# Patient Record
Sex: FEMALE | URBAN - NONMETROPOLITAN AREA
[De-identification: names, ages, dates, MRNs, and addresses within clinical notes are randomized per-mention and may not be internally consistent; named-entity substitution may affect disease eponyms.]

---

## 2019-03-14 ENCOUNTER — HISTORICAL (OUTPATIENT)
Dept: ADMINISTRATIVE | Facility: HOSPITAL | Age: 64
End: 2019-03-14

## 2019-03-26 ENCOUNTER — HISTORICAL (OUTPATIENT)
Dept: ADMINISTRATIVE | Facility: HOSPITAL | Age: 64
End: 2019-03-26

## 2019-04-18 ENCOUNTER — HISTORICAL (OUTPATIENT)
Dept: RADIOLOGY | Facility: HOSPITAL | Age: 64
End: 2019-04-18

## 2019-09-10 ENCOUNTER — HISTORICAL (OUTPATIENT)
Dept: ADMINISTRATIVE | Facility: HOSPITAL | Age: 64
End: 2019-09-10

## 2020-01-24 ENCOUNTER — HISTORICAL (OUTPATIENT)
Dept: RADIOLOGY | Facility: HOSPITAL | Age: 65
End: 2020-01-24

## 2020-02-07 ENCOUNTER — HISTORICAL (OUTPATIENT)
Dept: RADIOLOGY | Facility: HOSPITAL | Age: 65
End: 2020-02-07

## 2020-04-20 ENCOUNTER — HISTORICAL (OUTPATIENT)
Dept: LAB | Facility: HOSPITAL | Age: 65
End: 2020-04-20

## 2020-04-20 LAB
ALBUMIN SERPL-MCNC: 3 GM/DL (ref 3.4–5)
ALBUMIN/GLOB SERPL: 0.6 RATIO
ALP SERPL-CCNC: 434 UNIT/L (ref 30–113)
ALT SERPL-CCNC: 56 UNIT/L (ref 10–45)
AST SERPL-CCNC: 26 UNIT/L (ref 15–37)
BILIRUB SERPL-MCNC: 0.4 MG/DL (ref 0.1–0.9)
BILIRUBIN DIRECT+TOT PNL SERPL-MCNC: 0.2 MG/DL
BILIRUBIN DIRECT+TOT PNL SERPL-MCNC: 0.2 MG/DL (ref 0–0.3)
BUN SERPL-MCNC: 14 MG/DL (ref 10–20)
CALCIUM SERPL-MCNC: 9.5 MG/DL (ref 8–10.5)
CHLORIDE SERPL-SCNC: 105 MMOL/L (ref 100–108)
CHOLEST SERPL-MCNC: 113 MG/DL (ref 140–200)
CHOLEST/HDLC SERPL: 2 MG/DL (ref 0–8)
CO2 SERPL-SCNC: 26 MMOL/L (ref 21–35)
CREAT SERPL-MCNC: 0.88 MG/DL (ref 0.7–1.3)
GLOBULIN SER-MCNC: 4.9 GM/DL
GLUCOSE SERPL-MCNC: 101 MG/DL (ref 75–116)
HDLC SERPL-MCNC: 70 MG/DL (ref 35–59)
LDLC SERPL CALC-MCNC: 35 MG/DL (ref 100–129)
POTASSIUM SERPL-SCNC: 4 MMOL/L (ref 3.6–5.2)
PROT SERPL-MCNC: 7.9 GM/DL (ref 6.4–8.2)
SODIUM SERPL-SCNC: 141 MMOL/L (ref 135–145)
TRIGL SERPL-MCNC: 35 MG/DL (ref 35–150)
VLDLC SERPL CALC-MCNC: 7 MG/DL

## 2020-07-30 ENCOUNTER — HISTORICAL (OUTPATIENT)
Dept: RADIOLOGY | Facility: HOSPITAL | Age: 65
End: 2020-07-30

## 2020-08-12 ENCOUNTER — HISTORICAL (OUTPATIENT)
Dept: RADIOLOGY | Facility: HOSPITAL | Age: 65
End: 2020-08-12

## 2020-09-11 ENCOUNTER — HISTORICAL (OUTPATIENT)
Dept: ADMINISTRATIVE | Facility: HOSPITAL | Age: 65
End: 2020-09-11

## 2020-09-11 ENCOUNTER — HOSPITAL ENCOUNTER (OUTPATIENT)
Dept: MEDSURG UNIT | Facility: HOSPITAL | Age: 65
End: 2020-09-14
Attending: INTERNAL MEDICINE | Admitting: INTERNAL MEDICINE

## 2020-09-11 LAB
ABS NEUT (OLG): 7.5 X10(3)/MCL (ref 1.5–6.9)
ABS NEUT (OLG): 8.35 X10(3)/MCL (ref 2.1–9.2)
ALBUMIN SERPL-MCNC: 2.6 GM/DL (ref 3.4–4.8)
ALBUMIN SERPL-MCNC: 2.6 GM/DL (ref 3.4–5)
ALBUMIN/GLOB SERPL: 0.5 RATIO (ref 1.1–2)
ALBUMIN/GLOB SERPL: 0.5 RATIO (ref 1.1–2)
ALP SERPL-CCNC: 160 UNIT/L (ref 45–117)
ALP SERPL-CCNC: 164 UNIT/L (ref 40–150)
ALT SERPL-CCNC: 13 UNIT/L (ref 12–78)
ALT SERPL-CCNC: 8 UNIT/L (ref 0–55)
ANISOCYTOSIS BLD QL SMEAR: ABNORMAL
ANISOCYTOSIS BLD QL SMEAR: NORMAL
APPEARANCE, UA: CLEAR
APTT PPP: 45 SEC (ref 23.4–34.9)
APTT PPP: 49.4 SECOND(S) (ref 23.3–37)
AST SERPL-CCNC: 13 UNIT/L (ref 5–34)
AST SERPL-CCNC: 22 UNIT/L (ref 15–37)
BACTERIA SPEC CULT: ABNORMAL /HPF
BASOPHILS # BLD AUTO: 0 X10(3)/MCL (ref 0–0.1)
BASOPHILS # BLD AUTO: 0 X10(3)/MCL (ref 0–0.2)
BASOPHILS NFR BLD AUTO: 0 %
BASOPHILS NFR BLD AUTO: 0 % (ref 0–1)
BILIRUB SERPL-MCNC: 0.3 MG/DL
BILIRUB SERPL-MCNC: 0.3 MG/DL (ref 0.2–1)
BILIRUB UR QL STRIP: ABNORMAL
BILIRUBIN DIRECT+TOT PNL SERPL-MCNC: 0.1 MG/DL
BILIRUBIN DIRECT+TOT PNL SERPL-MCNC: 0.1 MG/DL (ref 0–0.8)
BILIRUBIN DIRECT+TOT PNL SERPL-MCNC: 0.2 MG/DL (ref 0–0.2)
BILIRUBIN DIRECT+TOT PNL SERPL-MCNC: 0.2 MG/DL (ref 0–0.5)
BUN SERPL-MCNC: 16 MG/DL (ref 7–18)
BUN SERPL-MCNC: 17 MG/DL (ref 9.8–20.1)
CALCIUM SERPL-MCNC: 9.2 MG/DL (ref 8.5–10.1)
CALCIUM SERPL-MCNC: 9.5 MG/DL (ref 8.4–10.2)
CHLORIDE SERPL-SCNC: 95 MMOL/L (ref 98–107)
CHLORIDE SERPL-SCNC: 95 MMOL/L (ref 98–107)
CO2 SERPL-SCNC: 28 MMOL/L (ref 23–31)
CO2 SERPL-SCNC: 32 MMOL/L (ref 21–32)
COLOR UR: YELLOW
CREAT SERPL-MCNC: 0.88 MG/DL (ref 0.55–1.02)
CREAT SERPL-MCNC: 0.9 MG/DL (ref 0.6–1.3)
CROSSMATCH INTERPRETATION: NORMAL
EOSINOPHIL # BLD AUTO: 0 X10(3)/MCL (ref 0–0.9)
EOSINOPHIL NFR BLD AUTO: 0 %
ERYTHROCYTE [DISTWIDTH] IN BLOOD BY AUTOMATED COUNT: 17.3 % (ref 11.5–14.5)
ERYTHROCYTE [DISTWIDTH] IN BLOOD BY AUTOMATED COUNT: 17.3 % (ref 11.5–17)
FERRITIN SERPL-MCNC: 131.58 NG/ML (ref 4.63–204)
FOLATE SERPL-MCNC: 9.9 NG/ML (ref 7–31.4)
GLOBULIN SER-MCNC: 5.2 GM/DL (ref 2.4–3.5)
GLOBULIN SER-MCNC: 5.7 GM/ML (ref 2.3–3.5)
GLUCOSE (UA): NEGATIVE MG/DL
GLUCOSE SERPL-MCNC: 102 MG/DL (ref 82–115)
GLUCOSE SERPL-MCNC: 114 MG/DL (ref 74–106)
GROUP & RH: NORMAL
HCT VFR BLD AUTO: 24.1 % (ref 35–46)
HCT VFR BLD AUTO: 24.9 % (ref 36–48)
HGB BLD-MCNC: 6.9 GM/DL (ref 12–16)
HGB BLD-MCNC: 7.3 GM/DL (ref 12–16)
HGB UR QL STRIP: NEGATIVE
HYPOCHROMIA BLD QL SMEAR: ABNORMAL
HYPOCHROMIA BLD QL SMEAR: NORMAL
IMM GRANULOCYTES # BLD AUTO: 0.05 10*3/UL
IMM GRANULOCYTES # BLD AUTO: 0.05 10*3/UL (ref 0–0.02)
IMM GRANULOCYTES NFR BLD AUTO: 0 %
IMM GRANULOCYTES NFR BLD AUTO: 0.4 % (ref 0–0.43)
INR PPP: 1 (ref 2–3)
INR PPP: 1.07 (ref 0.9–1.2)
IRON SATN MFR SERPL: 6 % (ref 20–50)
IRON SERPL-MCNC: 16 UG/DL (ref 50–170)
KETONES UR QL STRIP: 15 MG/DL
LEUKOCYTE ESTERASE UR QL STRIP: NEGATIVE
LYMPHOCYTES # BLD AUTO: 2.2 X10(3)/MCL (ref 0.6–4.6)
LYMPHOCYTES # BLD AUTO: 3.2 X10(3)/MCL (ref 0.5–4.1)
LYMPHOCYTES NFR BLD AUTO: 19 %
LYMPHOCYTES NFR BLD AUTO: 27 % (ref 15–40)
MAGNESIUM SERPL-MCNC: 2.24 MG/DL (ref 1.6–2.6)
MCH RBC QN AUTO: 22 PG (ref 27–34)
MCH RBC QN AUTO: 22.6 PG (ref 26–34)
MCHC RBC AUTO-ENTMCNC: 28.6 GM/DL (ref 31–37)
MCHC RBC AUTO-ENTMCNC: 29 GM/DL (ref 31–36)
MCV RBC AUTO: 77 FL (ref 80–99)
MCV RBC AUTO: 79 FL (ref 80–100)
MICROCYTES BLD QL SMEAR: ABNORMAL
MICROCYTES BLD QL SMEAR: NORMAL
MONOCYTES # BLD AUTO: 0.9 X10(3)/MCL (ref 0.1–1.3)
MONOCYTES # BLD AUTO: 1.1 X10(3)/MCL (ref 0–1.1)
MONOCYTES NFR BLD AUTO: 10 % (ref 4–12)
MONOCYTES NFR BLD AUTO: 8 %
NEUTROPHILS # BLD AUTO: 7.5 X10(3)/MCL (ref 1.5–6.9)
NEUTROPHILS # BLD AUTO: 8.35 X10(3)/MCL (ref 2.1–9.2)
NEUTROPHILS NFR BLD AUTO: 63 % (ref 43–75)
NEUTROPHILS NFR BLD AUTO: 73 %
NITRITE UR QL STRIP: NEGATIVE
PH UR STRIP: 5.5 [PH] (ref 4.6–8)
PLATELET # BLD AUTO: 679 X10(3)/MCL (ref 140–400)
PLATELET # BLD AUTO: 747 X10(3)/MCL (ref 130–400)
PLATELET # BLD EST: ABNORMAL 10*3/UL
PLATELET # BLD EST: NORMAL 10*3/UL
PMV BLD AUTO: 8.3 FL (ref 6.8–10)
PMV BLD AUTO: 8.6 FL (ref 7.4–10.4)
POTASSIUM SERPL-SCNC: 2.4 MMOL/L (ref 3.5–5.1)
POTASSIUM SERPL-SCNC: 2.7 MMOL/L (ref 3.5–5.1)
PRODUCT READY: NORMAL
PRODUCT READY: NORMAL
PROT SERPL-MCNC: 7.8 GM/DL (ref 5.8–7.6)
PROT SERPL-MCNC: 8.3 GM/DL (ref 6.4–8.2)
PROT UR QL STRIP: ABNORMAL
PROTHROMBIN TIME: 13.3 SEC (ref 11.7–14.5)
PROTHROMBIN TIME: 13.5 SECOND(S) (ref 11.9–14.4)
RBC # BLD AUTO: 3.05 X10(6)/MCL (ref 4–5.2)
RBC # BLD AUTO: 3.24 X10(6)/MCL (ref 4.2–5.4)
RBC #/AREA URNS HPF: ABNORMAL /HPF
RBC MORPH BLD: ABNORMAL
RBC MORPH BLD: NORMAL
SODIUM SERPL-SCNC: 137 MMOL/L (ref 136–145)
SODIUM SERPL-SCNC: 139 MMOL/L (ref 136–145)
SP GR UR STRIP: 1.01 (ref 1–1.03)
SQUAMOUS EPITHELIAL, UA: ABNORMAL /LPF
TIBC SERPL-MCNC: 232 UG/DL (ref 70–310)
TIBC SERPL-MCNC: 248 UG/DL (ref 250–450)
TRANSFUSION ORDER: NORMAL
TROPONIN I SERPL-MCNC: 0.02 NG/ML (ref 0–0.04)
UROBILINOGEN UR STRIP-ACNC: 1 EU/DL
VIT B12 SERPL-MCNC: 899 PG/ML (ref 213–816)
WBC # SPEC AUTO: 11.5 X10(3)/MCL (ref 4.5–11)
WBC # SPEC AUTO: 11.9 X10(3)/MCL (ref 4.5–11.5)
WBC #/AREA URNS HPF: ABNORMAL /HPF

## 2020-09-12 LAB
ABS NEUT (OLG): 7.3 X10(3)/MCL (ref 1.5–6.9)
ALBUMIN SERPL-MCNC: 2.4 GM/DL (ref 3.4–4.8)
ALBUMIN/GLOB SERPL: 0.5 RATIO (ref 1.1–2)
ALP SERPL-CCNC: 154 UNIT/L (ref 40–150)
ALT SERPL-CCNC: 7 UNIT/L (ref 0–55)
AST SERPL-CCNC: 12 UNIT/L (ref 5–34)
BASOPHILS # BLD AUTO: 0 X10(3)/MCL (ref 0–0.1)
BASOPHILS NFR BLD AUTO: 0 % (ref 0–1)
BILIRUB SERPL-MCNC: 0.6 MG/DL
BILIRUBIN DIRECT+TOT PNL SERPL-MCNC: 0.3 MG/DL (ref 0–0.5)
BILIRUBIN DIRECT+TOT PNL SERPL-MCNC: 0.3 MG/DL (ref 0–0.8)
BUN SERPL-MCNC: 16 MG/DL (ref 9.8–20.1)
CALCIUM SERPL-MCNC: 9.3 MG/DL (ref 8.4–10.2)
CHLORIDE SERPL-SCNC: 94 MMOL/L (ref 98–107)
CO2 SERPL-SCNC: 31 MMOL/L (ref 23–31)
CREAT SERPL-MCNC: 0.68 MG/DL (ref 0.55–1.02)
EOSINOPHIL # BLD AUTO: 0 X10(3)/MCL (ref 0–0.6)
EOSINOPHIL NFR BLD AUTO: 0 % (ref 0–5)
ERYTHROCYTE [DISTWIDTH] IN BLOOD BY AUTOMATED COUNT: 17.1 % (ref 11.5–17)
GLOBULIN SER-MCNC: 4.9 GM/DL (ref 2.4–3.5)
GLUCOSE SERPL-MCNC: 82 MG/DL (ref 82–115)
HCT VFR BLD AUTO: 26.7 % (ref 36–48)
HGB BLD-MCNC: 8 GM/DL (ref 12–16)
IMM GRANULOCYTES # BLD AUTO: 0.05 10*3/UL (ref 0–0.02)
IMM GRANULOCYTES NFR BLD AUTO: 0.4 % (ref 0–0.43)
LYMPHOCYTES # BLD AUTO: 3.2 X10(3)/MCL (ref 0.5–4.1)
LYMPHOCYTES NFR BLD AUTO: 27 % (ref 15–40)
MCH RBC QN AUTO: 23 PG (ref 27–34)
MCHC RBC AUTO-ENTMCNC: 30 GM/DL (ref 31–36)
MCV RBC AUTO: 76 FL (ref 80–99)
MONOCYTES # BLD AUTO: 1.2 X10(3)/MCL (ref 0–1.1)
MONOCYTES NFR BLD AUTO: 10 % (ref 4–12)
NEUTROPHILS # BLD AUTO: 7.3 X10(3)/MCL (ref 1.5–6.9)
NEUTROPHILS NFR BLD AUTO: 62 % (ref 43–75)
PLATELET # BLD AUTO: 596 X10(3)/MCL (ref 140–400)
PMV BLD AUTO: 8.1 FL (ref 6.8–10)
POTASSIUM SERPL-SCNC: 2.6 MMOL/L (ref 3.5–5.1)
PROT SERPL-MCNC: 7.3 GM/DL (ref 5.8–7.6)
RBC # BLD AUTO: 3.49 X10(6)/MCL (ref 4.2–5.4)
SODIUM SERPL-SCNC: 138 MMOL/L (ref 136–145)
WBC # SPEC AUTO: 11.8 X10(3)/MCL (ref 4.5–11.5)

## 2020-09-13 LAB
ABS NEUT (OLG): 10.6 X10(3)/MCL (ref 1.5–6.9)
ALBUMIN SERPL-MCNC: 2.2 GM/DL (ref 3.4–4.8)
ALBUMIN/GLOB SERPL: 0.5 RATIO (ref 1.1–2)
ALP SERPL-CCNC: 161 UNIT/L (ref 40–150)
ALT SERPL-CCNC: 7 UNIT/L (ref 0–55)
AST SERPL-CCNC: 10 UNIT/L (ref 5–34)
BASOPHILS # BLD AUTO: 0 X10(3)/MCL (ref 0–0.1)
BASOPHILS NFR BLD AUTO: 0 % (ref 0–1)
BILIRUB SERPL-MCNC: 0.5 MG/DL
BILIRUBIN DIRECT+TOT PNL SERPL-MCNC: 0.2 MG/DL (ref 0–0.8)
BILIRUBIN DIRECT+TOT PNL SERPL-MCNC: 0.3 MG/DL (ref 0–0.5)
BUN SERPL-MCNC: 8 MG/DL (ref 9.8–20.1)
CALCIUM SERPL-MCNC: 9.5 MG/DL (ref 8.4–10.2)
CHLORIDE SERPL-SCNC: 95 MMOL/L (ref 98–107)
CO2 SERPL-SCNC: 30 MMOL/L (ref 23–31)
CREAT SERPL-MCNC: 0.54 MG/DL (ref 0.55–1.02)
ERYTHROCYTE [DISTWIDTH] IN BLOOD BY AUTOMATED COUNT: 17.3 % (ref 11.5–17)
GLOBULIN SER-MCNC: 4.5 GM/DL (ref 2.4–3.5)
GLUCOSE SERPL-MCNC: 108 MG/DL (ref 82–115)
HCT VFR BLD AUTO: 26.9 % (ref 36–48)
HGB BLD-MCNC: 8.5 GM/DL (ref 12–16)
IMM GRANULOCYTES # BLD AUTO: 0.07 10*3/UL (ref 0–0.02)
IMM GRANULOCYTES NFR BLD AUTO: 0.5 % (ref 0–0.43)
LYMPHOCYTES # BLD AUTO: 2.2 X10(3)/MCL (ref 0.5–4.1)
LYMPHOCYTES NFR BLD AUTO: 15 % (ref 15–40)
MAGNESIUM SERPL-MCNC: 2.02 MG/DL (ref 1.6–2.6)
MCH RBC QN AUTO: 25 PG (ref 27–34)
MCHC RBC AUTO-ENTMCNC: 32 GM/DL (ref 31–36)
MCV RBC AUTO: 78 FL (ref 80–99)
MONOCYTES # BLD AUTO: 1.7 X10(3)/MCL (ref 0–1.1)
MONOCYTES NFR BLD AUTO: 12 % (ref 4–12)
NEUTROPHILS # BLD AUTO: 10.6 X10(3)/MCL (ref 1.5–6.9)
NEUTROPHILS NFR BLD AUTO: 73 % (ref 43–75)
PLATELET # BLD AUTO: 542 X10(3)/MCL (ref 140–400)
PMV BLD AUTO: 8 FL (ref 6.8–10)
POTASSIUM SERPL-SCNC: 2.9 MMOL/L (ref 3.5–5.1)
PROT SERPL-MCNC: 6.7 GM/DL (ref 5.8–7.6)
RBC # BLD AUTO: 3.46 X10(6)/MCL (ref 4.2–5.4)
SODIUM SERPL-SCNC: 137 MMOL/L (ref 136–145)
WBC # SPEC AUTO: 14.5 X10(3)/MCL (ref 4.5–11.5)

## 2020-09-14 LAB
ABS NEUT (OLG): 8.5 X10(3)/MCL (ref 1.5–6.9)
ALBUMIN SERPL-MCNC: 2.1 GM/DL (ref 3.4–4.8)
ALBUMIN/GLOB SERPL: 0.4 RATIO (ref 1.1–2)
ALP SERPL-CCNC: 176 UNIT/L (ref 40–150)
ALT SERPL-CCNC: 5 UNIT/L (ref 0–55)
AST SERPL-CCNC: 11 UNIT/L (ref 5–34)
BASOPHILS # BLD AUTO: 0 X10(3)/MCL (ref 0–0.1)
BASOPHILS NFR BLD AUTO: 0 % (ref 0–1)
BILIRUB SERPL-MCNC: 0.5 MG/DL
BILIRUBIN DIRECT+TOT PNL SERPL-MCNC: 0.2 MG/DL (ref 0–0.8)
BILIRUBIN DIRECT+TOT PNL SERPL-MCNC: 0.3 MG/DL (ref 0–0.5)
BUN SERPL-MCNC: 7 MG/DL (ref 9.8–20.1)
CALCIUM SERPL-MCNC: 9.5 MG/DL (ref 8.4–10.2)
CHLORIDE SERPL-SCNC: 93 MMOL/L (ref 98–107)
CO2 SERPL-SCNC: 30 MMOL/L (ref 23–31)
CREAT SERPL-MCNC: 0.52 MG/DL (ref 0.55–1.02)
EOSINOPHIL # BLD AUTO: 0 X10(3)/MCL (ref 0–0.6)
EOSINOPHIL NFR BLD AUTO: 0 % (ref 0–5)
ERYTHROCYTE [DISTWIDTH] IN BLOOD BY AUTOMATED COUNT: 17.9 % (ref 11.5–17)
GLOBULIN SER-MCNC: 4.9 GM/DL (ref 2.4–3.5)
GLUCOSE SERPL-MCNC: 94 MG/DL (ref 82–115)
HCT VFR BLD AUTO: 28.1 % (ref 36–48)
HGB BLD-MCNC: 8.7 GM/DL (ref 12–16)
IMM GRANULOCYTES # BLD AUTO: 0.07 10*3/UL (ref 0–0.02)
IMM GRANULOCYTES NFR BLD AUTO: 0.5 % (ref 0–0.43)
LYMPHOCYTES # BLD AUTO: 2.8 X10(3)/MCL (ref 0.5–4.1)
LYMPHOCYTES NFR BLD AUTO: 21 % (ref 15–40)
MAGNESIUM SERPL-MCNC: 1.98 MG/DL (ref 1.6–2.6)
MCH RBC QN AUTO: 24 PG (ref 27–34)
MCHC RBC AUTO-ENTMCNC: 31 GM/DL (ref 31–36)
MCV RBC AUTO: 79 FL (ref 80–99)
MONOCYTES # BLD AUTO: 1.5 X10(3)/MCL (ref 0–1.1)
MONOCYTES NFR BLD AUTO: 12 % (ref 4–12)
NEUTROPHILS # BLD AUTO: 8.5 X10(3)/MCL (ref 1.5–6.9)
NEUTROPHILS NFR BLD AUTO: 66 % (ref 43–75)
PLATELET # BLD AUTO: 615 X10(3)/MCL (ref 140–400)
PMV BLD AUTO: 8.4 FL (ref 6.8–10)
POTASSIUM SERPL-SCNC: 3.1 MMOL/L (ref 3.5–5.1)
PROT SERPL-MCNC: 7 GM/DL (ref 5.8–7.6)
RBC # BLD AUTO: 3.56 X10(6)/MCL (ref 4.2–5.4)
SODIUM SERPL-SCNC: 136 MMOL/L (ref 136–145)
WBC # SPEC AUTO: 12.9 X10(3)/MCL (ref 4.5–11.5)

## 2020-09-18 ENCOUNTER — HISTORICAL (OUTPATIENT)
Dept: ADMINISTRATIVE | Facility: HOSPITAL | Age: 65
End: 2020-09-18

## 2020-09-22 ENCOUNTER — HISTORICAL (OUTPATIENT)
Dept: SURGERY | Facility: HOSPITAL | Age: 65
End: 2020-09-22

## 2020-10-22 ENCOUNTER — HISTORICAL (OUTPATIENT)
Dept: ADMINISTRATIVE | Facility: HOSPITAL | Age: 65
End: 2020-10-22

## 2020-10-22 LAB
ABS NEUT (OLG): 8.88 X10(3)/MCL (ref 2.1–9.2)
ALBUMIN SERPL-MCNC: 2.5 GM/DL (ref 3.4–4.8)
ALBUMIN/GLOB SERPL: 0.3 RATIO (ref 1.1–2)
ALP SERPL-CCNC: 146 UNIT/L (ref 40–150)
ALT SERPL-CCNC: 9 UNIT/L (ref 0–55)
AST SERPL-CCNC: 17 UNIT/L (ref 5–34)
BASOPHILS # BLD AUTO: 0 X10(3)/MCL (ref 0–0.2)
BASOPHILS NFR BLD AUTO: 0 %
BILIRUB SERPL-MCNC: 0.2 MG/DL
BILIRUBIN DIRECT+TOT PNL SERPL-MCNC: -0.1 MG/DL (ref 0–0.8)
BILIRUBIN DIRECT+TOT PNL SERPL-MCNC: 0.3 MG/DL (ref 0–0.5)
BUN SERPL-MCNC: 27 MG/DL (ref 9.8–20.1)
CALCIUM SERPL-MCNC: 10 MG/DL (ref 8.4–10.2)
CHLORIDE SERPL-SCNC: 96 MMOL/L (ref 98–107)
CO2 SERPL-SCNC: 30 MMOL/L (ref 23–31)
CREAT SERPL-MCNC: 0.83 MG/DL (ref 0.55–1.02)
CRP SERPL-MCNC: 17.74 MG/DL
EOSINOPHIL # BLD AUTO: 0 X10(3)/MCL (ref 0–0.9)
EOSINOPHIL NFR BLD AUTO: 0 %
ERYTHROCYTE [DISTWIDTH] IN BLOOD BY AUTOMATED COUNT: 18.6 % (ref 11.5–14.5)
ERYTHROCYTE [SEDIMENTATION RATE] IN BLOOD: 110 MM/HR (ref 0–20)
FERRITIN SERPL-MCNC: 248.95 NG/ML (ref 4.63–204)
GLOBULIN SER-MCNC: 7.3 GM/DL (ref 2.4–3.5)
GLUCOSE SERPL-MCNC: 102 MG/DL (ref 82–115)
HAPTOGLOB SERPL-MCNC: 675 MG/DL (ref 63–273)
HCT VFR BLD AUTO: 26.9 % (ref 35–46)
HGB BLD-MCNC: 8 GM/DL (ref 12–16)
IMM GRANULOCYTES # BLD AUTO: 0.05 10*3/UL
IMM GRANULOCYTES NFR BLD AUTO: 0 %
IRON SATN MFR SERPL: 6 % (ref 20–50)
IRON SERPL-MCNC: 13 UG/DL (ref 50–170)
LDH SERPL-CCNC: 511 UNIT/L (ref 140–271)
LYMPHOCYTES # BLD AUTO: 2.4 X10(3)/MCL (ref 0.6–4.6)
LYMPHOCYTES NFR BLD AUTO: 19 %
MAGNESIUM SERPL-MCNC: 2.27 MG/DL (ref 1.6–2.6)
MCH RBC QN AUTO: 22.9 PG (ref 26–34)
MCHC RBC AUTO-ENTMCNC: 29.7 GM/DL (ref 31–37)
MCV RBC AUTO: 77.1 FL (ref 80–100)
MONOCYTES # BLD AUTO: 1.2 X10(3)/MCL (ref 0.1–1.3)
MONOCYTES NFR BLD AUTO: 9 %
NEUTROPHILS # BLD AUTO: 8.88 X10(3)/MCL (ref 2.1–9.2)
NEUTROPHILS NFR BLD AUTO: 71 %
PLATELET # BLD AUTO: 741 X10(3)/MCL (ref 130–400)
PMV BLD AUTO: 8.4 FL (ref 7.4–10.4)
POTASSIUM SERPL-SCNC: 3.4 MMOL/L (ref 3.5–5.1)
PROT SERPL-MCNC: 9.3 GM/DL
PROT SERPL-MCNC: 9.8 GM/DL (ref 5.8–7.6)
RBC # BLD AUTO: 3.49 X10(6)/MCL (ref 4–5.2)
RET# (OHS): 0.03 X10(6)/MCL (ref 0.02–0.08)
RETICULOCYTE COUNT AUTOMATED (OLG): 0.8 % (ref 0.5–1.5)
SODIUM SERPL-SCNC: 135 MMOL/L (ref 136–145)
TIBC SERPL-MCNC: 201 UG/DL (ref 70–310)
TIBC SERPL-MCNC: 214 UG/DL (ref 250–450)
TRANSFERRIN SERPL-MCNC: 188 MG/DL (ref 173–360)
WBC # SPEC AUTO: 12.5 X10(3)/MCL (ref 4.5–11)

## 2020-10-26 ENCOUNTER — HISTORICAL (OUTPATIENT)
Dept: INFUSION THERAPY | Facility: HOSPITAL | Age: 65
End: 2020-10-26

## 2020-10-29 ENCOUNTER — HISTORICAL (OUTPATIENT)
Dept: HEMATOLOGY/ONCOLOGY | Facility: CLINIC | Age: 65
End: 2020-10-29

## 2020-10-29 LAB
ABS NEUT (OLG): 10.75 X10(3)/MCL (ref 2.1–9.2)
ALBUMIN SERPL-MCNC: 2.4 GM/DL (ref 3.4–4.8)
ALBUMIN/GLOB SERPL: 0.3 RATIO (ref 1.1–2)
ALP SERPL-CCNC: 155 UNIT/L (ref 40–150)
ALT SERPL-CCNC: 14 UNIT/L (ref 0–55)
AST SERPL-CCNC: 21 UNIT/L (ref 5–34)
BASOPHILS # BLD AUTO: 0 X10(3)/MCL (ref 0–0.2)
BASOPHILS NFR BLD AUTO: 0 %
BILIRUB SERPL-MCNC: 0.3 MG/DL
BILIRUBIN DIRECT+TOT PNL SERPL-MCNC: 0.1 MG/DL (ref 0–0.8)
BILIRUBIN DIRECT+TOT PNL SERPL-MCNC: 0.2 MG/DL (ref 0–0.5)
BUN SERPL-MCNC: 18 MG/DL (ref 9.8–20.1)
CALCIUM SERPL-MCNC: 10 MG/DL (ref 8.4–10.2)
CHLORIDE SERPL-SCNC: 103 MMOL/L (ref 98–107)
CO2 SERPL-SCNC: 25 MMOL/L (ref 23–31)
CREAT SERPL-MCNC: 0.84 MG/DL (ref 0.55–1.02)
EOSINOPHIL # BLD AUTO: 0 X10(3)/MCL (ref 0–0.9)
EOSINOPHIL NFR BLD AUTO: 0 %
ERYTHROCYTE [DISTWIDTH] IN BLOOD BY AUTOMATED COUNT: 19.5 % (ref 11.5–14.5)
GLOBULIN SER-MCNC: 7.2 GM/DL (ref 2.4–3.5)
GLUCOSE SERPL-MCNC: 108 MG/DL (ref 82–115)
HCT VFR BLD AUTO: 26.1 % (ref 35–46)
HGB BLD-MCNC: 7.7 GM/DL (ref 12–16)
IMM GRANULOCYTES # BLD AUTO: 0.09 10*3/UL
IMM GRANULOCYTES NFR BLD AUTO: 1 %
LYMPHOCYTES # BLD AUTO: 3.8 X10(3)/MCL (ref 0.6–4.6)
LYMPHOCYTES NFR BLD AUTO: 24 %
MAGNESIUM SERPL-MCNC: 2.25 MG/DL (ref 1.6–2.6)
MCH RBC QN AUTO: 23.1 PG (ref 26–34)
MCHC RBC AUTO-ENTMCNC: 29.5 GM/DL (ref 31–37)
MCV RBC AUTO: 78.1 FL (ref 80–100)
MONOCYTES # BLD AUTO: 1.5 X10(3)/MCL (ref 0.1–1.3)
MONOCYTES NFR BLD AUTO: 9 %
NEUTROPHILS # BLD AUTO: 10.75 X10(3)/MCL (ref 2.1–9.2)
NEUTROPHILS NFR BLD AUTO: 66 %
PLATELET # BLD AUTO: 763 X10(3)/MCL (ref 130–400)
PMV BLD AUTO: 8.5 FL (ref 7.4–10.4)
POTASSIUM SERPL-SCNC: 4.7 MMOL/L (ref 3.5–5.1)
PROT SERPL-MCNC: 9.6 GM/DL (ref 5.8–7.6)
RBC # BLD AUTO: 3.34 X10(6)/MCL (ref 4–5.2)
SODIUM SERPL-SCNC: 136 MMOL/L (ref 136–145)
WBC # SPEC AUTO: 16.3 X10(3)/MCL (ref 4.5–11)

## 2020-11-02 ENCOUNTER — HISTORICAL (OUTPATIENT)
Dept: RADIOLOGY | Facility: HOSPITAL | Age: 65
End: 2020-11-02

## 2020-11-03 ENCOUNTER — HISTORICAL (OUTPATIENT)
Dept: INFUSION THERAPY | Facility: HOSPITAL | Age: 65
End: 2020-11-03

## 2020-11-06 ENCOUNTER — HISTORICAL (OUTPATIENT)
Dept: RADIOLOGY | Facility: HOSPITAL | Age: 65
End: 2020-11-06

## 2020-11-24 ENCOUNTER — HISTORICAL (OUTPATIENT)
Dept: RADIATION THERAPY | Facility: HOSPITAL | Age: 65
End: 2020-11-24

## 2020-11-25 ENCOUNTER — HISTORICAL (OUTPATIENT)
Dept: INFUSION THERAPY | Facility: HOSPITAL | Age: 65
End: 2020-11-25

## 2020-11-25 LAB
ABS NEUT (OLG): 8.68 X10(3)/MCL (ref 2.1–9.2)
ALBUMIN SERPL-MCNC: 2.4 GM/DL (ref 3.4–4.8)
ALBUMIN/GLOB SERPL: 0.3 RATIO (ref 1.1–2)
ALP SERPL-CCNC: 542 UNIT/L (ref 40–150)
ALT SERPL-CCNC: 24 UNIT/L (ref 0–55)
AST SERPL-CCNC: 26 UNIT/L (ref 5–34)
BASOPHILS # BLD AUTO: 0 X10(3)/MCL (ref 0–0.2)
BASOPHILS NFR BLD AUTO: 0 %
BILIRUB SERPL-MCNC: 0.3 MG/DL
BILIRUBIN DIRECT+TOT PNL SERPL-MCNC: 0.1 MG/DL (ref 0–0.8)
BILIRUBIN DIRECT+TOT PNL SERPL-MCNC: 0.2 MG/DL (ref 0–0.5)
BUN SERPL-MCNC: 21 MG/DL (ref 9.8–20.1)
CALCIUM SERPL-MCNC: 10.3 MG/DL (ref 8.4–10.2)
CHLORIDE SERPL-SCNC: 104 MMOL/L (ref 98–107)
CO2 SERPL-SCNC: 25 MMOL/L (ref 23–31)
CREAT SERPL-MCNC: 0.79 MG/DL (ref 0.55–1.02)
EOSINOPHIL # BLD AUTO: 0 X10(3)/MCL (ref 0–0.9)
EOSINOPHIL NFR BLD AUTO: 0 %
ERYTHROCYTE [DISTWIDTH] IN BLOOD BY AUTOMATED COUNT: 23.1 % (ref 11.5–14.5)
GLOBULIN SER-MCNC: 7 GM/DL (ref 2.4–3.5)
GLUCOSE SERPL-MCNC: 134 MG/DL (ref 82–115)
HCT VFR BLD AUTO: 27.1 % (ref 35–46)
HGB BLD-MCNC: 8 GM/DL (ref 12–16)
IMM GRANULOCYTES # BLD AUTO: 0.05 10*3/UL
IMM GRANULOCYTES NFR BLD AUTO: 0 %
LYMPHOCYTES # BLD AUTO: 2.2 X10(3)/MCL (ref 0.6–4.6)
LYMPHOCYTES NFR BLD AUTO: 18 %
MAGNESIUM SERPL-MCNC: 2.43 MG/DL (ref 1.6–2.6)
MCH RBC QN AUTO: 24.6 PG (ref 26–34)
MCHC RBC AUTO-ENTMCNC: 29.5 GM/DL (ref 31–37)
MCV RBC AUTO: 83.4 FL (ref 80–100)
MONOCYTES # BLD AUTO: 1.3 X10(3)/MCL (ref 0.1–1.3)
MONOCYTES NFR BLD AUTO: 11 %
NEUTROPHILS # BLD AUTO: 8.68 X10(3)/MCL (ref 2.1–9.2)
NEUTROPHILS NFR BLD AUTO: 70 %
PLATELET # BLD AUTO: 799 X10(3)/MCL (ref 130–400)
PMV BLD AUTO: 8.4 FL (ref 7.4–10.4)
POTASSIUM SERPL-SCNC: 3.5 MMOL/L (ref 3.5–5.1)
PROT SERPL-MCNC: 9.4 GM/DL (ref 5.8–7.6)
PTH-INTACT SERPL-MCNC: 32.9 PG/ML (ref 18.4–80.1)
RBC # BLD AUTO: 3.25 X10(6)/MCL (ref 4–5.2)
SODIUM SERPL-SCNC: 140 MMOL/L (ref 136–145)
WBC # SPEC AUTO: 12.3 X10(3)/MCL (ref 4.5–11)

## 2020-11-27 ENCOUNTER — HISTORICAL (OUTPATIENT)
Dept: ADMINISTRATIVE | Facility: HOSPITAL | Age: 65
End: 2020-11-27

## 2020-11-27 LAB
ABS NEUT (OLG): 8.46 X10(3)/MCL (ref 2.1–9.2)
ALBUMIN SERPL-MCNC: 2.1 GM/DL (ref 3.4–4.8)
ALBUMIN/GLOB SERPL: 0.3 RATIO (ref 1.1–2)
ALP SERPL-CCNC: 433 UNIT/L (ref 40–150)
ALT SERPL-CCNC: 19 UNIT/L (ref 0–55)
AST SERPL-CCNC: 21 UNIT/L (ref 5–34)
BASOPHILS # BLD AUTO: 0 X10(3)/MCL (ref 0–0.2)
BASOPHILS NFR BLD AUTO: 0 %
BILIRUB SERPL-MCNC: 0.3 MG/DL
BILIRUBIN DIRECT+TOT PNL SERPL-MCNC: 0.1 MG/DL (ref 0–0.8)
BILIRUBIN DIRECT+TOT PNL SERPL-MCNC: 0.2 MG/DL (ref 0–0.5)
BUN SERPL-MCNC: 6 MG/DL (ref 9.8–20.1)
CALCIUM SERPL-MCNC: 8.7 MG/DL (ref 8.4–10.2)
CHLORIDE SERPL-SCNC: 104 MMOL/L (ref 98–107)
CO2 SERPL-SCNC: 23 MMOL/L (ref 23–31)
CREAT SERPL-MCNC: 0.68 MG/DL (ref 0.55–1.02)
EOSINOPHIL # BLD AUTO: 0 X10(3)/MCL (ref 0–0.9)
EOSINOPHIL NFR BLD AUTO: 0 %
ERYTHROCYTE [DISTWIDTH] IN BLOOD BY AUTOMATED COUNT: 22.5 % (ref 11.5–14.5)
GLOBULIN SER-MCNC: 6.2 GM/DL (ref 2.4–3.5)
GLUCOSE SERPL-MCNC: 155 MG/DL (ref 82–115)
HCT VFR BLD AUTO: 24.9 % (ref 35–46)
HGB BLD-MCNC: 7.3 GM/DL (ref 12–16)
IMM GRANULOCYTES # BLD AUTO: 0.06 10*3/UL
IMM GRANULOCYTES NFR BLD AUTO: 0 %
LYMPHOCYTES # BLD AUTO: 2.2 X10(3)/MCL (ref 0.6–4.6)
LYMPHOCYTES NFR BLD AUTO: 19 %
MAGNESIUM SERPL-MCNC: 1.97 MG/DL (ref 1.6–2.6)
MCH RBC QN AUTO: 23.9 PG (ref 26–34)
MCHC RBC AUTO-ENTMCNC: 29.3 GM/DL (ref 31–37)
MCV RBC AUTO: 81.6 FL (ref 80–100)
MONOCYTES # BLD AUTO: 1.1 X10(3)/MCL (ref 0.1–1.3)
MONOCYTES NFR BLD AUTO: 10 %
NEUTROPHILS # BLD AUTO: 8.46 X10(3)/MCL (ref 2.1–9.2)
NEUTROPHILS NFR BLD AUTO: 71 %
PLATELET # BLD AUTO: 731 X10(3)/MCL (ref 130–400)
PMV BLD AUTO: 8.1 FL (ref 7.4–10.4)
POTASSIUM SERPL-SCNC: 3.8 MMOL/L (ref 3.5–5.1)
PROT SERPL-MCNC: 8.3 GM/DL (ref 5.8–7.6)
RBC # BLD AUTO: 3.05 X10(6)/MCL (ref 4–5.2)
SODIUM SERPL-SCNC: 136 MMOL/L (ref 136–145)
WBC # SPEC AUTO: 11.9 X10(3)/MCL (ref 4.5–11)

## 2020-11-30 ENCOUNTER — HISTORICAL (OUTPATIENT)
Dept: INFUSION THERAPY | Facility: HOSPITAL | Age: 65
End: 2020-11-30

## 2020-11-30 ENCOUNTER — HISTORICAL (OUTPATIENT)
Dept: RADIATION THERAPY | Facility: HOSPITAL | Age: 65
End: 2020-11-30

## 2020-11-30 LAB
CROSSMATCH INTERPRETATION: NORMAL
PRODUCT READY: NORMAL
TRANSFUSION ORDER: NORMAL

## 2020-12-03 ENCOUNTER — HISTORICAL (OUTPATIENT)
Dept: RADIATION THERAPY | Facility: HOSPITAL | Age: 65
End: 2020-12-03

## 2020-12-10 ENCOUNTER — HISTORICAL (OUTPATIENT)
Dept: ADMINISTRATIVE | Facility: HOSPITAL | Age: 65
End: 2020-12-10

## 2020-12-10 LAB
ABS NEUT (OLG): 7.67 X10(3)/MCL (ref 2.1–9.2)
ALBUMIN SERPL-MCNC: 2.4 GM/DL (ref 3.4–4.8)
ALBUMIN/GLOB SERPL: 0.4 RATIO (ref 1.1–2)
ALP SERPL-CCNC: 395 UNIT/L (ref 40–150)
ALT SERPL-CCNC: 19 UNIT/L (ref 0–55)
AST SERPL-CCNC: 17 UNIT/L (ref 5–34)
BASOPHILS # BLD AUTO: 0 X10(3)/MCL (ref 0–0.2)
BASOPHILS NFR BLD AUTO: 0 %
BILIRUB SERPL-MCNC: 0.3 MG/DL
BILIRUBIN DIRECT+TOT PNL SERPL-MCNC: 0.1 MG/DL (ref 0–0.8)
BILIRUBIN DIRECT+TOT PNL SERPL-MCNC: 0.2 MG/DL (ref 0–0.5)
BUN SERPL-MCNC: 13 MG/DL (ref 9.8–20.1)
CALCIUM SERPL-MCNC: 9.3 MG/DL (ref 8.4–10.2)
CHLORIDE SERPL-SCNC: 107 MMOL/L (ref 98–107)
CO2 SERPL-SCNC: 23 MMOL/L (ref 23–31)
CREAT SERPL-MCNC: 0.71 MG/DL (ref 0.55–1.02)
EOSINOPHIL # BLD AUTO: 0.1 X10(3)/MCL (ref 0–0.9)
EOSINOPHIL NFR BLD AUTO: 0 %
ERYTHROCYTE [DISTWIDTH] IN BLOOD BY AUTOMATED COUNT: 19.9 % (ref 11.5–14.5)
FERRITIN SERPL-MCNC: 1255.63 NG/ML (ref 4.63–204)
GLOBULIN SER-MCNC: 6.1 GM/DL (ref 2.4–3.5)
GLUCOSE SERPL-MCNC: 118 MG/DL (ref 82–115)
HCT VFR BLD AUTO: 33.7 % (ref 35–46)
HGB BLD-MCNC: 9.7 GM/DL (ref 12–16)
IMM GRANULOCYTES # BLD AUTO: 0.04 10*3/UL
IMM GRANULOCYTES NFR BLD AUTO: 0 %
IRON SATN MFR SERPL: 9 % (ref 20–50)
IRON SERPL-MCNC: 15 UG/DL (ref 50–170)
LYMPHOCYTES # BLD AUTO: 2.8 X10(3)/MCL (ref 0.6–4.6)
LYMPHOCYTES NFR BLD AUTO: 24 %
MCH RBC QN AUTO: 24.9 PG (ref 26–34)
MCHC RBC AUTO-ENTMCNC: 28.8 GM/DL (ref 31–37)
MCV RBC AUTO: 86.6 FL (ref 80–100)
MONOCYTES # BLD AUTO: 1.4 X10(3)/MCL (ref 0.1–1.3)
MONOCYTES NFR BLD AUTO: 12 %
NEUTROPHILS # BLD AUTO: 7.67 X10(3)/MCL (ref 2.1–9.2)
NEUTROPHILS NFR BLD AUTO: 64 %
PLATELET # BLD AUTO: 588 X10(3)/MCL (ref 130–400)
PMV BLD AUTO: 8.3 FL (ref 7.4–10.4)
POTASSIUM SERPL-SCNC: 3.9 MMOL/L (ref 3.5–5.1)
PROT SERPL-MCNC: 8.5 GM/DL (ref 5.8–7.6)
RBC # BLD AUTO: 3.89 X10(6)/MCL (ref 4–5.2)
SODIUM SERPL-SCNC: 138 MMOL/L (ref 136–145)
TIBC SERPL-MCNC: 157 UG/DL (ref 70–310)
TIBC SERPL-MCNC: 172 UG/DL (ref 250–450)
TRANSFERRIN SERPL-MCNC: 147 MG/DL (ref 173–360)
WBC # SPEC AUTO: 12 X10(3)/MCL (ref 4.5–11)

## 2020-12-16 ENCOUNTER — HISTORICAL (OUTPATIENT)
Dept: INFUSION THERAPY | Facility: HOSPITAL | Age: 65
End: 2020-12-16

## 2020-12-16 LAB
ABS NEUT (OLG): 10.68 X10(3)/MCL (ref 2.1–9.2)
ALBUMIN SERPL-MCNC: 2.5 GM/DL (ref 3.4–4.8)
ALBUMIN/GLOB SERPL: 0.4 RATIO (ref 1.1–2)
ALP SERPL-CCNC: 435 UNIT/L (ref 40–150)
ALT SERPL-CCNC: 32 UNIT/L (ref 0–55)
AST SERPL-CCNC: 25 UNIT/L (ref 5–34)
BASOPHILS # BLD AUTO: 0.1 X10(3)/MCL (ref 0–0.2)
BASOPHILS NFR BLD AUTO: 0 %
BILIRUB SERPL-MCNC: 0.5 MG/DL
BILIRUBIN DIRECT+TOT PNL SERPL-MCNC: 0.1 MG/DL (ref 0–0.8)
BILIRUBIN DIRECT+TOT PNL SERPL-MCNC: 0.4 MG/DL (ref 0–0.5)
BUN SERPL-MCNC: 10 MG/DL (ref 9.8–20.1)
CALCIUM SERPL-MCNC: 9.5 MG/DL (ref 8.4–10.2)
CHLORIDE SERPL-SCNC: 104 MMOL/L (ref 98–107)
CO2 SERPL-SCNC: 22 MMOL/L (ref 23–31)
CREAT SERPL-MCNC: 0.7 MG/DL (ref 0.55–1.02)
EOSINOPHIL # BLD AUTO: 0 X10(3)/MCL (ref 0–0.9)
EOSINOPHIL NFR BLD AUTO: 0 %
ERYTHROCYTE [DISTWIDTH] IN BLOOD BY AUTOMATED COUNT: 20 % (ref 11.5–14.5)
GLOBULIN SER-MCNC: 6.6 GM/DL (ref 2.4–3.5)
GLUCOSE SERPL-MCNC: 153 MG/DL (ref 82–115)
HCT VFR BLD AUTO: 32.7 % (ref 35–46)
HGB BLD-MCNC: 9.7 GM/DL (ref 12–16)
IMM GRANULOCYTES # BLD AUTO: 0.11 10*3/UL
IMM GRANULOCYTES NFR BLD AUTO: 1 %
LYMPHOCYTES # BLD AUTO: 3.3 X10(3)/MCL (ref 0.6–4.6)
LYMPHOCYTES NFR BLD AUTO: 21 %
MAGNESIUM SERPL-MCNC: 2.15 MG/DL (ref 1.6–2.6)
MCH RBC QN AUTO: 24.9 PG (ref 26–34)
MCHC RBC AUTO-ENTMCNC: 29.7 GM/DL (ref 31–37)
MCV RBC AUTO: 84.1 FL (ref 80–100)
MONOCYTES # BLD AUTO: 1.4 X10(3)/MCL (ref 0.1–1.3)
MONOCYTES NFR BLD AUTO: 9 %
NEUTROPHILS # BLD AUTO: 10.68 X10(3)/MCL (ref 2.1–9.2)
NEUTROPHILS NFR BLD AUTO: 69 %
PLATELET # BLD AUTO: 700 X10(3)/MCL (ref 130–400)
PMV BLD AUTO: 8.6 FL (ref 7.4–10.4)
POTASSIUM SERPL-SCNC: 4.3 MMOL/L (ref 3.5–5.1)
PROT SERPL-MCNC: 9.1 GM/DL (ref 5.8–7.6)
RBC # BLD AUTO: 3.89 X10(6)/MCL (ref 4–5.2)
SODIUM SERPL-SCNC: 138 MMOL/L (ref 136–145)
WBC # SPEC AUTO: 15.6 X10(3)/MCL (ref 4.5–11)

## 2020-12-22 ENCOUNTER — HISTORICAL (OUTPATIENT)
Dept: INFUSION THERAPY | Facility: HOSPITAL | Age: 65
End: 2020-12-22

## 2020-12-29 ENCOUNTER — HISTORICAL (OUTPATIENT)
Dept: ADMINISTRATIVE | Facility: HOSPITAL | Age: 65
End: 2020-12-29

## 2020-12-29 LAB
ABS NEUT (OLG): 6.75 X10(3)/MCL (ref 2.1–9.2)
ALBUMIN SERPL-MCNC: 2.7 GM/DL (ref 3.4–4.8)
ALBUMIN/GLOB SERPL: 0.6 RATIO (ref 1.1–2)
ALP SERPL-CCNC: 264 UNIT/L (ref 40–150)
ALT SERPL-CCNC: 48 UNIT/L (ref 0–55)
AST SERPL-CCNC: 17 UNIT/L (ref 5–34)
BASOPHILS # BLD AUTO: 0 X10(3)/MCL (ref 0–0.2)
BASOPHILS NFR BLD AUTO: 0 %
BILIRUB SERPL-MCNC: 0.4 MG/DL
BILIRUBIN DIRECT+TOT PNL SERPL-MCNC: 0.2 MG/DL (ref 0–0.5)
BILIRUBIN DIRECT+TOT PNL SERPL-MCNC: 0.2 MG/DL (ref 0–0.8)
BUN SERPL-MCNC: 12 MG/DL (ref 9.8–20.1)
CALCIUM SERPL-MCNC: 9.1 MG/DL (ref 8.4–10.2)
CHLORIDE SERPL-SCNC: 106 MMOL/L (ref 98–107)
CO2 SERPL-SCNC: 23 MMOL/L (ref 23–31)
CREAT SERPL-MCNC: 0.61 MG/DL (ref 0.55–1.02)
EOSINOPHIL # BLD AUTO: 0 X10(3)/MCL (ref 0–0.9)
EOSINOPHIL NFR BLD AUTO: 0 %
ERYTHROCYTE [DISTWIDTH] IN BLOOD BY AUTOMATED COUNT: 22.5 % (ref 11.5–14.5)
GLOBULIN SER-MCNC: 4.8 GM/DL (ref 2.4–3.5)
GLUCOSE SERPL-MCNC: 129 MG/DL (ref 82–115)
HCT VFR BLD AUTO: 29.2 % (ref 35–46)
HGB BLD-MCNC: 8.5 GM/DL (ref 12–16)
IMM GRANULOCYTES # BLD AUTO: 0.03 10*3/UL
IMM GRANULOCYTES NFR BLD AUTO: 0 %
LYMPHOCYTES # BLD AUTO: 1.4 X10(3)/MCL (ref 0.6–4.6)
LYMPHOCYTES NFR BLD AUTO: 15 %
MAGNESIUM SERPL-MCNC: 2.23 MG/DL (ref 1.6–2.6)
MCH RBC QN AUTO: 26.2 PG (ref 26–34)
MCHC RBC AUTO-ENTMCNC: 29.1 GM/DL (ref 31–37)
MCV RBC AUTO: 90.1 FL (ref 80–100)
MONOCYTES # BLD AUTO: 1.3 X10(3)/MCL (ref 0.1–1.3)
MONOCYTES NFR BLD AUTO: 13 %
NEUTROPHILS # BLD AUTO: 6.75 X10(3)/MCL (ref 2.1–9.2)
NEUTROPHILS NFR BLD AUTO: 71 %
PLATELET # BLD AUTO: 462 X10(3)/MCL (ref 130–400)
PMV BLD AUTO: 8.4 FL (ref 7.4–10.4)
POTASSIUM SERPL-SCNC: 4.1 MMOL/L (ref 3.5–5.1)
PROT SERPL-MCNC: 7.5 GM/DL (ref 5.8–7.6)
RBC # BLD AUTO: 3.24 X10(6)/MCL (ref 4–5.2)
SODIUM SERPL-SCNC: 138 MMOL/L (ref 136–145)
WBC # SPEC AUTO: 9.5 X10(3)/MCL (ref 4.5–11)

## 2020-12-31 ENCOUNTER — HISTORICAL (OUTPATIENT)
Dept: INFUSION THERAPY | Facility: HOSPITAL | Age: 65
End: 2020-12-31

## 2021-01-01 ENCOUNTER — HISTORICAL (OUTPATIENT)
Dept: RADIOLOGY | Facility: HOSPITAL | Age: 66
End: 2021-01-01

## 2021-01-01 ENCOUNTER — HISTORICAL (OUTPATIENT)
Dept: INFUSION THERAPY | Facility: HOSPITAL | Age: 66
End: 2021-01-01

## 2021-01-01 LAB
ABS NEUT (OLG): 4.94 X10(3)/MCL (ref 2.1–9.2)
ABS NEUT (OLG): 6.29 X10(3)/MCL (ref 2.1–9.2)
ALBUMIN SERPL-MCNC: 2.7 GM/DL (ref 3.4–4.8)
ALBUMIN SERPL-MCNC: 2.8 GM/DL (ref 3.4–4.8)
ALBUMIN/GLOB SERPL: 0.5 RATIO (ref 1.1–2)
ALBUMIN/GLOB SERPL: 0.6 RATIO (ref 1.1–2)
ALP SERPL-CCNC: 567 UNIT/L (ref 40–150)
ALP SERPL-CCNC: 606 UNIT/L (ref 40–150)
ALT SERPL-CCNC: 41 UNIT/L (ref 0–55)
ALT SERPL-CCNC: 42 UNIT/L (ref 0–55)
AST SERPL-CCNC: 30 UNIT/L (ref 5–34)
AST SERPL-CCNC: 33 UNIT/L (ref 5–34)
BASOPHILS # BLD AUTO: 0 X10(3)/MCL (ref 0–0.2)
BASOPHILS NFR BLD AUTO: 0 %
BILIRUB SERPL-MCNC: 0.4 MG/DL
BILIRUB SERPL-MCNC: 0.4 MG/DL
BILIRUBIN DIRECT+TOT PNL SERPL-MCNC: 0.1 MG/DL (ref 0–0.8)
BILIRUBIN DIRECT+TOT PNL SERPL-MCNC: 0.2 MG/DL (ref 0–0.5)
BILIRUBIN DIRECT+TOT PNL SERPL-MCNC: 0.2 MG/DL (ref 0–0.8)
BILIRUBIN DIRECT+TOT PNL SERPL-MCNC: 0.3 MG/DL (ref 0–0.5)
BUN SERPL-MCNC: 13.5 MG/DL (ref 9.8–20.1)
BUN SERPL-MCNC: 13.6 MG/DL (ref 9.8–20.1)
CALCIUM SERPL-MCNC: 10.1 MG/DL (ref 8.7–10.5)
CALCIUM SERPL-MCNC: 9.8 MG/DL (ref 8.7–10.5)
CHLORIDE SERPL-SCNC: 102 MMOL/L (ref 98–107)
CHLORIDE SERPL-SCNC: 103 MMOL/L (ref 98–107)
CO2 SERPL-SCNC: 23 MMOL/L (ref 23–31)
CO2 SERPL-SCNC: 25 MMOL/L (ref 23–31)
CREAT SERPL-MCNC: 0.71 MG/DL (ref 0.55–1.02)
CREAT SERPL-MCNC: 0.73 MG/DL (ref 0.55–1.02)
ERYTHROCYTE [DISTWIDTH] IN BLOOD BY AUTOMATED COUNT: 17.1 % (ref 11.5–14.5)
ERYTHROCYTE [DISTWIDTH] IN BLOOD BY AUTOMATED COUNT: 18.6 % (ref 11.5–14.5)
FERRITIN SERPL-MCNC: 4167.95 NG/ML (ref 4.63–204)
GLOBULIN SER-MCNC: 4.7 GM/DL (ref 2.4–3.5)
GLOBULIN SER-MCNC: 5 GM/DL (ref 2.4–3.5)
GLUCOSE SERPL-MCNC: 205 MG/DL (ref 82–115)
GLUCOSE SERPL-MCNC: 225 MG/DL (ref 82–115)
HAPTOGLOB SERPL-MCNC: 832 MG/DL (ref 63–273)
HCT VFR BLD AUTO: 32.3 % (ref 35–46)
HCT VFR BLD AUTO: 33.8 % (ref 35–46)
HGB BLD-MCNC: 10.2 GM/DL (ref 12–16)
HGB BLD-MCNC: 9.7 GM/DL (ref 12–16)
IMM GRANULOCYTES # BLD AUTO: 0.03 10*3/UL
IMM GRANULOCYTES # BLD AUTO: 0.03 10*3/UL
IMM GRANULOCYTES NFR BLD AUTO: 0 %
IMM GRANULOCYTES NFR BLD AUTO: 0 %
IRON SATN MFR SERPL: 21 % (ref 20–50)
IRON SERPL-MCNC: 46 UG/DL (ref 50–170)
LDH SERPL-CCNC: 131 UNIT/L (ref 140–271)
LYMPHOCYTES # BLD AUTO: 0.3 X10(3)/MCL (ref 0.6–4.6)
LYMPHOCYTES # BLD AUTO: 0.4 X10(3)/MCL (ref 0.6–4.6)
LYMPHOCYTES NFR BLD AUTO: 5 %
LYMPHOCYTES NFR BLD AUTO: 7 %
MCH RBC QN AUTO: 29.4 PG (ref 26–34)
MCH RBC QN AUTO: 29.6 PG (ref 26–34)
MCHC RBC AUTO-ENTMCNC: 30 GM/DL (ref 31–37)
MCHC RBC AUTO-ENTMCNC: 30.2 GM/DL (ref 31–37)
MCV RBC AUTO: 97.4 FL (ref 80–100)
MCV RBC AUTO: 98.5 FL (ref 80–100)
MONOCYTES # BLD AUTO: 0.3 X10(3)/MCL (ref 0.1–1.3)
MONOCYTES # BLD AUTO: 0.3 X10(3)/MCL (ref 0.1–1.3)
MONOCYTES NFR BLD AUTO: 4 %
MONOCYTES NFR BLD AUTO: 5 %
NEUTROPHILS # BLD AUTO: 4.94 X10(3)/MCL (ref 2.1–9.2)
NEUTROPHILS # BLD AUTO: 6.29 X10(3)/MCL (ref 2.1–9.2)
NEUTROPHILS NFR BLD AUTO: 87 %
NEUTROPHILS NFR BLD AUTO: 91 %
NRBC BLD AUTO-RTO: 0 % (ref 0–0.2)
NRBC BLD AUTO-RTO: 0 % (ref 0–0.2)
PLATELET # BLD AUTO: 552 X10(3)/MCL (ref 130–400)
PLATELET # BLD AUTO: 623 X10(3)/MCL (ref 130–400)
PMV BLD AUTO: 8.5 FL (ref 7.4–10.4)
PMV BLD AUTO: 8.6 FL (ref 7.4–10.4)
POTASSIUM SERPL-SCNC: 5.2 MMOL/L (ref 3.5–5.1)
POTASSIUM SERPL-SCNC: 5.3 MMOL/L (ref 3.5–5.1)
PROT SERPL-MCNC: 7.5 GM/DL (ref 5.8–7.6)
PROT SERPL-MCNC: 7.7 GM/DL (ref 5.8–7.6)
RBC # BLD AUTO: 3.28 X10(6)/MCL (ref 4–5.2)
RBC # BLD AUTO: 3.47 X10(6)/MCL (ref 4–5.2)
RET# (OHS): 0.1 X10(6)/MCL (ref 0.02–0.08)
RETICULOCYTE COUNT AUTOMATED (OLG): 3.1 % (ref 0.5–1.5)
SODIUM SERPL-SCNC: 135 MMOL/L (ref 136–145)
SODIUM SERPL-SCNC: 138 MMOL/L (ref 136–145)
T4 FREE SERPL-MCNC: 0.91 NG/DL (ref 0.7–1.48)
TIBC SERPL-MCNC: 172 UG/DL (ref 70–310)
TIBC SERPL-MCNC: 218 UG/DL (ref 250–450)
TRANSFERRIN SERPL-MCNC: 204 MG/DL (ref 173–360)
TSH SERPL-ACNC: 0.4 UIU/ML (ref 0.35–4.94)
TSH SERPL-ACNC: 0.63 UIU/ML (ref 0.35–4.94)
VIT B12 SERPL-MCNC: 479 PG/ML (ref 213–816)
WBC # SPEC AUTO: 5.7 X10(3)/MCL (ref 4.5–11)
WBC # SPEC AUTO: 6.9 X10(3)/MCL (ref 4.5–11)

## 2021-01-08 ENCOUNTER — HISTORICAL (OUTPATIENT)
Dept: INFUSION THERAPY | Facility: HOSPITAL | Age: 66
End: 2021-01-08

## 2021-01-08 LAB
ABS NEUT (OLG): 4.39 X10(3)/MCL (ref 2.1–9.2)
ALBUMIN SERPL-MCNC: 3 GM/DL (ref 3.4–4.8)
ALBUMIN/GLOB SERPL: 0.6 RATIO (ref 1.1–2)
ALP SERPL-CCNC: 341 UNIT/L (ref 40–150)
ALT SERPL-CCNC: 77 UNIT/L (ref 0–55)
ANISOCYTOSIS BLD QL SMEAR: ABNORMAL
AST SERPL-CCNC: 38 UNIT/L (ref 5–34)
BASOPHILS NFR BLD MANUAL: 0 %
BILIRUB SERPL-MCNC: 0.5 MG/DL
BILIRUBIN DIRECT+TOT PNL SERPL-MCNC: 0.2 MG/DL (ref 0–0.8)
BILIRUBIN DIRECT+TOT PNL SERPL-MCNC: 0.3 MG/DL (ref 0–0.5)
BUN SERPL-MCNC: 16 MG/DL (ref 9.8–20.1)
CALCIUM SERPL-MCNC: 9.4 MG/DL (ref 8.4–10.2)
CHLORIDE SERPL-SCNC: 105 MMOL/L (ref 98–107)
CO2 SERPL-SCNC: 21 MMOL/L (ref 23–31)
CREAT SERPL-MCNC: 0.76 MG/DL (ref 0.55–1.02)
EOSINOPHIL NFR BLD MANUAL: 1 %
ERYTHROCYTE [DISTWIDTH] IN BLOOD BY AUTOMATED COUNT: 23.7 % (ref 11.5–14.5)
GLOBULIN SER-MCNC: 5.2 GM/DL (ref 2.4–3.5)
GLUCOSE SERPL-MCNC: 259 MG/DL (ref 82–115)
GRANULOCYTES NFR BLD MANUAL: 91 % (ref 43–75)
HCT VFR BLD AUTO: 30.8 % (ref 35–46)
HGB BLD-MCNC: 9.1 GM/DL (ref 12–16)
LYMPHOCYTES NFR BLD MANUAL: 7 % (ref 20.5–51.1)
MAGNESIUM SERPL-MCNC: 2.4 MG/DL (ref 1.6–2.6)
MCH RBC QN AUTO: 26.9 PG (ref 26–34)
MCHC RBC AUTO-ENTMCNC: 29.5 GM/DL (ref 31–37)
MCV RBC AUTO: 91.1 FL (ref 80–100)
MONOCYTES NFR BLD MANUAL: 1 % (ref 2–9)
PLATELET # BLD AUTO: 342 X10(3)/MCL (ref 130–400)
PLATELET # BLD EST: ADEQUATE 10*3/UL
PMV BLD AUTO: 8.6 FL (ref 7.4–10.4)
POLYCHROMASIA BLD QL SMEAR: ABNORMAL
POTASSIUM SERPL-SCNC: 4.6 MMOL/L (ref 3.5–5.1)
PROT SERPL-MCNC: 8.2 GM/DL (ref 5.8–7.6)
RBC # BLD AUTO: 3.38 X10(6)/MCL (ref 4–5.2)
SODIUM SERPL-SCNC: 137 MMOL/L (ref 136–145)
WBC # SPEC AUTO: 4.7 X10(3)/MCL (ref 4.5–11)

## 2021-01-15 ENCOUNTER — HISTORICAL (OUTPATIENT)
Dept: INFUSION THERAPY | Facility: HOSPITAL | Age: 66
End: 2021-01-15

## 2021-01-15 LAB
ABS NEUT (OLG): 3.87 X10(3)/MCL (ref 2.1–9.2)
ALBUMIN SERPL-MCNC: 2.9 GM/DL (ref 3.4–4.8)
ALBUMIN/GLOB SERPL: 0.6 RATIO (ref 1.1–2)
ALP SERPL-CCNC: 298 UNIT/L (ref 40–150)
ALT SERPL-CCNC: 82 UNIT/L (ref 0–55)
AST SERPL-CCNC: 37 UNIT/L (ref 5–34)
BILIRUB SERPL-MCNC: 0.4 MG/DL
BILIRUBIN DIRECT+TOT PNL SERPL-MCNC: 0.1 MG/DL (ref 0–0.8)
BILIRUBIN DIRECT+TOT PNL SERPL-MCNC: 0.3 MG/DL (ref 0–0.5)
BUN SERPL-MCNC: 20 MG/DL (ref 9.8–20.1)
CALCIUM SERPL-MCNC: 10.1 MG/DL (ref 8.4–10.2)
CHLORIDE SERPL-SCNC: 103 MMOL/L (ref 98–107)
CO2 SERPL-SCNC: 19 MMOL/L (ref 23–31)
CREAT SERPL-MCNC: 0.82 MG/DL (ref 0.55–1.02)
ERYTHROCYTE [DISTWIDTH] IN BLOOD BY AUTOMATED COUNT: 24.3 % (ref 11.5–14.5)
GLOBULIN SER-MCNC: 4.8 GM/DL (ref 2.4–3.5)
GLUCOSE SERPL-MCNC: 313 MG/DL (ref 82–115)
HCT VFR BLD AUTO: 28.7 % (ref 35–46)
HGB BLD-MCNC: 8.7 GM/DL (ref 12–16)
IMM GRANULOCYTES # BLD AUTO: 0.03 10*3/UL
IMM GRANULOCYTES NFR BLD AUTO: 1 %
LYMPHOCYTES # BLD AUTO: 0.1 X10(3)/MCL (ref 0.6–4.6)
LYMPHOCYTES NFR BLD AUTO: 2 %
MAGNESIUM SERPL-MCNC: 1.75 MG/DL (ref 1.6–2.6)
MCH RBC QN AUTO: 27.6 PG (ref 26–34)
MCHC RBC AUTO-ENTMCNC: 30.3 GM/DL (ref 31–37)
MCV RBC AUTO: 91.1 FL (ref 80–100)
MONOCYTES # BLD AUTO: 0 X10(3)/MCL (ref 0.1–1.3)
MONOCYTES NFR BLD AUTO: 1 %
NEUTROPHILS # BLD AUTO: 3.87 X10(3)/MCL (ref 2.1–9.2)
NEUTROPHILS NFR BLD AUTO: 96 %
PLATELET # BLD AUTO: 323 X10(3)/MCL (ref 130–400)
PMV BLD AUTO: 8.6 FL (ref 7.4–10.4)
POTASSIUM SERPL-SCNC: 4.7 MMOL/L (ref 3.5–5.1)
PROT SERPL-MCNC: 7.7 GM/DL (ref 5.8–7.6)
RBC # BLD AUTO: 3.15 X10(6)/MCL (ref 4–5.2)
SODIUM SERPL-SCNC: 135 MMOL/L (ref 136–145)
WBC # SPEC AUTO: 4 X10(3)/MCL (ref 4.5–11)

## 2021-01-22 ENCOUNTER — HISTORICAL (OUTPATIENT)
Dept: INFUSION THERAPY | Facility: HOSPITAL | Age: 66
End: 2021-01-22

## 2021-01-22 LAB
ABS NEUT (OLG): 3.12 X10(3)/MCL (ref 2.1–9.2)
ALBUMIN SERPL-MCNC: 3 GM/DL (ref 3.4–4.8)
ALBUMIN/GLOB SERPL: 0.7 RATIO (ref 1.1–2)
ALP SERPL-CCNC: 281 UNIT/L (ref 40–150)
ALT SERPL-CCNC: 70 UNIT/L (ref 0–55)
AST SERPL-CCNC: 33 UNIT/L (ref 5–34)
BILIRUB SERPL-MCNC: 0.4 MG/DL
BILIRUBIN DIRECT+TOT PNL SERPL-MCNC: 0.2 MG/DL (ref 0–0.5)
BILIRUBIN DIRECT+TOT PNL SERPL-MCNC: 0.2 MG/DL (ref 0–0.8)
BUN SERPL-MCNC: 15 MG/DL (ref 9.8–20.1)
CALCIUM SERPL-MCNC: 9.4 MG/DL (ref 8.4–10.2)
CHLORIDE SERPL-SCNC: 106 MMOL/L (ref 98–107)
CO2 SERPL-SCNC: 22 MMOL/L (ref 23–31)
CREAT SERPL-MCNC: 0.71 MG/DL (ref 0.55–1.02)
ERYTHROCYTE [DISTWIDTH] IN BLOOD BY AUTOMATED COUNT: 25.2 % (ref 11.5–14.5)
GLOBULIN SER-MCNC: 4.5 GM/DL (ref 2.4–3.5)
GLUCOSE SERPL-MCNC: 279 MG/DL (ref 82–115)
HCT VFR BLD AUTO: 29.3 % (ref 35–46)
HGB BLD-MCNC: 8.8 GM/DL (ref 12–16)
IMM GRANULOCYTES # BLD AUTO: 0.03 10*3/UL
IMM GRANULOCYTES NFR BLD AUTO: 1 %
LYMPHOCYTES # BLD AUTO: 0.2 X10(3)/MCL (ref 0.6–4.6)
LYMPHOCYTES NFR BLD AUTO: 4 %
MAGNESIUM SERPL-MCNC: 2.16 MG/DL (ref 1.6–2.6)
MCH RBC QN AUTO: 28.1 PG (ref 26–34)
MCHC RBC AUTO-ENTMCNC: 30 GM/DL (ref 31–37)
MCV RBC AUTO: 93.6 FL (ref 80–100)
MONOCYTES # BLD AUTO: 0 X10(3)/MCL (ref 0.1–1.3)
MONOCYTES NFR BLD AUTO: 1 %
NEUTROPHILS # BLD AUTO: 3.12 X10(3)/MCL (ref 2.1–9.2)
NEUTROPHILS NFR BLD AUTO: 94 %
PLATELET # BLD AUTO: 339 X10(3)/MCL (ref 130–400)
PMV BLD AUTO: 8.2 FL (ref 7.4–10.4)
POTASSIUM SERPL-SCNC: 4.6 MMOL/L (ref 3.5–5.1)
PROT SERPL-MCNC: 7.5 GM/DL (ref 5.8–7.6)
RBC # BLD AUTO: 3.13 X10(6)/MCL (ref 4–5.2)
SODIUM SERPL-SCNC: 138 MMOL/L (ref 136–145)
WBC # SPEC AUTO: 3.3 X10(3)/MCL (ref 4.5–11)

## 2021-01-29 ENCOUNTER — HISTORICAL (OUTPATIENT)
Dept: INFUSION THERAPY | Facility: HOSPITAL | Age: 66
End: 2021-01-29

## 2021-01-29 LAB
ABS NEUT (OLG): 3.83 X10(3)/MCL (ref 2.1–9.2)
ALBUMIN SERPL-MCNC: 3.1 GM/DL (ref 3.4–4.8)
ALBUMIN/GLOB SERPL: 0.7 RATIO (ref 1.1–2)
ALP SERPL-CCNC: 284 UNIT/L (ref 40–150)
ALT SERPL-CCNC: 77 UNIT/L (ref 0–55)
AST SERPL-CCNC: 40 UNIT/L (ref 5–34)
BASOPHILS # BLD AUTO: 0 X10(3)/MCL (ref 0–0.2)
BASOPHILS NFR BLD AUTO: 0 %
BILIRUB SERPL-MCNC: 0.6 MG/DL
BILIRUBIN DIRECT+TOT PNL SERPL-MCNC: 0.2 MG/DL (ref 0–0.8)
BILIRUBIN DIRECT+TOT PNL SERPL-MCNC: 0.4 MG/DL (ref 0–0.5)
BUN SERPL-MCNC: 21 MG/DL (ref 9.8–20.1)
CALCIUM SERPL-MCNC: 10.5 MG/DL (ref 8.4–10.2)
CHLORIDE SERPL-SCNC: 106 MMOL/L (ref 98–107)
CO2 SERPL-SCNC: 22 MMOL/L (ref 23–31)
CREAT SERPL-MCNC: 0.77 MG/DL (ref 0.55–1.02)
ERYTHROCYTE [DISTWIDTH] IN BLOOD BY AUTOMATED COUNT: 25.3 % (ref 11.5–14.5)
GLOBULIN SER-MCNC: 4.7 GM/DL (ref 2.4–3.5)
GLUCOSE SERPL-MCNC: 206 MG/DL (ref 82–115)
HCT VFR BLD AUTO: 30.1 % (ref 35–46)
HGB BLD-MCNC: 9.5 GM/DL (ref 12–16)
IMM GRANULOCYTES # BLD AUTO: 0.02 10*3/UL
IMM GRANULOCYTES NFR BLD AUTO: 0 %
LYMPHOCYTES # BLD AUTO: 0.2 X10(3)/MCL (ref 0.6–4.6)
LYMPHOCYTES NFR BLD AUTO: 4 %
MAGNESIUM SERPL-MCNC: 1.9 MG/DL (ref 1.6–2.6)
MCH RBC QN AUTO: 29.1 PG (ref 26–34)
MCHC RBC AUTO-ENTMCNC: 31.6 GM/DL (ref 31–37)
MCV RBC AUTO: 92.3 FL (ref 80–100)
MONOCYTES # BLD AUTO: 0 X10(3)/MCL (ref 0.1–1.3)
MONOCYTES NFR BLD AUTO: 0 %
NEUTROPHILS # BLD AUTO: 3.83 X10(3)/MCL (ref 2.1–9.2)
NEUTROPHILS NFR BLD AUTO: 94 %
PLATELET # BLD AUTO: 310 X10(3)/MCL (ref 130–400)
PMV BLD AUTO: 9.2 FL (ref 7.4–10.4)
POTASSIUM SERPL-SCNC: 4.7 MMOL/L (ref 3.5–5.1)
PROT SERPL-MCNC: 7.8 GM/DL (ref 5.8–7.6)
RBC # BLD AUTO: 3.26 X10(6)/MCL (ref 4–5.2)
SODIUM SERPL-SCNC: 138 MMOL/L (ref 136–145)
WBC # SPEC AUTO: 4.1 X10(3)/MCL (ref 4.5–11)

## 2021-02-02 ENCOUNTER — HISTORICAL (OUTPATIENT)
Dept: HEPATOLOGY | Facility: HOSPITAL | Age: 66
End: 2021-02-02

## 2021-02-05 ENCOUNTER — HISTORICAL (OUTPATIENT)
Dept: GASTROENTEROLOGY | Facility: CLINIC | Age: 66
End: 2021-02-05

## 2021-02-05 LAB — SARS-COV-2 RNA RESP QL NAA+PROBE: NOT DETECTED

## 2021-02-10 ENCOUNTER — HISTORICAL (OUTPATIENT)
Dept: ENDOSCOPY | Facility: HOSPITAL | Age: 66
End: 2021-02-10

## 2021-02-10 LAB
ABS NEUT (OLG): 2.05 X10(3)/MCL (ref 2.1–9.2)
ALBUMIN SERPL-MCNC: 2.9 GM/DL (ref 3.4–4.8)
ALBUMIN/GLOB SERPL: 0.7 RATIO (ref 1.1–2)
ALP SERPL-CCNC: 244 UNIT/L (ref 40–150)
ALT SERPL-CCNC: 27 UNIT/L (ref 0–55)
AST SERPL-CCNC: 25 UNIT/L (ref 5–34)
BILIRUB SERPL-MCNC: 0.5 MG/DL
BILIRUBIN DIRECT+TOT PNL SERPL-MCNC: 0.2 MG/DL (ref 0–0.8)
BILIRUBIN DIRECT+TOT PNL SERPL-MCNC: 0.3 MG/DL (ref 0–0.5)
BUN SERPL-MCNC: 9 MG/DL (ref 9.8–20.1)
CALCIUM SERPL-MCNC: 9.1 MG/DL (ref 8.4–10.2)
CHLORIDE SERPL-SCNC: 103 MMOL/L (ref 98–107)
CO2 SERPL-SCNC: 21 MMOL/L (ref 23–31)
CREAT SERPL-MCNC: 0.65 MG/DL (ref 0.55–1.02)
ERYTHROCYTE [DISTWIDTH] IN BLOOD BY AUTOMATED COUNT: 22.1 % (ref 11.5–14.5)
GLOBULIN SER-MCNC: 4.1 GM/DL (ref 2.4–3.5)
GLUCOSE SERPL-MCNC: 92 MG/DL (ref 82–115)
HCT VFR BLD AUTO: 28.8 % (ref 35–46)
HGB BLD-MCNC: 9.3 GM/DL (ref 12–16)
LYMPHOCYTES NFR BLD MANUAL: 19 % (ref 20.5–51.1)
MAGNESIUM SERPL-MCNC: 2.01 MG/DL (ref 1.6–2.6)
MCH RBC QN AUTO: 29.6 PG (ref 26–34)
MCHC RBC AUTO-ENTMCNC: 32.3 GM/DL (ref 31–37)
MCV RBC AUTO: 91.7 FL (ref 80–100)
METAMYELOCYTES NFR BLD MANUAL: 1 %
MONOCYTES NFR BLD MANUAL: 13 % (ref 2–9)
NEUTROPHILS NFR BLD MANUAL: 67 % (ref 47–80)
PLATELET # BLD AUTO: 254 X10(3)/MCL (ref 130–400)
PLATELET # BLD EST: NORMAL 10*3/UL
PMV BLD AUTO: 9 FL (ref 7.4–10.4)
POTASSIUM SERPL-SCNC: 3.1 MMOL/L (ref 3.5–5.1)
PROT SERPL-MCNC: 7 GM/DL (ref 5.8–7.6)
RBC # BLD AUTO: 3.14 X10(6)/MCL (ref 4–5.2)
RBC MORPH BLD: NORMAL
SODIUM SERPL-SCNC: 136 MMOL/L (ref 136–145)
WBC # SPEC AUTO: 3.8 X10(3)/MCL (ref 4.5–11)

## 2021-03-22 ENCOUNTER — HISTORICAL (OUTPATIENT)
Dept: HEMATOLOGY/ONCOLOGY | Facility: CLINIC | Age: 66
End: 2021-03-22

## 2021-03-22 LAB
ABS NEUT (OLG): 2.66 X10(3)/MCL (ref 2.1–9.2)
ALBUMIN SERPL-MCNC: 3.3 GM/DL (ref 3.4–4.8)
ALBUMIN/GLOB SERPL: 0.8 RATIO (ref 1.1–2)
ALP SERPL-CCNC: 199 UNIT/L (ref 40–150)
ALT SERPL-CCNC: 27 UNIT/L (ref 0–55)
ANISOCYTOSIS BLD QL SMEAR: ABNORMAL
AST SERPL-CCNC: 19 UNIT/L (ref 5–34)
BASOPHILS NFR BLD MANUAL: 0 %
BILIRUB SERPL-MCNC: 0.3 MG/DL
BILIRUBIN DIRECT+TOT PNL SERPL-MCNC: 0.1 MG/DL (ref 0–0.8)
BILIRUBIN DIRECT+TOT PNL SERPL-MCNC: 0.2 MG/DL (ref 0–0.5)
BUN SERPL-MCNC: 18.7 MG/DL (ref 9.8–20.1)
CALCIUM SERPL-MCNC: 10 MG/DL (ref 8.4–10.2)
CHLORIDE SERPL-SCNC: 109 MMOL/L (ref 98–107)
CO2 SERPL-SCNC: 25 MMOL/L (ref 23–31)
CREAT SERPL-MCNC: 0.77 MG/DL (ref 0.55–1.02)
EOSINOPHIL NFR BLD MANUAL: 0 %
ERYTHROCYTE [DISTWIDTH] IN BLOOD BY AUTOMATED COUNT: 17.7 % (ref 11.5–14.5)
GLOBULIN SER-MCNC: 4.4 GM/DL (ref 2.4–3.5)
GLUCOSE SERPL-MCNC: 115 MG/DL (ref 82–115)
GRANULOCYTES NFR BLD MANUAL: 61 % (ref 43–75)
HCT VFR BLD AUTO: 37.2 % (ref 35–46)
HGB BLD-MCNC: 11.3 GM/DL (ref 12–16)
HYPOCHROMIA BLD QL SMEAR: ABNORMAL
LYMPHOCYTES NFR BLD MANUAL: 28 % (ref 20.5–51.1)
MAGNESIUM SERPL-MCNC: 2 MG/DL (ref 1.6–2.6)
MCH RBC QN AUTO: 31.2 PG (ref 26–34)
MCHC RBC AUTO-ENTMCNC: 30.4 GM/DL (ref 31–37)
MCV RBC AUTO: 102.8 FL (ref 80–100)
METAMYELOCYTES NFR BLD MANUAL: 1 %
MICROCYTES BLD QL SMEAR: ABNORMAL
MONOCYTES NFR BLD MANUAL: 10 % (ref 2–9)
PLATELET # BLD AUTO: 341 X10(3)/MCL (ref 130–400)
PLATELET # BLD EST: ADEQUATE 10*3/UL
PMV BLD AUTO: 8.7 FL (ref 7.4–10.4)
POLYCHROMASIA BLD QL SMEAR: ABNORMAL
POTASSIUM SERPL-SCNC: 4.3 MMOL/L (ref 3.5–5.1)
PROT SERPL-MCNC: 7.7 GM/DL (ref 5.8–7.6)
RBC # BLD AUTO: 3.62 X10(6)/MCL (ref 4–5.2)
RBC MORPH BLD: ABNORMAL
SODIUM SERPL-SCNC: 141 MMOL/L (ref 136–145)
WBC # SPEC AUTO: 5.2 X10(3)/MCL (ref 4.5–11)

## 2021-04-07 ENCOUNTER — HISTORICAL (OUTPATIENT)
Dept: RADIOLOGY | Facility: HOSPITAL | Age: 66
End: 2021-04-07

## 2021-04-15 ENCOUNTER — HISTORICAL (OUTPATIENT)
Dept: ADMINISTRATIVE | Facility: HOSPITAL | Age: 66
End: 2021-04-15

## 2021-04-15 LAB
ABS NEUT (OLG): 4.07 X10(3)/MCL (ref 2.1–9.2)
ALBUMIN SERPL-MCNC: 3.4 GM/DL (ref 3.4–4.8)
ALBUMIN/GLOB SERPL: 0.9 RATIO (ref 1.1–2)
ALP SERPL-CCNC: 149 UNIT/L (ref 40–150)
ALT SERPL-CCNC: 28 UNIT/L (ref 0–55)
AST SERPL-CCNC: 20 UNIT/L (ref 5–34)
BASOPHILS # BLD AUTO: 0 X10(3)/MCL (ref 0–0.2)
BASOPHILS NFR BLD AUTO: 1 %
BILIRUB SERPL-MCNC: 0.5 MG/DL
BILIRUBIN DIRECT+TOT PNL SERPL-MCNC: 0.2 MG/DL (ref 0–0.5)
BILIRUBIN DIRECT+TOT PNL SERPL-MCNC: 0.3 MG/DL (ref 0–0.8)
BUN SERPL-MCNC: 20.8 MG/DL (ref 9.8–20.1)
CALCIUM SERPL-MCNC: 10 MG/DL (ref 8.4–10.2)
CHLORIDE SERPL-SCNC: 106 MMOL/L (ref 98–107)
CO2 SERPL-SCNC: 28 MMOL/L (ref 23–31)
CREAT SERPL-MCNC: 0.76 MG/DL (ref 0.55–1.02)
EOSINOPHIL # BLD AUTO: 0.2 X10(3)/MCL (ref 0–0.9)
EOSINOPHIL NFR BLD AUTO: 2 %
ERYTHROCYTE [DISTWIDTH] IN BLOOD BY AUTOMATED COUNT: 15.9 % (ref 11.5–14.5)
GLOBULIN SER-MCNC: 3.9 GM/DL (ref 2.4–3.5)
GLUCOSE SERPL-MCNC: 108 MG/DL (ref 82–115)
HCT VFR BLD AUTO: 36.1 % (ref 35–46)
HGB BLD-MCNC: 11 GM/DL (ref 12–16)
IMM GRANULOCYTES # BLD AUTO: 0.06 10*3/UL
IMM GRANULOCYTES NFR BLD AUTO: 1 %
LYMPHOCYTES # BLD AUTO: 1.4 X10(3)/MCL (ref 0.6–4.6)
LYMPHOCYTES NFR BLD AUTO: 20 %
MAGNESIUM SERPL-MCNC: 2 MG/DL (ref 1.6–2.6)
MCH RBC QN AUTO: 32 PG (ref 26–34)
MCHC RBC AUTO-ENTMCNC: 30.5 GM/DL (ref 31–37)
MCV RBC AUTO: 104.9 FL (ref 80–100)
MONOCYTES # BLD AUTO: 1.1 X10(3)/MCL (ref 0.1–1.3)
MONOCYTES NFR BLD AUTO: 16 %
NEUTROPHILS # BLD AUTO: 4.07 X10(3)/MCL (ref 2.1–9.2)
NEUTROPHILS NFR BLD AUTO: 60 %
PLATELET # BLD AUTO: 314 X10(3)/MCL (ref 130–400)
PMV BLD AUTO: 8.2 FL (ref 7.4–10.4)
POTASSIUM SERPL-SCNC: 4.2 MMOL/L (ref 3.5–5.1)
PROT SERPL-MCNC: 7.3 GM/DL (ref 5.8–7.6)
RBC # BLD AUTO: 3.44 X10(6)/MCL (ref 4–5.2)
SODIUM SERPL-SCNC: 143 MMOL/L (ref 136–145)
T4 FREE SERPL-MCNC: 0.79 NG/DL (ref 0.7–1.48)
TSH SERPL-ACNC: 2.09 UIU/ML (ref 0.35–4.94)
WBC # SPEC AUTO: 6.8 X10(3)/MCL (ref 4.5–11)

## 2021-04-28 ENCOUNTER — HISTORICAL (OUTPATIENT)
Dept: RADIOLOGY | Facility: HOSPITAL | Age: 66
End: 2021-04-28

## 2021-04-29 ENCOUNTER — HISTORICAL (OUTPATIENT)
Dept: INFUSION THERAPY | Facility: HOSPITAL | Age: 66
End: 2021-04-29

## 2021-04-29 LAB
ABS NEUT (OLG): 3.74 X10(3)/MCL (ref 2.1–9.2)
ALBUMIN SERPL-MCNC: 3.5 GM/DL (ref 3.4–4.8)
ALBUMIN/GLOB SERPL: 0.9 RATIO (ref 1.1–2)
ALP SERPL-CCNC: 191 UNIT/L (ref 40–150)
ALT SERPL-CCNC: 35 UNIT/L (ref 0–55)
AST SERPL-CCNC: 22 UNIT/L (ref 5–34)
BASOPHILS # BLD AUTO: 0 X10(3)/MCL (ref 0–0.2)
BASOPHILS NFR BLD AUTO: 1 %
BILIRUB SERPL-MCNC: 0.5 MG/DL
BILIRUBIN DIRECT+TOT PNL SERPL-MCNC: 0.2 MG/DL (ref 0–0.5)
BILIRUBIN DIRECT+TOT PNL SERPL-MCNC: 0.3 MG/DL (ref 0–0.8)
BUN SERPL-MCNC: 11.8 MG/DL (ref 9.8–20.1)
CALCIUM SERPL-MCNC: 9.8 MG/DL (ref 8.4–10.2)
CHLORIDE SERPL-SCNC: 110 MMOL/L (ref 98–107)
CO2 SERPL-SCNC: 25 MMOL/L (ref 23–31)
CREAT SERPL-MCNC: 0.75 MG/DL (ref 0.55–1.02)
EOSINOPHIL # BLD AUTO: 0.1 X10(3)/MCL (ref 0–0.9)
EOSINOPHIL NFR BLD AUTO: 2 %
ERYTHROCYTE [DISTWIDTH] IN BLOOD BY AUTOMATED COUNT: 14.7 % (ref 11.5–14.5)
GLOBULIN SER-MCNC: 4 GM/DL (ref 2.4–3.5)
GLUCOSE SERPL-MCNC: 129 MG/DL (ref 82–115)
HCT VFR BLD AUTO: 37.6 % (ref 35–46)
HGB BLD-MCNC: 11.2 GM/DL (ref 12–16)
IMM GRANULOCYTES # BLD AUTO: 0.04 10*3/UL
IMM GRANULOCYTES NFR BLD AUTO: 1 %
LYMPHOCYTES # BLD AUTO: 1.6 X10(3)/MCL (ref 0.6–4.6)
LYMPHOCYTES NFR BLD AUTO: 24 %
MCH RBC QN AUTO: 32 PG (ref 26–34)
MCHC RBC AUTO-ENTMCNC: 29.8 GM/DL (ref 31–37)
MCV RBC AUTO: 107.4 FL (ref 80–100)
MONOCYTES # BLD AUTO: 1.1 X10(3)/MCL (ref 0.1–1.3)
MONOCYTES NFR BLD AUTO: 16 %
NEUTROPHILS # BLD AUTO: 3.74 X10(3)/MCL (ref 2.1–9.2)
NEUTROPHILS NFR BLD AUTO: 56 %
PLATELET # BLD AUTO: 333 X10(3)/MCL (ref 130–400)
PMV BLD AUTO: 8.5 FL (ref 7.4–10.4)
POTASSIUM SERPL-SCNC: 4.9 MMOL/L (ref 3.5–5.1)
PROT SERPL-MCNC: 7.5 GM/DL (ref 5.8–7.6)
RBC # BLD AUTO: 3.5 X10(6)/MCL (ref 4–5.2)
SODIUM SERPL-SCNC: 144 MMOL/L (ref 136–145)
T4 FREE SERPL-MCNC: 0.82 NG/DL (ref 0.7–1.48)
TSH SERPL-ACNC: 1.73 UIU/ML (ref 0.35–4.94)
WBC # SPEC AUTO: 6.6 X10(3)/MCL (ref 4.5–11)

## 2021-05-03 ENCOUNTER — HISTORICAL (OUTPATIENT)
Dept: RADIOLOGY | Facility: HOSPITAL | Age: 66
End: 2021-05-03

## 2021-05-03 ENCOUNTER — HISTORICAL (OUTPATIENT)
Dept: LAB | Facility: HOSPITAL | Age: 66
End: 2021-05-03

## 2021-05-03 LAB
ABS NEUT (OLG): 3.67 X10(3)/MCL (ref 2.1–9.2)
ALBUMIN SERPL-MCNC: 3.7 GM/DL (ref 3.4–4.8)
ALBUMIN/GLOB SERPL: 0.9 RATIO (ref 1.1–2)
ALP SERPL-CCNC: 157 UNIT/L (ref 40–150)
ALT SERPL-CCNC: 116 UNIT/L (ref 0–55)
AST SERPL-CCNC: 52 UNIT/L (ref 5–34)
BASOPHILS # BLD AUTO: 0.01 X10(3)/MCL (ref 0–0.2)
BASOPHILS NFR BLD AUTO: 0.2 % (ref 0–1)
BILIRUB SERPL-MCNC: 0.9 MG/DL (ref 0.2–1.2)
BILIRUBIN DIRECT+TOT PNL SERPL-MCNC: 0.4 MG/DL (ref 0–0.5)
BILIRUBIN DIRECT+TOT PNL SERPL-MCNC: 0.5 MG/DL (ref 0–0.8)
BUN SERPL-MCNC: 19.5 MG/DL (ref 9.8–20.1)
CALCIUM SERPL-MCNC: 10.2 MG/DL (ref 8.4–10.2)
CHLORIDE SERPL-SCNC: 103 MMOL/L (ref 98–107)
CO2 SERPL-SCNC: 24 MMOL/L (ref 23–31)
CREAT SERPL-MCNC: 0.83 MG/DL (ref 0.57–1.11)
EOSINOPHIL # BLD AUTO: 0.03 X10(3)/MCL (ref 0–0.9)
EOSINOPHIL NFR BLD AUTO: 0.6 % (ref 0–6.4)
ERYTHROCYTE [DISTWIDTH] IN BLOOD BY AUTOMATED COUNT: 14.1 % (ref 11.5–17)
GLOBULIN SER-MCNC: 4.3 GM/DL (ref 2.4–3.5)
GLUCOSE SERPL-MCNC: 92 MG/DL (ref 82–115)
HCT VFR BLD AUTO: 38.6 % (ref 37–47)
HGB BLD-MCNC: 11.7 GM/DL (ref 12–16)
IMM GRANULOCYTES # BLD AUTO: 0.01 10*3/UL (ref 0–0.02)
IMM GRANULOCYTES NFR BLD AUTO: 0.2 % (ref 0–0.43)
LYMPHOCYTES # BLD AUTO: 0.94 X10(3)/MCL (ref 0.6–4.6)
LYMPHOCYTES NFR BLD AUTO: 19.4 % (ref 16–44)
MCH RBC QN AUTO: 31.2 PG (ref 27–31)
MCHC RBC AUTO-ENTMCNC: 30.3 GM/DL (ref 33–36)
MCV RBC AUTO: 102.9 FL (ref 80–94)
MONOCYTES # BLD AUTO: 0.18 X10(3)/MCL (ref 0.1–1.3)
MONOCYTES NFR BLD AUTO: 3.7 % (ref 4–12.1)
NEUTROPHILS # BLD AUTO: 3.67 X10(3)/MCL (ref 2.1–9.2)
NEUTROPHILS NFR BLD AUTO: 75.9 % (ref 43–73)
NRBC BLD AUTO-RTO: 0 % (ref 0–0.2)
PLATELET # BLD AUTO: 313 X10(3)/MCL (ref 130–400)
PMV BLD AUTO: 8.5 FL (ref 7.4–10.4)
POTASSIUM SERPL-SCNC: 4.4 MMOL/L (ref 3.5–5.1)
PROT SERPL-MCNC: 8 GM/DL (ref 5.8–7.6)
RBC # BLD AUTO: 3.75 X10(6)/MCL (ref 4.2–5.4)
SODIUM SERPL-SCNC: 138 MMOL/L (ref 136–145)
WBC # SPEC AUTO: 4.8 X10(3)/MCL (ref 4.5–11.5)

## 2021-05-06 ENCOUNTER — HISTORICAL (OUTPATIENT)
Dept: ADMINISTRATIVE | Facility: HOSPITAL | Age: 66
End: 2021-05-06

## 2021-05-06 LAB
ABS NEUT (OLG): 2.9 X10(3)/MCL (ref 2.1–9.2)
ALBUMIN SERPL-MCNC: 3.7 GM/DL (ref 3.4–4.8)
ALBUMIN/GLOB SERPL: 0.9 RATIO (ref 1.1–2)
ALP SERPL-CCNC: 180 UNIT/L (ref 40–150)
ALT SERPL-CCNC: 81 UNIT/L (ref 0–55)
AST SERPL-CCNC: 30 UNIT/L (ref 5–34)
BASOPHILS # BLD AUTO: 0 X10(3)/MCL (ref 0–0.2)
BASOPHILS NFR BLD AUTO: 0 %
BILIRUB SERPL-MCNC: 0.4 MG/DL
BILIRUBIN DIRECT+TOT PNL SERPL-MCNC: 0.1 MG/DL (ref 0–0.8)
BILIRUBIN DIRECT+TOT PNL SERPL-MCNC: 0.3 MG/DL (ref 0–0.5)
BUN SERPL-MCNC: 19.2 MG/DL (ref 9.8–20.1)
CALCIUM SERPL-MCNC: 10.6 MG/DL (ref 8.4–10.2)
CHLORIDE SERPL-SCNC: 106 MMOL/L (ref 98–107)
CO2 SERPL-SCNC: 26 MMOL/L (ref 23–31)
CREAT SERPL-MCNC: 0.73 MG/DL (ref 0.55–1.02)
EOSINOPHIL # BLD AUTO: 0 X10(3)/MCL (ref 0–0.9)
EOSINOPHIL NFR BLD AUTO: 1 %
ERYTHROCYTE [DISTWIDTH] IN BLOOD BY AUTOMATED COUNT: 13.6 % (ref 11.5–14.5)
GLOBULIN SER-MCNC: 4.1 GM/DL (ref 2.4–3.5)
GLUCOSE SERPL-MCNC: 141 MG/DL (ref 82–115)
HCT VFR BLD AUTO: 34.4 % (ref 35–46)
HGB BLD-MCNC: 10.8 GM/DL (ref 12–16)
IMM GRANULOCYTES # BLD AUTO: 0.02 10*3/UL
IMM GRANULOCYTES NFR BLD AUTO: 0 %
LYMPHOCYTES # BLD AUTO: 0.9 X10(3)/MCL (ref 0.6–4.6)
LYMPHOCYTES NFR BLD AUTO: 20 %
MCH RBC QN AUTO: 32.3 PG (ref 26–34)
MCHC RBC AUTO-ENTMCNC: 31.4 GM/DL (ref 31–37)
MCV RBC AUTO: 103 FL (ref 80–100)
MONOCYTES # BLD AUTO: 0.5 X10(3)/MCL (ref 0.1–1.3)
MONOCYTES NFR BLD AUTO: 11 %
NEUTROPHILS # BLD AUTO: 2.9 X10(3)/MCL (ref 2.1–9.2)
NEUTROPHILS NFR BLD AUTO: 66 %
PLATELET # BLD AUTO: 259 X10(3)/MCL (ref 130–400)
PMV BLD AUTO: 8.4 FL (ref 7.4–10.4)
POTASSIUM SERPL-SCNC: 4.1 MMOL/L (ref 3.5–5.1)
PROT SERPL-MCNC: 7.8 GM/DL (ref 5.8–7.6)
RBC # BLD AUTO: 3.34 X10(6)/MCL (ref 4–5.2)
SODIUM SERPL-SCNC: 141 MMOL/L (ref 136–145)
T4 FREE SERPL-MCNC: 0.93 NG/DL (ref 0.7–1.48)
TSH SERPL-ACNC: 1.52 UIU/ML (ref 0.35–4.94)
WBC # SPEC AUTO: 4.4 X10(3)/MCL (ref 4.5–11)

## 2021-05-10 ENCOUNTER — HISTORICAL (OUTPATIENT)
Dept: ADMINISTRATIVE | Facility: HOSPITAL | Age: 66
End: 2021-05-10

## 2021-05-10 LAB — SARS-COV-2 RNA RESP QL NAA+PROBE: NOT DETECTED

## 2021-05-12 ENCOUNTER — HISTORICAL (OUTPATIENT)
Dept: SURGERY | Facility: HOSPITAL | Age: 66
End: 2021-05-12

## 2021-05-20 ENCOUNTER — HISTORICAL (OUTPATIENT)
Dept: INFUSION THERAPY | Facility: HOSPITAL | Age: 66
End: 2021-05-20

## 2021-05-20 LAB
ABS NEUT (OLG): 2.08 X10(3)/MCL (ref 2.1–9.2)
ALBUMIN SERPL-MCNC: 3.5 GM/DL (ref 3.4–4.8)
ALBUMIN/GLOB SERPL: 0.8 RATIO (ref 1.1–2)
ALP SERPL-CCNC: 217 UNIT/L (ref 40–150)
ALT SERPL-CCNC: 55 UNIT/L (ref 0–55)
AST SERPL-CCNC: 24 UNIT/L (ref 5–34)
BASOPHILS # BLD AUTO: 0 X10(3)/MCL (ref 0–0.2)
BASOPHILS NFR BLD AUTO: 0 %
BILIRUB SERPL-MCNC: 0.4 MG/DL
BILIRUBIN DIRECT+TOT PNL SERPL-MCNC: 0.1 MG/DL (ref 0–0.8)
BILIRUBIN DIRECT+TOT PNL SERPL-MCNC: 0.3 MG/DL (ref 0–0.5)
BUN SERPL-MCNC: 12.6 MG/DL (ref 9.8–20.1)
CALCIUM SERPL-MCNC: 9.8 MG/DL (ref 8.4–10.2)
CHLORIDE SERPL-SCNC: 107 MMOL/L (ref 98–107)
CO2 SERPL-SCNC: 26 MMOL/L (ref 23–31)
CREAT SERPL-MCNC: 0.77 MG/DL (ref 0.55–1.02)
EOSINOPHIL # BLD AUTO: 0 X10(3)/MCL (ref 0–0.9)
EOSINOPHIL NFR BLD AUTO: 1 %
ERYTHROCYTE [DISTWIDTH] IN BLOOD BY AUTOMATED COUNT: 15 % (ref 11.5–14.5)
GLOBULIN SER-MCNC: 4.2 GM/DL (ref 2.4–3.5)
GLUCOSE SERPL-MCNC: 99 MG/DL (ref 82–115)
HCT VFR BLD AUTO: 34.6 % (ref 35–46)
HGB BLD-MCNC: 10.6 GM/DL (ref 12–16)
IMM GRANULOCYTES # BLD AUTO: 0.05 10*3/UL
IMM GRANULOCYTES NFR BLD AUTO: 1 %
LYMPHOCYTES # BLD AUTO: 0.8 X10(3)/MCL (ref 0.6–4.6)
LYMPHOCYTES NFR BLD AUTO: 20 %
MCH RBC QN AUTO: 32.3 PG (ref 26–34)
MCHC RBC AUTO-ENTMCNC: 30.6 GM/DL (ref 31–37)
MCV RBC AUTO: 105.5 FL (ref 80–100)
MONOCYTES # BLD AUTO: 1.2 X10(3)/MCL (ref 0.1–1.3)
MONOCYTES NFR BLD AUTO: 29 %
NEUTROPHILS # BLD AUTO: 2.08 X10(3)/MCL (ref 2.1–9.2)
NEUTROPHILS NFR BLD AUTO: 49 %
PLATELET # BLD AUTO: 241 X10(3)/MCL (ref 130–400)
PMV BLD AUTO: 8.6 FL (ref 7.4–10.4)
POTASSIUM SERPL-SCNC: 4.4 MMOL/L (ref 3.5–5.1)
PROT SERPL-MCNC: 7.7 GM/DL (ref 5.8–7.6)
RBC # BLD AUTO: 3.28 X10(6)/MCL (ref 4–5.2)
SODIUM SERPL-SCNC: 141 MMOL/L (ref 136–145)
WBC # SPEC AUTO: 4.2 X10(3)/MCL (ref 4.5–11)

## 2021-06-10 ENCOUNTER — HISTORICAL (OUTPATIENT)
Dept: INFUSION THERAPY | Facility: HOSPITAL | Age: 66
End: 2021-06-10

## 2021-06-10 LAB
ABS NEUT (OLG): 2.59 X10(3)/MCL (ref 2.1–9.2)
ALBUMIN SERPL-MCNC: 3.4 GM/DL (ref 3.4–4.8)
ALBUMIN/GLOB SERPL: 0.8 RATIO (ref 1.1–2)
ALP SERPL-CCNC: 220 UNIT/L (ref 40–150)
ALT SERPL-CCNC: 34 UNIT/L (ref 0–55)
AST SERPL-CCNC: 28 UNIT/L (ref 5–34)
BASOPHILS # BLD AUTO: 0 X10(3)/MCL (ref 0–0.2)
BASOPHILS NFR BLD AUTO: 0 %
BILIRUB SERPL-MCNC: 0.5 MG/DL
BILIRUBIN DIRECT+TOT PNL SERPL-MCNC: 0.2 MG/DL (ref 0–0.8)
BILIRUBIN DIRECT+TOT PNL SERPL-MCNC: 0.3 MG/DL (ref 0–0.5)
BUN SERPL-MCNC: 7.8 MG/DL (ref 9.8–20.1)
CALCIUM SERPL-MCNC: 9.9 MG/DL (ref 8.4–10.2)
CHLORIDE SERPL-SCNC: 105 MMOL/L (ref 98–107)
CO2 SERPL-SCNC: 26 MMOL/L (ref 23–31)
CREAT SERPL-MCNC: 0.74 MG/DL (ref 0.55–1.02)
EOSINOPHIL # BLD AUTO: 0 X10(3)/MCL (ref 0–0.9)
EOSINOPHIL NFR BLD AUTO: 0 %
ERYTHROCYTE [DISTWIDTH] IN BLOOD BY AUTOMATED COUNT: 16.5 % (ref 11.5–14.5)
GLOBULIN SER-MCNC: 4.2 GM/DL (ref 2.4–3.5)
GLUCOSE SERPL-MCNC: 100 MG/DL (ref 82–115)
HCT VFR BLD AUTO: 31 % (ref 35–46)
HGB BLD-MCNC: 9.6 GM/DL (ref 12–16)
IMM GRANULOCYTES # BLD AUTO: 0.06 10*3/UL
IMM GRANULOCYTES NFR BLD AUTO: 1 %
LYMPHOCYTES # BLD AUTO: 1.4 X10(3)/MCL (ref 0.6–4.6)
LYMPHOCYTES NFR BLD AUTO: 25 %
MCH RBC QN AUTO: 33 PG (ref 26–34)
MCHC RBC AUTO-ENTMCNC: 31 GM/DL (ref 31–37)
MCV RBC AUTO: 106.5 FL (ref 80–100)
MONOCYTES # BLD AUTO: 1.6 X10(3)/MCL (ref 0.1–1.3)
MONOCYTES NFR BLD AUTO: 28 %
NEUTROPHILS # BLD AUTO: 2.59 X10(3)/MCL (ref 2.1–9.2)
NEUTROPHILS NFR BLD AUTO: 45 %
NRBC BLD AUTO-RTO: 0 % (ref 0–0.2)
PLATELET # BLD AUTO: 224 X10(3)/MCL (ref 130–400)
PMV BLD AUTO: 9.3 FL (ref 7.4–10.4)
POTASSIUM SERPL-SCNC: 4.4 MMOL/L (ref 3.5–5.1)
PROT SERPL-MCNC: 7.6 GM/DL (ref 5.8–7.6)
RBC # BLD AUTO: 2.91 X10(6)/MCL (ref 4–5.2)
SODIUM SERPL-SCNC: 141 MMOL/L (ref 136–145)
T4 FREE SERPL-MCNC: 0.87 NG/DL (ref 0.7–1.48)
T4 FREE SERPL-MCNC: 0.92 NG/DL (ref 0.7–1.48)
TSH SERPL-ACNC: 2.75 UIU/ML (ref 0.35–4.94)
TSH SERPL-ACNC: 3.05 UIU/ML (ref 0.35–4.94)
WBC # SPEC AUTO: 5.7 X10(3)/MCL (ref 4.5–11)

## 2021-07-09 ENCOUNTER — HISTORICAL (OUTPATIENT)
Dept: INFUSION THERAPY | Facility: HOSPITAL | Age: 66
End: 2021-07-09

## 2021-07-09 LAB
ABS NEUT (OLG): 3.14 X10(3)/MCL (ref 2.1–9.2)
ALBUMIN SERPL-MCNC: 3.3 GM/DL (ref 3.4–4.8)
ALBUMIN/GLOB SERPL: 0.9 RATIO (ref 1.1–2)
ALP SERPL-CCNC: 175 UNIT/L (ref 40–150)
ALT SERPL-CCNC: 23 UNIT/L (ref 0–55)
AST SERPL-CCNC: 18 UNIT/L (ref 5–34)
BASOPHILS # BLD AUTO: 0 X10(3)/MCL (ref 0–0.2)
BASOPHILS NFR BLD AUTO: 1 %
BILIRUB SERPL-MCNC: 0.3 MG/DL
BILIRUBIN DIRECT+TOT PNL SERPL-MCNC: 0.1 MG/DL (ref 0–0.5)
BILIRUBIN DIRECT+TOT PNL SERPL-MCNC: 0.2 MG/DL (ref 0–0.8)
BUN SERPL-MCNC: 12.6 MG/DL (ref 9.8–20.1)
CALCIUM SERPL-MCNC: 9.4 MG/DL (ref 8.4–10.2)
CHLORIDE SERPL-SCNC: 106 MMOL/L (ref 98–107)
CO2 SERPL-SCNC: 25 MMOL/L (ref 23–31)
CREAT SERPL-MCNC: 0.87 MG/DL (ref 0.55–1.02)
EOSINOPHIL # BLD AUTO: 0 X10(3)/MCL (ref 0–0.9)
EOSINOPHIL NFR BLD AUTO: 1 %
ERYTHROCYTE [DISTWIDTH] IN BLOOD BY AUTOMATED COUNT: 17 % (ref 11.5–14.5)
FOLATE SERPL-MCNC: 18.2 NG/ML (ref 7–31.4)
GLOBULIN SER-MCNC: 3.7 GM/DL (ref 2.4–3.5)
GLUCOSE SERPL-MCNC: 149 MG/DL (ref 82–115)
HCT VFR BLD AUTO: 32.7 % (ref 35–46)
HGB BLD-MCNC: 10.1 GM/DL (ref 12–16)
IMM GRANULOCYTES # BLD AUTO: 0.05 10*3/UL
IMM GRANULOCYTES NFR BLD AUTO: 1 %
LYMPHOCYTES # BLD AUTO: 1.4 X10(3)/MCL (ref 0.6–4.6)
LYMPHOCYTES NFR BLD AUTO: 23 %
MCH RBC QN AUTO: 33.9 PG (ref 26–34)
MCHC RBC AUTO-ENTMCNC: 30.9 GM/DL (ref 31–37)
MCV RBC AUTO: 109.7 FL (ref 80–100)
MONOCYTES # BLD AUTO: 1.3 X10(3)/MCL (ref 0.1–1.3)
MONOCYTES NFR BLD AUTO: 22 %
NEUTROPHILS # BLD AUTO: 3.14 X10(3)/MCL (ref 2.1–9.2)
NEUTROPHILS NFR BLD AUTO: 53 %
NRBC BLD AUTO-RTO: 0 % (ref 0–0.2)
PLATELET # BLD AUTO: 292 X10(3)/MCL (ref 130–400)
PMV BLD AUTO: 8 FL (ref 7.4–10.4)
POTASSIUM SERPL-SCNC: 3.9 MMOL/L (ref 3.5–5.1)
PROT SERPL-MCNC: 7 GM/DL (ref 5.8–7.6)
RBC # BLD AUTO: 2.98 X10(6)/MCL (ref 4–5.2)
SODIUM SERPL-SCNC: 141 MMOL/L (ref 136–145)
VIT B12 SERPL-MCNC: 337 PG/ML (ref 213–816)
WBC # SPEC AUTO: 5.9 X10(3)/MCL (ref 4.5–11)

## 2021-08-09 ENCOUNTER — HISTORICAL (OUTPATIENT)
Dept: RADIOLOGY | Facility: HOSPITAL | Age: 66
End: 2021-08-09

## 2021-08-09 LAB
BUN SERPL-MCNC: 7.4 MG/DL (ref 9.8–20.1)
CREAT SERPL-MCNC: 0.81 MG/DL (ref 0.57–1.11)

## 2021-08-10 ENCOUNTER — HISTORICAL (OUTPATIENT)
Dept: RADIOLOGY | Facility: HOSPITAL | Age: 66
End: 2021-08-10

## 2021-08-17 ENCOUNTER — HISTORICAL (OUTPATIENT)
Dept: ADMINISTRATIVE | Facility: HOSPITAL | Age: 66
End: 2021-08-17

## 2021-08-17 LAB
ABS NEUT (OLG): 12.92 X10(3)/MCL (ref 2.1–9.2)
ALBUMIN SERPL-MCNC: 2.1 GM/DL (ref 3.4–4.8)
ALBUMIN/GLOB SERPL: 0.3 RATIO (ref 1.1–2)
ALP SERPL-CCNC: 498 UNIT/L (ref 40–150)
ALT SERPL-CCNC: 34 UNIT/L (ref 0–55)
ANISOCYTOSIS BLD QL SMEAR: NORMAL
AST SERPL-CCNC: 33 UNIT/L (ref 5–34)
BASOPHILS # BLD AUTO: 0 X10(3)/MCL (ref 0–0.2)
BASOPHILS NFR BLD AUTO: 0 %
BILIRUB SERPL-MCNC: 0.4 MG/DL
BILIRUBIN DIRECT+TOT PNL SERPL-MCNC: 0.1 MG/DL (ref 0–0.8)
BILIRUBIN DIRECT+TOT PNL SERPL-MCNC: 0.3 MG/DL (ref 0–0.5)
BUN SERPL-MCNC: 12.8 MG/DL (ref 9.8–20.1)
CALCIUM SERPL-MCNC: 10.2 MG/DL (ref 8.4–10.2)
CHLORIDE SERPL-SCNC: 104 MMOL/L (ref 98–107)
CO2 SERPL-SCNC: 24 MMOL/L (ref 23–31)
CREAT SERPL-MCNC: 0.82 MG/DL (ref 0.55–1.02)
EOSINOPHIL # BLD AUTO: 0.1 X10(3)/MCL (ref 0–0.9)
EOSINOPHIL NFR BLD AUTO: 0 %
ERYTHROCYTE [DISTWIDTH] IN BLOOD BY AUTOMATED COUNT: 19.6 % (ref 11.5–14.5)
FERRITIN SERPL-MCNC: >1675.56 NG/ML (ref 4.63–204)
GLOBULIN SER-MCNC: 6.5 GM/DL (ref 2.4–3.5)
GLUCOSE SERPL-MCNC: 97 MG/DL (ref 82–115)
HCT VFR BLD AUTO: 20.6 % (ref 35–46)
HGB BLD-MCNC: 6.1 GM/DL (ref 12–16)
HYPOCHROMIA BLD QL SMEAR: NORMAL
IMM GRANULOCYTES # BLD AUTO: 0.23 10*3/UL
IMM GRANULOCYTES NFR BLD AUTO: 1 %
IRON SATN MFR SERPL: 9 % (ref 20–50)
IRON SERPL-MCNC: 16 UG/DL (ref 50–170)
LYMPHOCYTES # BLD AUTO: 1.1 X10(3)/MCL (ref 0.6–4.6)
LYMPHOCYTES NFR BLD AUTO: 7 %
MCH RBC QN AUTO: 29.9 PG (ref 26–34)
MCHC RBC AUTO-ENTMCNC: 29.6 GM/DL (ref 31–37)
MCV RBC AUTO: 101 FL (ref 80–100)
MICROCYTES BLD QL SMEAR: NORMAL
MONOCYTES # BLD AUTO: 2.5 X10(3)/MCL (ref 0.1–1.3)
MONOCYTES NFR BLD AUTO: 15 %
NEUTROPHILS # BLD AUTO: 12.92 X10(3)/MCL (ref 2.1–9.2)
NEUTROPHILS NFR BLD AUTO: 77 %
NRBC BLD AUTO-RTO: 0.4 % (ref 0–0.2)
PLATELET # BLD AUTO: 607 X10(3)/MCL (ref 130–400)
PLATELET # BLD EST: NORMAL 10*3/UL
PMV BLD AUTO: 8.8 FL (ref 7.4–10.4)
POTASSIUM SERPL-SCNC: 4.3 MMOL/L (ref 3.5–5.1)
PROT SERPL-MCNC: 8.6 GM/DL (ref 5.8–7.6)
RBC # BLD AUTO: 2.04 X10(6)/MCL (ref 4–5.2)
RBC MORPH BLD: NORMAL
SODIUM SERPL-SCNC: 141 MMOL/L (ref 136–145)
TIBC SERPL-MCNC: 159 UG/DL (ref 70–310)
TIBC SERPL-MCNC: 175 UG/DL (ref 250–450)
TRANSFERRIN SERPL-MCNC: 152 MG/DL (ref 173–360)
WBC # SPEC AUTO: 16.8 X10(3)/MCL (ref 4.5–11)

## 2021-08-25 ENCOUNTER — HOSPITAL ENCOUNTER (OUTPATIENT)
Dept: MEDSURG UNIT | Facility: HOSPITAL | Age: 66
End: 2021-08-26
Attending: INTERNAL MEDICINE | Admitting: INTERNAL MEDICINE

## 2021-08-25 LAB
ABS NEUT (OLG): 13.6 X10(3)/MCL (ref 1.5–6.9)
ALBUMIN SERPL-MCNC: 2 GM/DL (ref 3.4–4.8)
ALBUMIN/GLOB SERPL: 0.3 RATIO (ref 1.1–2)
ALP SERPL-CCNC: 331 UNIT/L (ref 40–150)
ALT SERPL-CCNC: 9 UNIT/L (ref 0–55)
APPEARANCE, UA: CLEAR
APTT PPP: 47.1 SEC (ref 23.4–34.9)
AST SERPL-CCNC: 19 UNIT/L (ref 5–34)
BACTERIA SPEC CULT: ABNORMAL /HPF
BASOPHILS # BLD AUTO: 0 X10(3)/MCL (ref 0–0.1)
BASOPHILS NFR BLD AUTO: 0 % (ref 0–1)
BILIRUB SERPL-MCNC: 0.4 MG/DL
BILIRUB UR QL STRIP: NEGATIVE
BILIRUBIN DIRECT+TOT PNL SERPL-MCNC: 0.2 MG/DL (ref 0–0.5)
BILIRUBIN DIRECT+TOT PNL SERPL-MCNC: 0.2 MG/DL (ref 0–0.8)
BNP BLD-MCNC: 128.3 PG/ML
BUN SERPL-MCNC: 8 MG/DL (ref 9.8–20.1)
CALCIUM SERPL-MCNC: 10.2 MG/DL (ref 8.4–10.2)
CHLORIDE SERPL-SCNC: 101 MMOL/L (ref 98–107)
CK MB SERPL-MCNC: <0.3 NG/ML
CK SERPL-CCNC: 19 U/L (ref 29–168)
CO2 SERPL-SCNC: 23 MMOL/L (ref 23–31)
COLOR UR: YELLOW
CREAT SERPL-MCNC: 0.84 MG/DL (ref 0.55–1.02)
CROSSMATCH INTERPRETATION: NORMAL
EOSINOPHIL # BLD AUTO: 0 X10(3)/MCL (ref 0–0.6)
EOSINOPHIL NFR BLD AUTO: 0 % (ref 0–5)
ERYTHROCYTE [DISTWIDTH] IN BLOOD BY AUTOMATED COUNT: 19.8 % (ref 11.5–17)
GLOBULIN SER-MCNC: 6.5 GM/DL (ref 2.4–3.5)
GLUCOSE (UA): NEGATIVE MG/DL
GLUCOSE SERPL-MCNC: 96 MG/DL (ref 82–115)
GROUP & RH: NORMAL
HCT VFR BLD AUTO: 25.2 % (ref 36–48)
HGB BLD-MCNC: 7.8 GM/DL (ref 12–16)
HGB UR QL STRIP: ABNORMAL
IMM GRANULOCYTES # BLD AUTO: 0.31 10*3/UL (ref 0–0.02)
IMM GRANULOCYTES NFR BLD AUTO: 1.8 % (ref 0–0.43)
INR PPP: 1.3 (ref 2–3)
KETONES UR QL STRIP: NEGATIVE MG/DL
LEUKOCYTE ESTERASE UR QL STRIP: NEGATIVE
LYMPHOCYTES # BLD AUTO: 1.4 X10(3)/MCL (ref 0.5–4.1)
LYMPHOCYTES NFR BLD AUTO: 8 % (ref 15–40)
MAGNESIUM SERPL-MCNC: 1.5 MG/DL (ref 1.6–2.6)
MCH RBC QN AUTO: 28 PG (ref 27–34)
MCHC RBC AUTO-ENTMCNC: 31 GM/DL (ref 31–36)
MCV RBC AUTO: 92 FL (ref 80–99)
MONOCYTES # BLD AUTO: 2.1 X10(3)/MCL (ref 0–1.1)
MONOCYTES NFR BLD AUTO: 12 % (ref 4–12)
NEUTROPHILS # BLD AUTO: 13.6 X10(3)/MCL (ref 1.5–6.9)
NEUTROPHILS NFR BLD AUTO: 78 % (ref 43–75)
NITRITE UR QL STRIP: NEGATIVE
PH UR STRIP: 7 [PH] (ref 4.6–8)
PLATELET # BLD AUTO: 646 X10(3)/MCL (ref 140–400)
PMV BLD AUTO: 9 FL (ref 6.8–10)
POTASSIUM SERPL-SCNC: 4.3 MMOL/L (ref 3.5–5.1)
PRODUCT READY: NORMAL
PROT SERPL-MCNC: 8.5 GM/DL (ref 5.8–7.6)
PROT UR QL STRIP: NEGATIVE MG/DL
PROTHROMBIN TIME: 16.5 SEC (ref 11.7–14.5)
RBC # BLD AUTO: 2.74 X10(6)/MCL (ref 4.2–5.4)
RBC #/AREA URNS HPF: ABNORMAL /HPF
SARS-COV-2 AG RESP QL IA.RAPID: NOT DETECTED
SODIUM SERPL-SCNC: 136 MMOL/L (ref 136–145)
SP GR UR STRIP: 1.01 (ref 1–1.03)
SQUAMOUS EPITHELIAL, UA: ABNORMAL /LPF
TRANSFUSION ORDER: NORMAL
TROPONIN I SERPL-MCNC: <0.01 NG/ML (ref 0–0.04)
UROBILINOGEN UR STRIP-ACNC: 0.2 EU/DL
WBC # SPEC AUTO: 17.5 X10(3)/MCL (ref 4.5–11.5)
WBC #/AREA URNS HPF: ABNORMAL /HPF

## 2021-08-26 LAB
ABS NEUT (OLG): 11 X10(3)/MCL (ref 1.5–6.9)
BASOPHILS # BLD AUTO: 0 X10(3)/MCL (ref 0–0.1)
BASOPHILS NFR BLD AUTO: 0 % (ref 0–1)
BUN SERPL-MCNC: 6 MG/DL (ref 9.8–20.1)
CALCIUM SERPL-MCNC: 9.4 MG/DL (ref 8.4–10.2)
CHLORIDE SERPL-SCNC: 108 MMOL/L (ref 98–107)
CO2 SERPL-SCNC: 21 MMOL/L (ref 23–31)
CREAT SERPL-MCNC: 0.77 MG/DL (ref 0.55–1.02)
CREAT/UREA NIT SERPL: 8
EOSINOPHIL # BLD AUTO: 0 X10(3)/MCL (ref 0–0.6)
EOSINOPHIL NFR BLD AUTO: 0 % (ref 0–5)
ERYTHROCYTE [DISTWIDTH] IN BLOOD BY AUTOMATED COUNT: 18.2 % (ref 11.5–17)
GLUCOSE SERPL-MCNC: 100 MG/DL (ref 82–115)
HCT VFR BLD AUTO: 29.9 % (ref 36–48)
HGB BLD-MCNC: 9.3 GM/DL (ref 12–16)
IMM GRANULOCYTES # BLD AUTO: 0.18 10*3/UL (ref 0–0.02)
IMM GRANULOCYTES NFR BLD AUTO: 1.3 % (ref 0–0.43)
LYMPHOCYTES # BLD AUTO: 0.8 X10(3)/MCL (ref 0.5–4.1)
LYMPHOCYTES NFR BLD AUTO: 6 % (ref 15–40)
MAGNESIUM SERPL-MCNC: 1.8 MG/DL (ref 1.6–2.6)
MCH RBC QN AUTO: 28 PG (ref 27–34)
MCHC RBC AUTO-ENTMCNC: 31 GM/DL (ref 31–36)
MCV RBC AUTO: 90 FL (ref 80–99)
MONOCYTES # BLD AUTO: 2 X10(3)/MCL (ref 0–1.1)
MONOCYTES NFR BLD AUTO: 14 % (ref 4–12)
NEUTROPHILS # BLD AUTO: 11 X10(3)/MCL (ref 1.5–6.9)
NEUTROPHILS NFR BLD AUTO: 78 % (ref 43–75)
PLATELET # BLD AUTO: 503 X10(3)/MCL (ref 140–400)
PMV BLD AUTO: 8.6 FL (ref 6.8–10)
POTASSIUM SERPL-SCNC: 3.4 MMOL/L (ref 3.5–5.1)
RBC # BLD AUTO: 3.31 X10(6)/MCL (ref 4.2–5.4)
SODIUM SERPL-SCNC: 140 MMOL/L (ref 136–145)
WBC # SPEC AUTO: 14 X10(3)/MCL (ref 4.5–11.5)

## 2021-09-09 ENCOUNTER — HISTORICAL (OUTPATIENT)
Dept: INFUSION THERAPY | Facility: HOSPITAL | Age: 66
End: 2021-09-09

## 2021-09-09 LAB
ABS NEUT (OLG): 8.73 X10(3)/MCL (ref 2.1–9.2)
ALBUMIN SERPL-MCNC: 2.3 GM/DL (ref 3.4–4.8)
ALBUMIN/GLOB SERPL: 0.3 RATIO (ref 1.1–2)
ALP SERPL-CCNC: 397 UNIT/L (ref 40–150)
ALT SERPL-CCNC: 27 UNIT/L (ref 0–55)
AST SERPL-CCNC: 33 UNIT/L (ref 5–34)
BASOPHILS # BLD AUTO: 0 X10(3)/MCL (ref 0–0.2)
BASOPHILS NFR BLD AUTO: 0 %
BILIRUB SERPL-MCNC: 0.4 MG/DL
BILIRUBIN DIRECT+TOT PNL SERPL-MCNC: 0 MG/DL (ref 0–0.8)
BILIRUBIN DIRECT+TOT PNL SERPL-MCNC: 0.4 MG/DL (ref 0–0.5)
BUN SERPL-MCNC: 15.7 MG/DL (ref 9.8–20.1)
CALCIUM SERPL-MCNC: 10.4 MG/DL (ref 8.4–10.2)
CHLORIDE SERPL-SCNC: 107 MMOL/L (ref 98–107)
CO2 SERPL-SCNC: 25 MMOL/L (ref 23–31)
CREAT SERPL-MCNC: 0.83 MG/DL (ref 0.55–1.02)
EOSINOPHIL # BLD AUTO: 0.1 X10(3)/MCL (ref 0–0.9)
EOSINOPHIL NFR BLD AUTO: 1 %
ERYTHROCYTE [DISTWIDTH] IN BLOOD BY AUTOMATED COUNT: 18 % (ref 11.5–14.5)
GLOBULIN SER-MCNC: 6.7 GM/DL (ref 2.4–3.5)
GLUCOSE SERPL-MCNC: 125 MG/DL (ref 82–115)
HCT VFR BLD AUTO: 33.1 % (ref 35–46)
HGB BLD-MCNC: 9.9 GM/DL (ref 12–16)
IMM GRANULOCYTES # BLD AUTO: 0.07 10*3/UL
IMM GRANULOCYTES NFR BLD AUTO: 1 %
LYMPHOCYTES # BLD AUTO: 1.4 X10(3)/MCL (ref 0.6–4.6)
LYMPHOCYTES NFR BLD AUTO: 12 %
MCH RBC QN AUTO: 27.3 PG (ref 26–34)
MCHC RBC AUTO-ENTMCNC: 29.9 GM/DL (ref 31–37)
MCV RBC AUTO: 91.4 FL (ref 80–100)
MONOCYTES # BLD AUTO: 1.3 X10(3)/MCL (ref 0.1–1.3)
MONOCYTES NFR BLD AUTO: 11 %
NEUTROPHILS # BLD AUTO: 8.73 X10(3)/MCL (ref 2.1–9.2)
NEUTROPHILS NFR BLD AUTO: 75 %
NRBC BLD AUTO-RTO: 0 % (ref 0–0.2)
PLATELET # BLD AUTO: 467 X10(3)/MCL (ref 130–400)
PMV BLD AUTO: 8.4 FL (ref 7.4–10.4)
POTASSIUM SERPL-SCNC: 4.3 MMOL/L (ref 3.5–5.1)
PROT SERPL-MCNC: 9 GM/DL (ref 5.8–7.6)
RBC # BLD AUTO: 3.62 X10(6)/MCL (ref 4–5.2)
SODIUM SERPL-SCNC: 142 MMOL/L (ref 136–145)
T4 FREE SERPL-MCNC: 1.03 NG/DL (ref 0.7–1.48)
TSH SERPL-ACNC: 2 UIU/ML (ref 0.35–4.94)
WBC # SPEC AUTO: 11.6 X10(3)/MCL (ref 4.5–11)

## 2021-09-16 ENCOUNTER — HISTORICAL (OUTPATIENT)
Dept: INFUSION THERAPY | Facility: HOSPITAL | Age: 66
End: 2021-09-16

## 2021-09-30 ENCOUNTER — HISTORICAL (OUTPATIENT)
Dept: ADMINISTRATIVE | Facility: HOSPITAL | Age: 66
End: 2021-09-30

## 2021-09-30 LAB
ABS NEUT (OLG): 8.19 X10(3)/MCL (ref 2.1–9.2)
ALBUMIN SERPL-MCNC: 2.4 GM/DL (ref 3.4–4.8)
ALBUMIN/GLOB SERPL: 0.4 RATIO (ref 1.1–2)
ALP SERPL-CCNC: 484 UNIT/L (ref 40–150)
ALT SERPL-CCNC: 29 UNIT/L (ref 0–55)
AST SERPL-CCNC: 18 UNIT/L (ref 5–34)
BASOPHILS # BLD AUTO: 0 X10(3)/MCL (ref 0–0.2)
BASOPHILS NFR BLD AUTO: 0 %
BILIRUB SERPL-MCNC: 0.5 MG/DL
BILIRUBIN DIRECT+TOT PNL SERPL-MCNC: 0.2 MG/DL (ref 0–0.8)
BILIRUBIN DIRECT+TOT PNL SERPL-MCNC: 0.3 MG/DL (ref 0–0.5)
BUN SERPL-MCNC: 10.5 MG/DL (ref 9.8–20.1)
CALCIUM SERPL-MCNC: 10.4 MG/DL (ref 8.4–10.2)
CHLORIDE SERPL-SCNC: 104 MMOL/L (ref 98–107)
CO2 SERPL-SCNC: 22 MMOL/L (ref 23–31)
CREAT SERPL-MCNC: 0.76 MG/DL (ref 0.55–1.02)
EOSINOPHIL # BLD AUTO: 0.1 X10(3)/MCL (ref 0–0.9)
EOSINOPHIL NFR BLD AUTO: 1 %
ERYTHROCYTE [DISTWIDTH] IN BLOOD BY AUTOMATED COUNT: 20.6 % (ref 11.5–14.5)
FERRITIN SERPL-MCNC: 8935.58 NG/ML (ref 4.63–204)
GLOBULIN SER-MCNC: 5.9 GM/DL (ref 2.4–3.5)
GLUCOSE SERPL-MCNC: 106 MG/DL (ref 82–115)
HCT VFR BLD AUTO: 31.3 % (ref 35–46)
HGB BLD-MCNC: 9.6 GM/DL (ref 12–16)
IMM GRANULOCYTES # BLD AUTO: 0.05 10*3/UL
IMM GRANULOCYTES NFR BLD AUTO: 0 %
IRON SATN MFR SERPL: 28 % (ref 20–50)
IRON SERPL-MCNC: 53 UG/DL (ref 50–170)
LYMPHOCYTES # BLD AUTO: 1.4 X10(3)/MCL (ref 0.6–4.6)
LYMPHOCYTES NFR BLD AUTO: 12 %
MCH RBC QN AUTO: 28.1 PG (ref 26–34)
MCHC RBC AUTO-ENTMCNC: 30.7 GM/DL (ref 31–37)
MCV RBC AUTO: 91.5 FL (ref 80–100)
MONOCYTES # BLD AUTO: 1.5 X10(3)/MCL (ref 0.1–1.3)
MONOCYTES NFR BLD AUTO: 13 %
NEUTROPHILS # BLD AUTO: 8.19 X10(3)/MCL (ref 2.1–9.2)
NEUTROPHILS NFR BLD AUTO: 73 %
NRBC BLD AUTO-RTO: 0 % (ref 0–0.2)
PLATELET # BLD AUTO: 425 X10(3)/MCL (ref 130–400)
PMV BLD AUTO: 8.4 FL (ref 7.4–10.4)
POTASSIUM SERPL-SCNC: 4.2 MMOL/L (ref 3.5–5.1)
PROT SERPL-MCNC: 8.3 GM/DL (ref 5.8–7.6)
RBC # BLD AUTO: 3.42 X10(6)/MCL (ref 4–5.2)
SODIUM SERPL-SCNC: 137 MMOL/L (ref 136–145)
TIBC SERPL-MCNC: 139 UG/DL (ref 70–310)
TIBC SERPL-MCNC: 192 UG/DL (ref 250–450)
TRANSFERRIN SERPL-MCNC: 153 MG/DL (ref 173–360)
WBC # SPEC AUTO: 11.3 X10(3)/MCL (ref 4.5–11)

## 2021-10-07 ENCOUNTER — HISTORICAL (OUTPATIENT)
Dept: INFUSION THERAPY | Facility: HOSPITAL | Age: 66
End: 2021-10-07

## 2021-10-07 LAB
ABS NEUT (OLG): 8.21 X10(3)/MCL (ref 2.1–9.2)
ALBUMIN SERPL-MCNC: 2.4 GM/DL (ref 3.4–4.8)
ALBUMIN/GLOB SERPL: 0.4 RATIO (ref 1.1–2)
ALP SERPL-CCNC: 480 UNIT/L (ref 40–150)
ALT SERPL-CCNC: 24 UNIT/L (ref 0–55)
AST SERPL-CCNC: 22 UNIT/L (ref 5–34)
BASOPHILS # BLD AUTO: 0 X10(3)/MCL (ref 0–0.2)
BASOPHILS NFR BLD AUTO: 0 %
BILIRUB SERPL-MCNC: 0.5 MG/DL
BILIRUBIN DIRECT+TOT PNL SERPL-MCNC: 0.2 MG/DL (ref 0–0.8)
BILIRUBIN DIRECT+TOT PNL SERPL-MCNC: 0.3 MG/DL (ref 0–0.5)
BUN SERPL-MCNC: 8.6 MG/DL (ref 9.8–20.1)
CALCIUM SERPL-MCNC: 10.6 MG/DL (ref 8.4–10.2)
CHLORIDE SERPL-SCNC: 103 MMOL/L (ref 98–107)
CO2 SERPL-SCNC: 23 MMOL/L (ref 23–31)
CREAT SERPL-MCNC: 0.79 MG/DL (ref 0.55–1.02)
EOSINOPHIL # BLD AUTO: 0.1 X10(3)/MCL (ref 0–0.9)
EOSINOPHIL NFR BLD AUTO: 1 %
ERYTHROCYTE [DISTWIDTH] IN BLOOD BY AUTOMATED COUNT: 20.6 % (ref 11.5–14.5)
GLOBULIN SER-MCNC: 6.2 GM/DL (ref 2.4–3.5)
GLUCOSE SERPL-MCNC: 133 MG/DL (ref 82–115)
HCT VFR BLD AUTO: 33 % (ref 35–46)
HGB BLD-MCNC: 9.7 GM/DL (ref 12–16)
IMM GRANULOCYTES # BLD AUTO: 0.09 10*3/UL
IMM GRANULOCYTES NFR BLD AUTO: 1 %
LYMPHOCYTES # BLD AUTO: 1.2 X10(3)/MCL (ref 0.6–4.6)
LYMPHOCYTES NFR BLD AUTO: 11 %
MCH RBC QN AUTO: 27.2 PG (ref 26–34)
MCHC RBC AUTO-ENTMCNC: 29.4 GM/DL (ref 31–37)
MCV RBC AUTO: 92.4 FL (ref 80–100)
MONOCYTES # BLD AUTO: 1.5 X10(3)/MCL (ref 0.1–1.3)
MONOCYTES NFR BLD AUTO: 14 %
NEUTROPHILS # BLD AUTO: 8.21 X10(3)/MCL (ref 2.1–9.2)
NEUTROPHILS NFR BLD AUTO: 73 %
NRBC BLD AUTO-RTO: 0 % (ref 0–0.2)
PLATELET # BLD AUTO: 514 X10(3)/MCL (ref 130–400)
PMV BLD AUTO: 8.4 FL (ref 7.4–10.4)
POTASSIUM SERPL-SCNC: 4.1 MMOL/L (ref 3.5–5.1)
PROT SERPL-MCNC: 8.6 GM/DL (ref 5.8–7.6)
RBC # BLD AUTO: 3.57 X10(6)/MCL (ref 4–5.2)
SODIUM SERPL-SCNC: 139 MMOL/L (ref 136–145)
WBC # SPEC AUTO: 11.2 X10(3)/MCL (ref 4.5–11)

## 2021-10-28 ENCOUNTER — HISTORICAL (OUTPATIENT)
Dept: INFUSION THERAPY | Facility: HOSPITAL | Age: 66
End: 2021-10-28

## 2021-10-28 LAB
ABS NEUT (OLG): 7.29 X10(3)/MCL (ref 2.1–9.2)
ALBUMIN SERPL-MCNC: 2.5 GM/DL (ref 3.4–4.8)
ALBUMIN/GLOB SERPL: 0.4 RATIO (ref 1.1–2)
ALP SERPL-CCNC: 597 UNIT/L (ref 40–150)
ALT SERPL-CCNC: 41 UNIT/L (ref 0–55)
AST SERPL-CCNC: 35 UNIT/L (ref 5–34)
BASOPHILS # BLD AUTO: 0 X10(3)/MCL (ref 0–0.2)
BASOPHILS NFR BLD AUTO: 0 %
BILIRUB SERPL-MCNC: 0.3 MG/DL
BILIRUBIN DIRECT+TOT PNL SERPL-MCNC: 0.1 MG/DL (ref 0–0.8)
BILIRUBIN DIRECT+TOT PNL SERPL-MCNC: 0.2 MG/DL (ref 0–0.5)
BUN SERPL-MCNC: 12.1 MG/DL (ref 9.8–20.1)
CALCIUM SERPL-MCNC: 10.6 MG/DL (ref 8.7–10.5)
CHLORIDE SERPL-SCNC: 106 MMOL/L (ref 98–107)
CO2 SERPL-SCNC: 23 MMOL/L (ref 23–31)
CREAT SERPL-MCNC: 0.75 MG/DL (ref 0.55–1.02)
ERYTHROCYTE [DISTWIDTH] IN BLOOD BY AUTOMATED COUNT: 20.6 % (ref 11.5–14.5)
GLOBULIN SER-MCNC: 5.9 GM/DL (ref 2.4–3.5)
GLUCOSE SERPL-MCNC: 131 MG/DL (ref 82–115)
HCT VFR BLD AUTO: 28.4 % (ref 35–46)
HGB BLD-MCNC: 8.8 GM/DL (ref 12–16)
IMM GRANULOCYTES # BLD AUTO: 0.08 10*3/UL
IMM GRANULOCYTES NFR BLD AUTO: 1 %
LYMPHOCYTES # BLD AUTO: 0.6 X10(3)/MCL (ref 0.6–4.6)
LYMPHOCYTES NFR BLD AUTO: 6 %
MCH RBC QN AUTO: 27.6 PG (ref 26–34)
MCHC RBC AUTO-ENTMCNC: 31 GM/DL (ref 31–37)
MCV RBC AUTO: 89 FL (ref 80–100)
MONOCYTES # BLD AUTO: 1.2 X10(3)/MCL (ref 0.1–1.3)
MONOCYTES NFR BLD AUTO: 13 %
NEUTROPHILS # BLD AUTO: 7.29 X10(3)/MCL (ref 2.1–9.2)
NEUTROPHILS NFR BLD AUTO: 79 %
NRBC BLD AUTO-RTO: 0 % (ref 0–0.2)
PLATELET # BLD AUTO: 568 X10(3)/MCL (ref 130–400)
PMV BLD AUTO: 8.2 FL (ref 7.4–10.4)
POTASSIUM SERPL-SCNC: 4.4 MMOL/L (ref 3.5–5.1)
PROT SERPL-MCNC: 8.4 GM/DL (ref 5.8–7.6)
RBC # BLD AUTO: 3.19 X10(6)/MCL (ref 4–5.2)
SODIUM SERPL-SCNC: 139 MMOL/L (ref 136–145)
T4 FREE SERPL-MCNC: 1.08 NG/DL (ref 0.7–1.48)
TSH SERPL-ACNC: 0.28 UIU/ML (ref 0.35–4.94)
WBC # SPEC AUTO: 9.2 X10(3)/MCL (ref 4.5–11)

## 2021-11-11 ENCOUNTER — HISTORICAL (OUTPATIENT)
Dept: RADIOLOGY | Facility: HOSPITAL | Age: 66
End: 2021-11-11

## 2021-11-18 ENCOUNTER — HISTORICAL (OUTPATIENT)
Dept: INFUSION THERAPY | Facility: HOSPITAL | Age: 66
End: 2021-11-18

## 2021-11-18 LAB
ABS NEUT (OLG): 7.03 X10(3)/MCL (ref 2.1–9.2)
ALBUMIN SERPL-MCNC: 2.5 GM/DL (ref 3.4–4.8)
ALBUMIN/GLOB SERPL: 0.5 RATIO (ref 1.1–2)
ALP SERPL-CCNC: 329 UNIT/L (ref 40–150)
ALT SERPL-CCNC: 16 UNIT/L (ref 0–55)
AST SERPL-CCNC: 16 UNIT/L (ref 5–34)
BASOPHILS # BLD AUTO: 0 X10(3)/MCL (ref 0–0.2)
BASOPHILS NFR BLD AUTO: 0 %
BILIRUB SERPL-MCNC: 0.3 MG/DL
BILIRUBIN DIRECT+TOT PNL SERPL-MCNC: 0.1 MG/DL (ref 0–0.8)
BILIRUBIN DIRECT+TOT PNL SERPL-MCNC: 0.2 MG/DL (ref 0–0.5)
BUN SERPL-MCNC: 11.8 MG/DL (ref 9.8–20.1)
CALCIUM SERPL-MCNC: 10 MG/DL (ref 8.7–10.5)
CHLORIDE SERPL-SCNC: 103 MMOL/L (ref 98–107)
CO2 SERPL-SCNC: 26 MMOL/L (ref 23–31)
CREAT SERPL-MCNC: 0.75 MG/DL (ref 0.55–1.02)
EOSINOPHIL # BLD AUTO: 0.1 X10(3)/MCL (ref 0–0.9)
EOSINOPHIL NFR BLD AUTO: 1 %
ERYTHROCYTE [DISTWIDTH] IN BLOOD BY AUTOMATED COUNT: 19.7 % (ref 11.5–14.5)
GLOBULIN SER-MCNC: 5.1 GM/DL (ref 2.4–3.5)
GLUCOSE SERPL-MCNC: 119 MG/DL (ref 82–115)
HCT VFR BLD AUTO: 29.6 % (ref 35–46)
HGB BLD-MCNC: 9.2 GM/DL (ref 12–16)
IMM GRANULOCYTES # BLD AUTO: 0.07 10*3/UL
IMM GRANULOCYTES NFR BLD AUTO: 1 %
LYMPHOCYTES # BLD AUTO: 1 X10(3)/MCL (ref 0.6–4.6)
LYMPHOCYTES NFR BLD AUTO: 10 %
MCH RBC QN AUTO: 29.4 PG (ref 26–34)
MCHC RBC AUTO-ENTMCNC: 31.1 GM/DL (ref 31–37)
MCV RBC AUTO: 94.6 FL (ref 80–100)
MONOCYTES # BLD AUTO: 1.6 X10(3)/MCL (ref 0.1–1.3)
MONOCYTES NFR BLD AUTO: 16 %
NEUTROPHILS # BLD AUTO: 7.03 X10(3)/MCL (ref 2.1–9.2)
NEUTROPHILS NFR BLD AUTO: 71 %
NRBC BLD AUTO-RTO: 0 % (ref 0–0.2)
PLATELET # BLD AUTO: 529 X10(3)/MCL (ref 130–400)
PMV BLD AUTO: 8.3 FL (ref 7.4–10.4)
POTASSIUM SERPL-SCNC: 3.9 MMOL/L (ref 3.5–5.1)
PROT SERPL-MCNC: 7.6 GM/DL (ref 5.8–7.6)
RBC # BLD AUTO: 3.13 X10(6)/MCL (ref 4–5.2)
SODIUM SERPL-SCNC: 138 MMOL/L (ref 136–145)
T4 FREE SERPL-MCNC: 1.01 NG/DL (ref 0.7–1.48)
TSH SERPL-ACNC: 1.17 UIU/ML (ref 0.35–4.94)
WBC # SPEC AUTO: 9.9 X10(3)/MCL (ref 4.5–11)

## 2022-01-01 ENCOUNTER — TELEPHONE (OUTPATIENT)
Dept: HEMATOLOGY/ONCOLOGY | Facility: CLINIC | Age: 67
End: 2022-01-01
Payer: MEDICARE

## 2022-01-01 ENCOUNTER — OFFICE VISIT (OUTPATIENT)
Dept: HEMATOLOGY/ONCOLOGY | Facility: CLINIC | Age: 67
End: 2022-01-01
Payer: MEDICARE

## 2022-01-01 ENCOUNTER — PATIENT OUTREACH (OUTPATIENT)
Dept: ADMINISTRATIVE | Facility: CLINIC | Age: 67
End: 2022-01-01
Payer: MEDICARE

## 2022-01-01 ENCOUNTER — INFUSION (OUTPATIENT)
Dept: INFUSION THERAPY | Facility: HOSPITAL | Age: 67
End: 2022-01-01
Attending: INTERNAL MEDICINE
Payer: MEDICARE

## 2022-01-01 ENCOUNTER — APPOINTMENT (OUTPATIENT)
Dept: HEMATOLOGY/ONCOLOGY | Facility: CLINIC | Age: 67
End: 2022-01-01
Payer: MEDICARE

## 2022-01-01 ENCOUNTER — HISTORICAL (OUTPATIENT)
Dept: RADIOLOGY | Facility: HOSPITAL | Age: 67
End: 2022-01-01

## 2022-01-01 ENCOUNTER — HOSPITAL ENCOUNTER (OUTPATIENT)
Dept: RADIOLOGY | Facility: HOSPITAL | Age: 67
Discharge: HOME | End: 2022-08-29
Attending: NURSE PRACTITIONER
Payer: MEDICARE

## 2022-01-01 ENCOUNTER — HISTORICAL (OUTPATIENT)
Dept: INFUSION THERAPY | Facility: HOSPITAL | Age: 67
End: 2022-01-01

## 2022-01-01 ENCOUNTER — APPOINTMENT (OUTPATIENT)
Dept: RADIOLOGY | Facility: HOSPITAL | Age: 67
End: 2022-01-01
Attending: RADIOLOGY
Payer: MEDICARE

## 2022-01-01 ENCOUNTER — HISTORICAL (OUTPATIENT)
Dept: ADMINISTRATIVE | Facility: HOSPITAL | Age: 67
End: 2022-01-01

## 2022-01-01 ENCOUNTER — HISTORICAL (OUTPATIENT)
Dept: ADMINISTRATIVE | Facility: HOSPITAL | Age: 67
End: 2022-01-01
Payer: MEDICAID

## 2022-01-01 ENCOUNTER — TELEPHONE (OUTPATIENT)
Dept: GASTROENTEROLOGY | Facility: CLINIC | Age: 67
End: 2022-01-01
Payer: MEDICARE

## 2022-01-01 ENCOUNTER — OFFICE VISIT (OUTPATIENT)
Dept: VASCULAR SURGERY | Facility: CLINIC | Age: 67
End: 2022-01-01
Payer: MEDICARE

## 2022-01-01 ENCOUNTER — HOSPITAL ENCOUNTER (OUTPATIENT)
Facility: HOSPITAL | Age: 67
Discharge: HOME | End: 2022-06-17
Attending: SURGERY | Admitting: SURGERY
Payer: MEDICARE

## 2022-01-01 ENCOUNTER — HOSPITAL ENCOUNTER (OUTPATIENT)
Dept: RADIOLOGY | Facility: HOSPITAL | Age: 67
Discharge: HOME | End: 2022-09-12
Attending: INTERNAL MEDICINE
Payer: MEDICARE

## 2022-01-01 ENCOUNTER — HOSPITAL ENCOUNTER (INPATIENT)
Facility: HOSPITAL | Age: 67
LOS: 7 days | Discharge: HOME HEALTH | DRG: 871 | End: 2022-10-17
Attending: EMERGENCY MEDICINE | Admitting: EMERGENCY MEDICINE
Payer: MEDICARE

## 2022-01-01 ENCOUNTER — OFFICE VISIT (OUTPATIENT)
Dept: GASTROENTEROLOGY | Facility: CLINIC | Age: 67
End: 2022-01-01
Payer: MEDICARE

## 2022-01-01 ENCOUNTER — ANESTHESIA EVENT (OUTPATIENT)
Dept: SURGERY | Facility: HOSPITAL | Age: 67
End: 2022-01-01
Payer: MEDICARE

## 2022-01-01 ENCOUNTER — HOSPITAL ENCOUNTER (INPATIENT)
Facility: HOSPITAL | Age: 67
LOS: 21 days | DRG: 207 | End: 2022-11-22
Attending: STUDENT IN AN ORGANIZED HEALTH CARE EDUCATION/TRAINING PROGRAM | Admitting: INTERNAL MEDICINE
Payer: MEDICARE

## 2022-01-01 ENCOUNTER — TELEPHONE (OUTPATIENT)
Dept: SMOKING CESSATION | Facility: CLINIC | Age: 67
End: 2022-01-01
Payer: MEDICARE

## 2022-01-01 ENCOUNTER — HOSPITAL ENCOUNTER (OUTPATIENT)
Dept: RADIOLOGY | Facility: HOSPITAL | Age: 67
Discharge: HOME | End: 2022-07-11
Attending: NURSE PRACTITIONER
Payer: MEDICARE

## 2022-01-01 ENCOUNTER — TELEPHONE (OUTPATIENT)
Dept: HEMATOLOGY/ONCOLOGY | Facility: CLINIC | Age: 67
End: 2022-01-01

## 2022-01-01 ENCOUNTER — HOSPITAL ENCOUNTER (OUTPATIENT)
Dept: RADIOLOGY | Facility: HOSPITAL | Age: 67
Discharge: HOME | End: 2022-06-29
Attending: NURSE PRACTITIONER
Payer: MEDICARE

## 2022-01-01 ENCOUNTER — HISTORICAL (OUTPATIENT)
Dept: INFUSION THERAPY | Facility: HOSPITAL | Age: 67
End: 2022-01-01
Payer: MEDICAID

## 2022-01-01 ENCOUNTER — HOSPITAL ENCOUNTER (EMERGENCY)
Facility: HOSPITAL | Age: 67
Discharge: HOME | End: 2022-08-29
Attending: EMERGENCY MEDICINE
Payer: MEDICARE

## 2022-01-01 ENCOUNTER — HOSPITAL ENCOUNTER (EMERGENCY)
Facility: HOSPITAL | Age: 67
Discharge: HOME | End: 2022-09-10
Attending: EMERGENCY MEDICINE
Payer: MEDICARE

## 2022-01-01 ENCOUNTER — ANESTHESIA (OUTPATIENT)
Dept: SURGERY | Facility: HOSPITAL | Age: 67
End: 2022-01-01
Payer: MEDICARE

## 2022-01-01 ENCOUNTER — HOSPITAL ENCOUNTER (OUTPATIENT)
Dept: PULMONOLOGY | Facility: HOSPITAL | Age: 67
Discharge: HOME | End: 2022-10-20
Attending: INTERNAL MEDICINE
Payer: MEDICARE

## 2022-01-01 ENCOUNTER — HOSPITAL ENCOUNTER (OUTPATIENT)
Dept: RADIOLOGY | Facility: HOSPITAL | Age: 67
Discharge: HOME | End: 2022-06-02
Attending: NURSE PRACTITIONER
Payer: MEDICARE

## 2022-01-01 ENCOUNTER — HOSPITAL ENCOUNTER (OUTPATIENT)
Dept: RADIOLOGY | Facility: HOSPITAL | Age: 67
Discharge: HOME | End: 2022-09-30
Attending: INTERNAL MEDICINE
Payer: MEDICARE

## 2022-01-01 VITALS
HEIGHT: 66 IN | TEMPERATURE: 98 F | TEMPERATURE: 99 F | SYSTOLIC BLOOD PRESSURE: 104 MMHG | SYSTOLIC BLOOD PRESSURE: 123 MMHG | HEART RATE: 98 BPM | SYSTOLIC BLOOD PRESSURE: 93 MMHG | HEIGHT: 66 IN | BODY MASS INDEX: 26.36 KG/M2 | HEART RATE: 102 BPM | OXYGEN SATURATION: 98 % | RESPIRATION RATE: 20 BRPM | BODY MASS INDEX: 26.52 KG/M2 | HEART RATE: 94 BPM | OXYGEN SATURATION: 95 % | DIASTOLIC BLOOD PRESSURE: 62 MMHG | RESPIRATION RATE: 24 BRPM | HEIGHT: 66 IN | WEIGHT: 165 LBS | DIASTOLIC BLOOD PRESSURE: 64 MMHG | OXYGEN SATURATION: 98 % | BODY MASS INDEX: 26.5 KG/M2 | TEMPERATURE: 99 F | WEIGHT: 164.88 LBS | WEIGHT: 164 LBS | DIASTOLIC BLOOD PRESSURE: 67 MMHG

## 2022-01-01 VITALS
HEIGHT: 66 IN | BODY MASS INDEX: 26.71 KG/M2 | HEART RATE: 85 BPM | RESPIRATION RATE: 16 BRPM | OXYGEN SATURATION: 100 % | DIASTOLIC BLOOD PRESSURE: 74 MMHG | WEIGHT: 166.19 LBS | TEMPERATURE: 99 F | SYSTOLIC BLOOD PRESSURE: 115 MMHG

## 2022-01-01 VITALS
HEART RATE: 123 BPM | RESPIRATION RATE: 20 BRPM | OXYGEN SATURATION: 98 % | HEIGHT: 66 IN | TEMPERATURE: 98 F | SYSTOLIC BLOOD PRESSURE: 98 MMHG | DIASTOLIC BLOOD PRESSURE: 65 MMHG | BODY MASS INDEX: 26.36 KG/M2 | WEIGHT: 164 LBS

## 2022-01-01 VITALS
BODY MASS INDEX: 26.22 KG/M2 | DIASTOLIC BLOOD PRESSURE: 52 MMHG | TEMPERATURE: 98 F | SYSTOLIC BLOOD PRESSURE: 82 MMHG | WEIGHT: 163.13 LBS | HEART RATE: 113 BPM | OXYGEN SATURATION: 98 % | RESPIRATION RATE: 20 BRPM | HEIGHT: 66 IN

## 2022-01-01 VITALS
TEMPERATURE: 98 F | BODY MASS INDEX: 26.4 KG/M2 | RESPIRATION RATE: 20 BRPM | HEIGHT: 66 IN | OXYGEN SATURATION: 97 % | HEART RATE: 91 BPM | DIASTOLIC BLOOD PRESSURE: 71 MMHG | SYSTOLIC BLOOD PRESSURE: 107 MMHG | WEIGHT: 164.25 LBS

## 2022-01-01 VITALS
RESPIRATION RATE: 16 BRPM | DIASTOLIC BLOOD PRESSURE: 74 MMHG | SYSTOLIC BLOOD PRESSURE: 116 MMHG | HEART RATE: 80 BPM | HEIGHT: 66 IN | WEIGHT: 166.81 LBS | BODY MASS INDEX: 26.81 KG/M2 | TEMPERATURE: 98 F | OXYGEN SATURATION: 99 %

## 2022-01-01 VITALS
TEMPERATURE: 98 F | RESPIRATION RATE: 18 BRPM | TEMPERATURE: 98 F | BODY MASS INDEX: 27.1 KG/M2 | DIASTOLIC BLOOD PRESSURE: 71 MMHG | SYSTOLIC BLOOD PRESSURE: 113 MMHG | RESPIRATION RATE: 18 BRPM | OXYGEN SATURATION: 97 % | DIASTOLIC BLOOD PRESSURE: 59 MMHG | BODY MASS INDEX: 27.1 KG/M2 | HEART RATE: 88 BPM | SYSTOLIC BLOOD PRESSURE: 129 MMHG | HEART RATE: 73 BPM | WEIGHT: 168.63 LBS | WEIGHT: 168.63 LBS | HEIGHT: 66 IN | OXYGEN SATURATION: 100 %

## 2022-01-01 VITALS
RESPIRATION RATE: 18 BRPM | SYSTOLIC BLOOD PRESSURE: 108 MMHG | BODY MASS INDEX: 27.42 KG/M2 | WEIGHT: 170.63 LBS | HEIGHT: 66 IN | DIASTOLIC BLOOD PRESSURE: 68 MMHG | HEART RATE: 93 BPM | OXYGEN SATURATION: 96 % | TEMPERATURE: 98 F

## 2022-01-01 VITALS
OXYGEN SATURATION: 100 % | DIASTOLIC BLOOD PRESSURE: 57 MMHG | SYSTOLIC BLOOD PRESSURE: 83 MMHG | BODY MASS INDEX: 26.99 KG/M2 | HEART RATE: 117 BPM | TEMPERATURE: 99 F | RESPIRATION RATE: 20 BRPM | HEIGHT: 65 IN | WEIGHT: 162 LBS

## 2022-01-01 VITALS
TEMPERATURE: 99 F | HEART RATE: 88 BPM | HEIGHT: 66 IN | BODY MASS INDEX: 32.2 KG/M2 | SYSTOLIC BLOOD PRESSURE: 121 MMHG | RESPIRATION RATE: 27 BRPM | WEIGHT: 200.38 LBS | DIASTOLIC BLOOD PRESSURE: 68 MMHG | OXYGEN SATURATION: 100 %

## 2022-01-01 VITALS
WEIGHT: 190.69 LBS | OXYGEN SATURATION: 100 % | DIASTOLIC BLOOD PRESSURE: 79 MMHG | BODY MASS INDEX: 30.65 KG/M2 | HEIGHT: 66 IN | SYSTOLIC BLOOD PRESSURE: 115 MMHG

## 2022-01-01 VITALS
SYSTOLIC BLOOD PRESSURE: 142 MMHG | HEIGHT: 66 IN | TEMPERATURE: 99 F | DIASTOLIC BLOOD PRESSURE: 96 MMHG | WEIGHT: 166 LBS | BODY MASS INDEX: 26.68 KG/M2 | OXYGEN SATURATION: 97 % | HEART RATE: 97 BPM | RESPIRATION RATE: 21 BRPM

## 2022-01-01 VITALS
HEART RATE: 93 BPM | TEMPERATURE: 98 F | DIASTOLIC BLOOD PRESSURE: 82 MMHG | OXYGEN SATURATION: 99 % | SYSTOLIC BLOOD PRESSURE: 138 MMHG | RESPIRATION RATE: 20 BRPM

## 2022-01-01 VITALS
HEART RATE: 111 BPM | WEIGHT: 166.88 LBS | DIASTOLIC BLOOD PRESSURE: 50 MMHG | RESPIRATION RATE: 20 BRPM | HEIGHT: 66 IN | SYSTOLIC BLOOD PRESSURE: 96 MMHG | OXYGEN SATURATION: 97 % | TEMPERATURE: 99 F | BODY MASS INDEX: 26.82 KG/M2

## 2022-01-01 VITALS
TEMPERATURE: 98 F | DIASTOLIC BLOOD PRESSURE: 69 MMHG | HEART RATE: 104 BPM | HEIGHT: 65 IN | SYSTOLIC BLOOD PRESSURE: 103 MMHG | RESPIRATION RATE: 16 BRPM | OXYGEN SATURATION: 99 % | BODY MASS INDEX: 27.39 KG/M2 | WEIGHT: 164.38 LBS

## 2022-01-01 VITALS
BODY MASS INDEX: 27.53 KG/M2 | OXYGEN SATURATION: 96 % | WEIGHT: 171.31 LBS | SYSTOLIC BLOOD PRESSURE: 127 MMHG | HEART RATE: 75 BPM | DIASTOLIC BLOOD PRESSURE: 75 MMHG | RESPIRATION RATE: 16 BRPM | HEIGHT: 66 IN | TEMPERATURE: 99 F

## 2022-01-01 VITALS
TEMPERATURE: 98 F | RESPIRATION RATE: 20 BRPM | HEART RATE: 126 BPM | BODY MASS INDEX: 26.2 KG/M2 | SYSTOLIC BLOOD PRESSURE: 93 MMHG | DIASTOLIC BLOOD PRESSURE: 64 MMHG | HEIGHT: 66 IN | OXYGEN SATURATION: 99 % | WEIGHT: 163 LBS

## 2022-01-01 VITALS
OXYGEN SATURATION: 98 % | TEMPERATURE: 98 F | DIASTOLIC BLOOD PRESSURE: 75 MMHG | SYSTOLIC BLOOD PRESSURE: 122 MMHG | HEART RATE: 111 BPM

## 2022-01-01 VITALS
HEIGHT: 66 IN | HEART RATE: 119 BPM | BODY MASS INDEX: 26.82 KG/M2 | TEMPERATURE: 98 F | DIASTOLIC BLOOD PRESSURE: 74 MMHG | WEIGHT: 166.88 LBS | SYSTOLIC BLOOD PRESSURE: 108 MMHG | OXYGEN SATURATION: 99 %

## 2022-01-01 DIAGNOSIS — D63.8 ANEMIA OF CHRONIC DISEASE: ICD-10-CM

## 2022-01-01 DIAGNOSIS — Z85.118 ENCOUNTER FOR FOLLOW-UP SURVEILLANCE OF LUNG CANCER: ICD-10-CM

## 2022-01-01 DIAGNOSIS — D52.0 DIETARY FOLATE DEFICIENCY ANEMIA: ICD-10-CM

## 2022-01-01 DIAGNOSIS — C34.90 ADENOCARCINOMA OF LUNG, STAGE 4, UNSPECIFIED LATERALITY: Primary | ICD-10-CM

## 2022-01-01 DIAGNOSIS — C34.91 CARCINOMA OF RIGHT LUNG: ICD-10-CM

## 2022-01-01 DIAGNOSIS — J98.4 CAVITARY LESION OF LUNG: Chronic | ICD-10-CM

## 2022-01-01 DIAGNOSIS — C79.31 BRAIN METASTASIS: ICD-10-CM

## 2022-01-01 DIAGNOSIS — M79.89 LEFT ARM SWELLING: ICD-10-CM

## 2022-01-01 DIAGNOSIS — R00.0 TACHYCARDIA: ICD-10-CM

## 2022-01-01 DIAGNOSIS — R04.2 HEMOPTYSIS: ICD-10-CM

## 2022-01-01 DIAGNOSIS — C79.31 BRAIN METASTASES: Primary | ICD-10-CM

## 2022-01-01 DIAGNOSIS — C79.31 BRAIN METASTASES: ICD-10-CM

## 2022-01-01 DIAGNOSIS — R93.5 ABNORMAL MRI OF THE ABDOMEN: ICD-10-CM

## 2022-01-01 DIAGNOSIS — C34.91 CARCINOMA OF RIGHT LUNG: Primary | ICD-10-CM

## 2022-01-01 DIAGNOSIS — D50.9 IRON DEFICIENCY ANEMIA, UNSPECIFIED IRON DEFICIENCY ANEMIA TYPE: ICD-10-CM

## 2022-01-01 DIAGNOSIS — I10 HTN (HYPERTENSION): ICD-10-CM

## 2022-01-01 DIAGNOSIS — R07.9 CHEST PAIN, UNSPECIFIED TYPE: ICD-10-CM

## 2022-01-01 DIAGNOSIS — C34.10 MALIGNANT NEOPLASM OF UPPER LOBE OF LUNG, UNSPECIFIED LATERALITY: ICD-10-CM

## 2022-01-01 DIAGNOSIS — C34.90 LUNG CANCER METASTATIC TO BRAIN: Primary | ICD-10-CM

## 2022-01-01 DIAGNOSIS — R79.89 ELEVATED LFTS: ICD-10-CM

## 2022-01-01 DIAGNOSIS — C79.31 SECONDARY MALIGNANT NEOPLASM OF BRAIN: Primary | ICD-10-CM

## 2022-01-01 DIAGNOSIS — Z01.818 PRE-OP TESTING: ICD-10-CM

## 2022-01-01 DIAGNOSIS — Q31.8 VOCAL CORD ANOMALY: ICD-10-CM

## 2022-01-01 DIAGNOSIS — C79.31 LUNG CANCER METASTATIC TO BRAIN: Primary | ICD-10-CM

## 2022-01-01 DIAGNOSIS — C34.91 ADENOCARCINOMA OF RIGHT LUNG, STAGE 4: Primary | ICD-10-CM

## 2022-01-01 DIAGNOSIS — J18.9 PNEUMONIA DUE TO INFECTIOUS ORGANISM, UNSPECIFIED LATERALITY, UNSPECIFIED PART OF LUNG: Primary | ICD-10-CM

## 2022-01-01 DIAGNOSIS — C34.90 MALIGNANT NEOPLASM OF LUNG, UNSPECIFIED LATERALITY, UNSPECIFIED PART OF LUNG: ICD-10-CM

## 2022-01-01 DIAGNOSIS — L11.0 ACQUIRED KERATOSIS FOLLICULARIS: ICD-10-CM

## 2022-01-01 DIAGNOSIS — J96.20 ACUTE ON CHRONIC RESPIRATORY FAILURE, UNSPECIFIED WHETHER WITH HYPOXIA OR HYPERCAPNIA: Primary | ICD-10-CM

## 2022-01-01 DIAGNOSIS — J06.9 UPPER RESPIRATORY TRACT INFECTION, UNSPECIFIED TYPE: ICD-10-CM

## 2022-01-01 DIAGNOSIS — K83.8 COMMON BILE DUCT DILATATION: ICD-10-CM

## 2022-01-01 DIAGNOSIS — D89.89 OTHER SPECIFIED DISORDERS INVOLVING THE IMMUNE MECHANISM, NOT ELSEWHERE CLASSIFIED: ICD-10-CM

## 2022-01-01 DIAGNOSIS — C79.31 LUNG CANCER METASTATIC TO BRAIN: ICD-10-CM

## 2022-01-01 DIAGNOSIS — C79.31 BRAIN METASTASIS: Primary | ICD-10-CM

## 2022-01-01 DIAGNOSIS — K80.50 CHOLEDOCHOLITHIASIS: ICD-10-CM

## 2022-01-01 DIAGNOSIS — I48.91 A-FIB: ICD-10-CM

## 2022-01-01 DIAGNOSIS — Z08 ENCOUNTER FOR FOLLOW-UP SURVEILLANCE OF LUNG CANCER: ICD-10-CM

## 2022-01-01 DIAGNOSIS — Z92.89 HISTORY OF MECHANICAL VENTILATION: ICD-10-CM

## 2022-01-01 DIAGNOSIS — A41.9 SEVERE SEPSIS: ICD-10-CM

## 2022-01-01 DIAGNOSIS — R79.89 ELEVATED FERRITIN LEVEL: Primary | ICD-10-CM

## 2022-01-01 DIAGNOSIS — C34.90 ADENOCARCINOMA OF LUNG, STAGE 4, UNSPECIFIED LATERALITY: ICD-10-CM

## 2022-01-01 DIAGNOSIS — Z86.2 HISTORY OF THROMBOCYTOSIS: ICD-10-CM

## 2022-01-01 DIAGNOSIS — C34.91 ADENOCARCINOMA OF RIGHT LUNG, STAGE 4: ICD-10-CM

## 2022-01-01 DIAGNOSIS — C34.90 LUNG CANCER METASTATIC TO BRAIN: ICD-10-CM

## 2022-01-01 DIAGNOSIS — Z51.11 ENCOUNTER FOR CHEMOTHERAPY MANAGEMENT: ICD-10-CM

## 2022-01-01 DIAGNOSIS — Z45.2 ENCOUNTER FOR CENTRAL LINE PLACEMENT: ICD-10-CM

## 2022-01-01 DIAGNOSIS — C34.11 MALIGNANT NEOPLASM OF RIGHT UPPER LOBE OF LUNG: Primary | ICD-10-CM

## 2022-01-01 DIAGNOSIS — R65.20 SEVERE SEPSIS: ICD-10-CM

## 2022-01-01 DIAGNOSIS — J06.9 URI WITH COUGH AND CONGESTION: ICD-10-CM

## 2022-01-01 DIAGNOSIS — Z51.12 ENCOUNTER FOR ANTINEOPLASTIC CHEMOTHERAPY AND IMMUNOTHERAPY: ICD-10-CM

## 2022-01-01 DIAGNOSIS — R78.81 BACTEREMIA: ICD-10-CM

## 2022-01-01 DIAGNOSIS — D75.838 REACTIVE THROMBOCYTOSIS: ICD-10-CM

## 2022-01-01 DIAGNOSIS — D50.9 IRON DEFICIENCY ANEMIA: ICD-10-CM

## 2022-01-01 DIAGNOSIS — C34.90 MALIGNANT NEOPLASM OF LUNG, UNSPECIFIED LATERALITY, UNSPECIFIED PART OF LUNG: Primary | ICD-10-CM

## 2022-01-01 DIAGNOSIS — J98.4 PULMONARY CAVITARY LESION: ICD-10-CM

## 2022-01-01 DIAGNOSIS — C34.90 LUNG CANCER: ICD-10-CM

## 2022-01-01 DIAGNOSIS — D47.2 MONOCLONAL GAMMOPATHIES: ICD-10-CM

## 2022-01-01 DIAGNOSIS — R06.02 SHORTNESS OF BREATH: ICD-10-CM

## 2022-01-01 DIAGNOSIS — D75.839 THROMBOCYTOSIS: ICD-10-CM

## 2022-01-01 DIAGNOSIS — B95.8 STAPH INFECTION: ICD-10-CM

## 2022-01-01 DIAGNOSIS — K80.50 CHOLEDOCHOLITHIASIS: Primary | ICD-10-CM

## 2022-01-01 DIAGNOSIS — Z86.2 HISTORY OF IRON DEFICIENCY ANEMIA: ICD-10-CM

## 2022-01-01 DIAGNOSIS — I10 ELEVATED HEART RATE WITH ELEVATED BLOOD PRESSURE AND DIAGNOSIS OF HYPERTENSION: ICD-10-CM

## 2022-01-01 DIAGNOSIS — Z72.0 TOBACCO USER: ICD-10-CM

## 2022-01-01 DIAGNOSIS — D81.819 BIOTIN-DEPENDENT CARBOXYLASE DEFICIENCY, UNSPECIFIED: ICD-10-CM

## 2022-01-01 DIAGNOSIS — I48.91 ATRIAL FIBRILLATION: ICD-10-CM

## 2022-01-01 DIAGNOSIS — D64.9 ANEMIA, UNSPECIFIED TYPE: ICD-10-CM

## 2022-01-01 DIAGNOSIS — R74.8 ELEVATED ALKALINE PHOSPHATASE LEVEL: Primary | ICD-10-CM

## 2022-01-01 DIAGNOSIS — Z29.89 ENCOUNTER FOR IMMUNOTHERAPY: ICD-10-CM

## 2022-01-01 DIAGNOSIS — Z87.891 FORMER SMOKER: ICD-10-CM

## 2022-01-01 DIAGNOSIS — R49.0 HOARSENESS OF VOICE: Primary | ICD-10-CM

## 2022-01-01 DIAGNOSIS — Z51.11 ENCOUNTER FOR ANTINEOPLASTIC CHEMOTHERAPY AND IMMUNOTHERAPY: ICD-10-CM

## 2022-01-01 DIAGNOSIS — J96.02 ACUTE HYPERCAPNIC RESPIRATORY FAILURE: ICD-10-CM

## 2022-01-01 DIAGNOSIS — M54.9 BACK PAIN, UNSPECIFIED BACK LOCATION, UNSPECIFIED BACK PAIN LATERALITY, UNSPECIFIED CHRONICITY: ICD-10-CM

## 2022-01-01 DIAGNOSIS — R00.9 ELEVATED HEART RATE WITH ELEVATED BLOOD PRESSURE AND DIAGNOSIS OF HYPERTENSION: ICD-10-CM

## 2022-01-01 DIAGNOSIS — R06.02 SOB (SHORTNESS OF BREATH): Primary | ICD-10-CM

## 2022-01-01 DIAGNOSIS — R07.9 CHEST PAIN: ICD-10-CM

## 2022-01-01 DIAGNOSIS — R91.8 MASS OF UPPER LOBE OF RIGHT LUNG: ICD-10-CM

## 2022-01-01 DIAGNOSIS — I47.10 SVT (SUPRAVENTRICULAR TACHYCARDIA): ICD-10-CM

## 2022-01-01 DIAGNOSIS — J44.0 CHRONIC OBSTRUCTIVE PULMONARY DISEASE WITH ACUTE LOWER RESPIRATORY INFECTION: Chronic | ICD-10-CM

## 2022-01-01 DIAGNOSIS — R07.9 CHEST PAIN, UNSPECIFIED TYPE: Primary | ICD-10-CM

## 2022-01-01 DIAGNOSIS — R73.9 HYPERGLYCEMIA: ICD-10-CM

## 2022-01-01 DIAGNOSIS — R05.8 COUGH PRODUCTIVE OF YELLOW SPUTUM: ICD-10-CM

## 2022-01-01 DIAGNOSIS — R00.1 BRADYCARDIA: ICD-10-CM

## 2022-01-01 DIAGNOSIS — I48.92 ATRIAL FLUTTER, UNSPECIFIED TYPE: ICD-10-CM

## 2022-01-01 DIAGNOSIS — R93.5 ABNORMAL MRI OF THE ABDOMEN: Primary | ICD-10-CM

## 2022-01-01 LAB
1,3 BETA GLUCAN SER-MCNC: 400 PG/ML
A1AT SERPL NEPH-MCNC: 213 MG/DL (ref 100–190)
ABO + RH BLD: NORMAL
ABS NEUT (OLG): 3.11 (ref 2.1–9.2)
ABS NEUT (OLG): 3.27 (ref 2.1–9.2)
ABS NEUT (OLG): 4.05 X10(3)/MCL (ref 2.1–9.2)
ABS NEUT (OLG): 5.28 (ref 2.1–9.2)
ABS NEUT (OLG): 6.18 (ref 2.1–9.2)
ABS NEUT CALC (OHS): 10.32 X10(3)/MCL (ref 2.1–9.2)
ABS NEUT CALC (OHS): 10.35 X10(3)/MCL (ref 2.1–9.2)
ABS NEUT CALC (OHS): 11.57 X10(3)/MCL (ref 2.1–9.2)
ABS NEUT CALC (OHS): 14.36 X10(3)/MCL (ref 2.1–9.2)
ABS NEUT CALC (OHS): 16.11 X10(3)/MCL (ref 2.1–9.2)
ABS NEUT CALC (OHS): 6.71 X10(3)/MCL (ref 2.1–9.2)
ABS NEUT CALC (OHS): 7.73 X10(3)/MCL (ref 2.1–9.2)
ABS NEUT CALC (OHS): 8.1 X10(3)/MCL (ref 2.1–9.2)
ABS NEUT CALC (OHS): 9.4 X10(3)/MCL (ref 2.1–9.2)
ABS NEUT CALC (OHS): 9.48 X10(3)/MCL (ref 2.1–9.2)
ABS NEUT CALC (OHS): 9.7 X10(3)/MCL (ref 2.1–9.2)
ABS NEUT CALC (OHS): ABNORMAL
ALBUMIN SERPL-MCNC: 1.1 GM/DL (ref 3.4–4.8)
ALBUMIN SERPL-MCNC: 1.2 GM/DL (ref 3.4–4.8)
ALBUMIN SERPL-MCNC: 1.3 GM/DL (ref 3.4–4.8)
ALBUMIN SERPL-MCNC: 1.4 GM/DL (ref 3.4–4.8)
ALBUMIN SERPL-MCNC: 1.5 GM/DL (ref 3.4–4.8)
ALBUMIN SERPL-MCNC: 1.6 GM/DL (ref 3.4–4.8)
ALBUMIN SERPL-MCNC: 1.7 GM/DL (ref 3.4–4.8)
ALBUMIN SERPL-MCNC: 1.8 GM/DL (ref 3.4–4.8)
ALBUMIN SERPL-MCNC: 1.8 GM/DL (ref 3.4–4.8)
ALBUMIN SERPL-MCNC: 2 GM/DL (ref 3.4–4.8)
ALBUMIN SERPL-MCNC: 2.2 GM/DL (ref 3.4–4.8)
ALBUMIN SERPL-MCNC: 2.3 GM/DL (ref 3.4–4.8)
ALBUMIN SERPL-MCNC: 2.6 G/DL (ref 3.4–4.8)
ALBUMIN SERPL-MCNC: 2.7 G/DL (ref 3.4–4.8)
ALBUMIN SERPL-MCNC: 2.7 GM/DL (ref 3.4–4.8)
ALBUMIN SERPL-MCNC: 2.8 G/DL (ref 3.4–4.8)
ALBUMIN SERPL-MCNC: 2.8 G/DL (ref 3.4–4.8)
ALBUMIN/GLOB SERPL: 0.2 RATIO (ref 1.1–2)
ALBUMIN/GLOB SERPL: 0.3 RATIO (ref 1.1–2)
ALBUMIN/GLOB SERPL: 0.4 RATIO (ref 1.1–2)
ALBUMIN/GLOB SERPL: 0.4 {RATIO} (ref 1.1–2)
ALBUMIN/GLOB SERPL: 0.5 RATIO (ref 1.1–2)
ALBUMIN/GLOB SERPL: 0.5 {RATIO} (ref 1.1–2)
ALBUMIN/GLOB SERPL: 0.5 {RATIO} (ref 1.1–2)
ALBUMIN/GLOB SERPL: 0.6 {RATIO} (ref 1.1–2)
ALP SERPL-CCNC: 117 UNIT/L (ref 40–150)
ALP SERPL-CCNC: 128 UNIT/L (ref 40–150)
ALP SERPL-CCNC: 128 UNIT/L (ref 40–150)
ALP SERPL-CCNC: 131 UNIT/L (ref 40–150)
ALP SERPL-CCNC: 136 UNIT/L (ref 40–150)
ALP SERPL-CCNC: 142 UNIT/L (ref 40–150)
ALP SERPL-CCNC: 142 UNIT/L (ref 40–150)
ALP SERPL-CCNC: 143 UNIT/L (ref 40–150)
ALP SERPL-CCNC: 147 UNIT/L (ref 40–150)
ALP SERPL-CCNC: 149 UNIT/L (ref 40–150)
ALP SERPL-CCNC: 150 UNIT/L (ref 40–150)
ALP SERPL-CCNC: 150 UNIT/L (ref 40–150)
ALP SERPL-CCNC: 151 UNIT/L (ref 40–150)
ALP SERPL-CCNC: 160 UNIT/L (ref 40–150)
ALP SERPL-CCNC: 169 UNIT/L (ref 40–150)
ALP SERPL-CCNC: 170 UNIT/L (ref 40–150)
ALP SERPL-CCNC: 178 UNIT/L (ref 40–150)
ALP SERPL-CCNC: 179 UNIT/L (ref 40–150)
ALP SERPL-CCNC: 183 UNIT/L (ref 40–150)
ALP SERPL-CCNC: 184 UNIT/L (ref 40–150)
ALP SERPL-CCNC: 188 UNIT/L (ref 40–150)
ALP SERPL-CCNC: 204 UNIT/L (ref 40–150)
ALP SERPL-CCNC: 205 UNIT/L (ref 40–150)
ALP SERPL-CCNC: 226 UNIT/L (ref 40–150)
ALP SERPL-CCNC: 229 UNIT/L (ref 40–150)
ALP SERPL-CCNC: 237 UNIT/L (ref 40–150)
ALP SERPL-CCNC: 247 UNIT/L (ref 40–150)
ALP SERPL-CCNC: 326 U/L (ref 40–150)
ALP SERPL-CCNC: 380 UNIT/L (ref 40–150)
ALP SERPL-CCNC: 385 U/L (ref 40–150)
ALP SERPL-CCNC: 427 UNIT/L (ref 40–150)
ALP SERPL-CCNC: 493 U/L (ref 40–150)
ALP SERPL-CCNC: 498 U/L (ref 40–150)
ALT SERPL-CCNC: 11 UNIT/L (ref 0–55)
ALT SERPL-CCNC: 11 UNIT/L (ref 0–55)
ALT SERPL-CCNC: 14 UNIT/L (ref 0–55)
ALT SERPL-CCNC: 14 UNIT/L (ref 0–55)
ALT SERPL-CCNC: 15 UNIT/L (ref 0–55)
ALT SERPL-CCNC: 16 UNIT/L (ref 0–55)
ALT SERPL-CCNC: 17 UNIT/L (ref 0–55)
ALT SERPL-CCNC: 18 UNIT/L (ref 0–55)
ALT SERPL-CCNC: 19 UNIT/L (ref 0–55)
ALT SERPL-CCNC: 19 UNIT/L (ref 0–55)
ALT SERPL-CCNC: 20 U/L (ref 0–55)
ALT SERPL-CCNC: 20 UNIT/L (ref 0–55)
ALT SERPL-CCNC: 20 UNIT/L (ref 0–55)
ALT SERPL-CCNC: 21 UNIT/L (ref 0–55)
ALT SERPL-CCNC: 21 UNIT/L (ref 0–55)
ALT SERPL-CCNC: 23 U/L (ref 0–55)
ALT SERPL-CCNC: 23 UNIT/L (ref 0–55)
ALT SERPL-CCNC: 25 UNIT/L (ref 0–55)
ALT SERPL-CCNC: 26 UNIT/L (ref 0–55)
ALT SERPL-CCNC: 34 UNIT/L (ref 0–55)
ALT SERPL-CCNC: 34 UNIT/L (ref 0–55)
ALT SERPL-CCNC: 42 U/L (ref 0–55)
ALT SERPL-CCNC: 49 U/L (ref 0–55)
ALT SERPL-CCNC: 5 UNIT/L (ref 0–55)
ALT SERPL-CCNC: 6 UNIT/L (ref 0–55)
ALT SERPL-CCNC: 6 UNIT/L (ref 0–55)
ALT SERPL-CCNC: 7 UNIT/L (ref 0–55)
ALT SERPL-CCNC: 7 UNIT/L (ref 0–55)
ALT SERPL-CCNC: 8 UNIT/L (ref 0–55)
ALT SERPL-CCNC: 9 UNIT/L (ref 0–55)
ALT SERPL-CCNC: 9 UNIT/L (ref 0–55)
ANISOCYTOSIS BLD QL SMEAR: ABNORMAL
ANISOCYTOSIS BLD QL SMEAR: SLIGHT
ANTINUCLEAR ANTIBODY SCREEN (OHS): NEGATIVE
AORTIC ROOT ANNULUS: 2.8 CM
APPEARANCE UR: ABNORMAL
APPEARANCE UR: CLEAR
AR (1,3)-BETA-D-GLUCAN INTERPRETATION: POSITIVE
AST SERPL-CCNC: 10 UNIT/L (ref 5–34)
AST SERPL-CCNC: 10 UNIT/L (ref 5–34)
AST SERPL-CCNC: 11 UNIT/L (ref 5–34)
AST SERPL-CCNC: 12 UNIT/L (ref 5–34)
AST SERPL-CCNC: 13 UNIT/L (ref 5–34)
AST SERPL-CCNC: 13 UNIT/L (ref 5–34)
AST SERPL-CCNC: 14 UNIT/L (ref 5–34)
AST SERPL-CCNC: 15 U/L (ref 5–34)
AST SERPL-CCNC: 15 UNIT/L (ref 5–34)
AST SERPL-CCNC: 17 U/L (ref 5–34)
AST SERPL-CCNC: 19 UNIT/L (ref 5–34)
AST SERPL-CCNC: 21 UNIT/L (ref 5–34)
AST SERPL-CCNC: 25 UNIT/L (ref 5–34)
AST SERPL-CCNC: 28 UNIT/L (ref 5–34)
AST SERPL-CCNC: 29 UNIT/L (ref 5–34)
AST SERPL-CCNC: 32 UNIT/L (ref 5–34)
AST SERPL-CCNC: 33 U/L (ref 5–34)
AST SERPL-CCNC: 37 U/L (ref 5–34)
AST SERPL-CCNC: 38 UNIT/L (ref 5–34)
AST SERPL-CCNC: 6 UNIT/L (ref 5–34)
AST SERPL-CCNC: 7 UNIT/L (ref 5–34)
AST SERPL-CCNC: 8 UNIT/L (ref 5–34)
AST SERPL-CCNC: 9 UNIT/L (ref 5–34)
AV INDEX (PROSTH): 0.81
AV MEAN GRADIENT: 5 MMHG
AV PEAK GRADIENT: 11 MMHG
AV REGURGITATION PRESSURE HALF TIME: 530.09 MS
AV VALVE AREA: 2.55 CM2
AV VELOCITY RATIO: 0.64
BACTERIA #/AREA URNS AUTO: ABNORMAL /HPF
BACTERIA #/AREA URNS AUTO: ABNORMAL /HPF
BACTERIA BLD CULT: ABNORMAL
BACTERIA BLD CULT: NORMAL
BACTERIA SPEC CULT: ABNORMAL
BACTERIA SPEC CULT: NORMAL
BACTERIA SPT CULT: ABNORMAL
BACTERIA SPT CULT: NO GROWTH
BASOPHILS # BLD AUTO: 0 10*3/UL (ref 0–0.2)
BASOPHILS # BLD AUTO: 0 X10(3)/MCL (ref 0–0.2)
BASOPHILS # BLD AUTO: 0.01 X10(3)/MCL (ref 0–0.2)
BASOPHILS # BLD AUTO: 0.02 X10(3)/MCL (ref 0–0.2)
BASOPHILS # BLD AUTO: 0.02 X10(3)/MCL (ref 0–0.2)
BASOPHILS # BLD AUTO: 0.03 X10(3)/MCL (ref 0–0.2)
BASOPHILS # BLD AUTO: 0.04 X10(3)/MCL (ref 0–0.2)
BASOPHILS # BLD AUTO: 0.05 X10(3)/MCL (ref 0–0.2)
BASOPHILS # BLD AUTO: 0.05 X10(3)/MCL (ref 0–0.2)
BASOPHILS # BLD AUTO: 0.07 X10(3)/MCL (ref 0–0.2)
BASOPHILS # BLD AUTO: 0.07 X10(3)/MCL (ref 0–0.2)
BASOPHILS # BLD AUTO: 0.15 X10(3)/MCL (ref 0–0.2)
BASOPHILS # BLD AUTO: 0.17 X10(3)/MCL (ref 0–0.2)
BASOPHILS NFR BLD AUTO: 0 %
BASOPHILS NFR BLD AUTO: 0 %
BASOPHILS NFR BLD AUTO: 0.1 %
BASOPHILS NFR BLD AUTO: 0.2 %
BASOPHILS NFR BLD AUTO: 0.3 %
BASOPHILS NFR BLD AUTO: 0.5 %
BASOPHILS NFR BLD AUTO: 0.6 %
BASOPHILS NFR BLD AUTO: 0.6 %
BASOPHILS NFR BLD MANUAL: 0.17 X10(3)/MCL (ref 0–0.2)
BASOPHILS NFR BLD MANUAL: 2 % (ref 0–2)
BILIRUB SERPL-MCNC: 0.3 MG/DL
BILIRUB SERPL-MCNC: 0.3 MG/DL
BILIRUB SERPL-MCNC: 0.4 MG/DL
BILIRUB UR QL STRIP.AUTO: NEGATIVE MG/DL
BILIRUB UR QL STRIP.AUTO: NEGATIVE MG/DL
BILIRUBIN DIRECT+TOT PNL SERPL-MCNC: 0.1 (ref 0–0.8)
BILIRUBIN DIRECT+TOT PNL SERPL-MCNC: 0.1 (ref 0–0.8)
BILIRUBIN DIRECT+TOT PNL SERPL-MCNC: 0.1 MG/DL (ref 0–0.8)
BILIRUBIN DIRECT+TOT PNL SERPL-MCNC: 0.2 (ref 0–0.5)
BILIRUBIN DIRECT+TOT PNL SERPL-MCNC: 0.2 (ref 0–0.8)
BILIRUBIN DIRECT+TOT PNL SERPL-MCNC: 0.2 (ref 0–0.8)
BILIRUBIN DIRECT+TOT PNL SERPL-MCNC: 0.2 MG/DL
BILIRUBIN DIRECT+TOT PNL SERPL-MCNC: 0.3 MG/DL
BILIRUBIN DIRECT+TOT PNL SERPL-MCNC: 0.3 MG/DL (ref 0–0.5)
BILIRUBIN DIRECT+TOT PNL SERPL-MCNC: 0.4 MG/DL
BILIRUBIN DIRECT+TOT PNL SERPL-MCNC: 0.5 MG/DL
BILIRUBIN DIRECT+TOT PNL SERPL-MCNC: 0.6 MG/DL
BILIRUBIN DIRECT+TOT PNL SERPL-MCNC: 0.7 MG/DL
BILIRUBIN DIRECT+TOT PNL SERPL-MCNC: 0.7 MG/DL
BILIRUBIN DIRECT+TOT PNL SERPL-MCNC: 1.6 MG/DL
BLD PROD TYP BPU: NORMAL
BLOOD UNIT EXPIRATION DATE: NORMAL
BLOOD UNIT TYPE CODE: 5100
BNP BLD-MCNC: 162.9 PG/ML
BNP BLD-MCNC: 991.6 PG/ML
BSA FOR ECHO PROCEDURE: 1.89 M2
BUN SERPL-MCNC: 10.8 MG/DL (ref 9.8–20.1)
BUN SERPL-MCNC: 12.5 MG/DL (ref 9.8–20.1)
BUN SERPL-MCNC: 13 MG/DL (ref 9.8–20.1)
BUN SERPL-MCNC: 14.8 MG/DL (ref 9.8–20.1)
BUN SERPL-MCNC: 15 MG/DL (ref 9.8–20.1)
BUN SERPL-MCNC: 15 MG/DL (ref 9.8–20.1)
BUN SERPL-MCNC: 15.1 MG/DL (ref 9.8–20.1)
BUN SERPL-MCNC: 16 MG/DL (ref 9.8–20.1)
BUN SERPL-MCNC: 16 MG/DL (ref 9.8–20.1)
BUN SERPL-MCNC: 16.9 MG/DL (ref 9.8–20.1)
BUN SERPL-MCNC: 17 MG/DL (ref 9.8–20.1)
BUN SERPL-MCNC: 17.1 MG/DL (ref 9.8–20.1)
BUN SERPL-MCNC: 18.1 MG/DL (ref 9.8–20.1)
BUN SERPL-MCNC: 18.3 MG/DL (ref 9.8–20.1)
BUN SERPL-MCNC: 19 MG/DL (ref 9.8–20.1)
BUN SERPL-MCNC: 19.4 MG/DL (ref 9.8–20.1)
BUN SERPL-MCNC: 21.6 MG/DL (ref 9.8–20.1)
BUN SERPL-MCNC: 21.9 MG/DL (ref 9.8–20.1)
BUN SERPL-MCNC: 22 MG/DL (ref 9.8–20.1)
BUN SERPL-MCNC: 23 MG/DL (ref 9.8–20.1)
BUN SERPL-MCNC: 24 MG/DL (ref 9.8–20.1)
BUN SERPL-MCNC: 24.2 MG/DL (ref 9.8–20.1)
BUN SERPL-MCNC: 25 MG/DL (ref 9.8–20.1)
BUN SERPL-MCNC: 25.3 MG/DL (ref 9.8–20.1)
BUN SERPL-MCNC: 26.2 MG/DL (ref 9.8–20.1)
BUN SERPL-MCNC: 26.6 MG/DL (ref 9.8–20.1)
BUN SERPL-MCNC: 26.6 MG/DL (ref 9.8–20.1)
BUN SERPL-MCNC: 27 MG/DL (ref 9.8–20.1)
BUN SERPL-MCNC: 27.2 MG/DL (ref 9.8–20.1)
BUN SERPL-MCNC: 30.9 MG/DL (ref 9.8–20.1)
BUN SERPL-MCNC: 32.2 MG/DL (ref 9.8–20.1)
BUN SERPL-MCNC: 36.1 MG/DL (ref 9.8–20.1)
BUN SERPL-MCNC: 38.2 MG/DL (ref 9.8–20.1)
BUN SERPL-MCNC: 41.4 MG/DL (ref 9.8–20.1)
BUN SERPL-MCNC: 46.6 MG/DL (ref 9.8–20.1)
BUN SERPL-MCNC: 46.8 MG/DL (ref 9.8–20.1)
BUN SERPL-MCNC: 48 MG/DL (ref 9.8–20.1)
BUN SERPL-MCNC: 49.1 MG/DL (ref 9.8–20.1)
BUN SERPL-MCNC: 7.7 MG/DL (ref 9.8–20.1)
BURR CELLS (OLG): SLIGHT
CALCIUM SERPL-MCNC: 10.3 MG/DL (ref 8.4–10.2)
CALCIUM SERPL-MCNC: 10.4 MG/DL (ref 8.7–10.5)
CALCIUM SERPL-MCNC: 10.6 MG/DL (ref 8.7–10.5)
CALCIUM SERPL-MCNC: 10.6 MG/DL (ref 8.7–10.5)
CALCIUM SERPL-MCNC: 10.7 MG/DL (ref 8.7–10.5)
CALCIUM SERPL-MCNC: 10.7 MG/DL (ref 8.7–10.5)
CALCIUM SERPL-MCNC: 8.2 MG/DL (ref 8.4–10.2)
CALCIUM SERPL-MCNC: 8.5 MG/DL (ref 8.4–10.2)
CALCIUM SERPL-MCNC: 8.5 MG/DL (ref 8.4–10.2)
CALCIUM SERPL-MCNC: 8.7 MG/DL (ref 8.4–10.2)
CALCIUM SERPL-MCNC: 8.7 MG/DL (ref 8.4–10.2)
CALCIUM SERPL-MCNC: 8.8 MG/DL (ref 8.4–10.2)
CALCIUM SERPL-MCNC: 8.9 MG/DL (ref 8.4–10.2)
CALCIUM SERPL-MCNC: 9 MG/DL (ref 8.4–10.2)
CALCIUM SERPL-MCNC: 9.1 MG/DL (ref 8.4–10.2)
CALCIUM SERPL-MCNC: 9.1 MG/DL (ref 8.4–10.2)
CALCIUM SERPL-MCNC: 9.2 MG/DL (ref 8.4–10.2)
CALCIUM SERPL-MCNC: 9.3 MG/DL (ref 8.4–10.2)
CALCIUM SERPL-MCNC: 9.4 MG/DL (ref 8.4–10.2)
CALCIUM SERPL-MCNC: 9.5 MG/DL (ref 8.4–10.2)
CALCIUM SERPL-MCNC: 9.6 MG/DL (ref 8.4–10.2)
CALCIUM SERPL-MCNC: 9.7 MG/DL (ref 8.4–10.2)
CALCIUM SERPL-MCNC: 9.7 MG/DL (ref 8.4–10.2)
CEA SERPL-MCNC: <1.73 NG/ML (ref 0–3)
CERULOPLASMIN SERPL-MCNC: 25 MG/DL (ref 20–51)
CHLORIDE SERPL-SCNC: 100 MMOL/L (ref 98–107)
CHLORIDE SERPL-SCNC: 101 MMOL/L (ref 98–107)
CHLORIDE SERPL-SCNC: 101 MMOL/L (ref 98–107)
CHLORIDE SERPL-SCNC: 102 MMOL/L (ref 98–107)
CHLORIDE SERPL-SCNC: 103 MMOL/L (ref 98–107)
CHLORIDE SERPL-SCNC: 103 MMOL/L (ref 98–107)
CHLORIDE SERPL-SCNC: 104 MMOL/L (ref 98–107)
CHLORIDE SERPL-SCNC: 105 MMOL/L (ref 98–107)
CHLORIDE SERPL-SCNC: 106 MMOL/L (ref 98–107)
CHLORIDE SERPL-SCNC: 107 MMOL/L (ref 98–107)
CHLORIDE SERPL-SCNC: 107 MMOL/L (ref 98–107)
CHLORIDE SERPL-SCNC: 108 MMOL/L (ref 98–107)
CHLORIDE SERPL-SCNC: 109 MMOL/L (ref 98–107)
CHLORIDE SERPL-SCNC: 110 MMOL/L (ref 98–107)
CHLORIDE SERPL-SCNC: 97 MMOL/L (ref 98–107)
CHLORIDE SERPL-SCNC: 98 MMOL/L (ref 98–107)
CHLORIDE SERPL-SCNC: 98 MMOL/L (ref 98–107)
CHLORIDE SERPL-SCNC: 99 MMOL/L (ref 98–107)
CHLORIDE UR-SCNC: 76.3 MMOL/L
CHLORIDE UR-SCNC: 85.9 MMOL/L
CK MB SERPL-MCNC: 0.7 NG/ML
CK MB SERPL-MCNC: 0.8 NG/ML
CK MB SERPL-MCNC: 1 NG/ML
CK SERPL-CCNC: 12 U/L (ref 29–168)
CK SERPL-CCNC: 19 U/L (ref 29–168)
CK SERPL-CCNC: 26 U/L (ref 29–168)
CO2 SERPL-SCNC: 17 MMOL/L (ref 23–31)
CO2 SERPL-SCNC: 18 MMOL/L (ref 23–31)
CO2 SERPL-SCNC: 19 MMOL/L (ref 23–31)
CO2 SERPL-SCNC: 19 MMOL/L (ref 23–31)
CO2 SERPL-SCNC: 20 MMOL/L (ref 23–31)
CO2 SERPL-SCNC: 21 MMOL/L (ref 23–31)
CO2 SERPL-SCNC: 22 MMOL/L (ref 23–31)
CO2 SERPL-SCNC: 23 MMOL/L (ref 23–31)
CO2 SERPL-SCNC: 24 MMOL/L (ref 23–31)
CO2 SERPL-SCNC: 25 MMOL/L (ref 23–31)
CO2 SERPL-SCNC: 26 MMOL/L (ref 23–31)
CO2 SERPL-SCNC: 27 MMOL/L (ref 23–31)
CO2 SERPL-SCNC: 29 MMOL/L (ref 23–31)
CO2 SERPL-SCNC: 30 MMOL/L (ref 23–31)
CO2 SERPL-SCNC: 30 MMOL/L (ref 23–31)
CO2 SERPL-SCNC: 34 MMOL/L (ref 23–31)
COLOR STL: NORMAL
COLOR UR AUTO: ABNORMAL
COLOR UR AUTO: YELLOW
CONSISTENCY STL: NORMAL
CORRECTED TEMPERATURE (PCO2): 27 MMHG (ref 35–45)
CORRECTED TEMPERATURE (PCO2): 35 MMHG (ref 35–45)
CORRECTED TEMPERATURE (PCO2): 36 MMHG (ref 35–45)
CORRECTED TEMPERATURE (PCO2): 37 MMHG (ref 35–45)
CORRECTED TEMPERATURE (PCO2): 40 MMHG (ref 35–45)
CORRECTED TEMPERATURE (PCO2): 41 MMHG (ref 35–45)
CORRECTED TEMPERATURE (PCO2): 42 MMHG (ref 35–45)
CORRECTED TEMPERATURE (PCO2): 43 MMHG (ref 35–45)
CORRECTED TEMPERATURE (PCO2): 45 MMHG (ref 35–45)
CORRECTED TEMPERATURE (PCO2): 46 MMHG (ref 35–45)
CORRECTED TEMPERATURE (PCO2): 47 MMHG (ref 35–45)
CORRECTED TEMPERATURE (PCO2): 49 MMHG (ref 35–45)
CORRECTED TEMPERATURE (PCO2): 50 MMHG (ref 35–45)
CORRECTED TEMPERATURE (PCO2): 51 MMHG (ref 20–50)
CORRECTED TEMPERATURE (PCO2): 51 MMHG (ref 20–50)
CORRECTED TEMPERATURE (PCO2): 54 MMHG (ref 20–50)
CORRECTED TEMPERATURE (PCO2): 55 MMHG (ref 20–50)
CORRECTED TEMPERATURE (PCO2): 59 MMHG (ref 20–50)
CORRECTED TEMPERATURE (PCO2): 62 MMHG (ref 20–50)
CORRECTED TEMPERATURE (PCO2): 97 MMHG (ref 20–50)
CORRECTED TEMPERATURE (PH): 7.06 (ref 7.3–7.6)
CORRECTED TEMPERATURE (PH): 7.11 (ref 7.3–7.6)
CORRECTED TEMPERATURE (PH): 7.28 (ref 7.3–7.6)
CORRECTED TEMPERATURE (PH): 7.3 (ref 7.35–7.45)
CORRECTED TEMPERATURE (PH): 7.31 (ref 7.35–7.45)
CORRECTED TEMPERATURE (PH): 7.32 (ref 7.35–7.45)
CORRECTED TEMPERATURE (PH): 7.33 (ref 7.35–7.45)
CORRECTED TEMPERATURE (PH): 7.34 (ref 7.35–7.45)
CORRECTED TEMPERATURE (PH): 7.35 (ref 7.35–7.45)
CORRECTED TEMPERATURE (PH): 7.36 (ref 7.35–7.45)
CORRECTED TEMPERATURE (PH): 7.36 (ref 7.35–7.45)
CORRECTED TEMPERATURE (PH): 7.37 (ref 7.35–7.45)
CORRECTED TEMPERATURE (PH): 7.39 (ref 7.35–7.45)
CORRECTED TEMPERATURE (PH): 7.4 (ref 7.35–7.45)
CORRECTED TEMPERATURE (PH): 7.46 (ref 7.35–7.45)
CORRECTED TEMPERATURE (PO2): 100 MMHG (ref 75–100)
CORRECTED TEMPERATURE (PO2): 101 MMHG (ref 75–100)
CORRECTED TEMPERATURE (PO2): 119 MMHG (ref 75–100)
CORRECTED TEMPERATURE (PO2): 50 MMHG
CORRECTED TEMPERATURE (PO2): 50 MMHG
CORRECTED TEMPERATURE (PO2): 51 MMHG
CORRECTED TEMPERATURE (PO2): 59 MMHG (ref 75–100)
CORRECTED TEMPERATURE (PO2): 59 MMHG (ref 75–100)
CORRECTED TEMPERATURE (PO2): 60 MMHG (ref 75–100)
CORRECTED TEMPERATURE (PO2): 68 MMHG (ref 75–100)
CORRECTED TEMPERATURE (PO2): 69 MMHG (ref 75–100)
CORRECTED TEMPERATURE (PO2): 75 MMHG (ref 75–100)
CORRECTED TEMPERATURE (PO2): 76 MMHG (ref 75–100)
CORRECTED TEMPERATURE (PO2): 77 MMHG (ref 75–100)
CORRECTED TEMPERATURE (PO2): 79 MMHG (ref 75–100)
CORRECTED TEMPERATURE (PO2): 81 MMHG (ref 75–100)
CORRECTED TEMPERATURE (PO2): 84 MMHG (ref 75–100)
CORRECTED TEMPERATURE (PO2): 85 MMHG (ref 75–100)
CORRECTED TEMPERATURE (PO2): 85 MMHG (ref 75–100)
CORRECTED TEMPERATURE (PO2): 90 MMHG (ref 75–100)
CORRECTED TEMPERATURE (PO2): 95 MMHG (ref 75–100)
CORRECTED TEMPERATURE (PO2): 95 MMHG (ref 75–100)
CORTIS SERPL-SCNC: 14.7 UG/DL
CREAT SERPL-MCNC: 0.64 MG/DL (ref 0.55–1.02)
CREAT SERPL-MCNC: 0.68 MG/DL (ref 0.55–1.02)
CREAT SERPL-MCNC: 0.71 MG/DL (ref 0.55–1.02)
CREAT SERPL-MCNC: 0.72 MG/DL (ref 0.55–1.02)
CREAT SERPL-MCNC: 0.75 MG/DL (ref 0.55–1.02)
CREAT SERPL-MCNC: 0.76 MG/DL (ref 0.55–1.02)
CREAT SERPL-MCNC: 0.78 MG/DL (ref 0.55–1.02)
CREAT SERPL-MCNC: 0.8 MG/DL (ref 0.55–1.02)
CREAT SERPL-MCNC: 0.8 MG/DL (ref 0.55–1.02)
CREAT SERPL-MCNC: 0.81 MG/DL (ref 0.55–1.02)
CREAT SERPL-MCNC: 0.81 MG/DL (ref 0.55–1.02)
CREAT SERPL-MCNC: 0.82 MG/DL (ref 0.55–1.02)
CREAT SERPL-MCNC: 0.85 MG/DL (ref 0.55–1.02)
CREAT SERPL-MCNC: 0.87 MG/DL (ref 0.55–1.02)
CREAT SERPL-MCNC: 0.88 MG/DL (ref 0.55–1.02)
CREAT SERPL-MCNC: 0.91 MG/DL (ref 0.55–1.02)
CREAT SERPL-MCNC: 0.92 MG/DL (ref 0.55–1.02)
CREAT SERPL-MCNC: 0.98 MG/DL (ref 0.55–1.02)
CREAT SERPL-MCNC: 1 MG/DL (ref 0.55–1.02)
CREAT SERPL-MCNC: 1.03 MG/DL (ref 0.55–1.02)
CREAT SERPL-MCNC: 1.03 MG/DL (ref 0.55–1.02)
CREAT SERPL-MCNC: 1.04 MG/DL (ref 0.55–1.02)
CREAT SERPL-MCNC: 1.08 MG/DL (ref 0.55–1.02)
CREAT SERPL-MCNC: 1.1 MG/DL (ref 0.55–1.02)
CREAT SERPL-MCNC: 1.11 MG/DL (ref 0.55–1.02)
CREAT SERPL-MCNC: 1.13 MG/DL (ref 0.55–1.02)
CREAT SERPL-MCNC: 1.16 MG/DL (ref 0.55–1.02)
CREAT SERPL-MCNC: 1.16 MG/DL (ref 0.55–1.02)
CREAT SERPL-MCNC: 1.23 MG/DL (ref 0.55–1.02)
CREAT SERPL-MCNC: 1.25 MG/DL (ref 0.55–1.02)
CREAT SERPL-MCNC: 1.29 MG/DL (ref 0.55–1.02)
CREAT SERPL-MCNC: 1.67 MG/DL (ref 0.55–1.02)
CREAT SERPL-MCNC: 1.7 MG/DL (ref 0.55–1.02)
CREAT SERPL-MCNC: 1.84 MG/DL (ref 0.55–1.02)
CREAT UR-MCNC: 14.4 MG/DL (ref 47–110)
CREAT UR-MCNC: 15 MG/DL (ref 47–110)
CREAT UR-MCNC: 39.1 MG/DL (ref 47–110)
CREAT UR-MCNC: 39.2 MG/DL (ref 47–110)
CROSSMATCH INTERPRETATION: NORMAL
CRP SERPL-MCNC: 106.7 MG/L
CRP SERPL-MCNC: 40 MG/L
CRP SERPL-MCNC: >240 MG/L
CTP QC/QA: YES
CV ECHO LV RWT: 0.52 CM
DISPENSE STATUS: NORMAL
DOP CALC AO PEAK VEL: 1.63 M/S
DOP CALC AO VTI: 23.9 CM
DOP CALC LVOT AREA: 3.1 CM2
DOP CALC LVOT DIAMETER: 2 CM
DOP CALC LVOT PEAK VEL: 1.04 M/S
DOP CALC LVOT STROKE VOLUME: 60.92 CM3
DOP CALC MV VTI: 21.4 CM
DOP CALCLVOT PEAK VEL VTI: 19.4 CM
DSDNA ANTIBODY (OHS): NEGATIVE
E WAVE DECELERATION TIME: 143.56 MSEC
E/A RATIO: 1.43
E/E' RATIO: 12.59 M/S
ECHO LV POSTERIOR WALL: 0.98 CM (ref 0.6–1.1)
EJECTION FRACTION: 65 %
ELLIPTOCYTOSIS (OHS): SLIGHT
EOSINOPHIL # BLD AUTO: 0 X10(3)/MCL (ref 0–0.9)
EOSINOPHIL # BLD AUTO: 0.01 X10(3)/MCL (ref 0–0.9)
EOSINOPHIL # BLD AUTO: 0.02 X10(3)/MCL (ref 0–0.9)
EOSINOPHIL # BLD AUTO: 0.03 X10(3)/MCL (ref 0–0.9)
EOSINOPHIL # BLD AUTO: 0.05 X10(3)/MCL (ref 0–0.9)
EOSINOPHIL # BLD AUTO: 0.05 X10(3)/MCL (ref 0–0.9)
EOSINOPHIL # BLD AUTO: 0.11 X10(3)/MCL (ref 0–0.9)
EOSINOPHIL # BLD AUTO: 0.12 X10(3)/MCL (ref 0–0.9)
EOSINOPHIL NFR BLD AUTO: 0 %
EOSINOPHIL NFR BLD AUTO: 0.1 %
EOSINOPHIL NFR BLD AUTO: 0.2 %
EOSINOPHIL NFR BLD AUTO: 0.3 %
EOSINOPHIL NFR BLD AUTO: 0.4 %
EOSINOPHIL NFR BLD AUTO: 0.4 %
EOSINOPHIL NFR BLD AUTO: 0.6 %
EOSINOPHIL NFR BLD MANUAL: 0.11 X10(3)/MCL (ref 0–0.9)
EOSINOPHIL NFR BLD MANUAL: 0.17 X10(3)/MCL (ref 0–0.9)
EOSINOPHIL NFR BLD MANUAL: 1 % (ref 0–8)
EOSINOPHIL NFR BLD MANUAL: 2 % (ref 0–8)
ERYTHROCYTE [DISTWIDTH] IN BLOOD BY AUTOMATED COUNT: 16.2 % (ref 11.5–14.5)
ERYTHROCYTE [DISTWIDTH] IN BLOOD BY AUTOMATED COUNT: 16.6 % (ref 11.5–17)
ERYTHROCYTE [DISTWIDTH] IN BLOOD BY AUTOMATED COUNT: 16.9 % (ref 11.5–14.5)
ERYTHROCYTE [DISTWIDTH] IN BLOOD BY AUTOMATED COUNT: 17.2 % (ref 11.5–14.5)
ERYTHROCYTE [DISTWIDTH] IN BLOOD BY AUTOMATED COUNT: 17.4 % (ref 11.5–14.5)
ERYTHROCYTE [DISTWIDTH] IN BLOOD BY AUTOMATED COUNT: 17.5 % (ref 11.5–17)
ERYTHROCYTE [DISTWIDTH] IN BLOOD BY AUTOMATED COUNT: 17.7 % (ref 11.5–17)
ERYTHROCYTE [DISTWIDTH] IN BLOOD BY AUTOMATED COUNT: 17.9 % (ref 11.5–17)
ERYTHROCYTE [DISTWIDTH] IN BLOOD BY AUTOMATED COUNT: 18 % (ref 11.5–17)
ERYTHROCYTE [DISTWIDTH] IN BLOOD BY AUTOMATED COUNT: 18.1 % (ref 11.5–17)
ERYTHROCYTE [DISTWIDTH] IN BLOOD BY AUTOMATED COUNT: 18.2 % (ref 11.5–17)
ERYTHROCYTE [DISTWIDTH] IN BLOOD BY AUTOMATED COUNT: 18.3 % (ref 11.5–14.5)
ERYTHROCYTE [DISTWIDTH] IN BLOOD BY AUTOMATED COUNT: 18.3 % (ref 11.5–17)
ERYTHROCYTE [DISTWIDTH] IN BLOOD BY AUTOMATED COUNT: 18.3 % (ref 11.5–17)
ERYTHROCYTE [DISTWIDTH] IN BLOOD BY AUTOMATED COUNT: 18.4 % (ref 11.5–17)
ERYTHROCYTE [DISTWIDTH] IN BLOOD BY AUTOMATED COUNT: 18.7 % (ref 11.5–17)
ERYTHROCYTE [DISTWIDTH] IN BLOOD BY AUTOMATED COUNT: 18.9 % (ref 11.5–17)
ERYTHROCYTE [DISTWIDTH] IN BLOOD BY AUTOMATED COUNT: 19.1 % (ref 11.5–17)
ERYTHROCYTE [DISTWIDTH] IN BLOOD BY AUTOMATED COUNT: 19.1 % (ref 11.5–17)
ERYTHROCYTE [DISTWIDTH] IN BLOOD BY AUTOMATED COUNT: 19.2 % (ref 11.5–17)
ERYTHROCYTE [DISTWIDTH] IN BLOOD BY AUTOMATED COUNT: 19.2 % (ref 11.5–17)
ERYTHROCYTE [DISTWIDTH] IN BLOOD BY AUTOMATED COUNT: 19.4 % (ref 11.5–17)
ERYTHROCYTE [DISTWIDTH] IN BLOOD BY AUTOMATED COUNT: 19.5 % (ref 11.5–17)
ERYTHROCYTE [DISTWIDTH] IN BLOOD BY AUTOMATED COUNT: 19.7 % (ref 11.5–17)
ERYTHROCYTE [DISTWIDTH] IN BLOOD BY AUTOMATED COUNT: 19.7 % (ref 11.5–17)
ERYTHROCYTE [DISTWIDTH] IN BLOOD BY AUTOMATED COUNT: 19.8 % (ref 11.5–17)
ERYTHROCYTE [DISTWIDTH] IN BLOOD BY AUTOMATED COUNT: 19.8 % (ref 11.5–17)
ERYTHROCYTE [DISTWIDTH] IN BLOOD BY AUTOMATED COUNT: 20 % (ref 11.5–17)
ERYTHROCYTE [DISTWIDTH] IN BLOOD BY AUTOMATED COUNT: 20.1 % (ref 11.5–17)
ERYTHROCYTE [DISTWIDTH] IN BLOOD BY AUTOMATED COUNT: 20.1 % (ref 11.5–17)
ERYTHROCYTE [DISTWIDTH] IN BLOOD BY AUTOMATED COUNT: 20.3 % (ref 11.5–17)
ERYTHROCYTE [DISTWIDTH] IN BLOOD BY AUTOMATED COUNT: 20.5 % (ref 11.5–17)
ERYTHROCYTE [DISTWIDTH] IN BLOOD BY AUTOMATED COUNT: 20.6 % (ref 11.5–17)
ERYTHROCYTE [DISTWIDTH] IN BLOOD BY AUTOMATED COUNT: 20.6 % (ref 11.5–17)
ERYTHROCYTE [DISTWIDTH] IN BLOOD BY AUTOMATED COUNT: 20.7 % (ref 11.5–17)
ERYTHROCYTE [DISTWIDTH] IN BLOOD BY AUTOMATED COUNT: 20.8 % (ref 11.5–17)
ERYTHROCYTE [DISTWIDTH] IN BLOOD BY AUTOMATED COUNT: 21 % (ref 11.5–17)
ERYTHROCYTE [DISTWIDTH] IN BLOOD BY AUTOMATED COUNT: 21.1 % (ref 11.5–17)
FERRITIN SERPL-MCNC: 3562.58 NG/ML (ref 4.63–204)
FERRITIN SERPL-MCNC: 4445.08 NG/ML (ref 4.63–204)
FERRITIN SERPL-MCNC: 6982.07 NG/ML (ref 4.63–204)
FLAG2 (OHS): 70
FLAG3 (OHS): 70
FLAG3 (OHS): 70
FLAG3 (OHS): 80
FLAG3 (OHS): 80
FLAGS (OHS): 70
FLAGS (OHS): 80
FLUAV AG UPPER RESP QL IA.RAPID: NOT DETECTED
FLUBV AG UPPER RESP QL IA.RAPID: NOT DETECTED
FOLATE SERPL-MCNC: 18.5 NG/ML (ref 7–31.4)
FRACTIONAL SHORTENING: 33 % (ref 28–44)
GALACTOMANNAN AG SERPL IA-ACNC: <0.5 INDEX
GFR SERPLBLD CREATININE-BSD FMLA CKD-EPI: 30 MLS/MIN/1.73/M2
GFR SERPLBLD CREATININE-BSD FMLA CKD-EPI: 33 MLS/MIN/1.73/M2
GFR SERPLBLD CREATININE-BSD FMLA CKD-EPI: 34 MLS/MIN/1.73/M2
GFR SERPLBLD CREATININE-BSD FMLA CKD-EPI: 46 MLS/MIN/1.73/M2
GFR SERPLBLD CREATININE-BSD FMLA CKD-EPI: 48 MLS/MIN/1.73/M2
GFR SERPLBLD CREATININE-BSD FMLA CKD-EPI: 49 MLS/MIN/1.73/M2
GFR SERPLBLD CREATININE-BSD FMLA CKD-EPI: 52 MLS/MIN/1.73/M2
GFR SERPLBLD CREATININE-BSD FMLA CKD-EPI: 52 MLS/MIN/1.73/M2
GFR SERPLBLD CREATININE-BSD FMLA CKD-EPI: 54 MLS/MIN/1.73/M2
GFR SERPLBLD CREATININE-BSD FMLA CKD-EPI: 55 MLS/MIN/1.73/M2
GFR SERPLBLD CREATININE-BSD FMLA CKD-EPI: 56 MLS/MIN/1.73/M2
GFR SERPLBLD CREATININE-BSD FMLA CKD-EPI: 57 MLS/MIN/1.73/M2
GFR SERPLBLD CREATININE-BSD FMLA CKD-EPI: 59 MLS/MIN/1.73/M2
GFR SERPLBLD CREATININE-BSD FMLA CKD-EPI: 60 MLS/MIN/1.73/M2
GFR SERPLBLD CREATININE-BSD FMLA CKD-EPI: 60 MLS/MIN/1.73/M2
GFR SERPLBLD CREATININE-BSD FMLA CKD-EPI: >60 MLS/MIN/1.73/M2
GLOBULIN SER-MCNC: 3.6 GM/DL (ref 2.4–3.5)
GLOBULIN SER-MCNC: 4 GM/DL (ref 2.4–3.5)
GLOBULIN SER-MCNC: 4.2 GM/DL (ref 2.4–3.5)
GLOBULIN SER-MCNC: 4.3 GM/DL (ref 2.4–3.5)
GLOBULIN SER-MCNC: 4.4 GM/DL (ref 2.4–3.5)
GLOBULIN SER-MCNC: 4.6 GM/DL (ref 2.4–3.5)
GLOBULIN SER-MCNC: 4.7 GM/DL (ref 2.4–3.5)
GLOBULIN SER-MCNC: 4.9 G/DL (ref 2.4–3.5)
GLOBULIN SER-MCNC: 4.9 GM/DL (ref 2.4–3.5)
GLOBULIN SER-MCNC: 5 GM/DL (ref 2.4–3.5)
GLOBULIN SER-MCNC: 5 GM/DL (ref 2.4–3.5)
GLOBULIN SER-MCNC: 5.1 GM/DL (ref 2.4–3.5)
GLOBULIN SER-MCNC: 5.1 GM/DL (ref 2.4–3.5)
GLOBULIN SER-MCNC: 5.2 G/DL (ref 2.4–3.5)
GLOBULIN SER-MCNC: 5.2 G/DL (ref 2.4–3.5)
GLOBULIN SER-MCNC: 5.2 GM/DL (ref 2.4–3.5)
GLOBULIN SER-MCNC: 5.4 GM/DL (ref 2.4–3.5)
GLOBULIN SER-MCNC: 5.5 GM/DL (ref 2.4–3.5)
GLOBULIN SER-MCNC: 5.8 GM/DL (ref 2.4–3.5)
GLOBULIN SER-MCNC: 5.9 GM/DL (ref 2.4–3.5)
GLOBULIN SER-MCNC: 6 G/DL (ref 2.4–3.5)
GLUCOSE SERPL-MCNC: 110 MG/DL (ref 82–115)
GLUCOSE SERPL-MCNC: 111 MG/DL (ref 82–115)
GLUCOSE SERPL-MCNC: 114 MG/DL (ref 82–115)
GLUCOSE SERPL-MCNC: 116 MG/DL (ref 82–115)
GLUCOSE SERPL-MCNC: 117 MG/DL (ref 70–110)
GLUCOSE SERPL-MCNC: 121 MG/DL (ref 82–115)
GLUCOSE SERPL-MCNC: 125 MG/DL (ref 82–115)
GLUCOSE SERPL-MCNC: 139 MG/DL (ref 82–115)
GLUCOSE SERPL-MCNC: 139 MG/DL (ref 82–115)
GLUCOSE SERPL-MCNC: 140 MG/DL (ref 82–115)
GLUCOSE SERPL-MCNC: 141 MG/DL (ref 82–115)
GLUCOSE SERPL-MCNC: 145 MG/DL (ref 82–115)
GLUCOSE SERPL-MCNC: 145 MG/DL (ref 82–115)
GLUCOSE SERPL-MCNC: 149 MG/DL (ref 82–115)
GLUCOSE SERPL-MCNC: 150 MG/DL (ref 82–115)
GLUCOSE SERPL-MCNC: 154 MG/DL (ref 82–115)
GLUCOSE SERPL-MCNC: 158 MG/DL (ref 70–110)
GLUCOSE SERPL-MCNC: 172 MG/DL (ref 82–115)
GLUCOSE SERPL-MCNC: 177 MG/DL (ref 82–115)
GLUCOSE SERPL-MCNC: 178 MG/DL (ref 82–115)
GLUCOSE SERPL-MCNC: 179 MG/DL (ref 82–115)
GLUCOSE SERPL-MCNC: 179 MG/DL (ref 82–115)
GLUCOSE SERPL-MCNC: 193 MG/DL (ref 82–115)
GLUCOSE SERPL-MCNC: 200 MG/DL (ref 82–115)
GLUCOSE SERPL-MCNC: 201 MG/DL (ref 82–115)
GLUCOSE SERPL-MCNC: 206 MG/DL (ref 82–115)
GLUCOSE SERPL-MCNC: 208 MG/DL (ref 82–115)
GLUCOSE SERPL-MCNC: 210 MG/DL (ref 82–115)
GLUCOSE SERPL-MCNC: 231 MG/DL (ref 82–115)
GLUCOSE SERPL-MCNC: 253 MG/DL (ref 82–115)
GLUCOSE SERPL-MCNC: 259 MG/DL (ref 82–115)
GLUCOSE SERPL-MCNC: 260 MG/DL (ref 82–115)
GLUCOSE SERPL-MCNC: 274 MG/DL (ref 82–115)
GLUCOSE SERPL-MCNC: 306 MG/DL (ref 82–115)
GLUCOSE SERPL-MCNC: 342 MG/DL (ref 82–115)
GLUCOSE SERPL-MCNC: 65 MG/DL (ref 82–115)
GLUCOSE SERPL-MCNC: 81 MG/DL (ref 82–115)
GLUCOSE SERPL-MCNC: 81 MG/DL (ref 82–115)
GLUCOSE SERPL-MCNC: 89 MG/DL (ref 82–115)
GLUCOSE SERPL-MCNC: 99 MG/DL (ref 82–115)
GLUCOSE SERPL-MCNC: 99 MG/DL (ref 82–115)
GLUCOSE UR QL STRIP.AUTO: NEGATIVE MG/DL
GLUCOSE UR QL STRIP.AUTO: NORMAL MG/DL
GRAM STN SPEC: ABNORMAL
GRAM STN SPEC: NORMAL
GROUP & RH: NORMAL
HAV IGM SERPL QL IA: NONREACTIVE
HBV CORE IGM SERPL QL IA: NONREACTIVE
HBV SURFACE AG SERPL QL IA: NONREACTIVE
HCO3 UR-SCNC: 14.9 MMOL/L
HCO3 UR-SCNC: 18.7 MMOL/L (ref 22–26)
HCO3 UR-SCNC: 21.2 MMOL/L (ref 22–26)
HCO3 UR-SCNC: 22.1 MMOL/L (ref 22–26)
HCO3 UR-SCNC: 22.4 MMOL/L (ref 22–26)
HCO3 UR-SCNC: 22.4 MMOL/L (ref 22–26)
HCO3 UR-SCNC: 22.7 MMOL/L (ref 22–26)
HCO3 UR-SCNC: 22.9 MMOL/L (ref 22–26)
HCO3 UR-SCNC: 23.2 MMOL/L (ref 22–26)
HCO3 UR-SCNC: 23.2 MMOL/L (ref 22–26)
HCO3 UR-SCNC: 23.7 MMOL/L (ref 22–26)
HCO3 UR-SCNC: 24.2 MMOL/L (ref 22–26)
HCO3 UR-SCNC: 24.3 MMOL/L (ref 22–26)
HCO3 UR-SCNC: 25.6 MMOL/L (ref 22–26)
HCO3 UR-SCNC: 26.3 MMOL/L (ref 22–26)
HCO3 UR-SCNC: 26.4 MMOL/L (ref 22–26)
HCO3 UR-SCNC: 27.1 MMOL/L (ref 22–26)
HCO3 UR-SCNC: 27.5 MMOL/L (ref 22–26)
HCO3 UR-SCNC: 27.7 MMOL/L (ref 22–26)
HCO3 UR-SCNC: 28.8 MMOL/L (ref 22–26)
HCO3 UR-SCNC: 29.1 MMOL/L (ref 22–26)
HCO3 UR-SCNC: 30.5 MMOL/L (ref 22–26)
HCT VFR BLD AUTO: 21.2 % (ref 37–47)
HCT VFR BLD AUTO: 22.1 % (ref 37–47)
HCT VFR BLD AUTO: 22.1 % (ref 37–47)
HCT VFR BLD AUTO: 23.1 % (ref 37–47)
HCT VFR BLD AUTO: 23.4 % (ref 37–47)
HCT VFR BLD AUTO: 23.6 % (ref 37–47)
HCT VFR BLD AUTO: 25.2 % (ref 37–47)
HCT VFR BLD AUTO: 25.3 % (ref 37–47)
HCT VFR BLD AUTO: 25.7 % (ref 37–47)
HCT VFR BLD AUTO: 26.8 % (ref 37–47)
HCT VFR BLD AUTO: 26.9 % (ref 37–47)
HCT VFR BLD AUTO: 27 % (ref 37–47)
HCT VFR BLD AUTO: 27.2 % (ref 37–47)
HCT VFR BLD AUTO: 27.9 % (ref 37–47)
HCT VFR BLD AUTO: 28.6 % (ref 37–47)
HCT VFR BLD AUTO: 29.1 % (ref 37–47)
HCT VFR BLD AUTO: 29.2 % (ref 37–47)
HCT VFR BLD AUTO: 29.2 % (ref 37–47)
HCT VFR BLD AUTO: 29.8 % (ref 37–47)
HCT VFR BLD AUTO: 30.3 % (ref 37–47)
HCT VFR BLD AUTO: 30.7 % (ref 37–47)
HCT VFR BLD AUTO: 30.8 % (ref 37–47)
HCT VFR BLD AUTO: 30.9 % (ref 37–47)
HCT VFR BLD AUTO: 31.3 % (ref 37–47)
HCT VFR BLD AUTO: 31.7 % (ref 37–47)
HCT VFR BLD AUTO: 32.1 % (ref 37–47)
HCT VFR BLD AUTO: 32.7 % (ref 37–47)
HCT VFR BLD AUTO: 34.5 % (ref 37–47)
HCT VFR BLD AUTO: 35.2 % (ref 35–46)
HCT VFR BLD AUTO: 35.6 % (ref 37–47)
HCT VFR BLD AUTO: 36.4 % (ref 37–47)
HCT VFR BLD AUTO: 36.6 % (ref 35–46)
HCT VFR BLD AUTO: 36.7 % (ref 35–46)
HCT VFR BLD AUTO: 37.3 % (ref 35–46)
HCT VFR BLD AUTO: 38.6 % (ref 37–47)
HCT VFR BLD AUTO: 39 % (ref 37–47)
HCT VFR BLD AUTO: 39.1 % (ref 37–47)
HCT VFR BLD AUTO: 40.2 % (ref 35–46)
HCT VFR BLD AUTO: 40.6 % (ref 37–47)
HCV AB SERPL QL IA: NONREACTIVE
HEMOCCULT SP1 STL QL: NEGATIVE
HEMOCCULT SP2 STL QL: NEGATIVE
HEMOLYSIS INTERF INDEX SERPL-ACNC: 25
HEMOLYSIS INTERF INDEX SERPL-ACNC: 4
HEMOLYSIS INTERF INDEX SERPL-ACNC: 4
HEMOLYSIS INTERF INDEX SERPL-ACNC: 6
HEMOLYSIS INTERF INDEX SERPL-ACNC: <0
HEMOLYSIS INTERF INDEX SERPL-ACNC: <0
HGB BLD-MCNC: 10 G/DL (ref 12–18)
HGB BLD-MCNC: 10.1 GM/DL (ref 12–16)
HGB BLD-MCNC: 10.2 GM/DL (ref 12–16)
HGB BLD-MCNC: 10.3 G/DL (ref 12–18)
HGB BLD-MCNC: 10.3 G/DL (ref 12–18)
HGB BLD-MCNC: 10.4 GM/DL (ref 12–16)
HGB BLD-MCNC: 10.5 G/DL (ref 12–16)
HGB BLD-MCNC: 10.6 G/DL (ref 12–18)
HGB BLD-MCNC: 10.8 GM/DL (ref 12–16)
HGB BLD-MCNC: 11 G/DL (ref 12–16)
HGB BLD-MCNC: 11 GM/DL (ref 12–16)
HGB BLD-MCNC: 11 GM/DL (ref 12–16)
HGB BLD-MCNC: 11.1 G/DL (ref 12–16)
HGB BLD-MCNC: 11.5 GM/DL (ref 12–16)
HGB BLD-MCNC: 11.5 GM/DL (ref 12–16)
HGB BLD-MCNC: 12 G/DL (ref 12–16)
HGB BLD-MCNC: 12 GM/DL (ref 12–16)
HGB BLD-MCNC: 12.1 GM/DL (ref 12–16)
HGB BLD-MCNC: 14.7 G/DL (ref 12–18)
HGB BLD-MCNC: 6.7 GM/DL (ref 12–16)
HGB BLD-MCNC: 6.9 G/DL (ref 12–18)
HGB BLD-MCNC: 6.9 GM/DL (ref 12–16)
HGB BLD-MCNC: 7 GM/DL (ref 12–16)
HGB BLD-MCNC: 7.2 G/DL (ref 12–18)
HGB BLD-MCNC: 7.2 GM/DL (ref 12–16)
HGB BLD-MCNC: 7.3 GM/DL (ref 12–16)
HGB BLD-MCNC: 7.5 GM/DL (ref 12–16)
HGB BLD-MCNC: 7.7 GM/DL (ref 12–16)
HGB BLD-MCNC: 7.8 G/DL (ref 12–18)
HGB BLD-MCNC: 7.8 GM/DL (ref 12–16)
HGB BLD-MCNC: 8.1 GM/DL (ref 12–16)
HGB BLD-MCNC: 8.1 GM/DL (ref 12–16)
HGB BLD-MCNC: 8.4 G/DL (ref 12–18)
HGB BLD-MCNC: 8.5 G/DL (ref 12–18)
HGB BLD-MCNC: 8.5 GM/DL (ref 12–16)
HGB BLD-MCNC: 8.5 GM/DL (ref 12–16)
HGB BLD-MCNC: 8.6 G/DL (ref 12–18)
HGB BLD-MCNC: 8.7 GM/DL (ref 12–16)
HGB BLD-MCNC: 8.7 GM/DL (ref 12–16)
HGB BLD-MCNC: 8.8 G/DL (ref 12–18)
HGB BLD-MCNC: 8.8 GM/DL (ref 12–16)
HGB BLD-MCNC: 8.9 GM/DL (ref 12–16)
HGB BLD-MCNC: 8.9 GM/DL (ref 12–16)
HGB BLD-MCNC: 9 G/DL (ref 12–18)
HGB BLD-MCNC: 9.2 G/DL (ref 12–18)
HGB BLD-MCNC: 9.3 G/DL (ref 12–18)
HGB BLD-MCNC: 9.3 G/DL (ref 12–18)
HGB BLD-MCNC: 9.3 GM/DL (ref 12–16)
HGB BLD-MCNC: 9.4 G/DL (ref 12–18)
HGB BLD-MCNC: 9.4 GM/DL (ref 12–16)
HGB BLD-MCNC: 9.5 G/DL (ref 12–18)
HGB BLD-MCNC: 9.5 GM/DL (ref 12–16)
HGB BLD-MCNC: 9.5 GM/DL (ref 12–16)
HGB BLD-MCNC: 9.6 G/DL (ref 12–18)
HGB BLD-MCNC: 9.7 G/DL (ref 12–18)
HGB BLD-MCNC: 9.7 GM/DL (ref 12–16)
HGB BLD-MCNC: 9.8 G/DL (ref 12–18)
HGB BLD-MCNC: 9.8 GM/DL (ref 12–16)
HGB BLD-MCNC: 9.9 G/DL (ref 12–18)
HGB BLD-MCNC: 9.9 GM/DL (ref 12–16)
HOLD SPECIMEN: NORMAL
HYALINE CASTS #/AREA URNS LPF: ABNORMAL /LPF
HYPOCHROMIA BLD QL SMEAR: SLIGHT
HYPOCHROMIA BLD QL SMEAR: SLIGHT
ICTERIC INTERF INDEX SERPL-ACNC: 0
IMM GRANULOCYTES # BLD AUTO: 0.02 10*3/UL
IMM GRANULOCYTES # BLD AUTO: 0.02 10*3/UL
IMM GRANULOCYTES # BLD AUTO: 0.02 X10(3)/MCL (ref 0–0.04)
IMM GRANULOCYTES # BLD AUTO: 0.02 X10(3)/MCL (ref 0–0.04)
IMM GRANULOCYTES # BLD AUTO: 0.03 X10(3)/MCL (ref 0–0.04)
IMM GRANULOCYTES # BLD AUTO: 0.04 X10(3)/MCL (ref 0–0.04)
IMM GRANULOCYTES # BLD AUTO: 0.07 10*3/UL
IMM GRANULOCYTES # BLD AUTO: 0.07 10*3/UL
IMM GRANULOCYTES # BLD AUTO: 0.07 X10(3)/MCL (ref 0–0.04)
IMM GRANULOCYTES # BLD AUTO: 0.09 X10(3)/MCL (ref 0–0.04)
IMM GRANULOCYTES # BLD AUTO: 0.1 X10(3)/MCL (ref 0–0.04)
IMM GRANULOCYTES # BLD AUTO: 0.11 10*3/UL
IMM GRANULOCYTES # BLD AUTO: 0.13 X10(3)/MCL (ref 0–0.04)
IMM GRANULOCYTES # BLD AUTO: 0.19 X10(3)/MCL (ref 0–0.04)
IMM GRANULOCYTES # BLD AUTO: 0.19 X10(3)/MCL (ref 0–0.04)
IMM GRANULOCYTES # BLD AUTO: 0.21 X10(3)/MCL (ref 0–0.04)
IMM GRANULOCYTES # BLD AUTO: 0.21 X10(3)/MCL (ref 0–0.04)
IMM GRANULOCYTES # BLD AUTO: 0.23 X10(3)/MCL (ref 0–0.04)
IMM GRANULOCYTES # BLD AUTO: 0.27 X10(3)/MCL (ref 0–0.04)
IMM GRANULOCYTES # BLD AUTO: 0.31 X10(3)/MCL (ref 0–0.04)
IMM GRANULOCYTES # BLD AUTO: 0.33 X10(3)/MCL (ref 0–0.04)
IMM GRANULOCYTES # BLD AUTO: 0.35 X10(3)/MCL (ref 0–0.04)
IMM GRANULOCYTES # BLD AUTO: 0.39 X10(3)/MCL (ref 0–0.04)
IMM GRANULOCYTES # BLD AUTO: 0.44 X10(3)/MCL (ref 0–0.04)
IMM GRANULOCYTES # BLD AUTO: 0.46 X10(3)/MCL (ref 0–0.04)
IMM GRANULOCYTES # BLD AUTO: 0.46 X10(3)/MCL (ref 0–0.04)
IMM GRANULOCYTES # BLD AUTO: 0.51 X10(3)/MCL (ref 0–0.04)
IMM GRANULOCYTES # BLD AUTO: 0.55 X10(3)/MCL (ref 0–0.04)
IMM GRANULOCYTES # BLD AUTO: 0.56 X10(3)/MCL (ref 0–0.04)
IMM GRANULOCYTES # BLD AUTO: 0.64 X10(3)/MCL (ref 0–0.04)
IMM GRANULOCYTES # BLD AUTO: 0.69 X10(3)/MCL (ref 0–0.04)
IMM GRANULOCYTES # BLD AUTO: 0.83 X10(3)/MCL (ref 0–0.04)
IMM GRANULOCYTES # BLD AUTO: 0.99 X10(3)/MCL (ref 0–0.04)
IMM GRANULOCYTES # BLD AUTO: 1.06 X10(3)/MCL (ref 0–0.04)
IMM GRANULOCYTES # BLD AUTO: 1.21 X10(3)/MCL (ref 0–0.04)
IMM GRANULOCYTES # BLD AUTO: 1.78 X10(3)/MCL (ref 0–0.04)
IMM GRANULOCYTES # BLD AUTO: 2.07 X10(3)/MCL (ref 0–0.04)
IMM GRANULOCYTES # BLD AUTO: 2.46 X10(3)/MCL (ref 0–0.04)
IMM GRANULOCYTES NFR BLD AUTO: 0 %
IMM GRANULOCYTES NFR BLD AUTO: 0 %
IMM GRANULOCYTES NFR BLD AUTO: 0.2 %
IMM GRANULOCYTES NFR BLD AUTO: 0.2 %
IMM GRANULOCYTES NFR BLD AUTO: 0.4 %
IMM GRANULOCYTES NFR BLD AUTO: 0.5 %
IMM GRANULOCYTES NFR BLD AUTO: 0.6 %
IMM GRANULOCYTES NFR BLD AUTO: 0.8 %
IMM GRANULOCYTES NFR BLD AUTO: 1 %
IMM GRANULOCYTES NFR BLD AUTO: 1 %
IMM GRANULOCYTES NFR BLD AUTO: 1.1 %
IMM GRANULOCYTES NFR BLD AUTO: 1.2 %
IMM GRANULOCYTES NFR BLD AUTO: 1.3 %
IMM GRANULOCYTES NFR BLD AUTO: 1.6 %
IMM GRANULOCYTES NFR BLD AUTO: 2 %
IMM GRANULOCYTES NFR BLD AUTO: 2.1 %
IMM GRANULOCYTES NFR BLD AUTO: 2.4 %
IMM GRANULOCYTES NFR BLD AUTO: 2.5 %
IMM GRANULOCYTES NFR BLD AUTO: 2.5 %
IMM GRANULOCYTES NFR BLD AUTO: 2.7 %
IMM GRANULOCYTES NFR BLD AUTO: 3.1 %
IMM GRANULOCYTES NFR BLD AUTO: 4.2 %
IMM GRANULOCYTES NFR BLD AUTO: 5 %
IMM GRANULOCYTES NFR BLD AUTO: 5.1 %
IMM GRANULOCYTES NFR BLD AUTO: 5.2 %
IMM GRANULOCYTES NFR BLD AUTO: 5.5 %
IMM GRANULOCYTES NFR BLD AUTO: 5.5 %
IMM GRANULOCYTES NFR BLD AUTO: 5.7 %
IMM GRANULOCYTES NFR BLD AUTO: 6.2 %
IMM GRANULOCYTES NFR BLD AUTO: 6.9 %
IMM GRANULOCYTES NFR BLD AUTO: 7.3 %
IMM GRANULOCYTES NFR BLD AUTO: 8.3 %
IMM GRANULOCYTES NFR BLD AUTO: 9.8 %
IMM. NE 1 SUSPECT FLAG (OHS): 10
IMM. NE 2 SUSPECT FLAG (OHS): 20
INDIRECT COOMBS GEL: NORMAL
INTERVENTRICULAR SEPTUM: 1.06 CM (ref 0.6–1.1)
IRON SATN MFR SERPL: 23 % (ref 20–50)
IRON SATN MFR SERPL: 25 % (ref 20–50)
IRON SATN MFR SERPL: 7 % (ref 20–50)
IRON SATN MFR SERPL: ABNORMAL %
IRON SERPL-MCNC: 12 UG/DL (ref 50–170)
IRON SERPL-MCNC: 249 UG/DL (ref 50–170)
IRON SERPL-MCNC: 25 UG/DL (ref 50–170)
IRON SERPL-MCNC: 54 UG/DL (ref 50–170)
KETONES UR QL STRIP.AUTO: NEGATIVE MG/DL
KETONES UR QL STRIP.AUTO: NEGATIVE MG/DL
LACTATE SERPL-SCNC: 0.8 MMOL/L (ref 0.5–2.2)
LACTATE SERPL-SCNC: 0.8 MMOL/L (ref 0.5–2.2)
LACTATE SERPL-SCNC: 1.1 MMOL/L (ref 0.5–2.2)
LACTATE SERPL-SCNC: 1.5 MMOL/L (ref 0.5–2.2)
LACTATE SERPL-SCNC: 1.8 MMOL/L (ref 0.5–2.2)
LDH SERPL-CCNC: 165 U/L (ref 125–220)
LEFT ATRIUM SIZE: 3.19 CM
LEFT ATRIUM VOLUME INDEX MOD: 16 ML/M2
LEFT ATRIUM VOLUME MOD: 30 CM3
LEFT INTERNAL DIMENSION IN SYSTOLE: 2.53 CM (ref 2.1–4)
LEFT VENTRICLE DIASTOLIC VOLUME INDEX: 32.62 ML/M2
LEFT VENTRICLE DIASTOLIC VOLUME: 61 ML
LEFT VENTRICLE MASS INDEX: 64 G/M2
LEFT VENTRICLE SYSTOLIC VOLUME INDEX: 12.3 ML/M2
LEFT VENTRICLE SYSTOLIC VOLUME: 23.09 ML
LEFT VENTRICULAR INTERNAL DIMENSION IN DIASTOLE: 3.78 CM (ref 3.5–6)
LEFT VENTRICULAR MASS: 119.68 G
LEUKOCYTE ESTERASE UR QL STRIP.AUTO: NEGATIVE UNIT/L
LEUKOCYTE ESTERASE UR QL STRIP.AUTO: NEGATIVE UNIT/L
LIPEMIC INTERF INDEX SERPL-ACNC: 11
LIPEMIC INTERF INDEX SERPL-ACNC: 6
LIPEMIC INTERF INDEX SERPL-ACNC: 7
LIPEMIC INTERF INDEX SERPL-ACNC: 9
LKM-1 IGG SER IA-ACNC: 1.1 U
LOW EVENT # SUSPECT FLAG (OHS): 70
LOW EVENT # SUSPECT FLAG (OHS): 80
LV LATERAL E/E' RATIO: 10.7 M/S
LV SEPTAL E/E' RATIO: 15.29 M/S
LVOT MG: 2.71 MMHG
LVOT MV: 0.8 CM/S
LYMPH ABN # BLD MANUAL: 2 %
LYMPHOCYTES # BLD AUTO: 0.2 X10(3)/MCL (ref 0.6–4.6)
LYMPHOCYTES # BLD AUTO: 0.26 X10(3)/MCL (ref 0.6–4.6)
LYMPHOCYTES # BLD AUTO: 0.32 X10(3)/MCL (ref 0.6–4.6)
LYMPHOCYTES # BLD AUTO: 0.33 X10(3)/MCL (ref 0.6–4.6)
LYMPHOCYTES # BLD AUTO: 0.35 X10(3)/MCL (ref 0.6–4.6)
LYMPHOCYTES # BLD AUTO: 0.39 X10(3)/MCL (ref 0.6–4.6)
LYMPHOCYTES # BLD AUTO: 0.4 10*3/UL (ref 0.6–4.6)
LYMPHOCYTES # BLD AUTO: 0.46 X10(3)/MCL (ref 0.6–4.6)
LYMPHOCYTES # BLD AUTO: 0.49 X10(3)/MCL (ref 0.6–4.6)
LYMPHOCYTES # BLD AUTO: 0.49 X10(3)/MCL (ref 0.6–4.6)
LYMPHOCYTES # BLD AUTO: 0.5 10*3/UL (ref 0.6–4.6)
LYMPHOCYTES # BLD AUTO: 0.5 X10(3)/MCL (ref 0.6–4.6)
LYMPHOCYTES # BLD AUTO: 0.53 X10(3)/MCL (ref 0.6–4.6)
LYMPHOCYTES # BLD AUTO: 0.53 X10(3)/MCL (ref 0.6–4.6)
LYMPHOCYTES # BLD AUTO: 0.56 X10(3)/MCL (ref 0.6–4.6)
LYMPHOCYTES # BLD AUTO: 0.6 10*3/UL (ref 0.6–4.6)
LYMPHOCYTES # BLD AUTO: 0.62 X10(3)/MCL (ref 0.6–4.6)
LYMPHOCYTES # BLD AUTO: 0.62 X10(3)/MCL (ref 0.6–4.6)
LYMPHOCYTES # BLD AUTO: 0.7 10*3/UL (ref 0.6–4.6)
LYMPHOCYTES # BLD AUTO: 0.7 X10(3)/MCL (ref 0.6–4.6)
LYMPHOCYTES # BLD AUTO: 0.99 X10(3)/MCL (ref 0.6–4.6)
LYMPHOCYTES # BLD AUTO: 1.09 X10(3)/MCL (ref 0.6–4.6)
LYMPHOCYTES # BLD AUTO: 1.17 X10(3)/MCL (ref 0.6–4.6)
LYMPHOCYTES # BLD AUTO: 1.21 X10(3)/MCL (ref 0.6–4.6)
LYMPHOCYTES NFR BLD AUTO: 0.9 %
LYMPHOCYTES NFR BLD AUTO: 1.5 %
LYMPHOCYTES NFR BLD AUTO: 10 %
LYMPHOCYTES NFR BLD AUTO: 13 %
LYMPHOCYTES NFR BLD AUTO: 16 %
LYMPHOCYTES NFR BLD AUTO: 2.1 %
LYMPHOCYTES NFR BLD AUTO: 2.2 %
LYMPHOCYTES NFR BLD AUTO: 2.5 %
LYMPHOCYTES NFR BLD AUTO: 2.8 %
LYMPHOCYTES NFR BLD AUTO: 2.9 %
LYMPHOCYTES NFR BLD AUTO: 2.9 %
LYMPHOCYTES NFR BLD AUTO: 3 %
LYMPHOCYTES NFR BLD AUTO: 3.2 %
LYMPHOCYTES NFR BLD AUTO: 3.7 %
LYMPHOCYTES NFR BLD AUTO: 3.8 %
LYMPHOCYTES NFR BLD AUTO: 3.9 %
LYMPHOCYTES NFR BLD AUTO: 3.9 %
LYMPHOCYTES NFR BLD AUTO: 4.2 %
LYMPHOCYTES NFR BLD AUTO: 4.3 %
LYMPHOCYTES NFR BLD AUTO: 4.8 %
LYMPHOCYTES NFR BLD AUTO: 6.1 %
LYMPHOCYTES NFR BLD AUTO: 6.7 %
LYMPHOCYTES NFR BLD AUTO: 6.8 %
LYMPHOCYTES NFR BLD AUTO: 7 %
LYMPHOCYTES NFR BLD AUTO: 8.6 %
LYMPHOCYTES NFR BLD AUTO: 9 %
LYMPHOCYTES NFR BLD MANUAL: 0.33 X10(3)/MCL
LYMPHOCYTES NFR BLD MANUAL: 0.4 X10(3)/MCL
LYMPHOCYTES NFR BLD MANUAL: 0.44 X10(3)/MCL
LYMPHOCYTES NFR BLD MANUAL: 0.54 X10(3)/MCL
LYMPHOCYTES NFR BLD MANUAL: 0.55 X10(3)/MCL
LYMPHOCYTES NFR BLD MANUAL: 0.64 X10(3)/MCL
LYMPHOCYTES NFR BLD MANUAL: 0.66 X10(3)/MCL
LYMPHOCYTES NFR BLD MANUAL: 0.68 X10(3)/MCL
LYMPHOCYTES NFR BLD MANUAL: 0.69 X10(3)/MCL
LYMPHOCYTES NFR BLD MANUAL: 0.76 X10(3)/MCL
LYMPHOCYTES NFR BLD MANUAL: 1.08 X10(3)/MCL
LYMPHOCYTES NFR BLD MANUAL: 10 % (ref 13–40)
LYMPHOCYTES NFR BLD MANUAL: 3 % (ref 13–40)
LYMPHOCYTES NFR BLD MANUAL: 4 % (ref 13–40)
LYMPHOCYTES NFR BLD MANUAL: 4 % (ref 13–40)
LYMPHOCYTES NFR BLD MANUAL: 5 % (ref 13–40)
LYMPHOCYTES NFR BLD MANUAL: 5 % (ref 13–40)
LYMPHOCYTES NFR BLD MANUAL: 6 % (ref 13–40)
LYMPHOCYTES NFR BLD MANUAL: 7 % (ref 13–40)
LYMPHOCYTES NFR BLD MANUAL: 7 % (ref 13–40)
LYMPHOCYTES NFR BLD MANUAL: 8 % (ref 13–40)
MACROCYTES BLD QL SMEAR: ABNORMAL
MACROCYTES BLD QL SMEAR: ABNORMAL
MAGNESIUM SERPL-MCNC: 1.2 MG/DL (ref 1.6–2.6)
MAGNESIUM SERPL-MCNC: 1.4 MG/DL (ref 1.6–2.6)
MAGNESIUM SERPL-MCNC: 1.5 MG/DL (ref 1.6–2.6)
MAGNESIUM SERPL-MCNC: 1.6 MG/DL (ref 1.6–2.6)
MAGNESIUM SERPL-MCNC: 1.7 MG/DL (ref 1.6–2.6)
MAGNESIUM SERPL-MCNC: 1.8 MG/DL (ref 1.6–2.6)
MAGNESIUM SERPL-MCNC: 1.9 MG/DL (ref 1.6–2.6)
MAGNESIUM SERPL-MCNC: 2 MG/DL (ref 1.6–2.6)
MAGNESIUM SERPL-MCNC: 2.1 MG/DL (ref 1.6–2.6)
MAGNESIUM SERPL-MCNC: 2.1 MG/DL (ref 1.6–2.6)
MANUAL DIFF? (OHS): NO
MAYO GENERIC ORDERABLE RESULT: ABNORMAL
MCH RBC QN AUTO: 27.7 PG (ref 27–31)
MCH RBC QN AUTO: 28.1 PG (ref 26–34)
MCH RBC QN AUTO: 28.5 PG (ref 27–31)
MCH RBC QN AUTO: 28.6 PG (ref 27–31)
MCH RBC QN AUTO: 28.8 PG (ref 26–34)
MCH RBC QN AUTO: 28.8 PG (ref 27–31)
MCH RBC QN AUTO: 28.9 PG (ref 26–34)
MCH RBC QN AUTO: 28.9 PG (ref 26–34)
MCH RBC QN AUTO: 28.9 PG (ref 27–31)
MCH RBC QN AUTO: 29 PG (ref 27–31)
MCH RBC QN AUTO: 29.1 PG (ref 26–34)
MCH RBC QN AUTO: 29.1 PG (ref 27–31)
MCH RBC QN AUTO: 29.2 PG (ref 27–31)
MCH RBC QN AUTO: 29.3 PG (ref 27–31)
MCH RBC QN AUTO: 29.4 PG (ref 27–31)
MCH RBC QN AUTO: 29.5 PG (ref 27–31)
MCH RBC QN AUTO: 29.5 PG (ref 27–31)
MCH RBC QN AUTO: 29.6 PG (ref 27–31)
MCH RBC QN AUTO: 29.6 PG (ref 27–31)
MCH RBC QN AUTO: 29.8 PG (ref 27–31)
MCH RBC QN AUTO: 29.9 PG (ref 27–31)
MCH RBC QN AUTO: 30 PG (ref 27–31)
MCH RBC QN AUTO: 30.2 PG (ref 27–31)
MCH RBC QN AUTO: 30.5 PG (ref 27–31)
MCHC RBC AUTO-ENTMCNC: 29.4 MG/DL (ref 33–36)
MCHC RBC AUTO-ENTMCNC: 29.5 GM/DL (ref 31–37)
MCHC RBC AUTO-ENTMCNC: 29.8 G/DL (ref 31–37)
MCHC RBC AUTO-ENTMCNC: 29.8 MG/DL (ref 33–36)
MCHC RBC AUTO-ENTMCNC: 29.9 G/DL (ref 31–37)
MCHC RBC AUTO-ENTMCNC: 29.9 MG/DL (ref 33–36)
MCHC RBC AUTO-ENTMCNC: 30 G/DL (ref 31–37)
MCHC RBC AUTO-ENTMCNC: 30 MG/DL (ref 33–36)
MCHC RBC AUTO-ENTMCNC: 30.1 MG/DL (ref 33–36)
MCHC RBC AUTO-ENTMCNC: 30.2 MG/DL (ref 33–36)
MCHC RBC AUTO-ENTMCNC: 30.3 G/DL (ref 31–37)
MCHC RBC AUTO-ENTMCNC: 30.3 MG/DL (ref 33–36)
MCHC RBC AUTO-ENTMCNC: 30.4 MG/DL (ref 33–36)
MCHC RBC AUTO-ENTMCNC: 30.6 MG/DL (ref 33–36)
MCHC RBC AUTO-ENTMCNC: 30.8 MG/DL (ref 33–36)
MCHC RBC AUTO-ENTMCNC: 31 MG/DL (ref 33–36)
MCHC RBC AUTO-ENTMCNC: 31 MG/DL (ref 33–36)
MCHC RBC AUTO-ENTMCNC: 31.2 MG/DL (ref 33–36)
MCHC RBC AUTO-ENTMCNC: 31.3 MG/DL (ref 33–36)
MCHC RBC AUTO-ENTMCNC: 31.5 MG/DL (ref 33–36)
MCHC RBC AUTO-ENTMCNC: 31.5 MG/DL (ref 33–36)
MCHC RBC AUTO-ENTMCNC: 31.6 MG/DL (ref 33–36)
MCHC RBC AUTO-ENTMCNC: 31.7 MG/DL (ref 33–36)
MCHC RBC AUTO-ENTMCNC: 31.7 MG/DL (ref 33–36)
MCHC RBC AUTO-ENTMCNC: 31.8 MG/DL (ref 33–36)
MCHC RBC AUTO-ENTMCNC: 31.8 MG/DL (ref 33–36)
MCHC RBC AUTO-ENTMCNC: 31.9 MG/DL (ref 33–36)
MCHC RBC AUTO-ENTMCNC: 32 MG/DL (ref 33–36)
MCHC RBC AUTO-ENTMCNC: 32.3 MG/DL (ref 33–36)
MCHC RBC AUTO-ENTMCNC: 32.3 MG/DL (ref 33–36)
MCHC RBC AUTO-ENTMCNC: 32.5 MG/DL (ref 33–36)
MCHC RBC AUTO-ENTMCNC: 32.9 MG/DL (ref 33–36)
MCV RBC AUTO: 100.6 FL (ref 80–94)
MCV RBC AUTO: 89.2 FL (ref 80–94)
MCV RBC AUTO: 90.4 FL (ref 80–94)
MCV RBC AUTO: 90.6 FL (ref 80–94)
MCV RBC AUTO: 90.9 FL (ref 80–94)
MCV RBC AUTO: 91.4 FL (ref 80–94)
MCV RBC AUTO: 91.7 FL (ref 80–94)
MCV RBC AUTO: 91.8 FL (ref 80–94)
MCV RBC AUTO: 92.1 FL (ref 80–94)
MCV RBC AUTO: 92.4 FL (ref 80–94)
MCV RBC AUTO: 92.4 FL (ref 80–94)
MCV RBC AUTO: 92.5 FL (ref 80–94)
MCV RBC AUTO: 92.5 FL (ref 80–94)
MCV RBC AUTO: 92.7 FL (ref 80–100)
MCV RBC AUTO: 93.2 FL (ref 80–94)
MCV RBC AUTO: 93.8 FL (ref 80–94)
MCV RBC AUTO: 94 FL (ref 80–94)
MCV RBC AUTO: 94.2 FL (ref 80–94)
MCV RBC AUTO: 94.4 FL (ref 80–94)
MCV RBC AUTO: 94.4 FL (ref 80–94)
MCV RBC AUTO: 94.5 FL (ref 80–94)
MCV RBC AUTO: 94.6 FL (ref 80–94)
MCV RBC AUTO: 94.7 FL (ref 80–94)
MCV RBC AUTO: 95 FL (ref 80–94)
MCV RBC AUTO: 95.1 FL (ref 80–94)
MCV RBC AUTO: 95.7 FL (ref 80–94)
MCV RBC AUTO: 96.1 FL (ref 80–94)
MCV RBC AUTO: 96.3 FL (ref 80–100)
MCV RBC AUTO: 96.4 FL (ref 80–100)
MCV RBC AUTO: 96.5 FL (ref 80–94)
MCV RBC AUTO: 96.7 FL (ref 80–94)
MCV RBC AUTO: 96.7 FL (ref 80–94)
MCV RBC AUTO: 96.8 FL (ref 80–94)
MCV RBC AUTO: 96.8 FL (ref 80–94)
MCV RBC AUTO: 97 FL (ref 80–94)
MCV RBC AUTO: 97.5 FL (ref 80–94)
MCV RBC AUTO: 97.6 FL (ref 80–100)
MCV RBC AUTO: 97.9 FL (ref 80–100)
METAMYELOCYTES NFR BLD MANUAL: 1 %
METAMYELOCYTES NFR BLD MANUAL: 1 %
METAMYELOCYTES NFR BLD MANUAL: 2 %
METAMYELOCYTES NFR BLD MANUAL: 3 %
MICROCYTES BLD QL SMEAR: SLIGHT
MITOCHONDRIA M2 AB SER IA-ACNC: <0.1 U
MO BLASTS SUSPECT FLAG (OHS): 40
MONOCYTES # BLD AUTO: 0.2 X10(3)/MCL (ref 0.1–1.3)
MONOCYTES # BLD AUTO: 0.3 10*3/UL (ref 0.1–1.3)
MONOCYTES # BLD AUTO: 0.3 X10(3)/MCL (ref 0.1–1.3)
MONOCYTES # BLD AUTO: 0.32 X10(3)/MCL (ref 0.1–1.3)
MONOCYTES # BLD AUTO: 0.4 10*3/UL (ref 0.1–1.3)
MONOCYTES # BLD AUTO: 0.45 X10(3)/MCL (ref 0.1–1.3)
MONOCYTES # BLD AUTO: 0.48 X10(3)/MCL (ref 0.1–1.3)
MONOCYTES # BLD AUTO: 0.59 X10(3)/MCL (ref 0.1–1.3)
MONOCYTES # BLD AUTO: 0.59 X10(3)/MCL (ref 0.1–1.3)
MONOCYTES # BLD AUTO: 0.64 X10(3)/MCL (ref 0.1–1.3)
MONOCYTES # BLD AUTO: 0.64 X10(3)/MCL (ref 0.1–1.3)
MONOCYTES # BLD AUTO: 0.69 X10(3)/MCL (ref 0.1–1.3)
MONOCYTES # BLD AUTO: 0.84 X10(3)/MCL (ref 0.1–1.3)
MONOCYTES # BLD AUTO: 0.84 X10(3)/MCL (ref 0.1–1.3)
MONOCYTES # BLD AUTO: 0.86 X10(3)/MCL (ref 0.1–1.3)
MONOCYTES # BLD AUTO: 0.89 X10(3)/MCL (ref 0.1–1.3)
MONOCYTES # BLD AUTO: 1.08 X10(3)/MCL (ref 0.1–1.3)
MONOCYTES # BLD AUTO: 1.23 X10(3)/MCL (ref 0.1–1.3)
MONOCYTES # BLD AUTO: 1.29 X10(3)/MCL (ref 0.1–1.3)
MONOCYTES # BLD AUTO: 1.35 X10(3)/MCL (ref 0.1–1.3)
MONOCYTES # BLD AUTO: 1.36 X10(3)/MCL (ref 0.1–1.3)
MONOCYTES # BLD AUTO: 1.53 X10(3)/MCL (ref 0.1–1.3)
MONOCYTES # BLD AUTO: 1.59 X10(3)/MCL (ref 0.1–1.3)
MONOCYTES # BLD AUTO: 1.72 X10(3)/MCL (ref 0.1–1.3)
MONOCYTES NFR BLD AUTO: 10 %
MONOCYTES NFR BLD AUTO: 10.5 %
MONOCYTES NFR BLD AUTO: 10.8 %
MONOCYTES NFR BLD AUTO: 15.3 %
MONOCYTES NFR BLD AUTO: 2.4 %
MONOCYTES NFR BLD AUTO: 2.6 %
MONOCYTES NFR BLD AUTO: 2.9 %
MONOCYTES NFR BLD AUTO: 3.7 %
MONOCYTES NFR BLD AUTO: 3.8 %
MONOCYTES NFR BLD AUTO: 3.8 %
MONOCYTES NFR BLD AUTO: 4 %
MONOCYTES NFR BLD AUTO: 4 %
MONOCYTES NFR BLD AUTO: 4.1 %
MONOCYTES NFR BLD AUTO: 4.1 %
MONOCYTES NFR BLD AUTO: 4.7 %
MONOCYTES NFR BLD AUTO: 5 %
MONOCYTES NFR BLD AUTO: 5.4 %
MONOCYTES NFR BLD AUTO: 5.5 %
MONOCYTES NFR BLD AUTO: 5.6 %
MONOCYTES NFR BLD AUTO: 6.1 %
MONOCYTES NFR BLD AUTO: 7 %
MONOCYTES NFR BLD AUTO: 7.1 %
MONOCYTES NFR BLD AUTO: 7.1 %
MONOCYTES NFR BLD AUTO: 7.5 %
MONOCYTES NFR BLD AUTO: 7.6 %
MONOCYTES NFR BLD AUTO: 9.7 %
MONOCYTES NFR BLD MANUAL: 0.26 X10(3)/MCL (ref 0.1–1.3)
MONOCYTES NFR BLD MANUAL: 0.32 X10(3)/MCL (ref 0.1–1.3)
MONOCYTES NFR BLD MANUAL: 0.44 X10(3)/MCL (ref 0.1–1.3)
MONOCYTES NFR BLD MANUAL: 0.44 X10(3)/MCL (ref 0.1–1.3)
MONOCYTES NFR BLD MANUAL: 0.46 X10(3)/MCL (ref 0.1–1.3)
MONOCYTES NFR BLD MANUAL: 0.76 X10(3)/MCL (ref 0.1–1.3)
MONOCYTES NFR BLD MANUAL: 0.77 X10(3)/MCL (ref 0.1–1.3)
MONOCYTES NFR BLD MANUAL: 0.83 X10(3)/MCL (ref 0.1–1.3)
MONOCYTES NFR BLD MANUAL: 1.09 X10(3)/MCL (ref 0.1–1.3)
MONOCYTES NFR BLD MANUAL: 1.33 X10(3)/MCL (ref 0.1–1.3)
MONOCYTES NFR BLD MANUAL: 1.49 X10(3)/MCL (ref 0.1–1.3)
MONOCYTES NFR BLD MANUAL: 10 % (ref 2–11)
MONOCYTES NFR BLD MANUAL: 3 % (ref 2–11)
MONOCYTES NFR BLD MANUAL: 3 % (ref 2–11)
MONOCYTES NFR BLD MANUAL: 4 % (ref 2–11)
MONOCYTES NFR BLD MANUAL: 6 % (ref 2–11)
MONOCYTES NFR BLD MANUAL: 7 % (ref 2–11)
MONOCYTES NFR BLD MANUAL: 9 % (ref 2–11)
MRSA PCR SCRN (OHS): NOT DETECTED
MTB AND RIF RESISTANCE PNL ISLT/SPM: NOT DETECTED
MTB AND RIF RESISTANCE PNL ISLT/SPM: NOT DETECTED
MUCOUS THREADS URNS QL MICRO: ABNORMAL /LPF
MV MEAN GRADIENT: 2 MMHG
MV PEAK A VEL: 0.75 M/S
MV PEAK E VEL: 1.07 M/S
MV PEAK GRADIENT: 4 MMHG
MV STENOSIS PRESSURE HALF TIME: 41.63 MS
MV VALVE AREA BY CONTINUITY EQUATION: 2.85 CM2
MV VALVE AREA P 1/2 METHOD: 5.28 CM2
MYELOCYTES NFR BLD MANUAL: 1 %
NEUTROPHILS # BLD AUTO: 10.5 X10(3)/MCL (ref 2.1–9.2)
NEUTROPHILS # BLD AUTO: 11 X10(3)/MCL (ref 2.1–9.2)
NEUTROPHILS # BLD AUTO: 11.1 X10(3)/MCL (ref 2.1–9.2)
NEUTROPHILS # BLD AUTO: 13.2 X10(3)/MCL (ref 2.1–9.2)
NEUTROPHILS # BLD AUTO: 13.9 X10(3)/MCL (ref 2.1–9.2)
NEUTROPHILS # BLD AUTO: 13.9 X10(3)/MCL (ref 2.1–9.2)
NEUTROPHILS # BLD AUTO: 14.8 X10(3)/MCL (ref 2.1–9.2)
NEUTROPHILS # BLD AUTO: 14.9 X10(3)/MCL (ref 2.1–9.2)
NEUTROPHILS # BLD AUTO: 15.6 X10(3)/MCL (ref 2.1–9.2)
NEUTROPHILS # BLD AUTO: 19.5 X10(3)/MCL (ref 2.1–9.2)
NEUTROPHILS # BLD AUTO: 24.1 X10(3)/MCL (ref 2.1–9.2)
NEUTROPHILS # BLD AUTO: 24.5 X10(3)/MCL (ref 2.1–9.2)
NEUTROPHILS # BLD AUTO: 26.6 X10(3)/MCL (ref 2.1–9.2)
NEUTROPHILS # BLD AUTO: 3.11 10*3/UL (ref 2.1–9.2)
NEUTROPHILS # BLD AUTO: 3.27 10*3/UL (ref 2.1–9.2)
NEUTROPHILS # BLD AUTO: 33.1 X10(3)/MCL (ref 2.1–9.2)
NEUTROPHILS # BLD AUTO: 4.05 X10(3)/MCL (ref 2.1–9.2)
NEUTROPHILS # BLD AUTO: 5.28 10*3/UL (ref 2.1–9.2)
NEUTROPHILS # BLD AUTO: 6.18 10*3/UL (ref 2.1–9.2)
NEUTROPHILS # BLD AUTO: 6.4 X10(3)/MCL (ref 2.1–9.2)
NEUTROPHILS # BLD AUTO: 6.9 X10(3)/MCL (ref 2.1–9.2)
NEUTROPHILS # BLD AUTO: 7.5 X10(3)/MCL (ref 2.1–9.2)
NEUTROPHILS # BLD AUTO: 7.9 X10(3)/MCL (ref 2.1–9.2)
NEUTROPHILS # BLD AUTO: 8.1 X10(3)/MCL (ref 2.1–9.2)
NEUTROPHILS # BLD AUTO: 8.7 X10(3)/MCL (ref 2.1–9.2)
NEUTROPHILS # BLD AUTO: 9.8 X10(3)/MCL (ref 2.1–9.2)
NEUTROPHILS NFR BLD AUTO: 74 %
NEUTROPHILS NFR BLD AUTO: 76.4 %
NEUTROPHILS NFR BLD AUTO: 77.7 %
NEUTROPHILS NFR BLD AUTO: 79.2 %
NEUTROPHILS NFR BLD AUTO: 80 %
NEUTROPHILS NFR BLD AUTO: 81.4 %
NEUTROPHILS NFR BLD AUTO: 82 %
NEUTROPHILS NFR BLD AUTO: 83.3 %
NEUTROPHILS NFR BLD AUTO: 84.7 %
NEUTROPHILS NFR BLD AUTO: 85.5 %
NEUTROPHILS NFR BLD AUTO: 85.6 %
NEUTROPHILS NFR BLD AUTO: 86 %
NEUTROPHILS NFR BLD AUTO: 87.8 %
NEUTROPHILS NFR BLD AUTO: 88 %
NEUTROPHILS NFR BLD AUTO: 88.6 %
NEUTROPHILS NFR BLD AUTO: 88.6 %
NEUTROPHILS NFR BLD AUTO: 89.2 %
NEUTROPHILS NFR BLD AUTO: 89.7 %
NEUTROPHILS NFR BLD AUTO: 90.8 %
NEUTROPHILS NFR BLD AUTO: 92 %
NEUTROPHILS NFR BLD AUTO: 92.6 %
NEUTROPHILS NFR BLD AUTO: 92.7 %
NEUTROPHILS NFR BLD AUTO: 92.8 %
NEUTROPHILS NFR BLD AUTO: 93.8 %
NEUTROPHILS NFR BLD MANUAL: 63 % (ref 47–80)
NEUTROPHILS NFR BLD MANUAL: 81 % (ref 47–80)
NEUTROPHILS NFR BLD MANUAL: 82 % (ref 47–80)
NEUTROPHILS NFR BLD MANUAL: 82 % (ref 47–80)
NEUTROPHILS NFR BLD MANUAL: 83 % (ref 47–80)
NEUTROPHILS NFR BLD MANUAL: 84 % (ref 47–80)
NEUTROPHILS NFR BLD MANUAL: 84 % (ref 47–80)
NEUTROPHILS NFR BLD MANUAL: 86 % (ref 47–80)
NEUTROPHILS NFR BLD MANUAL: 86 % (ref 47–80)
NEUTROPHILS NFR BLD MANUAL: 89 % (ref 47–80)
NEUTROPHILS NFR BLD MANUAL: 89 % (ref 47–80)
NEUTROPHILS NFR BLD MANUAL: 91 % (ref 47–80)
NEUTS BAND NFR BLD MANUAL: 1 % (ref 0–11)
NEUTS BAND NFR BLD MANUAL: 14 % (ref 0–11)
NEUTS BAND NFR BLD MANUAL: 2 % (ref 0–11)
NEUTS BAND NFR BLD MANUAL: 2 % (ref 0–11)
NEUTS BAND NFR BLD MANUAL: 3 % (ref 0–11)
NEUTS BAND NFR BLD MANUAL: 4 % (ref 0–11)
NEUTS BAND NFR BLD MANUAL: 4 % (ref 0–11)
NEUTS HYPERSEG BLD QL SMEAR: ABNORMAL
NITRITE UR QL STRIP.AUTO: NEGATIVE
NITRITE UR QL STRIP.AUTO: NEGATIVE
NRBC BLD AUTO-RTO: 0 %
NRBC BLD AUTO-RTO: 0 % (ref 0–0.2)
NRBC BLD AUTO-RTO: 0.1 %
NRBC BLD AUTO-RTO: 0.1 %
NRBC BLD AUTO-RTO: 0.2 %
NRBC BLD AUTO-RTO: 0.3 %
NRBC BLD AUTO-RTO: 0.4 %
NRBC BLD AUTO-RTO: 0.6 %
NRBC BLD AUTO-RTO: 0.6 %
OSMOLALITY SERPL: 291 MOSM/KG (ref 280–300)
OSMOLALITY SERPL: 303 MOSM/KG (ref 280–300)
OSMOLALITY UR: 262 MOSM/KG (ref 300–1300)
OSMOLALITY UR: 432 MOSM/KG (ref 300–1300)
PCO2 BLDA: 27 MMHG (ref 35–45)
PCO2 BLDA: 35 MMHG (ref 35–45)
PCO2 BLDA: 36 MMHG (ref 35–45)
PCO2 BLDA: 37 MMHG (ref 35–45)
PCO2 BLDA: 40 MMHG (ref 35–45)
PCO2 BLDA: 41 MMHG (ref 35–45)
PCO2 BLDA: 42 MMHG (ref 35–45)
PCO2 BLDA: 43 MMHG (ref 35–45)
PCO2 BLDA: 45 MMHG (ref 35–45)
PCO2 BLDA: 46 MMHG (ref 35–45)
PCO2 BLDA: 47 MMHG (ref 35–45)
PCO2 BLDA: 49 MMHG (ref 35–45)
PCO2 BLDA: 50 MMHG (ref 35–45)
PCO2 BLDA: 51 MMHG (ref 20–50)
PCO2 BLDA: 51 MMHG (ref 20–50)
PCO2 BLDA: 54 MMHG (ref 20–50)
PCO2 BLDA: 55 MMHG (ref 20–50)
PCO2 BLDA: 59 MMHG (ref 20–50)
PCO2 BLDA: 62 MMHG (ref 20–50)
PCO2 BLDA: 97 MMHG (ref 20–50)
PEEP: 5 CM H2O
PH SMN: 7.06 [PH] (ref 7.3–7.6)
PH SMN: 7.11 [PH] (ref 7.3–7.6)
PH SMN: 7.28 [PH] (ref 7.3–7.6)
PH SMN: 7.3 [PH] (ref 7.35–7.45)
PH SMN: 7.31 [PH] (ref 7.35–7.45)
PH SMN: 7.32 [PH] (ref 7.35–7.45)
PH SMN: 7.33 [PH] (ref 7.35–7.45)
PH SMN: 7.34 [PH] (ref 7.35–7.45)
PH SMN: 7.35 [PH] (ref 7.35–7.45)
PH SMN: 7.36 [PH] (ref 7.35–7.45)
PH SMN: 7.36 [PH] (ref 7.35–7.45)
PH SMN: 7.37 [PH] (ref 7.35–7.45)
PH SMN: 7.39 [PH] (ref 7.35–7.45)
PH SMN: 7.4 [PH] (ref 7.35–7.45)
PH SMN: 7.46 [PH] (ref 7.35–7.45)
PH UR STRIP.AUTO: 5.5 [PH]
PH UR STRIP.AUTO: 6 [PH]
PHOSPHATE SERPL-MCNC: 1.4 MG/DL (ref 2.3–4.7)
PHOSPHATE SERPL-MCNC: 1.9 MG/DL (ref 2.3–4.7)
PHOSPHATE SERPL-MCNC: 2.2 MG/DL (ref 2.3–4.7)
PHOSPHATE SERPL-MCNC: 2.3 MG/DL (ref 2.3–4.7)
PHOSPHATE SERPL-MCNC: 2.4 MG/DL (ref 2.3–4.7)
PHOSPHATE SERPL-MCNC: 2.4 MG/DL (ref 2.3–4.7)
PHOSPHATE SERPL-MCNC: 2.5 MG/DL (ref 2.3–4.7)
PHOSPHATE SERPL-MCNC: 2.6 MG/DL (ref 2.3–4.7)
PHOSPHATE SERPL-MCNC: 2.6 MG/DL (ref 2.3–4.7)
PHOSPHATE SERPL-MCNC: 2.7 MG/DL (ref 2.3–4.7)
PHOSPHATE SERPL-MCNC: 2.8 MG/DL (ref 2.3–4.7)
PHOSPHATE SERPL-MCNC: 2.9 MG/DL (ref 2.3–4.7)
PHOSPHATE SERPL-MCNC: 2.9 MG/DL (ref 2.3–4.7)
PHOSPHATE SERPL-MCNC: 3 MG/DL (ref 2.3–4.7)
PHOSPHATE SERPL-MCNC: 3.1 MG/DL (ref 2.3–4.7)
PHOSPHATE SERPL-MCNC: 3.1 MG/DL (ref 2.3–4.7)
PHOSPHATE SERPL-MCNC: 3.2 MG/DL (ref 2.3–4.7)
PHOSPHATE SERPL-MCNC: 3.4 MG/DL (ref 2.3–4.7)
PHOSPHATE SERPL-MCNC: 3.5 MG/DL (ref 2.3–4.7)
PHOSPHATE SERPL-MCNC: 3.6 MG/DL (ref 2.3–4.7)
PHOSPHATE SERPL-MCNC: 3.9 MG/DL (ref 2.3–4.7)
PHOSPHATE SERPL-MCNC: 3.9 MG/DL (ref 2.3–4.7)
PHOSPHATE SERPL-MCNC: 4.1 MG/DL (ref 2.3–4.7)
PHOSPHATE SERPL-MCNC: 4.6 MG/DL (ref 2.3–4.7)
PHOSPHATE SERPL-MCNC: 4.6 MG/DL (ref 2.3–4.7)
PHOSPHATE SERPL-MCNC: 4.7 MG/DL (ref 2.3–4.7)
PISA AR MAX VEL: 3.67 M/S
PISA TR MAX VEL: 2.86 M/S
PLATELET # BLD AUTO: 197 X10(3)/MCL (ref 130–400)
PLATELET # BLD AUTO: 211 X10(3)/MCL (ref 130–400)
PLATELET # BLD AUTO: 212 X10(3)/MCL (ref 130–400)
PLATELET # BLD AUTO: 225 X10(3)/MCL (ref 130–400)
PLATELET # BLD AUTO: 229 X10(3)/MCL (ref 130–400)
PLATELET # BLD AUTO: 236 X10(3)/MCL (ref 130–400)
PLATELET # BLD AUTO: 238 X10(3)/MCL (ref 130–400)
PLATELET # BLD AUTO: 245 X10(3)/MCL (ref 130–400)
PLATELET # BLD AUTO: 252 X10(3)/MCL (ref 130–400)
PLATELET # BLD AUTO: 252 X10(3)/MCL (ref 130–400)
PLATELET # BLD AUTO: 254 X10(3)/MCL (ref 130–400)
PLATELET # BLD AUTO: 256 X10(3)/MCL (ref 130–400)
PLATELET # BLD AUTO: 262 X10(3)/MCL (ref 130–400)
PLATELET # BLD AUTO: 265 X10(3)/MCL (ref 130–400)
PLATELET # BLD AUTO: 266 X10(3)/MCL (ref 130–400)
PLATELET # BLD AUTO: 277 X10(3)/MCL (ref 130–400)
PLATELET # BLD AUTO: 296 X10(3)/MCL (ref 130–400)
PLATELET # BLD AUTO: 302 X10(3)/MCL (ref 130–400)
PLATELET # BLD AUTO: 302 X10(3)/MCL (ref 130–400)
PLATELET # BLD AUTO: 304 X10(3)/MCL (ref 130–400)
PLATELET # BLD AUTO: 308 X10(3)/MCL (ref 130–400)
PLATELET # BLD AUTO: 320 X10(3)/MCL (ref 130–400)
PLATELET # BLD AUTO: 364 X10(3)/MCL (ref 130–400)
PLATELET # BLD AUTO: 383 X10(3)/MCL (ref 130–400)
PLATELET # BLD AUTO: 408 X10(3)/MCL (ref 130–400)
PLATELET # BLD AUTO: 411 X10(3)/MCL (ref 130–400)
PLATELET # BLD AUTO: 415 X10(3)/MCL (ref 130–400)
PLATELET # BLD AUTO: 469 X10(3)/MCL (ref 130–400)
PLATELET # BLD AUTO: 486 X10(3)/MCL (ref 130–400)
PLATELET # BLD AUTO: 493 X10(3)/MCL (ref 130–400)
PLATELET # BLD AUTO: 504 10*3/UL (ref 130–400)
PLATELET # BLD AUTO: 519 10*3/UL (ref 130–400)
PLATELET # BLD AUTO: 528 X10(3)/MCL (ref 130–400)
PLATELET # BLD AUTO: 550 X10(3)/MCL (ref 130–400)
PLATELET # BLD AUTO: 596 10*3/UL (ref 130–400)
PLATELET # BLD AUTO: 648 X10(3)/MCL (ref 130–400)
PLATELET # BLD AUTO: 651 10*3/UL (ref 130–400)
PLATELET # BLD AUTO: 763 X10(3)/MCL (ref 130–400)
PLATELET # BLD EST: ABNORMAL 10*3/UL
PLATELET # BLD EST: ADEQUATE 10*3/UL
PLATELET # BLD EST: NORMAL 10*3/UL
PLATELET CLUMPS SUSPECT FLAG (OHS): 10
PMV BLD AUTO: 10 FL (ref 7.4–10.4)
PMV BLD AUTO: 10 FL (ref 7.4–10.4)
PMV BLD AUTO: 10.1 FL (ref 7.4–10.4)
PMV BLD AUTO: 10.1 FL (ref 7.4–10.4)
PMV BLD AUTO: 10.2 FL (ref 7.4–10.4)
PMV BLD AUTO: 10.3 FL (ref 7.4–10.4)
PMV BLD AUTO: 10.3 FL (ref 7.4–10.4)
PMV BLD AUTO: 10.5 FL (ref 7.4–10.4)
PMV BLD AUTO: 10.6 FL (ref 7.4–10.4)
PMV BLD AUTO: 10.7 FL (ref 7.4–10.4)
PMV BLD AUTO: 10.8 FL (ref 7.4–10.4)
PMV BLD AUTO: 10.9 FL (ref 7.4–10.4)
PMV BLD AUTO: 11 FL (ref 7.4–10.4)
PMV BLD AUTO: 8.3 FL (ref 7.4–10.4)
PMV BLD AUTO: 8.5 FL (ref 7.4–10.4)
PMV BLD AUTO: 8.6 FL (ref 7.4–10.4)
PMV BLD AUTO: 8.7 FL (ref 7.4–10.4)
PMV BLD AUTO: 8.8 FL (ref 7.4–10.4)
PMV BLD AUTO: 8.9 FL (ref 7.4–10.4)
PMV BLD AUTO: 8.9 FL (ref 7.4–10.4)
PMV BLD AUTO: 9.2 FL (ref 7.4–10.4)
PMV BLD AUTO: 9.3 FL (ref 7.4–10.4)
PMV BLD AUTO: 9.4 FL (ref 7.4–10.4)
PMV BLD AUTO: 9.6 FL (ref 7.4–10.4)
PMV BLD AUTO: 9.6 FL (ref 7.4–10.4)
PMV BLD AUTO: 9.7 FL (ref 7.4–10.4)
PMV BLD AUTO: 9.8 FL (ref 7.4–10.4)
PMV BLD AUTO: 9.9 FL (ref 7.4–10.4)
PMV BLD AUTO: 9.9 FL (ref 7.4–10.4)
PO2 BLDA: 100 MMHG (ref 75–100)
PO2 BLDA: 101 MMHG (ref 75–100)
PO2 BLDA: 119 MMHG (ref 75–100)
PO2 BLDA: 50 MMHG
PO2 BLDA: 50 MMHG
PO2 BLDA: 51 MMHG
PO2 BLDA: 59 MMHG (ref 75–100)
PO2 BLDA: 59 MMHG (ref 75–100)
PO2 BLDA: 60 MMHG (ref 75–100)
PO2 BLDA: 68 MMHG (ref 75–100)
PO2 BLDA: 69 MMHG (ref 75–100)
PO2 BLDA: 75 MMHG (ref 75–100)
PO2 BLDA: 76 MMHG (ref 75–100)
PO2 BLDA: 77 MMHG (ref 75–100)
PO2 BLDA: 79 MMHG (ref 75–100)
PO2 BLDA: 81 MMHG (ref 75–100)
PO2 BLDA: 84 MMHG (ref 75–100)
PO2 BLDA: 85 MMHG (ref 75–100)
PO2 BLDA: 85 MMHG (ref 75–100)
PO2 BLDA: 90 MMHG (ref 75–100)
PO2 BLDA: 95 MMHG (ref 75–100)
PO2 BLDA: 95 MMHG (ref 75–100)
POC BASE DEFICIT: -0.6 MMOL/L (ref -2–2)
POC BASE DEFICIT: -0.7 MMOL/L (ref -2–2)
POC BASE DEFICIT: -1.1 MMOL/L (ref -2–2)
POC BASE DEFICIT: -1.5 MMOL/L (ref -2–2)
POC BASE DEFICIT: -1.7 MMOL/L (ref -2–2)
POC BASE DEFICIT: -10.7 MMOL/L (ref -2–2)
POC BASE DEFICIT: -2.3 MMOL/L (ref -2–2)
POC BASE DEFICIT: -3.1 MMOL/L (ref -2–2)
POC BASE DEFICIT: -3.3 MMOL/L (ref -2–2)
POC BASE DEFICIT: -3.4 MMOL/L (ref -2–2)
POC BASE DEFICIT: -3.9 MMOL/L (ref -2–2)
POC BASE DEFICIT: -4.6 MMOL/L (ref -2–2)
POC BASE DEFICIT: -9.6 MMOL/L (ref -2–2)
POC BASE DEFICIT: 0.1 MMOL/L (ref -2–2)
POC BASE DEFICIT: 0.8 MMOL/L (ref -2–2)
POC BASE DEFICIT: 1.1 MMOL/L (ref -2–2)
POC BASE DEFICIT: 1.7 MMOL/L (ref -2–2)
POC BASE DEFICIT: 2.6 MMOL/L (ref -2–2)
POC BASE DEFICIT: 2.7 MMOL/L (ref -2–2)
POC BASE DEFICIT: 2.9 MMOL/L (ref -2–2)
POC COHB: 1.9 % (ref 0.5–1.5)
POC COHB: 2.1 % (ref 0.5–1.5)
POC COHB: 2.1 % (ref 0.5–1.5)
POC COHB: 2.3 % (ref 0.5–1.5)
POC COHB: 2.3 % (ref 0.5–1.5)
POC COHB: 2.4 % (ref 0.5–1.5)
POC COHB: 2.4 % (ref 0.5–1.5)
POC COHB: 2.5 % (ref 0.5–1.5)
POC COHB: 2.5 % (ref 0.5–1.5)
POC COHB: 2.6 % (ref 0.5–1.5)
POC COHB: 2.7 % (ref 0.5–1.5)
POC COHB: 2.9 % (ref 0.5–1.5)
POC COHB: 2.9 % (ref 0.5–1.5)
POC COHB: 3 % (ref 0.5–1.5)
POC COHB: 3.1 % (ref 0.5–1.5)
POC COHB: 3.2 % (ref 0.5–1.5)
POC METHB: 0.3 % (ref 0–1.5)
POC METHB: 0.3 % (ref 0–1.5)
POC METHB: 0.4 % (ref 0–1.5)
POC METHB: 0.6 % (ref 0–1.5)
POC METHB: 0.7 % (ref 0–1.5)
POC METHB: 0.7 % (ref 0–1.5)
POC METHB: 0.8 % (ref 0–1.5)
POC METHB: 0.9 % (ref 0–1.5)
POC METHB: 1 % (ref 0–1.5)
POC METHB: 1.2 % (ref 0–1.5)
POC METHB: 1.4 % (ref 0–1.5)
POC O2HB: 76.6 % (ref 94–100)
POC O2HB: 82.3 % (ref 94–100)
POC O2HB: 86.9 % (ref 94–100)
POC O2HB: 88.5 % (ref 94–100)
POC O2HB: 88.9 % (ref 94–100)
POC O2HB: 89.8 % (ref 94–100)
POC O2HB: 92.1 % (ref 94–100)
POC O2HB: 92.8 % (ref 94–100)
POC O2HB: 94.1 % (ref 94–100)
POC O2HB: 94.4 % (ref 94–100)
POC O2HB: 94.5 % (ref 94–100)
POC O2HB: 94.7 % (ref 94–100)
POC O2HB: 95 % (ref 94–100)
POC O2HB: 95.1 % (ref 94–100)
POC O2HB: 95.1 % (ref 94–100)
POC O2HB: 95.8 % (ref 94–100)
POC O2HB: 95.9 % (ref 94–100)
POC O2HB: 96 % (ref 94–100)
POC O2HB: 96.2 % (ref 94–100)
POC O2HB: 96.5 % (ref 94–100)
POC O2HB: 96.7 % (ref 94–100)
POC O2HB: 97.9 % (ref 94–100)
POC PERFORMED BY: ABNORMAL
POC SATURATED O2: 66.4 % (ref 90–100)
POC SATURATED O2: 80.6 % (ref 90–100)
POC SATURATED O2: 83.3 % (ref 90–100)
POC SATURATED O2: 87.5 % (ref 90–100)
POC SATURATED O2: 89 % (ref 90–100)
POC SATURATED O2: 89 % (ref 90–100)
POC SATURATED O2: 89.5 % (ref 90–100)
POC SATURATED O2: 92.6 % (ref 90–100)
POC SATURATED O2: 92.7 % (ref 90–100)
POC SATURATED O2: 93.8 % (ref 90–100)
POC SATURATED O2: 94.5 % (ref 90–100)
POC SATURATED O2: 94.7 % (ref 90–100)
POC SATURATED O2: 95.4 % (ref 90–100)
POC SATURATED O2: 96.2 % (ref 90–100)
POC SATURATED O2: 96.2 % (ref 90–100)
POC SATURATED O2: 96.3 % (ref 90–100)
POC SATURATED O2: 96.4 % (ref 90–100)
POC SATURATED O2: 96.4 % (ref 90–100)
POC SATURATED O2: 97.2 % (ref 90–100)
POC SATURATED O2: 97.3 % (ref 90–100)
POC SATURATED O2: 97.4 % (ref 90–100)
POC SATURATED O2: 98.8 % (ref 90–100)
POC TEMPERATURE: 37 C
POC TEMPERATURE: 37 °C
POCT GLUCOSE: 102 MG/DL (ref 70–110)
POCT GLUCOSE: 107 MG/DL (ref 70–110)
POCT GLUCOSE: 108 MG/DL (ref 70–110)
POCT GLUCOSE: 111 MG/DL (ref 70–110)
POCT GLUCOSE: 115 MG/DL (ref 70–110)
POCT GLUCOSE: 121 MG/DL (ref 70–110)
POCT GLUCOSE: 122 MG/DL (ref 70–110)
POCT GLUCOSE: 122 MG/DL (ref 70–110)
POCT GLUCOSE: 125 MG/DL (ref 70–110)
POCT GLUCOSE: 133 MG/DL (ref 70–110)
POCT GLUCOSE: 136 MG/DL (ref 70–110)
POCT GLUCOSE: 137 MG/DL (ref 70–110)
POCT GLUCOSE: 142 MG/DL (ref 70–110)
POCT GLUCOSE: 144 MG/DL (ref 70–110)
POCT GLUCOSE: 148 MG/DL (ref 70–110)
POCT GLUCOSE: 150 MG/DL (ref 70–110)
POCT GLUCOSE: 151 MG/DL (ref 70–110)
POCT GLUCOSE: 152 MG/DL (ref 70–110)
POCT GLUCOSE: 155 MG/DL (ref 70–110)
POCT GLUCOSE: 156 MG/DL (ref 70–110)
POCT GLUCOSE: 159 MG/DL (ref 70–110)
POCT GLUCOSE: 168 MG/DL (ref 70–110)
POCT GLUCOSE: 168 MG/DL (ref 70–110)
POCT GLUCOSE: 173 MG/DL (ref 70–110)
POCT GLUCOSE: 173 MG/DL (ref 70–110)
POCT GLUCOSE: 180 MG/DL (ref 70–110)
POCT GLUCOSE: 184 MG/DL (ref 70–110)
POCT GLUCOSE: 184 MG/DL (ref 70–110)
POCT GLUCOSE: 188 MG/DL (ref 70–110)
POCT GLUCOSE: 189 MG/DL (ref 70–110)
POCT GLUCOSE: 191 MG/DL (ref 70–110)
POCT GLUCOSE: 192 MG/DL (ref 70–110)
POCT GLUCOSE: 195 MG/DL (ref 70–110)
POCT GLUCOSE: 196 MG/DL (ref 70–110)
POCT GLUCOSE: 201 MG/DL (ref 70–110)
POCT GLUCOSE: 207 MG/DL (ref 70–110)
POCT GLUCOSE: 207 MG/DL (ref 70–110)
POCT GLUCOSE: 211 MG/DL (ref 70–110)
POCT GLUCOSE: 213 MG/DL (ref 70–110)
POCT GLUCOSE: 214 MG/DL (ref 70–110)
POCT GLUCOSE: 215 MG/DL (ref 70–110)
POCT GLUCOSE: 217 MG/DL (ref 70–110)
POCT GLUCOSE: 218 MG/DL (ref 70–110)
POCT GLUCOSE: 219 MG/DL (ref 70–110)
POCT GLUCOSE: 225 MG/DL (ref 70–110)
POCT GLUCOSE: 226 MG/DL (ref 70–110)
POCT GLUCOSE: 227 MG/DL (ref 70–110)
POCT GLUCOSE: 227 MG/DL (ref 70–110)
POCT GLUCOSE: 235 MG/DL (ref 70–110)
POCT GLUCOSE: 244 MG/DL (ref 70–110)
POCT GLUCOSE: 247 MG/DL (ref 70–110)
POCT GLUCOSE: 247 MG/DL (ref 70–110)
POCT GLUCOSE: 253 MG/DL (ref 70–110)
POCT GLUCOSE: 254 MG/DL (ref 70–110)
POCT GLUCOSE: 264 MG/DL (ref 70–110)
POCT GLUCOSE: 269 MG/DL (ref 70–110)
POCT GLUCOSE: 273 MG/DL (ref 70–110)
POCT GLUCOSE: 281 MG/DL (ref 70–110)
POCT GLUCOSE: 284 MG/DL (ref 70–110)
POCT GLUCOSE: 285 MG/DL (ref 70–110)
POCT GLUCOSE: 285 MG/DL (ref 70–110)
POCT GLUCOSE: 294 MG/DL (ref 70–110)
POCT GLUCOSE: 327 MG/DL (ref 70–110)
POCT GLUCOSE: 331 MG/DL (ref 70–110)
POCT GLUCOSE: 80 MG/DL (ref 70–110)
POCT GLUCOSE: 84 MG/DL (ref 70–110)
POCT GLUCOSE: 90 MG/DL (ref 70–110)
POCT GLUCOSE: 91 MG/DL (ref 70–110)
POCT GLUCOSE: 92 MG/DL (ref 70–110)
POCT GLUCOSE: 93 MG/DL (ref 70–110)
POCT GLUCOSE: 93 MG/DL (ref 70–110)
POCT GLUCOSE: 94 MG/DL (ref 70–110)
POCT GLUCOSE: 95 MG/DL (ref 70–110)
POCT GLUCOSE: 98 MG/DL (ref 70–110)
POIKILOCYTOSIS BLD QL SMEAR: ABNORMAL
POIKILOCYTOSIS BLD QL SMEAR: ABNORMAL
POIKILOCYTOSIS BLD QL SMEAR: SLIGHT
POLYCHROMASIA BLD QL SMEAR: ABNORMAL
POTASSIUM SERPL-SCNC: 2.9 MMOL/L (ref 3.5–5.1)
POTASSIUM SERPL-SCNC: 3 MMOL/L (ref 3.5–5.1)
POTASSIUM SERPL-SCNC: 3 MMOL/L (ref 3.5–5.1)
POTASSIUM SERPL-SCNC: 3.1 MMOL/L (ref 3.5–5.1)
POTASSIUM SERPL-SCNC: 3.2 MMOL/L (ref 3.5–5.1)
POTASSIUM SERPL-SCNC: 3.3 MMOL/L (ref 3.5–5.1)
POTASSIUM SERPL-SCNC: 3.3 MMOL/L (ref 3.5–5.1)
POTASSIUM SERPL-SCNC: 3.4 MMOL/L (ref 3.5–5.1)
POTASSIUM SERPL-SCNC: 3.4 MMOL/L (ref 3.5–5.1)
POTASSIUM SERPL-SCNC: 3.6 MMOL/L (ref 3.5–5.1)
POTASSIUM SERPL-SCNC: 3.6 MMOL/L (ref 3.5–5.1)
POTASSIUM SERPL-SCNC: 3.7 MMOL/L (ref 3.5–5.1)
POTASSIUM SERPL-SCNC: 3.8 MMOL/L (ref 3.5–5.1)
POTASSIUM SERPL-SCNC: 3.9 MMOL/L (ref 3.5–5.1)
POTASSIUM SERPL-SCNC: 4 MMOL/L (ref 3.5–5.1)
POTASSIUM SERPL-SCNC: 4.1 MMOL/L (ref 3.5–5.1)
POTASSIUM SERPL-SCNC: 4.1 MMOL/L (ref 3.5–5.1)
POTASSIUM SERPL-SCNC: 4.2 MMOL/L (ref 3.5–5.1)
POTASSIUM SERPL-SCNC: 4.4 MMOL/L (ref 3.5–5.1)
POTASSIUM SERPL-SCNC: 4.4 MMOL/L (ref 3.5–5.1)
POTASSIUM SERPL-SCNC: 4.5 MMOL/L (ref 3.5–5.1)
POTASSIUM SERPL-SCNC: 4.6 MMOL/L (ref 3.5–5.1)
POTASSIUM SERPL-SCNC: 4.7 MMOL/L (ref 3.5–5.1)
POTASSIUM SERPL-SCNC: 4.7 MMOL/L (ref 3.5–5.1)
POTASSIUM SERPL-SCNC: 5 MMOL/L (ref 3.5–5.1)
POTASSIUM SERPL-SCNC: 5 MMOL/L (ref 3.5–5.1)
POTASSIUM SERPL-SCNC: 5.1 MMOL/L (ref 3.5–5.1)
POTASSIUM SERPL-SCNC: 5.3 MMOL/L (ref 3.5–5.1)
POTASSIUM SERPL-SCNC: 5.5 MMOL/L (ref 3.5–5.1)
POTASSIUM UR-SCNC: 12 MMOL/L
POTASSIUM UR-SCNC: 50 MMOL/L
PREALB SERPL-MCNC: 5.9 MG/DL (ref 14–37)
PROCALCITONIN SERPL-MCNC: 0.37 NG/ML
PROT SERPL-MCNC: 5.3 GM/DL (ref 5.8–7.6)
PROT SERPL-MCNC: 5.3 GM/DL (ref 5.8–7.6)
PROT SERPL-MCNC: 5.6 GM/DL (ref 5.8–7.6)
PROT SERPL-MCNC: 5.6 GM/DL (ref 5.8–7.6)
PROT SERPL-MCNC: 5.7 GM/DL (ref 5.8–7.6)
PROT SERPL-MCNC: 5.7 GM/DL (ref 5.8–7.6)
PROT SERPL-MCNC: 5.9 GM/DL (ref 5.8–7.6)
PROT SERPL-MCNC: 6.1 GM/DL (ref 5.8–7.6)
PROT SERPL-MCNC: 6.2 GM/DL (ref 5.8–7.6)
PROT SERPL-MCNC: 6.2 GM/DL (ref 5.8–7.6)
PROT SERPL-MCNC: 6.3 GM/DL (ref 5.8–7.6)
PROT SERPL-MCNC: 6.4 GM/DL (ref 5.8–7.6)
PROT SERPL-MCNC: 6.4 GM/DL (ref 5.8–7.6)
PROT SERPL-MCNC: 6.5 GM/DL (ref 5.8–7.6)
PROT SERPL-MCNC: 6.6 GM/DL (ref 5.8–7.6)
PROT SERPL-MCNC: 6.6 GM/DL (ref 5.8–7.6)
PROT SERPL-MCNC: 6.7 GM/DL (ref 5.8–7.6)
PROT SERPL-MCNC: 6.7 GM/DL (ref 5.8–7.6)
PROT SERPL-MCNC: 7.1 GM/DL (ref 5.8–7.6)
PROT SERPL-MCNC: 7.7 G/DL (ref 5.8–7.6)
PROT SERPL-MCNC: 7.9 G/DL (ref 5.8–7.6)
PROT SERPL-MCNC: 8 G/DL (ref 5.8–7.6)
PROT SERPL-MCNC: 8.1 GM/DL (ref 5.8–7.6)
PROT SERPL-MCNC: 8.6 G/DL (ref 5.8–7.6)
PROT SERPL-MCNC: 8.6 GM/DL (ref 5.8–7.6)
PROT UR QL STRIP.AUTO: ABNORMAL MG/DL
PROT UR QL STRIP.AUTO: ABNORMAL MG/DL
PROT UR STRIP-MCNC: 104.6 MG/DL
PROT UR STRIP-MCNC: 31.7 MG/DL
PTH-INTACT SERPL-MCNC: 52.7 PG/ML (ref 8.7–77)
RA PRESSURE: 3 MMHG
RBC # BLD AUTO: 2.32 X10(6)/MCL (ref 4.2–5.4)
RBC # BLD AUTO: 2.37 X10(6)/MCL (ref 4.2–5.4)
RBC # BLD AUTO: 2.4 X10(6)/MCL (ref 4.2–5.4)
RBC # BLD AUTO: 2.55 X10(6)/MCL (ref 4.2–5.4)
RBC # BLD AUTO: 2.68 X10(6)/MCL (ref 4.2–5.4)
RBC # BLD AUTO: 2.74 X10(6)/MCL (ref 4.2–5.4)
RBC # BLD AUTO: 2.78 X10(6)/MCL (ref 4.2–5.4)
RBC # BLD AUTO: 2.79 X10(6)/MCL (ref 4.2–5.4)
RBC # BLD AUTO: 2.9 X10(6)/MCL (ref 4.2–5.4)
RBC # BLD AUTO: 2.92 X10(6)/MCL (ref 4.2–5.4)
RBC # BLD AUTO: 2.96 X10(6)/MCL (ref 4.2–5.4)
RBC # BLD AUTO: 3.01 X10(6)/MCL (ref 4.2–5.4)
RBC # BLD AUTO: 3.02 X10(6)/MCL (ref 4.2–5.4)
RBC # BLD AUTO: 3.02 X10(6)/MCL (ref 4.2–5.4)
RBC # BLD AUTO: 3.13 X10(6)/MCL (ref 4.2–5.4)
RBC # BLD AUTO: 3.17 X10(6)/MCL (ref 4.2–5.4)
RBC # BLD AUTO: 3.18 X10(6)/MCL (ref 4.2–5.4)
RBC # BLD AUTO: 3.19 X10(6)/MCL (ref 4.2–5.4)
RBC # BLD AUTO: 3.21 X10(6)/MCL (ref 4.2–5.4)
RBC # BLD AUTO: 3.23 X10(6)/MCL (ref 4.2–5.4)
RBC # BLD AUTO: 3.25 X10(6)/MCL (ref 4.2–5.4)
RBC # BLD AUTO: 3.29 X10(6)/MCL (ref 4.2–5.4)
RBC # BLD AUTO: 3.35 X10(6)/MCL (ref 4.2–5.4)
RBC # BLD AUTO: 3.35 X10(6)/MCL (ref 4.2–5.4)
RBC # BLD AUTO: 3.44 X10(6)/MCL (ref 4.2–5.4)
RBC # BLD AUTO: 3.5 X10(6)/MCL (ref 4.2–5.4)
RBC # BLD AUTO: 3.59 X10(6)/MCL (ref 4.2–5.4)
RBC # BLD AUTO: 3.65 10*6/UL (ref 4–5.2)
RBC # BLD AUTO: 3.69 X10(6)/MCL (ref 4.2–5.4)
RBC # BLD AUTO: 3.81 10*6/UL (ref 4–5.2)
RBC # BLD AUTO: 3.81 X10(6)/MCL (ref 4–5.2)
RBC # BLD AUTO: 3.85 X10(6)/MCL (ref 4.2–5.4)
RBC # BLD AUTO: 3.95 10*6/UL (ref 4–5.2)
RBC # BLD AUTO: 3.98 X10(6)/MCL (ref 4.2–5.4)
RBC # BLD AUTO: 4.04 X10(6)/MCL (ref 4.2–5.4)
RBC # BLD AUTO: 4.12 10*6/UL (ref 4–5.2)
RBC # BLD AUTO: 4.14 X10(6)/MCL (ref 4.2–5.4)
RBC # BLD AUTO: 4.2 X10(6)/MCL (ref 4.2–5.4)
RBC #/AREA URNS AUTO: ABNORMAL /HPF
RBC #/AREA URNS AUTO: ABNORMAL /HPF
RBC MORPH BLD: ABNORMAL
RBC MORPH BLD: NORMAL
RBC UR QL AUTO: NEGATIVE UNIT/L
RBC UR QL AUTO: NEGATIVE UNIT/L
RET# (OHS): 0.03 (ref 0.02–0.08)
RET# (OHS): 0.06 (ref 0.02–0.08)
RETICULOCYTE COUNT AUTOMATED (OLG): 1.15 % (ref 1.1–2.1)
RETICULOCYTE COUNT AUTOMATED (OLG): 1.76 % (ref 1.1–2.1)
RHODAMINE-AURAMINE STN SPEC: NORMAL
RIGHT VENTRICULAR END-DIASTOLIC DIMENSION: 2.28 CM
SARS-COV-2 AG RESP QL IA.RAPID: NEGATIVE
SARS-COV-2 RDRP RESP QL NAA+PROBE: NEGATIVE
SARS-COV-2 RNA RESP QL NAA+PROBE: NOT DETECTED
SCHISTOCYTE (OLG): SLIGHT
SMA IGG SER-ACNC: 16.1 U
SODIUM SERPL-SCNC: 129 MMOL/L (ref 136–145)
SODIUM SERPL-SCNC: 134 MMOL/L (ref 136–145)
SODIUM SERPL-SCNC: 135 MMOL/L (ref 136–145)
SODIUM SERPL-SCNC: 136 MMOL/L (ref 136–145)
SODIUM SERPL-SCNC: 137 MMOL/L (ref 136–145)
SODIUM SERPL-SCNC: 137 MMOL/L (ref 136–145)
SODIUM SERPL-SCNC: 138 MMOL/L (ref 136–145)
SODIUM SERPL-SCNC: 139 MMOL/L (ref 136–145)
SODIUM SERPL-SCNC: 140 MMOL/L (ref 136–145)
SODIUM SERPL-SCNC: 141 MMOL/L (ref 136–145)
SODIUM SERPL-SCNC: 141 MMOL/L (ref 136–145)
SODIUM SERPL-SCNC: 142 MMOL/L (ref 136–145)
SODIUM SERPL-SCNC: 143 MMOL/L (ref 136–145)
SODIUM SERPL-SCNC: 144 MMOL/L (ref 136–145)
SODIUM SERPL-SCNC: 146 MMOL/L (ref 136–145)
SODIUM UR-SCNC: 57.8 MMOL/L
SODIUM UR-SCNC: 62.2 MMOL/L
SP GR UR STRIP.AUTO: 1.02
SP GR UR STRIP.AUTO: 1.03
SPECIMEN SOURCE: ABNORMAL
SQUAMOUS #/AREA URNS AUTO: ABNORMAL /HPF
SQUAMOUS #/AREA URNS LPF: ABNORMAL /HPF
T4 FREE SERPL-MCNC: 0.91 NG/DL (ref 0.7–1.48)
T4 FREE SERPL-MCNC: 0.98 NG/DL (ref 0.7–1.48)
T4 FREE SERPL-MCNC: 1.04 NG/DL (ref 0.7–1.48)
T4 FREE SERPL-MCNC: 1.12 NG/DL (ref 0.7–1.48)
TARGETS BLD QL SMEAR: SLIGHT
TDI LATERAL: 0.1 M/S
TDI SEPTAL: 0.07 M/S
TDI: 0.09 M/S
TEAR DROP CELL (OLG): ABNORMAL
TIBC SERPL-MCNC: 111 UG/DL (ref 250–450)
TIBC SERPL-MCNC: 149 UG/DL (ref 70–310)
TIBC SERPL-MCNC: 161 UG/DL (ref 250–450)
TIBC SERPL-MCNC: 162 UG/DL (ref 70–310)
TIBC SERPL-MCNC: 216 UG/DL (ref 250–450)
TIBC SERPL-MCNC: 86 UG/DL (ref 70–310)
TIBC SERPL-MCNC: <25 UG/DL (ref 70–310)
TIBC SERPL-MCNC: <274 UG/DL (ref 250–450)
TR MAX PG: 33 MMHG
TRANSFERRIN SERPL-MCNC: 130 MG/DL (ref 173–360)
TRANSFERRIN SERPL-MCNC: 190 MG/DL (ref 173–360)
TRANSFERRIN SERPL-MCNC: 195 MG/DL (ref 173–360)
TROPONIN I SERPL-MCNC: 0.01 NG/ML (ref 0–0.04)
TROPONIN I SERPL-MCNC: 0.01 NG/ML (ref 0–0.04)
TROPONIN I SERPL-MCNC: 0.04 NG/ML (ref 0–0.04)
TROPONIN I SERPL-MCNC: <0.01 NG/ML (ref 0–0.04)
TROPONIN I SERPL-MCNC: <0.01 NG/ML (ref 0–0.04)
TSH SERPL-ACNC: 0.32 M[IU]/L (ref 0.35–4.94)
TSH SERPL-ACNC: 0.43 UIU/ML (ref 0.35–4.94)
TSH SERPL-ACNC: 0.51 UIU/ML (ref 0.35–4.94)
TSH SERPL-ACNC: 0.53 M[IU]/L (ref 0.35–4.94)
TSH SERPL-ACNC: 0.6 M[IU]/L (ref 0.35–4.94)
TSH SERPL-ACNC: 1.33 UIU/ML (ref 0.35–4.94)
TSH SERPL-ACNC: 1.92 UIU/ML (ref 0.35–4.94)
TTG IGA SER IA-ACNC: <2 U/ML
TV REST PULMONARY ARTERY PRESSURE: 36 MMHG
UNIT NUMBER: NORMAL
URATE SERPL-MCNC: 2.1 MG/DL (ref 2.6–6)
URATE SERPL-MCNC: 3.1 MG/DL (ref 2.6–6)
URATE SERPL-MCNC: 4.2 MG/DL (ref 2.6–6)
URATE UR-MCNC: 10.3 MG/DL
URATE UR-MCNC: 37.6 MG/DL
URINE PROTEIN/CREATININE RATIO (OHS): 2.2
URINE PROTEIN/CREATININE RATIO (OHS): 2.7
UROBILINOGEN UR STRIP-ACNC: 0.2 MG/DL
UROBILINOGEN UR STRIP-ACNC: NORMAL MG/DL
VANCOMYCIN SERPL-MCNC: 11.2 UG/ML (ref 15–20)
VANCOMYCIN SERPL-MCNC: 18.1 UG/ML (ref 15–20)
VANCOMYCIN SERPL-MCNC: 25.3 UG/ML (ref 15–20)
VANCOMYCIN TROUGH SERPL-MCNC: 11.4 UG/ML (ref 15–20)
VANCOMYCIN TROUGH SERPL-MCNC: 31.4 UG/ML (ref 15–20)
VANCOMYCIN TROUGH SERPL-MCNC: 38.5 UG/ML (ref 15–20)
VANCOMYCIN TROUGH SERPL-MCNC: 9.5 UG/ML (ref 15–20)
VIT B12 SERPL-MCNC: 1851 PG/ML (ref 213–816)
VIT B12 SERPL-MCNC: 696 PG/ML (ref 213–816)
WBC # SPEC AUTO: 10.3 X10(3)/MCL (ref 4.5–11.5)
WBC # SPEC AUTO: 10.8 X10(3)/MCL (ref 4.5–11.5)
WBC # SPEC AUTO: 10.9 X10(3)/MCL (ref 4.5–11.5)
WBC # SPEC AUTO: 10.9 X10(3)/MCL (ref 4.5–11.5)
WBC # SPEC AUTO: 11.1 X10(3)/MCL (ref 4.5–11.5)
WBC # SPEC AUTO: 11.1 X10(3)/MCL (ref 4.5–11.5)
WBC # SPEC AUTO: 11.5 X10(3)/MCL (ref 4.5–11.5)
WBC # SPEC AUTO: 11.9 X10(3)/MCL (ref 4.5–11.5)
WBC # SPEC AUTO: 12.2 X10(3)/MCL (ref 4.5–11.5)
WBC # SPEC AUTO: 13.3 X10(3)/MCL (ref 4.5–11.5)
WBC # SPEC AUTO: 14.1 X10(3)/MCL (ref 4.5–11.5)
WBC # SPEC AUTO: 14.8 X10(3)/MCL (ref 4.5–11.5)
WBC # SPEC AUTO: 15.8 X10(3)/MCL (ref 4.5–11.5)
WBC # SPEC AUTO: 16 X10(3)/MCL (ref 4.5–11.5)
WBC # SPEC AUTO: 16.3 X10(3)/MCL (ref 4.5–11.5)
WBC # SPEC AUTO: 16.5 X10(3)/MCL (ref 4.5–11.5)
WBC # SPEC AUTO: 16.8 X10(3)/MCL (ref 4.5–11.5)
WBC # SPEC AUTO: 17.9 X10(3)/MCL (ref 4.5–11.5)
WBC # SPEC AUTO: 18.1 X10(3)/MCL (ref 4.5–11.5)
WBC # SPEC AUTO: 21.2 X10(3)/MCL (ref 4.5–11.5)
WBC # SPEC AUTO: 26.5 X10(3)/MCL (ref 4.5–11.5)
WBC # SPEC AUTO: 29.6 X10(3)/MCL (ref 4.5–11.5)
WBC # SPEC AUTO: 29.9 X10(3)/MCL (ref 4.5–11.5)
WBC # SPEC AUTO: 3.9 10*3/UL (ref 4.5–11)
WBC # SPEC AUTO: 31.3 X10(3)/MCL (ref 4.5–11.5)
WBC # SPEC AUTO: 35.3 X10(3)/MCL (ref 4.5–11.5)
WBC # SPEC AUTO: 4.4 10*3/UL (ref 4.5–11)
WBC # SPEC AUTO: 4.9 X10(3)/MCL (ref 4.5–11)
WBC # SPEC AUTO: 6 10*3/UL (ref 4.5–11)
WBC # SPEC AUTO: 7.2 10*3/UL (ref 4.5–11)
WBC # SPEC AUTO: 7.9 X10(3)/MCL (ref 4.5–11.5)
WBC # SPEC AUTO: 8.4 X10(3)/MCL (ref 4.5–11.5)
WBC # SPEC AUTO: 8.5 X10(3)/MCL (ref 4.5–11.5)
WBC # SPEC AUTO: 8.7 X10(3)/MCL (ref 4.5–11.5)
WBC # SPEC AUTO: 8.8 X10(3)/MCL (ref 4.5–11.5)
WBC # SPEC AUTO: 9 X10(3)/MCL (ref 4.5–11.5)
WBC # SPEC AUTO: 9.2 X10(3)/MCL (ref 4.5–11.5)
WBC # SPEC AUTO: 9.6 X10(3)/MCL (ref 4.5–11.5)
WBC #/AREA URNS AUTO: ABNORMAL /HPF
WBC #/AREA URNS AUTO: ABNORMAL /HPF
ZZGIANT PLATELETS (OHS): 10

## 2022-01-01 PROCEDURE — 87116 MYCOBACTERIA CULTURE: CPT | Performed by: INTERNAL MEDICINE

## 2022-01-01 PROCEDURE — 83735 ASSAY OF MAGNESIUM: CPT | Performed by: STUDENT IN AN ORGANIZED HEALTH CARE EDUCATION/TRAINING PROGRAM

## 2022-01-01 PROCEDURE — 99223 1ST HOSP IP/OBS HIGH 75: CPT | Mod: AI,,, | Performed by: INTERNAL MEDICINE

## 2022-01-01 PROCEDURE — 25000003 PHARM REV CODE 250: Performed by: EMERGENCY MEDICINE

## 2022-01-01 PROCEDURE — 86803 HEPATITIS C AB TEST: CPT | Performed by: NURSE PRACTITIONER

## 2022-01-01 PROCEDURE — 99214 OFFICE O/P EST MOD 30 MIN: CPT | Mod: PBBFAC | Performed by: INTERNAL MEDICINE

## 2022-01-01 PROCEDURE — 1159F PR MEDICATION LIST DOCUMENTED IN MEDICAL RECORD: ICD-10-PCS | Mod: CPTII,,, | Performed by: NURSE PRACTITIONER

## 2022-01-01 PROCEDURE — 25000003 PHARM REV CODE 250: Performed by: INTERNAL MEDICINE

## 2022-01-01 PROCEDURE — 63600175 PHARM REV CODE 636 W HCPCS: Performed by: NURSE ANESTHETIST, CERTIFIED REGISTERED

## 2022-01-01 PROCEDURE — 63600175 PHARM REV CODE 636 W HCPCS: Performed by: INTERNAL MEDICINE

## 2022-01-01 PROCEDURE — C9113 INJ PANTOPRAZOLE SODIUM, VIA: HCPCS | Performed by: STUDENT IN AN ORGANIZED HEALTH CARE EDUCATION/TRAINING PROGRAM

## 2022-01-01 PROCEDURE — 11000001 HC ACUTE MED/SURG PRIVATE ROOM

## 2022-01-01 PROCEDURE — 3008F PR BODY MASS INDEX (BMI) DOCUMENTED: ICD-10-PCS | Mod: CPTII,,, | Performed by: NURSE PRACTITIONER

## 2022-01-01 PROCEDURE — 96411 CHEMO IV PUSH ADDL DRUG: CPT

## 2022-01-01 PROCEDURE — 86709 HEPATITIS A IGM ANTIBODY: CPT | Mod: 59,91 | Performed by: NURSE PRACTITIONER

## 2022-01-01 PROCEDURE — 84100 ASSAY OF PHOSPHORUS: CPT | Performed by: STUDENT IN AN ORGANIZED HEALTH CARE EDUCATION/TRAINING PROGRAM

## 2022-01-01 PROCEDURE — 1101F PT FALLS ASSESS-DOCD LE1/YR: CPT | Mod: CPTII,,, | Performed by: INTERNAL MEDICINE

## 2022-01-01 PROCEDURE — 93005 ELECTROCARDIOGRAM TRACING: CPT

## 2022-01-01 PROCEDURE — 99214 OFFICE O/P EST MOD 30 MIN: CPT | Mod: S$PBB,,, | Performed by: INTERNAL MEDICINE

## 2022-01-01 PROCEDURE — 84133 ASSAY OF URINE POTASSIUM: CPT | Performed by: STUDENT IN AN ORGANIZED HEALTH CARE EDUCATION/TRAINING PROGRAM

## 2022-01-01 PROCEDURE — 82803 BLOOD GASES ANY COMBINATION: CPT

## 2022-01-01 PROCEDURE — 63600175 PHARM REV CODE 636 W HCPCS: Performed by: EMERGENCY MEDICINE

## 2022-01-01 PROCEDURE — 85014 HEMATOCRIT: CPT | Performed by: INTERNAL MEDICINE

## 2022-01-01 PROCEDURE — 1101F PR PT FALLS ASSESS DOC 0-1 FALLS W/OUT INJ PAST YR: ICD-10-PCS | Mod: CPTII,,, | Performed by: NURSE PRACTITIONER

## 2022-01-01 PROCEDURE — 99900026 HC AIRWAY MAINTENANCE (STAT)

## 2022-01-01 PROCEDURE — 85027 COMPLETE CBC AUTOMATED: CPT | Performed by: STUDENT IN AN ORGANIZED HEALTH CARE EDUCATION/TRAINING PROGRAM

## 2022-01-01 PROCEDURE — G0378 HOSPITAL OBSERVATION PER HR: HCPCS

## 2022-01-01 PROCEDURE — 25000242 PHARM REV CODE 250 ALT 637 W/ HCPCS: Performed by: STUDENT IN AN ORGANIZED HEALTH CARE EDUCATION/TRAINING PROGRAM

## 2022-01-01 PROCEDURE — 63600175 PHARM REV CODE 636 W HCPCS: Performed by: STUDENT IN AN ORGANIZED HEALTH CARE EDUCATION/TRAINING PROGRAM

## 2022-01-01 PROCEDURE — 80202 ASSAY OF VANCOMYCIN: CPT | Performed by: INTERNAL MEDICINE

## 2022-01-01 PROCEDURE — 27200966 HC CLOSED SUCTION SYSTEM

## 2022-01-01 PROCEDURE — 86376 MICROSOMAL ANTIBODY EACH: CPT | Performed by: NURSE PRACTITIONER

## 2022-01-01 PROCEDURE — 94761 N-INVAS EAR/PLS OXIMETRY MLT: CPT

## 2022-01-01 PROCEDURE — 21400001 HC TELEMETRY ROOM

## 2022-01-01 PROCEDURE — 3288F PR FALLS RISK ASSESSMENT DOCUMENTED: ICD-10-PCS | Mod: CPTII,,, | Performed by: NURSE PRACTITIONER

## 2022-01-01 PROCEDURE — 20000000 HC ICU ROOM

## 2022-01-01 PROCEDURE — 85025 COMPLETE CBC W/AUTO DIFF WBC: CPT | Performed by: EMERGENCY MEDICINE

## 2022-01-01 PROCEDURE — 99900035 HC TECH TIME PER 15 MIN (STAT)

## 2022-01-01 PROCEDURE — 78815 PET IMAGE W/CT SKULL-THIGH: CPT | Mod: TC,PS

## 2022-01-01 PROCEDURE — 25000003 PHARM REV CODE 250

## 2022-01-01 PROCEDURE — 83735 ASSAY OF MAGNESIUM: CPT | Performed by: INTERNAL MEDICINE

## 2022-01-01 PROCEDURE — A4216 STERILE WATER/SALINE, 10 ML: HCPCS | Performed by: NURSE PRACTITIONER

## 2022-01-01 PROCEDURE — 63600175 PHARM REV CODE 636 W HCPCS

## 2022-01-01 PROCEDURE — 1126F AMNT PAIN NOTED NONE PRSNT: CPT | Mod: CPTII,,, | Performed by: NURSE PRACTITIONER

## 2022-01-01 PROCEDURE — 87040 BLOOD CULTURE FOR BACTERIA: CPT | Performed by: STUDENT IN AN ORGANIZED HEALTH CARE EDUCATION/TRAINING PROGRAM

## 2022-01-01 PROCEDURE — 3078F DIAST BP <80 MM HG: CPT | Mod: CPTII,,, | Performed by: INTERNAL MEDICINE

## 2022-01-01 PROCEDURE — 1101F PT FALLS ASSESS-DOCD LE1/YR: CPT | Mod: CPTII,,, | Performed by: NURSE PRACTITIONER

## 2022-01-01 PROCEDURE — 1160F RVW MEDS BY RX/DR IN RCRD: CPT | Mod: CPTII,,, | Performed by: NURSE PRACTITIONER

## 2022-01-01 PROCEDURE — 96413 CHEMO IV INFUSION 1 HR: CPT

## 2022-01-01 PROCEDURE — 99214 OFFICE O/P EST MOD 30 MIN: CPT | Mod: PBBFAC | Performed by: NURSE PRACTITIONER

## 2022-01-01 PROCEDURE — 82533 TOTAL CORTISOL: CPT | Performed by: INTERNAL MEDICINE

## 2022-01-01 PROCEDURE — 96375 TX/PRO/DX INJ NEW DRUG ADDON: CPT

## 2022-01-01 PROCEDURE — 25000003 PHARM REV CODE 250: Performed by: STUDENT IN AN ORGANIZED HEALTH CARE EDUCATION/TRAINING PROGRAM

## 2022-01-01 PROCEDURE — 36410 VNPNXR 3YR/> PHY/QHP DX/THER: CPT

## 2022-01-01 PROCEDURE — 80053 COMPREHEN METABOLIC PANEL: CPT | Performed by: STUDENT IN AN ORGANIZED HEALTH CARE EDUCATION/TRAINING PROGRAM

## 2022-01-01 PROCEDURE — 86920 COMPATIBILITY TEST SPIN: CPT | Performed by: INTERNAL MEDICINE

## 2022-01-01 PROCEDURE — 86850 RBC ANTIBODY SCREEN: CPT | Performed by: INTERNAL MEDICINE

## 2022-01-01 PROCEDURE — P9016 RBC LEUKOCYTES REDUCED: HCPCS | Performed by: INTERNAL MEDICINE

## 2022-01-01 PROCEDURE — 85025 COMPLETE CBC W/AUTO DIFF WBC: CPT | Performed by: INTERNAL MEDICINE

## 2022-01-01 PROCEDURE — 3288F FALL RISK ASSESSMENT DOCD: CPT | Mod: CPTII,,, | Performed by: INTERNAL MEDICINE

## 2022-01-01 PROCEDURE — 27000221 HC OXYGEN, UP TO 24 HOURS

## 2022-01-01 PROCEDURE — 85045 AUTOMATED RETICULOCYTE COUNT: CPT | Performed by: NURSE PRACTITIONER

## 2022-01-01 PROCEDURE — 99233 PR SUBSEQUENT HOSPITAL CARE,LEVL III: ICD-10-PCS | Mod: GC,,, | Performed by: INTERNAL MEDICINE

## 2022-01-01 PROCEDURE — 99214 PR OFFICE/OUTPT VISIT, EST, LEVL IV, 30-39 MIN: ICD-10-PCS | Mod: S$PBB,,, | Performed by: NURSE PRACTITIONER

## 2022-01-01 PROCEDURE — 36415 COLL VENOUS BLD VENIPUNCTURE: CPT | Performed by: INTERNAL MEDICINE

## 2022-01-01 PROCEDURE — 94003 VENT MGMT INPAT SUBQ DAY: CPT

## 2022-01-01 PROCEDURE — 83540 ASSAY OF IRON: CPT | Performed by: NURSE PRACTITIONER

## 2022-01-01 PROCEDURE — 84560 ASSAY OF URINE/URIC ACID: CPT | Performed by: STUDENT IN AN ORGANIZED HEALTH CARE EDUCATION/TRAINING PROGRAM

## 2022-01-01 PROCEDURE — 99233 SBSQ HOSP IP/OBS HIGH 50: CPT | Mod: GC,,, | Performed by: INTERNAL MEDICINE

## 2022-01-01 PROCEDURE — 25500020 PHARM REV CODE 255: Performed by: EMERGENCY MEDICINE

## 2022-01-01 PROCEDURE — 37000008 HC ANESTHESIA 1ST 15 MINUTES: Performed by: SURGERY

## 2022-01-01 PROCEDURE — 3074F SYST BP LT 130 MM HG: CPT | Mod: CPTII,,, | Performed by: NURSE PRACTITIONER

## 2022-01-01 PROCEDURE — 3078F DIAST BP <80 MM HG: CPT | Mod: CPTII,,, | Performed by: NURSE PRACTITIONER

## 2022-01-01 PROCEDURE — 96360 HYDRATION IV INFUSION INIT: CPT

## 2022-01-01 PROCEDURE — 93010 ELECTROCARDIOGRAM REPORT: CPT | Mod: ,,, | Performed by: INTERNAL MEDICINE

## 2022-01-01 PROCEDURE — 80069 RENAL FUNCTION PANEL: CPT | Performed by: INTERNAL MEDICINE

## 2022-01-01 PROCEDURE — C9113 INJ PANTOPRAZOLE SODIUM, VIA: HCPCS | Performed by: INTERNAL MEDICINE

## 2022-01-01 PROCEDURE — 85025 COMPLETE CBC W/AUTO DIFF WBC: CPT | Performed by: STUDENT IN AN ORGANIZED HEALTH CARE EDUCATION/TRAINING PROGRAM

## 2022-01-01 PROCEDURE — 84484 ASSAY OF TROPONIN QUANT: CPT | Performed by: EMERGENCY MEDICINE

## 2022-01-01 PROCEDURE — 85045 AUTOMATED RETICULOCYTE COUNT: CPT | Performed by: STUDENT IN AN ORGANIZED HEALTH CARE EDUCATION/TRAINING PROGRAM

## 2022-01-01 PROCEDURE — 1101F PR PT FALLS ASSESS DOC 0-1 FALLS W/OUT INJ PAST YR: ICD-10-PCS | Mod: CPTII,,, | Performed by: INTERNAL MEDICINE

## 2022-01-01 PROCEDURE — 94640 AIRWAY INHALATION TREATMENT: CPT

## 2022-01-01 PROCEDURE — 63600175 PHARM REV CODE 636 W HCPCS: Mod: JG | Performed by: INTERNAL MEDICINE

## 2022-01-01 PROCEDURE — 37799 UNLISTED PX VASCULAR SURGERY: CPT

## 2022-01-01 PROCEDURE — 87556 M.TUBERCULO DNA AMP PROBE: CPT | Performed by: INTERNAL MEDICINE

## 2022-01-01 PROCEDURE — 86709 HEPATITIS A IGM ANTIBODY: CPT | Performed by: NURSE PRACTITIONER

## 2022-01-01 PROCEDURE — 96361 HYDRATE IV INFUSION ADD-ON: CPT

## 2022-01-01 PROCEDURE — 1159F MED LIST DOCD IN RCRD: CPT | Mod: CPTII,,, | Performed by: NURSE PRACTITIONER

## 2022-01-01 PROCEDURE — 25000242 PHARM REV CODE 250 ALT 637 W/ HCPCS: Performed by: INTERNAL MEDICINE

## 2022-01-01 PROCEDURE — 96374 THER/PROPH/DIAG INJ IV PUSH: CPT

## 2022-01-01 PROCEDURE — 85018 HEMOGLOBIN: CPT | Performed by: INTERNAL MEDICINE

## 2022-01-01 PROCEDURE — 36415 COLL VENOUS BLD VENIPUNCTURE: CPT | Performed by: STUDENT IN AN ORGANIZED HEALTH CARE EDUCATION/TRAINING PROGRAM

## 2022-01-01 PROCEDURE — 84300 ASSAY OF URINE SODIUM: CPT | Performed by: STUDENT IN AN ORGANIZED HEALTH CARE EDUCATION/TRAINING PROGRAM

## 2022-01-01 PROCEDURE — A4217 STERILE WATER/SALINE, 500 ML: HCPCS | Performed by: STUDENT IN AN ORGANIZED HEALTH CARE EDUCATION/TRAINING PROGRAM

## 2022-01-01 PROCEDURE — 83735 ASSAY OF MAGNESIUM: CPT | Performed by: EMERGENCY MEDICINE

## 2022-01-01 PROCEDURE — 36415 COLL VENOUS BLD VENIPUNCTURE: CPT | Performed by: NURSE PRACTITIONER

## 2022-01-01 PROCEDURE — P9047 ALBUMIN (HUMAN), 25%, 50ML: HCPCS | Mod: JG | Performed by: STUDENT IN AN ORGANIZED HEALTH CARE EDUCATION/TRAINING PROGRAM

## 2022-01-01 PROCEDURE — 25500020 PHARM REV CODE 255

## 2022-01-01 PROCEDURE — C1751 CATH, INF, PER/CENT/MIDLINE: HCPCS

## 2022-01-01 PROCEDURE — 86850 RBC ANTIBODY SCREEN: CPT | Performed by: NURSE PRACTITIONER

## 2022-01-01 PROCEDURE — 84156 ASSAY OF PROTEIN URINE: CPT | Performed by: INTERNAL MEDICINE

## 2022-01-01 PROCEDURE — 83930 ASSAY OF BLOOD OSMOLALITY: CPT | Performed by: STUDENT IN AN ORGANIZED HEALTH CARE EDUCATION/TRAINING PROGRAM

## 2022-01-01 PROCEDURE — 3008F PR BODY MASS INDEX (BMI) DOCUMENTED: ICD-10-PCS | Mod: CPTII,,, | Performed by: INTERNAL MEDICINE

## 2022-01-01 PROCEDURE — 99214 OFFICE O/P EST MOD 30 MIN: CPT | Mod: S$PBB,,, | Performed by: NURSE PRACTITIONER

## 2022-01-01 PROCEDURE — 3074F PR MOST RECENT SYSTOLIC BLOOD PRESSURE < 130 MM HG: ICD-10-PCS | Mod: CPTII,,, | Performed by: NURSE PRACTITIONER

## 2022-01-01 PROCEDURE — 25000242 PHARM REV CODE 250 ALT 637 W/ HCPCS: Performed by: EMERGENCY MEDICINE

## 2022-01-01 PROCEDURE — 81001 URINALYSIS AUTO W/SCOPE: CPT | Performed by: STUDENT IN AN ORGANIZED HEALTH CARE EDUCATION/TRAINING PROGRAM

## 2022-01-01 PROCEDURE — 82553 CREATINE MB FRACTION: CPT | Performed by: EMERGENCY MEDICINE

## 2022-01-01 PROCEDURE — 3074F PR MOST RECENT SYSTOLIC BLOOD PRESSURE < 130 MM HG: ICD-10-PCS | Mod: CPTII,,, | Performed by: INTERNAL MEDICINE

## 2022-01-01 PROCEDURE — 3078F PR MOST RECENT DIASTOLIC BLOOD PRESSURE < 80 MM HG: ICD-10-PCS | Mod: CPTII,,, | Performed by: INTERNAL MEDICINE

## 2022-01-01 PROCEDURE — 87641 MR-STAPH DNA AMP PROBE: CPT | Performed by: INTERNAL MEDICINE

## 2022-01-01 PROCEDURE — 36415 COLL VENOUS BLD VENIPUNCTURE: CPT

## 2022-01-01 PROCEDURE — 71000039 HC RECOVERY, EACH ADD'L HOUR: Performed by: SURGERY

## 2022-01-01 PROCEDURE — 84443 ASSAY THYROID STIM HORMONE: CPT | Performed by: INTERNAL MEDICINE

## 2022-01-01 PROCEDURE — 3288F FALL RISK ASSESSMENT DOCD: CPT | Mod: CPTII,,, | Performed by: NURSE PRACTITIONER

## 2022-01-01 PROCEDURE — A4216 STERILE WATER/SALINE, 10 ML: HCPCS | Performed by: STUDENT IN AN ORGANIZED HEALTH CARE EDUCATION/TRAINING PROGRAM

## 2022-01-01 PROCEDURE — 25000003 PHARM REV CODE 250: Performed by: SURGERY

## 2022-01-01 PROCEDURE — 82436 ASSAY OF URINE CHLORIDE: CPT | Performed by: STUDENT IN AN ORGANIZED HEALTH CARE EDUCATION/TRAINING PROGRAM

## 2022-01-01 PROCEDURE — 3074F SYST BP LT 130 MM HG: CPT | Mod: CPTII,,, | Performed by: INTERNAL MEDICINE

## 2022-01-01 PROCEDURE — 3008F BODY MASS INDEX DOCD: CPT | Mod: CPTII,,, | Performed by: NURSE PRACTITIONER

## 2022-01-01 PROCEDURE — 83935 ASSAY OF URINE OSMOLALITY: CPT | Performed by: STUDENT IN AN ORGANIZED HEALTH CARE EDUCATION/TRAINING PROGRAM

## 2022-01-01 PROCEDURE — 97530 THERAPEUTIC ACTIVITIES: CPT

## 2022-01-01 PROCEDURE — 63600175 PHARM REV CODE 636 W HCPCS: Performed by: NURSE PRACTITIONER

## 2022-01-01 PROCEDURE — 99214 OFFICE O/P EST MOD 30 MIN: CPT | Mod: PBBFAC,25 | Performed by: NURSE PRACTITIONER

## 2022-01-01 PROCEDURE — 94002 VENT MGMT INPAT INIT DAY: CPT

## 2022-01-01 PROCEDURE — 87070 CULTURE OTHR SPECIMN AEROBIC: CPT | Performed by: INTERNAL MEDICINE

## 2022-01-01 PROCEDURE — 87449 NOS EACH ORGANISM AG IA: CPT | Performed by: INTERNAL MEDICINE

## 2022-01-01 PROCEDURE — 83516 IMMUNOASSAY NONANTIBODY: CPT | Mod: 59 | Performed by: NURSE PRACTITIONER

## 2022-01-01 PROCEDURE — 84134 ASSAY OF PREALBUMIN: CPT | Performed by: EMERGENCY MEDICINE

## 2022-01-01 PROCEDURE — 36430 TRANSFUSION BLD/BLD COMPNT: CPT

## 2022-01-01 PROCEDURE — C1894 INTRO/SHEATH, NON-LASER: HCPCS | Performed by: SURGERY

## 2022-01-01 PROCEDURE — 99291 CRITICAL CARE FIRST HOUR: CPT | Mod: GC,,, | Performed by: INTERNAL MEDICINE

## 2022-01-01 PROCEDURE — 1159F PR MEDICATION LIST DOCUMENTED IN MEDICAL RECORD: ICD-10-PCS | Mod: CPTII,,, | Performed by: INTERNAL MEDICINE

## 2022-01-01 PROCEDURE — 83605 ASSAY OF LACTIC ACID: CPT | Performed by: EMERGENCY MEDICINE

## 2022-01-01 PROCEDURE — 36600 WITHDRAWAL OF ARTERIAL BLOOD: CPT

## 2022-01-01 PROCEDURE — 97161 PT EVAL LOW COMPLEX 20 MIN: CPT

## 2022-01-01 PROCEDURE — 82728 ASSAY OF FERRITIN: CPT | Performed by: NURSE PRACTITIONER

## 2022-01-01 PROCEDURE — 1126F PR PAIN SEVERITY QUANTIFIED, NO PAIN PRESENT: ICD-10-PCS | Mod: CPTII,,, | Performed by: NURSE PRACTITIONER

## 2022-01-01 PROCEDURE — 85027 COMPLETE CBC AUTOMATED: CPT | Performed by: INTERNAL MEDICINE

## 2022-01-01 PROCEDURE — A9577 INJ MULTIHANCE: HCPCS | Performed by: RADIOLOGY

## 2022-01-01 PROCEDURE — 63600175 PHARM REV CODE 636 W HCPCS: Performed by: ANESTHESIOLOGY

## 2022-01-01 PROCEDURE — 99215 OFFICE O/P EST HI 40 MIN: CPT | Mod: PBBFAC

## 2022-01-01 PROCEDURE — 71000033 HC RECOVERY, INTIAL HOUR: Performed by: SURGERY

## 2022-01-01 PROCEDURE — 96372 THER/PROPH/DIAG INJ SC/IM: CPT | Mod: 59

## 2022-01-01 PROCEDURE — 85025 COMPLETE CBC W/AUTO DIFF WBC: CPT | Performed by: NURSE PRACTITIONER

## 2022-01-01 PROCEDURE — 1160F PR REVIEW ALL MEDS BY PRESCRIBER/CLIN PHARMACIST DOCUMENTED: ICD-10-PCS | Mod: CPTII,,, | Performed by: NURSE PRACTITIONER

## 2022-01-01 PROCEDURE — 87635 SARS-COV-2 COVID-19 AMP PRB: CPT | Performed by: STUDENT IN AN ORGANIZED HEALTH CARE EDUCATION/TRAINING PROGRAM

## 2022-01-01 PROCEDURE — 83605 ASSAY OF LACTIC ACID: CPT | Performed by: STUDENT IN AN ORGANIZED HEALTH CARE EDUCATION/TRAINING PROGRAM

## 2022-01-01 PROCEDURE — C1788 PORT, INDWELLING, IMP: HCPCS | Performed by: SURGERY

## 2022-01-01 PROCEDURE — 1160F PR REVIEW ALL MEDS BY PRESCRIBER/CLIN PHARMACIST DOCUMENTED: ICD-10-PCS | Mod: CPTII,,, | Performed by: INTERNAL MEDICINE

## 2022-01-01 PROCEDURE — 99291 PR CRITICAL CARE, E/M 30-74 MINUTES: ICD-10-PCS | Mod: ,,, | Performed by: INTERNAL MEDICINE

## 2022-01-01 PROCEDURE — 1125F AMNT PAIN NOTED PAIN PRSNT: CPT | Mod: CPTII,,, | Performed by: INTERNAL MEDICINE

## 2022-01-01 PROCEDURE — 80053 COMPREHEN METABOLIC PANEL: CPT | Performed by: INTERNAL MEDICINE

## 2022-01-01 PROCEDURE — 3078F PR MOST RECENT DIASTOLIC BLOOD PRESSURE < 80 MM HG: ICD-10-PCS | Mod: CPTII,,, | Performed by: NURSE PRACTITIONER

## 2022-01-01 PROCEDURE — 86140 C-REACTIVE PROTEIN: CPT | Performed by: INTERNAL MEDICINE

## 2022-01-01 PROCEDURE — 99291 PR CRITICAL CARE, E/M 30-74 MINUTES: ICD-10-PCS | Mod: ,,, | Performed by: HOSPITALIST

## 2022-01-01 PROCEDURE — 80053 COMPREHEN METABOLIC PANEL: CPT | Performed by: EMERGENCY MEDICINE

## 2022-01-01 PROCEDURE — 25000242 PHARM REV CODE 250 ALT 637 W/ HCPCS

## 2022-01-01 PROCEDURE — 87340 HEPATITIS B SURFACE AG IA: CPT | Performed by: NURSE PRACTITIONER

## 2022-01-01 PROCEDURE — 1126F AMNT PAIN NOTED NONE PRSNT: CPT | Mod: CPTII,,, | Performed by: INTERNAL MEDICINE

## 2022-01-01 PROCEDURE — 84484 ASSAY OF TROPONIN QUANT: CPT | Performed by: STUDENT IN AN ORGANIZED HEALTH CARE EDUCATION/TRAINING PROGRAM

## 2022-01-01 PROCEDURE — 99213 OFFICE O/P EST LOW 20 MIN: CPT | Mod: PBBFAC | Performed by: INTERNAL MEDICINE

## 2022-01-01 PROCEDURE — 82103 ALPHA-1-ANTITRYPSIN TOTAL: CPT | Performed by: NURSE PRACTITIONER

## 2022-01-01 PROCEDURE — 84550 ASSAY OF BLOOD/URIC ACID: CPT | Performed by: STUDENT IN AN ORGANIZED HEALTH CARE EDUCATION/TRAINING PROGRAM

## 2022-01-01 PROCEDURE — 87040 BLOOD CULTURE FOR BACTERIA: CPT | Performed by: EMERGENCY MEDICINE

## 2022-01-01 PROCEDURE — 3288F PR FALLS RISK ASSESSMENT DOCUMENTED: ICD-10-PCS | Mod: CPTII,,, | Performed by: INTERNAL MEDICINE

## 2022-01-01 PROCEDURE — 63600175 PHARM REV CODE 636 W HCPCS: Mod: JG | Performed by: STUDENT IN AN ORGANIZED HEALTH CARE EDUCATION/TRAINING PROGRAM

## 2022-01-01 PROCEDURE — 1125F PR PAIN SEVERITY QUANTIFIED, PAIN PRESENT: ICD-10-PCS | Mod: CPTII,,, | Performed by: NURSE PRACTITIONER

## 2022-01-01 PROCEDURE — 82570 ASSAY OF URINE CREATININE: CPT | Performed by: STUDENT IN AN ORGANIZED HEALTH CARE EDUCATION/TRAINING PROGRAM

## 2022-01-01 PROCEDURE — 84156 ASSAY OF PROTEIN URINE: CPT | Performed by: STUDENT IN AN ORGANIZED HEALTH CARE EDUCATION/TRAINING PROGRAM

## 2022-01-01 PROCEDURE — 87206 SMEAR FLUORESCENT/ACID STAI: CPT | Performed by: INTERNAL MEDICINE

## 2022-01-01 PROCEDURE — 30000890 HC MISC. SEND OUT TEST: Performed by: INTERNAL MEDICINE

## 2022-01-01 PROCEDURE — 71046 X-RAY EXAM CHEST 2 VIEWS: CPT | Mod: TC

## 2022-01-01 PROCEDURE — 85610 PROTHROMBIN TIME: CPT | Performed by: STUDENT IN AN ORGANIZED HEALTH CARE EDUCATION/TRAINING PROGRAM

## 2022-01-01 PROCEDURE — 76705 ECHO EXAM OF ABDOMEN: CPT | Mod: TC

## 2022-01-01 PROCEDURE — 99291 PR CRITICAL CARE, E/M 30-74 MINUTES: ICD-10-PCS | Mod: GC,,, | Performed by: INTERNAL MEDICINE

## 2022-01-01 PROCEDURE — 84439 ASSAY OF FREE THYROXINE: CPT | Performed by: INTERNAL MEDICINE

## 2022-01-01 PROCEDURE — 99291 CRITICAL CARE FIRST HOUR: CPT | Mod: ,,, | Performed by: HOSPITALIST

## 2022-01-01 PROCEDURE — 99285 EMERGENCY DEPT VISIT HI MDM: CPT | Mod: 25,27

## 2022-01-01 PROCEDURE — 87305 ASPERGILLUS AG IA: CPT | Performed by: INTERNAL MEDICINE

## 2022-01-01 PROCEDURE — 99285 EMERGENCY DEPT VISIT HI MDM: CPT | Mod: 25

## 2022-01-01 PROCEDURE — 99215 OFFICE O/P EST HI 40 MIN: CPT | Mod: PBBFAC | Performed by: NURSE PRACTITIONER

## 2022-01-01 PROCEDURE — 87040 BLOOD CULTURE FOR BACTERIA: CPT | Performed by: INTERNAL MEDICINE

## 2022-01-01 PROCEDURE — 3008F BODY MASS INDEX DOCD: CPT | Mod: CPTII,,, | Performed by: INTERNAL MEDICINE

## 2022-01-01 PROCEDURE — 1125F AMNT PAIN NOTED PAIN PRSNT: CPT | Mod: CPTII,,, | Performed by: NURSE PRACTITIONER

## 2022-01-01 PROCEDURE — 99214 PR OFFICE/OUTPT VISIT, EST, LEVL IV, 30-39 MIN: ICD-10-PCS | Mod: S$PBB,,, | Performed by: INTERNAL MEDICINE

## 2022-01-01 PROCEDURE — 76937 US GUIDE VASCULAR ACCESS: CPT

## 2022-01-01 PROCEDURE — 25500020 PHARM REV CODE 255: Performed by: RADIOLOGY

## 2022-01-01 PROCEDURE — 36000706: Performed by: SURGERY

## 2022-01-01 PROCEDURE — 84145 PROCALCITONIN (PCT): CPT | Performed by: STUDENT IN AN ORGANIZED HEALTH CARE EDUCATION/TRAINING PROGRAM

## 2022-01-01 PROCEDURE — 83880 ASSAY OF NATRIURETIC PEPTIDE: CPT | Performed by: INTERNAL MEDICINE

## 2022-01-01 PROCEDURE — 99222 1ST HOSP IP/OBS MODERATE 55: CPT | Mod: ,,, | Performed by: INTERNAL MEDICINE

## 2022-01-01 PROCEDURE — 86225 DNA ANTIBODY NATIVE: CPT | Performed by: NURSE PRACTITIONER

## 2022-01-01 PROCEDURE — 86381 MITOCHONDRIAL ANTIBODY EACH: CPT | Performed by: NURSE PRACTITIONER

## 2022-01-01 PROCEDURE — 84439 ASSAY OF FREE THYROXINE: CPT

## 2022-01-01 PROCEDURE — 84100 ASSAY OF PHOSPHORUS: CPT | Performed by: EMERGENCY MEDICINE

## 2022-01-01 PROCEDURE — 82746 ASSAY OF FOLIC ACID SERUM: CPT | Performed by: STUDENT IN AN ORGANIZED HEALTH CARE EDUCATION/TRAINING PROGRAM

## 2022-01-01 PROCEDURE — 25500020 PHARM REV CODE 255: Performed by: STUDENT IN AN ORGANIZED HEALTH CARE EDUCATION/TRAINING PROGRAM

## 2022-01-01 PROCEDURE — 36415 COLL VENOUS BLD VENIPUNCTURE: CPT | Performed by: EMERGENCY MEDICINE

## 2022-01-01 PROCEDURE — 82550 ASSAY OF CK (CPK): CPT | Performed by: EMERGENCY MEDICINE

## 2022-01-01 PROCEDURE — 87186 SC STD MICRODIL/AGAR DIL: CPT | Performed by: EMERGENCY MEDICINE

## 2022-01-01 PROCEDURE — 87077 CULTURE AEROBIC IDENTIFY: CPT | Performed by: INTERNAL MEDICINE

## 2022-01-01 PROCEDURE — 25000242 PHARM REV CODE 250 ALT 637 W/ HCPCS: Performed by: ANESTHESIOLOGY

## 2022-01-01 PROCEDURE — 86335 IMMUNFIX E-PHORSIS/URINE/CSF: CPT | Performed by: INTERNAL MEDICINE

## 2022-01-01 PROCEDURE — 1111F PR DISCHARGE MEDS RECONCILED W/ CURRENT OUTPATIENT MED LIST: ICD-10-PCS | Mod: CPTII,,, | Performed by: NURSE PRACTITIONER

## 2022-01-01 PROCEDURE — 99291 CRITICAL CARE FIRST HOUR: CPT | Mod: ,,, | Performed by: INTERNAL MEDICINE

## 2022-01-01 PROCEDURE — 82728 ASSAY OF FERRITIN: CPT | Performed by: STUDENT IN AN ORGANIZED HEALTH CARE EDUCATION/TRAINING PROGRAM

## 2022-01-01 PROCEDURE — 70553 MRI BRAIN STEM W/O & W/DYE: CPT | Mod: TC

## 2022-01-01 PROCEDURE — 84484 ASSAY OF TROPONIN QUANT: CPT | Performed by: NURSE PRACTITIONER

## 2022-01-01 PROCEDURE — 84443 ASSAY THYROID STIM HORMONE: CPT | Performed by: STUDENT IN AN ORGANIZED HEALTH CARE EDUCATION/TRAINING PROGRAM

## 2022-01-01 PROCEDURE — 84443 ASSAY THYROID STIM HORMONE: CPT | Performed by: EMERGENCY MEDICINE

## 2022-01-01 PROCEDURE — 99213 OFFICE O/P EST LOW 20 MIN: CPT | Mod: PBBFAC,25 | Performed by: NURSE PRACTITIONER

## 2022-01-01 PROCEDURE — 78815 PET IMAGE W/CT SKULL-THIGH: CPT | Mod: TC

## 2022-01-01 PROCEDURE — 99233 PR SUBSEQUENT HOSPITAL CARE,LEVL III: ICD-10-PCS | Mod: ,,, | Performed by: INTERNAL MEDICINE

## 2022-01-01 PROCEDURE — 1125F PR PAIN SEVERITY QUANTIFIED, PAIN PRESENT: ICD-10-PCS | Mod: CPTII,,, | Performed by: INTERNAL MEDICINE

## 2022-01-01 PROCEDURE — 99233 SBSQ HOSP IP/OBS HIGH 50: CPT | Mod: ,,, | Performed by: INTERNAL MEDICINE

## 2022-01-01 PROCEDURE — 80202 ASSAY OF VANCOMYCIN: CPT | Performed by: STUDENT IN AN ORGANIZED HEALTH CARE EDUCATION/TRAINING PROGRAM

## 2022-01-01 PROCEDURE — 86901 BLOOD TYPING SEROLOGIC RH(D): CPT | Performed by: INTERNAL MEDICINE

## 2022-01-01 PROCEDURE — 82270 OCCULT BLOOD FECES: CPT | Performed by: NURSE PRACTITIONER

## 2022-01-01 PROCEDURE — 74177 CT ABD & PELVIS W/CONTRAST: CPT | Mod: TC

## 2022-01-01 PROCEDURE — 99222 PR INITIAL HOSPITAL CARE,LEVL II: ICD-10-PCS | Mod: ,,, | Performed by: INTERNAL MEDICINE

## 2022-01-01 PROCEDURE — 99223 PR INITIAL HOSPITAL CARE,LEVL III: ICD-10-PCS | Mod: AI,,, | Performed by: INTERNAL MEDICINE

## 2022-01-01 PROCEDURE — C1725 CATH, TRANSLUMIN NON-LASER: HCPCS | Performed by: SURGERY

## 2022-01-01 PROCEDURE — 83880 ASSAY OF NATRIURETIC PEPTIDE: CPT | Performed by: EMERGENCY MEDICINE

## 2022-01-01 PROCEDURE — 93010 EKG 12-LEAD: ICD-10-PCS | Mod: ,,, | Performed by: INTERNAL MEDICINE

## 2022-01-01 PROCEDURE — 37000009 HC ANESTHESIA EA ADD 15 MINS: Performed by: SURGERY

## 2022-01-01 PROCEDURE — 86850 RBC ANTIBODY SCREEN: CPT | Performed by: STUDENT IN AN ORGANIZED HEALTH CARE EDUCATION/TRAINING PROGRAM

## 2022-01-01 PROCEDURE — 82390 ASSAY OF CERULOPLASMIN: CPT | Performed by: NURSE PRACTITIONER

## 2022-01-01 PROCEDURE — 63600175 PHARM REV CODE 636 W HCPCS: Performed by: SURGERY

## 2022-01-01 PROCEDURE — 87070 CULTURE OTHR SPECIMN AEROBIC: CPT | Performed by: STUDENT IN AN ORGANIZED HEALTH CARE EDUCATION/TRAINING PROGRAM

## 2022-01-01 PROCEDURE — 1126F PR PAIN SEVERITY QUANTIFIED, NO PAIN PRESENT: ICD-10-PCS | Mod: CPTII,,, | Performed by: INTERNAL MEDICINE

## 2022-01-01 PROCEDURE — 27201423 OPTIME MED/SURG SUP & DEVICES STERILE SUPPLY: Performed by: SURGERY

## 2022-01-01 PROCEDURE — 1160F RVW MEDS BY RX/DR IN RCRD: CPT | Mod: CPTII,,, | Performed by: INTERNAL MEDICINE

## 2022-01-01 PROCEDURE — 81001 URINALYSIS AUTO W/SCOPE: CPT | Performed by: EMERGENCY MEDICINE

## 2022-01-01 PROCEDURE — 83540 ASSAY OF IRON: CPT | Performed by: STUDENT IN AN ORGANIZED HEALTH CARE EDUCATION/TRAINING PROGRAM

## 2022-01-01 PROCEDURE — 87205 SMEAR GRAM STAIN: CPT | Performed by: STUDENT IN AN ORGANIZED HEALTH CARE EDUCATION/TRAINING PROGRAM

## 2022-01-01 PROCEDURE — 87102 FUNGUS ISOLATION CULTURE: CPT | Performed by: STUDENT IN AN ORGANIZED HEALTH CARE EDUCATION/TRAINING PROGRAM

## 2022-01-01 PROCEDURE — 87040 BLOOD CULTURE FOR BACTERIA: CPT | Mod: 59 | Performed by: EMERGENCY MEDICINE

## 2022-01-01 PROCEDURE — 36620 INSERTION CATHETER ARTERY: CPT

## 2022-01-01 PROCEDURE — 96367 TX/PROPH/DG ADDL SEQ IV INF: CPT

## 2022-01-01 PROCEDURE — A4216 STERILE WATER/SALINE, 10 ML: HCPCS | Performed by: INTERNAL MEDICINE

## 2022-01-01 PROCEDURE — 86140 C-REACTIVE PROTEIN: CPT | Performed by: EMERGENCY MEDICINE

## 2022-01-01 PROCEDURE — 1111F DSCHRG MED/CURRENT MED MERGE: CPT | Mod: CPTII,,, | Performed by: NURSE PRACTITIONER

## 2022-01-01 PROCEDURE — 86364 TISS TRNSGLTMNASE EA IG CLAS: CPT | Performed by: NURSE PRACTITIONER

## 2022-01-01 PROCEDURE — 1159F MED LIST DOCD IN RCRD: CPT | Mod: CPTII,,, | Performed by: INTERNAL MEDICINE

## 2022-01-01 PROCEDURE — 25000003 PHARM REV CODE 250: Performed by: NURSE PRACTITIONER

## 2022-01-01 PROCEDURE — 84100 ASSAY OF PHOSPHORUS: CPT

## 2022-01-01 PROCEDURE — 99204 OFFICE O/P NEW MOD 45 MIN: CPT | Mod: S$PBB,,, | Performed by: NURSE PRACTITIONER

## 2022-01-01 PROCEDURE — 99214 OFFICE O/P EST MOD 30 MIN: CPT | Mod: PBBFAC,27

## 2022-01-01 PROCEDURE — 0241U COVID/FLU A&B PCR: CPT | Performed by: NURSE PRACTITIONER

## 2022-01-01 PROCEDURE — 99204 PR OFFICE/OUTPT VISIT, NEW, LEVL IV, 45-59 MIN: ICD-10-PCS | Mod: S$PBB,,, | Performed by: NURSE PRACTITIONER

## 2022-01-01 PROCEDURE — 71260 CT THORAX DX C+: CPT | Mod: TC

## 2022-01-01 PROCEDURE — 82607 VITAMIN B-12: CPT | Performed by: STUDENT IN AN ORGANIZED HEALTH CARE EDUCATION/TRAINING PROGRAM

## 2022-01-01 PROCEDURE — 25000003 PHARM REV CODE 250: Performed by: NURSE ANESTHETIST, CERTIFIED REGISTERED

## 2022-01-01 PROCEDURE — 99213 OFFICE O/P EST LOW 20 MIN: CPT | Mod: PBBFAC | Performed by: NURSE PRACTITIONER

## 2022-01-01 PROCEDURE — 99900031 HC PATIENT EDUCATION (STAT)

## 2022-01-01 PROCEDURE — 87102 FUNGUS ISOLATION CULTURE: CPT | Performed by: INTERNAL MEDICINE

## 2022-01-01 PROCEDURE — 80053 COMPREHEN METABOLIC PANEL: CPT | Performed by: NURSE PRACTITIONER

## 2022-01-01 PROCEDURE — 36000707: Performed by: SURGERY

## 2022-01-01 PROCEDURE — 96409 CHEMO IV PUSH SNGL DRUG: CPT

## 2022-01-01 DEVICE — PORT POWERPORT CLEARVUE 8F: Type: IMPLANTABLE DEVICE | Site: CHEST | Status: FUNCTIONAL

## 2022-01-01 RX ORDER — LANCETS 33 GAUGE
EACH MISCELLANEOUS
COMMUNITY
Start: 2022-01-01

## 2022-01-01 RX ORDER — EPINEPHRINE 0.3 MG/.3ML
0.3 INJECTION SUBCUTANEOUS ONCE AS NEEDED
Status: CANCELLED | OUTPATIENT
Start: 2022-01-01

## 2022-01-01 RX ORDER — MUPIROCIN 20 MG/G
OINTMENT TOPICAL 2 TIMES DAILY
Status: DISCONTINUED | OUTPATIENT
Start: 2022-01-01 | End: 2022-01-01

## 2022-01-01 RX ORDER — POTASSIUM CHLORIDE 7.45 MG/ML
10 INJECTION INTRAVENOUS
Status: COMPLETED | OUTPATIENT
Start: 2022-01-01 | End: 2022-01-01

## 2022-01-01 RX ORDER — DEXAMETHASONE 4 MG/1
4 TABLET ORAL
COMMUNITY
Start: 2021-09-09 | End: 2022-01-01

## 2022-01-01 RX ORDER — CALCIUM GLUCONATE 20 MG/ML
2 INJECTION, SOLUTION INTRAVENOUS
Status: DISCONTINUED | OUTPATIENT
Start: 2022-01-01 | End: 2022-01-01 | Stop reason: HOSPADM

## 2022-01-01 RX ORDER — LORATADINE 10 MG/1
10 TABLET ORAL DAILY
COMMUNITY

## 2022-01-01 RX ORDER — KETAMINE HCL IN 0.9 % NACL 50 MG/5 ML
SYRINGE (ML) INTRAVENOUS
Status: DISCONTINUED | OUTPATIENT
Start: 2022-01-01 | End: 2022-01-01

## 2022-01-01 RX ORDER — SODIUM CHLORIDE 0.9 % (FLUSH) 0.9 %
10 SYRINGE (ML) INJECTION
Status: DISCONTINUED | OUTPATIENT
Start: 2022-01-01 | End: 2022-01-01 | Stop reason: HOSPADM

## 2022-01-01 RX ORDER — PROPOFOL 10 MG/ML
0-50 INJECTION, EMULSION INTRAVENOUS CONTINUOUS
Status: DISCONTINUED | OUTPATIENT
Start: 2022-01-01 | End: 2022-01-01

## 2022-01-01 RX ORDER — PANTOPRAZOLE SODIUM 40 MG/10ML
40 INJECTION, POWDER, LYOPHILIZED, FOR SOLUTION INTRAVENOUS EVERY 24 HOURS
Status: DISCONTINUED | OUTPATIENT
Start: 2022-01-01 | End: 2022-01-01

## 2022-01-01 RX ORDER — DIGOXIN 0.25 MG/ML
125 INJECTION INTRAMUSCULAR; INTRAVENOUS ONCE
Status: COMPLETED | OUTPATIENT
Start: 2022-01-01 | End: 2022-01-01

## 2022-01-01 RX ORDER — PHENYLEPHRINE HYDROCHLORIDE 10 MG/ML
INJECTION INTRAVENOUS
Status: DISCONTINUED | OUTPATIENT
Start: 2022-01-01 | End: 2022-01-01

## 2022-01-01 RX ORDER — NAPROXEN SODIUM 220 MG/1
81 TABLET, FILM COATED ORAL DAILY
Status: DISCONTINUED | OUTPATIENT
Start: 2022-01-01 | End: 2022-01-01 | Stop reason: HOSPADM

## 2022-01-01 RX ORDER — DOXYCYCLINE HYCLATE 100 MG
100 TABLET ORAL EVERY 12 HOURS
Qty: 14 TABLET | Refills: 0 | Status: SHIPPED | OUTPATIENT
Start: 2022-01-01 | End: 2022-01-01

## 2022-01-01 RX ORDER — PANTOPRAZOLE SODIUM 40 MG/10ML
80 INJECTION, POWDER, LYOPHILIZED, FOR SOLUTION INTRAVENOUS ONCE
Status: COMPLETED | OUTPATIENT
Start: 2022-01-01 | End: 2022-01-01

## 2022-01-01 RX ORDER — DILTIAZEM HYDROCHLORIDE 360 MG/1
360 CAPSULE, EXTENDED RELEASE ORAL DAILY
Qty: 30 CAPSULE | Refills: 1 | Status: SHIPPED | OUTPATIENT
Start: 2022-01-01 | End: 2022-01-01

## 2022-01-01 RX ORDER — DIPHENHYDRAMINE HYDROCHLORIDE 50 MG/ML
50 INJECTION INTRAMUSCULAR; INTRAVENOUS ONCE AS NEEDED
Status: DISCONTINUED | OUTPATIENT
Start: 2022-01-01 | End: 2022-01-01 | Stop reason: HOSPADM

## 2022-01-01 RX ORDER — POTASSIUM CHLORIDE 20 MEQ/1
40 TABLET, EXTENDED RELEASE ORAL 2 TIMES DAILY
Status: COMPLETED | OUTPATIENT
Start: 2022-01-01 | End: 2022-01-01

## 2022-01-01 RX ORDER — LEVOFLOXACIN 750 MG/1
750 TABLET ORAL DAILY
Qty: 7 TABLET | Refills: 0 | Status: ON HOLD | OUTPATIENT
Start: 2022-01-01 | End: 2022-01-01

## 2022-01-01 RX ORDER — SODIUM CHLORIDE, SODIUM LACTATE, POTASSIUM CHLORIDE, CALCIUM CHLORIDE 600; 310; 30; 20 MG/100ML; MG/100ML; MG/100ML; MG/100ML
2000 INJECTION, SOLUTION INTRAVENOUS CONTINUOUS
Status: ACTIVE | OUTPATIENT
Start: 2022-01-01 | End: 2022-01-01

## 2022-01-01 RX ORDER — EPINEPHRINE 0.3 MG/.3ML
0.3 INJECTION SUBCUTANEOUS ONCE AS NEEDED
Status: DISCONTINUED | OUTPATIENT
Start: 2022-01-01 | End: 2022-01-01 | Stop reason: HOSPADM

## 2022-01-01 RX ORDER — IPRATROPIUM BROMIDE AND ALBUTEROL SULFATE 2.5; .5 MG/3ML; MG/3ML
3 SOLUTION RESPIRATORY (INHALATION) EVERY 4 HOURS PRN
Status: DISCONTINUED | OUTPATIENT
Start: 2022-01-01 | End: 2022-01-01 | Stop reason: SINTOL

## 2022-01-01 RX ORDER — CYANOCOBALAMIN 1000 UG/ML
1000 INJECTION, SOLUTION INTRAMUSCULAR; SUBCUTANEOUS
Status: CANCELLED | OUTPATIENT
Start: 2022-01-01

## 2022-01-01 RX ORDER — DEXAMETHASONE SODIUM PHOSPHATE 4 MG/ML
INJECTION, SOLUTION INTRA-ARTICULAR; INTRALESIONAL; INTRAMUSCULAR; INTRAVENOUS; SOFT TISSUE
Status: DISCONTINUED | OUTPATIENT
Start: 2022-01-01 | End: 2022-01-01

## 2022-01-01 RX ORDER — IPRATROPIUM BROMIDE 17 UG/1
AEROSOL, METERED RESPIRATORY (INHALATION)
COMMUNITY
Start: 2022-01-01

## 2022-01-01 RX ORDER — MAGNESIUM SULFATE 1 G/100ML
1 INJECTION INTRAVENOUS ONCE
Status: COMPLETED | OUTPATIENT
Start: 2022-01-01 | End: 2022-01-01

## 2022-01-01 RX ORDER — BENZONATATE 100 MG/1
200 CAPSULE ORAL ONCE
Status: DISCONTINUED | OUTPATIENT
Start: 2022-01-01 | End: 2022-01-01

## 2022-01-01 RX ORDER — HEPARIN 100 UNIT/ML
500 SYRINGE INTRAVENOUS
Status: CANCELLED | OUTPATIENT
Start: 2022-01-01

## 2022-01-01 RX ORDER — POTASSIUM CHLORIDE 14.9 MG/ML
60 INJECTION INTRAVENOUS
Status: DISCONTINUED | OUTPATIENT
Start: 2022-01-01 | End: 2022-01-01

## 2022-01-01 RX ORDER — ATROPINE SULFATE 10 MG/ML
2 SOLUTION/ DROPS OPHTHALMIC EVERY 4 HOURS PRN
Status: DISCONTINUED | OUTPATIENT
Start: 2022-01-01 | End: 2022-01-01 | Stop reason: HOSPADM

## 2022-01-01 RX ORDER — METHYLPREDNISOLONE 4 MG/1
TABLET ORAL
Qty: 21 EACH | Refills: 0 | Status: SHIPPED | OUTPATIENT
Start: 2022-01-01 | End: 2022-01-01

## 2022-01-01 RX ORDER — DEXMEDETOMIDINE HYDROCHLORIDE 4 UG/ML
INJECTION, SOLUTION INTRAVENOUS
Status: DISPENSED
Start: 2022-01-01 | End: 2022-01-01

## 2022-01-01 RX ORDER — DEXTROSE MONOHYDRATE 100 MG/ML
INJECTION, SOLUTION INTRAVENOUS CONTINUOUS PRN
Status: DISCONTINUED | OUTPATIENT
Start: 2022-01-01 | End: 2022-01-01 | Stop reason: HOSPADM

## 2022-01-01 RX ORDER — LORAZEPAM 2 MG/ML
0.5 INJECTION INTRAMUSCULAR EVERY 30 MIN PRN
Status: DISCONTINUED | OUTPATIENT
Start: 2022-01-01 | End: 2022-01-01 | Stop reason: HOSPADM

## 2022-01-01 RX ORDER — IBUPROFEN 200 MG
16 TABLET ORAL
Status: DISCONTINUED | OUTPATIENT
Start: 2022-01-01 | End: 2022-01-01

## 2022-01-01 RX ORDER — NAPROXEN SODIUM 220 MG/1
81 TABLET, FILM COATED ORAL DAILY
Refills: 0
Start: 2022-01-01 | End: 2022-01-01

## 2022-01-01 RX ORDER — HEPARIN 100 UNIT/ML
500 SYRINGE INTRAVENOUS
Status: DISCONTINUED | OUTPATIENT
Start: 2022-01-01 | End: 2022-01-01 | Stop reason: HOSPADM

## 2022-01-01 RX ORDER — IPRATROPIUM BROMIDE AND ALBUTEROL SULFATE 2.5; .5 MG/3ML; MG/3ML
3 SOLUTION RESPIRATORY (INHALATION) EVERY 4 HOURS
Status: DISCONTINUED | OUTPATIENT
Start: 2022-01-01 | End: 2022-01-01

## 2022-01-01 RX ORDER — LIDOCAINE HCL/EPINEPHRINE/PF 2%-1:200K
VIAL (ML) INJECTION
Status: DISCONTINUED | OUTPATIENT
Start: 2022-01-01 | End: 2022-01-01 | Stop reason: HOSPADM

## 2022-01-01 RX ORDER — DIPHENHYDRAMINE HYDROCHLORIDE 50 MG/ML
6.25 INJECTION INTRAMUSCULAR; INTRAVENOUS ONCE AS NEEDED
Status: DISCONTINUED | OUTPATIENT
Start: 2022-01-01 | End: 2022-01-01

## 2022-01-01 RX ORDER — MIRTAZAPINE 15 MG/1
15 TABLET, FILM COATED ORAL NIGHTLY
Status: DISCONTINUED | OUTPATIENT
Start: 2022-01-01 | End: 2022-01-01 | Stop reason: HOSPADM

## 2022-01-01 RX ORDER — IBUPROFEN 200 MG
16 TABLET ORAL
Status: DISCONTINUED | OUTPATIENT
Start: 2022-01-01 | End: 2022-01-01 | Stop reason: HOSPADM

## 2022-01-01 RX ORDER — DIPHENHYDRAMINE HYDROCHLORIDE 50 MG/ML
50 INJECTION INTRAMUSCULAR; INTRAVENOUS ONCE AS NEEDED
Status: CANCELLED | OUTPATIENT
Start: 2022-01-01

## 2022-01-01 RX ORDER — IPRATROPIUM BROMIDE AND ALBUTEROL SULFATE 2.5; .5 MG/3ML; MG/3ML
3 SOLUTION RESPIRATORY (INHALATION)
Status: COMPLETED | OUTPATIENT
Start: 2022-01-01 | End: 2022-01-01

## 2022-01-01 RX ORDER — METOPROLOL TARTRATE 1 MG/ML
5 INJECTION, SOLUTION INTRAVENOUS EVERY 5 MIN PRN
Status: COMPLETED | OUTPATIENT
Start: 2022-01-01 | End: 2022-01-01

## 2022-01-01 RX ORDER — INSULIN ASPART 100 [IU]/ML
0-10 INJECTION, SOLUTION INTRAVENOUS; SUBCUTANEOUS 3 TIMES DAILY
Status: DISCONTINUED | OUTPATIENT
Start: 2022-01-01 | End: 2022-01-01

## 2022-01-01 RX ORDER — OXYCODONE HYDROCHLORIDE 5 MG/1
5 TABLET ORAL EVERY 4 HOURS PRN
Status: DISCONTINUED | OUTPATIENT
Start: 2022-01-01 | End: 2022-01-01 | Stop reason: HOSPADM

## 2022-01-01 RX ORDER — DEXMEDETOMIDINE HYDROCHLORIDE 4 UG/ML
0-1.4 INJECTION, SOLUTION INTRAVENOUS CONTINUOUS
Status: DISCONTINUED | OUTPATIENT
Start: 2022-01-01 | End: 2022-01-01

## 2022-01-01 RX ORDER — POTASSIUM CHLORIDE 29.8 MG/ML
80 INJECTION INTRAVENOUS
Status: DISCONTINUED | OUTPATIENT
Start: 2022-01-01 | End: 2022-01-01

## 2022-01-01 RX ORDER — INSULIN ASPART 100 [IU]/ML
1-10 INJECTION, SOLUTION INTRAVENOUS; SUBCUTANEOUS
Status: DISCONTINUED | OUTPATIENT
Start: 2022-01-01 | End: 2022-01-01

## 2022-01-01 RX ORDER — FOLIC ACID 1 MG/1
1 TABLET ORAL DAILY
COMMUNITY

## 2022-01-01 RX ORDER — ONDANSETRON 2 MG/ML
4 INJECTION INTRAMUSCULAR; INTRAVENOUS EVERY 8 HOURS PRN
Status: DISCONTINUED | OUTPATIENT
Start: 2022-01-01 | End: 2022-01-01 | Stop reason: HOSPADM

## 2022-01-01 RX ORDER — FUROSEMIDE 10 MG/ML
10 INJECTION INTRAMUSCULAR; INTRAVENOUS ONCE
Status: COMPLETED | OUTPATIENT
Start: 2022-01-01 | End: 2022-01-01

## 2022-01-01 RX ORDER — METOCLOPRAMIDE HYDROCHLORIDE 5 MG/ML
10 INJECTION INTRAMUSCULAR; INTRAVENOUS ONCE AS NEEDED
Status: DISCONTINUED | OUTPATIENT
Start: 2022-01-01 | End: 2022-01-01

## 2022-01-01 RX ORDER — IPRATROPIUM BROMIDE AND ALBUTEROL SULFATE 2.5; .5 MG/3ML; MG/3ML
3 SOLUTION RESPIRATORY (INHALATION) EVERY 4 HOURS
Status: DISCONTINUED | OUTPATIENT
Start: 2022-01-01 | End: 2022-01-01 | Stop reason: SINTOL

## 2022-01-01 RX ORDER — FLUTICASONE PROPIONATE 50 MCG
SPRAY, SUSPENSION (ML) NASAL
Status: ON HOLD | COMMUNITY
Start: 2022-01-01 | End: 2022-01-01

## 2022-01-01 RX ORDER — LIDOCAINE HCL/EPINEPHRINE/PF 2%-1:200K
VIAL (ML) INJECTION
Status: DISCONTINUED
Start: 2022-01-01 | End: 2022-01-01 | Stop reason: HOSPADM

## 2022-01-01 RX ORDER — SODIUM BICARBONATE 1 MEQ/ML
100 SYRINGE (ML) INTRAVENOUS ONCE
Status: COMPLETED | OUTPATIENT
Start: 2022-01-01 | End: 2022-01-01

## 2022-01-01 RX ORDER — MAGNESIUM SULFATE HEPTAHYDRATE 40 MG/ML
4 INJECTION, SOLUTION INTRAVENOUS ONCE
Status: COMPLETED | OUTPATIENT
Start: 2022-01-01 | End: 2022-01-01

## 2022-01-01 RX ORDER — GUAIFENESIN/DEXTROMETHORPHAN 100-10MG/5
5 SYRUP ORAL ONCE
Status: COMPLETED | OUTPATIENT
Start: 2022-01-01 | End: 2022-01-01

## 2022-01-01 RX ORDER — METOPROLOL TARTRATE 1 MG/ML
5 INJECTION, SOLUTION INTRAVENOUS ONCE
Status: COMPLETED | OUTPATIENT
Start: 2022-01-01 | End: 2022-01-01

## 2022-01-01 RX ORDER — MONTELUKAST SODIUM 5 MG/1
5 TABLET, CHEWABLE ORAL DAILY
Status: DISCONTINUED | OUTPATIENT
Start: 2022-01-01 | End: 2022-01-01 | Stop reason: HOSPADM

## 2022-01-01 RX ORDER — METOPROLOL TARTRATE 25 MG/1
25 TABLET, FILM COATED ORAL 2 TIMES DAILY
Status: DISCONTINUED | OUTPATIENT
Start: 2022-01-01 | End: 2022-01-01

## 2022-01-01 RX ORDER — IPRATROPIUM BROMIDE AND ALBUTEROL SULFATE 2.5; .5 MG/3ML; MG/3ML
3 SOLUTION RESPIRATORY (INHALATION) ONCE
Status: COMPLETED | OUTPATIENT
Start: 2022-01-01 | End: 2022-01-01

## 2022-01-01 RX ORDER — ALPRAZOLAM 0.5 MG/1
0.5 TABLET ORAL 2 TIMES DAILY PRN
Status: DISCONTINUED | OUTPATIENT
Start: 2022-01-01 | End: 2022-01-01 | Stop reason: HOSPADM

## 2022-01-01 RX ORDER — HYDROCODONE BITARTRATE AND ACETAMINOPHEN 5; 325 MG/1; MG/1
1 TABLET ORAL EVERY 6 HOURS PRN
Status: DISCONTINUED | OUTPATIENT
Start: 2022-01-01 | End: 2022-01-01 | Stop reason: HOSPADM

## 2022-01-01 RX ORDER — SODIUM CHLORIDE, SODIUM LACTATE, POTASSIUM CHLORIDE, CALCIUM CHLORIDE 600; 310; 30; 20 MG/100ML; MG/100ML; MG/100ML; MG/100ML
INJECTION, SOLUTION INTRAVENOUS CONTINUOUS
Status: DISCONTINUED | OUTPATIENT
Start: 2022-01-01 | End: 2022-01-01

## 2022-01-01 RX ORDER — MIRTAZAPINE 15 MG/1
15 TABLET, FILM COATED ORAL NIGHTLY
Qty: 30 TABLET | Refills: 0 | Status: SHIPPED | OUTPATIENT
Start: 2022-01-01 | End: 2022-01-01

## 2022-01-01 RX ORDER — SODIUM CHLORIDE 0.9 % (FLUSH) 0.9 %
10 SYRINGE (ML) INJECTION
Status: CANCELLED | OUTPATIENT
Start: 2022-01-01

## 2022-01-01 RX ORDER — MIDAZOLAM HYDROCHLORIDE 1 MG/ML
1 INJECTION INTRAMUSCULAR; INTRAVENOUS ONCE AS NEEDED
Status: COMPLETED | OUTPATIENT
Start: 2022-01-01 | End: 2022-01-01

## 2022-01-01 RX ORDER — ALBUMIN HUMAN 250 G/1000ML
12.5 SOLUTION INTRAVENOUS ONCE
Status: COMPLETED | OUTPATIENT
Start: 2022-01-01 | End: 2022-01-01

## 2022-01-01 RX ORDER — HEPARIN 100 UNIT/ML
100 SYRINGE INTRAVENOUS
Status: DISCONTINUED | OUTPATIENT
Start: 2022-01-01 | End: 2022-01-01 | Stop reason: HOSPADM

## 2022-01-01 RX ORDER — GUAIFENESIN/DEXTROMETHORPHAN 100-10MG/5
5 SYRUP ORAL EVERY 6 HOURS PRN
Status: DISCONTINUED | OUTPATIENT
Start: 2022-01-01 | End: 2022-01-01

## 2022-01-01 RX ORDER — ONDANSETRON 2 MG/ML
4 INJECTION INTRAMUSCULAR; INTRAVENOUS
Status: ACTIVE | OUTPATIENT
Start: 2022-01-01 | End: 2022-01-01

## 2022-01-01 RX ORDER — HYDROCODONE BITARTRATE AND ACETAMINOPHEN 10; 325 MG/1; MG/1
1 TABLET ORAL EVERY 6 HOURS PRN
Qty: 30 TABLET | Refills: 0 | Status: SHIPPED | OUTPATIENT
Start: 2022-01-01 | End: 2022-01-01 | Stop reason: SDUPTHER

## 2022-01-01 RX ORDER — GLUCAGON 1 MG
1 KIT INJECTION
Status: DISCONTINUED | OUTPATIENT
Start: 2022-01-01 | End: 2022-01-01 | Stop reason: HOSPADM

## 2022-01-01 RX ORDER — GABAPENTIN 300 MG/1
300 CAPSULE ORAL 3 TIMES DAILY
Status: DISCONTINUED | OUTPATIENT
Start: 2022-01-01 | End: 2022-01-01

## 2022-01-01 RX ORDER — ACETAMINOPHEN 325 MG/1
650 TABLET ORAL EVERY 4 HOURS PRN
Status: DISCONTINUED | OUTPATIENT
Start: 2022-01-01 | End: 2022-01-01 | Stop reason: HOSPADM

## 2022-01-01 RX ORDER — SODIUM CHLORIDE 9 MG/ML
INJECTION, SOLUTION INTRAVENOUS CONTINUOUS
Status: CANCELLED | OUTPATIENT
Start: 2022-01-01

## 2022-01-01 RX ORDER — ROCURONIUM BROMIDE 10 MG/ML
INJECTION, SOLUTION INTRAVENOUS
Status: DISCONTINUED | OUTPATIENT
Start: 2022-01-01 | End: 2022-01-01

## 2022-01-01 RX ORDER — MAG HYDROX/ALUMINUM HYD/SIMETH 200-200-20
30 SUSPENSION, ORAL (FINAL DOSE FORM) ORAL 4 TIMES DAILY PRN
Status: DISCONTINUED | OUTPATIENT
Start: 2022-01-01 | End: 2022-01-01 | Stop reason: HOSPADM

## 2022-01-01 RX ORDER — FENTANYL CITRATE 50 UG/ML
INJECTION, SOLUTION INTRAMUSCULAR; INTRAVENOUS
Status: DISCONTINUED | OUTPATIENT
Start: 2022-01-01 | End: 2022-01-01

## 2022-01-01 RX ORDER — AZITHROMYCIN 250 MG/1
TABLET, FILM COATED ORAL
Qty: 6 TABLET | Refills: 0 | Status: CANCELLED | OUTPATIENT
Start: 2022-01-01 | End: 2022-01-01

## 2022-01-01 RX ORDER — NALOXONE HCL 0.4 MG/ML
0.02 VIAL (ML) INJECTION
Status: DISCONTINUED | OUTPATIENT
Start: 2022-01-01 | End: 2022-01-01 | Stop reason: HOSPADM

## 2022-01-01 RX ORDER — SODIUM,POTASSIUM PHOSPHATES 280-250MG
2 POWDER IN PACKET (EA) ORAL ONCE
Status: COMPLETED | OUTPATIENT
Start: 2022-01-01 | End: 2022-01-01

## 2022-01-01 RX ORDER — IPRATROPIUM BROMIDE AND ALBUTEROL SULFATE 2.5; .5 MG/3ML; MG/3ML
3 SOLUTION RESPIRATORY (INHALATION) EVERY 4 HOURS PRN
Status: DISCONTINUED | OUTPATIENT
Start: 2022-01-01 | End: 2022-01-01

## 2022-01-01 RX ORDER — POLYETHYLENE GLYCOL 3350 17 G/17G
17 POWDER, FOR SOLUTION ORAL DAILY
Status: DISCONTINUED | OUTPATIENT
Start: 2022-01-01 | End: 2022-01-01 | Stop reason: HOSPADM

## 2022-01-01 RX ORDER — BLOOD-GLUCOSE METER
EACH MISCELLANEOUS
COMMUNITY
Start: 2022-01-01

## 2022-01-01 RX ORDER — HYDROCODONE BITARTRATE AND ACETAMINOPHEN 10; 325 MG/1; MG/1
1 TABLET ORAL
Status: COMPLETED | OUTPATIENT
Start: 2022-01-01 | End: 2022-01-01

## 2022-01-01 RX ORDER — HYDROCODONE BITARTRATE AND ACETAMINOPHEN 10; 325 MG/1; MG/1
1 TABLET ORAL EVERY 4 HOURS PRN
Qty: 90 TABLET | Refills: 0 | Status: SHIPPED | OUTPATIENT
Start: 2022-01-01 | End: 2022-01-01 | Stop reason: SDUPTHER

## 2022-01-01 RX ORDER — ALPRAZOLAM 0.25 MG/1
0.5 TABLET ORAL ONCE
Status: COMPLETED | OUTPATIENT
Start: 2022-01-01 | End: 2022-01-01

## 2022-01-01 RX ORDER — METFORMIN HYDROCHLORIDE 500 MG/1
500 TABLET ORAL 2 TIMES DAILY
COMMUNITY
Start: 2022-01-01

## 2022-01-01 RX ORDER — VANCOMYCIN/0.9 % SOD CHLORIDE 1 G/100 ML
1000 PLASTIC BAG, INJECTION (ML) INTRAVENOUS
Status: DISCONTINUED | OUTPATIENT
Start: 2022-01-01 | End: 2022-01-01 | Stop reason: DRUGHIGH

## 2022-01-01 RX ORDER — DILTIAZEM HYDROCHLORIDE 120 MG/1
360 CAPSULE, COATED, EXTENDED RELEASE ORAL DAILY
Status: DISCONTINUED | OUTPATIENT
Start: 2022-01-01 | End: 2022-01-01

## 2022-01-01 RX ORDER — LORAZEPAM 1 MG/1
1 TABLET ORAL EVERY 30 MIN PRN
Status: DISCONTINUED | OUTPATIENT
Start: 2022-01-01 | End: 2022-01-01 | Stop reason: HOSPADM

## 2022-01-01 RX ORDER — IBUPROFEN 200 MG
24 TABLET ORAL
Status: DISCONTINUED | OUTPATIENT
Start: 2022-01-01 | End: 2022-01-01

## 2022-01-01 RX ORDER — CEFAZOLIN SODIUM 1 G/3ML
INJECTION, POWDER, FOR SOLUTION INTRAMUSCULAR; INTRAVENOUS
Status: DISCONTINUED | OUTPATIENT
Start: 2022-01-01 | End: 2022-01-01

## 2022-01-01 RX ORDER — SODIUM CHLORIDE 0.9 % (FLUSH) 0.9 %
10 SYRINGE (ML) INJECTION EVERY 12 HOURS PRN
Status: DISCONTINUED | OUTPATIENT
Start: 2022-01-01 | End: 2022-01-01 | Stop reason: HOSPADM

## 2022-01-01 RX ORDER — METOCLOPRAMIDE 5 MG/1
5 TABLET ORAL EVERY 6 HOURS PRN
Status: DISCONTINUED | OUTPATIENT
Start: 2022-01-01 | End: 2022-01-01 | Stop reason: HOSPADM

## 2022-01-01 RX ORDER — HEPARIN SODIUM 5000 [USP'U]/ML
5000 INJECTION, SOLUTION INTRAVENOUS; SUBCUTANEOUS
Status: COMPLETED | OUTPATIENT
Start: 2022-01-01 | End: 2022-01-01

## 2022-01-01 RX ORDER — METOPROLOL TARTRATE 25 MG/1
25 TABLET, FILM COATED ORAL 2 TIMES DAILY
Status: ON HOLD | COMMUNITY
Start: 2021-08-26 | End: 2022-01-01 | Stop reason: HOSPADM

## 2022-01-01 RX ORDER — FOLIC ACID 1 MG/1
1 TABLET ORAL DAILY
Status: DISCONTINUED | OUTPATIENT
Start: 2022-01-01 | End: 2022-01-01

## 2022-01-01 RX ORDER — CALCIUM GLUCONATE 20 MG/ML
3 INJECTION, SOLUTION INTRAVENOUS
Status: DISCONTINUED | OUTPATIENT
Start: 2022-01-01 | End: 2022-01-01 | Stop reason: HOSPADM

## 2022-01-01 RX ORDER — MUPIROCIN 20 MG/G
OINTMENT TOPICAL 2 TIMES DAILY
Status: COMPLETED | OUTPATIENT
Start: 2022-01-01 | End: 2022-01-01

## 2022-01-01 RX ORDER — ONDANSETRON 2 MG/ML
INJECTION INTRAMUSCULAR; INTRAVENOUS
Status: DISCONTINUED | OUTPATIENT
Start: 2022-01-01 | End: 2022-01-01

## 2022-01-01 RX ORDER — SODIUM CHLORIDE 9 MG/ML
INJECTION, SOLUTION INTRAVENOUS CONTINUOUS
Status: DISCONTINUED | OUTPATIENT
Start: 2022-01-01 | End: 2022-01-01

## 2022-01-01 RX ORDER — DEXAMETHASONE 4 MG/1
4 TABLET ORAL EVERY 6 HOURS
Qty: 120 TABLET | Refills: 0 | Status: CANCELLED | OUTPATIENT
Start: 2022-01-01 | End: 2023-09-07

## 2022-01-01 RX ORDER — INSULIN ASPART 100 [IU]/ML
1-10 INJECTION, SOLUTION INTRAVENOUS; SUBCUTANEOUS EVERY 4 HOURS PRN
Status: DISCONTINUED | OUTPATIENT
Start: 2022-01-01 | End: 2022-01-01

## 2022-01-01 RX ORDER — METHYLPREDNISOLONE SOD SUCC 125 MG
60 VIAL (EA) INJECTION
Status: DISCONTINUED | OUTPATIENT
Start: 2022-01-01 | End: 2022-01-01

## 2022-01-01 RX ORDER — METOPROLOL TARTRATE 50 MG/1
100 TABLET ORAL 2 TIMES DAILY
Status: DISCONTINUED | OUTPATIENT
Start: 2022-01-01 | End: 2022-01-01

## 2022-01-01 RX ORDER — GLUCAGON 1 MG
1 KIT INJECTION
Status: DISCONTINUED | OUTPATIENT
Start: 2022-01-01 | End: 2022-01-01

## 2022-01-01 RX ORDER — DROPERIDOL 2.5 MG/ML
0.25 INJECTION, SOLUTION INTRAMUSCULAR; INTRAVENOUS ONCE AS NEEDED
Status: DISCONTINUED | OUTPATIENT
Start: 2022-01-01 | End: 2022-01-01

## 2022-01-01 RX ORDER — PREDNISONE 10 MG/1
TABLET ORAL
Qty: 21 TABLET | Refills: 0 | Status: ON HOLD | OUTPATIENT
Start: 2022-01-01 | End: 2022-01-01 | Stop reason: HOSPADM

## 2022-01-01 RX ORDER — DIGOXIN 250 MCG
0.25 TABLET ORAL ONCE
Status: DISCONTINUED | OUTPATIENT
Start: 2022-01-01 | End: 2022-01-01

## 2022-01-01 RX ORDER — HYDROCODONE BITARTRATE AND ACETAMINOPHEN 500; 5 MG/1; MG/1
TABLET ORAL
Status: DISCONTINUED | OUTPATIENT
Start: 2022-01-01 | End: 2022-01-01 | Stop reason: HOSPADM

## 2022-01-01 RX ORDER — METOPROLOL TARTRATE 25 MG/1
25 TABLET, FILM COATED ORAL 2 TIMES DAILY
Status: DISCONTINUED | OUTPATIENT
Start: 2022-01-01 | End: 2022-01-01 | Stop reason: HOSPADM

## 2022-01-01 RX ORDER — CALCIUM GLUCONATE 20 MG/ML
1 INJECTION, SOLUTION INTRAVENOUS
Status: DISCONTINUED | OUTPATIENT
Start: 2022-01-01 | End: 2022-01-01 | Stop reason: HOSPADM

## 2022-01-01 RX ORDER — HYDROCODONE BITARTRATE AND ACETAMINOPHEN 10; 325 MG/1; MG/1
1 TABLET ORAL EVERY 4 HOURS PRN
Qty: 180 TABLET | Refills: 0 | Status: ON HOLD | OUTPATIENT
Start: 2022-01-01 | End: 2022-01-01 | Stop reason: HOSPADM

## 2022-01-01 RX ORDER — PANTOPRAZOLE SODIUM 40 MG/10ML
80 INJECTION, POWDER, LYOPHILIZED, FOR SOLUTION INTRAVENOUS EVERY 24 HOURS
Status: DISCONTINUED | OUTPATIENT
Start: 2022-01-01 | End: 2022-01-01

## 2022-01-01 RX ORDER — FUROSEMIDE 10 MG/ML
20 INJECTION INTRAMUSCULAR; INTRAVENOUS ONCE
Status: COMPLETED | OUTPATIENT
Start: 2022-01-01 | End: 2022-01-01

## 2022-01-01 RX ORDER — HEPARIN 100 UNIT/ML
SYRINGE INTRAVENOUS
Status: DISCONTINUED | OUTPATIENT
Start: 2022-01-01 | End: 2022-01-01 | Stop reason: HOSPADM

## 2022-01-01 RX ORDER — ONDANSETRON 8 MG/1
8 TABLET, ORALLY DISINTEGRATING ORAL 3 TIMES DAILY PRN
Qty: 90 TABLET | Refills: 1 | Status: SHIPPED | OUTPATIENT
Start: 2022-01-01 | End: 2022-01-01

## 2022-01-01 RX ORDER — SODIUM CHLORIDE, SODIUM LACTATE, POTASSIUM CHLORIDE, CALCIUM CHLORIDE 600; 310; 30; 20 MG/100ML; MG/100ML; MG/100ML; MG/100ML
2000 INJECTION, SOLUTION INTRAVENOUS CONTINUOUS
Status: DISCONTINUED | OUTPATIENT
Start: 2022-01-01 | End: 2022-01-01

## 2022-01-01 RX ORDER — ACETAMINOPHEN 325 MG/1
650 TABLET ORAL EVERY 8 HOURS PRN
Status: DISCONTINUED | OUTPATIENT
Start: 2022-01-01 | End: 2022-01-01 | Stop reason: HOSPADM

## 2022-01-01 RX ORDER — SULFAMETHOXAZOLE AND TRIMETHOPRIM 800; 160 MG/1; MG/1
1 TABLET ORAL 2 TIMES DAILY
Status: DISCONTINUED | OUTPATIENT
Start: 2022-01-01 | End: 2022-01-01

## 2022-01-01 RX ORDER — IBUPROFEN 200 MG
24 TABLET ORAL
Status: DISCONTINUED | OUTPATIENT
Start: 2022-01-01 | End: 2022-01-01 | Stop reason: HOSPADM

## 2022-01-01 RX ORDER — METHYLPREDNISOLONE SOD SUCC 125 MG
125 VIAL (EA) INJECTION
Status: COMPLETED | OUTPATIENT
Start: 2022-01-01 | End: 2022-01-01

## 2022-01-01 RX ORDER — INSULIN HUMAN 100 [IU]/ML
INJECTION, SOLUTION PARENTERAL
COMMUNITY
Start: 2022-01-01

## 2022-01-01 RX ORDER — HYDROCODONE BITARTRATE AND ACETAMINOPHEN 5; 325 MG/1; MG/1
1 TABLET ORAL EVERY 4 HOURS PRN
Status: DISCONTINUED | OUTPATIENT
Start: 2022-01-01 | End: 2022-01-01 | Stop reason: HOSPADM

## 2022-01-01 RX ORDER — DICYCLOMINE HYDROCHLORIDE 10 MG/1
10 CAPSULE ORAL 3 TIMES DAILY PRN
COMMUNITY
Start: 2022-01-01

## 2022-01-01 RX ORDER — METOPROLOL TARTRATE 1 MG/ML
INJECTION, SOLUTION INTRAVENOUS
Status: COMPLETED
Start: 2022-01-01 | End: 2022-01-01

## 2022-01-01 RX ORDER — CYPROHEPTADINE HYDROCHLORIDE 4 MG/1
4 TABLET ORAL
COMMUNITY
Start: 2022-01-01

## 2022-01-01 RX ORDER — INSULIN ASPART 100 [IU]/ML
1-10 INJECTION, SOLUTION INTRAVENOUS; SUBCUTANEOUS 4 TIMES DAILY
Status: DISCONTINUED | OUTPATIENT
Start: 2022-01-01 | End: 2022-01-01

## 2022-01-01 RX ORDER — GLYCOPYRROLATE 0.2 MG/ML
INJECTION INTRAMUSCULAR; INTRAVENOUS
Status: DISCONTINUED | OUTPATIENT
Start: 2022-01-01 | End: 2022-01-01

## 2022-01-01 RX ORDER — IPRATROPIUM BROMIDE 0.5 MG/2.5ML
0.5 SOLUTION RESPIRATORY (INHALATION) EVERY 6 HOURS PRN
Status: DISCONTINUED | OUTPATIENT
Start: 2022-01-01 | End: 2022-01-01 | Stop reason: HOSPADM

## 2022-01-01 RX ORDER — IPRATROPIUM BROMIDE 17 UG/1
AEROSOL, METERED RESPIRATORY (INHALATION)
COMMUNITY
Start: 2022-01-01 | End: 2022-01-01

## 2022-01-01 RX ORDER — HYDROCODONE BITARTRATE AND ACETAMINOPHEN 10; 325 MG/1; MG/1
1 TABLET ORAL EVERY 4 HOURS PRN
Qty: 180 TABLET | Refills: 0 | Status: SHIPPED | OUTPATIENT
Start: 2022-01-01 | End: 2022-01-01 | Stop reason: SDUPTHER

## 2022-01-01 RX ORDER — GABAPENTIN 300 MG/1
300 CAPSULE ORAL 3 TIMES DAILY
Status: DISCONTINUED | OUTPATIENT
Start: 2022-01-01 | End: 2022-01-01 | Stop reason: HOSPADM

## 2022-01-01 RX ORDER — PROPOFOL 10 MG/ML
VIAL (ML) INTRAVENOUS CONTINUOUS PRN
Status: DISCONTINUED | OUTPATIENT
Start: 2022-01-01 | End: 2022-01-01

## 2022-01-01 RX ORDER — TALC
6 POWDER (GRAM) TOPICAL NIGHTLY PRN
Status: DISCONTINUED | OUTPATIENT
Start: 2022-01-01 | End: 2022-01-01 | Stop reason: HOSPADM

## 2022-01-01 RX ORDER — MAGNESIUM SULFATE HEPTAHYDRATE 40 MG/ML
2 INJECTION, SOLUTION INTRAVENOUS
Status: DISCONTINUED | OUTPATIENT
Start: 2022-01-01 | End: 2022-01-01 | Stop reason: HOSPADM

## 2022-01-01 RX ORDER — LEVOFLOXACIN 5 MG/ML
500 INJECTION, SOLUTION INTRAVENOUS
Status: DISCONTINUED | OUTPATIENT
Start: 2022-01-01 | End: 2022-01-01

## 2022-01-01 RX ORDER — HYDROCODONE BITARTRATE AND ACETAMINOPHEN 10; 325 MG/1; MG/1
1 TABLET ORAL EVERY 4 HOURS PRN
Qty: 180 TABLET | Refills: 0 | OUTPATIENT
Start: 2022-01-01 | End: 2022-01-01

## 2022-01-01 RX ORDER — IPRATROPIUM BROMIDE AND ALBUTEROL SULFATE 2.5; .5 MG/3ML; MG/3ML
SOLUTION RESPIRATORY (INHALATION)
Status: COMPLETED
Start: 2022-01-01 | End: 2022-01-01

## 2022-01-01 RX ORDER — METOPROLOL TARTRATE 1 MG/ML
INJECTION, SOLUTION INTRAVENOUS
Status: DISPENSED
Start: 2022-01-01 | End: 2022-01-01

## 2022-01-01 RX ORDER — MORPHINE SULFATE 2 MG/ML
1 INJECTION, SOLUTION INTRAMUSCULAR; INTRAVENOUS EVERY 6 HOURS PRN
Status: DISCONTINUED | OUTPATIENT
Start: 2022-01-01 | End: 2022-01-01 | Stop reason: HOSPADM

## 2022-01-01 RX ORDER — LEVOFLOXACIN 750 MG/1
750 TABLET ORAL DAILY
Qty: 7 TABLET | Refills: 0 | Status: SHIPPED | OUTPATIENT
Start: 2022-01-01 | End: 2022-01-01

## 2022-01-01 RX ORDER — DEXTROSE MONOHYDRATE 100 MG/ML
12.5 INJECTION, SOLUTION INTRAVENOUS
Status: DISCONTINUED | OUTPATIENT
Start: 2022-01-01 | End: 2022-01-01 | Stop reason: HOSPADM

## 2022-01-01 RX ORDER — MAGNESIUM SULFATE HEPTAHYDRATE 40 MG/ML
2 INJECTION, SOLUTION INTRAVENOUS ONCE
Status: COMPLETED | OUTPATIENT
Start: 2022-01-01 | End: 2022-01-01

## 2022-01-01 RX ORDER — METOPROLOL TARTRATE 100 MG/1
100 TABLET ORAL 2 TIMES DAILY
Qty: 60 TABLET | Refills: 11 | Status: SHIPPED | OUTPATIENT
Start: 2022-01-01 | End: 2023-10-17

## 2022-01-01 RX ORDER — GUAIFENESIN 600 MG/1
600 TABLET, EXTENDED RELEASE ORAL 2 TIMES DAILY
Status: DISCONTINUED | OUTPATIENT
Start: 2022-01-01 | End: 2022-01-01 | Stop reason: HOSPADM

## 2022-01-01 RX ORDER — HYDROCODONE BITARTRATE AND ACETAMINOPHEN 10; 325 MG/1; MG/1
1 TABLET ORAL EVERY 4 HOURS PRN
Qty: 90 TABLET | Refills: 0 | Status: SHIPPED | OUTPATIENT
Start: 2022-01-01 | End: 2022-01-01

## 2022-01-01 RX ORDER — POTASSIUM CHLORIDE 29.8 MG/ML
40 INJECTION INTRAVENOUS
Status: DISCONTINUED | OUTPATIENT
Start: 2022-01-01 | End: 2022-01-01

## 2022-01-01 RX ORDER — MORPHINE SULFATE 4 MG/ML
4 INJECTION, SOLUTION INTRAMUSCULAR; INTRAVENOUS
Status: ACTIVE | OUTPATIENT
Start: 2022-01-01 | End: 2022-01-01

## 2022-01-01 RX ORDER — CYANOCOBALAMIN 1000 UG/ML
1000 INJECTION, SOLUTION INTRAMUSCULAR; SUBCUTANEOUS
Status: COMPLETED | OUTPATIENT
Start: 2022-01-01 | End: 2022-01-01

## 2022-01-01 RX ORDER — DILTIAZEM HYDROCHLORIDE 5 MG/ML
10 INJECTION INTRAVENOUS ONCE
Status: COMPLETED | OUTPATIENT
Start: 2022-01-01 | End: 2022-01-01

## 2022-01-01 RX ORDER — SUCCINYLCHOLINE CHLORIDE 20 MG/ML
INJECTION INTRAMUSCULAR; INTRAVENOUS
Status: DISCONTINUED | OUTPATIENT
Start: 2022-01-01 | End: 2022-01-01

## 2022-01-01 RX ORDER — NYSTATIN 100000 [USP'U]/ML
4 SUSPENSION ORAL
Status: ON HOLD | COMMUNITY
Start: 2022-01-01 | End: 2022-01-01

## 2022-01-01 RX ORDER — MORPHINE SULFATE 2 MG/ML
4 INJECTION, SOLUTION INTRAMUSCULAR; INTRAVENOUS
Status: DISCONTINUED | OUTPATIENT
Start: 2022-01-01 | End: 2022-01-01 | Stop reason: HOSPADM

## 2022-01-01 RX ORDER — TIZANIDINE 4 MG/1
TABLET ORAL
COMMUNITY
Start: 2022-01-01

## 2022-01-01 RX ORDER — ALBUTEROL SULFATE 90 UG/1
AEROSOL, METERED RESPIRATORY (INHALATION)
COMMUNITY
Start: 2022-01-01

## 2022-01-01 RX ORDER — HYDROCODONE BITARTRATE AND ACETAMINOPHEN 10; 325 MG/1; MG/1
1 TABLET ORAL EVERY 4 HOURS PRN
Qty: 30 TABLET | Refills: 0 | Status: SHIPPED | OUTPATIENT
Start: 2022-01-01 | End: 2022-01-01 | Stop reason: SDUPTHER

## 2022-01-01 RX ORDER — DIGOXIN 250 MCG
0.25 TABLET ORAL DAILY
Status: DISCONTINUED | OUTPATIENT
Start: 2022-01-01 | End: 2022-01-01

## 2022-01-01 RX ORDER — OXYCODONE HYDROCHLORIDE 5 MG/1
5 TABLET ORAL
Status: DISCONTINUED | OUTPATIENT
Start: 2022-01-01 | End: 2022-01-01

## 2022-01-01 RX ORDER — PROPOFOL 10 MG/ML
INJECTION, EMULSION INTRAVENOUS
Status: DISCONTINUED
Start: 2022-01-01 | End: 2022-01-01 | Stop reason: WASHOUT

## 2022-01-01 RX ORDER — MONTELUKAST SODIUM 10 MG/1
10 TABLET ORAL DAILY
Status: ON HOLD | COMMUNITY
Start: 2022-01-01 | End: 2022-01-01

## 2022-01-01 RX ORDER — PANTOPRAZOLE SODIUM 40 MG/10ML
40 INJECTION, POWDER, LYOPHILIZED, FOR SOLUTION INTRAVENOUS EVERY 12 HOURS
Status: DISCONTINUED | OUTPATIENT
Start: 2022-01-01 | End: 2022-01-01 | Stop reason: HOSPADM

## 2022-01-01 RX ORDER — GABAPENTIN 300 MG/1
300 CAPSULE ORAL 3 TIMES DAILY
COMMUNITY
Start: 2021-08-25

## 2022-01-01 RX ORDER — HYDROMORPHONE HYDROCHLORIDE 1 MG/ML
0.2 INJECTION, SOLUTION INTRAMUSCULAR; INTRAVENOUS; SUBCUTANEOUS EVERY 5 MIN PRN
Status: DISCONTINUED | OUTPATIENT
Start: 2022-01-01 | End: 2022-01-01

## 2022-01-01 RX ORDER — DEXTROSE MONOHYDRATE, SODIUM CHLORIDE, AND POTASSIUM CHLORIDE 50; 1.49; 4.5 G/1000ML; G/1000ML; G/1000ML
INJECTION, SOLUTION INTRAVENOUS CONTINUOUS
Status: DISCONTINUED | OUTPATIENT
Start: 2022-01-01 | End: 2022-01-01

## 2022-01-01 RX ORDER — HYDROCODONE BITARTRATE AND ACETAMINOPHEN 10; 325 MG/1; MG/1
1 TABLET ORAL EVERY 4 HOURS PRN
Qty: 180 TABLET | Refills: 0 | Status: SHIPPED | OUTPATIENT
Start: 2022-01-01 | End: 2022-01-01

## 2022-01-01 RX ORDER — FAMOTIDINE 10 MG/ML
20 INJECTION INTRAVENOUS 2 TIMES DAILY
Status: DISCONTINUED | OUTPATIENT
Start: 2022-01-01 | End: 2022-01-01 | Stop reason: HOSPADM

## 2022-01-01 RX ORDER — ETOMIDATE 2 MG/ML
INJECTION INTRAVENOUS
Status: COMPLETED
Start: 2022-01-01 | End: 2022-01-01

## 2022-01-01 RX ORDER — ALPRAZOLAM 0.5 MG/1
0.5 TABLET ORAL ONCE
Status: COMPLETED | OUTPATIENT
Start: 2022-01-01 | End: 2022-01-01

## 2022-01-01 RX ORDER — MORPHINE SULFATE 2 MG/ML
2 INJECTION, SOLUTION INTRAMUSCULAR; INTRAVENOUS EVERY 4 HOURS PRN
Status: DISCONTINUED | OUTPATIENT
Start: 2022-01-01 | End: 2022-01-01 | Stop reason: HOSPADM

## 2022-01-01 RX ORDER — IPRATROPIUM BROMIDE AND ALBUTEROL SULFATE 2.5; .5 MG/3ML; MG/3ML
3 SOLUTION RESPIRATORY (INHALATION) EVERY 4 HOURS PRN
COMMUNITY
Start: 2022-01-01

## 2022-01-01 RX ORDER — VORICONAZOLE 200 MG/1
200 TABLET, FILM COATED ORAL 2 TIMES DAILY
Status: DISCONTINUED | OUTPATIENT
Start: 2022-01-01 | End: 2022-01-01

## 2022-01-01 RX ORDER — DICYCLOMINE HYDROCHLORIDE 10 MG/1
10 CAPSULE ORAL 3 TIMES DAILY
Status: DISCONTINUED | OUTPATIENT
Start: 2022-01-01 | End: 2022-01-01 | Stop reason: HOSPADM

## 2022-01-01 RX ORDER — LEVOFLOXACIN 5 MG/ML
750 INJECTION, SOLUTION INTRAVENOUS
Status: DISCONTINUED | OUTPATIENT
Start: 2022-01-01 | End: 2022-01-01

## 2022-01-01 RX ORDER — IPRATROPIUM BROMIDE AND ALBUTEROL SULFATE 2.5; .5 MG/3ML; MG/3ML
6 SOLUTION RESPIRATORY (INHALATION)
Status: COMPLETED | OUTPATIENT
Start: 2022-01-01 | End: 2022-01-01

## 2022-01-01 RX ORDER — MEGESTROL ACETATE 40 MG/ML
400 SUSPENSION ORAL 2 TIMES DAILY
Qty: 480 ML | Refills: 1 | Status: ON HOLD | OUTPATIENT
Start: 2022-01-01 | End: 2022-01-01 | Stop reason: HOSPADM

## 2022-01-01 RX ORDER — AMIODARONE HYDROCHLORIDE 200 MG/1
200 TABLET ORAL 2 TIMES DAILY
Status: DISCONTINUED | OUTPATIENT
Start: 2022-01-01 | End: 2022-01-01

## 2022-01-01 RX ORDER — ACETAMINOPHEN 325 MG/1
650 TABLET ORAL ONCE
Status: DISCONTINUED | OUTPATIENT
Start: 2022-01-01 | End: 2022-01-01

## 2022-01-01 RX ORDER — HYDROCODONE BITARTRATE AND ACETAMINOPHEN 5; 325 MG/1; MG/1
1 TABLET ORAL EVERY 6 HOURS PRN
Status: DISCONTINUED | OUTPATIENT
Start: 2022-01-01 | End: 2022-01-01

## 2022-01-01 RX ORDER — LANCETS 30 GAUGE
EACH MISCELLANEOUS
COMMUNITY
Start: 2022-01-01

## 2022-01-01 RX ORDER — LIDOCAINE HYDROCHLORIDE 10 MG/ML
1 INJECTION, SOLUTION EPIDURAL; INFILTRATION; INTRACAUDAL; PERINEURAL ONCE
Status: DISCONTINUED | OUTPATIENT
Start: 2022-01-01 | End: 2022-01-01

## 2022-01-01 RX ORDER — AMIODARONE HYDROCHLORIDE 200 MG/1
400 TABLET ORAL 2 TIMES DAILY
Status: DISCONTINUED | OUTPATIENT
Start: 2022-01-01 | End: 2022-01-01

## 2022-01-01 RX ORDER — METOPROLOL TARTRATE 1 MG/ML
5 INJECTION, SOLUTION INTRAVENOUS ONCE
Status: DISCONTINUED | OUTPATIENT
Start: 2022-01-01 | End: 2022-01-01

## 2022-01-01 RX ORDER — ALBUTEROL SULFATE 90 UG/1
2 AEROSOL, METERED RESPIRATORY (INHALATION) EVERY 6 HOURS PRN
Status: DISCONTINUED | OUTPATIENT
Start: 2022-01-01 | End: 2022-01-01

## 2022-01-01 RX ORDER — ALBUTEROL SULFATE 90 UG/1
AEROSOL, METERED RESPIRATORY (INHALATION)
COMMUNITY
Start: 2022-01-01 | End: 2022-01-01

## 2022-01-01 RX ORDER — INSULIN ASPART 100 [IU]/ML
5-10 INJECTION, SOLUTION INTRAVENOUS; SUBCUTANEOUS EVERY 6 HOURS
Status: DISCONTINUED | OUTPATIENT
Start: 2022-01-01 | End: 2022-01-01

## 2022-01-01 RX ORDER — METOPROLOL TARTRATE 25 MG/1
12.5 TABLET ORAL 2 TIMES DAILY
Status: CANCELLED | OUTPATIENT
Start: 2022-01-01

## 2022-01-01 RX ORDER — METOPROLOL TARTRATE 50 MG/1
100 TABLET ORAL 2 TIMES DAILY
Status: DISCONTINUED | OUTPATIENT
Start: 2022-01-01 | End: 2022-01-01 | Stop reason: HOSPADM

## 2022-01-01 RX ORDER — INSULIN ASPART 100 [IU]/ML
0-5 INJECTION, SOLUTION INTRAVENOUS; SUBCUTANEOUS EVERY 4 HOURS PRN
Status: DISCONTINUED | OUTPATIENT
Start: 2022-01-01 | End: 2022-01-01 | Stop reason: HOSPADM

## 2022-01-01 RX ORDER — IPRATROPIUM BROMIDE AND ALBUTEROL SULFATE 2.5; .5 MG/3ML; MG/3ML
3 SOLUTION RESPIRATORY (INHALATION) EVERY 4 HOURS PRN
Status: DISCONTINUED | OUTPATIENT
Start: 2022-01-01 | End: 2022-01-01 | Stop reason: HOSPADM

## 2022-01-01 RX ORDER — ONDANSETRON 4 MG/1
8 TABLET, ORALLY DISINTEGRATING ORAL EVERY 8 HOURS PRN
Status: DISCONTINUED | OUTPATIENT
Start: 2022-01-01 | End: 2022-01-01 | Stop reason: HOSPADM

## 2022-01-01 RX ORDER — DILTIAZEM HYDROCHLORIDE 180 MG/1
360 CAPSULE, COATED, EXTENDED RELEASE ORAL DAILY
Status: DISCONTINUED | OUTPATIENT
Start: 2022-01-01 | End: 2022-01-01 | Stop reason: HOSPADM

## 2022-01-01 RX ORDER — CODEINE PHOSPHATE AND GUAIFENESIN 10; 100 MG/5ML; MG/5ML
10 SOLUTION ORAL EVERY 6 HOURS PRN
Status: DISCONTINUED | OUTPATIENT
Start: 2022-01-01 | End: 2022-01-01 | Stop reason: HOSPADM

## 2022-01-01 RX ORDER — HEPARIN SODIUM 5000 [USP'U]/ML
5000 INJECTION, SOLUTION INTRAVENOUS; SUBCUTANEOUS
Status: CANCELLED | OUTPATIENT
Start: 2022-01-01 | End: 2022-01-01

## 2022-01-01 RX ORDER — ROCURONIUM BROMIDE 10 MG/ML
INJECTION, SOLUTION INTRAVENOUS
Status: COMPLETED
Start: 2022-01-01 | End: 2022-01-01

## 2022-01-01 RX ORDER — CODEINE PHOSPHATE AND GUAIFENESIN 10; 100 MG/5ML; MG/5ML
10 SOLUTION ORAL
COMMUNITY
Start: 2021-10-28

## 2022-01-01 RX ORDER — INSULIN ASPART 100 [IU]/ML
0-5 INJECTION, SOLUTION INTRAVENOUS; SUBCUTANEOUS
Status: DISCONTINUED | OUTPATIENT
Start: 2022-01-01 | End: 2022-01-01

## 2022-01-01 RX ORDER — MONTELUKAST SODIUM 10 MG/1
10 TABLET ORAL
COMMUNITY
Start: 2022-01-01

## 2022-01-01 RX ORDER — METOPROLOL TARTRATE 50 MG/1
50 TABLET ORAL 2 TIMES DAILY
Status: DISCONTINUED | OUTPATIENT
Start: 2022-01-01 | End: 2022-01-01

## 2022-01-01 RX ORDER — MAGNESIUM SULFATE HEPTAHYDRATE 40 MG/ML
4 INJECTION, SOLUTION INTRAVENOUS
Status: DISCONTINUED | OUTPATIENT
Start: 2022-01-01 | End: 2022-01-01 | Stop reason: HOSPADM

## 2022-01-01 RX ORDER — PROPOFOL 10 MG/ML
INJECTION, EMULSION INTRAVENOUS
Status: COMPLETED
Start: 2022-01-01 | End: 2022-01-01

## 2022-01-01 RX ORDER — MONTELUKAST SODIUM 10 MG/1
10 TABLET ORAL DAILY
Status: DISCONTINUED | OUTPATIENT
Start: 2022-01-01 | End: 2022-01-01 | Stop reason: HOSPADM

## 2022-01-01 RX ORDER — BENZONATATE 100 MG/1
100 CAPSULE ORAL 3 TIMES DAILY PRN
Status: DISCONTINUED | OUTPATIENT
Start: 2022-01-01 | End: 2022-01-01 | Stop reason: HOSPADM

## 2022-01-01 RX ORDER — TIZANIDINE 4 MG/1
TABLET ORAL
COMMUNITY
Start: 2022-01-01 | End: 2022-01-01

## 2022-01-01 RX ORDER — PREDNISONE 20 MG/1
40 TABLET ORAL DAILY
Status: DISCONTINUED | OUTPATIENT
Start: 2022-01-01 | End: 2022-01-01

## 2022-01-01 RX ORDER — CEFAZOLIN SODIUM 2 G/50ML
2 SOLUTION INTRAVENOUS
Status: DISCONTINUED | OUTPATIENT
Start: 2022-01-01 | End: 2022-01-01

## 2022-01-01 RX ADMIN — DEXMEDETOMIDINE HYDROCHLORIDE 0.6 MCG/KG/HR: 4 INJECTION, SOLUTION INTRAVENOUS at 01:11

## 2022-01-01 RX ADMIN — PANTOPRAZOLE SODIUM 40 MG: 40 INJECTION, POWDER, FOR SOLUTION INTRAVENOUS at 08:11

## 2022-01-01 RX ADMIN — IPRATROPIUM BROMIDE AND ALBUTEROL SULFATE 3 ML: 2.5; .5 SOLUTION RESPIRATORY (INHALATION) at 07:06

## 2022-01-01 RX ADMIN — CEFEPIME 1 G: 1 INJECTION, POWDER, FOR SOLUTION INTRAMUSCULAR; INTRAVENOUS at 09:11

## 2022-01-01 RX ADMIN — PANTOPRAZOLE SODIUM 40 MG: 40 INJECTION, POWDER, FOR SOLUTION INTRAVENOUS at 09:11

## 2022-01-01 RX ADMIN — ALBUMIN (HUMAN) 12.5 G: 0.25 INJECTION, SOLUTION INTRAVENOUS at 03:11

## 2022-01-01 RX ADMIN — ASCORBIC ACID, VITAMIN A PALMITATE, CHOLECALCIFEROL, THIAMINE HYDROCHLORIDE, RIBOFLAVIN-5 PHOSPHATE SODIUM, PYRIDOXINE HYDROCHLORIDE, NIACINAMIDE, DEXPANTHENOL, ALPHA-TOCOPHEROL ACETATE, VITAMIN K1, FOLIC ACID, BIOTIN, CYANOCOBALAMIN: 200; 3300; 200; 6; 3.6; 6; 40; 15; 10; 150; 600; 60; 5 INJECTION, SOLUTION INTRAVENOUS at 03:11

## 2022-01-01 RX ADMIN — PREDNISONE 40 MG: 20 TABLET ORAL at 09:11

## 2022-01-01 RX ADMIN — IPRATROPIUM BROMIDE AND ALBUTEROL SULFATE 3 ML: 2.5; .5 SOLUTION RESPIRATORY (INHALATION) at 02:11

## 2022-01-01 RX ADMIN — DICYCLOMINE HYDROCHLORIDE 10 MG: 10 CAPSULE ORAL at 02:10

## 2022-01-01 RX ADMIN — HYDROCORTISONE SODIUM SUCCINATE 100 MG: 100 INJECTION, POWDER, FOR SOLUTION INTRAMUSCULAR; INTRAVENOUS at 01:11

## 2022-01-01 RX ADMIN — SODIUM CHLORIDE, POTASSIUM CHLORIDE, SODIUM LACTATE AND CALCIUM CHLORIDE 1000 ML: 600; 310; 30; 20 INJECTION, SOLUTION INTRAVENOUS at 11:11

## 2022-01-01 RX ADMIN — FAMOTIDINE 20 MG: 10 INJECTION INTRAVENOUS at 08:10

## 2022-01-01 RX ADMIN — MONTELUKAST 10 MG: 10 TABLET, FILM COATED ORAL at 08:10

## 2022-01-01 RX ADMIN — POLYETHYLENE GLYCOL 3350 17 G: 17 POWDER, FOR SOLUTION ORAL at 08:10

## 2022-01-01 RX ADMIN — SODIUM CHLORIDE 200 MG: 9 INJECTION, SOLUTION INTRAVENOUS at 10:07

## 2022-01-01 RX ADMIN — SODIUM CHLORIDE, POTASSIUM CHLORIDE, SODIUM LACTATE AND CALCIUM CHLORIDE: 600; 310; 30; 20 INJECTION, SOLUTION INTRAVENOUS at 06:11

## 2022-01-01 RX ADMIN — SODIUM CHLORIDE, POTASSIUM CHLORIDE, SODIUM LACTATE AND CALCIUM CHLORIDE 2000 ML: 600; 310; 30; 20 INJECTION, SOLUTION INTRAVENOUS at 08:11

## 2022-01-01 RX ADMIN — DEXAMETHASONE SODIUM PHOSPHATE 0.25 MG: 4 INJECTION, SOLUTION INTRA-ARTICULAR; INTRALESIONAL; INTRAMUSCULAR; INTRAVENOUS; SOFT TISSUE at 10:06

## 2022-01-01 RX ADMIN — INSULIN ASPART 2 UNITS: 100 INJECTION, SOLUTION INTRAVENOUS; SUBCUTANEOUS at 03:11

## 2022-01-01 RX ADMIN — PROPOFOL 50 MCG/KG/MIN: 10 INJECTION, EMULSION INTRAVENOUS at 09:11

## 2022-01-01 RX ADMIN — CEFEPIME 1 G: 1 INJECTION, POWDER, FOR SOLUTION INTRAMUSCULAR; INTRAVENOUS at 11:11

## 2022-01-01 RX ADMIN — BENZONATATE 100 MG: 100 CAPSULE ORAL at 09:11

## 2022-01-01 RX ADMIN — VANCOMYCIN HYDROCHLORIDE 750 MG: 750 INJECTION, POWDER, LYOPHILIZED, FOR SOLUTION INTRAVENOUS at 06:11

## 2022-01-01 RX ADMIN — GABAPENTIN 300 MG: 300 CAPSULE ORAL at 08:10

## 2022-01-01 RX ADMIN — DEXMEDETOMIDINE HYDROCHLORIDE 1.2 MCG/KG/HR: 4 INJECTION, SOLUTION INTRAVENOUS at 03:11

## 2022-01-01 RX ADMIN — PROPOFOL 30 MCG/KG/MIN: 10 INJECTION, EMULSION INTRAVENOUS at 02:11

## 2022-01-01 RX ADMIN — DEXMEDETOMIDINE HYDROCHLORIDE 0.2 MCG/KG/HR: 4 INJECTION, SOLUTION INTRAVENOUS at 04:11

## 2022-01-01 RX ADMIN — CEFEPIME 1 G: 1 INJECTION, POWDER, FOR SOLUTION INTRAMUSCULAR; INTRAVENOUS at 02:11

## 2022-01-01 RX ADMIN — VORICONAZOLE 200 MG: 200 TABLET ORAL at 08:11

## 2022-01-01 RX ADMIN — FAMOTIDINE 20 MG: 10 INJECTION INTRAVENOUS at 11:10

## 2022-01-01 RX ADMIN — HYDROCODONE BITARTRATE AND ACETAMINOPHEN 1 TABLET: 5; 325 TABLET ORAL at 05:11

## 2022-01-01 RX ADMIN — DICYCLOMINE HYDROCHLORIDE 10 MG: 10 CAPSULE ORAL at 03:10

## 2022-01-01 RX ADMIN — PROPOFOL 40 MCG/KG/MIN: 10 INJECTION, EMULSION INTRAVENOUS at 02:11

## 2022-01-01 RX ADMIN — SODIUM CHLORIDE, POTASSIUM CHLORIDE, SODIUM LACTATE AND CALCIUM CHLORIDE 2000 ML: 600; 310; 30; 20 INJECTION, SOLUTION INTRAVENOUS at 05:11

## 2022-01-01 RX ADMIN — PIPERACILLIN AND TAZOBACTAM 4.5 G: 4; .5 INJECTION, POWDER, LYOPHILIZED, FOR SOLUTION INTRAVENOUS; PARENTERAL at 11:10

## 2022-01-01 RX ADMIN — FENTANYL CITRATE 25 MCG: 50 INJECTION, SOLUTION INTRAMUSCULAR; INTRAVENOUS at 07:06

## 2022-01-01 RX ADMIN — GABAPENTIN 300 MG: 300 CAPSULE ORAL at 08:11

## 2022-01-01 RX ADMIN — HYDROCODONE BITARTRATE AND ACETAMINOPHEN 1 TABLET: 5; 325 TABLET ORAL at 12:10

## 2022-01-01 RX ADMIN — HYDROCODONE BITARTRATE AND ACETAMINOPHEN 1 TABLET: 5; 325 TABLET ORAL at 06:10

## 2022-01-01 RX ADMIN — MUPIROCIN: 20 OINTMENT TOPICAL at 08:10

## 2022-01-01 RX ADMIN — METHYLPREDNISOLONE SODIUM SUCCINATE 60 MG: 125 INJECTION, POWDER, FOR SOLUTION INTRAMUSCULAR; INTRAVENOUS at 05:10

## 2022-01-01 RX ADMIN — INSULIN ASPART 1 UNITS: 100 INJECTION, SOLUTION INTRAVENOUS; SUBCUTANEOUS at 08:11

## 2022-01-01 RX ADMIN — IPRATROPIUM BROMIDE AND ALBUTEROL SULFATE 3 ML: 2.5; .5 SOLUTION RESPIRATORY (INHALATION) at 03:10

## 2022-01-01 RX ADMIN — AMIODARONE HYDROCHLORIDE 200 MG: 200 TABLET ORAL at 09:11

## 2022-01-01 RX ADMIN — PROPOFOL 35 MCG/KG/MIN: 10 INJECTION, EMULSION INTRAVENOUS at 09:11

## 2022-01-01 RX ADMIN — SODIUM ZIRCONIUM CYCLOSILICATE 5 G: 5 POWDER, FOR SUSPENSION ORAL at 04:10

## 2022-01-01 RX ADMIN — GABAPENTIN 300 MG: 300 CAPSULE ORAL at 09:10

## 2022-01-01 RX ADMIN — SODIUM CHLORIDE, PRESERVATIVE FREE 10 ML: 5 INJECTION INTRAVENOUS at 10:06

## 2022-01-01 RX ADMIN — PROPOFOL 40 MCG/KG/MIN: 10 INJECTION, EMULSION INTRAVENOUS at 06:11

## 2022-01-01 RX ADMIN — DEXMEDETOMIDINE HYDROCHLORIDE 0.6 MCG/KG/HR: 4 INJECTION, SOLUTION INTRAVENOUS at 11:11

## 2022-01-01 RX ADMIN — DILTIAZEM HYDROCHLORIDE 360 MG: 180 CAPSULE, COATED, EXTENDED RELEASE ORAL at 09:10

## 2022-01-01 RX ADMIN — MUPIROCIN: 20 OINTMENT TOPICAL at 10:10

## 2022-01-01 RX ADMIN — PROPOFOL 45 MCG/KG/MIN: 10 INJECTION, EMULSION INTRAVENOUS at 10:11

## 2022-01-01 RX ADMIN — GUAIFENESIN AND CODEINE PHOSPHATE 10 ML: 100; 10 SOLUTION ORAL at 10:10

## 2022-01-01 RX ADMIN — DIGOXIN 125 MCG: 0.25 INJECTION INTRAMUSCULAR; INTRAVENOUS at 03:10

## 2022-01-01 RX ADMIN — VANCOMYCIN HYDROCHLORIDE 750 MG: 500 INJECTION, POWDER, LYOPHILIZED, FOR SOLUTION INTRAVENOUS at 05:11

## 2022-01-01 RX ADMIN — HYDROCORTISONE SODIUM SUCCINATE 100 MG: 100 INJECTION, POWDER, FOR SOLUTION INTRAMUSCULAR; INTRAVENOUS at 09:11

## 2022-01-01 RX ADMIN — HYDROCORTISONE SODIUM SUCCINATE 100 MG: 100 INJECTION, POWDER, FOR SOLUTION INTRAMUSCULAR; INTRAVENOUS at 02:11

## 2022-01-01 RX ADMIN — ETOMIDATE 20 MG: 2 INJECTION INTRAVENOUS at 01:11

## 2022-01-01 RX ADMIN — METOPROLOL TARTRATE 5 MG: 5 INJECTION, SOLUTION INTRAVENOUS at 12:11

## 2022-01-01 RX ADMIN — CEFEPIME 2 G: 2 INJECTION, POWDER, FOR SOLUTION INTRAVENOUS at 08:11

## 2022-01-01 RX ADMIN — PROPOFOL 50 MCG/KG/MIN: 10 INJECTION, EMULSION INTRAVENOUS at 05:11

## 2022-01-01 RX ADMIN — POTASSIUM CHLORIDE 10 MEQ: 7.46 INJECTION, SOLUTION INTRAVENOUS at 01:11

## 2022-01-01 RX ADMIN — MUPIROCIN: 20 OINTMENT TOPICAL at 08:11

## 2022-01-01 RX ADMIN — POTASSIUM CHLORIDE 10 MEQ: 7.46 INJECTION, SOLUTION INTRAVENOUS at 09:11

## 2022-01-01 RX ADMIN — ALPRAZOLAM 0.5 MG: 0.25 TABLET ORAL at 05:11

## 2022-01-01 RX ADMIN — GUAIFENESIN 600 MG: 600 TABLET, EXTENDED RELEASE ORAL at 09:10

## 2022-01-01 RX ADMIN — PROPOFOL 50 MCG/KG/MIN: 10 INJECTION, EMULSION INTRAVENOUS at 08:11

## 2022-01-01 RX ADMIN — VORICONAZOLE 200 MG: 200 TABLET ORAL at 09:11

## 2022-01-01 RX ADMIN — DILTIAZEM HYDROCHLORIDE 10 MG: 5 INJECTION INTRAVENOUS at 03:10

## 2022-01-01 RX ADMIN — MUPIROCIN: 20 OINTMENT TOPICAL at 09:11

## 2022-01-01 RX ADMIN — SODIUM CHLORIDE 975 MG: 9 INJECTION, SOLUTION INTRAVENOUS at 11:07

## 2022-01-01 RX ADMIN — MAGNESIUM SULFATE 2 G: 2 INJECTION INTRAVENOUS at 06:11

## 2022-01-01 RX ADMIN — INSULIN ASPART 3 UNITS: 100 INJECTION, SOLUTION INTRAVENOUS; SUBCUTANEOUS at 12:11

## 2022-01-01 RX ADMIN — VASOPRESSIN 0.04 UNITS/MIN: 20 INJECTION INTRAVENOUS at 12:11

## 2022-01-01 RX ADMIN — FUROSEMIDE 10 MG: 10 INJECTION INTRAMUSCULAR; INTRAVENOUS at 11:11

## 2022-01-01 RX ADMIN — AMIODARONE HYDROCHLORIDE 400 MG: 200 TABLET ORAL at 09:11

## 2022-01-01 RX ADMIN — NOREPINEPHRINE BITARTRATE 0.02 MCG/KG/MIN: 1 INJECTION, SOLUTION, CONCENTRATE INTRAVENOUS at 11:11

## 2022-01-01 RX ADMIN — IPRATROPIUM BROMIDE AND ALBUTEROL SULFATE 3 ML: 2.5; .5 SOLUTION RESPIRATORY (INHALATION) at 05:10

## 2022-01-01 RX ADMIN — METOPROLOL TARTRATE 5 MG: 5 INJECTION, SOLUTION INTRAVENOUS at 01:11

## 2022-01-01 RX ADMIN — POTASSIUM BICARBONATE 20 MEQ: 391 TABLET, EFFERVESCENT ORAL at 09:11

## 2022-01-01 RX ADMIN — IPRATROPIUM BROMIDE AND ALBUTEROL SULFATE 3 ML: 2.5; .5 SOLUTION RESPIRATORY (INHALATION) at 01:11

## 2022-01-01 RX ADMIN — VASOPRESSIN 0.04 UNITS/MIN: 20 INJECTION INTRAVENOUS at 05:11

## 2022-01-01 RX ADMIN — DEXAMETHASONE SODIUM PHOSPHATE 0.25 MG: 4 INJECTION, SOLUTION INTRA-ARTICULAR; INTRALESIONAL; INTRAMUSCULAR; INTRAVENOUS; SOFT TISSUE at 09:09

## 2022-01-01 RX ADMIN — FUROSEMIDE 20 MG: 10 INJECTION, SOLUTION INTRAMUSCULAR; INTRAVENOUS at 05:10

## 2022-01-01 RX ADMIN — IPRATROPIUM BROMIDE AND ALBUTEROL SULFATE 3 ML: 2.5; .5 SOLUTION RESPIRATORY (INHALATION) at 07:10

## 2022-01-01 RX ADMIN — PREDNISONE 40 MG: 20 TABLET ORAL at 08:11

## 2022-01-01 RX ADMIN — SODIUM CHLORIDE 1000 ML: 9 INJECTION, SOLUTION INTRAVENOUS at 10:10

## 2022-01-01 RX ADMIN — PANTOPRAZOLE SODIUM 80 MG: 40 INJECTION, POWDER, FOR SOLUTION INTRAVENOUS at 08:11

## 2022-01-01 RX ADMIN — PROPOFOL 40 MCG/KG/MIN: 10 INJECTION, EMULSION INTRAVENOUS at 01:11

## 2022-01-01 RX ADMIN — AMIODARONE HYDROCHLORIDE 200 MG: 200 TABLET ORAL at 08:11

## 2022-01-01 RX ADMIN — DEXAMETHASONE SODIUM PHOSPHATE 0.25 MG: 4 INJECTION, SOLUTION INTRA-ARTICULAR; INTRALESIONAL; INTRAMUSCULAR; INTRAVENOUS; SOFT TISSUE at 10:07

## 2022-01-01 RX ADMIN — SODIUM PHOSPHATE, MONOBASIC, MONOHYDRATE AND SODIUM PHOSPHATE, DIBASIC, ANHYDROUS 30 MMOL: 276; 142 INJECTION, SOLUTION INTRAVENOUS at 11:11

## 2022-01-01 RX ADMIN — METOPROLOL TARTRATE 25 MG: 25 TABLET, FILM COATED ORAL at 08:10

## 2022-01-01 RX ADMIN — FENTANYL CITRATE 15 MCG/HR: 50 INJECTION, SOLUTION INTRAMUSCULAR; INTRAVENOUS at 08:11

## 2022-01-01 RX ADMIN — NOREPINEPHRINE BITARTRATE 0.06 MCG/KG/MIN: 1 INJECTION, SOLUTION, CONCENTRATE INTRAVENOUS at 04:11

## 2022-01-01 RX ADMIN — IPRATROPIUM BROMIDE AND ALBUTEROL SULFATE 3 ML: 2.5; .5 SOLUTION RESPIRATORY (INHALATION) at 12:10

## 2022-01-01 RX ADMIN — AMIODARONE HYDROCHLORIDE 150 MG: 1.5 INJECTION, SOLUTION INTRAVENOUS at 10:11

## 2022-01-01 RX ADMIN — GUAIFENESIN 600 MG: 600 TABLET, EXTENDED RELEASE ORAL at 08:10

## 2022-01-01 RX ADMIN — PIPERACILLIN AND TAZOBACTAM 4.5 G: 4; .5 INJECTION, POWDER, LYOPHILIZED, FOR SOLUTION INTRAVENOUS; PARENTERAL at 12:11

## 2022-01-01 RX ADMIN — FAMOTIDINE 20 MG: 10 INJECTION INTRAVENOUS at 09:10

## 2022-01-01 RX ADMIN — POTASSIUM CHLORIDE 10 MEQ: 7.46 INJECTION, SOLUTION INTRAVENOUS at 08:11

## 2022-01-01 RX ADMIN — DEXAMETHASONE SODIUM PHOSPHATE 4 MG: 4 INJECTION, SOLUTION INTRA-ARTICULAR; INTRALESIONAL; INTRAMUSCULAR; INTRAVENOUS; SOFT TISSUE at 09:06

## 2022-01-01 RX ADMIN — DEXMEDETOMIDINE HYDROCHLORIDE 0.49 MCG/KG/HR: 4 INJECTION, SOLUTION INTRAVENOUS at 03:11

## 2022-01-01 RX ADMIN — DEXMEDETOMIDINE HYDROCHLORIDE 1.2 MCG/KG/HR: 4 INJECTION, SOLUTION INTRAVENOUS at 05:11

## 2022-01-01 RX ADMIN — FOLIC ACID 1 MG: 1 TABLET ORAL at 08:11

## 2022-01-01 RX ADMIN — HYDROCODONE BITARTRATE AND ACETAMINOPHEN 1 TABLET: 5; 325 TABLET ORAL at 08:10

## 2022-01-01 RX ADMIN — ALPRAZOLAM 0.5 MG: 0.25 TABLET ORAL at 08:11

## 2022-01-01 RX ADMIN — DEXMEDETOMIDINE HYDROCHLORIDE 1 MCG/KG/HR: 4 INJECTION, SOLUTION INTRAVENOUS at 04:11

## 2022-01-01 RX ADMIN — MIRTAZAPINE 15 MG: 15 TABLET, FILM COATED ORAL at 09:10

## 2022-01-01 RX ADMIN — PROPOFOL 20 MCG/KG/MIN: 10 INJECTION, EMULSION INTRAVENOUS at 11:11

## 2022-01-01 RX ADMIN — DICYCLOMINE HYDROCHLORIDE 10 MG: 10 CAPSULE ORAL at 08:10

## 2022-01-01 RX ADMIN — IOHEXOL 100 ML: 350 INJECTION, SOLUTION INTRAVENOUS at 08:09

## 2022-01-01 RX ADMIN — ALPRAZOLAM 0.5 MG: 0.5 TABLET ORAL at 06:10

## 2022-01-01 RX ADMIN — SODIUM BICARBONATE: 84 INJECTION, SOLUTION INTRAVENOUS at 07:11

## 2022-01-01 RX ADMIN — PROPOFOL 50 MCG/KG/MIN: 10 INJECTION, EMULSION INTRAVENOUS at 12:11

## 2022-01-01 RX ADMIN — GABAPENTIN 300 MG: 300 CAPSULE ORAL at 09:11

## 2022-01-01 RX ADMIN — CEFEPIME 1 G: 1 INJECTION, POWDER, FOR SOLUTION INTRAMUSCULAR; INTRAVENOUS at 10:11

## 2022-01-01 RX ADMIN — PROPOFOL 30 MCG/KG/MIN: 10 INJECTION, EMULSION INTRAVENOUS at 01:11

## 2022-01-01 RX ADMIN — INSULIN ASPART 6 UNITS: 100 INJECTION, SOLUTION INTRAVENOUS; SUBCUTANEOUS at 04:11

## 2022-01-01 RX ADMIN — PROPOFOL 45 MCG/KG/MIN: 10 INJECTION, EMULSION INTRAVENOUS at 04:11

## 2022-01-01 RX ADMIN — MEROPENEM 1 G: 1 INJECTION, POWDER, FOR SOLUTION INTRAVENOUS at 04:10

## 2022-01-01 RX ADMIN — METOPROLOL TARTRATE 25 MG: 25 TABLET, FILM COATED ORAL at 09:10

## 2022-01-01 RX ADMIN — PROPOFOL 50 MCG/KG/MIN: 10 INJECTION, EMULSION INTRAVENOUS at 06:11

## 2022-01-01 RX ADMIN — PROPOFOL 50 MCG/KG/MIN: 10 INJECTION, EMULSION INTRAVENOUS at 01:11

## 2022-01-01 RX ADMIN — PIPERACILLIN AND TAZOBACTAM 4.5 G: 4; .5 INJECTION, POWDER, LYOPHILIZED, FOR SOLUTION INTRAVENOUS; PARENTERAL at 04:11

## 2022-01-01 RX ADMIN — PROPOFOL 45 MCG/KG/MIN: 10 INJECTION, EMULSION INTRAVENOUS at 01:11

## 2022-01-01 RX ADMIN — INSULIN DETEMIR 3 UNITS: 100 INJECTION, SOLUTION SUBCUTANEOUS at 08:11

## 2022-01-01 RX ADMIN — SODIUM CHLORIDE, POTASSIUM CHLORIDE, SODIUM LACTATE AND CALCIUM CHLORIDE 1000 ML: 600; 310; 30; 20 INJECTION, SOLUTION INTRAVENOUS at 06:11

## 2022-01-01 RX ADMIN — VASOPRESSIN 0.04 UNITS/MIN: 20 INJECTION INTRAVENOUS at 01:11

## 2022-01-01 RX ADMIN — MEROPENEM 1 G: 1 INJECTION, POWDER, FOR SOLUTION INTRAVENOUS at 05:11

## 2022-01-01 RX ADMIN — DEXMEDETOMIDINE HYDROCHLORIDE 0.2 MCG/KG/HR: 4 INJECTION, SOLUTION INTRAVENOUS at 02:11

## 2022-01-01 RX ADMIN — RACEPINEPHRINE HYDROCHLORIDE 0.5 ML: 11.25 SOLUTION RESPIRATORY (INHALATION) at 10:06

## 2022-01-01 RX ADMIN — DILTIAZEM HYDROCHLORIDE 360 MG: 120 CAPSULE, COATED, EXTENDED RELEASE ORAL at 10:11

## 2022-01-01 RX ADMIN — PROPOFOL 35 MCG/KG/MIN: 10 INJECTION, EMULSION INTRAVENOUS at 11:11

## 2022-01-01 RX ADMIN — MEROPENEM 1 G: 1 INJECTION, POWDER, FOR SOLUTION INTRAVENOUS at 12:10

## 2022-01-01 RX ADMIN — INSULIN DETEMIR 10 UNITS: 100 INJECTION, SOLUTION SUBCUTANEOUS at 08:11

## 2022-01-01 RX ADMIN — SUGAMMADEX 200 MG: 100 INJECTION, SOLUTION INTRAVENOUS at 09:06

## 2022-01-01 RX ADMIN — MEROPENEM 1 G: 1 INJECTION, POWDER, FOR SOLUTION INTRAVENOUS at 10:10

## 2022-01-01 RX ADMIN — NOREPINEPHRINE BITARTRATE 0.06 MCG/KG/MIN: 1 INJECTION, SOLUTION, CONCENTRATE INTRAVENOUS at 07:11

## 2022-01-01 RX ADMIN — HEPARIN 500 UNITS: 100 SYRINGE at 10:06

## 2022-01-01 RX ADMIN — DEXMEDETOMIDINE HYDROCHLORIDE 1 MCG/KG/HR: 4 INJECTION, SOLUTION INTRAVENOUS at 02:11

## 2022-01-01 RX ADMIN — PROPOFOL 45 MCG/KG/MIN: 10 INJECTION, EMULSION INTRAVENOUS at 08:11

## 2022-01-01 RX ADMIN — IPRATROPIUM BROMIDE AND ALBUTEROL SULFATE 3 ML: 2.5; .5 SOLUTION RESPIRATORY (INHALATION) at 11:10

## 2022-01-01 RX ADMIN — PIPERACILLIN AND TAZOBACTAM 4.5 G: 4; .5 INJECTION, POWDER, LYOPHILIZED, FOR SOLUTION INTRAVENOUS; PARENTERAL at 01:11

## 2022-01-01 RX ADMIN — POTASSIUM CHLORIDE 10 MEQ: 7.46 INJECTION, SOLUTION INTRAVENOUS at 07:11

## 2022-01-01 RX ADMIN — INSULIN ASPART 2 UNITS: 100 INJECTION, SOLUTION INTRAVENOUS; SUBCUTANEOUS at 10:11

## 2022-01-01 RX ADMIN — DEXMEDETOMIDINE HYDROCHLORIDE 0.06 MCG/KG/HR: 4 INJECTION, SOLUTION INTRAVENOUS at 10:11

## 2022-01-01 RX ADMIN — SODIUM CHLORIDE, POTASSIUM CHLORIDE, SODIUM LACTATE AND CALCIUM CHLORIDE: 600; 310; 30; 20 INJECTION, SOLUTION INTRAVENOUS at 06:06

## 2022-01-01 RX ADMIN — PROPOFOL 20 MCG/KG/MIN: 10 INJECTION, EMULSION INTRAVENOUS at 01:11

## 2022-01-01 RX ADMIN — PROPOFOL 10 MCG/KG/MIN: 10 INJECTION, EMULSION INTRAVENOUS at 07:11

## 2022-01-01 RX ADMIN — ACETAMINOPHEN 650 MG: 325 TABLET ORAL at 02:10

## 2022-01-01 RX ADMIN — SULFAMETHOXAZOLE AND TRIMETHOPRIM 1 TABLET: 800; 160 TABLET ORAL at 09:11

## 2022-01-01 RX ADMIN — VASOPRESSIN 0.04 UNITS/MIN: 20 INJECTION INTRAVENOUS at 08:11

## 2022-01-01 RX ADMIN — PIPERACILLIN AND TAZOBACTAM 4.5 G: 4; .5 INJECTION, POWDER, LYOPHILIZED, FOR SOLUTION INTRAVENOUS; PARENTERAL at 10:11

## 2022-01-01 RX ADMIN — HYDROCODONE BITARTRATE AND ACETAMINOPHEN 1 TABLET: 5; 325 TABLET ORAL at 10:10

## 2022-01-01 RX ADMIN — PHENYLEPHRINE HYDROCHLORIDE 200 MCG: 10 INJECTION INTRAVENOUS at 07:06

## 2022-01-01 RX ADMIN — INSULIN ASPART 2 UNITS: 100 INJECTION, SOLUTION INTRAVENOUS; SUBCUTANEOUS at 02:11

## 2022-01-01 RX ADMIN — LEVOFLOXACIN 500 MG: 500 INJECTION, SOLUTION INTRAVENOUS at 10:10

## 2022-01-01 RX ADMIN — METOPROLOL TARTRATE 5 MG: 5 INJECTION, SOLUTION INTRAVENOUS at 12:10

## 2022-01-01 RX ADMIN — IPRATROPIUM BROMIDE AND ALBUTEROL SULFATE 3 ML: 2.5; .5 SOLUTION RESPIRATORY (INHALATION) at 04:11

## 2022-01-01 RX ADMIN — MIRTAZAPINE 15 MG: 15 TABLET, FILM COATED ORAL at 08:10

## 2022-01-01 RX ADMIN — METOPROLOL TARTRATE 100 MG: 50 TABLET, FILM COATED ORAL at 09:10

## 2022-01-01 RX ADMIN — AMIODARONE HYDROCHLORIDE 1 MG/MIN: 1.8 INJECTION, SOLUTION INTRAVENOUS at 02:11

## 2022-01-01 RX ADMIN — GABAPENTIN 300 MG: 300 CAPSULE ORAL at 02:10

## 2022-01-01 RX ADMIN — DILTIAZEM HYDROCHLORIDE 360 MG: 180 CAPSULE, COATED, EXTENDED RELEASE ORAL at 12:10

## 2022-01-01 RX ADMIN — DEXTROSE MONOHYDRATE 10 MG/HR: 50 INJECTION, SOLUTION INTRAVENOUS at 03:10

## 2022-01-01 RX ADMIN — POTASSIUM CHLORIDE 40 MEQ: 1500 TABLET, EXTENDED RELEASE ORAL at 08:10

## 2022-01-01 RX ADMIN — PROPOFOL 50 MCG/KG/MIN: 10 INJECTION, EMULSION INTRAVENOUS at 02:11

## 2022-01-01 RX ADMIN — DILTIAZEM HYDROCHLORIDE 360 MG: 180 CAPSULE, COATED, EXTENDED RELEASE ORAL at 08:10

## 2022-01-01 RX ADMIN — DEXMEDETOMIDINE HYDROCHLORIDE 0.6 MCG/KG/HR: 4 INJECTION, SOLUTION INTRAVENOUS at 09:11

## 2022-01-01 RX ADMIN — PROPOFOL 50 MCG/KG/MIN: 10 INJECTION, EMULSION INTRAVENOUS at 11:11

## 2022-01-01 RX ADMIN — PROPOFOL 50 MCG/KG/MIN: 10 INJECTION, EMULSION INTRAVENOUS at 04:11

## 2022-01-01 RX ADMIN — CEFEPIME 1 G: 1 INJECTION, POWDER, FOR SOLUTION INTRAMUSCULAR; INTRAVENOUS at 07:11

## 2022-01-01 RX ADMIN — DEXMEDETOMIDINE HYDROCHLORIDE 0.5 MCG/KG/HR: 4 INJECTION, SOLUTION INTRAVENOUS at 04:11

## 2022-01-01 RX ADMIN — POTASSIUM CHLORIDE 20 MEQ: 14.9 INJECTION, SOLUTION INTRAVENOUS at 03:11

## 2022-01-01 RX ADMIN — HEPARIN SODIUM 5000 UNITS: 5000 INJECTION, SOLUTION INTRAVENOUS; SUBCUTANEOUS at 06:06

## 2022-01-01 RX ADMIN — VANCOMYCIN HYDROCHLORIDE 750 MG: 750 INJECTION, POWDER, LYOPHILIZED, FOR SOLUTION INTRAVENOUS at 08:11

## 2022-01-01 RX ADMIN — PROPOFOL 40 MCG/KG/MIN: 10 INJECTION, EMULSION INTRAVENOUS at 05:11

## 2022-01-01 RX ADMIN — DEXMEDETOMIDINE HYDROCHLORIDE 1.4 MCG/KG/HR: 4 INJECTION, SOLUTION INTRAVENOUS at 11:11

## 2022-01-01 RX ADMIN — PROPOFOL 40 MCG/KG/MIN: 10 INJECTION, EMULSION INTRAVENOUS at 12:11

## 2022-01-01 RX ADMIN — SODIUM CHLORIDE 900 MG: 9 INJECTION, SOLUTION INTRAVENOUS at 10:09

## 2022-01-01 RX ADMIN — PHENYLEPHRINE HYDROCHLORIDE 100 MCG: 10 INJECTION INTRAVENOUS at 07:06

## 2022-01-01 RX ADMIN — DEXTROSE MONOHYDRATE 15 MG/HR: 50 INJECTION, SOLUTION INTRAVENOUS at 06:10

## 2022-01-01 RX ADMIN — METHYLPREDNISOLONE SODIUM SUCCINATE 60 MG: 125 INJECTION, POWDER, FOR SOLUTION INTRAMUSCULAR; INTRAVENOUS at 06:10

## 2022-01-01 RX ADMIN — DEXMEDETOMIDINE HYDROCHLORIDE 1 MCG/KG/HR: 4 INJECTION, SOLUTION INTRAVENOUS at 05:11

## 2022-01-01 RX ADMIN — VASOPRESSIN 0.04 UNITS/MIN: 20 INJECTION INTRAVENOUS at 09:11

## 2022-01-01 RX ADMIN — GUAIFENESIN AND DEXTROMETHORPHAN 5 ML: 100; 10 SYRUP ORAL at 01:11

## 2022-01-01 RX ADMIN — SODIUM CHLORIDE 200 MG: 9 INJECTION, SOLUTION INTRAVENOUS at 09:09

## 2022-01-01 RX ADMIN — PIPERACILLIN AND TAZOBACTAM 4.5 G: 4; .5 INJECTION, POWDER, LYOPHILIZED, FOR SOLUTION INTRAVENOUS; PARENTERAL at 08:11

## 2022-01-01 RX ADMIN — DEXTROSE MONOHYDRATE 10 MG/HR: 50 INJECTION, SOLUTION INTRAVENOUS at 04:10

## 2022-01-01 RX ADMIN — SODIUM CHLORIDE 200 MG: 9 INJECTION, SOLUTION INTRAVENOUS at 09:05

## 2022-01-01 RX ADMIN — PROPOFOL 40 MCG/KG/MIN: 10 INJECTION, EMULSION INTRAVENOUS at 09:11

## 2022-01-01 RX ADMIN — VASOPRESSIN 0.04 UNITS/MIN: 20 INJECTION INTRAVENOUS at 07:11

## 2022-01-01 RX ADMIN — SODIUM CHLORIDE 200 MG: 9 INJECTION, SOLUTION INTRAVENOUS at 11:08

## 2022-01-01 RX ADMIN — GABAPENTIN 300 MG: 300 CAPSULE ORAL at 03:11

## 2022-01-01 RX ADMIN — MEROPENEM 1 G: 1 INJECTION, POWDER, FOR SOLUTION INTRAVENOUS at 06:10

## 2022-01-01 RX ADMIN — DEXMEDETOMIDINE HYDROCHLORIDE 0.4 MCG/KG/HR: 4 INJECTION, SOLUTION INTRAVENOUS at 05:11

## 2022-01-01 RX ADMIN — IOPAMIDOL 100 ML: 755 INJECTION, SOLUTION INTRAVENOUS at 01:08

## 2022-01-01 RX ADMIN — POTASSIUM BICARBONATE 20 MEQ: 391 TABLET, EFFERVESCENT ORAL at 08:11

## 2022-01-01 RX ADMIN — IPRATROPIUM BROMIDE AND ALBUTEROL SULFATE 3 ML: 2.5; .5 SOLUTION RESPIRATORY (INHALATION) at 09:06

## 2022-01-01 RX ADMIN — PHENYLEPHRINE HYDROCHLORIDE 200 MCG: 10 INJECTION INTRAVENOUS at 08:06

## 2022-01-01 RX ADMIN — PROPOFOL 20 MCG/KG/MIN: 10 INJECTION, EMULSION INTRAVENOUS at 03:11

## 2022-01-01 RX ADMIN — DEXMEDETOMIDINE HYDROCHLORIDE 1 MCG/KG/HR: 4 INJECTION, SOLUTION INTRAVENOUS at 03:11

## 2022-01-01 RX ADMIN — FENTANYL CITRATE 100 MCG/HR: 50 INJECTION, SOLUTION INTRAMUSCULAR; INTRAVENOUS at 04:11

## 2022-01-01 RX ADMIN — GUAIFENESIN AND CODEINE PHOSPHATE 10 ML: 100; 10 SOLUTION ORAL at 02:10

## 2022-01-01 RX ADMIN — METOPROLOL TARTRATE 100 MG: 50 TABLET, FILM COATED ORAL at 08:10

## 2022-01-01 RX ADMIN — DEXMEDETOMIDINE HYDROCHLORIDE 1 MCG/KG/HR: 4 INJECTION, SOLUTION INTRAVENOUS at 08:11

## 2022-01-01 RX ADMIN — DEXMEDETOMIDINE HYDROCHLORIDE 0.4 MCG/KG/HR: 4 INJECTION, SOLUTION INTRAVENOUS at 02:11

## 2022-01-01 RX ADMIN — MORPHINE SULFATE 1 MG: 2 INJECTION, SOLUTION INTRAMUSCULAR; INTRAVENOUS at 02:11

## 2022-01-01 RX ADMIN — DICYCLOMINE HYDROCHLORIDE 10 MG: 10 CAPSULE ORAL at 09:10

## 2022-01-01 RX ADMIN — HYDROMORPHONE HYDROCHLORIDE 0.2 MG: 1 INJECTION, SOLUTION INTRAMUSCULAR; INTRAVENOUS; SUBCUTANEOUS at 10:06

## 2022-01-01 RX ADMIN — PROPOFOL 40 MCG/KG/MIN: 10 INJECTION, EMULSION INTRAVENOUS at 04:11

## 2022-01-01 RX ADMIN — IPRATROPIUM BROMIDE AND ALBUTEROL SULFATE 3 ML: 2.5; .5 SOLUTION RESPIRATORY (INHALATION) at 07:11

## 2022-01-01 RX ADMIN — AMIODARONE HYDROCHLORIDE 1 MG/MIN: 1.8 INJECTION, SOLUTION INTRAVENOUS at 01:11

## 2022-01-01 RX ADMIN — CEFEPIME 2 G: 2 INJECTION, POWDER, FOR SOLUTION INTRAVENOUS at 03:11

## 2022-01-01 RX ADMIN — SODIUM CHLORIDE, POTASSIUM CHLORIDE, SODIUM LACTATE AND CALCIUM CHLORIDE 1000 ML: 600; 310; 30; 20 INJECTION, SOLUTION INTRAVENOUS at 09:09

## 2022-01-01 RX ADMIN — AMIODARONE HYDROCHLORIDE 1 MG/MIN: 1.8 INJECTION, SOLUTION INTRAVENOUS at 09:11

## 2022-01-01 RX ADMIN — NOREPINEPHRINE BITARTRATE 0.07 MCG/KG/MIN: 1 INJECTION, SOLUTION, CONCENTRATE INTRAVENOUS at 07:11

## 2022-01-01 RX ADMIN — CEFEPIME 2 G: 2 INJECTION, POWDER, FOR SOLUTION INTRAVENOUS at 01:11

## 2022-01-01 RX ADMIN — GUAIFENESIN AND CODEINE PHOSPHATE 10 ML: 100; 10 SOLUTION ORAL at 08:10

## 2022-01-01 RX ADMIN — PROPOFOL 40 MCG/KG/MIN: 10 INJECTION, EMULSION INTRAVENOUS at 10:11

## 2022-01-01 RX ADMIN — PROPOFOL 20 MCG/KG/MIN: 10 INJECTION, EMULSION INTRAVENOUS at 02:11

## 2022-01-01 RX ADMIN — POTASSIUM CHLORIDE 10 MEQ: 7.46 INJECTION, SOLUTION INTRAVENOUS at 10:11

## 2022-01-01 RX ADMIN — DEXMEDETOMIDINE HYDROCHLORIDE 0.4 MCG/KG/HR: 4 INJECTION, SOLUTION INTRAVENOUS at 08:11

## 2022-01-01 RX ADMIN — PROPOFOL 0.5 MCG/KG/MIN: 10 INJECTION, EMULSION INTRAVENOUS at 12:11

## 2022-01-01 RX ADMIN — POTASSIUM CHLORIDE 10 MEQ: 7.46 INJECTION, SOLUTION INTRAVENOUS at 05:11

## 2022-01-01 RX ADMIN — ALPRAZOLAM 0.5 MG: 0.5 TABLET ORAL at 10:10

## 2022-01-01 RX ADMIN — GABAPENTIN 300 MG: 300 CAPSULE ORAL at 04:10

## 2022-01-01 RX ADMIN — HYDROCORTISONE SODIUM SUCCINATE 100 MG: 100 INJECTION, POWDER, FOR SOLUTION INTRAMUSCULAR; INTRAVENOUS at 05:11

## 2022-01-01 RX ADMIN — INSULIN ASPART 4 UNITS: 100 INJECTION, SOLUTION INTRAVENOUS; SUBCUTANEOUS at 06:11

## 2022-01-01 RX ADMIN — METHYLPREDNISOLONE SODIUM SUCCINATE 60 MG: 125 INJECTION, POWDER, FOR SOLUTION INTRAMUSCULAR; INTRAVENOUS at 04:10

## 2022-01-01 RX ADMIN — FENTANYL CITRATE 25 MCG: 50 INJECTION, SOLUTION INTRAMUSCULAR; INTRAVENOUS at 09:06

## 2022-01-01 RX ADMIN — LEUCINE, PHENYLALANINE, LYSINE, METHIONINE, ISOLEUCINE, VALINE, HISTIDINE, THREONINE, TRYPTOPHAN, ALANINE, GLYCINE, ARGININE, PROLINE, SERINE, TYROSINE, DEXTROSE: 365; 280; 290; 200; 300; 290; 240; 210; 90; 1035; 515; 575; 340; 250; 20; 20 INJECTION INTRAVENOUS at 06:11

## 2022-01-01 RX ADMIN — POTASSIUM CHLORIDE 10 MEQ: 7.46 INJECTION, SOLUTION INTRAVENOUS at 06:11

## 2022-01-01 RX ADMIN — IOPAMIDOL 100 ML: 755 INJECTION, SOLUTION INTRAVENOUS at 05:10

## 2022-01-01 RX ADMIN — HYDROCODONE BITARTRATE AND ACETAMINOPHEN 1 TABLET: 5; 325 TABLET ORAL at 02:10

## 2022-01-01 RX ADMIN — POTASSIUM, SODIUM PHOSPHATES 280 MG-160 MG-250 MG ORAL POWDER PACKET 2 PACKET: POWDER IN PACKET at 09:11

## 2022-01-01 RX ADMIN — SODIUM CHLORIDE 200 MG: 9 INJECTION, SOLUTION INTRAVENOUS at 09:06

## 2022-01-01 RX ADMIN — GUAIFENESIN AND CODEINE PHOSPHATE 10 ML: 100; 10 SOLUTION ORAL at 11:10

## 2022-01-01 RX ADMIN — SODIUM CHLORIDE 975 MG: 9 INJECTION, SOLUTION INTRAVENOUS at 12:08

## 2022-01-01 RX ADMIN — DEXMEDETOMIDINE HYDROCHLORIDE 1.2 MCG/KG/HR: 4 INJECTION, SOLUTION INTRAVENOUS at 12:11

## 2022-01-01 RX ADMIN — PROPOFOL 45 MCG/KG/MIN: 10 INJECTION, EMULSION INTRAVENOUS at 09:11

## 2022-01-01 RX ADMIN — DEXMEDETOMIDINE HYDROCHLORIDE 0.4 MCG/KG/HR: 4 INJECTION, SOLUTION INTRAVENOUS at 06:11

## 2022-01-01 RX ADMIN — MEROPENEM 1 G: 1 INJECTION, POWDER, FOR SOLUTION INTRAVENOUS at 09:10

## 2022-01-01 RX ADMIN — POTASSIUM CHLORIDE 10 MEQ: 7.46 INJECTION, SOLUTION INTRAVENOUS at 11:11

## 2022-01-01 RX ADMIN — Medication 1 G: at 08:11

## 2022-01-01 RX ADMIN — INSULIN ASPART 4 UNITS: 100 INJECTION, SOLUTION INTRAVENOUS; SUBCUTANEOUS at 05:11

## 2022-01-01 RX ADMIN — PIPERACILLIN AND TAZOBACTAM 4.5 G: 4; .5 INJECTION, POWDER, LYOPHILIZED, FOR SOLUTION INTRAVENOUS; PARENTERAL at 05:11

## 2022-01-01 RX ADMIN — NOREPINEPHRINE BITARTRATE 0.5 MCG/KG/MIN: 1 INJECTION, SOLUTION, CONCENTRATE INTRAVENOUS at 11:11

## 2022-01-01 RX ADMIN — INSULIN ASPART 3 UNITS: 100 INJECTION, SOLUTION INTRAVENOUS; SUBCUTANEOUS at 05:11

## 2022-01-01 RX ADMIN — DEXMEDETOMIDINE HYDROCHLORIDE 0.6 MCG/KG/HR: 4 INJECTION, SOLUTION INTRAVENOUS at 06:11

## 2022-01-01 RX ADMIN — GABAPENTIN 300 MG: 300 CAPSULE ORAL at 02:11

## 2022-01-01 RX ADMIN — ROCURONIUM BROMIDE 30 MG: 10 SOLUTION INTRAVENOUS at 08:06

## 2022-01-01 RX ADMIN — INSULIN ASPART 4 UNITS: 100 INJECTION, SOLUTION INTRAVENOUS; SUBCUTANEOUS at 10:11

## 2022-01-01 RX ADMIN — DILTIAZEM HYDROCHLORIDE 5 MG/HR: 5 INJECTION INTRAVENOUS at 02:11

## 2022-01-01 RX ADMIN — SODIUM ZIRCONIUM CYCLOSILICATE 5 G: 5 POWDER, FOR SUSPENSION ORAL at 01:10

## 2022-01-01 RX ADMIN — ASCORBIC ACID, VITAMIN A PALMITATE, CHOLECALCIFEROL, THIAMINE HYDROCHLORIDE, RIBOFLAVIN-5 PHOSPHATE SODIUM, PYRIDOXINE HYDROCHLORIDE, NIACINAMIDE, DEXPANTHENOL, ALPHA-TOCOPHEROL ACETATE, VITAMIN K1, FOLIC ACID, BIOTIN, CYANOCOBALAMIN: 200; 3300; 200; 6; 3.6; 6; 40; 15; 10; 150; 600; 60; 5 INJECTION, SOLUTION INTRAVENOUS at 01:11

## 2022-01-01 RX ADMIN — HYDROCODONE BITARTRATE AND ACETAMINOPHEN 1 TABLET: 5; 325 TABLET ORAL at 05:10

## 2022-01-01 RX ADMIN — GABAPENTIN 300 MG: 300 CAPSULE ORAL at 10:11

## 2022-01-01 RX ADMIN — MEROPENEM 1 G: 1 INJECTION, POWDER, FOR SOLUTION INTRAVENOUS at 06:11

## 2022-01-01 RX ADMIN — PROPOFOL 40 MCG/KG/MIN: 10 INJECTION, EMULSION INTRAVENOUS at 11:11

## 2022-01-01 RX ADMIN — GLYCOPYRROLATE 0.2 MG: 0.2 INJECTION INTRAMUSCULAR; INTRAVENOUS at 07:06

## 2022-01-01 RX ADMIN — MONTELUKAST 10 MG: 10 TABLET, FILM COATED ORAL at 09:10

## 2022-01-01 RX ADMIN — DEXMEDETOMIDINE HYDROCHLORIDE 1.2 MCG/KG/HR: 4 INJECTION, SOLUTION INTRAVENOUS at 07:11

## 2022-01-01 RX ADMIN — NOREPINEPHRINE BITARTRATE 0.02 MCG/KG/MIN: 1 INJECTION, SOLUTION, CONCENTRATE INTRAVENOUS at 07:11

## 2022-01-01 RX ADMIN — INSULIN DETEMIR 5 UNITS: 100 INJECTION, SOLUTION SUBCUTANEOUS at 09:11

## 2022-01-01 RX ADMIN — DEXMEDETOMIDINE HYDROCHLORIDE 0.4 MCG/KG/HR: 4 INJECTION, SOLUTION INTRAVENOUS at 09:11

## 2022-01-01 RX ADMIN — MAGNESIUM SULFATE HEPTAHYDRATE 4 G: 40 INJECTION, SOLUTION INTRAVENOUS at 11:11

## 2022-01-01 RX ADMIN — PROPOFOL 35 MCG/KG/MIN: 10 INJECTION, EMULSION INTRAVENOUS at 12:11

## 2022-01-01 RX ADMIN — SODIUM CHLORIDE: 9 INJECTION, SOLUTION INTRAVENOUS at 09:05

## 2022-01-01 RX ADMIN — VANCOMYCIN HYDROCHLORIDE 1500 MG: 500 INJECTION, POWDER, LYOPHILIZED, FOR SOLUTION INTRAVENOUS at 04:11

## 2022-01-01 RX ADMIN — Medication 10 MG: at 07:06

## 2022-01-01 RX ADMIN — ASCORBIC ACID, VITAMIN A PALMITATE, CHOLECALCIFEROL, THIAMINE HYDROCHLORIDE, RIBOFLAVIN-5 PHOSPHATE SODIUM, PYRIDOXINE HYDROCHLORIDE, NIACINAMIDE, DEXPANTHENOL, ALPHA-TOCOPHEROL ACETATE, VITAMIN K1, FOLIC ACID, BIOTIN, CYANOCOBALAMIN: 200; 3300; 200; 6; 3.6; 6; 40; 15; 10; 150; 600; 60; 5 INJECTION, SOLUTION INTRAVENOUS at 11:11

## 2022-01-01 RX ADMIN — SODIUM CHLORIDE: 9 INJECTION, SOLUTION INTRAVENOUS at 08:06

## 2022-01-01 RX ADMIN — FENTANYL CITRATE 50 MCG/HR: 50 INJECTION, SOLUTION INTRAMUSCULAR; INTRAVENOUS at 03:11

## 2022-01-01 RX ADMIN — NOREPINEPHRINE BITARTRATE 0.03 MCG/KG/MIN: 1 INJECTION, SOLUTION, CONCENTRATE INTRAVENOUS at 05:11

## 2022-01-01 RX ADMIN — MUPIROCIN: 20 OINTMENT TOPICAL at 09:10

## 2022-01-01 RX ADMIN — NOREPINEPHRINE BITARTRATE 0.06 MCG/KG/MIN: 1 INJECTION, SOLUTION, CONCENTRATE INTRAVENOUS at 06:11

## 2022-01-01 RX ADMIN — PROPOFOL 40 MCG/KG/MIN: 10 INJECTION, EMULSION INTRAVENOUS at 08:11

## 2022-01-01 RX ADMIN — DEXMEDETOMIDINE HYDROCHLORIDE 1 MCG/KG/HR: 4 INJECTION, SOLUTION INTRAVENOUS at 12:11

## 2022-01-01 RX ADMIN — CYANOCOBALAMIN 1000 MCG: 1000 INJECTION, SOLUTION INTRAMUSCULAR at 10:07

## 2022-01-01 RX ADMIN — METOPROLOL TARTRATE 5 MG: 5 INJECTION, SOLUTION INTRAVENOUS at 02:10

## 2022-01-01 RX ADMIN — SODIUM BICARBONATE 100 MEQ: 84 INJECTION INTRAVENOUS at 06:11

## 2022-01-01 RX ADMIN — FENTANYL CITRATE 100 MCG/HR: 50 INJECTION, SOLUTION INTRAMUSCULAR; INTRAVENOUS at 02:11

## 2022-01-01 RX ADMIN — POTASSIUM BICARBONATE 25 MEQ: 978 TABLET, EFFERVESCENT ORAL at 08:11

## 2022-01-01 RX ADMIN — ONDANSETRON 4 MG: 2 INJECTION INTRAMUSCULAR; INTRAVENOUS at 02:10

## 2022-01-01 RX ADMIN — IPRATROPIUM BROMIDE AND ALBUTEROL SULFATE 3 ML: 2.5; .5 SOLUTION RESPIRATORY (INHALATION) at 11:06

## 2022-01-01 RX ADMIN — DEXMEDETOMIDINE HYDROCHLORIDE 1 MCG/KG/HR: 4 INJECTION, SOLUTION INTRAVENOUS at 11:11

## 2022-01-01 RX ADMIN — DEXTROSE MONOHYDRATE 10 MG/HR: 50 INJECTION, SOLUTION INTRAVENOUS at 05:10

## 2022-01-01 RX ADMIN — NOREPINEPHRINE BITARTRATE 0.02 MCG/KG/MIN: 1 INJECTION, SOLUTION, CONCENTRATE INTRAVENOUS at 02:11

## 2022-01-01 RX ADMIN — IPRATROPIUM BROMIDE AND ALBUTEROL SULFATE 3 ML: 2.5; .5 SOLUTION RESPIRATORY (INHALATION) at 10:10

## 2022-01-01 RX ADMIN — DEXMEDETOMIDINE HYDROCHLORIDE 0.2 MCG/KG/HR: 4 INJECTION, SOLUTION INTRAVENOUS at 01:11

## 2022-01-01 RX ADMIN — GUAIFENESIN AND CODEINE PHOSPHATE 10 ML: 100; 10 SOLUTION ORAL at 07:10

## 2022-01-01 RX ADMIN — POTASSIUM CHLORIDE, DEXTROSE MONOHYDRATE AND SODIUM CHLORIDE: 150; 5; 450 INJECTION, SOLUTION INTRAVENOUS at 07:10

## 2022-01-01 RX ADMIN — AMIODARONE HYDROCHLORIDE 1 MG/MIN: 1.8 INJECTION, SOLUTION INTRAVENOUS at 07:11

## 2022-01-01 RX ADMIN — INSULIN ASPART 6 UNITS: 100 INJECTION, SOLUTION INTRAVENOUS; SUBCUTANEOUS at 09:11

## 2022-01-01 RX ADMIN — IPRATROPIUM BROMIDE 0.5 MG: 0.5 SOLUTION RESPIRATORY (INHALATION) at 11:11

## 2022-01-01 RX ADMIN — INSULIN ASPART 1 UNITS: 100 INJECTION, SOLUTION INTRAVENOUS; SUBCUTANEOUS at 09:11

## 2022-01-01 RX ADMIN — ASPIRIN 81 MG 81 MG: 81 TABLET ORAL at 12:10

## 2022-01-01 RX ADMIN — FOLIC ACID 1 MG: 1 TABLET ORAL at 09:11

## 2022-01-01 RX ADMIN — ACETAMINOPHEN 650 MG: 325 TABLET ORAL at 04:11

## 2022-01-01 RX ADMIN — PROPOFOL 20 MCG/KG/MIN: 10 INJECTION, EMULSION INTRAVENOUS at 05:11

## 2022-01-01 RX ADMIN — CEFEPIME 1 G: 1 INJECTION, POWDER, FOR SOLUTION INTRAMUSCULAR; INTRAVENOUS at 08:11

## 2022-01-01 RX ADMIN — PROPOFOL 20 MCG/KG/MIN: 10 INJECTION, EMULSION INTRAVENOUS at 06:11

## 2022-01-01 RX ADMIN — ASPIRIN 81 MG 81 MG: 81 TABLET ORAL at 09:10

## 2022-01-01 RX ADMIN — ALPRAZOLAM 0.5 MG: 0.25 TABLET ORAL at 12:11

## 2022-01-01 RX ADMIN — VANCOMYCIN HYDROCHLORIDE 1000 MG: 1 INJECTION, POWDER, LYOPHILIZED, FOR SOLUTION INTRAVENOUS at 09:11

## 2022-01-01 RX ADMIN — PIPERACILLIN AND TAZOBACTAM 4.5 G: 4; .5 INJECTION, POWDER, LYOPHILIZED, FOR SOLUTION INTRAVENOUS; PARENTERAL at 06:10

## 2022-01-01 RX ADMIN — DEXMEDETOMIDINE HYDROCHLORIDE 0.2 MCG/KG/HR: 4 INJECTION, SOLUTION INTRAVENOUS at 09:11

## 2022-01-01 RX ADMIN — SUCCINYLCHOLINE CHLORIDE 100 MG: 20 INJECTION, SOLUTION INTRAMUSCULAR; INTRAVENOUS at 08:06

## 2022-01-01 RX ADMIN — PROPOFOL 50 MCG/KG/MIN: 10 INJECTION, EMULSION INTRAVENOUS at 07:06

## 2022-01-01 RX ADMIN — ASPIRIN 81 MG 81 MG: 81 TABLET ORAL at 08:10

## 2022-01-01 RX ADMIN — VASOPRESSIN 0.04 UNITS/MIN: 20 INJECTION INTRAVENOUS at 04:11

## 2022-01-01 RX ADMIN — GUAIFENESIN AND CODEINE PHOSPHATE 10 ML: 100; 10 SOLUTION ORAL at 09:10

## 2022-01-01 RX ADMIN — POTASSIUM CHLORIDE, DEXTROSE MONOHYDRATE AND SODIUM CHLORIDE 125 ML/HR: 150; 5; 450 INJECTION, SOLUTION INTRAVENOUS at 08:10

## 2022-01-01 RX ADMIN — SODIUM ZIRCONIUM CYCLOSILICATE 5 G: 5 POWDER, FOR SUSPENSION ORAL at 08:10

## 2022-01-01 RX ADMIN — CEFAZOLIN 2 G: 330 INJECTION, POWDER, FOR SOLUTION INTRAMUSCULAR; INTRAVENOUS at 07:06

## 2022-01-01 RX ADMIN — HYDROCODONE BITARTRATE AND ACETAMINOPHEN 1 TABLET: 5; 325 TABLET ORAL at 07:10

## 2022-01-01 RX ADMIN — DEXTROSE MONOHYDRATE 10 MG/HR: 50 INJECTION, SOLUTION INTRAVENOUS at 08:10

## 2022-01-01 RX ADMIN — PROPOFOL 35 MCG/KG/MIN: 10 INJECTION, EMULSION INTRAVENOUS at 04:11

## 2022-01-01 RX ADMIN — ONDANSETRON 4 MG: 2 INJECTION INTRAMUSCULAR; INTRAVENOUS at 09:06

## 2022-01-01 RX ADMIN — AZITHROMYCIN 500 MG: 500 INJECTION, POWDER, LYOPHILIZED, FOR SOLUTION INTRAVENOUS at 05:10

## 2022-01-01 RX ADMIN — GUAIFENESIN AND CODEINE PHOSPHATE 10 ML: 100; 10 SOLUTION ORAL at 06:10

## 2022-01-01 RX ADMIN — MAGNESIUM SULFATE IN DEXTROSE 1 G: 10 INJECTION, SOLUTION INTRAVENOUS at 06:11

## 2022-01-01 RX ADMIN — SODIUM CHLORIDE, POTASSIUM CHLORIDE, SODIUM LACTATE AND CALCIUM CHLORIDE 500 ML: 600; 310; 30; 20 INJECTION, SOLUTION INTRAVENOUS at 06:11

## 2022-01-01 RX ADMIN — METOPROLOL TARTRATE 5 MG: 5 INJECTION, SOLUTION INTRAVENOUS at 03:10

## 2022-01-01 RX ADMIN — INSULIN ASPART 3 UNITS: 100 INJECTION, SOLUTION INTRAVENOUS; SUBCUTANEOUS at 03:11

## 2022-01-01 RX ADMIN — AMIODARONE HYDROCHLORIDE 400 MG: 200 TABLET ORAL at 05:11

## 2022-01-01 RX ADMIN — Medication 1 G: at 09:11

## 2022-01-01 RX ADMIN — GABAPENTIN 300 MG: 300 CAPSULE ORAL at 03:10

## 2022-01-01 RX ADMIN — PROPOFOL 10 MCG/KG/MIN: 10 INJECTION, EMULSION INTRAVENOUS at 08:11

## 2022-01-01 RX ADMIN — POTASSIUM BICARBONATE 20 MEQ: 391 TABLET, EFFERVESCENT ORAL at 11:11

## 2022-01-01 RX ADMIN — MAGNESIUM SULFATE IN DEXTROSE 1 G: 10 INJECTION, SOLUTION INTRAVENOUS at 05:11

## 2022-01-01 RX ADMIN — PROPOFOL 40 MCG/KG/MIN: 10 INJECTION, EMULSION INTRAVENOUS at 07:11

## 2022-01-01 RX ADMIN — NOREPINEPHRINE BITARTRATE 0.08 MCG/KG/MIN: 1 INJECTION, SOLUTION, CONCENTRATE INTRAVENOUS at 05:11

## 2022-01-01 RX ADMIN — IPRATROPIUM BROMIDE AND ALBUTEROL SULFATE 3 ML: 2.5; .5 SOLUTION RESPIRATORY (INHALATION) at 02:10

## 2022-01-01 RX ADMIN — MAGNESIUM SULFATE 4 G: 2 INJECTION INTRAVENOUS at 06:11

## 2022-01-01 RX ADMIN — NOREPINEPHRINE BITARTRATE 0.18 MCG/KG/MIN: 1 INJECTION, SOLUTION, CONCENTRATE INTRAVENOUS at 09:11

## 2022-01-01 RX ADMIN — PIPERACILLIN AND TAZOBACTAM 4.5 G: 4; .5 INJECTION, POWDER, LYOPHILIZED, FOR SOLUTION INTRAVENOUS; PARENTERAL at 09:11

## 2022-01-01 RX ADMIN — DEXMEDETOMIDINE HYDROCHLORIDE 1 MCG/KG/HR: 4 INJECTION, SOLUTION INTRAVENOUS at 10:11

## 2022-01-01 RX ADMIN — AMIODARONE HYDROCHLORIDE 400 MG: 200 TABLET ORAL at 08:11

## 2022-01-01 RX ADMIN — INSULIN ASPART 3 UNITS: 100 INJECTION, SOLUTION INTRAVENOUS; SUBCUTANEOUS at 11:11

## 2022-01-01 RX ADMIN — MIDAZOLAM HYDROCHLORIDE 1 MG: 1 INJECTION, SOLUTION INTRAMUSCULAR; INTRAVENOUS at 07:06

## 2022-01-01 RX ADMIN — MORPHINE SULFATE 1 MG: 2 INJECTION, SOLUTION INTRAMUSCULAR; INTRAVENOUS at 10:11

## 2022-01-01 RX ADMIN — FENTANYL CITRATE 100 MCG/HR: 50 INJECTION, SOLUTION INTRAMUSCULAR; INTRAVENOUS at 09:11

## 2022-01-01 RX ADMIN — IPRATROPIUM BROMIDE AND ALBUTEROL SULFATE 6 ML: 2.5; .5 SOLUTION RESPIRATORY (INHALATION) at 02:11

## 2022-01-01 RX ADMIN — SODIUM CHLORIDE, POTASSIUM CHLORIDE, SODIUM LACTATE AND CALCIUM CHLORIDE 500 ML: 600; 310; 30; 20 INJECTION, SOLUTION INTRAVENOUS at 07:11

## 2022-01-01 RX ADMIN — METOPROLOL TARTRATE 5 MG: 1 INJECTION, SOLUTION INTRAVENOUS at 01:11

## 2022-01-01 RX ADMIN — CEFEPIME 2 G: 2 INJECTION, POWDER, FOR SOLUTION INTRAVENOUS at 12:11

## 2022-01-01 RX ADMIN — POTASSIUM CHLORIDE 10 MEQ: 7.46 INJECTION, SOLUTION INTRAVENOUS at 12:11

## 2022-01-01 RX ADMIN — POTASSIUM CHLORIDE 20 MEQ: 14.9 INJECTION, SOLUTION INTRAVENOUS at 05:11

## 2022-01-01 RX ADMIN — INSULIN ASPART 2 UNITS: 100 INJECTION, SOLUTION INTRAVENOUS; SUBCUTANEOUS at 11:11

## 2022-01-01 RX ADMIN — MEROPENEM 1 G: 1 INJECTION, POWDER, FOR SOLUTION INTRAVENOUS at 08:10

## 2022-01-01 RX ADMIN — HEPARIN 500 UNITS: 100 SYRINGE at 11:07

## 2022-01-01 RX ADMIN — SODIUM CHLORIDE, PRESERVATIVE FREE 10 ML: 5 INJECTION INTRAVENOUS at 11:07

## 2022-01-01 RX ADMIN — SODIUM CHLORIDE 900 MG: 9 INJECTION, SOLUTION INTRAVENOUS at 10:05

## 2022-01-01 RX ADMIN — PROPOFOL 30 MCG/KG/MIN: 10 INJECTION, EMULSION INTRAVENOUS at 04:11

## 2022-01-01 RX ADMIN — OXYCODONE HYDROCHLORIDE 5 MG: 5 TABLET ORAL at 12:06

## 2022-01-01 RX ADMIN — DEXAMETHASONE SODIUM PHOSPHATE 0.25 MG: 4 INJECTION, SOLUTION INTRA-ARTICULAR; INTRALESIONAL; INTRAMUSCULAR; INTRAVENOUS; SOFT TISSUE at 11:08

## 2022-01-01 RX ADMIN — SODIUM CHLORIDE, PRESERVATIVE FREE 10 ML: 5 INJECTION INTRAVENOUS at 01:11

## 2022-01-01 RX ADMIN — LEVOFLOXACIN 750 MG: 750 INJECTION, SOLUTION INTRAVENOUS at 10:10

## 2022-01-01 RX ADMIN — VASOPRESSIN 0.04 UNITS/MIN: 20 INJECTION INTRAVENOUS at 10:11

## 2022-01-01 RX ADMIN — PROPOFOL 20 MCG/KG/MIN: 10 INJECTION, EMULSION INTRAVENOUS at 10:11

## 2022-01-01 RX ADMIN — POTASSIUM CHLORIDE 20 MEQ: 14.9 INJECTION, SOLUTION INTRAVENOUS at 12:11

## 2022-01-01 RX ADMIN — DEXMEDETOMIDINE HYDROCHLORIDE 1.4 MCG/KG/HR: 4 INJECTION, SOLUTION INTRAVENOUS at 03:11

## 2022-01-01 RX ADMIN — INSULIN DETEMIR 5 UNITS: 100 INJECTION, SOLUTION SUBCUTANEOUS at 08:11

## 2022-01-01 RX ADMIN — GUAIFENESIN AND CODEINE PHOSPHATE 10 ML: 100; 10 SOLUTION ORAL at 04:10

## 2022-01-01 RX ADMIN — IOHEXOL 100 ML: 350 INJECTION, SOLUTION INTRAVENOUS at 11:10

## 2022-01-01 RX ADMIN — INSULIN DETEMIR 10 UNITS: 100 INJECTION, SOLUTION SUBCUTANEOUS at 09:11

## 2022-01-01 RX ADMIN — PROPOFOL 10 MCG/KG/MIN: 10 INJECTION, EMULSION INTRAVENOUS at 03:11

## 2022-01-01 RX ADMIN — CEFEPIME 1 G: 1 INJECTION, POWDER, FOR SOLUTION INTRAMUSCULAR; INTRAVENOUS at 03:11

## 2022-01-01 RX ADMIN — IOPAMIDOL 100 ML: 755 INJECTION, SOLUTION INTRAVENOUS at 09:09

## 2022-01-01 RX ADMIN — PHENYLEPHRINE HYDROCHLORIDE 100 MCG: 10 INJECTION INTRAVENOUS at 08:06

## 2022-01-01 RX ADMIN — VANCOMYCIN HYDROCHLORIDE 2000 MG: 1 INJECTION, POWDER, LYOPHILIZED, FOR SOLUTION INTRAVENOUS at 08:10

## 2022-01-01 RX ADMIN — DEXMEDETOMIDINE HYDROCHLORIDE 0.4 MCG/KG/HR: 4 INJECTION, SOLUTION INTRAVENOUS at 11:11

## 2022-01-01 RX ADMIN — METOPROLOL TARTRATE 5 MG: 5 INJECTION, SOLUTION INTRAVENOUS at 08:10

## 2022-01-01 RX ADMIN — DEXTROSE MONOHYDRATE 15 MG/HR: 50 INJECTION, SOLUTION INTRAVENOUS at 11:10

## 2022-01-01 RX ADMIN — DEXMEDETOMIDINE HYDROCHLORIDE 0.2 MCG/KG/HR: 4 INJECTION, SOLUTION INTRAVENOUS at 05:11

## 2022-01-01 RX ADMIN — SODIUM CHLORIDE 975 MG: 9 INJECTION, SOLUTION INTRAVENOUS at 10:06

## 2022-01-01 RX ADMIN — NOREPINEPHRINE BITARTRATE 0.33 MCG/KG/MIN: 1 INJECTION, SOLUTION, CONCENTRATE INTRAVENOUS at 05:11

## 2022-01-01 RX ADMIN — METHYLPREDNISOLONE SODIUM SUCCINATE 125 MG: 125 INJECTION, POWDER, FOR SOLUTION INTRAMUSCULAR; INTRAVENOUS at 03:10

## 2022-01-01 RX ADMIN — SODIUM CHLORIDE 1000 ML: 9 INJECTION, SOLUTION INTRAVENOUS at 12:11

## 2022-01-01 RX ADMIN — GADOBENATE DIMEGLUMINE 15 ML: 529 INJECTION, SOLUTION INTRAVENOUS at 03:07

## 2022-01-01 RX ADMIN — DEXMEDETOMIDINE HYDROCHLORIDE 0.2 MCG/KG/HR: 4 INJECTION, SOLUTION INTRAVENOUS at 11:11

## 2022-01-01 RX ADMIN — HYDROCODONE BITARTRATE AND ACETAMINOPHEN 1 TABLET: 5; 325 TABLET ORAL at 03:11

## 2022-01-01 RX ADMIN — SODIUM CHLORIDE, POTASSIUM CHLORIDE, SODIUM LACTATE AND CALCIUM CHLORIDE 2244 ML: 600; 310; 30; 20 INJECTION, SOLUTION INTRAVENOUS at 03:10

## 2022-01-01 RX ADMIN — DEXMEDETOMIDINE HYDROCHLORIDE 0.2 MCG/KG/HR: 4 INJECTION, SOLUTION INTRAVENOUS at 12:11

## 2022-01-01 RX ADMIN — DEXMEDETOMIDINE HYDROCHLORIDE 1 MCG/KG/HR: 4 INJECTION, SOLUTION INTRAVENOUS at 07:11

## 2022-01-01 RX ADMIN — ASCORBIC ACID, VITAMIN A PALMITATE, CHOLECALCIFEROL, THIAMINE HYDROCHLORIDE, RIBOFLAVIN-5 PHOSPHATE SODIUM, PYRIDOXINE HYDROCHLORIDE, NIACINAMIDE, DEXPANTHENOL, ALPHA-TOCOPHEROL ACETATE, VITAMIN K1, FOLIC ACID, BIOTIN, CYANOCOBALAMIN: 200; 3300; 200; 6; 3.6; 6; 40; 15; 10; 150; 600; 60; 5 INJECTION, SOLUTION INTRAVENOUS at 05:11

## 2022-01-01 RX ADMIN — POTASSIUM BICARBONATE 20 MEQ: 391 TABLET, EFFERVESCENT ORAL at 03:11

## 2022-01-01 RX ADMIN — ALPRAZOLAM 0.5 MG: 0.5 TABLET ORAL at 09:10

## 2022-01-01 RX ADMIN — MAGNESIUM SULFATE IN DEXTROSE 1 G: 10 INJECTION, SOLUTION INTRAVENOUS at 08:11

## 2022-01-01 RX ADMIN — AMIODARONE HYDROCHLORIDE 0.5 MG/MIN: 1.8 INJECTION, SOLUTION INTRAVENOUS at 05:11

## 2022-01-01 RX ADMIN — AMIODARONE HYDROCHLORIDE 1 MG/MIN: 1.8 INJECTION, SOLUTION INTRAVENOUS at 11:11

## 2022-01-01 RX ADMIN — OXYCODONE HYDROCHLORIDE 5 MG: 5 TABLET ORAL at 04:06

## 2022-01-01 RX ADMIN — PROPOFOL 30 MCG/KG/MIN: 10 INJECTION, EMULSION INTRAVENOUS at 05:11

## 2022-01-01 RX ADMIN — METOPROLOL TARTRATE 5 MG: 1 INJECTION, SOLUTION INTRAVENOUS at 12:11

## 2022-01-01 RX ADMIN — CEFEPIME 2 G: 2 INJECTION, POWDER, FOR SOLUTION INTRAVENOUS at 04:11

## 2022-01-01 RX ADMIN — INSULIN ASPART 4 UNITS: 100 INJECTION, SOLUTION INTRAVENOUS; SUBCUTANEOUS at 07:11

## 2022-01-01 RX ADMIN — DEXAMETHASONE SODIUM PHOSPHATE 0.25 MG: 4 INJECTION, SOLUTION INTRA-ARTICULAR; INTRALESIONAL; INTRAMUSCULAR; INTRAVENOUS; SOFT TISSUE at 10:05

## 2022-01-01 RX ADMIN — IPRATROPIUM BROMIDE AND ALBUTEROL SULFATE 3 ML: 2.5; .5 SOLUTION RESPIRATORY (INHALATION) at 11:11

## 2022-01-01 RX ADMIN — PROPOFOL 50 MG: 10 INJECTION, EMULSION INTRAVENOUS at 08:06

## 2022-01-01 RX ADMIN — PROPOFOL 50 MCG/KG/MIN: 10 INJECTION, EMULSION INTRAVENOUS at 03:11

## 2022-01-01 RX ADMIN — PROPOFOL 45 MCG/KG/MIN: 10 INJECTION, EMULSION INTRAVENOUS at 05:11

## 2022-01-01 RX ADMIN — POTASSIUM CHLORIDE 40 MEQ: 1500 TABLET, EXTENDED RELEASE ORAL at 05:10

## 2022-01-01 RX ADMIN — NOREPINEPHRINE BITARTRATE 0.22 MCG/KG/MIN: 1 INJECTION, SOLUTION, CONCENTRATE INTRAVENOUS at 05:11

## 2022-01-01 RX ADMIN — PIPERACILLIN AND TAZOBACTAM 4.5 G: 4; .5 INJECTION, POWDER, LYOPHILIZED, FOR SOLUTION INTRAVENOUS; PARENTERAL at 03:10

## 2022-01-01 RX ADMIN — SULFAMETHOXAZOLE AND TRIMETHOPRIM 1 TABLET: 800; 160 TABLET ORAL at 11:11

## 2022-01-01 RX ADMIN — DEXMEDETOMIDINE HYDROCHLORIDE 1 MCG/KG/HR: 4 INJECTION, SOLUTION INTRAVENOUS at 01:11

## 2022-01-01 RX ADMIN — PROPOFOL 40 MCG/KG/MIN: 10 INJECTION, EMULSION INTRAVENOUS at 03:11

## 2022-01-01 RX ADMIN — FENTANYL CITRATE 100 MCG/HR: 50 INJECTION, SOLUTION INTRAMUSCULAR; INTRAVENOUS at 12:11

## 2022-01-01 RX ADMIN — VASOPRESSIN 0.04 UNITS/MIN: 20 INJECTION INTRAVENOUS at 03:11

## 2022-01-01 RX ADMIN — HYDROCODONE BITARTRATE AND ACETAMINOPHEN 1 TABLET: 5; 325 TABLET ORAL at 11:11

## 2022-01-01 RX ADMIN — NOREPINEPHRINE BITARTRATE 0.16 MCG/KG/MIN: 1 INJECTION, SOLUTION, CONCENTRATE INTRAVENOUS at 09:11

## 2022-01-01 RX ADMIN — AZITHROMYCIN 500 MG: 500 INJECTION, POWDER, LYOPHILIZED, FOR SOLUTION INTRAVENOUS at 04:10

## 2022-01-01 RX ADMIN — AMIODARONE HYDROCHLORIDE 1 MG/MIN: 1.8 INJECTION, SOLUTION INTRAVENOUS at 08:11

## 2022-01-01 RX ADMIN — DICYCLOMINE HYDROCHLORIDE 10 MG: 10 CAPSULE ORAL at 04:10

## 2022-01-01 RX ADMIN — CEFEPIME 2 G: 2 INJECTION, POWDER, FOR SOLUTION INTRAVENOUS at 05:11

## 2022-01-01 RX ADMIN — INSULIN ASPART 4 UNITS: 100 INJECTION, SOLUTION INTRAVENOUS; SUBCUTANEOUS at 04:11

## 2022-01-01 RX ADMIN — HYDROCODONE BITARTRATE AND ACETAMINOPHEN 1 TABLET: 10; 325 TABLET ORAL at 12:09

## 2022-01-01 RX ADMIN — HYDROCODONE BITARTRATE AND ACETAMINOPHEN 1 TABLET: 5; 325 TABLET ORAL at 09:10

## 2022-01-01 RX ADMIN — PANTOPRAZOLE SODIUM 40 MG: 40 INJECTION, POWDER, LYOPHILIZED, FOR SOLUTION INTRAVENOUS at 08:11

## 2022-01-01 RX ADMIN — ROCURONIUM BROMIDE 80 MG: 10 SOLUTION INTRAVENOUS at 01:11

## 2022-03-22 PROBLEM — C34.90 STAGE IV ADENOCARCINOMA OF LUNG: Status: ACTIVE | Noted: 2022-01-01

## 2022-04-30 NOTE — ED PROVIDER NOTES
Patient:   Adrienne Urbina            MRN: [id number]            FIN: [id number]               Age:   66 years     Sex:  Female     :  1955   Associated Diagnoses:   Anemia; SVT (supraventricular tachycardia)   Author:   Eran Villafuerte MD      Basic Information   Time seen: Date & time 2021 11:54:00.   History source: Patient.   Arrival mode: Private vehicle, wheelchair.   History limitation: None.   Additional information: Chief Complaint from Nursing Triage Note : Chief Complaint   2021 11:59 CDT      Chief Complaint           Pt sent from Dr. givens office for elevated hr. Pt denies cp or palpitations.  .      History of Present Illness   The patient presents with Patient sent from Dr. Givens's office for SVT.  Patient states she has not had palpitations.  heart rate greater than 160 for at least 2-3 days.  Patient had an episode while hospitalized last week which was treated and resolved prior to discharge she denies any complaints.  Patient denies chest pain.  The onset was unknown.  The course/duration of symptoms is constant.  The degree at onset was unknown.  The degree at present is minimal.  The exacerbating factor is none.  The relieving factor is none.  Risk factors consist of hypertension.  Prior episodes: with paroxysmal supraventricular tachycardia.  Therapy today: none.  Associated symptoms: none.  Additional history: none.        Review of Systems   Constitutional symptoms:  Negative except as documented in HPI.   Skin symptoms:  Negative except as documented in HPI.   Eye symptoms:  Negative except as documented in HPI.   ENMT symptoms:  Negative except as documented in HPI.   Respiratory symptoms:  Negative except as documented in HPI.   Cardiovascular symptoms:  Negative except as documented in HPI.   Gastrointestinal symptoms:  Negative except as documented in HPI.   Genitourinary symptoms:  Negative except as documented in HPI.   Musculoskeletal symptoms:  Negative except as  documented in HPI.   Neurologic symptoms:  Negative except as documented in HPI.   Psychiatric symptoms:  Negative except as documented in HPI.             Additional review of systems information: All other systems reviewed and otherwise negative.      Health Status   Allergies:    Allergic Reactions (Selected)  No Known Medication Allergies,    Allergies (1) Active Reaction  No Known Medication Allergies None Documented  .   Medications:  (Selected)   Inpatient Medications  Ordered  Benadryl (for IVPB): 25 mg, form: Infusion, IV Piggyback, Once-chemo, Infuse over: 20 minute(s), first dose 01/08/21 9:29:00 CST, stop date 01/08/21 9:29:00 CST, Week 3  Benadryl (for IVPB): 25 mg, form: Infusion, IV Piggyback, Once-chemo, Infuse over: 20 minute(s), first dose 01/15/21 9:13:00 CST, stop date 01/15/21 9:13:00 CST, Week 4  Benadryl (for IVPB): 25 mg, form: Infusion, IV Piggyback, Once-chemo, Infuse over: 20 minute(s), first dose 01/22/21 9:33:00 CST, stop date 01/22/21 9:33:00 CST, Week 5  Benadryl (for IVPB): 25 mg, form: Infusion, IV Piggyback, Once-chemo, Infuse over: 20 minute(s), first dose 01/29/21 10:23:00 CST, stop date 01/29/21 10:23:00 CST, Week 6  Benadryl (for IVPB): 25 mg, form: Infusion, IV Piggyback, Once-chemo, Infuse over: 20 minute(s), first dose 12/31/20 7:55:00 CST, stop date 12/31/20 7:55:00 CST, Week 2  Heparin Flush 100 U/mL - 5 mL: 500 units, form: Injection, IV Push, Once-chemo, first dose 01/22/21 11:53:00 CST, stop date 01/22/21 11:53:00 CST, Routine, Week 5  Heparin Flush 100 U/mL - 5 mL: 500 units, form: Injection, IV Push, Once-chemo, first dose 12/31/20 10:15:00 CST, stop date 12/31/20 10:15:00 CST, Routine, Week 2  Future  Alimta (for IVPB): 980 mg, IV Piggyback, Once-chemo, Infuse over: 10 minute(s), *Est. first dose 08/18/21 16:16:00 CDT, *Est. stop date 08/18/21 16:16:00 CDT, Future Order, Days 1  Feraheme: 510 mg, form: Soln, IV Piggyback, Once-chemo, Infuse over: 20 minute(s), *Est.  first dose 08/19/21 19:26:00 CDT, *Est. stop date 08/19/21 19:26:00 CDT, Routine, Mix in NS 100ml and infuse over 20 mins., days 1, Future Order  Feraheme: 510 mg, form: Soln, IV Piggyback, Once-chemo, Infuse over: 20 minute(s), *Est. first dose 08/26/21 19:26:00 CDT, *Est. stop date 08/26/21 19:26:00 CDT, Routine, Mix in NS 100ml and infuse over 20 mins., days 8, Future Order  Heparin Flush 100 U/mL - 5 mL: 500 units, form: Injection, IV Push, Once-chemo, *Est. first dose 08/18/21 16:46:00 CDT, *Est. stop date 08/18/21 16:46:00 CDT, Routine, Days 1, Future Order  Keytruda: 200 mg, form: Infusion, IV Piggyback, Once, *Est. first dose 08/18/21 16:00:00 CDT, *Est. stop date 08/18/21 16:00:00 CDT, Routine, Days 1, Future Order  Zofran (for IVPB): 16 mg, form: Infusion, IV Piggyback, Once-chemo, Infuse over: 20 minute(s), *Est. first dose 08/18/21 15:56:00 CDT, *Est. stop date 08/18/21 15:56:00 CDT, Routine, Days 1, Future Order  cyanocobalamin: 1,000 mcg, form: Injection, IM, Once-chemo, *Est. first dose 08/18/21 16:16:00 CDT, *Est. stop date 08/18/21 16:16:00 CDT, Routine, Give with Cycles 3 & 6., Days 1, Future Order  dexamethasone (for IVPB): 10 mg, form: Infusion, IV Piggyback, Once-chemo, Infuse over: 20 minute(s), *Est. first dose 08/18/21 15:56:00 CDT, *Est. stop date 08/18/21 15:56:00 CDT, Future Order, Days 1  Outpatient Medications  Ordered  Benadryl (for IVPB): 25 mg, form: Infusion, IV Piggyback, Once-chemo, Infuse over: 20 minute(s), first dose 12/16/20 10:16:00 CST, stop date 12/16/20 10:16:00 CST, Week 1  Prescriptions  Prescribed  DuoNeb 0.5 mg-2.5 mg/3 mL inhalation solution: 3 mL, NEB, QID, # 60 EA, 0 Refill(s), Pharmacy: [id number], 168, cm, Height/Length Dosing, 07/12/21 1:45:00 CDT, 97, kg, Weight Dosing, 07/12/21 1:45:00 CDT  Fergon 240 mg (27 mg elemental iron) oral tablet: 240 mg = 1 tab(s), Oral, Daily, # 30 tab(s), 3 Refill(s)  Norco 10 mg-325 mg oral tablet: 1 tab(s), Oral, q6hr,  PRN PRN as needed for pain, take one tablet by mouth every 6 hours as needed for pain., # 30 tab(s), 0 Refill(s), Pharmacy: [facility], 168, cm, Height/Length Dosing, 07/12/21 1:45:00 CDT, 97, kg, Weight Dosing, 07/12/...  Xanax 0.25 mg oral tablet: 0.25 mg = 1 tab(s), Oral, BID, X 7 day(s), # 14 tab(s), 0 Refill(s)  amoxicillin-clavulanate 875 mg-125 mg oral tablet: = 1 tab(s), Oral, q12hr, X 7 day(s), # 14 tab(s), 0 Refill(s), Pharmacy: [facility], 164, cm, Height/Length Dosing, 08/18/21 1:21:00 CDT, 87.05, kg, Weight Dosing, 08/18/21 1:21:00 CDT  buPROPion 150 mg oral tablet, extended release: 150 mg = 1 tab(s), Oral, Daily, # 30 tab(s), 0 Refill(s), [facility] 163, cm, Height/Length Dosing, 06/10/21 9:45:00 CDT, 91.2, kg, Weight Dosing, 06/10/21 9:45:00 CDT  dexamethasone 4 mg oral tablet: 4 mg = 1 tab(s), Oral, BID, Begin day before chemo, X 3 day(s), # 12 tab(s), 12 Refill(s), Pharmacy: [facility] #[id number], 168, cm, Height/Length Dosing, 08/17/21 15:39:00 CDT, 86.4, kg, Weight Dosing, 08/17/21 15:39:00 CDT  folic acid 1 mg oral tablet: 1 mg = 1 tab(s), Oral, Daily, Starting 7-14 days before chemo and continued for 21 days after the last premetrexed dose, # 30 tab(s), 12 Refill(s), Pharmacy: [facility], 163, cm, Height/Length Dosing, 07/09/21 9:43:00 CDT, 93.8, kg, Weight...  levofloxacin 750 mg oral tablet: 750 mg = 1 tab(s), Oral, q24hr, X 7 day(s), # 7 tab(s), 0 Refill(s), Pharmacy: [facility], 164, cm, Height/Length Dosing, 08/18/21 1:21:00 CDT, 87.05, kg, Weight Dosing, 08/18/21 1:21:00 CDT  megestrol 40 mg/mL oral suspension: 800 mg = 20 mL, Oral, Daily, # 600 mL, 1 Refill(s), Pharmacy: [facility], 168, cm, Height/Length Dosing, 08/17/21 15:39:00 CDT, 86.4, kg, Weight Dosing, 08/17/21 15:39:00 CDT  scopolamine 1 mg/72 hr transdermal film, extended release: 1, TOP, q72hr, # 10 EA, 0 Refill(s), Pharmacy: [facility] #[facility],  168, cm, Height/Length Dosing, 21 1:45:00 CDT, 97, kg, Weight Dosing, 21 1:45:00 CDT  Documented Medications  Documented  aspirin 81 mg oral Delayed Release (EC) tablet: 0 Refill(s)  ondansetron 8 mg oral tablet, disintegratin mg = 1 tab(s), Oral, TID.      Past Medical/ Family/ Social History   Medical history:    Active  COPD - Chronic obstructive pulmonary disease (458445195)  Abnormal imaging (0601604554)  Comments:  3/4/2020 CST 6:54 CST - Agnes Crawley LPN  abnormal HIDA Scan.   Surgical history:    Catheter Insertion Mediport (None) on 2021 at 65 Years.  Comments:  2021 10:24 CDT - Lucita Layne RN  auto-populated from documented surgical case  Colonoscopy (None) on 2/10/2021 at 65 Years.  Comments:  2/10/2021 9:08 CST - Suzi Sauceda  auto-populated from documented surgical case  Esophagogastroduodenoscopy on 2/10/2021 at 65 Years.  Comments:  2/10/2021 9:08 CST - Suzi Sauceda  auto-populated from documented surgical case  Bronchoscopy, Ebus, 3 or More Samples on 2020 at 65 Years.  Comments:  2020 9:25 CDT - Alexa Pimentel  auto-populated from documented surgical case  PICC placement in 2020 at 65 Years..   Family history:    Heart disease  Brother  Sister  Primary malignant neoplasm of bone  Mother ()  Hypertension.  Brother  Sister  Unknown cause of morbidity or mortality.....  Father  .   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 (Check all that apply) Identifies as female    History of STIs No    Substance Use  08/17/2021  Use: Never    Tobacco  08/18/2021  Use: Former smoker, quit more    Type: Cigarettes    Patient Wants Consult For Cessation Counseling No    Tobacco use per day: 3    08/25/2021  Use: Never (less than 100 in l    Patient Wants Consult For Cessation Counseling N/A    Abuse/Neglect  08/18/2021  SHX Any signs of abuse or neglect No    Feels unsafe at home: No    Safe place to go: Yes    08/25/2021  SHX Any signs of abuse or neglect No    Spiritual/Cultural  03/04/2020  Mormonism Preference Taoism    Spiritual Services to Visit? No  .   Problem list:    Active Problems (18)  Abnormal imaging   Alkaline phosphatase level   Brain metastasis   Cancer of upper lobe of right lung   Cancer related pain   Cholelithiasis   Chronic cough   COPD - Chronic obstructive pulmonary disease   Current smoker   Dilated cbd, acquired   Hepatomegaly   Impaired mobility   Iron deficiency anemia   Microcytic anemia   Obesity   PAD - Peripheral arterial disease   Thrombocytosis   Tobacco user   .      Physical Examination               Vital Signs   Vital Signs   8/25/2021 11:59 CDT      Temperature Oral          36.4 DegC                             Temperature Oral (calculated)             97.52 DegF                             Peripheral Pulse Rate     172 bpm  HI                             Respiratory Rate          22 br/min                             SpO2                      96 %                             Oxygen Therapy            Room air                             Systolic Blood Pressure   107 mmHg                             Diastolic Blood Pressure  72 mmHg  .      Vital  Signs (last 24 hrs)_____  Last Charted___________  Temp Oral     36.4 DegC  (AUG 25 11:59)  Heart Rate Peripheral   H 172bpm  (AUG 25 11:59)  Resp Rate         22 br/min  (AUG 25 11:59)  SBP      107 mmHg  (AUG 25 11:59)  DBP      72 mmHg  (AUG 25 11:59)  SpO2      96 %  (AUG 25 11:59)  .   Measurements   8/25/2021 11:59 CDT      Weight Dosing             81.5 kg                             Weight Measured and Calculated in Lbs     179.67 lb                             Weight Estimated          81.5 kg                             Height/Length Dosing      167 cm                             Height/Length Estimated   167 cm                             Body Mass Index Estimated 29.22 kg/m2  .   Basic Oxygen Information   8/25/2021 11:59 CDT      SpO2                      96 %                             Oxygen Therapy            Room air  .   General:  Alert, no acute distress.    Skin:  Warm, dry, pink, intact.    Head:  Normocephalic, atraumatic.    Neck:  Supple, trachea midline, no tenderness, no JVD.    Eye:  Pupils are equal, round and reactive to light, extraocular movements are intact, normal conjunctiva.    Cardiovascular:  No murmur, Normal peripheral perfusion, No edema, Tachycardia.    Respiratory:  Lungs are clear to auscultation, respirations are non-labored, breath sounds are equal.    Chest wall:  No tenderness, No deformity.    Back:  Nontender, Normal range of motion, Normal alignment.    Musculoskeletal:  Normal ROM, normal strength, no tenderness, no swelling, no deformity.    Gastrointestinal:  Soft, Nontender, Non distended, Normal bowel sounds, No organomegaly.    Neurological:  Alert and oriented to person, place, time, and situation, No focal neurological deficit observed.    Lymphatics:  No lymphadenopathy.   Psychiatric:  Cooperative, appropriate mood & affect, normal judgment.       Medical Decision Making   Orders  Launch Order Profile (Selected)   Inpatient Orders  Ordered  30 Day  "Readmission: 08/25/21 12:02:30 CDT, Stop date 08/25/21 12:02:30 CDT, "This patient has had an inpatient, observation, outpatient bedded or emergency visit within the last 30 days."  Ordered (Dispatched)  Urinalysis with Microscopic if Indicated: Stat collect, Urine, 08/25/21 12:45:00 CDT, Stop date 08/25/21 12:46:00 CDT, Nurse collect  Ordered (Exam Ordered)  XR Chest 1 View: Stat, 08/25/21 12:45:00 CDT, Chest Pain, None, Ambulatory, Rad Type, Not Scheduled, 08/25/21 12:45:00 CDT  Ordered (In-Lab)  Automated Diff: Now collect, 08/25/21 12:40:00 CDT, Blood, Collected, Stop date 08/25/21 12:40:00 CDT, Lab Collect, 08/25/21 12:45:00 CDT  BNP: Stat collect, 08/25/21 12:45:00 CDT, Blood, Stop date 08/25/21 12:46:00 CDT, Lab Collect, 08/25/21 12:45:00 CDT  CBC w/ Auto Diff: Now collect, 08/25/21 12:45:00 CDT, Blood, Stop date 08/25/21 12:46:00 CDT, Lab Collect, 08/25/21 12:45:00 CDT  CK MB: Stat collect, 08/25/21 12:45:00 CDT, Blood, Stop date 08/25/21 12:46:00 CDT, Lab Collect, 08/25/21 12:45:00 CDT  CK: Stat collect, 08/25/21 12:45:00 CDT, Blood, Stop date 08/25/21 12:46:00 CDT, Lab Collect, 08/25/21 12:45:00 CDT  CMP: Stat collect, 08/25/21 12:45:00 CDT, Blood, Stop date 08/25/21 12:46:00 CDT, Lab Collect, 08/25/21 12:45:00 CDT  INR - Protime: Stat collect, 08/25/21 12:45:00 CDT, Blood, Stop date 08/25/21 12:46:00 CDT, Lab Collect, 08/25/21 12:45:00 CDT  Magnesium Level: Stat collect, 08/25/21 12:45:00 CDT, Blood, Stop date 08/25/21 12:46:00 CDT, Lab Collect, 08/25/21 12:45:00 CDT  PTT: Stat collect, 08/25/21 12:45:00 CDT, Blood, Stop date 08/25/21 12:46:00 CDT, Lab Collect, 08/25/21 12:45:00 CDT  Troponin-I: Stat collect, 08/25/21 12:45:00 CDT, Blood, Stop date 08/25/21 12:46:00 CDT, Lab Collect, 08/25/21 12:45:00 CDT.   Electrocardiogram:  Time 8/25/2021 12:02:00, rate 167, The Rhythm is SVT.  , Q waves V1 and V2.    Results review:  Lab results : Lab View   8/25/2021 14:05 CDT      UA Appear                 Clear  "                            UA Color                  Yellow                             UA Spec Grav              1.015                             UA Bili                   Negative                             UA pH                     7.0                             UA Urobilinogen           0.2 EU/dL                             UA Blood                  Large                             UA Glucose                Negative mg/dL                             UA Ketones                Negative mg/dL                             UA Protein                Negative mg/dL                             UA Nitrite                Negative                             UA Leuk Est               Negative                             UA WBC                    2-5 /HPF                             UA RBC                    10-25 /HPF                             UA Bacteria               None Seen /HPF                             UA Squam Epithelial       Few /LPF    8/25/2021 13:08 CDT      Est Creat Clearance Ser   85.91 mL/min    8/25/2021 12:40 CDT      Sodium Lvl                136 mmol/L                             Potassium Lvl             4.3 mmol/L                             Chloride                  101 mmol/L                             CO2                       23 mmol/L                             Calcium Lvl               10.2 mg/dL                             Magnesium Lvl             1.50 mg/dL  LOW                             Glucose Lvl               96 mg/dL                             BUN                       8.0 mg/dL  LOW                             Creatinine                0.84 mg/dL                             eGFR-AA                   >60  NA                             eGFR-WILFRID                  >60 mls/min/1.73/m2  NA                             Bili Total                0.4 mg/dL                             Bili Direct               0.2 mg/dL                             Bili Indirect             0.20 mg/dL                              AST                       19 unit/L                             ALT                       9 unit/L                             Alk Phos                  331 unit/L  HI                             Total Protein             8.5 gm/dL  HI                             Albumin Lvl               2.0 gm/dL  LOW                             Globulin                  6.5 gm/dL  HI                             A/G Ratio                 0.3 ratio  LOW                             BNP                       128.3 pg/mL  HI                             Total CK                  19 U/L  LOW                             CK MB                     <0.3 ng/mL                             Troponin-I                <0.010 ng/mL                             PT                        16.5 sec  HI                             INR                       1.3  LOW                             PTT                       47.1 sec  HI                             WBC                       17.5 x10(3)/mcL  HI                             RBC                       2.74 x10(6)/mcL  LOW                             Hgb                       7.8 gm/dL  LOW                             Hct                       25.2 %  LOW                             Platelet                  646 x10(3)/mcL  HI                             MCV                       92 fL                             MCH                       28 pg                             MCHC                      31 gm/dL                             RDW                       19.8 %  HI                             MPV                       9.0 fL                             Abs Neut                  13.6 x10(3)/mcL  HI                             Neutro Auto               78 %  HI                             Lymph Auto                8 %  LOW                             Mono Auto                 12 %                             Eos Auto                  0 %                             Abs  Eos                   0.0 x10(3)/mcL                             Basophil Auto             0 %                             Abs Neutro                13.6 x10(3)/mcL  HI                             Abs Lymph                 1.4 x10(3)/mcL                             Abs Mono                  2.1 x10(3)/mcL  HI                             Abs Baso                  0.0 x10(3)/mcL                             IG%                       1.800 %  HI                             IG#                       0.3100  HI  ,    No qualifying data available.    Radiology results:  Rad Results (ST)  < 12 hrs   Accession: [id number]  Order: XR Chest 1 View  Report Dt/Tm: 08/25/2021 13:52  Report:   CHEST 1 VIEW (PORTABLE):     HISTORY: Chest Pain     COMPARISON: 8/18/2021     FINDINGS: An AP view or more reveals the heart to be borderline  enlarged. There is continued opacification of the right upper and mid  lung. There is suspect a slight interim progression since the prior  exam. Left lung remains relatively clear. There is lateral deviation  of the trachea. Left central line/Mediport is unchanged.  Atherosclerosis is seen within the aorta.     IMPRESSION:  1.Continued opacification right upper and midlung with questionable  slight interim progression  2. Borderline cardiomegaly  2. Left central line/Mediport      .      Reexamination/ Reevaluation   Vital signs   Basic Oxygen Information   8/25/2021 11:59 CDT      SpO2                      96 %                             Oxygen Therapy            Room air        Impression and Plan   Diagnosis   Anemia (QAJ91-KG D64.9)   SVT (supraventricular tachycardia) (LFZ91-BL I47.1)      Calls-Consults   -  8/25/2021 16:04:00 , Efren [place], Eliseo Hagan to admit.    Plan   Condition: Improved, Stable.    Disposition: Admit time  8/25/2021 16:05:00, Place in Observation Telemetry Unit.    Counseled: Patient, Family, Regarding diagnosis, Regarding diagnostic results, Regarding treatment  plan, Patient indicated understanding of instructions.    Orders: Launch Orders   Pharmacy:  Rocephin (Order): 1 gm, CAP, IV, Once, first dose 8/25/2021 17:00 CDT, stop date 8/25/2021 17:00 CDT  Admit/Transfer/Discharge:  Place in Outpatient Observation (Order): 8/25/2021 16:05 CDT, Medical Unit Efren [place], Enrrique FAUSTIN, No.

## 2022-04-30 NOTE — ED PROVIDER NOTES
Patient:   Adrienne Urbina            MRN: [id number]            FIN: [id number]               Age:   65 years     Sex:  Female     :  1955   Associated Diagnoses:   Anemia   Author:   Ameena Lan DO      Basic Information   Time seen: Date & time 2020 18:30:00.   History source: Patient.   Arrival mode: Private vehicle.   History limitation: None.   Additional information: Chief Complaint from Nursing Triage Note : Chief Complaint   2020 18:13 CDT      Chief Complaint           Sent by [facility] to get blood transfusion    had abnormal labs this morning  .      History of Present Illness   The patient presents with anemia.  The onset was chronic.  Risk factors consist of COPD/lung mass.  Prior episodes: none.  Therapy today: doctor's office visit.  Associated symptoms: fatigue, denies blood in stool, denies syncope and denies bleeding.   patient was seen today as an outpatient and had labs drawn. She was called this afternoon and told to go to a hospital for a blood transfusion due to her RBC count.  She is a symptomatic other than fatigue and told me that she has always been told she had a low blood count she has never had a blood transfusion..        Review of Systems   Constitutional symptoms:  Fatigue.   Skin symptoms:  Negative except as documented in HPI.   Eye symptoms:  Negative except as documented in HPI.   ENMT symptoms:  Negative except as documented in HPI.   Respiratory symptoms:  Negative except as documented in HPI.   Cardiovascular symptoms:  Negative except as documented in HPI.   Gastrointestinal symptoms:  Negative except as documented in HPI.   Genitourinary symptoms:  Negative except as documented in HPI.   Musculoskeletal symptoms:  Negative except as documented in HPI.   Neurologic symptoms:  Negative except as documented in HPI.   Psychiatric symptoms:  Negative except as documented in HPI.   Hematologic/Lymphatic symptoms:  Negative except as documented in HPI.    Allergy/immunologic symptoms:  Negative except as documented in HPI.             Additional review of systems information: All other systems reviewed and otherwise negative, All systems reviewed as documented in chart.      Health Status   Allergies:    Allergic Reactions (All)  No Known Medication Allergies,    Allergies (1) Active Reaction  No Known Medication Allergies None Documented  .   Medications:  (Selected)   Prescriptions  Prescribed  gabapentin 100 mg oral capsule: 100 mg = 1 cap(s), Oral, TID, Take 1 capule tonight. Tomorrow take 1 capsule in the morning & 1 capsule at night. In 2 days, begin taking 1 capsule in morning, 1 at lunch & 1 at night & then continue taking 3 times a day, # 90 cap(s), 0 Refill(s)  Documented Medications  Documented  Atrovent HFA 17 mcg/inh inhalation aerosol: 2 puff(s), INH, QID  DULoxetine 60 mg oral delayed release capsule: 60 mg = 1 cap(s), Oral, Daily  Plavix 75 mg oral tablet: 75 mg = 1 tab(s), Oral, Daily, # 30 tab(s), 0 Refill(s)  acetaminophen-hydrocodone 325 mg-5 mg oral tablet: 1 tab(s), Oral, BID  albuterol 0.083% inhalation solution: 2.5 mg = 3 mL, NEB, q6hr  albuterol CFC free 90 mcg/inh inhalation aerosol with adapter: 2 puff(s), INH, q6hr  amitriptyline 10 mg oral tablet: 10 mg = 1 tab(s), Oral, qPM  diclofenac sodium 50 mg oral delayed release tablet: 50 mg = 1 tab(s), Oral, BID  diclofenac sodium 75 mg oral delayed release tablet: 75 mg = 1 tab(s), Oral, BID  fluticasone 50 mcg/inh nasal spray: 1 spray(s), Nasal, Daily  gabapentin 300 mg oral capsule: 300 mg = 1 cap(s), Oral, TID  hydroCHLOROthiazide 12.5 mg oral capsule: 25 mg = 2 cap(s), Oral, Daily, 0 Refill(s)  loratadine 10 mg oral tablet: 10 mg = 1 tab(s), Oral, Daily  methocarbamol 750 mg oral tablet: 750 mg = 1 tab(s), Oral, TID  montelukast 10 mg oral TABLET: 10 mg = 1 tab(s), Oral, Daily  pravastatin 40 mg oral tablet: 40 mg = 1 tab(s), Oral, Daily.   Immunizations: Unknown.      Past Medical/  Family/ Social History   Medical history:    Active  COPD - Chronic obstructive pulmonary disease (210055546)  Abnormal imaging ([id number])  Comments:  3/4/2020 CST 6:54 CST - Agnes Crawley LPN  abnormal HIDA Scan.   Surgical history:    No active procedure history items have been selected or recorded..   Family history:    Heart disease  Brother  Sister  Primary malignant neoplasm of bone  Mother ()  Hypertension.  Brother  Sister  Unknown cause of morbidity or mortality.....  Father  .   Social history:    Social & Psychosocial Habits    Alcohol  2018  Use: Current    Frequency: 1-2 times per year    2020  Use: Past    Type: Beer, Liquor, Wine    Has alcohol use interfered with work or home life? No    Has anyone been hurt or at risk by your drinking? 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 (Check all that apply) Identifies as female    History of STIs No    Substance Use  2017  Use: Never    2020  Use: Never    Has drug use interfered with your work or home life? No    Concerns about substance abuse in household: No    Tobacco  2019  Use: 10 or more cigarettes (1/    Type: Cigarettes    Patient Wants Consult For Cessation Counseling  No    03/04/2020  Use: 10 or more cigarettes (1/    Type: Cigarettes    Patient Wants Consult For Cessation Counseling No    Concerns about tobacco use in household: No    06/23/2020  Use: 10 or more cigarettes (1/    Type: Cigarettes    Patient Wants Consult For Cessation Counseling No    Tobacco use per day: 10    07/12/2020  Use: 10 or more cigarettes (1/    Patient Wants Consult For Cessation Counseling No    08/22/2020  Use: 10 or more cigarettes (1/    Patient Wants Consult For Cessation Counseling No    09/11/2020  Use: 10 or more cigarettes (1/    Patient Wants Consult For Cessation Counseling No    Abuse/Neglect  09/17/2019  SHX Any signs of abuse or neglect No    03/04/2020  SHX Any signs of abuse or neglect No    Feels unsafe at home: No    Safe place to go: Yes    06/23/2020  SHX Any signs of abuse or neglect No    07/12/2020  SHX Any signs of abuse or neglect No    08/22/2020  SHX Any signs of abuse or neglect No    09/11/2020  SHX Any signs of abuse or neglect No    Spiritual/Cultural  03/04/2020  Hindu Preference Taoist    Spiritual Services to Visit? No  .   Problem list:    Active Problems (10)  Abnormal imaging   Alkaline phosphatase level   Cholelithiasis   Chronic cough   COPD - Chronic obstructive pulmonary disease   Current smoker   Hepatomegaly   Obesity   PAD - Peripheral arterial disease   Tobacco user   .      Physical Examination               Vital Signs   Vital Signs   9/11/2020 18:13 CDT      Temperature Oral          36.4 DegC                             Temperature Oral (calculated)             97.52 DegF                             Peripheral Pulse Rate     102 bpm  HI                             Respiratory Rate          18 br/min                             SpO2                      100 %                             Oxygen Therapy            Room air                             Systolic Blood Pressure   94 mmHg                             Diastolic Blood Pressure  56 mmHg  LOW     9/11/2020 14:19 CDT      Temperature Oral          36.8 DegC                             Temperature Oral (calculated)             98.24 DegF                             Peripheral Pulse Rate     99 bpm                             SpO2                      100 %                             Systolic Blood Pressure   93 mmHg                             Diastolic Blood Pressure  60 mmHg                             Mean Arterial Pressure, Cuff              71 mmHg  .      Vital Signs (last 24 hrs)_____  Last Charted___________  Temp Oral     36.4 DegC  (SEP 11 18:13)  Heart Rate Peripheral   H 102bpm  (SEP 11 18:13)  Resp Rate         18 br/min  (SEP 11 18:13)  SBP      94 mmHg  (SEP 11 18:13)  DBP      L 56mmHg  (SEP 11 18:13)  SpO2      100 %  (SEP 11 18:13)  .   Measurements   9/11/2020 18:13 CDT      Weight Dosing             75 kg                             Weight Measured and Calculated in Lbs     165.34 lb                             Weight Estimated          75 kg                             Height/Length Dosing      168 cm                             Height/Length Estimated   168 cm                             Body Mass Index Estimated 26.57 kg/m2    9/11/2020 14:15 CDT      Weight Dosing             76.2 kg  (Unauth)                            Weight Measured           76.2 kg  (Unauth)                            Height/Length Dosing      165.10 cm  (Unauth)                            Height/Length Measured    165.10 cm  (Unauth)                            BSA Measured              1.87 m2  (Unauth)                            Body Mass Index Measured  27.96 kg/m2  (Unauth) .   Basic Oxygen Information   9/11/2020 18:13 CDT      SpO2                      100 %                             Oxygen Therapy            Room air    9/11/2020 14:19 CDT      SpO2                      100 %  .   General:  Alert, no acute distress.    Skin:  Warm, dry, pink.    Head:  Normocephalic, atraumatic.    Neck:  Supple.    Eye:  Pupils are equal, round and reactive to light, extraocular movements are intact.    Ears, nose, mouth and throat:  Oral mucosa moist.   Cardiovascular:  Regular rate and rhythm.   Respiratory:  Lungs are clear to auscultation.   Gastrointestinal:  Soft, Nontender, Normal bowel sounds.    Musculoskeletal:  Normal ROM, normal strength, no tenderness.    Neurological:  Alert and oriented to person, place, time, and situation, No focal neurological deficit observed, normal sensory observed, normal motor observed, normal speech observed.    Psychiatric:  Cooperative.      Medical Decision Making   Differential Diagnosis:  Anemia, gastrointestinal bleed, dizziness, iron deficiency.    Results review:  Lab results : Lab View   9/11/2020 20:21 CDT      UA Appear                 Clear                             UA Color                  Yellow                             UA Spec Grav              1.015                             UA Bili                   Small                             UA pH                     5.5                             UA Urobilinogen           1.0 EU/dL                             UA Blood                  Negative                             UA Glucose                Negative mg/dL                             UA Ketones                15 mg/dL                             UA Protein                Trace                             UA Nitrite                Negative                             UA Leuk Est               Negative                             UA WBC                    0-2 /HPF                             UA RBC                    2-5 /HPF                             UA Bacteria               Moderate /HPF                             UA Squam Epithelial       Few /LPF    9/11/2020 19:20 CDT      Occult Bld Stl            Positive    9/11/2020 18:40 CDT      Sodium Lvl                139 mmol/L                             Potassium Lvl             2.7 mmol/L  LOW                              Chloride                  95 mmol/L  LOW                             CO2                       28 mmol/L                             Calcium Lvl               9.5 mg/dL                             Magnesium Lvl             2.24 mg/dL                             Glucose Lvl               102 mg/dL                             BUN                       17.0 mg/dL                             Creatinine                0.88 mg/dL                             eGFR-AA                   >60  NA                             eGFR-WILFRID                  >60  NA                             Bili Total                0.3 mg/dL                             Bili Direct               0.2 mg/dL                             Bili Indirect             0.10 mg/dL                             AST                       13 unit/L                             ALT                       8 unit/L                             Alk Phos                  164 unit/L  HI                             Total Protein             7.8 gm/dL  HI                             Albumin Lvl               2.6 gm/dL  LOW                             Globulin                  5.2 gm/dL  HI                             A/G Ratio                 0.5 ratio  LOW                             Troponin-I                0.018 ng/mL                             PT                        13.3 sec                             INR                       1.0  LOW                             PTT                       45.0 sec  HI                             WBC                       11.9 x10(3)/mcL  HI                             RBC                       3.24 x10(6)/mcL  LOW                             Hgb                       7.3 gm/dL  LOW                             Hct                       24.9 %  LOW                             Platelet                  679 x10(3)/mcL  HI                             MCV                       77 fL  LOW                             MCH                        22 pg  LOW                             MCHC                      29 gm/dL  LOW                             RDW                       17.3 %  HI                             MPV                       8.3 fL                             Abs Neut                  7.5 x10(3)/mcL  HI                             Neutro Auto               63 %                             Lymph Auto                27 %                             Mono Auto                 10 %                             Basophil Auto             0 %                             Abs Neutro                7.5 x10(3)/mcL  HI                             Abs Lymph                 3.2 x10(3)/mcL                             Abs Mono                  1.1 x10(3)/mcL                             Abs Baso                  0.0 x10(3)/mcL                             IG%                       0.400 %                             IG#                       0.0500  HI                             Hypochrom                 1+                             Platelet Est              Increased                             Anisocyte                 Trace                             Microcyte                 1+                             RBC Morph                 Abnormal                             ABO/Rh                    O POS                             Antibody Screen           Neg    9/11/2020 15:04 CDT      Sodium Lvl                137 mmol/L                             Potassium Lvl             2.4 mmol/L  CRIT                             Chloride                  95 mmol/L  LOW                             CO2                       32 mmol/L                             Calcium Lvl               9.2 mg/dL                             Glucose Lvl               114 mg/dL  HI                             BUN                       16 mg/dL                             Creatinine                0.90 mg/dL                             eGFR-AA                   81 mL/min  LOW                              eGFR-WILFRID                  67 mL/min  LOW                             Bili Total                0.3 mg/dL                             Bili Direct               0.2 mg/dL                             Bili Indirect             0.1 mg/dL                             AST                       22 unit/L                             ALT                       13 unit/L                             Alk Phos                  160 unit/L  HI                             Total Protein             8.3 gm/dL  HI                             Albumin Lvl               2.6 gm/dL  LOW                             Globulin                  5.70 gm/mL  HI                             A/G Ratio                 0.5 ratio  LOW                             PT                        13.5 second(s)                             INR                       1.07                             PTT                       49.4 second(s)  HI                             WBC                       11.5 x10(3)/mcL  HI                             RBC                       3.05 x10(6)/mcL  LOW                             Hgb                       6.9 gm/dL  CRIT                             Hct                       24.1 %  LOW                             Platelet                  747 x10(3)/mcL  HI                             MCV                       79.0 fL  LOW                             MCH                       22.6 pg  LOW                             MCHC                      28.6 gm/dL  LOW                             RDW                       17.3 %  HI                             MPV                       8.6 fL                             Abs Neut                  8.35 x10(3)/mcL                             Neutro Auto               73 %  NA                             Lymph Auto                19 %  NA                             Mono Auto                 8 %  NA                             Eos Auto                  0 %  NA                              Abs Eos                   0.0 x10(3)/mcL                             Basophil Auto             0 %  NA                             Abs Neutro                8.35 x10(3)/mcL                             Abs Lymph                 2.2 x10(3)/mcL                             Abs Mono                  0.9 x10(3)/mcL                             Abs Baso                  0.0 x10(3)/mcL                             IG%                       0 %  NA                             IG#                       0.0500  NA                             Hypochrom                 1+                             Platelet Est              Increased                             Anisocyte                 1+                             Microcyte                 1+                             RBC Morph                 Abnormal  ,    No qualifying data available.       Reexamination/ Reevaluation   Vital signs   Basic Oxygen Information   9/11/2020 18:13 CDT      SpO2                      100 %                             Oxygen Therapy            Room air    9/11/2020 14:19 CDT      SpO2                      100 %     2230 - the patient is stable have talked to the hospitalist about her anemia and he agrees with observation we will go ahead and give her 1 unit of PRBCs and he will reevaluate in the morning.  I have ordered the anemia workup on this patient.  She is stable for admission and agrees with this treatment plan.      Impression and Plan   Diagnosis   Anemia (BVA08-EC D64.9)      Calls-Consults   -  9/11/2020 22:20:00 , [place], Matthew [place].    Plan   Condition: Stable.    Disposition: Medically cleared, Admit time  9/11/2020 22:42:00, Place in Observation Unit.    Counseled: Patient, Family, Regarding diagnosis, Regarding diagnostic results, Patient indicated understanding of instructions.

## 2022-04-30 NOTE — CONSULTS
Patient:   Adrienne Urbina            MRN: [id number]            FIN: [id number]               Age:   66 years     Sex:  Female     :  1955   Associated Diagnoses:   None   Author:   Yrn STATON, Barbara Archer      CONSULTATION NOTE      Chief complaint:  Elevated heart rate    History of present illness:  She is a 66-year-old -American female patient of [facility] with history of stage IV lung cancer with metastasis to the brain undergoing chemotherapy who was seen at our clinic in [place] today by CAROL Adams NP as hospital follow-up for recent anemia.  She was noted to be tachycardic with heart rate in the 160s and was referred back to the ER for further evaluation.  Of note, on her previous hospitalization, she was noted to be tachycardic prior to discharge.  She underwent work-up for PE protocol which was negative.  She was given a dose of Ativan and apparently, her heart rate improved back to baseline of 110s.  She was discharged home, however, patient reports heart rate has been increased since her discharge.  She denies any chest pain, shortness of breath, palpitations, fever, chills, or purulent sputum production.  Her twelve-lead EKG initially demonstrated SVT.  She was given 12 mg of adenosine with improvement.  Repeat EKG showed sinus tachycardia and her heart rate has been in the 110s to 120s since that time.  She is awake and alert resting comfortably in the ER without complaints at this time.  She is oxygenating well on room air.  Blood pressure has been running 110s over 70s.    PMH: Stage IV lung cancer with metastasis to the brain, anemia, recent pneumonia, COPD unquantitated, PAD, cholelithiasis, thrombocytosis  PSH: She has undergone Mediport insertion, colonoscopy, EGD, bronchoscopy  Family Hx: Brother and sister both had heart disease and hypertension.  Mother had bone cancer  Social Hx: Currently lives at home with her  and children.  She is a current smoker of about 4 cigarettes  a day.  Previously worked in housekeeping.  No significant alcohol or drug use.    PREVIOUS TESTING/PROCEDURES:    Art us 1/20: mild to mod dz, no significant hemodynamic stenosis.     Labs 4/20: ALT 56, Alk Phos 434, HDL 70, LDL 35    Labs 6/20: ALT 57, Alk Phos 439, HDL 59,     Labs 9/20: CMP ok, lipids not done but LDL 35 in 4/20    REVIEW OF SYSTEMS:  Constitutional: No weakness, no fatigue  Respiratory: No shortness of breath, no cough.  Cardiovascular: No chest pain, no palpitations, no bradycardia  Gastrointestinal: No nausea, no vomiting.  Genitourinary: No dysuria.  Neurologic: Alert and oriented x4.  All other systems are negative    Allergies:  No known allergies    Medications:  Home Medications (12) Active  amoxicillin-clavulanate 875 mg-125 mg oral tablet 1 tab(s), Oral, q12hr  aspirin 81 mg oral Delayed Release (EC) tablet   buPROPion 150 mg oral tablet, extended release 150 mg = 1 tab(s), Oral, Daily  dexamethasone 4 mg oral tablet 4 mg = 1 tab(s), Oral, BID  DuoNeb 0.5 mg-2.5 mg/3 mL inhalation solution 3 mL, NEB, QID  Fergon 240 mg (27 mg elemental iron) oral tablet 240 mg = 1 tab(s), Oral, Daily  folic acid 1 mg oral tablet 1 mg = 1 tab(s), Oral, Daily  levofloxacin 750 mg oral tablet 750 mg = 1 tab(s), Oral, q24hr  megestrol 40 mg/mL oral suspension 800 mg = 20 mL, Oral, Daily  Norco 10 mg-325 mg oral tablet 1 tab(s), PRN, Oral, q6hr  ondansetron 8 mg oral tablet, disintegrating 8 mg = 1 tab(s), Oral, TID  Xanax 0.25 mg oral tablet 0.25 mg = 1 tab(s), Oral, BID    PHYSICAL EXAM:  Vital signs: Afebrile, heart rate 118, respiratory rate 22, blood pressure 129/94, O2 sat 95% on room air  General: She is a frail middle-aged -American female awake and alert.  No acute distress noted.  HEENT: Unremarkable  Neck: Supple, no JVD  Chest: Clear bilaterally nonlabored  Cardiac: Tachycardic, S1, S2  Abdomen: Soft, nondistended  Extremities: No clubbing, cyanosis, or edema  Neurologic exam:  Awake and alert, no focal deficits    LABS:  Labs Last 24 Hours   Chemistry  Hematology/Coagulation    Sodium Lvl: 136 mmol/L (08/25/21 12:40:00) PT: 16.5 sec High (08/25/21 12:40:00)   Potassium Lvl: 4.3 mmol/L (08/25/21 12:40:00) INR: 1.3 Low (08/25/21 12:40:00)   Chloride: 101 mmol/L (08/25/21 12:40:00) PTT: 47.1 sec High (08/25/21 12:40:00)   CO2: 23 mmol/L (08/25/21 12:40:00) WBC: 17.5 x10(3)/mcL High (08/25/21 12:40:00)   Calcium Lvl: 10.2 mg/dL (08/25/21 12:40:00) RBC: 2.74 x10(6)/mcL Low (08/25/21 12:40:00)   Magnesium Lvl: 1.5 mg/dL Low (08/25/21 12:40:00) Hgb: 7.8 gm/dL Low (08/25/21 12:40:00)   Glucose Lvl: 96 mg/dL (08/25/21 12:40:00) Hct: 25.2 % Low (08/25/21 12:40:00)   BUN: 8 mg/dL Low (08/25/21 12:40:00) Platelet: 646 x10(3)/mcL High (08/25/21 12:40:00)   Creatinine: 0.84 mg/dL (08/25/21 12:40:00) MCV: 92 fL (08/25/21 12:40:00)   Est Creat Clearance Ser: 85.91 mL/min (08/25/21 13:08:07) MCH: 28 pg (08/25/21 12:40:00)   eGFR-AA: >60 (08/25/21 12:40:00) MCHC: 31 gm/dL (08/25/21 12:40:00)   eGFR-WILFRID: >60 (08/25/21 12:40:00) RDW: 19.8 % High (08/25/21 12:40:00)   Bili Total: 0.4 mg/dL (08/25/21 12:40:00) MPV: 9 fL (08/25/21 12:40:00)   Bili Direct: 0.2 mg/dL (08/25/21 12:40:00) Abs Neut: 13.6 x10(3)/mcL High (08/25/21 12:40:00)   Bili Indirect: 0.2 mg/dL (08/25/21 12:40:00) Neutro Auto: 78 % High (08/25/21 12:40:00)   AST: 19 unit/L (08/25/21 12:40:00) Lymph Auto: 8 % Low (08/25/21 12:40:00)   ALT: 9 unit/L (08/25/21 12:40:00) Mono Auto: 12 % (08/25/21 12:40:00)   Alk Phos: 331 unit/L High (08/25/21 12:40:00) Eos Auto: 0 % (08/25/21 12:40:00)   Total Protein: 8.5 gm/dL High (08/25/21 12:40:00) Abs Eos: 0 x10(3)/mcL (08/25/21 12:40:00)   Albumin Lvl: 2 gm/dL Low (08/25/21 12:40:00) Basophil Auto: 0 % (08/25/21 12:40:00)   Globulin: 6.5 gm/dL High (08/25/21 12:40:00) Abs Neutro: 13.6 x10(3)/mcL High (08/25/21 12:40:00)   A/G Ratio: 0.3 ratio Low (08/25/21 12:40:00) Abs Lymph: 1.4 x10(3)/mcL (08/25/21  12:40:00)   BNP: 128.3 pg/mL High (08/25/21 12:40:00) Abs [place]: 2.1 x10(3)/mcL High (08/25/21 12:40:00)   Total CK: 19 U/L Low (08/25/21 12:40:00) Abs Baso: 0 x10(3)/mcL (08/25/21 12:40:00)   CK MB: <0.3 (08/25/21 12:40:00) IG%: 1.8 % High (08/25/21 12:40:00)   Troponin-I: <0.010 (08/25/21 12:40:00) IG#: 0.31 High (08/25/21 12:40:00)      RADIOLOGY:  Chest x-ray per radiology review demonstrates continued opacification of the right upper and mid lung, borderline cardiomegaly, and left central line/Mediport    DIAGNOSTIC STUDIES:  Twelve-lead EKG 8/25/21 12:02: SVT  Twelve-lead EKG 8/25/21 12:19 p.m.: Sinus tachycardia    IMPRESSION:  1.  Supraventricular tachycardia, converted to sinus tachycardia with 12 mg of adenosine  2.  Recent hospitalization for anemia and pneumonia, with continued chronic anemia  3.  Stage IV lung cancer with metastasis to the brain, currently on chemotherapy  4.  Peripheral arterial disease  5.  COPD unquantitated    PLAN:  We will give 1 dose of Lopressor IV push now and will order as needed.  Start metoprolol tartrate 25 mg p.o. twice daily.  Patient will stay overnight for observation and hopefully discharge tomorrow with plans to follow-up with [facility] after discharge.

## 2022-05-04 NOTE — HISTORICAL [facility] CERNER
This is a historical note converted from [place]. Formatting and pictures may have been removed.  Please reference [place] for original formatting and attached multimedia. 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 Diagnosed 9/22/2020. Brain mets diagnosed 11/6/2020.  ?   Current Treatment:  Carboplatin/Taxol weekly along with RT  XRT started 12/16/2020  Started 12/16/2020  Week #4 due 1/15/2021  ?   Dose modifications & interruptions:  Week #2 delayed 1 week d/t patient not presenting for toxicity check  ?   Treatment History:  SRS to brain metastasis on 12/3/2020.  ?  ?   Past medical history: Cholelithiasis.? Chronic cough.? COPD.? Tobacco abuse.? Hypertension.? Dyslipidemia.? Hepatomegaly.? Obesity.? Peripheral artery disease.  Social history: . ?Lives in [place] with her  and family. ?Has 12 children. ?Has been smoking?1 pack of cigarettes daily for 40 years.? No history of alcohol or illicit drug abuse.  Family history:?Negative for cancers.  Health maintenance:  Screening mammogram in 2019 in [place], apparently unremarkable.  Mostly colonoscopy ever.  Menstrual and OB/GYN history:?Menopause?in her 50s.  ?   History of present illness:  65-year-old female referred by Dr. Steve Simpson, with lung cancer.  ?   For a full, detailed history, please see Dr. Ulrich note dated 11/27/2020.  ?  Interval History  1/29/2021: Patient?presents for?follow up?on weekly Carbo/Taxol + XRT; she?is scheduled to receive week #6 today. She presents with her daughter. VS stable. CO2 low at 22; Ca elevated mildly at 10.5. BUN slightly elevated at 21; creatinine/eGFR WNL. Bili WNL. AST now mildly elevated at 40; ALT stable at 77; Alk Phos stable at 281. WBC 4.1; H&H 9.5 & 30.1; plts 310k; ANC 3830. She denies nausea, vomiting, diarrhea, constipation, mouth sores, abdominal pain. Her only complaint  is a burning rash at the right upper back and difficulty swallowing which leads to coughing, choking, and vomiting. Will follow up on referral to GI as she was referred at diagnosis for colonoscopy & EGD. Advised her to try Aquaphor or CeraVe for radiation burns to right upper back. 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 She states Rad Onc told her it would get better; however, she has difficulty swallowing water. She crushes her pain pills into a paste and still has difficulty swallowing that. Will change her pain medication to liquid form. Will have her follow up in 2 weeks with Dr. Shoemaker to discuss further treatment. She is amenable to this plan.  Review of Systems  A complete 12-point?ROS was performed in full with pertinent positives as described in interval history. Remainder of ROS done in full and are negative.  Physical Exam  Vitals & Measurements  T:?36.8? ?C (Oral)? HR:?93(Peripheral)? RR:?18? BP:?109/68?  HT:?167.00?cm? WT:?76.700?kg? BMI:?27.32?  General: ?Alert and oriented, No acute distress.??  Appearance: Well-groomed  HEENT: ?Normocephalic, Oral mucosa is moist.?Pupils are equal, round and reactive to light, Extraocular movements are intact, Normal conjunctiva.?  Neck: ?Supple, Non-tender, No lymphadenopathy, No thyromegaly.??  Respiratory: ?Lungs are clear to auscultation, Respirations are non-labored, Breath sounds are equal, Symmetrical chest wall expansion.??  Cardiovascular: ?Normal rate, Regular rhythm, No edema.??  Breast:??Breast exam not performed on todays visit.??  Gastrointestinal: Rounded,?Soft, Non-tender, Non-distended, Normal bowel sounds.??  Musculoskeletal: ?Normal strength.??  Integumentary: ?Warm, dry, intact. Artificial nails bilateral hands.  Neurologic: ?Alert, Oriented, No focal deficits, Cranial Nerves II-XII are grossly intact.??  Cognition and Speech: ?Oriented, Speech clear  and coherent.??Wearing face mask.  Psychiatric: ?Cooperative, Appropriate mood & affect. ?  ECOG Performance Scale: 2 - Capable of all self-care but unable to carry out any work activities. Up and about greater than 50 percent of waking hours.?  Assessment/Plan  1.?Cancer of upper lobe of right lung?C34.11  #Poorly differentiated carcinoma of lung  -11.4 cm right upper lobe mass, invading mediastinum (noncontrast chest CT)  -Large mass in 4R position (right lower paratracheal) (bronchoscopy and EBUS: 09/22/2020)  -EBUS, 4R, FNA: Poorly differentiated carcinoma (tumor of lung versus upper GI tract)  -PET/CT (11/02/2020):?Right upper lung lobe mass has enlarged?9.9 x 9.8 x 14.0 cm,?previously, 8.1 x 7.4 x 11.4 cm;?  contiguous extension?of the mass into the mediastinum?versus right hilar enlarged lymph node?2.0 x 1.8 cm  -Brain MRI (11/06/2020):?Right occipital?5 x 4 mm?hemorrhagic metastasis without visual edema  -11/06/2020: Referred to [facility] for SRS  -->  Tumor >7 cm, therefore, T4  Tumor invading mediastinum, therefore, T4  Ipsilateral mediastinal mass (??Lymphadenopathy), EBUS FNA positive,?therefore, N2  Solitary brain metastasis, therefore,?M1b  -->  cT4 cN2 M1b, stage RICKIE  ?  ?   #????Hypercalcemia?(11/25/2020)  -11/25/2020: Calcium 10.3 (elevated for the very first time). ?BUN 21, elevated. ?Creatinine 0.79, normal. ?Albumin 2.4.  -11/25/2020:?Intact PTH level 32.9, normal. ?Ionized calcium level 5.4, within normal limits.  -11/25/2020:?IV fluids + Zometa 4 mg IV x1  ?  ?   #????Iron deficiency anemia, FOBT positive  -09/11/2020: Hemoglobin 6.9.? Microcytosis.? Elevated RDW.? Low serum iron, low TIBC, low transferrin saturation, normal ferritin.? FOBT positive.? PRBC x2  -10/22/2020:?Iron deficiency (transferrin saturation 6%, ferritin elevated?to 248.95);?folate, B12 stores adequate; no monoclonal?gammopathy; no hemolysis; hypoproliferative anemia  -Feraheme?510 mg IV x2 (10/26/2020-11/03/2020)  ?    #????Thrombocytosis since 09/11/2020 ([phone])  -Could be reactive to iron deficiency anemia?versus metastatic lung cancer  -10/22/2020:?JAK2 mutation negative. ?BCR-ABL 1?fusion transcripts negative.? ESR, CRP elevated.? Iron deficiency anemia.  ?   #Cholelithiasis.? Chronically elevated alkaline phosphatase.  -HIDA scan (04/18/2019): No gallbladder activity at 1 hour post isotope injection, compatible with cystic duct obstruction/acute cholecystitis  -02/07/2020: Limited abdominal ultrasound: Cholelithiasis, dilated CBD, hepatomegaly  ?  ?  Plan:  -Solitary brain metastasis  -SRS to brain metastasis (referred to radiation oncology)?(as of 11/27/2020,?pending)  -Subsequently, definitive concurrent chemoradiation therapy to cT4 cN2 disease  (Carboplatin/Taxol weekly along with RT) (see below)  -Followed by consideration of?systemic chemotherapy?(for stage IV disease)  -Check lung cancer biopsy tissue for EGFR mutation, ALK mutation, BRAF mutation, ROS1 gene rearrangement, PD-L1 expression, RET rearrangement, MET exon 14 skipping mutation, NTRK gene fusion?(already ordered)  ?  -Has been referred to GI for colonoscopy and EGD  (Possibility of upper GI primary?on mediastinal mass biopsy?via EBUS, apart from lung primary)  (Iron deficiency anemia, FOBT positive)  ?  -Iron deficiency anemia  -FOBT positive  -Feraheme 510 mg IV x2 (10/26/2020-11/03/2020)  -11/27/2020: Hemoglobin down to 7.3;?no bleeding in any form; will transfuse PRBC x2 units  -Assess response to Feraheme?with repeat serum iron, TIBC, ferritin 6 weeks post completion (mid December)  ?  -Cough suppressants?for dry cough  ?  -Thrombocytosis is?reactive to iron deficiency and underlying metastatic lung cancer  ?  Ok to proceed with week #6 of Carbo/Taxol today.  Follow up in?2 weeks (TM) with?Dr. Shoemaker with?CBC, CMP, Mg to discuss potential future treatment.  ?  Discussion:  Concurrent chemoradiation therapy:  Day 1: Paclitaxel 45-50 mg/m? IV,  followed by:  Day 1 carboplatin AUC 2  Repeat weekly x7 weeks with RT, with/without additional  Day 1: Paclitaxel 200 mg per square IV  Day 1: Carboplatin AUC 6 IV  Repeat every 3 weeks x2 cycles starting 2-4 weeks after completion of concurrent CRT  Ordered:  ?  2.?Cancer related pain?G89.3  -Right upper back constant pain, 10/10, secondary to?large right upper lobe tumor  -Well-controlled with Norco 10 mg every 4 hours as needed  -Instructions given?to mitigate constipation with narcotics (plenty of fluids;?prune juice twice daily, with Metamucil;?as needed Metamucil, etc.)?  ?   Continue Norco 10/325mg q4hr prn; will change to liquid form for easier swallowing.?  Ordered:  ?   Problem List/Past Medical History  Ongoing  Abnormal imaging  Alkaline phosphatase level  Brain metastasis  Cancer of upper lobe of right lung  Cancer related pain  Cholelithiasis  Chronic cough  COPD - Chronic obstructive pulmonary disease  Current smoker  Dilated cbd, acquired  Hepatomegaly  Iron deficiency anemia  Microcytic anemia  Obesity  PAD - Peripheral arterial disease  Thrombocytosis  Tobacco user  Historical  No qualifying data  Procedure/Surgical History  Transfusion, blood or blood components (11/30/2020)  Bronchoscopy, Ebus, 3 or More Samples (09/22/2020)  Bronchoscopy, rigid or flexible, including fluoroscopic guidance, when performed; with endobronchial ultrasound (EBUS) guided transtracheal and/or transbronchial sampling (eg, aspiration[s]/biopsy[ies]), 3 or more mediastinal and/or hilar lymph node stati (09/22/2020)  Extraction of Thorax Lymphatic, Via Natural or Artificial Opening Endoscopic, Diagnostic (09/22/2020)  Transfusion of Nonautologous Red Blood Cells into Peripheral Vein, Percutaneous Approach (09/12/2020)  Transfusion, blood or blood components (09/12/2020)  Transfusion of Nonautologous Red Blood Cells into Peripheral Vein, Percutaneous Approach (09/11/2020)  Transfusion, blood or blood components  (09/11/2020)  PICC placement (2020)  Drainage of Neck Skin, External Approach (12/21/2018)  Incision and drainage of abscess (eg, carbuncle, suppurative hidradenitis, cutaneous or subcutaneous abscess, cyst, furuncle, or paronychia); simple or single (12/21/2018)   Medications  acetaminophen-hydrocodone 325 mg-5 mg oral tablet, 1 tab(s), Oral, q6hr, PRN,? ?Not Taking, Completed Rx  amitriptyline 10 mg oral tablet, 10 mg= 1 tab(s), Oral, qPM,? ?Not Taking, Completed Rx  aspirin 81 mg oral Delayed Release (EC) tablet  Benadryl (for IVPB), 25 mg, IV Piggyback, Once-chemo  Benadryl (for IVPB), 25 mg, IV Piggyback, Once-chemo  Benadryl (for IVPB), 25 mg, IV Piggyback, Once-chemo  Benadryl (for IVPB), 25 mg, IV Piggyback, Once-chemo  Benadryl (for IVPB), 25 mg, IV Piggyback, Once-chemo  Benadryl (for IVPB), 25 mg, IV Piggyback, Once-chemo  benzonatate 100 mg oral capsule,? ?Not taking  carboplatin (for IVPB)  clopidogrel 75 mg oral tablet, 75 mg= 1 tab(s), Oral, Daily,? ?Not Taking, Completed Rx  Decadron 4 mg oral tablet, 4 mg= 1 tab(s), Oral, BID  DULoxetine 60 mg oral delayed release capsule, 60 mg= 1 cap(s), Oral, Daily,? ?Not Taking, Completed Rx  Fergon 240 mg (27 mg elemental iron) oral tablet, 240 mg= 1 tab(s), Oral, Daily, 3 refills  gabapentin 100 mg oral capsule, 100 mg= 1 cap(s), Oral, TID,? ?Not taking  gabapentin 300 mg oral capsule, 300 mg= 1 cap(s), Oral, TID  Heparin Flush 100 U/mL - 5 mL, 500 units= 5 mL, IV Push, Once-chemo  Heparin Flush 100 U/mL - 5 mL, 500 units= 5 mL, IV Push, Once-chemo  Heparin Flush 100 U/mL - 5 mL, 500 units= 5 mL, IV Push, Once-chemo  hydroCHLOROthiazide 12.5 mg oral capsule, 25 mg= 2 cap(s), Oral, Daily,? ?Not taking  K-Dur 20 oral tablet, extended release, 40 mEq= 2 tab(s), Oral, Daily,? ?Not taking  megestrol 40 mg/mL oral suspension, 800 mg= 20 mL, Oral, Daily, 1 refills  montelukast 10 mg oral TABLET, 10 mg= 1 tab(s), Oral, Daily,? ?Not taking  MoviPrep oral powder for  reconstitution, 240 mL, Oral, Daily,? ?Investigating: will need for colonoscopy  Norco 10 mg-325 mg oral tablet, 1 tab(s), Oral, q4hr, PRN  pravastatin 40 mg oral tablet, 40 mg= 1 tab(s), Oral, Daily,? 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 Exercise frequency: Daily. Exercise type: Walking., 03/04/2020  Home/Environment  Lives with Children, Spouse, grandson. Living situation: Home/Independent. TV/Computer concerns: No. Single family house, 03/04/2020  Living situation: Home/Independent., 01/18/2018  Nutrition/Health  Regular, Low fat, Good, 03/04/2020  Sexual  Sexually active: Yes. Number of current partners 1. Sexual orientation: Straight or heterosexual. Gender Identity Identifies as female. No, 03/04/2020  Spiritual/Cultural  Islam, No, 03/04/2020  Substance Use  Never, 01/29/2021  Tobacco  4 or less cigarettes(less than 1/4 pack)/day in last 30 days, No, 01/29/2021  Family History  Heart disease: Sister and Brother.  Hypertension.: Sister and Brother.  Primary malignant neoplasm of bone: Mother.  Unknown cause of morbidity or mortality.....: Father.  Health Maintenance  Health Maintenance  ???Pending?(in the next year)  ??? ??OverDue  ??? ? ? ?COPD Management-COPD Medications Prescribed due??04/19/18??and every 1??year(s)  ??? ? ? ?Influenza Vaccine due??10/01/20??and every 1??day(s)  ??? ??Due?  ??? ? ? ?Colorectal Screening due??12/11/20??and every 3??month(s)  ??? ? ? ?Alcohol Misuse Screening due??01/02/21??and every 1??year(s)  ??? ? ? ?Breast Cancer Screening due??01/29/21??Unknown Frequency  ??? ? ? ?COPD  Maintenance-Pulmonary Rehab Education due??01/29/21??and every 1??year(s)  ??? ? ? ?Cervical Cancer Screening due??01/29/21??Unknown Frequency  ??? ? ? ?Lung Cancer Screening due??01/29/21??and every 1??year(s)  ??? ? ? ?Tetanus Vaccine due??01/29/21??and every 10??year(s)  ??? ? ? ?Zoster Vaccine due??01/29/21??Unknown Frequency  ??? ??Refused?  ??? ? ? ?Smoking Cessation due??01/01/21??and every 1??year(s)  ??? ??Due In Future?  ??? ? ? ?ADL Screening not due until??09/15/21??and every 1??year(s)  ??? ? ? ?Aspirin Therapy for CVD Prevention not due until??10/22/21??and every 1??year(s)  ??? ? ? ?Obesity Screening not due until??01/01/22??and every 1??year(s)  ??? ? ? ?COPD Management-Oxygen Assessment not due until??01/22/22??and every 1??year(s)  ???Satisfied?(in the past 1 year)  ??? ??Satisfied?  ??? ? ? ?ADL Screening on??09/15/20.??Satisfied by Betsey Lockett  ??? ? ? ?Alcohol Misuse Screening on??03/03/20.??Satisfied by Agnes Crawley LPN  ??? ? ? ?Aspirin Therapy for CVD Prevention on??10/22/20.  ??? ? ? ?Blood Pressure Screening on??01/29/21.??Satisfied by Jose Miguel Houston LPN  ??? ? ? ?Body Mass Index Check on??01/29/21.??Satisfied by Cesar Gomez RN  ??? ? ? ?COPD Management-Oxygen Assessment on??01/22/21.??Satisfied by Cesar Gomez RN  ??? ? ? ?Colorectal Screening on??09/11/20.??Satisfied by Colton Moseley  ??? ? ? ?Coronary Artery Disease Maintenance-Antiplatelet Agent Prescribed on??10/22/20.  ??? ? ? ?Depression Screening on??01/29/21.??Satisfied by Jose Miguel Houston LPN  ??? ? ? ?Diabetes Screening on??01/29/21.??Satisfied by Alba Wilson.  ??? ? ? ?Hypertension Management-Blood Pressure on??01/29/21.??Satisfied by Jose Miguel Houston LPN  ??? ? ? ?Hypertension Management-BMP on??01/29/21.??Satisfied by Alba Wilson.  ??? ? ? ?Influenza Vaccine on??01/29/21.??Satisfied by Cesar Gomez RN  ??? ? ? ?Lipid Screening on??04/20/20.??Satisfied by Venita Underwood  ??? ? ? ?Obesity  Screening on??01/29/21.??Satisfied by Cesar Gomez RN  ??? ??Refused?  ??? ? ? ?Smoking Cessation on??09/15/20.??Recorded by Betsey Lockett??Reason: Patient Refuses  ?  Lab Results  Test Name Test Result Date/Time   Sodium Lvl 138 mmol/L 01/29/2021 08:15 CST   Potassium Lvl 4.7 mmol/L 01/29/2021 08:15 CST   Chloride 106 mmol/L 01/29/2021 08:15 CST   CO2 22 mmol/L (Low) 01/29/2021 08:15 CST   Calcium Lvl 10.5 mg/dL (High) 01/29/2021 08:15 CST   Magnesium Lvl 1.90 mg/dL 01/29/2021 08:15 CST   Glucose Lvl 206 mg/dL (High) 01/29/2021 08:15 CST   BUN 21.0 mg/dL (High) 01/29/2021 08:15 CST   Creatinine 0.77 mg/dL 01/29/2021 08:15 CST   Est Creat Clearance Ser 68.42 mL/min 01/29/2021 09:14 CST   eGFR-AA 97 01/29/2021 08:15 CST   eGFR-WILFRID 80 mL/min/1.73 m2 (Low) 01/29/2021 08:15 CST   Bili Total 0.6 mg/dL 01/29/2021 08:15 CST   Bili Direct 0.4 mg/dL 01/29/2021 08:15 CST   Bili Indirect 0.20 mg/dL 01/29/2021 08:15 CST   AST 40 unit/L (High) 01/29/2021 08:15 CST   ALT 77 unit/L (High) 01/29/2021 08:15 CST   Alk Phos 284 unit/L (High) 01/29/2021 08:15 CST   Total Protein 7.8 gm/dL (High) 01/29/2021 08:15 CST   Albumin Lvl 3.1 gm/dL (Low) 01/29/2021 08:15 CST   Globulin 4.7 gm/dL (High) 01/29/2021 08:15 CST   A/G Ratio 0.7 ratio (Low) 01/29/2021 08:15 CST   WBC 4.1 x10(3)/mcL (Low) 01/29/2021 08:15 CST   RBC 3.26 x10(6)/mcL (Low) 01/29/2021 08:15 CST   Hgb 9.5 gm/dL (Low) 01/29/2021 08:15 CST   Hct 30.1 % (Low) 01/29/2021 08:15 CST   Platelet 310 x10(3)/mcL 01/29/2021 08:15 CST   MCV 92.3 fL 01/29/2021 08:15 CST   MCH 29.1 pg 01/29/2021 08:15 CST   MCHC 31.6 gm/dL 01/29/2021 08:15 CST   RDW 25.3 % (High) 01/29/2021 08:15 CST   MPV 9.2 fL 01/29/2021 08:15 CST   Abs Neut 3.83 x10(3)/mcL 01/29/2021 08:15 CST   Neutro Auto 94 % 01/29/2021 08:15 CST   Lymph Auto 4 % 01/29/2021 08:15 CST   Mono Auto 0 % 01/29/2021 08:15 CST   Abs Neutro 3.83 x10(3)/mcL 01/29/2021 08:15 CST   Abs Lymph 0.2 x10(3)/mcL (Low) 01/29/2021 08:15 CST   Abs  Mono 0.0 x10(3)/mcL (Low) 01/29/2021 08:15 CST   IG% 0 % 01/29/2021 08:15 CST   IG# 0.020 01/29/2021 08:15 CST

## 2022-05-04 NOTE — HISTORICAL [facility] CERNER
This is a historical note converted from [place]. Formatting and pictures may have been removed.  Please reference [place] for original formatting and attached multimedia. History of Present Illness  Past medical history: Cholelithiasis.? Chronic cough.? COPD.? Tobacco abuse.? Hypertension.? Dyslipidemia.? Hepatomegaly.? Obesity.? Peripheral artery disease.  Social history: . ?Lives in [place] with her  and family. ?Has 12 children. ?Has been smoking?1 pack of cigarettes daily for 40 years.? No history of alcohol or illicit drug abuse.  Family history:?Negative for cancers.  Health maintenance:  Screening mammogram in 2019 in [place], apparently unremarkable.  Mostly colonoscopy ever.  Menstrual and OB/GYN history:?Menopause?in her 50s.  ?  ?   Reason for follow-up:  Poorly differentiated carcinoma of right lung, stage IV: Right upper lobe mass, mediastinal lymphadenopathy, brain metastasis  Brain metastasis  Iron deficiency anemia; FOBT positive  Thrombocytosis  ?  ?   History of present illness:  65-year-old female referred by Dr. Steve Simpson, with lung cancer.  ?   -11.4 cm right upper lobe mass, invading mediastinum (noncontrast chest CT)  -Large mass in 4R position (right lower paratracheal) (bronchoscopy and EBUS: 09/22/2020)  -EBUS, 4R, FNA: Poorly differentiated carcinoma (tumor of lung versus upper GI tract)  (EGD, colonoscopy:?02/10/2021: No malignancy; no biopsies collected)  -PET/CT (11/02/2020):?Right upper lung lobe mass has enlarged?9.9 x 9.8 x 14.0 cm,?previously, 8.1 x 7.4 x 11.4 cm;?  contiguous extension?of the mass into the mediastinum?versus right hilar enlarged lymph node?2.0 x 1.8 cm  -Brain MRI (11/06/2020):?Right occipital?5 x 4 mm?hemorrhagic metastasis without?edema  -11/06/2020: Referred to [facility] for SRS  >>  Tumor >7 cm, therefore, T4  Tumor invading mediastinum, therefore, T4  Ipsilateral mediastinal mass (??Lymphadenopathy), EBUS FNA positive,?therefore, N2  Solitary  brain metastasis, therefore,?M1b  >>cT4 cN2 M1b, stage RICKIE  -SRS to brain metastasis (2100 cGy; 1 fraction)?(12/03/2020)  -Carbo/Taxol weekly x6, concurrent with RT?(12/16/2020-01/29/2021)  -Radiotherapy right lung (12/16/2020-01/28/2021) (6000 cGy; 30 fractions; 43 elapsed days)  -04/07/2021: Restaging bone scan: No bone metastasis  -04/07/2021: Restaging CT C/A/P with contrast (comparison: PET/CT dated 11/02/2020): Mild interval decrease in size of the mass involving the upper lobe of right lung (8.7 x 7.6 cm, previously 10.5 x 9.8 cm); prominent precarinal lymph node also appears mildly reduced in size (7 mm, previously 9 mm); no new malignancy or metastatic disease in chest, abdomen, or pelvis  (Positive response to?chemoradiation therapy)  -Subsequently, carbo/Alimta/Keytruda x4?(04/29/2021-07/09/2021)  -No brain metastases on surveillance brain MRI (05/03/2021)  -Hospitalized 07/11/2021-07/22/2021:?[facility]: Postobstructive pneumonia, and acute hypoxemic?respiratory failure, septic shock, HUNG; successfully treated  -08/09/2021: Surveillance brain MRI with contrast (comparison: 05/03/2021):  -08/20/2021: Restaging CT C/A/P with contrast (comparison: 04/07/2021: Similar size of right upper lobe mass extending to the hilum with increased peripheral opacities and fluid peripheral to the mass, cannot exclude postobstructive infection (8-9 cm, similar to prior); small right pleural effusion; stable mediastinal lymph nodes (subcarinal, 8 mm)  -Hospitalized 08/18/2021-08/21/2021: Anemia, pneumonia; hemoglobin 6.1, PRBC x1 unit; Augmentin plus Levaquin for pneumonia; sinus tachycardia, requiring Ativan; remained afebrile; CT chest without contrast (08/19/2021) indicated postobstructive pneumonia and lepidic spread of tumor, no interval change from prior; CTA chest PE study showed no large central or segmental pulmonary thromboemboli; interval enlargement of known right upper lobe mass and progression of  surrounding irregular airspace consolidation and ipsilateral pleural effusion, etc.  -08/19/2021: CT chest without contrast: Size of the right upper lobe mass with associated consolidation is unchanged in size and extent from 08/10/2021; small right pleural effusion; postobstructive pneumonia versus lepidic spread of tumor, no change from prior  -08/20/2021: CTA chest PE protocol (comparison: 07/11/2021): No PE; interval enlargement of known right upper lobe mass and progression of surrounding irregular airspace consolidation and ipsilateral pleural effusion (large right upper lobe mass 9.4 x 12.6 x 7.6 cm); cholelithiasis, similar dilated appearance of common bile duct, without definitive visualization of distal obstructive etiology  -Alimta/Keytruda every 3 weeks maintenance started 09/09/2021  -11/11/2021: Surveillance brain MRI with and without contrast (comparison: 08/09/2021): No acute intracranial findings or evidence of metastasis  -11/18/2021: TSH and free T4 normal  -Cycle #6 of maintenance Alimta/Keytruda on 12/29/2021  -12/17/2021: Restaging CTs of C/A/P with contrast (comparison: August 10 and August 9, 2021): Similar appearance of irregular right upper lung masslike consolidation and adjacent fluid; slightly improved aeration of right lower lobe; questionably slightly increased biliary ductal dilation; small mediastinal lymph nodes are stable; there are gallstones  -Alk phos: 567 (12/29/2021); 337 (12/13/2021); 606 (12/09/2021),  -Bilirubin, AST, ALT normal  -12/09/2021: No hemolysis; iron stores normal (transferrin saturation 21%, ferritin 4167.95); B12 479, normal; LDH low, haptoglobin elevated; RBC folate normal; hemoglobin 9.7, reticulocyte count 3.1%  -12/29/2021: TSH normal  -Per pathology, unable to perform liquid biopsy for PD-L1 and KRAS mutations  ?  ?  Interval history:  10/22/2020:  Presents for?initial medical oncology consultation, accompanied by her daughter,?Leslie.  ?    01/19/2022:  -Cycle #6 of maintenance Alimta/Keytruda on 12/29/2021  -12/17/2021: Restaging CTs of C/A/P with contrast (comparison: August 10 and August 9, 2021): Similar appearance of irregular right upper lung masslike consolidation and adjacent fluid; slightly improved aeration of right lower lobe; questionably slightly increased biliary ductal dilation; small mediastinal lymph nodes are stable; there are gallstones  -Alk phos: 567 (12/29/2021); 337 (12/13/2021); 606 (12/09/2021),  -Bilirubin, AST, ALT normal  -12/09/2021: No hemolysis; iron stores normal (transferrin saturation 21%, ferritin 4167.95); B12 479, normal; LDH low, haptoglobin elevated; RBC folate normal; hemoglobin 9.7, reticulocyte count 3.1%  -12/29/2021: TSH normal  -Per pathology, unable to perform liquid biopsy for PD-L1 and KRAS mutations  -01/19/2022: CBC reviewed; platelets 648,000/mm?, reactively elevated; hemoglobin 11, stable; WBC, differential normal  Presents for follow-up visit, accompanied by her daughter.? Doing very well, indeed.? Good appetite.? Good energy.? No unusual headaches or focal neurological symptoms.? No cough, chest pain, hemoptysis, recurrent fevers or chills, or significant dyspnea.? ECOG 1.? No new lumps or lymphadenopathy.? Appetite is good with Megace.? Wants refill of Norco.? Complains of pain in the right upper back secondary to very large right upper lung lobe tumor.? So far, has responded extremely well.? No significant side effects with chemotherapy whatsoever.? No right upper abdominal quadrant pains.? Occasional right lower abdominal quadrant pains, transient.  ?  ?  Review of systems:  All systems reviewed, and found to be negative except for the symptoms detailed above.  ?  ?   Physical examination:  VITAL SIGNS:? Reviewed.? ?  GENERAL:? In no apparent distress.?  HEAD:? No signs of head trauma.  EYES:? Pupils are equal.? Extraocular motions intact.?  EARS:? Hearing grossly intact.  MOUTH:? Oropharynx is  normal.  NECK:? No adenopathy, no JVD.? ?  CHEST:? Chest with clear breath sounds bilaterally.? No wheezes, rales, or rhonchi.?  CARDIAC:? Regular rate and rhythm.? S1 and S2, without murmurs, gallops, or rubs.  VASCULAR:? No Edema.? Peripheral pulses normal and equal in all extremities.  ABDOMEN:? Soft, without detectable tenderness.? No sign of distention.? No? ?rebound or guarding, and no masses palpated.? ?Bowel Sounds normal.  MUSCULOSKELETAL:? Good range of motion of all major joints. Extremities without clubbing, cyanosis or edema.?  NEUROLOGIC EXAM:? Alert and oriented x 3.? No focal sensory or strength deficits.? ?Speech normal.? Follows commands.  PSYCHIATRIC:? Mood normal.  SKIN:? No rash or lesions.  10/22/2020:?Lungs clear to auscultation. ?No crepitations or rhonchi.? No supraclavicular or axillary lymphadenopathy palpable.? Heart sounds normal. ?Abdomen benign.? No swelling in legs.? Conjunctiva pale.  11/27/2020: In no acute discomfort.? Conjunctive pale.  03/23/2021:?Not examined; it was a telemedicine visit.  04/15/2021:?Looks well and healthy. ?Cheerful.? Bilateral rhonchi. ?No palpable lymphadenopathy.  ?  ?  Assessment:  #Poorly differentiated carcinoma of lung:  To summarize:  -Poorly differentiated carcinoma of lung, 14.0 cm right upper lobe mass invading mediastinum  -EBUS 09/20/2020  -cT4 cN2 M1b, stage RICKIE  -EGD/colonoscopy negative  -5 mm right occipital hemorrhagic metastasis 11/06/2020  -S/p SRS?to brain metastasis (2100 cGy; 1 fraction)?(12/03/2020)  -S/p chemoradiation therapy (12/2020-01/2021) for intrathoracic disease, with positive response,  -Followed by carbo/Alimta/Keytruda x4 for metastatic disease (solitary brain metastasis)?with stable disease;?questionable progression on CTs?08/2021)  -No brain metastases on surveillance brain MRI (11/11/2021)  -No progression on restaging CTs?post?maintenance Alimta/Keytruda x6?(12/17/2021)  ?  ?  #Sites of disease:  14 cm right upper lobe  mass, invading mediastinum;?mediastinal lymphadenopathy;?right occipital brain metastasis  ?  ?  #Molecular markers:  -BRAF mutation negative; ROS1 gene arrangement negative; ALK rearrangement negative; PD-L1 CPS <10 (CPS 5)?(erroneously, tested for?esophageal carcinoma); EGFR negative; MET exon 14 skipping mutation negative; NTRK gene fusion negative; RET rearrangement negative  -PD-L1 testing: QNS  ?  ?  #Hypercalcemia?(11/25/2020)  -11/25/2020: Calcium 10.3 (elevated for the very first time). ?BUN 21, elevated. ?Creatinine 0.79, normal. ?Albumin 2.4.  -11/25/2020:?Intact PTH level 32.9, normal. ?Ionized calcium level 5.4, within normal limits.  -11/25/2020:?IV fluids + Zometa 4 mg IV x1  ?  ?  #Iron deficiency anemia, FOBT positive  -09/11/2020: Hemoglobin 6.9.? Microcytosis.? Elevated RDW.? Low serum iron, low TIBC, low transferrin saturation, normal ferritin.? FOBT positive.? 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  ?  #Thrombocytosis since 09/11/2020 ([id number])  -Could be reactive to iron deficiency anemia?versus metastatic lung cancer  -10/22/2020:?JAK2 mutation negative. ?BCR-ABL  1?fusion transcripts negative.? ESR, CRP elevated.? Iron deficiency anemia.  -Subsequently, resolved?(post?parenteral iron therapy;?post?chemoradiation therapy for lung cancer)  -08/17/2021: Hemoglobin 6.1, , serum iron 16, TIBC 175, transferrin saturation 9%, ferritin >1675.56 (relative iron deficiency)  ?  ?  #Cholelithiasis;?chronically elevated alkaline phosphatase:  -HIDA scan (04/18/2019): No gallbladder activity at 1 hour post isotope injection, compatible with cystic duct obstruction/acute cholecystitis  -02/07/2020: Limited abdominal ultrasound: Cholelithiasis, dilated CBD, hepatomegaly  (12/03/2020)  -12/17/2021:?Restaging CT C/A/P with contrast:?Gallstones noted, apart from other findings;?questionable?slightly increased?periductal dilatation  -Alk phos: 567 (12/29/2021); 337 (12/13/2021); 606 (12/09/2021),  (Bilirubin, AST, ALT normal)  ?  ?  Plan:  -Alimta/Keytruda maintenance started 09/09/2021  -No progression?post Alimta/Durant x6 cycles?on restaging CTs (12/17/2021)  >>>  -Continue Alimta/Keytruda maintenance every 3 weeks (Alimta indefinitely; pembrolizumab for 24 months)  -Check CBC and CMP every 3 weeks?before each cycle of chemotherapy  -Check TSH?every 6 weeks; next,?due now  -Restage with contrast-enhanced CT scans of C/A/P?in 3 months?mid March)  -Surveillance brain MRI scans deferred?to radiation oncology?(no metastasis on surveillance brain MRI?11/11/2021)  ?  Chemotherapy regimen:  1.? Day 1: Pembrolizumab 200 mg IV plus pemetrexed 500 mg/m? IV plus carboplatin AUC 5; repeat cycles every 3 weeks for 4 cycles; followed by:  2.? Day 1: Pembrolizumab 200 mg IV every 3 weeks for 24 months;  3.? Day 1: Pemetrexed 500 mg/m? IV every 3 weeks indefinitely  ?  Labs unable to perform?liquid biopsy?for PD-L1 and KRAS mutation  >>>  -Will refer to?IR for?biopsy?of right upper lung lobe mass?we will check for PD-L1 and KRAS mutations  ?  -Iron deficiency anemia; FOBT positive;;?s/p Feraheme  x2 (10/26/2020-11/03/2020)  -EGD and colonoscopy?(02/10/2021)?with a few ulcers in sigmoid colon, internal hemorrhoids, no bleeding,?radiation esophagitis?(no biopsies taken)  -08/17/2021: Hemoglobin 6.1, relative iron deficiency (serum iron 16, TIBC 175,?transferrin saturation 9%, ferritin?>1675.56), PRBC x1 unit as inpatient  -S/p Feraheme 510 mg IV x2 (09/09/2021, 09/16/2021)  -No improvement in hemoglobin?despite normalization of iron stores?(12/09/2021)  -12/09/2021: Iron, B12, folate stores normal; no hemolysis  ?  -Cholelithiasis; chronically elevated alkaline phosphatase  -Questionable slightly increased bili ductal dilation on CT C/A/P with contrast (12/17/2021)  >>>  -We will order MRCP at this time to rule out choledocholithiasis  ?  -Thrombocytosis is?reactive to iron deficiency and underlying metastatic lung cancer  ?  Needs pain medications and cough suppressant.? On  -Norco refilled today.  ?  Follow-up with NP in 3 weeks.  ?  Above discussed with her?and her daughter. ?All questions answered.  Discussed labs and scans and gave her copies of relevant reports.  She understands and agrees with this plan, and was appreciative.  Physical Exam  Vitals & Measurements  T:?37.0? ?C (Oral)? HR:?87(Apical)? RR:?19? BP:?117/76? SpO2:?98%?  HT:?160?cm? WT:?79.5?kg?   Problem List/Past Medical History  Ongoing  Abnormal imaging  Adjustment and management of vascular access device  Alkaline phosphatase level  Brain metastasis  Cancer of upper lobe of right lung  Cancer related pain  Cholelithiasis  Chronic cough  COPD - Chronic obstructive pulmonary disease  Current smoker  Dilated cbd, acquired  Hepatomegaly  Iron deficiency anemia  Microcytic anemia  Obesity  PAD - Peripheral arterial disease  Thrombocytosis  Tobacco user  Historical  No qualifying data  Procedure/Surgical History  Transfusion of Nonautologous Red Blood Cells into Peripheral Vein, Percutaneous Approach (08/18/2021)  Insertion of Infusion Device  into Superior Vena Cava, Percutaneous Approach (07/12/2021)  Introduction of Vasopressor into Central Vein, Percutaneous Approach (07/12/2021)  Catheter Insertion Mediport (None) (05/12/2021)  Fluoroscopy of Superior Vena Cava using Low Osmolar Contrast, Guidance (05/12/2021)  Insertion of Infusion Device into Superior Vena Cava, Percutaneous Approach (05/12/2021)  Insertion of Totally Implantable Vascular Access Device into Chest Subcutaneous Tissue and Fascia, Open Approach (05/12/2021)  Insertion of tunneled centrally inserted central venous access device, with subcutaneous port; age 5 years or older (05/12/2021)  Colonoscopy (None) (02/10/2021)  Colonoscopy, flexible; diagnostic, including collection of specimen(s) by brushing or washing, when performed (separate procedure) (02/10/2021)  Esophagogastroduodenoscopy (02/10/2021)  Esophagogastroduodenoscopy, flexible, transoral; diagnostic, including collection of specimen(s) by brushing or washing, when performed (separate procedure) (02/10/2021)  Inspection of Lower Intestinal Tract, Via Natural or Artificial Opening Endoscopic (02/10/2021)  Inspection of Upper Intestinal Tract, Via Natural or Artificial Opening Endoscopic (02/10/2021)  Transfusion, blood or blood components (11/30/2020)  Bronchoscopy, Ebus, 3 or More Samples (09/22/2020)  Bronchoscopy, rigid or flexible, including fluoroscopic guidance, when performed; with endobronchial ultrasound (EBUS) guided transtracheal and/or transbronchial sampling (eg, aspiration[s]/biopsy[ies]), 3 or more mediastinal and/or hilar lymph node stati (09/22/2020)  Extraction of Thorax Lymphatic, Via Natural or Artificial Opening Endoscopic, Diagnostic (09/22/2020)  Transfusion of Nonautologous Red Blood Cells into Peripheral Vein, Percutaneous Approach (09/12/2020)  Transfusion, blood or blood components (09/12/2020)  Transfusion of Nonautologous Red Blood Cells into Peripheral Vein, Percutaneous Approach  (09/11/2020)  Transfusion, blood or blood components (09/11/2020)  PICC placement (2020)  Drainage of Neck Skin, External Approach (12/21/2018)  Incision and drainage of abscess (eg, carbuncle, suppurative hidradenitis, cutaneous or subcutaneous abscess, cyst, furuncle, or paronychia); simple or single (12/21/2018)   Medications  adenosine, 6 mg= 2 mL, IV Push, Once  alPRAzolam 0.25 mg oral tab, 0.25 mg= 1 tab(s), Oral, TID  aspirin 81 mg oral Delayed Release (EC) tablet  Benadryl (for IVPB), 25 mg, IV Piggyback, Once-chemo  Benadryl (for IVPB), 25 mg, IV Piggyback, Once-chemo  Benadryl (for IVPB), 25 mg, IV Piggyback, Once-chemo  Benadryl (for IVPB), 25 mg, IV Piggyback, Once-chemo  Benadryl (for IVPB), 25 mg, IV Piggyback, Once-chemo  Benadryl (for IVPB), 25 mg, IV Piggyback, Once-chemo  Bentyl 10 mg oral capsule, 10 mg= 1 cap(s), Oral, q6hr, PRN  buPROPion 150 mg oral tablet, extended release, 150 mg= 1 tab(s), Oral, Daily,? ?Not taking  codeine-guaifenesin 10 mg-100 mg/5 mL oral syrup, 10 mL, Oral, q4hr, PRN  codeine-promethazine 10 mg-6.25 mg/5 mL oral syrup, 5 mL, Oral, Once a day (at bedtime)  cyproheptadine 4 mg oral tablet, 4 mg= 1 tab(s), Oral, Daily,? ?Not taking  Decadron 4 mg oral tablet, 4 mg= 1 tab(s), Oral, BID  dexamethasone 4 mg oral tablet, 4 mg= 1 tab(s), Oral, BID, 12 refills  DuoNeb 0.5 mg-2.5 mg/3 mL inhalation solution, 3 mL, NEB, QID  Fergon 240 mg (27 mg elemental iron) oral tablet, 240 mg= 1 tab(s), Oral, Daily, 3 refills  folic acid 1 mg oral tablet, 1 mg= 1 tab(s), Oral, Daily, 12 refills  folic acid 1 mg oral tablet, 1 mg= 1 tab(s), Oral, Daily, 12 refills  gabapentin 300 mg oral capsule, 300 mg= 1 cap(s), Oral, TID  Heparin Flush 100 U/mL - 5 mL, 500 units= 5 mL, IV Push, Once-chemo  Heparin Flush 100 U/mL - 5 mL, 500 units= 5 mL, IV Push, Once-chemo  loratadine 10 mg oral capsule, 10 mg= 1 cap(s), Oral, Daily  megestrol 40 mg/mL oral suspension, 800 mg= 20 mL, Oral, Daily, 1  refills  metoprolol tartrate 25 mg oral tab, 25 mg= 1 tab(s), Oral, BID, 3 refills  metoprolol tartrate 50 mg oral tab, 50 mg= 1 tab(s), Oral, BID,? 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 Exercise frequency: Daily. Exercise type: Walking., 03/04/2020  Home/Environment  Lives with Children, Spouse, grandson. Living situation: Home/Independent. TV/Computer concerns: No. Single family house, 03/04/2020  Living situation: Home/Independent., 01/18/2018  Nutrition/Health  Regular, Low fat, Good, 03/04/2020  Sexual  Sexually active: Yes. Number of current partners 1. Sexual orientation: Straight or heterosexual. Gender Identity Identifies as female. No, 03/04/2020  Spiritual/Cultural  Cheondoism, No, 03/04/2020  Substance Use  Never, 12/29/2021  Tobacco  Former smoker, quit more than 30 days ago, No, 12/29/2021  Family History  Heart disease: Sister and Brother.  Hypertension.: Sister and Brother.  Primary malignant neoplasm of bone: Mother.  Unknown cause of morbidity or mortality.....: Father.  Immunizations  Vaccine Date Status   influenza virus vaccine, inactivated 10/05/2011 Recorded

## 2022-05-04 NOTE — HISTORICAL [facility] CERNER
This is a historical note converted from [place]. Formatting and pictures may have been removed.  Please reference [place] for original formatting and attached multimedia. History of Present Illness  ?  IMPRESSION:  ?  1. Right occipital lobe small lesion with precontrast T1-weighted and  T2-weighted hyperintensity which suggests subacute hemorrhage. This  lesion shows enhancement on the postcontrast images and is suspected  to represent small hemorrhagic metastatic focus. No surrounding  vasogenic edema. Attention to short follow-up exam in 6 weeks.?  2. Mild chronic microvascular ischemia.?  ?  ?  Brain MRI scan?report reviewed (see above).  Question of?small?hemorrhagic?right occipital?lobe metastasis.  Called patient.  She denies any acute symptoms except for?nonspecific?headaches without focal neurological symptoms?like vision impairment, seizures,?or focal weakness?or sensory loss, or dizziness.  ?  We will send a referral to [facility]; patient?may need SRS.  At this time, no indication to start corticosteroid therapy.  ER precautions discussed.? Instructed her to report to emergency room immediately if she is?starts having severe headaches?or?focal neurological symptoms.  ?  She verbalized understanding.   Problem List/Past Medical History  Ongoing  Abnormal imaging  Alkaline phosphatase level  Cholelithiasis  Chronic cough  COPD - Chronic obstructive pulmonary disease  Current smoker  Dilated cbd, acquired  Hepatomegaly  Microcytic anemia  Obesity  PAD - Peripheral arterial disease  Tobacco user  Historical  No qualifying data  Procedure/Surgical History  Bronchoscopy, Ebus, 3 or More Samples (09/22/2020)  Bronchoscopy, rigid or flexible, including fluoroscopic guidance, when performed; with endobronchial ultrasound (EBUS) guided transtracheal and/or transbronchial sampling (eg, aspiration[s]/biopsy[ies]), one or two mediastinal and/or hilar lymph node stat (09/22/2020)  Extraction of Thorax Lymphatic, Via  Natural or Artificial Opening Endoscopic, Diagnostic (09/22/2020)  Transfusion of Nonautologous Red Blood Cells into Peripheral Vein, Percutaneous Approach (09/12/2020)  Transfusion, blood or blood components (09/12/2020)  Transfusion of Nonautologous Red Blood Cells into Peripheral Vein, Percutaneous Approach (09/11/2020)  Transfusion, blood or blood components (09/11/2020)  Drainage of Neck Skin, External Approach (12/21/2018)  Incision and drainage of abscess (eg, carbuncle, suppurative hidradenitis, cutaneous or subcutaneous abscess, cyst, furuncle, or paronychia); simple or single (12/21/2018)   Medications  acetaminophen-hydrocodone 325 mg-5 mg oral tablet, 1 tab(s), Oral, q6hr, PRN,? ?Not Taking, Completed Rx  amitriptyline 10 mg oral tablet, 10 mg= 1 tab(s), Oral, qPM,? ?Not Taking, Completed Rx  aspirin 81 mg oral Delayed Release (EC) tablet  benzonatate 100 mg oral capsule  clopidogrel 75 mg oral tablet, 75 mg= 1 tab(s), Oral, Daily,? ?Not Taking, Completed Rx  DULoxetine 60 mg oral delayed release capsule, 60 mg= 1 cap(s), Oral, Daily,? ?Not Taking, Completed Rx  Fergon 240 mg (27 mg elemental iron) oral tablet, 240 mg= 1 tab(s), Oral, Daily, 3 refills  gabapentin 100 mg oral capsule, 100 mg= 1 cap(s), Oral, TID  hydroCHLOROthiazide 12.5 mg oral capsule, 25 mg= 2 cap(s), Oral, Daily  K-Dur 20 oral tablet, extended release, 40 mEq= 2 tab(s), Oral, Daily  megestrol 40 mg/mL oral suspension, 800 mg= 20 mL, Oral, Daily, 1 refills  montelukast 10 mg oral TABLET, 10 mg= 1 tab(s), Oral, Daily  pravastatin 40 mg oral tablet, 40 mg= 1 tab(s), Oral, Daily,? 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 Exercise frequency: Daily.  Exercise type: Walking., 03/04/2020  Home/Environment  Lives with Children, Spouse, grandson. Living situation: Home/Independent. TV/Computer concerns: No. Single family house, 03/04/2020  Living situation: Home/Independent., 01/18/2018  Nutrition/Health  Regular, Low fat, Good, 03/04/2020  Sexual  Sexually active: Yes. Number of current partners 1. Sexual orientation: Straight or heterosexual. Gender Identity Identifies as female. No, 03/04/2020  Spiritual/Cultural  Caodaism, No, 03/04/2020  Substance Use  Never, 10/22/2020  Tobacco  10 or more cigarettes (1/2 pack or more)/day in last 30 days, No, Cigarettes, 19 Years (Age started)., 11/03/2020  Family History  Heart disease: Sister and Brother.  Hypertension.: Sister and Brother.  Primary malignant neoplasm of bone: Mother.  Unknown cause of morbidity or mortality.....: Father.

## 2022-05-04 NOTE — HISTORICAL [facility] CERNER
This is a historical note converted from [place]. Formatting and pictures may have been removed.  Please reference [place] for original formatting and attached multimedia. 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 Diagnosed 9/22/2020. Brain mets diagnosed 11/6/2020.  ?   Current Treatment:  Day 1: Pembrolizumab 200 mg IV plus pemetrexed 500 mg/m? IV plus carboplatin AUC 5; repeat cycles every 3 weeks for 4 cycles; followed by:  2.? Day 1: Pembrolizumab 200 mg IV every 3 weeks for 24 months;  3.? Day 1: Pemetrexed 500 mg/m? IV every 3 weeks indefinitely  Started 4/29/2021  ?   Cycle #4 due 7/1/2021  ?   Dose modifications & interruptions:  ?  ?   Treatment History:  SRS to brain metastasis on 12/3/2020.  Carboplatin/Taxol weekly along with RT  XRT started 12/16/2020  Started 12/16/2020, completed 1/29/2021  ?  ?   Past medical history: Cholelithiasis.? Chronic cough.? COPD.? Tobacco abuse.? Hypertension.? Dyslipidemia.? Hepatomegaly.? Obesity.? Peripheral artery disease.  Social history: . ?Lives in [place] with her  and family. ?Has 12 children. ?Has been smoking?1 pack of cigarettes daily for 40 years.? No history of alcohol or illicit drug abuse.  Family history:?Negative for cancers.  Health maintenance:  Screening mammogram in 2019 in [place], apparently unremarkable.  Mostly colonoscopy ever.  Menstrual and OB/GYN history:?Menopause?in her 50s.  ?  History of present illness:  65-year-old female referred by Dr. Steve Simpson, with lung cancer.  ?  For a full, detailed history, please see Dr. Ulrich note dated 11/27/2020.  ?  Interval History  7/9/2021: Patient presents for follow up on Carbo/Alimta/Keytruda; she is scheduled to receive cycle #4 today. VS stable. Her daughter and granddaughter attend the visit with her. She denies nausea, vomiting, diarrhea, constipation, mouth  sores, abdominal pain. CMP, CBC stable. She asks why she can no longer receive Spangle from Oncology. Informed her that recent scans indicate a decrease in her right lung mass as well as a decrease in the size of her lymph nodes. Since her chest pain had resolved, there is no need to continue to prescribe narcotic pain medication. Advised her she can take OTC pain meds. She reports tenderness to her right axilla and breast; she is aware that it could be related to XRT. She is due for MMG which her PCP orders; she has this done in [place]. Advised her to follow up with her PCP regarding having her MMG ordered. Breast exam did not reveal any lumps but elicited tenderness upon palpation in the axilla and at 6 oclock under the right breast. Informed her that, after today, her regimen will not include Carbo; she verbalizes understanding. She is also aware of upcoming scans. Will have her follow up in 3 weeks with Dr. Shoemaker to review scans and start Alimta/Keytruda. She is amenable to this plan.  Review of Systems  A complete 12-point?ROS was performed in full with pertinent positives as described in interval history. Remainder of ROS done in full and are negative.  Physical Exam  Vitals & Measurements  T:?37.0? ?C (Oral)? HR:?105(Peripheral)? RR:?18? BP:?130/79?  HT:?163.00?cm? WT:?93.800?kg? BMI:?35.3?  General: ?Alert and oriented, No acute distress.??  Appearance: Well-groomed; wearing face mask.  HEENT: ?Normocephalic, Oral mucosa is moist.?Pupils are equal, round and reactive to light, Extraocular movements are intact, Normal conjunctiva.?  Neck: ?Supple, Non-tender, No lymphadenopathy, No thyromegaly.??  Respiratory: ?Lungs are clear to auscultation, Respirations are non-labored, Breath sounds are equal, Symmetrical chest wall expansion.??  Cardiovascular: ?Normal rate, Regular rhythm, No edema.??  Breast:??Right breast no masses, adenopathy, or nipple discharge. Left axillary tenderness;?tenderness at 6  oclock.??Left breast not examined.  Gastrointestinal: Rounded,?Soft, Non-tender, Non-distended, Normal bowel sounds.??  Musculoskeletal: ?Normal strength.??  Integumentary: ?Warm, dry, intact.?Left dorsum?hand tattoo.?Long acrylic nails to bilateral hands.  Neurologic: ?Alert, Oriented, No focal deficits, Cranial Nerves II-XII are grossly intact.??  Cognition and Speech: ?Oriented, Speech clear and coherent.??  Psychiatric: ?Cooperative, Appropriate mood & affect. ?  ECOG Performance Scale:?1 - Capable of light work.?  Assessment/Plan  1.?Cancer of upper lobe of right lung?C34.11  #Poorly differentiated carcinoma of lung  -11.4 cm right upper lobe mass, invading mediastinum (noncontrast chest CT)  -Large mass in 4R position (right lower paratracheal) (bronchoscopy and EBUS: 09/22/2020)  -EBUS, 4R, FNA: Poorly differentiated carcinoma (tumor of lung versus upper GI tract)  (EGD, colonoscopy:?02/10/2021: No malignancy; no biopsies collected)  -PET/CT (11/02/2020):?Right upper lung lobe mass has enlarged?9.9 x 9.8 x 14.0 cm,?previously, 8.1 x 7.4 x 11.4 cm;?  contiguous extension?of the mass into the mediastinum?versus right hilar enlarged lymph node?2.0 x 1.8 cm  -Brain MRI (11/06/2020):?Right occipital?5 x 4 mm?hemorrhagic metastasis without?edema  -11/06/2020: Referred to [facility] for SRS  -->  Tumor >7 cm, therefore, T4  Tumor invading mediastinum, therefore, T4  Ipsilateral mediastinal mass (??Lymphadenopathy), EBUS FNA positive,?therefore, N2  Solitary brain metastasis, therefore,?M1b  -->?cT4 cN2 M1b, stage RICKIE  -SRS to brain metastasis (2100 cGy; 1 fraction)?(12/03/2020)  -Carbo/Taxol weekly x6, concurrent with RT?(12/16/2020-01/29/2021)  -Radiotherapy right lung (12/16/2020-01/28/2021) (6000 cGy; 30 fractions; 43 elapsed days)  -04/07/2021: Restaging bone scan: No bone metastasis  -04/07/2021: Restaging CT C/A/P with contrast (comparison: PET/CT dated 11/02/2020): Mild interval decrease in size of the mass  involving the upper lobe of right lung (8.7 x 7.6 cm, previously 10.5 x 9.8 cm); prominent precarinal lymph node also appears mildly reduced in size (7 mm, previously 9 mm); no new malignancy or metastatic disease in chest, abdomen, or pelvis  (Positive response to?chemoradiation therapy)  ?   #Sites of disease:  11.4 cm right upper lobe mass, invading mediastinum;?mediastinal lymphadenopathy;?right occipital brain metastasis  ?   #?Molecular markers:  ?-BRAF mutation negative; ROS1 gene arrangement negative; ALK rearrangement negative; PD-L1 CPS <10 (CPS 5)?(erroneously, tested for?esophageal carcinoma); EGFR negative; MET exon 14 skipping mutation negative; NTRK gene fusion negative; RET rearrangement negative  -PD-L1 testing: QNS  ?  ?   #Cholelithiasis.? Chronically elevated alkaline phosphatase.  -HIDA scan (04/18/2019): No gallbladder activity at 1 hour post isotope injection, compatible with cystic duct obstruction/acute cholecystitis  -02/07/2020: Limited abdominal ultrasound: Cholelithiasis, dilated CBD, hepatomegaly  ?   ?Plan:  -Solitary brain metastasis, s/p SRS?(12/03/2020)  -Subsequently,?definitive CRT concurrent chemoradiation therapy to cT4 cN2 disease?(12/16/2020-01/29/2021)  -Positive response to chemoradiation therapy?on restaging scans  ?   -Now, proceed with systemic chemotherapy?for stage IV disease  -Molecular markers ( mutations) negative  -PD-L1 testing: QNS  ?   -Proceed with systemic therapy now  -In general, 4 cycles of initial systemic therapy (with carboplatin and cisplatin) are recommended prior to maintenance therapy  -However, if patient is tolerating therapy well, consideration can be given to continue to 6 cycles  -Monitoring: Response assessment after 2 cycles, then every 2-4 cycles with CTs  ?  Ok to proceed with cycle #4 of Carbo/Alimta/Keytruda today.  Restaging contrast-enhanced CT scans of C/A/P?2 months after starting chemotherapy; scheduled for?7/16/2021.  Follow  up?in?3 weeks (F2F on 7/30/2021) with Dr. Shoemaker with CT results, CBC, CMP, TSH, free T4?prior to starting Alimta/Keytruda.  Check baseline TSH and free T4, and monitor every 6 weeks (monitoring for the possibility of immune thyroiditis with immunotherapy).  ?  -Check CBC and CMP every 10 days  -Restage with contrast-enhanced CT scans of C/A/P 2 months after initiation of chemotherapy  ?  -Brain MRI for surveillance of brain metastasis, deferred to radiation oncology  ?  -EGD Band colonoscopy?(02/10/2021)?with a few ulcers in sigmoid colon, internal hemorrhoids, no bleeding,?radiation esophagitis?(no biopsies taken)  ?  -Right upper back constant pain, 10/10, secondary to?large right upper lobe tumor  -Required narcotics  -04/15/2021:?Chest pain has completely resolved?and she does not need to take narcotics anymore; severe cough is also resolved  (Excellent symptom?response to chemoradiation therapy)  ?  -Mid-forehead abscess  referred to PCP  causing headaches, encouraged to take Tylenol?OTC will not refill [place] for healing abscess to forehead  Ordered:  ?  2.?Iron deficiency anemia?D50.9  #Iron deficiency anemia, FOBT positive  -09/11/2020: Hemoglobin 6.9.? Microcytosis.? Elevated RDW.? Low serum iron, low TIBC, low transferrin saturation, normal ferritin.? FOBT positive.? PRBC x2  -10/22/2020:?Iron deficiency (transferrin saturation 6%, ferritin elevated?to 248.95);?folate, B12 stores adequate; no monoclonal?gammopathy; no hemolysis; hypoproliferative anemia  -Feraheme?510 mg IV x2 (10/26/2020-11/03/2020)  ?-02/10/2021: Colonoscopy: A few ulcers in sigmoid colon; mild diverticulosis in sigmoid colon, without bleeding; internal hemorrhoids (no biopsies)  -02/10/2021: EGD: Moderately severe radiation esophagitis?(radiation esophagitis); normal stomach; duodenal mucosal atrophy (no biopsies)?  ?   -Iron deficiency anemia; FOBT positive  -Feraheme 510 mg IV x2 (10/26/2020-11/03/2020)?  ?  Continue folic acid  daily.  B12 level, folate level in 3 weeks  Ordered:  ?   Problem List/Past Medical History  Ongoing  Abnormal imaging  Alkaline phosphatase level  Brain metastasis  Cancer of upper lobe of right lung  Cancer related pain  Cholelithiasis  Chronic cough  COPD - Chronic obstructive pulmonary disease  Current smoker  Dilated cbd, acquired  Hepatomegaly  Iron deficiency anemia  Microcytic anemia  Obesity  PAD - Peripheral arterial disease  Thrombocytosis  Tobacco user  Historical  No qualifying data  Procedure/Surgical History  Catheter Insertion Mediport (None) (05/12/2021)  Fluoroscopy of Superior Vena Cava using Low Osmolar Contrast, Guidance (05/12/2021)  Insertion of Infusion Device into Superior Vena Cava, Percutaneous Approach (05/12/2021)  Insertion of Totally Implantable Vascular Access Device into Chest Subcutaneous Tissue and Fascia, Open Approach (05/12/2021)  Insertion of tunneled centrally inserted central venous access device, with subcutaneous port; age 5 years or older (05/12/2021)  Colonoscopy (None) (02/10/2021)  Colonoscopy, flexible; diagnostic, including collection of specimen(s) by brushing or washing, when performed (separate procedure) (02/10/2021)  Esophagogastroduodenoscopy (02/10/2021)  Esophagogastroduodenoscopy, flexible, transoral; diagnostic, including collection of specimen(s) by brushing or washing, when performed (separate procedure) (02/10/2021)  Inspection of Lower Intestinal Tract, Via Natural or Artificial Opening Endoscopic (02/10/2021)  Inspection of Upper Intestinal Tract, Via Natural or Artificial Opening Endoscopic (02/10/2021)  Transfusion, blood or blood components (11/30/2020)  Bronchoscopy, Ebus, 3 or More Samples (09/22/2020)  Bronchoscopy, rigid or flexible, including fluoroscopic guidance, when performed; with endobronchial ultrasound (EBUS) guided transtracheal and/or transbronchial sampling (eg, aspiration[s]/biopsy[ies]), 3 or more mediastinal and/or hilar lymph node  stati (09/22/2020)  Extraction of Thorax Lymphatic, Via Natural or Artificial Opening Endoscopic, Diagnostic (09/22/2020)  Transfusion of Nonautologous Red Blood Cells into Peripheral Vein, Percutaneous Approach (09/12/2020)  Transfusion, blood or blood components (09/12/2020)  Transfusion of Nonautologous Red Blood Cells into Peripheral Vein, Percutaneous Approach (09/11/2020)  Transfusion, blood or blood components (09/11/2020)  PICC placement (2020)  Drainage of Neck Skin, External Approach (12/21/2018)  Incision and drainage of abscess (eg, carbuncle, suppurative hidradenitis, cutaneous or subcutaneous abscess, cyst, furuncle, or paronychia); simple or single (12/21/2018)   Medications  albuterol 0.083% inhalation solution, 2.5 mg= 3 mL, NEB, q6hr  albuterol CFC free 90 mcg/inh inhalation aerosol with adapter, 2 puff(s), INH, q6hr  Alimta (for IVPB)  aspirin 81 mg oral Delayed Release (EC) tablet  Benadryl (for IVPB), 25 mg, IV Piggyback, Once-chemo  Benadryl (for IVPB), 25 mg, IV Piggyback, Once-chemo  Benadryl (for IVPB), 25 mg, IV Piggyback, Once-chemo  Benadryl (for IVPB), 25 mg, IV Piggyback, Once-chemo  Benadryl (for IVPB), 25 mg, IV Piggyback, Once-chemo  Benadryl (for IVPB), 25 mg, IV Piggyback, Once-chemo  Benadryl 50mg/ml Inj, 25 mg= 0.5 mL, IV Push, Once-chemo  buPROPion 150 mg oral tablet, extended release, 150 mg= 1 tab(s), Oral, Daily  carboplatin (for IVPB)  Cinvanti, 130 mg= 18 mL, IV Push, Once  cyanocobalamin, 1000 mcg= 1 mL, IM, Once-chemo  cyproheptadine 4 mg oral tablet, 4 mg= 1 tab(s), Oral, qPM  Decadron 4 mg oral tablet, 4 mg= 1 tab(s), Oral, BID  Fergon 240 mg (27 mg elemental iron) oral tablet, 240 mg= 1 tab(s), Oral, Daily, 3 refills  fluticasone 50 mcg/inh nasal spray, 1 spray(s), Daily  folic acid 1 mg oral tablet, 1 mg= 1 tab(s), Oral, Daily, 12 refills  gabapentin 300 mg oral capsule, 300 mg= 1 cap(s), Oral, TID  Heparin Flush 100 U/mL - 5 mL, 500 units= 5 mL, IV Push,  Once-chemo  Heparin Flush 100 U/mL - 5 mL, 500 units= 5 mL, IV Push, Once-chemo  Heparin Flush 100 U/mL - 5 mL, 500 units= 5 mL, IV Push, Once-chemo  loratadine 10 mg oral tablet, 10 mg= 1 tab(s), Oral, Daily  megestrol 40 mg/mL oral suspension, 800 mg= 20 mL, Oral, Daily, 1 refills  montelukast 10 mg oral TABLET, 10 mg= 1 tab(s), Oral, Daily  Norco 10 mg-325 mg oral tablet, 1 tab(s), Oral, q6hr, PRN  ondansetron 8 mg oral tablet, disintegrating, 8 mg= 1 tab(s), Oral, TID  ondansetron/dexamethasone, 1 EA, IV Piggyback, Once-chemo  pembrolizumab  tiZANidine 4 mg oral tablet, 4 mg= 1 tab(s), Oral, TID  Allergies  No Known Medication Allergies  Social History  Abuse/Neglect  No, No, Yes, 07/09/2021  Alcohol  Past, Beer, 07/09/2021  Employment/School  Employed, 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 Exercise frequency: Daily. Exercise type: Walking., 03/04/2020  Home/Environment  Lives with Children, Spouse, grandson. Living situation: Home/Independent. TV/Computer concerns: No. Single family house, 03/04/2020  Living situation: Home/Independent., 01/18/2018  Nutrition/Health  Regular, Low fat, Good, 03/04/2020  Sexual  Sexually active: Yes. Number of current partners 1. Sexual orientation: Straight or heterosexual. Gender Identity Identifies as female. No, 03/04/2020  Spiritual/Cultural  Orthodox, No, 03/04/2020  Substance Use  Never, 07/09/2021  Tobacco  4 or less cigarettes(less than 1/4 pack)/day in last 30 days, No, Cigarettes, 3 per day., 07/09/2021  Family History  Heart disease: Sister and Brother.  Hypertension.: Sister and Brother.  Primary malignant neoplasm of bone: Mother.  Unknown cause of morbidity or mortality.....: Father.  Health Maintenance  Health Maintenance  ???Pending?(in the next year)  ??? ??OverDue  ??? ? ? ?Influenza Vaccine due??10/01/20??and every 1??day(s)  ??? ? ? ?Alcohol Misuse Screening due??01/02/21??and every  1??year(s)  ??? ??Due?  ??? ? ? ?Breast Cancer Screening due??07/09/21??Unknown Frequency  ??? ? ? ?COPD Maintenance-Pulmonary Rehab Education due??07/09/21??and every 1??year(s)  ??? ? ? ?Cervical Cancer Screening due??07/09/21??Unknown Frequency  ??? ? ? ?Lung Cancer Screening due??07/09/21??and every 1??year(s)  ??? ? ? ?Tetanus Vaccine due??07/09/21??and every 10??year(s)  ??? ? ? ?Zoster Vaccine due??07/09/21??Unknown Frequency  ??? ??Refused?  ??? ? ? ?Smoking Cessation due??01/01/21??and every 1??year(s)  ??? ??Due In Future?  ??? ? ? ?ADL Screening not due until??09/15/21??and every 1??year(s)  ??? ? ? ?Aspirin Therapy for CVD Prevention not due until??10/22/21??and every 1??year(s)  ??? ? ? ?Obesity Screening not due until??01/01/22??and every 1??year(s)  ??? ? ? ?COPD Management-COPD Medications Prescribed not due until??04/27/22??and every 1??year(s)  ??? ? ? ?COPD Management-Oxygen Assessment not due until??05/12/22??and every 1??year(s)  ???Satisfied?(in the past 1 year)  ??? ??Satisfied?  ??? ? ? ?ADL Screening on??09/15/20.??Satisfied by Betsey Lockett  ??? ? ? ?Aspirin Therapy for CVD Prevention on??10/22/20.  ??? ? ? ?Blood Pressure Screening on??07/09/21.??Satisfied by Manny Moore  ??? ? ? ?Body Mass Index Check on??07/09/21.??Satisfied by Manny Moore  ??? ? ? ?COPD Management-Oxygen Assessment on??05/12/21.??Satisfied by Gaetano DUQUE, Dee  ??? ? ? ?COPD Management-COPD Medications Prescribed on??04/27/21.  ??? ? ? ?Colorectal Screening on??02/10/21.  ??? ? ? ?Coronary Artery Disease Maintenance-Antiplatelet Agent Prescribed on??10/22/20.  ??? ? ? ?Depression Screening on??07/09/21.??Satisfied by Manny Moore  ??? ? ? ?Diabetes Screening on??07/09/21.??Satisfied by Siobhan Moseley  ??? ? ? ?Hypertension Management-Blood Pressure on??07/09/21.??Satisfied by Manny Moore  ??? ? ? ?Hypertension Management-BMP on??07/09/21.??Satisfied by Siobhan Moseley  ??? ? ?  ?Influenza Vaccine on??03/23/21.??Satisfied by Patricia Montoya  ??? ? ? ?Obesity Screening on??07/09/21.??Satisfied by Manny Moore  ??? ??Refused?  ??? ? ? ?Smoking Cessation on??09/15/20.??Recorded by Betsey Lockett??Reason: Patient Refuses  ?  Lab Results  Test Name Test Result Date/Time   Sodium Lvl 141 mmol/L 07/09/2021 08:20 CDT   Potassium Lvl 3.9 mmol/L 07/09/2021 08:20 CDT   Chloride 106 mmol/L 07/09/2021 08:20 CDT   CO2 25 mmol/L 07/09/2021 08:20 CDT   Calcium Lvl 9.4 mg/dL 07/09/2021 08:20 CDT   Glucose Lvl 149 mg/dL (High) 07/09/2021 08:20 CDT   BUN 12.6 mg/dL 07/09/2021 08:20 CDT   Creatinine 0.87 mg/dL 07/09/2021 08:20 CDT   Est Creat Clearance Ser 56.82 mL/min 07/09/2021 09:43 CDT   eGFR-AA 84 (Low) 07/09/2021 08:20 CDT   eGFR-WILFRID 70 mL/min/1.73 m2 (Low) 07/09/2021 08:20 CDT   Bili Total 0.3 mg/dL 07/09/2021 08:20 CDT   Bili Direct 0.1 mg/dL 07/09/2021 08:20 CDT   Bili Indirect 0.20 mg/dL 07/09/2021 08:20 CDT   AST 18 unit/L 07/09/2021 08:20 CDT   ALT 23 unit/L 07/09/2021 08:20 CDT   Alk Phos 175 unit/L (High) 07/09/2021 08:20 CDT   Total Protein 7.0 gm/dL 07/09/2021 08:20 CDT   Albumin Lvl 3.3 gm/dL (Low) 07/09/2021 08:20 CDT   Globulin 3.7 gm/dL (High) 07/09/2021 08:20 CDT   A/G Ratio 0.9 ratio (Low) 07/09/2021 08:20 CDT   Folate Lvl 18.2 ng/mL 07/09/2021 08:20 CDT   Vitamin B12 Lvl 337 pg/mL 07/09/2021 08:20 CDT   WBC 5.9 x10(3)/mcL 07/09/2021 08:20 CDT   RBC 2.98 x10(6)/mcL (Low) 07/09/2021 08:20 CDT   Hgb 10.1 gm/dL (Low) 07/09/2021 08:20 CDT   Hct 32.7 % (Low) 07/09/2021 08:20 CDT   Platelet 292 x10(3)/mcL 07/09/2021 08:20 CDT   .7 fL (High) 07/09/2021 08:20 CDT   MCH 33.9 pg 07/09/2021 08:20 CDT   MCHC 30.9 gm/dL (Low) 07/09/2021 08:20 CDT   RDW 17.0 % (High) 07/09/2021 08:20 CDT   MPV 8.0 fL 07/09/2021 08:20 CDT   Abs Neut 3.14 x10(3)/mcL 07/09/2021 08:20 CDT   Neutro Auto 53 % 07/09/2021 08:20 CDT   Lymph Auto 23 % 07/09/2021 08:20 CDT   Mono Auto 22 % 07/09/2021 08:20 CDT   Eos Auto  1 % 07/09/2021 08:20 CDT   Abs Eos 0.0 x10(3)/[facility] 07/09/2021 08:20 CDT   Basophil Auto 1 % 07/09/2021 08:20 CDT   Abs Neutro 3.14 x10(3)/[facility] 07/09/2021 08:20 CDT   Abs Lymph 1.4 x10(3)/[facility] 07/09/2021 08:20 CDT   Abs Mono 1.3 x10(3)/[facility] 07/09/2021 08:20 CDT   Abs Baso 0.0 x10(3)/[facility] 07/09/2021 08:20 CDT   NRBC% 0.0 % 07/09/2021 08:20 CDT   IG% 1 % 07/09/2021 08:20 CDT   IG# 0.050 07/09/2021 08:20 CDT

## 2022-05-04 NOTE — HISTORICAL [facility] CERNER
This is a historical note converted from [employer]. Formatting and pictures may have been removed.  Please reference [employer] for original formatting and attached multimedia. No changes to H&P performed within the past 30 days.  -To OR today for mediport placment  -Informed consent in chart  ?  Leti Zaragoza MD  [facility] General Surgery, PGY-1  ?  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 Pt has changes on CT of thorax that look like  involvement of Rt subclavian and IJ region.  Will attempt line placement via Left IJ.  Pt is aware of potential difficulties with access.  ?  Americo Martin MD

## 2022-05-04 NOTE — HISTORICAL [facility] CERNER
This is a historical note converted from [employer]. Formatting and pictures may have been removed.  Please reference [employer] for original formatting and attached multimedia. Procedure Name  Endoscopic?bronchoscopy with ultrasound?guidance  Consent  Written consent obtained from the patient  Indication  Right upper lung mass  Location  Procedure performed in the interventional radiology suite  Pre-Procedure Exam  Patient awake and alert.  Procedural Sedation  Sedation accomplished?by CNA  Technique  The fiberoptic bronchoscope was inserted via the LMA.? The vocal cords were visualized and normal in appearance and function.? They?were anesthetized with local lidocaine.? The scope was advanced to the trachea.? The main richie was identified and normal.? The scope was advanced to the left mainstem bronchus.? Left upper and left lower lung were patent and free of endobronchial lesions.? The scope was then brought back to the main richie and advanced to the right mainstem bronchus. ?This was normal.? The right upper, right middle and right lower lung were all patent?and free ?of endobronchial lesions.? The fiberoptic bronchoscope was subsequently removed and the ultrasound bronchoscope was inserted.? This was advanced to the distal trachea.? Ultrasound evaluation was subsequently made.? Large mass was found?in the 4R position.? Transtracheal biopsy with the ultrasound guidance was accomplished x4?with initial?fast read consistent with malignancy.? Scope was withdrawn and the patient is recovered by CRNA.? All specimens were obtained from the 4 R position and sent to pathology?for?examination.  Post-Procedure Exam  Patient awake and alert  Complications  No apparent immediate complications.  Total Time  45 minutes  Assessment/Plan  Orders:  Pathology Non-Gyn Request [facility], 09/22/20 8:14:00 CDT, Routine, Vishnu Catalan MD, Right upper lung mass c/w lung cancer, Lung mass, AP Specimen, 4R Right upper lung, Nurse Collect, Print  Label, RT - Routine, [email or web address], Hold Until Collected, 09/22/20 8:15:00 CDT

## 2022-05-04 NOTE — HISTORICAL [facility] CERNER
This is a historical note converted from [employer]. Formatting and pictures may have been removed.  Please reference [employer] for original formatting and attached multimedia. I have reviewed the H&P and there are no changes. All questions answered this AM. Ready to proceed to the OR for mediport insertion.  ?  Nelda French MD  [facility] General Surgery PGY 1

## 2022-05-04 NOTE — HISTORICAL [facility] CERNER
This is a historical note converted from [place]. Formatting and pictures may have been removed.  Please reference [place] for original formatting and attached multimedia. 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 Diagnosed 9/22/2020. Brain mets diagnosed 11/6/2020.  ?   Current Treatment:  2.? Day 1: Pembrolizumab 200 mg IV every 3 weeks for 24 months;  3.? Day 1: Pemetrexed 500 mg/m? IV every 3 weeks indefinitely  Started 9/9/2021  ?   Cycle #4 due 11/18/2021  ?   Dose modifications & interruptions:  Cycle #1 delayed 6 weeks d/t multiple hospitalizations  ?   Treatment History:  1. SRS to brain metastasis on 12/3/2020.  2. Carboplatin/Taxol weekly along with RT. XRT started 12/16/2020. Chemo started 12/16/2020; completed 1/29/2021.  3. Carbo/Alimta/Keytruda every 3 weeks x 4 cycles. Started 4/29/2021. Completed 4 cycles on 7/9/2021.  ?  ?   Past medical history: Cholelithiasis.? Chronic cough.? COPD.? Tobacco abuse.? Hypertension.? Dyslipidemia.? Hepatomegaly.? Obesity.? Peripheral artery disease.  Social history: . ?Lives in [place] with her  and family. ?Has 12 children. ?Has been smoking?1 pack of cigarettes daily for 40 years.? No history of alcohol or illicit drug abuse.  Family history:?Negative for cancers.  Health maintenance:  Screening mammogram in 2019 in [place], apparently unremarkable.  Mostly colonoscopy ever.  Menstrual and OB/GYN history:?Menopause?in her 50s.  ?   History of present illness:  65-year-old female referred by Dr. Steve Simpson, with lung cancer.  ?  For a full, detailed history, please see Dr. Ulrich note dated 11/27/2020.  ?  Interval History  11/18/2021: Patient presents for follow up on Alimta/Keytruda; she is scheduled to receive cycle #4 today. VS stable. CMP, CBC stable. TSH, free T4 WNL. She presents with her daughter. She denies nausea,  vomiting, diarrhea, constipation, mouth sores, abdominal pain, and neuropathy. She reports a decent appetite but states her appetite is better when she takes Megace. She has Megace at home and has resumed using it. Advised her she can take it every other day if daily is causing her to eat more than she would like. She verbalizes understanding. Her main complaint this morning is that she is tired. She was originally scheduled for scans this morning; scans were cancelled but she was not notified so she presented at 7am this morning. Her daughter asks when she will need?to do scans. Will reschedule them for 2 weeks from now and have her follow up in 3 weeks with Dr. Shoemaker to review scans prior to her next cycle of treatment. She is amenable to this plan. She reports having followed up with Rad Onc recently; brain MRI reveals no evidence of recurrent or worsening brain mets.  Review of Systems  A complete 12-point?ROS was performed in full with pertinent positives as described in interval history. Remainder of ROS done in full and are negative.  Physical Exam  Vitals & Measurements  T:?36.9? ?C (Oral)? HR:?88(Peripheral)? RR:?20? BP:?111/71?  HT:?160.00?cm? WT:?80.700?kg? BMI:?31.52?  General: ?Alert and oriented, No acute distress.??  Appearance: Well-groomed; wearing face mask.  HEENT: ?Normocephalic, Oral mucosa is moist.?Pupils are equal, round and reactive to light, Extraocular movements are intact, Normal conjunctiva.?  Neck: ?Supple, Non-tender, No lymphadenopathy, No thyromegaly.??  Respiratory: ?Lungs are clear to auscultation, Respirations are non-labored, Breath sounds are equal, Symmetrical chest wall expansion.??  Cardiovascular: ?Normal rate, Regular rhythm, No edema.??  Breast:??Breast exam not performed on todays visit.??  Gastrointestinal: Rounded,?Soft, Non-tender, Non-distended, Normal bowel sounds.??  Musculoskeletal: ?Normal strength.??  Integumentary: ?Warm, dry, intact.??  Neurologic: ?Alert,  Oriented, No focal deficits, Cranial Nerves II-XII are grossly intact.??  Cognition and Speech: ?Oriented, Speech clear and coherent.??  Psychiatric: ?Cooperative, Appropriate mood & affect. ?  ECOG Performance Scale: 2 - Capable of all self-care but unable to carry out any work activities. Up and about greater than 50 percent of waking hours.?  Assessment/Plan  1.?Cancer of upper lobe of right lung?C34.11  #Poorly differentiated carcinoma of lung:  -11.4 cm right upper lobe mass, invading mediastinum (noncontrast chest CT)  -Large mass in 4R position (right lower paratracheal) (bronchoscopy and EBUS: 09/22/2020)  -EBUS, 4R, FNA: Poorly differentiated carcinoma (tumor of lung versus upper GI tract)  (EGD, colonoscopy:?02/10/2021: No malignancy; no biopsies collected)  -PET/CT (11/02/2020):?Right upper lung lobe mass has enlarged?9.9 x 9.8 x 14.0 cm,?previously, 8.1 x 7.4 x 11.4 cm;?  contiguous extension?of the mass into the mediastinum?versus right hilar enlarged lymph node?2.0 x 1.8 cm  -Brain MRI (11/06/2020):?Right occipital?5 x 4 mm?hemorrhagic metastasis without?edema  -11/06/2020: Referred to [facility] for SRS  >>  Tumor >7 cm, therefore, T4  Tumor invading mediastinum, therefore, T4  Ipsilateral mediastinal mass (??Lymphadenopathy), EBUS FNA positive,?therefore, N2  Solitary brain metastasis, therefore,?M1b  >> cT4 cN2 M1b, stage RICKIE  -SRS to brain metastasis (2100 cGy; 1 fraction)?(12/03/2020)  -Carbo/Taxol weekly x6, concurrent with RT?(12/16/2020-01/29/2021)  -Radiotherapy right lung (12/16/2020-01/28/2021) (6000 cGy; 30 fractions; 43 elapsed days)  -04/07/2021: Restaging bone scan: No bone metastasis  -04/07/2021: Restaging CT C/A/P with contrast (comparison: PET/CT dated 11/02/2020): Mild interval decrease in size of the mass involving the upper lobe of right lung (8.7 x 7.6 cm, previously 10.5 x 9.8 cm); prominent precarinal lymph node also appears mildly reduced in size (7 mm, previously 9 mm); no  new malignancy or metastatic disease in chest, abdomen, or pelvis  (Positive response to?chemoradiation therapy)  -Subsequently, carbo/Alimta/Keytruda x4?(04/29/2021-07/09/2021)  -No brain metastases on surveillance brain MRI (05/03/2021)  -Hospitalized 07/11/2021-07/22/2021:?[facility]: Postobstructive pneumonia, and acute hypoxemic?respiratory failure, septic shock, HUNG; successfully treated  -08/09/2021: Surveillance brain MRI with contrast (comparison: 05/03/2021):  -08/20/2021: Restaging CT C/A/P with contrast (comparison: 04/07/2021: Similar size of right upper lobe mass extending to the hilum with increased peripheral opacities and fluid peripheral to the mass, cannot exclude postobstructive infection (8-9 cm, similar to prior); small right pleural effusion; stable mediastinal lymph nodes (subcarinal, 8 mm)  To summarize:  -Poorly differentiated carcinoma of lung, 14.0 cm right upper lobe mass invading mediastinum, EBUS 09/20/2020, EGD/colonoscopy negative, 5 mm right occipital hemorrhagic metastasis 11/06/2020, s/p SRS, s/p chemoradiation therapy (12/2020-01/2021) with positive response, then carbo/Alimta/Keytruda x4 for metastatic disease (solitary brain metastasis)?with stable disease  ?  ?   #Sites of disease:  11.4 cm right upper lobe mass, invading mediastinum;?mediastinal lymphadenopathy;?right occipital brain metastasis  ?  ?   #Molecular markers:  -BRAF mutation negative; ROS1 gene arrangement negative; ALK rearrangement negative; PD-L1 CPS <10 (CPS 5)?(erroneously, tested for?esophageal carcinoma); EGFR negative; MET exon 14 skipping mutation negative; NTRK gene fusion negative; RET rearrangement negative  -PD-L1 testing: QNS  ?  ?   #Hypercalcemia?(11/25/2020)  -11/25/2020: Calcium 10.3 (elevated for the very first time). ?BUN 21, elevated. ?Creatinine 0.79, normal. ?Albumin 2.4.  -11/25/2020:?Intact PTH level 32.9, normal. ?Ionized calcium level 5.4, within normal  limits.  -11/25/2020:?IV fluids + Zometa 4 mg IV x1  ?  ?  #Iron deficiency anemia, FOBT positive  -09/11/2020: Hemoglobin 6.9.? Microcytosis.? Elevated RDW.? Low serum iron, low TIBC, low transferrin saturation, normal ferritin.? FOBT positive.? 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  ?  #Thrombocytosis since 09/11/2020 ([phone])  -Could be reactive to iron deficiency anemia?versus metastatic lung cancer  -10/22/2020:?JAK2 mutation negative. ?BCR-ABL 1?fusion transcripts negative.? ESR, CRP elevated.? Iron deficiency anemia.  -Subsequently, resolved?(post?parenteral iron therapy;?post?chemoradiation therapy for lung cancer)  ?  ?  #Cholelithiasis.? Chronically elevated alkaline phosphatase.  -HIDA scan (04/18/2019): No gallbladder activity at 1 hour post isotope injection, compatible with cystic duct obstruction/acute cholecystitis  -02/07/2020: Limited abdominal ultrasound: Cholelithiasis, dilated CBD, hepatomegaly  (12/03/2020)  ?  ?  Plan:  -Poorly differentiated carcinoma of lung, 14.0 cm right upper lobe mass invading mediastinum, EBUS 09/20/2020, EGD/colonoscopy negative, 5 mm right occipital hemorrhagic metastasis 11/06/2020, s/p SRS, s/p chemoradiation therapy (12/2020-01/2021) with positive response, then carbo/Alimta/Keytruda x4 for metastatic disease (solitary brain metastasis)?with stable disease  >>>  -Proceed with?Alimta/Keytruda maintenance every 3 weeks (Alimta indefinitely; pembrolizumab for 24 months)  -Restage with  contrast-enhanced CT scans of C/A/P?in 3 months?(November)  -Surveillance brain MRI scans deferred?to radiation oncology  ?  Check baseline TSH and free T4, and monitor every 6 weeks (monitoring for the possibility of immune thyroiditis with immunotherapy)  -Next?TSH check, end of August  ?  -Check CBC and CMP every 10 days  ?  -Resume Norco 10 mg every 4 hours as needed?for right chest wall pain secondary to?lung tumor  -Resume Megace?800 mg p.o. daily to boost appetite.  ?  -Brain MRI for surveillance of brain metastasis, deferred to radiation oncology  ?  -Iron deficiency anemia; FOBT positive; s/p Feraheme x2 (10/26/2020-11/03/2020)  -EGD and colonoscopy?(02/10/2021)?with a few ulcers in sigmoid colon, internal hemorrhoids, no bleeding,?radiation esophagitis?(no biopsies taken)  >>>  Today, check CBC, CMP, serum iron, TIBC, and ferritin  ?  -Right upper back constant pain, 10/10, secondary to?large right upper lobe tumor; required narcotics  -04/15/2021:?Chest pain completely resolved?and she does not need to take narcotics anymore; severe cough is also resolved  (Excellent symptom?response to chemoradiation therapy)  -9/9/2021: Right upper and lower back pain from tumor  ?  -Thrombocytosis is?reactive to iron deficiency and underlying metastatic lung cancer  -Subsequently, resolved  ?  Ok to proceed with?cycle #4 of Alimta/Keytruda today.  Restage with contrast-enhanced CT scans of C/A/P?in 3 months?(November); scheduled for 11/18/2021; will move back?2 weeks.  Follow up in 6 weeks (12/9/2021) with Dr. Shoemaker with CBC, CMP, TSH, free T4, & CT scan results prior to cycle #5.  Surveillance brain MRI scans deferred?to radiation oncology  ?  Discussion:  Concurrent chemoradiation therapy:  Day 1: Paclitaxel 45-50 mg/m? IV, followed by:  Day 1 carboplatin AUC 2  Repeat weekly x7 weeks with RT,  with/without additional  Day 1: Paclitaxel 200 mg per square IV  Day 1: Carboplatin AUC 6 IV  Repeat every 3 weeks x2  cycles starting 2-4 weeks after completion of concurrent CRT  Ordered:  ?   Problem List/Past Medical History  Ongoing  Abnormal imaging  Alkaline phosphatase level  Brain metastasis  Cancer of upper lobe of right lung  Cancer related pain  Cholelithiasis  Chronic cough  COPD - Chronic obstructive pulmonary disease  Current smoker  Dilated cbd, acquired  Hepatomegaly  Iron deficiency anemia  Microcytic anemia  Obesity  PAD - Peripheral arterial disease  Thrombocytosis  Tobacco user  Historical  No qualifying data  Procedure/Surgical History  Transfusion of Nonautologous Red Blood Cells into Peripheral Vein, Percutaneous Approach (08/18/2021)  Insertion of Infusion Device into Superior Vena Cava, Percutaneous Approach (07/12/2021)  Introduction of Vasopressor into Central Vein, Percutaneous Approach (07/12/2021)  Catheter Insertion Mediport (None) (05/12/2021)  Fluoroscopy of Superior Vena Cava using Low Osmolar Contrast, Guidance (05/12/2021)  Insertion of Infusion Device into Superior Vena Cava, Percutaneous Approach (05/12/2021)  Insertion of Totally Implantable Vascular Access Device into Chest Subcutaneous Tissue and Fascia, Open Approach (05/12/2021)  Insertion of tunneled centrally inserted central venous access device, with subcutaneous port; age 5 years or older (05/12/2021)  Colonoscopy (None) (02/10/2021)  Colonoscopy, flexible; diagnostic, including collection of specimen(s) by brushing or washing, when performed (separate procedure) (02/10/2021)  Esophagogastroduodenoscopy (02/10/2021)  Esophagogastroduodenoscopy, flexible, transoral; diagnostic, including collection of specimen(s) by brushing or washing, when performed (separate procedure) (02/10/2021)  Inspection of Lower Intestinal Tract, Via Natural or Artificial Opening Endoscopic (02/10/2021)  Inspection of Upper Intestinal Tract, Via Natural or Artificial Opening Endoscopic (02/10/2021)  Transfusion, blood or blood components  (11/30/2020)  Bronchoscopy, Ebus, 3 or More Samples (09/22/2020)  Bronchoscopy, rigid or flexible, including fluoroscopic guidance, when performed; with endobronchial ultrasound (EBUS) guided transtracheal and/or transbronchial sampling (eg, aspiration[s]/biopsy[ies]), 3 or more mediastinal and/or hilar lymph node stati (09/22/2020)  Extraction of Thorax Lymphatic, Via Natural or Artificial Opening Endoscopic, Diagnostic (09/22/2020)  Transfusion of Nonautologous Red Blood Cells into Peripheral Vein, Percutaneous Approach (09/12/2020)  Transfusion, blood or blood components (09/12/2020)  Transfusion of Nonautologous Red Blood Cells into Peripheral Vein, Percutaneous Approach (09/11/2020)  Transfusion, blood or blood components (09/11/2020)  PICC placement (2020)  Drainage of Neck Skin, External Approach (12/21/2018)  Incision and drainage of abscess (eg, carbuncle, suppurative hidradenitis, cutaneous or subcutaneous abscess, cyst, furuncle, or paronychia); simple or single (12/21/2018)   Medications  adenosine, 6 mg= 2 mL, IV Push, Once  alPRAzolam 0.25 mg oral tab, 0.25 mg= 1 tab(s), Oral, TID  aspirin 81 mg oral Delayed Release (EC) tablet  Benadryl (for IVPB), 25 mg, IV Piggyback, Once-chemo  Benadryl (for IVPB), 25 mg, IV Piggyback, Once-chemo  Benadryl (for IVPB), 25 mg, IV Piggyback, Once-chemo  Benadryl (for IVPB), 25 mg, IV Piggyback, Once-chemo  Benadryl (for IVPB), 25 mg, IV Piggyback, Once-chemo  Benadryl (for IVPB), 25 mg, IV Piggyback, Once-chemo  buPROPion 150 mg oral tablet, extended release, 150 mg= 1 tab(s), Oral, Daily,? ?Not taking  codeine-guaifenesin 10 mg-100 mg/5 mL oral syrup, 10 mL, Oral, q4hr, PRN  codeine-promethazine 10 mg-6.25 mg/5 mL oral syrup, 5 mL, Oral, Once a day (at bedtime)  cyproheptadine 4 mg oral tablet, 4 mg= 1 tab(s), Oral, Daily,? ?Not taking  Decadron 4 mg oral tablet, 4 mg= 1 tab(s), Oral, BID  dexamethasone 4 mg oral tablet, 4 mg= 1 tab(s), Oral, BID, 12  refills  dexamethasone 4 mg oral tablet, 4 mg= 1 tab(s), Oral, BID, 12 refills  DuoNeb 0.5 mg-2.5 mg/3 mL inhalation solution, 3 mL, NEB, QID  Fergon 240 mg (27 mg elemental iron) oral tablet, 240 mg= 1 tab(s), Oral, Daily, 3 refills  folic acid 1 mg oral tablet, 1 mg= 1 tab(s), Oral, Daily, 12 refills  folic acid 1 mg oral tablet, 1 mg= 1 tab(s), Oral, Daily, 12 refills  gabapentin 300 mg oral capsule, 300 mg= 1 cap(s), Oral, TID  Heparin Flush 100 U/mL - 5 mL, 500 units= 5 mL, IV Push, Once-chemo  Heparin Flush 100 U/mL - 5 mL, 500 units= 5 mL, IV Push, Once-chemo  loratadine 10 mg oral capsule, 10 mg= 1 cap(s), Oral, Daily  megestrol 40 mg/mL oral suspension, 800 mg= 20 mL, Oral, Daily, 1 refills  metoprolol tartrate 25 mg oral tab, 25 mg= 1 tab(s), Oral, BID, 3 refills  metoprolol tartrate 50 mg oral tab, 50 mg= 1 tab(s), Oral, BID,? 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 Exercise frequency: Daily. Exercise type: Walking., 03/04/2020  Home/Environment  Lives with Children, Spouse, grandson. Living situation: Home/Independent. TV/Computer concerns: No. Single family house, 03/04/2020  Living situation: Home/Independent., 01/18/2018  Nutrition/Health  Regular, Low fat, Good, 03/04/2020  Sexual  Sexually active: Yes. Number of current partners 1. Sexual orientation: Straight or heterosexual. Gender Identity Identifies as female. No, 03/04/2020  Spiritual/Cultural  Latter day, No, 03/04/2020  Substance Use  Never, 11/18/2021  Tobacco  Former smoker, quit more than 30 days ago, No, 11/18/2021  Family History  Heart disease: Sister and Brother.  Hypertension.:  Sister and Brother.  Primary malignant neoplasm of bone: Mother.  Unknown cause of morbidity or mortality.....: Father.  Immunizations  Vaccine Date Status   influenza virus vaccine, inactivated 10/05/2011 Recorded   Health Maintenance  Health Maintenance  ???Pending?(in the next year)  ??? ??OverDue  ??? ? ? ?Alcohol Misuse Screening due??01/02/21??and every 1??year(s)  ??? ? ? ?Cognitive Screening due??01/02/21??and every 1??year(s)  ??? ? ? ?ADL Screening due??09/15/21??and every 1??year(s)  ??? ??Due?  ??? ? ? ?Bone Density Screening due??11/18/21??Variable frequency  ??? ? ? ?Breast Cancer Screening due??11/18/21??Unknown Frequency  ??? ? ? ?COPD Maintenance-Pulmonary Rehab Education due??11/18/21??and every 1??year(s)  ??? ? ? ?Lung Cancer Screening due??11/18/21??and every 1??year(s)  ??? ? ? ?Pneumococcal Vaccine due??11/18/21??Unknown Frequency  ??? ? ? ?Tetanus Vaccine due??11/18/21??and every 10??year(s)  ??? ? ? ?Zoster Vaccine due??11/18/21??Unknown Frequency  ??? ??Refused?  ??? ? ? ?Smoking Cessation due??01/01/21??and every 1??year(s)  ??? ??Due In Future?  ??? ? ? ?Obesity Screening not due until??01/01/22??and every 1??year(s)  ??? ? ? ?Advance Directive not due until??01/02/22??and every 1??year(s)  ??? ? ? ?Fall Risk Assessment not due until??01/02/22??and every 1??year(s)  ??? ? ? ?Functional Assessment not due until??01/02/22??and every 1??year(s)  ??? ? ? ?COPD Management-COPD Medications Prescribed not due until??08/18/22??and every 1??year(s)  ??? ? ? ?Aspirin Therapy for CVD Prevention not due until??08/26/22??and every 1??year(s)  ??? ? ? ?COPD Management-Oxygen Assessment not due until??10/07/22??and every 1??year(s)  ???Satisfied?(in the past 1 year)  ??? ??Satisfied?  ??? ? ? ?Advance Directive on??08/25/21.??Satisfied by Daryl Camarillo  ??? ? ? ?Aspirin Therapy for CVD Prevention on??08/26/21.??Satisfied by Gale Marin RN  ??? ? ? ?Blood Pressure Screening  on??11/18/21.??Satisfied by Joana Yates  ??? ? ? ?Body Mass Index Check on??11/18/21.??Satisfied by Joana Yates  ??? ? ? ?COPD Maintenance-Spirometry on??07/21/21.??Satisfied by She Cunningham  ??? ? ? ?COPD Management-COPD Medications Prescribed on??08/18/21.??Satisfied by Gennaro Rodriguez DO  ??? ? ? ?COPD Management-Oxygen Assessment on??10/07/21.??Satisfied by Janessa Moseley RN  ??? ? ? ?Colorectal Screening on??02/10/21.??Satisfied by Agnes Crawley LPN.  ??? ? ? ?Depression Screening on??11/18/21.??Satisfied by Joana Yates  ??? ? ? ?Diabetes Screening on??11/18/21.??Satisfied by Kevan Osorio.  ??? ? ? ?Fall Risk Assessment on??11/18/21.??Satisfied by Jeanette Brewer RN  ??? ? ? ?Functional Assessment on??11/18/21.??Satisfied by Jeanette Brewer RN  ??? ? ? ?Hypertension Management-Blood Pressure on??11/18/21.??Satisfied by Joana Yates  ??? ? ? ?Hypertension Management-BMP on??11/18/21.??Satisfied by Kevan Osorio.  ??? ? ? ?Influenza Vaccine on??11/18/21.??Satisfied by Joana Yates  ??? ? ? ?Obesity Screening on??11/18/21.??Satisfied by Joana Yates  ?  Lab Results  Test Name Test Result Date/Time   Sodium Lvl 138 mmol/L 11/18/2021 08:05 CST   Potassium Lvl 3.9 mmol/L 11/18/2021 08:05 CST   Chloride 103 mmol/L 11/18/2021 08:05 CST   CO2 26 mmol/L 11/18/2021 08:05 CST   Calcium Lvl 10.0 mg/dL 11/18/2021 08:05 CST   Glucose Lvl 119 mg/dL (High) 11/18/2021 08:05 CST   BUN 11.8 mg/dL 11/18/2021 08:05 CST   Creatinine 0.75 mg/dL 11/18/2021 08:05 CST   Est Creat Clearance Ser 61.84 mL/min 11/18/2021 09:53 CST   eGFR- 11/18/2021 08:05 CST   eGFR-WILFRID 82 mL/min/1.73 m2 (Low) 11/18/2021 08:05 CST   Bili Total 0.3 mg/dL 11/18/2021 08:05 CST   Bili Direct 0.2 mg/dL 11/18/2021 08:05 CST   Bili Indirect 0.10 mg/dL 11/18/2021 08:05 CST   AST 16 unit/L 11/18/2021 08:05 CST   ALT 16 unit/L 11/18/2021 08:05 CST   Alk Phos 329 unit/L (High) 11/18/2021 08:05 CST   Total Protein 7.6 gm/dL  11/18/2021 08:05 CST   Albumin Lvl 2.5 gm/dL (Low) 11/18/2021 08:05 CST   Globulin 5.1 gm/dL (High) 11/18/2021 08:05 CST   A/G Ratio 0.5 ratio (Low) 11/18/2021 08:05 CST   T4 Free 1.01 ng/dL 11/18/2021 08:05 CST   TSH 1.1680 uIU/mL 11/18/2021 08:05 CST   WBC 9.9 x10(3)/mcL 11/18/2021 08:05 CST   RBC 3.13 x10(6)/mcL (Low) 11/18/2021 08:05 CST   Hgb 9.2 gm/dL (Low) 11/18/2021 08:05 CST   Hct 29.6 % (Low) 11/18/2021 08:05 CST   Platelet 529 x10(3)/mcL (High) 11/18/2021 08:05 CST   MCV 94.6 fL 11/18/2021 08:05 CST   MCH 29.4 pg 11/18/2021 08:05 CST   MCHC 31.1 gm/dL 11/18/2021 08:05 CST   RDW 19.7 % (High) 11/18/2021 08:05 CST   MPV 8.3 fL 11/18/2021 08:05 CST   Abs Neut 7.03 x10(3)/mcL 11/18/2021 08:05 CST   Neutro Auto 71 % 11/18/2021 08:05 CST   Lymph Auto 10 % 11/18/2021 08:05 CST   Mono Auto 16 % 11/18/2021 08:05 CST   Eos Auto 1 % 11/18/2021 08:05 CST   Abs Eos 0.1 x10(3)/mcL 11/18/2021 08:05 CST   Basophil Auto 0 % 11/18/2021 08:05 CST   Abs Neutro 7.03 x10(3)/mcL 11/18/2021 08:05 CST   Abs Lymph 1.0 x10(3)/mcL 11/18/2021 08:05 CST   Abs Mono 1.6 x10(3)/mcL (High) 11/18/2021 08:05 CST   Abs Baso 0.0 x10(3)/mcL 11/18/2021 08:05 CST   NRBC% 0.0 % 11/18/2021 08:05 CST   IG% 1 % 11/18/2021 08:05 CST   IG# 0.070 11/18/2021 08:05 CST

## 2022-05-04 NOTE — HISTORICAL [facility] CERNER
This is a historical note converted from [place]. Formatting and pictures may have been removed.  Please reference [place] for original formatting and attached multimedia. 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 Diagnosed 9/22/2020. Brain mets diagnosed 11/6/2020.  ?   Current Treatment:  2.? Day 1: Pembrolizumab 200 mg IV every 3 weeks for 24 months;  3.? Day 1: Pemetrexed 500 mg/m? IV every 3 weeks indefinitely  Started 9/9/2021  ?   Cycle #3 due 10/28/2021  ?   Dose modifications & interruptions:  Cycle #1 delayed 6 weeks d/t multiple hospitalizations  ?   Treatment History:  1. SRS to brain metastasis on 12/3/2020.  2. Carboplatin/Taxol weekly along with RT. XRT started 12/16/2020. Chemo started 12/16/2020; completed 1/29/2021.  3. Carbo/Alimta/Keytruda every 3 weeks x 4 cycles. Started 4/29/2021. Completed 4 cycles on 7/9/2021.  ?  ?   Past medical history: Cholelithiasis.? Chronic cough.? COPD.? Tobacco abuse.? Hypertension.? Dyslipidemia.? Hepatomegaly.? Obesity.? Peripheral artery disease.  Social history: . ?Lives in [place] with her  and family. ?Has 12 children. ?Has been smoking?1 pack of cigarettes daily for 40 years.? No history of alcohol or illicit drug abuse.  Family history:?Negative for cancers.  Health maintenance:  Screening mammogram in 2019 in [place], apparently unremarkable.  Mostly colonoscopy ever.  Menstrual and OB/GYN history:?Menopause?in her 50s.  ?   History of present illness:  65-year-old female referred by Dr. Steve Simpson, with lung cancer.  ?  For a full, detailed history, please see Dr. Ulrich note dated 11/27/2020.  ?  Interval History  10/28/2021: Patient presents for follow up on Alimta/Keytruda; she is scheduled to receive cycle #3 today. VS stable. She presents with her daughter. She denies nausea, vomiting, diarrhea, constipation,  mouth sores, abdominal pain, and neuropathy. She experienced back pain yesterday but states her pain meds relieved it and it has not returned. She requests a refill of her pain meds. She also requests a refill of cough syrup. She reports a good appetite. Her daughter states she has been having difficulty getting a refill of anxiety medication for her mother. Alprazolam was started while patient was inpatient when patients HR was elevated d/t anxiety. Patient saw a psych NP who informed her she could not refill it and referred her back to Oncology; however, Oncology does not diagnose or treat anxiety. Will consult with Patient Navigator to refer patient to Psych NP. Informed patient that scans will be moved back 1 week. CMP, CBC stable. TSH low; will hold Keytruda per Up-To-Date recommendations. Will recheck TSH with next dose and resume if TSH has returned to WNL. Will have her follow up in 3 weeks with labs. She is amenable to this plan.  Review of Systems  A complete 12-point?ROS was performed in full with pertinent positives as described in interval history. Remainder of ROS done in full and are negative.  Physical Exam  Vitals & Measurements  T:?36.8? ?C (Oral)? HR:?83(Peripheral)? RR:?20? BP:?113/72?  HT:?160.00?cm? WT:?81.100?kg? BMI:?31.68?  General: ?Alert and oriented, No acute distress.??  Appearance: Well-groomed; wearing face mask.  HEENT: ?Normocephalic, Oral mucosa is moist.?Pupils are equal, round and reactive to light, Extraocular movements are intact, Normal conjunctiva.?  Neck: ?Supple, Non-tender, No lymphadenopathy, No thyromegaly.??  Respiratory: ?Lungs are clear to auscultation, Respirations are non-labored, Breath sounds are equal, Symmetrical chest wall expansion.??  Cardiovascular: ?Normal rate, Regular rhythm, No edema.??  Breast:??Breast exam not performed on todays visit.??  Gastrointestinal: Rounded,?Soft, Non-tender, Non-distended, Normal bowel sounds.??  Musculoskeletal: ?Normal  strength.??  Integumentary: ?Warm, dry, intact.??  Neurologic: ?Alert, Oriented, No focal deficits, Cranial Nerves II-XII are grossly intact.??  Cognition and Speech: ?Oriented, Speech clear and coherent.??  Psychiatric: ?Cooperative, Appropriate mood & affect. ?  ECOG Performance Scale: 2 - Capable of all self-care but unable to carry out any work activities. Up and about greater than 50 percent of waking hours.?  Assessment/Plan  1.?Cancer of upper lobe of right lung?C34.11  #Poorly differentiated carcinoma of lung:  -11.4 cm right upper lobe mass, invading mediastinum (noncontrast chest CT)  -Large mass in 4R position (right lower paratracheal) (bronchoscopy and EBUS: 09/22/2020)  -EBUS, 4R, FNA: Poorly differentiated carcinoma (tumor of lung versus upper GI tract)  (EGD, colonoscopy:?02/10/2021: No malignancy; no biopsies collected)  -PET/CT (11/02/2020):?Right upper lung lobe mass has enlarged?9.9 x 9.8 x 14.0 cm,?previously, 8.1 x 7.4 x 11.4 cm;?  contiguous extension?of the mass into the mediastinum?versus right hilar enlarged lymph node?2.0 x 1.8 cm  -Brain MRI (11/06/2020):?Right occipital?5 x 4 mm?hemorrhagic metastasis without?edema  -11/06/2020: Referred to [facility] for SRS  >>  Tumor >7 cm, therefore, T4  Tumor invading mediastinum, therefore, T4  Ipsilateral mediastinal mass (??Lymphadenopathy), EBUS FNA positive,?therefore, N2  Solitary brain metastasis, therefore,?M1b  >> cT4 cN2 M1b, stage RICKIE  -SRS to brain metastasis (2100 cGy; 1 fraction)?(12/03/2020)  -Carbo/Taxol weekly x6, concurrent with RT?(12/16/2020-01/29/2021)  -Radiotherapy right lung (12/16/2020-01/28/2021) (6000 cGy; 30 fractions; 43 elapsed days)  -04/07/2021: Restaging bone scan: No bone metastasis  -04/07/2021: Restaging CT C/A/P with contrast (comparison: PET/CT dated 11/02/2020): Mild interval decrease in size of the mass involving the upper lobe of right lung (8.7 x 7.6 cm, previously 10.5 x 9.8 cm); prominent precarinal lymph  node also appears mildly reduced in size (7 mm, previously 9 mm); no new malignancy or metastatic disease in chest, abdomen, or pelvis  (Positive response to?chemoradiation therapy)  -Subsequently, carbo/Alimta/Keytruda x4?(04/29/2021-07/09/2021)  -No brain metastases on surveillance brain MRI (05/03/2021)  -Hospitalized 07/11/2021-07/22/2021:?[facility]: Postobstructive pneumonia, and acute hypoxemic?respiratory failure, septic shock, HUNG; successfully treated  -08/09/2021: Surveillance brain MRI with contrast (comparison: 05/03/2021):  -08/20/2021: Restaging CT C/A/P with contrast (comparison: 04/07/2021: Similar size of right upper lobe mass extending to the hilum with increased peripheral opacities and fluid peripheral to the mass, cannot exclude postobstructive infection (8-9 cm, similar to prior); small right pleural effusion; stable mediastinal lymph nodes (subcarinal, 8 mm)  To summarize:  -Poorly differentiated carcinoma of lung, 14.0 cm right upper lobe mass invading mediastinum, EBUS 09/20/2020, EGD/colonoscopy negative, 5 mm right occipital hemorrhagic metastasis 11/06/2020, s/p SRS, s/p chemoradiation therapy (12/2020-01/2021) with positive response, then carbo/Alimta/Keytruda x4 for metastatic disease (solitary brain metastasis)?with stable disease  ?  ?   #Sites of disease:  11.4 cm right upper lobe mass, invading mediastinum;?mediastinal lymphadenopathy;?right occipital brain metastasis  ?  ?   #Molecular markers:  -BRAF mutation negative; ROS1 gene arrangement negative; ALK rearrangement negative; PD-L1 CPS <10 (CPS 5)?(erroneously, tested for?esophageal carcinoma); EGFR negative; MET exon 14 skipping mutation negative; NTRK gene fusion negative; RET rearrangement negative  -PD-L1 testing: QNS  ?  ?   #Hypercalcemia?(11/25/2020)  -11/25/2020: Calcium 10.3 (elevated for the very first time). ?BUN 21, elevated. ?Creatinine 0.79, normal. ?Albumin 2.4.  -11/25/2020:?Intact PTH level 32.9,  normal. ?Ionized calcium level 5.4, within normal limits.  -11/25/2020:?IV fluids + Zometa 4 mg IV x1  ?  ?  #Iron deficiency anemia, FOBT positive  -09/11/2020: Hemoglobin 6.9.? Microcytosis.? Elevated RDW.? Low serum iron, low TIBC, low transferrin saturation, normal ferritin.? FOBT positive.? 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  ?  #Thrombocytosis since 09/11/2020 ([phone])  -Could be reactive to iron deficiency anemia?versus metastatic lung cancer  -10/22/2020:?JAK2 mutation negative. ?BCR-ABL 1?fusion transcripts negative.? ESR, CRP elevated.? Iron deficiency anemia.  -Subsequently, resolved?(post?parenteral iron therapy;?post?chemoradiation therapy for lung cancer)  ?  ?  #Cholelithiasis.? Chronically elevated alkaline phosphatase.  -HIDA scan (04/18/2019): No gallbladder activity at 1 hour post isotope injection, compatible with cystic duct obstruction/acute cholecystitis  -02/07/2020: Limited abdominal ultrasound: Cholelithiasis, dilated CBD, hepatomegaly  (12/03/2020)  ?  ?  Plan:  -Poorly differentiated carcinoma of lung, 14.0 cm right upper lobe mass invading mediastinum, EBUS 09/20/2020, EGD/colonoscopy negative, 5 mm right occipital hemorrhagic metastasis 11/06/2020, s/p SRS, s/p chemoradiation therapy (12/2020-01/2021) with positive response, then carbo/Alimta/Keytruda x4 for metastatic disease (solitary brain metastasis)?with stable disease  >>>  -Proceed with?Alimta/Keytruda maintenance every 3 weeks (Alimta  indefinitely; pembrolizumab for 24 months)  -Restage with contrast-enhanced CT scans of C/A/P?in 3 months?(November)  -Surveillance brain MRI scans deferred?to radiation oncology  ?  Check baseline TSH and free T4, and monitor every 6 weeks (monitoring for the possibility of immune thyroiditis with immunotherapy)  -Next?TSH check, end of August  ?  -Check CBC and CMP every 10 days  ?  -Resume Norco 10 mg every 4 hours as needed?for right chest wall pain secondary to?lung tumor  -Resume Megace?800 mg p.o. daily to boost appetite.  ?  -Brain MRI for surveillance of brain metastasis, deferred to radiation oncology  ?  -Iron deficiency anemia; FOBT positive; s/p Feraheme x2 (10/26/2020-11/03/2020)  -EGD and colonoscopy?(02/10/2021)?with a few ulcers in sigmoid colon, internal hemorrhoids, no bleeding,?radiation esophagitis?(no biopsies taken)  >>>  Today, check CBC, CMP, serum iron, TIBC, and ferritin  ?  -Right upper back constant pain, 10/10, secondary to?large right upper lobe tumor; required narcotics  -04/15/2021:?Chest pain completely resolved?and she does not need to take narcotics anymore; severe cough is also resolved  (Excellent symptom?response to chemoradiation therapy)  -9/9/2021: Right upper and lower back pain from tumor  ?  -Thrombocytosis is?reactive to iron deficiency and underlying metastatic lung cancer  -Subsequently, resolved  ?  Ok to proceed with?cycle #3 of Alimta/Keytruda today.  Follow up in?3 weeks (F2F on 11/18/2021) with CBC, CMP prior to cycle #4.  Restage with contrast-enhanced CT scans of C/A/P?in 3 months?(November); scheduled for 11/18/2021; will move back 1 week.  Follow up in 6 weeks (12/9/2021) with Dr. Shoemaker with CBC, CMP, TSH, free T4, & CT scan results prior to cycle #5.  Surveillance brain MRI scans deferred?to radiation oncology  ?  Discussion:  Concurrent chemoradiation therapy:  Day 1: Paclitaxel 45-50 mg/m? IV, followed by:  Day 1 carboplatin AUC 2  Repeat weekly x7 weeks  with RT,  with/without additional  Day 1: Paclitaxel 200 mg per square IV  Day 1: Carboplatin AUC 6 IV  Repeat every 3 weeks x2 cycles starting 2-4 weeks after completion of concurrent CRT  Ordered:  ?   Problem List/Past Medical History  Ongoing  Abnormal imaging  Alkaline phosphatase level  Brain metastasis  Cancer of upper lobe of right lung  Cancer related pain  Cholelithiasis  Chronic cough  COPD - Chronic obstructive pulmonary disease  Current smoker  Dilated cbd, acquired  Hepatomegaly  Iron deficiency anemia  Microcytic anemia  Obesity  PAD - Peripheral arterial disease  Thrombocytosis  Tobacco user  Historical  No qualifying data  Procedure/Surgical History  Transfusion of Nonautologous Red Blood Cells into Peripheral Vein, Percutaneous Approach (08/18/2021)  Insertion of Infusion Device into Superior Vena Cava, Percutaneous Approach (07/12/2021)  Introduction of Vasopressor into Central Vein, Percutaneous Approach (07/12/2021)  Catheter Insertion Mediport (None) (05/12/2021)  Fluoroscopy of Superior Vena Cava using Low Osmolar Contrast, Guidance (05/12/2021)  Insertion of Infusion Device into Superior Vena Cava, Percutaneous Approach (05/12/2021)  Insertion of Totally Implantable Vascular Access Device into Chest Subcutaneous Tissue and Fascia, Open Approach (05/12/2021)  Insertion of tunneled centrally inserted central venous access device, with subcutaneous port; age 5 years or older (05/12/2021)  Colonoscopy (None) (02/10/2021)  Colonoscopy, flexible; diagnostic, including collection of specimen(s) by brushing or washing, when performed (separate procedure) (02/10/2021)  Esophagogastroduodenoscopy (02/10/2021)  Esophagogastroduodenoscopy, flexible, transoral; diagnostic, including collection of specimen(s) by brushing or washing, when performed (separate procedure) (02/10/2021)  Inspection of Lower Intestinal Tract, Via Natural or Artificial Opening Endoscopic (02/10/2021)  Inspection of Upper  Intestinal Tract, Via Natural or Artificial Opening Endoscopic (02/10/2021)  Transfusion, blood or blood components (11/30/2020)  Bronchoscopy, Ebus, 3 or More Samples (09/22/2020)  Bronchoscopy, rigid or flexible, including fluoroscopic guidance, when performed; with endobronchial ultrasound (EBUS) guided transtracheal and/or transbronchial sampling (eg, aspiration[s]/biopsy[ies]), 3 or more mediastinal and/or hilar lymph node stati (09/22/2020)  Extraction of Thorax Lymphatic, Via Natural or Artificial Opening Endoscopic, Diagnostic (09/22/2020)  Transfusion of Nonautologous Red Blood Cells into Peripheral Vein, Percutaneous Approach (09/12/2020)  Transfusion, blood or blood components (09/12/2020)  Transfusion of Nonautologous Red Blood Cells into Peripheral Vein, Percutaneous Approach (09/11/2020)  Transfusion, blood or blood components (09/11/2020)  PICC placement (2020)  Drainage of Neck Skin, External Approach (12/21/2018)  Incision and drainage of abscess (eg, carbuncle, suppurative hidradenitis, cutaneous or subcutaneous abscess, cyst, furuncle, or paronychia); simple or single (12/21/2018)   Medications  adenosine, 6 mg= 2 mL, IV Push, Once  alPRAzolam 0.25 mg oral tab, 0.25 mg= 1 tab(s), Oral, TID  aspirin 81 mg oral Delayed Release (EC) tablet  Benadryl (for IVPB), 25 mg, IV Piggyback, Once-chemo  Benadryl (for IVPB), 25 mg, IV Piggyback, Once-chemo  Benadryl (for IVPB), 25 mg, IV Piggyback, Once-chemo  Benadryl (for IVPB), 25 mg, IV Piggyback, Once-chemo  Benadryl (for IVPB), 25 mg, IV Piggyback, Once-chemo  Benadryl (for IVPB), 25 mg, IV Piggyback, Once-chemo  buPROPion 150 mg oral tablet, extended release, 150 mg= 1 tab(s), Oral, Daily,? ?Not taking  codeine-guaifenesin 10 mg-100 mg/5 mL oral syrup, 10 mL, Oral, q4hr, PRN  codeine-promethazine 10 mg-6.25 mg/5 mL oral syrup, 5 mL, Oral, Once a day (at bedtime)  cyproheptadine 4 mg oral tablet, 4 mg= 1 tab(s), Oral, Daily,? ?Not taking  Decadron 4 mg  oral tablet, 4 mg= 1 tab(s), Oral, BID  dexamethasone 4 mg oral tablet, 4 mg= 1 tab(s), Oral, BID, 12 refills  DuoNeb 0.5 mg-2.5 mg/3 mL inhalation solution, 3 mL, NEB, QID  Fergon 240 mg (27 mg elemental iron) oral tablet, 240 mg= 1 tab(s), Oral, Daily, 3 refills  folic acid 1 mg oral tablet, 1 mg= 1 tab(s), Oral, Daily, 12 refills  gabapentin 300 mg oral capsule, 300 mg= 1 cap(s), Oral, TID  Heparin Flush 100 U/mL - 5 mL, 500 units= 5 mL, IV Push, Once-chemo  Heparin Flush 100 U/mL - 5 mL, 500 units= 5 mL, IV Push, Once-chemo  loratadine 10 mg oral capsule, 10 mg= 1 cap(s), Oral, Daily  megestrol 40 mg/mL oral suspension, 800 mg= 20 mL, Oral, Daily, 1 refills  metoprolol tartrate 25 mg oral tab, 25 mg= 1 tab(s), Oral, BID, 3 refills  metoprolol tartrate 50 mg oral tab, 50 mg= 1 tab(s), Oral, BID,? 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 Exercise frequency: Daily. Exercise type: Walking., 03/04/2020  Home/Environment  Lives with Children, Spouse, grandson. Living situation: Home/Independent. TV/Computer concerns: No. Single family house, 03/04/2020  Living situation: Home/Independent., 01/18/2018  Nutrition/Health  Regular, Low fat, Good, 03/04/2020  Sexual  Sexually active: Yes. Number of current partners 1. Sexual orientation: Straight or heterosexual. Gender Identity Identifies as female. No, 03/04/2020  Spiritual/Cultural  Quaker, No, 03/04/2020  Substance Use  Never, 10/28/2021  Tobacco  Former smoker, quit more than 30 days ago, No, 10/28/2021  Family History  Heart disease: Sister and Brother.  Hypertension.: Sister and Brother.  Primary  malignant neoplasm of bone: Mother.  Unknown cause of morbidity or mortality.....: Father.  Immunizations  Vaccine Date Status   influenza virus vaccine, inactivated 10/05/2011 Recorded   Health Maintenance  Health Maintenance  ???Pending?(in the next year)  ??? ??OverDue  ??? ? ? ?Alcohol Misuse Screening due??01/02/21??and every 1??year(s)  ??? ? ? ?Cognitive Screening due??01/02/21??and every 1??year(s)  ??? ? ? ?ADL Screening due??09/15/21??and every 1??year(s)  ??? ??Due?  ??? ? ? ?Bone Density Screening due??10/28/21??Variable frequency  ??? ? ? ?Breast Cancer Screening due??10/28/21??Unknown Frequency  ??? ? ? ?COPD Maintenance-Pulmonary Rehab Education due??10/28/21??and every 1??year(s)  ??? ? ? ?Lung Cancer Screening due??10/28/21??and every 1??year(s)  ??? ? ? ?Pneumococcal Vaccine due??10/28/21??Unknown Frequency  ??? ? ? ?Tetanus Vaccine due??10/28/21??and every 10??year(s)  ??? ? ? ?Zoster Vaccine due??10/28/21??Unknown Frequency  ??? ??Refused?  ??? ? ? ?Smoking Cessation due??01/01/21??and every 1??year(s)  ??? ??Due In Future?  ??? ? ? ?Obesity Screening not due until??01/01/22??and every 1??year(s)  ??? ? ? ?Advance Directive not due until??01/02/22??and every 1??year(s)  ??? ? ? ?Fall Risk Assessment not due until??01/02/22??and every 1??year(s)  ??? ? ? ?Functional Assessment not due until??01/02/22??and every 1??year(s)  ??? ? ? ?COPD Management-COPD Medications Prescribed not due until??08/18/22??and every 1??year(s)  ??? ? ? ?Aspirin Therapy for CVD Prevention not due until??08/26/22??and every 1??year(s)  ??? ? ? ?COPD Management-Oxygen Assessment not due until??10/07/22??and every 1??year(s)  ???Satisfied?(in the past 1 year)  ??? ??Satisfied?  ??? ? ? ?Advance Directive on??08/25/21.??Satisfied by Daryl Camarillo  ??? ? ? ?Aspirin Therapy for CVD Prevention on??08/26/21.??Satisfied by Gale Marin RN  ??? ? ? ?Blood Pressure Screening on??10/28/21.??Satisfied by Milan  Joana  ??? ? ? ?Body Mass Index Check on??10/28/21.??Satisfied by Joana Yates  ??? ? ? ?COPD Maintenance-Spirometry on??07/21/21.??Satisfied by She Cunningham  ??? ? ? ?COPD Management-COPD Medications Prescribed on??08/18/21.??Satisfied by Gennaro Rodriguez DO  ??? ? ? ?COPD Management-Oxygen Assessment on??10/07/21.??Satisfied by Janessa Moseley RN  ??? ? ? ?Colorectal Screening on??02/10/21.??Satisfied by Agnes Crawley LPN.  ??? ? ? ?Depression Screening on??10/28/21.??Satisfied by Joana Yates  ??? ? ? ?Diabetes Screening on??10/28/21.??Satisfied by Ning Jaeger  ??? ? ? ?Fall Risk Assessment on??10/28/21.??Satisfied by Jeanette Brewer RN  ??? ? ? ?Functional Assessment on??10/28/21.??Satisfied by Jeanette Brewer RN  ??? ? ? ?Hypertension Management-Blood Pressure on??10/28/21.??Satisfied by Joana Yates  ??? ? ? ?Hypertension Management-BMP on??10/28/21.??Satisfied by Ning Jaeger  ??? ? ? ?Influenza Vaccine on??10/28/21.??Satisfied by Jeanette Brewer RN  ??? ? ? ?Obesity Screening on??10/28/21.??Satisfied by Joana Yates  ?  Lab Results  Test Name Test Result Date/Time   Sodium Lvl 139 mmol/L 10/28/2021 09:41 CDT   Potassium Lvl 4.4 mmol/L 10/28/2021 09:41 CDT   Chloride 106 mmol/L 10/28/2021 09:41 CDT   CO2 23 mmol/L 10/28/2021 09:41 CDT   Calcium Lvl 10.6 mg/dL (High) 10/28/2021 09:41 CDT   Glucose Lvl 131 mg/dL (High) 10/28/2021 09:41 CDT   BUN 12.1 mg/dL 10/28/2021 09:41 CDT   Creatinine 0.75 mg/dL 10/28/2021 09:41 CDT   Est Creat Clearance Ser 61.84 mL/min 10/28/2021 10:56 CDT   eGFR- 10/28/2021 09:41 CDT   eGFR-WILFRID 82 mL/min/1.73 m2 (Low) 10/28/2021 09:41 CDT   Bili Total 0.3 mg/dL 10/28/2021 09:41 CDT   Bili Direct 0.2 mg/dL 10/28/2021 09:41 CDT   Bili Indirect 0.10 mg/dL 10/28/2021 09:41 CDT   AST 35 unit/L (High) 10/28/2021 09:41 CDT   ALT 41 unit/L 10/28/2021 09:41 CDT   Alk Phos 597 unit/L (High) 10/28/2021 09:41 CDT   Total Protein 8.4 gm/dL (High)  10/28/2021 09:41 CDT   Albumin Lvl 2.5 gm/dL (Low) 10/28/2021 09:41 CDT   Globulin 5.9 gm/dL (High) 10/28/2021 09:41 CDT   A/G Ratio 0.4 ratio (Low) 10/28/2021 09:41 CDT   T4 Free 1.08 ng/dL 10/28/2021 09:41 CDT   TSH 0.2774 uIU/mL (Low) 10/28/2021 09:41 CDT   WBC 9.2 x10(3)/mcL 10/28/2021 09:41 CDT   RBC 3.19 x10(6)/mcL (Low) 10/28/2021 09:41 CDT   Hgb 8.8 gm/dL (Low) 10/28/2021 09:41 CDT   Hct 28.4 % (Low) 10/28/2021 09:41 CDT   Platelet 568 x10(3)/mcL (High) 10/28/2021 09:41 CDT   MCV 89.0 fL 10/28/2021 09:41 CDT   MCH 27.6 pg 10/28/2021 09:41 CDT   MCHC 31.0 gm/dL 10/28/2021 09:41 CDT   RDW 20.6 % (High) 10/28/2021 09:41 CDT   MPV 8.2 fL 10/28/2021 09:41 CDT   Abs Neut 7.29 x10(3)/mcL 10/28/2021 09:41 CDT   Neutro Auto 79 % 10/28/2021 09:41 CDT   Lymph Auto 6 % 10/28/2021 09:41 CDT   Mono Auto 13 % 10/28/2021 09:41 CDT   Basophil Auto 0 % 10/28/2021 09:41 CDT   Abs Neutro 7.29 x10(3)/mcL 10/28/2021 09:41 CDT   Abs Lymph 0.6 x10(3)/mcL 10/28/2021 09:41 CDT   Abs Mono 1.2 x10(3)/mcL 10/28/2021 09:41 CDT   Abs Baso 0.0 x10(3)/mcL 10/28/2021 09:41 CDT   NRBC% 0.0 % 10/28/2021 09:41 CDT   IG% 1 % 10/28/2021 09:41 CDT   IG# 0.080 10/28/2021 09:41 CDT

## 2022-05-04 NOTE — HISTORICAL [facility] CERNER
This is a historical note converted from [place]. Formatting and pictures may have been removed.  Please reference [place] for original formatting and attached multimedia. 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 Diagnosed 9/22/2020. Brain mets diagnosed 11/6/2020.  ?   Current Treatment:  2.? Day 1: Pembrolizumab 200 mg IV every 3 weeks for 24 months;  3.? Day 1: Pemetrexed 500 mg/m? IV every 3 weeks indefinitely  Started  ?   Cycle #1 due 9/9/2021  ?   Dose modifications & interruptions:  Cycle #1 delayed 6 weeks d/t multiple hospitalizations  ?   Treatment History:  1. SRS to brain metastasis on 12/3/2020.  2. Carboplatin/Taxol weekly along with RT. XRT started 12/16/2020.Chemo started 12/16/2020; completed 1/29/2021.  3. Carbo/Alimta/Keytruda every 3 weeks x 4 cycles.Started 4/29/2021. Completed 4 cycles on 7/9/2021.  ?  ?   Past medical history: Cholelithiasis.? Chronic cough.? COPD.? Tobacco abuse.? Hypertension.? Dyslipidemia.? Hepatomegaly.? Obesity.? Peripheral artery disease.  Social history: . ?Lives in [place] with her  and family. ?Has 12 children. ?Has been smoking?1 pack of cigarettes daily for 40 years.? No history of alcohol or illicit drug abuse.  Family history:?Negative for cancers.  Health maintenance:  Screening mammogram in 2019 in [place], apparently unremarkable.  Mostly colonoscopy ever.  Menstrual and OB/GYN history:?Menopause?in her 50s.  ?   History of present illness:  65-year-old female referred by Dr. Steve Simpson, with lung cancer.  ?  For a full, detailed history, please see Dr. Ulrich note dated 11/27/2020.  ?  Interval History  9/9/2021: Patient presents for follow up on Alimta/Keytruda; she is scheduled to receive cycle #1 today. CMP, CBC stable. She presents with her daughter. VS stable. Patients treatment has been delayed  6 weeks. The patient was diagnosed with pneumonia and bacteremia and treated with antibiotics. She underwent blood transfusion for critically low H&H and exhibited elevated WBC & ANC. She was sent to ER where she was admitted and given IV Vanc and Zosyn. She was sent home with Levaquin and Augmentin which she completed. During all of this, her HR was elevated. Her PCP referred her to [facility] who started her on metoprolol. Also, Xanax was prescribed for her inpatient for her anxiety. She is waiting to see a new Psych doctor for her anxiety. Advised her daughter to follow up with the new Psych doctor to get a sooner appointment, possibly TM, to get a refill of Xanax. Patient requests refill of Norco. She denies any nausea, vomiting, diarrhea, constipation; she states she is a little weak but feels well enough for treatment. She states chemo does not make her sick. Her appetite is improving since having pneumonia. Will proceed with treatment today and have her follow up in 3 weeks labs prior to treatment. She is amenable to this plan.  Review of Systems  A complete 12-point?ROS was performed in full with pertinent positives as described in interval history. Remainder of ROS done in full and are negative.  Physical Exam  Vitals & Measurements  T:?36.5? ?C (Oral)? HR:?96(Peripheral)? RR:?18? BP:?94/62? SpO2:?100%?  HT:?168.00?cm? WT:?81.8?kg? BMI:?29.33?  General: ?Alert and oriented, No acute distress.??  Appearance: Well-groomed; wearing face mask.  HEENT: ?Normocephalic, Oral mucosa is moist.?Pupils are equal, round and reactive to light, Extraocular movements are intact, Normal conjunctiva.?  Neck: ?Supple, Non-tender, No lymphadenopathy, No thyromegaly.??  Respiratory: ?Lungs are clear to auscultation, Respirations are non-labored, Breath sounds are equal, Symmetrical chest wall expansion.??  Cardiovascular: ?Normal rate, Regular rhythm, No edema.??  Breast:??Breast exam not performed on todays  visit.??  Gastrointestinal: Rounded,?Soft, Non-tender, Non-distended, Normal bowel sounds.??  Musculoskeletal: ?Normal strength.??  Integumentary: ?Warm, dry, intact.??  Neurologic: ?Alert, Oriented, No focal deficits, Cranial Nerves II-XII are grossly intact.??  Cognition and Speech: ?Oriented, Speech clear and coherent.??  Psychiatric: ?Cooperative, Appropriate mood & affect. ?  ECOG Performance Scale: 2 - Capable of all self-care but unable to carry out any work activities. Up and about greater than 50 percent of waking hours.?  Assessment/Plan  1.?Cancer of upper lobe of right lung?C34.11  #Poorly differentiated carcinoma of lung:  -11.4 cm right upper lobe mass, invading mediastinum (noncontrast chest CT)  -Large mass in 4R position (right lower paratracheal) (bronchoscopy and EBUS: 09/22/2020)  -EBUS, 4R, FNA: Poorly differentiated carcinoma (tumor of lung versus upper GI tract)  (EGD, colonoscopy:?02/10/2021: No malignancy; no biopsies collected)  -PET/CT (11/02/2020):?Right upper lung lobe mass has enlarged?9.9 x 9.8 x 14.0 cm,?previously, 8.1 x 7.4 x 11.4 cm;?  contiguous extension?of the mass into the mediastinum?versus right hilar enlarged lymph node?2.0 x 1.8 cm  -Brain MRI (11/06/2020):?Right occipital?5 x 4 mm?hemorrhagic metastasis without?edema  -11/06/2020: Referred to [facility] for SRS  >>  Tumor >7 cm, therefore, T4  Tumor invading mediastinum, therefore, T4  Ipsilateral mediastinal mass (??Lymphadenopathy), EBUS FNA positive,?therefore, N2  Solitary brain metastasis, therefore,?M1b  >> cT4 cN2 M1b, stage RICKIE  -SRS to brain metastasis (2100 cGy; 1 fraction)?(12/03/2020)  -Carbo/Taxol weekly x6, concurrent with RT?(12/16/2020-01/29/2021)  -Radiotherapy right lung (12/16/2020-01/28/2021) (6000 cGy; 30 fractions; 43 elapsed days)  -04/07/2021: Restaging bone scan: No bone metastasis  -04/07/2021: Restaging CT C/A/P with contrast (comparison: PET/CT dated 11/02/2020): Mild interval decrease in size  of the mass involving the upper lobe of right lung (8.7 x 7.6 cm, previously 10.5 x 9.8 cm); prominent precarinal lymph node also appears mildly reduced in size (7 mm, previously 9 mm); no new malignancy or metastatic disease in chest, abdomen, or pelvis  (Positive response to?chemoradiation therapy)  -Subsequently, carbo/Alimta/Keytruda x4?(04/29/2021-07/09/2021)  -No brain metastases on surveillance brain MRI (05/03/2021)  -Hospitalized 07/11/2021-07/22/2021:?[facility]: Postobstructive pneumonia, and acute hypoxemic?respiratory failure, septic shock, HUNG; successfully treated  -08/09/2021: Surveillance brain MRI with contrast (comparison: 05/03/2021):  -08/20/2021: Restaging CT C/A/P with contrast (comparison: 04/07/2021: Similar size of right upper lobe mass extending to the hilum with increased peripheral opacities and fluid peripheral to the mass, cannot exclude postobstructive infection (8-9 cm, similar to prior); small right pleural effusion; stable mediastinal lymph nodes (subcarinal, 8 mm)  To summarize:  -Poorly differentiated carcinoma of lung, 14.0 cm right upper lobe mass invading mediastinum, EBUS 09/20/2020, EGD/colonoscopy negative, 5 mm right occipital hemorrhagic metastasis 11/06/2020, s/p SRS, s/p chemoradiation therapy (12/2020-01/2021) with positive response, then carbo/Alimta/Keytruda x4 for metastatic disease (solitary brain metastasis)?with stable disease  ?  ?   #Sites of disease:  11.4 cm right upper lobe mass, invading mediastinum;?mediastinal lymphadenopathy;?right occipital brain metastasis  ?  ?   #Molecular markers:  -BRAF mutation negative; ROS1 gene arrangement negative; ALK rearrangement negative; PD-L1 CPS <10 (CPS 5)?(erroneously, tested for?esophageal carcinoma); EGFR negative; MET exon 14 skipping mutation negative; NTRK gene fusion negative; RET rearrangement negative  -PD-L1 testing: QNS  ?  ?   #Hypercalcemia?(11/25/2020)  -11/25/2020: Calcium 10.3 (elevated for  the very first time). ?BUN 21, elevated. ?Creatinine 0.79, normal. ?Albumin 2.4.  -11/25/2020:?Intact PTH level 32.9, normal. ?Ionized calcium level 5.4, within normal limits.  -11/25/2020:?IV fluids + Zometa 4 mg IV x1  ?  ?  #Iron deficiency anemia, FOBT positive  -09/11/2020: Hemoglobin 6.9.? Microcytosis.? Elevated RDW.? Low serum iron, low TIBC, low transferrin saturation, normal ferritin.? FOBT positive.? 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  ?  #Thrombocytosis since 09/11/2020 (542-117K)  -Could be reactive to iron deficiency anemia?versus metastatic lung cancer  -10/22/2020:?JAK2 mutation negative. ?BCR-ABL 1?fusion transcripts negative.? ESR, CRP elevated.? Iron deficiency anemia.  -Subsequently, resolved?(post?parenteral iron therapy;?post?chemoradiation therapy for lung cancer)  ?  ?  #Cholelithiasis.? Chronically elevated alkaline phosphatase.  -HIDA scan (04/18/2019): No gallbladder activity at 1 hour post isotope injection, compatible with cystic duct obstruction/acute cholecystitis  -02/07/2020: Limited abdominal ultrasound: Cholelithiasis, dilated CBD, hepatomegaly  (12/03/2020)  ?  ?  Plan:  -Poorly differentiated carcinoma of lung, 14.0 cm right upper lobe mass invading mediastinum, EBUS 09/20/2020, EGD/colonoscopy negative, 5 mm right occipital hemorrhagic metastasis 11/06/2020, s/p SRS, s/p chemoradiation therapy (12/2020-01/2021) with positive response, then carbo/Alimta/Keytruda x4 for metastatic disease  (solitary brain metastasis)?with stable disease  >>>  -Proceed with?Alimta/Keytruda maintenance every 3 weeks (Alimta indefinitely; pembrolizumab for 24 months)  -Restage with contrast-enhanced CT scans of C/A/P?in 3 months?(November)  -Surveillance brain MRI scans deferred?to radiation oncology  ?  Check baseline TSH and free T4, and monitor every 6 weeks (monitoring for the possibility of immune thyroiditis with immunotherapy)  -Next?TSH check, end of August  ?  -Check CBC and CMP every 10 days  ?  -Resume Norco 10 mg every 4 hours as needed?for right chest wall pain secondary to?lung tumor  -Resume Megace?800 mg p.o. daily to boost appetite.  ?  -Brain MRI for surveillance of brain metastasis, deferred to radiation oncology  ?  -Iron deficiency anemia; FOBT positive; s/p Feraheme x2 (10/26/2020-11/03/2020)  -EGD and colonoscopy?(02/10/2021)?with a few ulcers in sigmoid colon, internal hemorrhoids, no bleeding,?radiation esophagitis?(no biopsies taken)  >>>  Today, check CBC, CMP, serum iron, TIBC, and ferritin  ?  -Right upper back constant pain, 10/10, secondary to?large right upper lobe tumor; required narcotics  -04/15/2021:?Chest pain completely resolved?and she does not need to take narcotics anymore; severe cough is also resolved  (Excellent symptom?response to chemoradiation therapy)  -9/9/2021: Right upper and lower back pain from tumor  ?  -Thrombocytosis is?reactive to iron deficiency and underlying metastatic lung cancer  -Subsequently, resolved  ?  Ok to proceed with cycle #1 of Alimta/Keytruda today; B12 injection to be given.  Norco refilled per patients request.  Follow up in 3 weeks (F2F on 9/30/2021) with CBC, CMP prior to cycle #2.  Restage with contrast-enhanced CT scans of C/A/P?in 3 months?(November); ordered today.  Surveillance brain MRI scans deferred?to radiation oncology  ?  Discussion:  Concurrent chemoradiation therapy:  Day 1: Paclitaxel 45-50 mg/m? IV, followed by:  Day 1 carboplatin  AUC 2  Repeat weekly x7 weeks with RT,  with/without additional  Day 1: Paclitaxel 200 mg per square IV  Day 1: Carboplatin AUC 6 IV  Repeat every 3 weeks x2 cycles starting 2-4 weeks after completion of concurrent CRT  Ordered:  ?   Problem List/Past Medical History  Ongoing  Abnormal imaging  Alkaline phosphatase level  Brain metastasis  Cancer of upper lobe of right lung  Cancer related pain  Cholelithiasis  Chronic cough  COPD - Chronic obstructive pulmonary disease  Current smoker  Dilated cbd, acquired  Hepatomegaly  Iron deficiency anemia  Microcytic anemia  Obesity  PAD - Peripheral arterial disease  Thrombocytosis  Tobacco user  Historical  No qualifying data  Procedure/Surgical History  Transfusion of Nonautologous Red Blood Cells into Peripheral Vein, Percutaneous Approach (08/18/2021)  Insertion of Infusion Device into Superior Vena Cava, Percutaneous Approach (07/12/2021)  Introduction of Vasopressor into Central Vein, Percutaneous Approach (07/12/2021)  Catheter Insertion Mediport (None) (05/12/2021)  Fluoroscopy of Superior Vena Cava using Low Osmolar Contrast, Guidance (05/12/2021)  Insertion of Infusion Device into Superior Vena Cava, Percutaneous Approach (05/12/2021)  Insertion of Totally Implantable Vascular Access Device into Chest Subcutaneous Tissue and Fascia, Open Approach (05/12/2021)  Insertion of tunneled centrally inserted central venous access device, with subcutaneous port; age 5 years or older (05/12/2021)  Colonoscopy (None) (02/10/2021)  Colonoscopy, flexible; diagnostic, including collection of specimen(s) by brushing or washing, when performed (separate procedure) (02/10/2021)  Esophagogastroduodenoscopy (02/10/2021)  Esophagogastroduodenoscopy, flexible, transoral; diagnostic, including collection of specimen(s) by brushing or washing, when performed (separate procedure) (02/10/2021)  Inspection of Lower Intestinal Tract, Via Natural or Artificial Opening Endoscopic  (02/10/2021)  Inspection of Upper Intestinal Tract, Via Natural or Artificial Opening Endoscopic (02/10/2021)  Transfusion, blood or blood components (11/30/2020)  Bronchoscopy, Ebus, 3 or More Samples (09/22/2020)  Bronchoscopy, rigid or flexible, including fluoroscopic guidance, when performed; with endobronchial ultrasound (EBUS) guided transtracheal and/or transbronchial sampling (eg, aspiration[s]/biopsy[ies]), 3 or more mediastinal and/or hilar lymph node stati (09/22/2020)  Extraction of Thorax Lymphatic, Via Natural or Artificial Opening Endoscopic, Diagnostic (09/22/2020)  Transfusion of Nonautologous Red Blood Cells into Peripheral Vein, Percutaneous Approach (09/12/2020)  Transfusion, blood or blood components (09/12/2020)  Transfusion of Nonautologous Red Blood Cells into Peripheral Vein, Percutaneous Approach (09/11/2020)  Transfusion, blood or blood components (09/11/2020)  PICC placement (2020)  Drainage of Neck Skin, External Approach (12/21/2018)  Incision and drainage of abscess (eg, carbuncle, suppurative hidradenitis, cutaneous or subcutaneous abscess, cyst, furuncle, or paronychia); simple or single (12/21/2018)   Medications  adenosine, 6 mg= 2 mL, IV Push, Once  alPRAzolam 0.25 mg oral tab, 0.25 mg= 1 tab(s), Oral, TID  aspirin 81 mg oral Delayed Release (EC) tablet  Benadryl (for IVPB), 25 mg, IV Piggyback, Once-chemo  Benadryl (for IVPB), 25 mg, IV Piggyback, Once-chemo  Benadryl (for IVPB), 25 mg, IV Piggyback, Once-chemo  Benadryl (for IVPB), 25 mg, IV Piggyback, Once-chemo  Benadryl (for IVPB), 25 mg, IV Piggyback, Once-chemo  Benadryl (for IVPB), 25 mg, IV Piggyback, Once-chemo  buPROPion 150 mg oral tablet, extended release, 150 mg= 1 tab(s), Oral, Daily,? ?Not taking  cyproheptadine 4 mg oral tablet, 4 mg= 1 tab(s), Oral, Daily,? ?Not taking  Decadron 4 mg oral tablet, 4 mg= 1 tab(s), Oral, BID  DuoNeb 0.5 mg-2.5 mg/3 mL inhalation solution, 3 mL, NEB, QID  Fergon 240 mg (27 mg  elemental iron) oral tablet, 240 mg= 1 tab(s), Oral, Daily, 3 refills  folic acid 1 mg oral tablet, 1 mg= 1 tab(s), Oral, Daily, 12 refills  gabapentin 300 mg oral capsule, 300 mg= 1 cap(s), Oral, TID  Heparin Flush 100 U/mL - 5 mL, 500 units= 5 mL, IV Push, Once-chemo  Heparin Flush 100 U/mL - 5 mL, 500 units= 5 mL, IV Push, Once-chemo  loratadine 10 mg oral capsule, 10 mg= 1 cap(s), Oral, Daily  megestrol 40 mg/mL oral suspension, 800 mg= 20 mL, Oral, Daily, 1 refills  metoprolol tartrate 25 mg oral tab, 25 mg= 1 tab(s), Oral, BID, 3 refills  Norco 10 mg-325 mg oral tablet, 1 tab(s), Oral, q4hr, PRN  ondansetron 8 mg oral tablet, disintegrating, 8 mg= 1 tab(s), Oral, TID  Allergies  No Known Medication Allergies  Social History  Abuse/Neglect  No, No, Yes, 09/09/2021  Alcohol  Past, Beer, 09/09/2021  Employment/School  Employed, 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 Exercise frequency: Daily. Exercise type: Walking., 03/04/2020  Home/Environment  Lives with Children, Spouse, grandson. Living situation: Home/Independent. TV/Computer concerns: No. Single family house, 03/04/2020  Living situation: Home/Independent., 01/18/2018  Nutrition/Health  Regular, Low fat, Good, 03/04/2020  Sexual  Sexually active: Yes. Number of current partners 1. Sexual orientation: Straight or heterosexual. Gender Identity Identifies as female. No, 03/04/2020  Spiritual/Cultural  Latter-day, No, 03/04/2020  Substance Use  Never, 08/17/2021  Tobacco  Former smoker, quit more than 30 days ago, No, Cigarettes, 3 per day., 09/09/2021  Family History  Heart disease: Sister and Brother.  Hypertension.: Sister and Brother.  Primary malignant neoplasm of bone: Mother.  Unknown cause of morbidity or mortality.....: Father.  Health Maintenance  Health Maintenance  ???Pending?(in the next year)  ??? ??OverDue  ??? ? ? ?Alcohol Misuse Screening due??01/02/21??and every 1??year(s)  ???  ? ? ?Cognitive Screening due??01/02/21??and every 1??year(s)  ??? ??Due?  ??? ? ? ?Bone Density Screening due??09/09/21??Variable frequency  ??? ? ? ?Breast Cancer Screening due??09/09/21??Unknown Frequency  ??? ? ? ?COPD Maintenance-Pulmonary Rehab Education due??09/09/21??and every 1??year(s)  ??? ? ? ?IPPE due??09/09/21??One-time only  ??? ? ? ?Lung Cancer Screening due??09/09/21??and every 1??year(s)  ??? ? ? ?Medicare Annual Wellness Exam due??09/09/21??and every 1??year(s)  ??? ? ? ?Pneumococcal Vaccine due??09/09/21??Unknown Frequency  ??? ? ? ?Tetanus Vaccine due??09/09/21??and every 10??year(s)  ??? ? ? ?Zoster Vaccine due??09/09/21??Unknown Frequency  ??? ??Refused?  ??? ? ? ?Smoking Cessation due??01/01/21??and every 1??year(s)  ??? ??Due In Future?  ??? ? ? ?ADL Screening not due until??09/15/21??and every 1??year(s)  ??? ? ? ?Obesity Screening not due until??01/01/22??and every 1??year(s)  ??? ? ? ?Advance Directive not due until??01/02/22??and every 1??year(s)  ??? ? ? ?Fall Risk Assessment not due until??01/02/22??and every 1??year(s)  ??? ? ? ?Functional Assessment not due until??01/02/22??and every 1??year(s)  ??? ? ? ?COPD Management-COPD Medications Prescribed not due until??08/18/22??and every 1??year(s)  ??? ? ? ?Aspirin Therapy for CVD Prevention not due until??08/26/22??and every 1??year(s)  ???Satisfied?(in the past 1 year)  ??? ??Satisfied?  ??? ? ? ?ADL Screening on??09/15/20.??Satisfied by Betsey Lockett  ??? ? ? ?Advance Directive on??08/25/21.??Satisfied by Daryl Camarillo  ??? ? ? ?Aspirin Therapy for CVD Prevention on??08/26/21.??Satisfied by Gale Marin RN  ??? ? ? ?Blood Pressure Screening on??09/09/21.??Satisfied by Verena Singh  ??? ? ? ?Body Mass Index Check on??09/09/21.??Satisfied by Verena Singh  ??? ? ? ?COPD Maintenance-Spirometry on??07/21/21.??Satisfied by She Cunningham  ??? ? ? ?COPD Management-Oxygen Assessment on??09/09/21.??Satisfied by  Nancy Davison LPN  ??? ? ? ?COPD Management-COPD Medications Prescribed on??08/18/21.??Satisfied by Gennaro Rodriguez DO  ??? ? ? ?Colorectal Screening on??02/10/21.??Satisfied by Agnes Crawley LPN.  ??? ? ? ?Coronary Artery Disease Maintenance-Antiplatelet Agent Prescribed on??10/22/20.  ??? ? ? ?Depression Screening on??09/09/21.??Satisfied by Nancy Davison LPN  ??? ? ? ?Diabetes Screening on??09/09/21.??Satisfied by Akira Calderón  ??? ? ? ?Fall Risk Assessment on??09/09/21.??Satisfied by Verena Singh  ??? ? ? ?Functional Assessment on??09/09/21.??Satisfied by Verena Singh  ??? ? ? ?Hypertension Management-Blood Pressure on??09/09/21.??Satisfied by Verena Singh  ??? ? ? ?Hypertension Management-BMP on??09/09/21.??Satisfied by Akira Calderón  ??? ? ? ?Influenza Vaccine on??03/23/21.??Satisfied by Patricia Montoya  ??? ? ? ?Obesity Screening on??09/09/21.??Satisfied by Verena Singh  ??? ??Refused?  ??? ? ? ?Smoking Cessation on??09/15/20.??Recorded by Betsey Lockett??Reason: Patient Refuses  ?  Lab Results  Test Name Test Result Date/Time   Sodium Lvl 142 mmol/L 09/09/2021 07:15 CDT   Potassium Lvl 4.3 mmol/L 09/09/2021 07:15 CDT   Chloride 107 mmol/L 09/09/2021 07:15 CDT   CO2 25 mmol/L 09/09/2021 07:15 CDT   Calcium Lvl 10.4 mg/dL (High) 09/09/2021 07:15 CDT   Glucose Lvl 125 mg/dL (High) 09/09/2021 07:15 CDT   BUN 15.7 mg/dL 09/09/2021 07:15 CDT   Creatinine 0.83 mg/dL 09/09/2021 07:15 CDT   eGFR-AA 89 (Low) 09/09/2021 07:15 CDT   eGFR-WILFRID 73 mL/min/1.73 m2 (Low) 09/09/2021 07:15 CDT   Bili Total 0.4 mg/dL 09/09/2021 07:15 CDT   Bili Direct 0.4 mg/dL 09/09/2021 07:15 CDT   Bili Indirect 0.00 mg/dL 09/09/2021 07:15 CDT   AST 33 unit/L 09/09/2021 07:15 CDT   ALT 27 unit/L 09/09/2021 07:15 CDT   Alk Phos 397 unit/L (High) 09/09/2021 07:15 CDT   Total Protein 9.0 gm/dL (High) 09/09/2021 07:15 CDT   Albumin Lvl 2.3 gm/dL (Low) 09/09/2021 07:15 CDT   Globulin 6.7 gm/dL (High) 09/09/2021  07:15 CDT   A/G Ratio 0.3 ratio (Low) 09/09/2021 07:15 CDT   WBC 11.6 x10(3)/mcL (High) 09/09/2021 07:15 CDT   RBC 3.62 x10(6)/mcL (Low) 09/09/2021 07:15 CDT   Hgb 9.9 gm/dL (Low) 09/09/2021 07:15 CDT   Hct 33.1 % (Low) 09/09/2021 07:15 CDT   Platelet 467 x10(3)/mcL (High) 09/09/2021 07:15 CDT   MCV 91.4 fL 09/09/2021 07:15 CDT   MCH 27.3 pg 09/09/2021 07:15 CDT   MCHC 29.9 gm/dL (Low) 09/09/2021 07:15 CDT   RDW 18.0 % (High) 09/09/2021 07:15 CDT   MPV 8.4 fL 09/09/2021 07:15 CDT   Abs Neut 8.73 x10(3)/[facility] 09/09/2021 07:15 CDT   Neutro Auto 75 % 09/09/2021 07:15 CDT   Lymph Auto 12 % 09/09/2021 07:15 CDT   Mono Auto 11 % 09/09/2021 07:15 CDT   Eos Auto 1 % 09/09/2021 07:15 CDT   Abs Eos 0.1 x10(3)/[facility] 09/09/2021 07:15 CDT   Basophil Auto 0 % 09/09/2021 07:15 CDT   Abs Neutro 8.73 x10(3)/[facility] 09/09/2021 07:15 CDT   Abs Lymph 1.4 x10(3)/[facility] 09/09/2021 07:15 CDT   Abs Mono 1.3 x10(3)/[facility] 09/09/2021 07:15 CDT   Abs Baso 0.0 x10(3)/[facility] 09/09/2021 07:15 CDT   NRBC% 0.0 % 09/09/2021 07:15 CDT   IG% 1 % 09/09/2021 07:15 CDT   IG# 0.070 09/09/2021 07:15 CDT

## 2022-05-04 NOTE — HISTORICAL [facility] CERNER
This is a historical note converted from [place]. Formatting and pictures may have been removed.  Please reference [place] for original formatting and attached multimedia. Chief Complaint  Pt sent from Dr. givens office for elevated hr. Pt denies cp or palpitations.  History of Present Illness  65yo female presents to the ED today due to tachycardia.? She checked her pulse today and found it to be very high.? She was apparently admitted to [facility] last week for the same thing and pneumonia.? She was tachycardic the entire duration of her stay and was only given Ativan with some mild improvement of her tachycardia.? Today she was in the 170s and given adenosine 12mg.? Once again she was also found to be anemic which can be contributing to her HR.? No fever chills.? No chest pain/SOB.? No N/V/D/C.? She does have metastatic lung cancer as well.? Tele-cardiology was consulted in the ED and recommended metoprolol.? Also her Mag is low which will be replaced.?? She will be transfused with 2 units PRBCs.  Review of Systems  ?  ?????Constitutional: ?No fever, No chills, No sweats,?+ weakness, No fatigue. ?  ?????Eye: ?No double vision, No dry eyes, No photophobia. ?  ?????Ear/Nose/Mouth/Throat: ?No ear pain, No nasal congestion, No sore throat. ?  ?????Respiratory: ?No shortness of breath, No cough, No sputum production, No hemoptysis, No wheezing, No pleuritic pain. ?  ?????Cardiovascular: ?No chest pain, No palpitations, No peripheral edema, No syncope. ?  ?????Gastrointestinal: ?No nausea, No vomiting, No diarrhea, No constipation, No heartburn, No abdominal pain. ?  ?????Genitourinary: ?No dysuria, No hematuria, No change in urine stream. ?  ?????Hematology/Lymphatics: ?No anemia, No bruising tendency, No bruise, No hemorrhage, No petechiae. ?  ?????Endocrine: ?No excessive thirst, No polyuria, No cold intolerance. ?  ?????Immunologic: ?No recurrent fevers, No recurrent infections. ?  ?????Musculoskeletal: ?Joint pain, No back  pain, No muscle pain, No decreased range of motion. ?  ?????Integumentary: ?No rash, No pruritus, No petechiae, No skin lesion. ?  ?????Neurologic: ?Alert and oriented X4, No abnormal balance, No confusion, No tingling. ?  ?????Psychiatric: ?No anxiety, No depression. ?  Physical Exam  Vitals & Measurements  T:?36.4? ?C (Oral)? HR:?111(Peripheral)? HR:?112(Monitored)? RR:?30? BP:?141/71? SpO2:?96%? WT:?81.5?kg? BMI:?29.22?  General: ?Alert and oriented, No acute distress. ?  ??????? ? Appearance: Well nourished. ?  ??????? ? Behavior: Appropriate. ?  ??????? ? Skin: Normal for ethnicity. ?  ?????Eye: ?Pupils are equal, round and reactive to light, Extraocular movements are intact. ?  ?????HENT: ?Normocephalic, Normal hearing, Oral mucosa is moist, No pharyngeal erythema. ?  ?????Neck: ?Supple, Non-tender, No carotid bruit, No jugular venous distention, No lymphadenopathy, No thyromegaly. ?  ?????Respiratory: ?Lungs are clear to auscultation, Breath sounds are equal, No chest wall tenderness. ?  ?????Cardiovascular:? tachycardic, Regular rhythm, No murmur, No gallop, Good pulses equal in all extremities, Normal peripheral perfusion, No edema. ?  ?????Gastrointestinal: ?Soft, Non-tender, Non-distended, Normal bowel sounds, No organomegaly. ?  ?????Genitourinary: ?No costovertebral angle tenderness, No urethral discharge. ?  ?????Lymphatics: ?No lymphadenopathy neck, axilla, groin. ?  ?????Musculoskeletal: ?Normal range of motion, Normal strength, No tenderness, No swelling, Normal gait. ?  ?????Integumentary: ?Warm, Dry, Pink, Intact, No rash. ?  ?????Neurologic: ?Alert, Oriented, Normal sensory, Normal motor function, No focal deficits, Cranial Nerves II-XII are grossly intact. ?  ?????Psychiatric: ?Cooperative, Appropriate mood & affect, Normal judgment. ?  Assessment/Plan  1.?SVT (supraventricular tachycardia)?I47.1  ?  2.?Anemia?D64.9  ?  3.?Non-small cell lung cancer?C34.90  Ordered:  megestrol, 800 mg, form:  Susp, Oral, Daily, first dose 08/26/21 9:00:00 CDT  ?  4.?COPD - Chronic obstructive pulmonary disease?J44.9  ?  Orders:  acetaminophen-HYDROcodone, 1 tab(s), form: Tab, Oral, q6hr PRN for pain, first dose 08/25/21 17:18:00 CDT  albuterol-ipratropium, 3 mL, form: Soln, NEB, QID, first dose 08/25/21 17:18:00 CDT  aspirin, 81 mg, form: Tab-EC, Oral, Daily, first dose 08/26/21 9:00:00 CDT  buPROPion, 150 mg, form: SR Tab, Oral, Daily, first dose 08/26/21 9:00:00 CDT  ferrous gluconate, 240 mg, form: Tab, Oral, Daily, first dose 08/26/21 9:00:00 CDT  Low Sodium Meal Plan, 08/25/21 17:01:00 CDT, Start Meal: Now  Magnesium Level, AM Next collect, 08/26/21 5:00:00 CDT, Blood, Stop date 08/26/21 5:00:00 CDT, Lab Collect, 08/26/21 5:00:00 CDT  Weight, 08/25/21 16:54:00 CDT, Daily  Admit to observation on 8/25/21 at 16:05  Transfuse 2 units PRBCs  replace Mag IV  review home meds  add B-blocker for HR  follow labs  DVT prophylaxis  telemetry  ?  criticla care time=43mins   Problem List/Past Medical History  Ongoing  Abnormal imaging  Alkaline phosphatase level  Brain metastasis  Cancer of upper lobe of right lung  Cancer related pain  Cholelithiasis  Chronic cough  COPD - Chronic obstructive pulmonary disease  Current smoker  Dilated cbd, acquired  Hepatomegaly  Iron deficiency anemia  Microcytic anemia  Obesity  PAD - Peripheral arterial disease  Thrombocytosis  Tobacco user  Historical  No qualifying data  Procedure/Surgical History  Transfusion of Nonautologous Red Blood Cells into Peripheral Vein, Percutaneous Approach (08/18/2021)  Insertion of Infusion Device into Superior Vena Cava, Percutaneous Approach (07/12/2021)  Introduction of Vasopressor into Central Vein, Percutaneous Approach (07/12/2021)  Catheter Insertion Mediport (None) (05/12/2021)  Fluoroscopy of Superior Vena Cava using Low Osmolar Contrast, Guidance (05/12/2021)  Insertion of Infusion Device into Superior Vena Cava, Percutaneous Approach  (05/12/2021)  Insertion of Totally Implantable Vascular Access Device into Chest Subcutaneous Tissue and Fascia, Open Approach (05/12/2021)  Insertion of tunneled centrally inserted central venous access device, with subcutaneous port; age 5 years or older (05/12/2021)  Colonoscopy (None) (02/10/2021)  Colonoscopy, flexible; diagnostic, including collection of specimen(s) by brushing or washing, when performed (separate procedure) (02/10/2021)  Esophagogastroduodenoscopy (02/10/2021)  Esophagogastroduodenoscopy, flexible, transoral; diagnostic, including collection of specimen(s) by brushing or washing, when performed (separate procedure) (02/10/2021)  Inspection of Lower Intestinal Tract, Via Natural or Artificial Opening Endoscopic (02/10/2021)  Inspection of Upper Intestinal Tract, Via Natural or Artificial Opening Endoscopic (02/10/2021)  Transfusion, blood or blood components (11/30/2020)  Bronchoscopy, Ebus, 3 or More Samples (09/22/2020)  Bronchoscopy, rigid or flexible, including fluoroscopic guidance, when performed; with endobronchial ultrasound (EBUS) guided transtracheal and/or transbronchial sampling (eg, aspiration[s]/biopsy[ies]), 3 or more mediastinal and/or hilar lymph node stati (09/22/2020)  Extraction of Thorax Lymphatic, Via Natural or Artificial Opening Endoscopic, Diagnostic (09/22/2020)  Transfusion of Nonautologous Red Blood Cells into Peripheral Vein, Percutaneous Approach (09/12/2020)  Transfusion, blood or blood components (09/12/2020)  Transfusion of Nonautologous Red Blood Cells into Peripheral Vein, Percutaneous Approach (09/11/2020)  Transfusion, blood or blood components (09/11/2020)  PICC placement (2020)  Drainage of Neck Skin, External Approach (12/21/2018)  Incision and drainage of abscess (eg, carbuncle, suppurative hidradenitis, cutaneous or subcutaneous abscess, cyst, furuncle, or paronychia); simple or single (12/21/2018)   Medications  Inpatient  acetaminophen, 650 mg= 2  tab(s), Oral, q6hr, PRN  acetaminophen, 1000 mg= 2 tab(s), Oral, q6hr, PRN  acetaminophen, 650 mg= 2 tab(s), Oral, Once-Unscheduled  aspirin 81 mg oral Delayed Release (EC) tablet, 81 mg= 1 tab(s), Oral, Daily  Benadryl (for IVPB), 25 mg, IV Piggyback, Once-chemo  Benadryl (for IVPB), 25 mg, IV Piggyback, Once-chemo  Benadryl (for IVPB), 25 mg, IV Piggyback, Once-chemo  Benadryl (for IVPB), 25 mg, IV Piggyback, Once-chemo  Benadryl (for IVPB), 25 mg, IV Piggyback, Once-chemo  Benadryl (for IVPB), 25 mg, IV Piggyback, Once-chemo  buPROPion 150 mg/12 hours (SR) oral tablet, extended release, 150 mg= 1 tab(s), Oral, Daily  diphenhydrAMINE, 25 mg= 0.5 mL, IV Push, Once-Unscheduled  DuoNeb 0.5 mg-2.5 mg/3 mL inhalation solution, 3 mL, NEB, QID  Fergon, 324 mg= 1 tab(s), Oral, Daily  Heparin Flush 100 U/mL - 5 mL, 500 units= 5 mL, IV Push, Once-chemo  Heparin Flush 100 U/mL - 5 mL, 500 units= 5 mL, IV Push, Once-chemo  Lasix, 20 mg= 2 mL, IV Push, Once-Unscheduled  megestrol 40 mg/mL oral suspension, 800 mg= 20 mL, Oral, Daily  metoprolol tartrate 25 mg oral tab, 25 mg= 1 tab(s), Oral, BID  Norco 10 mg-325 mg oral tablet, 1 tab(s), Oral, q6hr, PRN  Normal Saline (0.9% NS)  mL, 500 mL, IV  Rocephin  Zofran, 4 mg= 2 mL, IV Push, q4hr, PRN  Home  amoxicillin-clavulanate 875 mg-125 mg oral tablet, 1 tab(s), Oral, q12hr  aspirin 81 mg oral Delayed Release (EC) tablet  buPROPion 150 mg oral tablet, extended release, 150 mg= 1 tab(s), Oral, Daily,? ?Investigating  dexamethasone 4 mg oral tablet, 4 mg= 1 tab(s), Oral, BID, 12 refills  DuoNeb 0.5 mg-2.5 mg/3 mL inhalation solution, 3 mL, NEB, QID  Fergon 240 mg (27 mg elemental iron) oral tablet, 240 mg= 1 tab(s), Oral, Daily, 3 refills  folic acid 1 mg oral tablet, 1 mg= 1 tab(s), Oral, Daily, 12 refills  levofloxacin 750 mg oral tablet, 750 mg= 1 tab(s), Oral, q24hr  megestrol 40 mg/mL oral suspension, 800 mg= 20 mL, Oral, Daily, 1 refills  Norco 10 mg-325 mg oral tablet,  1 tab(s), Oral, q6hr, PRN,? 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 Exercise frequency: Daily. Exercise type: Walking., 03/04/2020  Home/Environment  Lives with Children, Spouse, grandson. Living situation: Home/Independent. TV/Computer concerns: No. Single family house, 03/04/2020  Living situation: Home/Independent., 01/18/2018  Nutrition/Health  Regular, Low fat, Good, 03/04/2020  Sexual  Sexually active: Yes. Number of current partners 1. Sexual orientation: Straight or heterosexual. Gender Identity Identifies as female. No, 03/04/2020  Spiritual/Cultural  Hindu, No, 03/04/2020  Substance Use  Never, 08/17/2021  Tobacco  Never (less than 100 in lifetime), N/A, 08/25/2021  Former smoker, quit more than 30 days ago, Cigarettes, No, 3 per day., 08/18/2021  Family History  Heart disease: Sister and Brother.  Hypertension.: Sister and Brother.  Primary malignant neoplasm of bone: Mother.  Unknown cause of morbidity or mortality.....: Father.  Lab Results  Test Name Test Result Date/Time Comments   Sodium Lvl 136 mmol/L 08/25/2021 12:40 CDT    Potassium Lvl 4.3 mmol/L 08/25/2021 12:40 CDT    Chloride 101 mmol/L 08/25/2021 12:40 CDT    CO2 23 mmol/L 08/25/2021 12:40 CDT    Calcium Lvl 10.2 mg/dL 08/25/2021 12:40 CDT    Magnesium Lvl 1.50 mg/dL (Low) 08/25/2021 12:40 CDT    Glucose Lvl 96 mg/dL 08/25/2021 12:40 CDT    BUN 8.0 mg/dL (Low) 08/25/2021 12:40 CDT    Creatinine 0.84 mg/dL 08/25/2021 12:40 CDT    eGFR-AA >60 08/25/2021 12:40 CDT    AST 19 unit/L 08/25/2021 12:40 CDT    ALT 9 unit/L 08/25/2021 12:40 CDT    Alk Phos  331 unit/L (High) 08/25/2021 12:40 CDT    Total Protein 8.5 gm/dL (High) 08/25/2021 12:40 CDT    Albumin Lvl 2.0 gm/dL (Low) 08/25/2021 12:40 CDT    Globulin 6.5 gm/dL (High) 08/25/2021 12:40 CDT    .3 pg/mL (High) 08/25/2021 12:40 CDT    Total CK 19 U/L (Low) 08/25/2021 12:40 CDT    CK MB <0.3 ng/mL 08/25/2021 12:40 CDT    Troponin-I <.010 ng/mL 08/25/2021 12:40 CDT 0.5 ng/mL is the accepted discriminant level for mycardial injury rather than a statistical normal limit for the general population.   PT 16.5 sec (High) 08/25/2021 12:40 CDT    INR 1.3 (Low) 08/25/2021 12:40 CDT    PTT 47.1 sec (High) 08/25/2021 12:40 CDT    WBC 17.5 x10(3)/mcL (High) 08/25/2021 12:40 CDT    RBC 2.74 x10(6)/mcL (Low) 08/25/2021 12:40 CDT    Hgb 7.8 gm/dL (Low) 08/25/2021 12:40 CDT    Hct 25.2 % (Low) 08/25/2021 12:40 CDT    Platelet 646 x10(3)/mcL (High) 08/25/2021 12:40 CDT    COVID-19 Rapid Cepheid 08/25/2021 15:55 CDT ID NOW is an automated ?assay that utilizes ?isothermal nucleic acid amplification technology intended for the qualitative detection SARS-Co-@ viral nucleic acids.

## 2022-05-04 NOTE — HISTORICAL [facility] CERNER
This is a historical note converted from [place]. Formatting and pictures may have been removed.  Please reference [place] for original formatting and attached multimedia. 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 Diagnosed 9/22/2020. Brain mets diagnosed 11/6/2020.  ?   Current Treatment:  2.? Day 1: Pembrolizumab 200 mg IV every 3 weeks for 24 months;  3.? Day 1: Pemetrexed 500 mg/m? IV every 3 weeks indefinitely  Started 9/9/2021  ?   Cycle #6 due 12/29/2021  ?   Dose modifications & interruptions:  Cycle #1 delayed 6 weeks d/t multiple hospitalizations  ?   Treatment History:  1. SRS to brain metastasis on 12/3/2020.  2. Carboplatin/Taxol weekly along with RT. XRT started 12/16/2020. Chemo started 12/16/2020; completed 1/29/2021.  3. Carbo/Alimta/Keytruda every 3 weeks x 4 cycles. Started 4/29/2021. Completed 4 cycles on 7/9/2021.  ?  ?   Past medical history: Cholelithiasis.? Chronic cough.? COPD.? Tobacco abuse.? Hypertension.? Dyslipidemia.? Hepatomegaly.? Obesity.? Peripheral artery disease.  Social history: . ?Lives in [place] with her  and family. ?Has 12 children. ?Has been smoking?1 pack of cigarettes daily for 40 years.? No history of alcohol or illicit drug abuse.  Family history:?Negative for cancers.  Health maintenance:  Screening mammogram in 2019 in [place], apparently unremarkable.  Mostly colonoscopy ever.  Menstrual and OB/GYN history:?Menopause?in her 50s.  ?   History of present illness:  65-year-old female referred by Dr. Steve Simpson, with lung cancer.  ?  For a full, detailed history, please see Dr. Ulrich note dated 11/27/2020.  ?  Interval History  12/29/2021: Patient presents for follow up on Alimta/Keytruda; she is scheduled to receive cycle #6 today. VS stable. She presents with her daughter and states her appetite is good and she feels good now.  She had presented to the ER with right sided abdominal pain that required her to take her pain medication more often. Abdominal XR reveals no abnormality. Restaging scans reveal no progression and improved aeration of right lower lobe. It also mentions the presence of gallstones; her daughter is aware of this. She states they were told nothing had to be done about them until they began to bother her. Advised the patient?that if her abdominal pain occurs more frequently or becomes worse, she may need to have her gallbladder removed. CMP, CBC stable. TSH WNL. She requests a refill of pain medications. Will have her follow up with Dr. Shoemaker in 3 weeks so he can review her scans as well. She is amenable to this plan.  Review of Systems  A complete 12-point?ROS was performed in full with pertinent positives as described in interval history. Remainder of ROS done in full and are negative.  Physical Exam  Vitals & Measurements  T:?37.2? ?C (Oral)? HR:?86(Peripheral)? RR:?20? BP:?110/70?  HT:?160.00?cm? WT:?81.100?kg? BMI:?31.68?  General: ?Alert and oriented, No acute distress.??  Appearance: Well-groomed; wearing face mask.  HEENT: ?Normocephalic, Oral mucosa is moist.?Pupils are equal, round and reactive to light, Extraocular movements are intact, Normal conjunctiva.?  Neck: ?Supple, Non-tender, No lymphadenopathy, No thyromegaly.??  Respiratory: ?Lungs are clear to auscultation, Respirations are non-labored, Breath sounds are equal, Symmetrical chest wall expansion.??  Cardiovascular: ?Normal rate, Regular rhythm, No edema.??  Breast:??Breast exam not performed on todays visit.??  Gastrointestinal: Rounded,?Soft, Non-tender, Non-distended, Normal bowel sounds.??  Musculoskeletal: ?Normal strength.??  Integumentary: ?Warm, dry, intact.??Left chest wall mediport.  Neurologic: ?Alert, Oriented, No focal deficits, Cranial Nerves II-XII are grossly intact.??  Cognition and Speech: ?Oriented, Speech clear and  coherent.??  Psychiatric: ?Cooperative, Appropriate mood & affect. ?  ECOG Performance Scale: 2 - Capable of all self-care but unable to carry out any work activities. Up and about greater than 50 percent of waking hours.?  Assessment/Plan  1.?Cancer of upper lobe of right lung?C34.11  #Poorly differentiated carcinoma of lung:  To summarize:  -Poorly differentiated carcinoma of lung, 14.0 cm right upper lobe mass invading mediastinum  -EBUS 09/20/2020  -cT4 cN2 M1b, stage RICKIE  -EGD/colonoscopy negative  -5 mm right occipital hemorrhagic metastasis 11/06/2020  -S/p SRS?to brain metastasis (2100 cGy; 1 fraction)?(12/03/2020)  -S/p chemoradiation therapy (12/2020-01/2021) for intrathoracic disease, with positive response,  -Followed by carbo/Alimta/Keytruda x4 for metastatic disease (solitary brain metastasis)?with stable disease;?questionable progression on CTs?08/2021)  -No brain metastases on surveillance brain MRI (11/11/2021)  ?  ?   #Sites of disease:  14 cm right upper lobe mass, invading mediastinum;?mediastinal lymphadenopathy;?right occipital brain metastasis  ?  ?   #Molecular markers:  -BRAF mutation negative; ROS1 gene arrangement negative; ALK rearrangement negative; PD-L1 CPS <10 (CPS 5)?(erroneously, tested for?esophageal carcinoma); EGFR negative; MET exon 14 skipping mutation negative; NTRK gene fusion negative; RET rearrangement negative  -PD-L1 testing: QNS  ?  ?   #Hypercalcemia?(11/25/2020)  -11/25/2020: Calcium 10.3 (elevated for the very first time). ?BUN 21, elevated. ?Creatinine 0.79, normal. ?Albumin 2.4.  -11/25/2020:?Intact PTH level 32.9, normal. ?Ionized calcium level 5.4, within normal limits.  -11/25/2020:?IV fluids + Zometa 4 mg IV x1  ?  ?  #Iron deficiency anemia, FOBT positive  -09/11/2020: Hemoglobin 6.9.? Microcytosis.? Elevated RDW.? Low serum iron, low TIBC, low transferrin saturation, normal ferritin.? FOBT positive.? 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  ?  #Thrombocytosis since 09/11/2020 ([id number])  -Could be reactive to iron deficiency anemia?versus metastatic lung cancer  -10/22/2020:?JAK2 mutation negative. ?BCR-ABL 1?fusion transcripts negative.? ESR, CRP elevated.? Iron deficiency anemia.  -Subsequently, resolved?(post?parenteral iron therapy;?post?chemoradiation therapy for lung cancer)  -08/17/2021: Hemoglobin 6.1, , serum iron 16, TIBC 175, transferrin saturation 9%, ferritin >1675.56 (relative iron deficiency)  ?  ?  #Cholelithiasis.? Chronically elevated alkaline phosphatase.  -HIDA scan (04/18/2019): No gallbladder activity at 1 hour post isotope injection, compatible with cystic duct obstruction/acute cholecystitis  -02/07/2020: Limited abdominal ultrasound: Cholelithiasis, dilated CBD, hepatomegaly  (12/03/2020)  ?  ?  Plan:  Need restaging scans at this time  ?  -Potassium 5.3 today  -Not on potassium supplements  -We will prescribe Kayexalate 30 g p.o. x1 dose  -Recheck potassium level tomorrow  ?  -Alimta/Keytruda maintenance started 09/09/2021  >>>  -Continue Alimta/Keytruda maintenance every 3 weeks (Alimta indefinitely; pembrolizumab for 24 months)  -Check CBC and CMP every 3 weeks?before each cycle of chemotherapy  -Check TSH?every 6 weeks; next, mid January 22  -Restage with contrast-enhanced CT scans of C/A/P?in 3 months?(November)?(due now)  -Surveillance brain MRI scans deferred?to radiation oncology?(no metastasis on surveillance brain  MRI?11/11/2021)  ?  Chemotherapy regimen:  1.? Day 1: Pembrolizumab 200 mg IV plus pemetrexed 500 mg/m? IV plus carboplatin AUC 5; repeat cycles every 3 weeks for 4 cycles; followed by:  2.? Day 1: Pembrolizumab 200 mg IV every 3 weeks for 24 months;  3.? Day 1: Pemetrexed 500 mg/m? IV every 3 weeks indefinitely  ?  Check liquid biopsy?for PD-L1 and KRAS mutation.  ?  -Iron deficiency anemia; FOBT positive;;?s/p Feraheme x2 (10/26/2020-11/03/2020)  -EGD and colonoscopy?(02/10/2021)?with a few ulcers in sigmoid colon, internal hemorrhoids, no bleeding,?radiation esophagitis?(no biopsies taken)  -08/17/2021: Hemoglobin 6.1, relative iron deficiency (serum iron 16, TIBC 175,?transferrin saturation 9%, ferritin?>1675.56), PRBC x1 unit as inpatient  -S/p Feraheme 510 mg IV x2 (09/09/2021, 09/16/2021)  >>>  -No improvement in hemoglobin  -Check reticulocyte count, serum iron, TIBC, ferritin,?B12 level, RBC folate, LDH,?haptoglobin  ?  -Thrombocytosis is?reactive to iron deficiency and underlying metastatic lung cancer  ?  Pain medications and?cough suppressant?prescription filled today.  ?  Ok to proceed with cycle #6 of Keytruda/Alimta today.  Check CBC and CMP every 3 weeks?before each cycle of chemotherapy.  Check TSH?every 6 weeks; next, mid January 22  Restage with contrast-enhanced CT scans of C/A/P?in 3 months; due 3/2022.  Surveillance brain MRI scans deferred?to radiation oncology?  Follow up in 3 weeks (F2F with 1/19/2022) with Dr. Shoemaker with?CBC, CMP, TSH, free T4,?& CT scan results?prior to cycle #7.  Ordered:  ?   Problem List/Past Medical History  Ongoing  Abnormal imaging  Adjustment and management of vascular access device  Alkaline phosphatase level  Brain metastasis  Cancer of upper lobe of right lung  Cancer related pain  Cholelithiasis  Chronic cough  COPD - Chronic obstructive pulmonary disease  Current smoker  Dilated cbd, acquired  Hepatomegaly  Iron deficiency anemia  Microcytic  anemia  Obesity  PAD - Peripheral arterial disease  Thrombocytosis  Tobacco user  Historical  No qualifying data  Procedure/Surgical History  Transfusion of Nonautologous Red Blood Cells into Peripheral Vein, Percutaneous Approach (08/18/2021)  Insertion of Infusion Device into Superior Vena Cava, Percutaneous Approach (07/12/2021)  Introduction of Vasopressor into Central Vein, Percutaneous Approach (07/12/2021)  Catheter Insertion Mediport (None) (05/12/2021)  Fluoroscopy of Superior Vena Cava using Low Osmolar Contrast, Guidance (05/12/2021)  Insertion of Infusion Device into Superior Vena Cava, Percutaneous Approach (05/12/2021)  Insertion of Totally Implantable Vascular Access Device into Chest Subcutaneous Tissue and Fascia, Open Approach (05/12/2021)  Insertion of tunneled centrally inserted central venous access device, with subcutaneous port; age 5 years or older (05/12/2021)  Colonoscopy (None) (02/10/2021)  Colonoscopy, flexible; diagnostic, including collection of specimen(s) by brushing or washing, when performed (separate procedure) (02/10/2021)  Esophagogastroduodenoscopy (02/10/2021)  Esophagogastroduodenoscopy, flexible, transoral; diagnostic, including collection of specimen(s) by brushing or washing, when performed (separate procedure) (02/10/2021)  Inspection of Lower Intestinal Tract, Via Natural or Artificial Opening Endoscopic (02/10/2021)  Inspection of Upper Intestinal Tract, Via Natural or Artificial Opening Endoscopic (02/10/2021)  Transfusion, blood or blood components (11/30/2020)  Bronchoscopy, Ebus, 3 or More Samples (09/22/2020)  Bronchoscopy, rigid or flexible, including fluoroscopic guidance, when performed; with endobronchial ultrasound (EBUS) guided transtracheal and/or transbronchial sampling (eg, aspiration[s]/biopsy[ies]), 3 or more mediastinal and/or hilar lymph node stati (09/22/2020)  Extraction of Thorax Lymphatic, Via Natural or Artificial Opening Endoscopic, Diagnostic  (09/22/2020)  Transfusion of Nonautologous Red Blood Cells into Peripheral Vein, Percutaneous Approach (09/12/2020)  Transfusion, blood or blood components (09/12/2020)  Transfusion of Nonautologous Red Blood Cells into Peripheral Vein, Percutaneous Approach (09/11/2020)  Transfusion, blood or blood components (09/11/2020)  PICC placement (2020)  Drainage of Neck Skin, External Approach (12/21/2018)  Incision and drainage of abscess (eg, carbuncle, suppurative hidradenitis, cutaneous or subcutaneous abscess, cyst, furuncle, or paronychia); simple or single (12/21/2018)   Medications  adenosine, 6 mg= 2 mL, IV Push, Once  Alimta (for IVPB)  alPRAzolam 0.25 mg oral tab, 0.25 mg= 1 tab(s), Oral, TID  aspirin 81 mg oral Delayed Release (EC) tablet  Benadryl (for IVPB), 25 mg, IV Piggyback, Once-chemo  Benadryl (for IVPB), 25 mg, IV Piggyback, Once-chemo  Benadryl (for IVPB), 25 mg, IV Piggyback, Once-chemo  Benadryl (for IVPB), 25 mg, IV Piggyback, Once-chemo  Benadryl (for IVPB), 25 mg, IV Piggyback, Once-chemo  Benadryl (for IVPB), 25 mg, IV Piggyback, Once-chemo  Bentyl 10 mg oral capsule, 10 mg= 1 cap(s), Oral, q6hr, PRN  buPROPion 150 mg oral tablet, extended release, 150 mg= 1 tab(s), Oral, Daily,? ?Not taking  codeine-guaifenesin 10 mg-100 mg/5 mL oral syrup, 10 mL, Oral, q4hr, PRN  codeine-promethazine 10 mg-6.25 mg/5 mL oral syrup, 5 mL, Oral, Once a day (at bedtime)  cyproheptadine 4 mg oral tablet, 4 mg= 1 tab(s), Oral, Daily,? ?Not taking  Decadron 4 mg oral tablet, 4 mg= 1 tab(s), Oral, BID  dexamethasone 4 mg oral tablet, 4 mg= 1 tab(s), Oral, BID, 12 refills  dexamethasone 4 mg oral tablet, 4 mg= 1 tab(s), Oral, BID, 12 refills  DuoNeb 0.5 mg-2.5 mg/3 mL inhalation solution, 3 mL, NEB, QID  Fergon 240 mg (27 mg elemental iron) oral tablet, 240 mg= 1 tab(s), Oral, Daily, 3 refills  folic acid 1 mg oral tablet, 1 mg= 1 tab(s), Oral, Daily, 12 refills  folic acid 1 mg oral tablet, 1 mg= 1 tab(s), Oral,  Daily, 12 refills  gabapentin 300 mg oral capsule, 300 mg= 1 cap(s), Oral, TID  Heparin Flush 100 U/mL - 5 mL, 500 units= 5 mL, IV Push, Once-chemo  Heparin Flush 100 U/mL - 5 mL, 500 units= 5 mL, IV Push, Once-chemo  Heparin Flush 100 U/mL - 5 mL, 500 units= 5 mL, IV Push, Once-chemo  Keytruda  loratadine 10 mg oral capsule, 10 mg= 1 cap(s), Oral, Daily  megestrol 40 mg/mL oral suspension, 800 mg= 20 mL, Oral, Daily, 1 refills  metoprolol tartrate 25 mg oral tab, 25 mg= 1 tab(s), Oral, BID, 3 refills  metoprolol tartrate 50 mg oral tab, 50 mg= 1 tab(s), Oral, BID,? 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 Exercise frequency: Daily. Exercise type: Walking., 03/04/2020  Home/Environment  Lives with Children, Spouse, grandson. Living situation: Home/Independent. TV/Computer concerns: No. Single family house, 03/04/2020  Living situation: Home/Independent., 01/18/2018  Nutrition/Health  Regular, Low fat, Good, 03/04/2020  Sexual  Sexually active: Yes. Number of current partners 1. Sexual orientation: Straight or heterosexual. Gender Identity Identifies as female. No, 03/04/2020  Spiritual/Cultural  Druze, No, 03/04/2020  Substance Use  Never, 12/29/2021  Tobacco  Former smoker, quit more than 30 days ago, No, 12/29/2021  Family History  Heart disease: Sister and Brother.  Hypertension.: Sister and Brother.  Primary malignant neoplasm of bone: Mother.  Unknown cause of morbidity or mortality.....: Father.  Immunizations  Vaccine Date Status   influenza virus vaccine, inactivated  10/05/2011 Recorded   Health Maintenance  Health Maintenance  ???Pending?(in the next year)  ??? ??OverDue  ??? ? ? ?Alcohol Misuse Screening due??01/02/21??and every 1??year(s)  ??? ? ? ?Cognitive Screening due??01/02/21??and every 1??year(s)  ??? ? ? ?ADL Screening due??09/15/21??and every 1??year(s)  ??? ??Due?  ??? ? ? ?Bone Density Screening due??12/29/21??Variable frequency  ??? ? ? ?Breast Cancer Screening due??12/29/21??Unknown Frequency  ??? ? ? ?COPD Maintenance-Pulmonary Rehab Education due??12/29/21??and every 1??year(s)  ??? ? ? ?Lung Cancer Screening due??12/29/21??and every 1??year(s)  ??? ? ? ?Pneumococcal Vaccine due??12/29/21??Unknown Frequency  ??? ? ? ?Tetanus Vaccine due??12/29/21??and every 10??year(s)  ??? ? ? ?Zoster Vaccine due??12/29/21??Unknown Frequency  ??? ??Refused?  ??? ? ? ?Smoking Cessation due??01/01/21??and every 1??year(s)  ??? ??Due In Future?  ??? ? ? ?Obesity Screening not due until??01/01/22??and every 1??year(s)  ??? ? ? ?Advance Directive not due until??01/02/22??and every 1??year(s)  ??? ? ? ?Fall Risk Assessment not due until??01/02/22??and every 1??year(s)  ??? ? ? ?Functional Assessment not due until??01/02/22??and every 1??year(s)  ??? ? ? ?COPD Management-COPD Medications Prescribed not due until??08/18/22??and every 1??year(s)  ??? ? ? ?Aspirin Therapy for CVD Prevention not due until??08/26/22??and every 1??year(s)  ??? ? ? ?COPD Management-Oxygen Assessment not due until??12/13/22??and every 1??year(s)  ???Satisfied?(in the past 1 year)  ??? ??Satisfied?  ??? ? ? ?Advance Directive on??08/25/21.??Satisfied by Daryl Camarillo  ??? ? ? ?Aspirin Therapy for CVD Prevention on??08/26/21.??Satisfied by Gale Marin RN  ??? ? ? ?Blood Pressure Screening on??12/29/21.??Satisfied by Joana Yates  ??? ? ? ?Body Mass Index Check on??12/29/21.??Satisfied by Joana Yates  ??? ? ? ?COPD Maintenance-Spirometry on??07/21/21.??Satisfied by She Cunningham  ??? ?  ? ?COPD Management-COPD Medications Prescribed on??08/18/21.??Satisfied by Gennaro Rodriguez DO  ??? ? ? ?COPD Management-Oxygen Assessment on??12/13/21.??Satisfied by Deidre Crawley  ??? ? ? ?Colorectal Screening on??02/10/21.??Satisfied by Agnes Crawley LPN  ??? ? ? ?Depression Screening on??12/29/21.??Satisfied by Joana Yates  ??? ? ? ?Diabetes Screening on??12/29/21.??Satisfied by Aletha Salter MT  ??? ? ? ?Fall Risk Assessment on??12/29/21.??Satisfied by Joana Yates  ??? ? ? ?Functional Assessment on??12/09/21.??Satisfied by Jeanette Brewer RN  ??? ? ? ?Hypertension Management-Blood Pressure on??12/29/21.??Satisfied by Joana Yates  ??? ? ? ?Hypertension Management-BMP on??12/29/21.??Satisfied by Aletha Salter MT  ??? ? ? ?Influenza Vaccine on??12/29/21.??Satisfied by Joana Yates  ??? ? ? ?Obesity Screening on??12/29/21.??Satisfied by Joana Yates  ?  Lab Results  Test Name Test Result Date/Time   Sodium Lvl 138 mmol/L 12/29/2021 09:53 CST   Potassium Lvl 5.2 mmol/L (High) 12/29/2021 09:53 CST   Chloride 103 mmol/L 12/29/2021 09:53 CST   CO2 25 mmol/L 12/29/2021 09:53 CST   Calcium Lvl 9.8 mg/dL 12/29/2021 09:53 CST   Glucose Lvl 205 mg/dL (High) 12/29/2021 09:53 CST   BUN 13.6 mg/dL 12/29/2021 09:53 CST   Creatinine 0.73 mg/dL 12/29/2021 09:53 CST   Est Creat Clearance Ser 63.53 mL/min 12/29/2021 10:50 CST   eGFR- 12/29/2021 09:53 CST   eGFR-WILFRID 85 mL/min/1.73 m2 (Low) 12/29/2021 09:53 CST   Bili Total 0.4 mg/dL 12/29/2021 09:53 CST   Bili Direct 0.2 mg/dL 12/29/2021 09:53 CST   Bili Indirect 0.20 mg/dL 12/29/2021 09:53 CST   AST 33 unit/L 12/29/2021 09:53 CST   ALT 42 unit/L 12/29/2021 09:53 CST   Alk Phos 567 unit/L (High) 12/29/2021 09:53 CST   Total Protein 7.5 gm/dL 12/29/2021 09:53 CST   Albumin Lvl 2.8 gm/dL (Low) 12/29/2021 09:53 CST   Globulin 4.7 gm/dL (High) 12/29/2021 09:53 CST   A/G Ratio 0.6 ratio (Low) 12/29/2021 09:53 CST   WBC 5.7 x10(3)/mcL  12/29/2021 09:53 CST   RBC 3.47 x10(6)/mcL (Low) 12/29/2021 09:53 CST   Hgb 10.2 gm/dL (Low) 12/29/2021 09:53 CST   Hct 33.8 % (Low) 12/29/2021 09:53 CST   Platelet 552 x10(3)/mcL (High) 12/29/2021 09:53 CST   MCV 97.4 fL 12/29/2021 09:53 CST   MCH 29.4 pg 12/29/2021 09:53 CST   MCHC 30.2 gm/dL (Low) 12/29/2021 09:53 CST   RDW 17.1 % (High) 12/29/2021 09:53 CST   MPV 8.5 fL 12/29/2021 09:53 CST   Abs Neut 4.94 x10(3)/mcL 12/29/2021 09:53 CST   Neutro Auto 87 % 12/29/2021 09:53 CST   Lymph Auto 7 % 12/29/2021 09:53 CST   Mono Auto 5 % 12/29/2021 09:53 CST   Abs Neutro 4.94 x10(3)/mcL 12/29/2021 09:53 CST   Abs Lymph 0.4 x10(3)/mcL (Low) 12/29/2021 09:53 CST   Abs Mono 0.3 x10(3)/mcL 12/29/2021 09:53 CST   NRBC% 0.0 % 12/29/2021 09:53 CST   IG% 0 % 12/29/2021 09:53 CST   IG# 0.030 12/29/2021 09:53 CST   Diagnostic Results  (12/17/2021 11:19 CST CT Abdomen and Pelvis W Contrast)  ?  Reason For Exam  evaluation for disease progression;Other (please specify)  ?  Radiology Report  ?  History.  Lung cancer  ?  Reference.  19 August 2021  10 August 2021  ?  Technique.  Helical acquisition from the thoracic inlet through the ischia with IV  contrast. Three plane reconstructions made available for review. DLP  1490 mGycm. Automatic exposure control, adjustment of mA/kV or  iterative reconstruction technique was used to reduce radiation.  ?  Findings.  Chest.  The heart is not enlarged. No pericardial effusion. There are coronary  artery calcifications. Left-sided Port-A-Cath tip is in the  brachiocephalic vein.  ?  Small mediastinal lymph nodes are stable. For example subcarinal lymph  node image 27 series 2 measures 9 mm short axis. No axillary  adenopathy. No left hilar adenopathy. Stable thyroid hypodensities.  Similar esophageal wall thickening.  ?  Right upper lung irregular consolidation with surrounding fluid shows  little interval change going back to August 2021. There are calcified  right hilar lymph nodes. There is  slightly improved aeration of the  right lower lobe compared to prior. There are no new suspicious  findings in the left lung. Moderate emphysema.  ?  Abdomen and pelvis.  No focal liver lesion is seen. There are gallstones. Perhaps slightly  worsened biliary ductal dilatation. No significant abnormality the  spleen, pancreas or adrenals. There is no hydronephrosis.  ?  There is no bowel obstruction. There is colonic diverticulosis. No  evidence of diverticulitis. No enlarged mesenteric lymph nodes.  ?  Urinary bladder nearly empty. No significant free fluid. Abdominal  aorta is normal in caliber. Moderate atherosclerotic disease. There  are stable small retroperitoneal lymph nodes.  ?  No new suspicious osseous lesion.  ?  Impression.  1. Similar appearance of irregular right upper lung masslike  consolidation and adjacent fluid.  2. Slightly improved aeration of the right lower lobe.  3. Question slightly increased biliary ductal dilatation, recommend  correlation with cholestatic parameters.  ?  ?  ?  Signature Line  Electronically Signed By: Penelope COLORADO, Akira Min  Date/Time Signed: 12/17/2021 11:38  ?  Technical Comments  CT Abdomen and Pelvis W Contrast:  History of adverse reaction to contrast? No?  History of Renal Insufficiency? No?  Dialysis Patient? No?  Did Patient have reaction? No?  Was there an extravasation of contrast? No?  If Yese, was hospital protocol followed? No?  Home Medication Reviewed? No?  ?  CT Thorax W Contrast:  History of adverse reaction to contrast? No?  History of Renal Insufficiency? No?  Dialysis Patient? No?  Did Patient have reaction? No?  Was there an extravasation of contrast? No?  If Yese, was hospital protocol followed? No?  Home Medication Reviewed? No?  ?  ?  This document has an image  ?  Result type:???????CT Abdomen and Pelvis W Contrast  Result date:???????December 17, 2021 11:19 CST  Result status:???????Auth (Verified)  Result title:???????CT Abdomen and Pelvis W  Contrast  Performed by:???????Penelope COLORADO, Akira Min on December 17, 2021 11:25 CST  Verified by:???????Penelope COLORADO, Akira Min on December 17, 2021 11:38 CST  Encounter info:???????[id number], [facility], Outpatient, 12/17/2021 - 12/17/2021 [1]  ?  ?  (12/17/2021 11:19 CST CT Thorax W Contrast)  Reason For Exam  evaluation for disease progression;Other (please specify)  ?  Radiology Report  ?  History.  Lung cancer  ?  Reference.  19 August 2021  10 August 2021  ?  Technique.  Helical acquisition from the thoracic inlet through the ischia with IV  contrast. Three plane reconstructions made available for review. DLP  1490 mGycm. Automatic exposure control, adjustment of mA/kV or  iterative reconstruction technique was used to reduce radiation.  ?  Findings.  Chest.  The heart is not enlarged. No pericardial effusion. There are coronary  artery calcifications. Left-sided Port-A-Cath tip is in the  brachiocephalic vein.  ?  Small mediastinal lymph nodes are stable. For example subcarinal lymph  node image 27 series 2 measures 9 mm short axis. No axillary  adenopathy. No left hilar adenopathy. Stable thyroid hypodensities.  Similar esophageal wall thickening.  ?  Right upper lung irregular consolidation with surrounding fluid shows  little interval change going back to August 2021. There are calcified  right hilar lymph nodes. There is slightly improved aeration of the  right lower lobe compared to prior. There are no new suspicious  findings in the left lung. Moderate emphysema.  ?  Abdomen and pelvis.  No focal liver lesion is seen. There are gallstones. Perhaps slightly  worsened biliary ductal dilatation. No significant abnormality the  spleen, pancreas or adrenals. There is no hydronephrosis.  ?  There is no bowel obstruction. There is colonic diverticulosis. No  evidence of diverticulitis. No enlarged mesenteric lymph nodes.  ?  Urinary bladder nearly empty. No significant free fluid. Abdominal  aorta  is normal in caliber. Moderate atherosclerotic disease. There  are stable small retroperitoneal lymph nodes.  ?  No new suspicious osseous lesion.  ?  Impression.  1. Similar appearance of irregular right upper lung masslike  consolidation and adjacent fluid.  2. Slightly improved aeration of the right lower lobe.  3. Question slightly increased biliary ductal dilatation, recommend  correlation with cholestatic parameters.  ?  ?  ?  Signature Line  Electronically Signed By: Akira Negrete MD  Date/Time Signed: 12/17/2021 11:38  ?  Technical Comments  CT Abdomen and Pelvis W Contrast:  History of adverse reaction to contrast? No?  History of Renal Insufficiency? No?  Dialysis Patient? No?  Did Patient have reaction? No?  Was there an extravasation of contrast? No?  If Yese, was hospital protocol followed? No?  Home Medication Reviewed? No?  ?  CT Thorax W Contrast:  History of adverse reaction to contrast? No?  History of Renal Insufficiency? No?  Dialysis Patient? No?  Did Patient have reaction? No?  Was there an extravasation of contrast? No?  If Yese, was hospital protocol followed? No?  Home Medication Reviewed? No?  ?  ?  This document has an image  ?  Result type:???????CT Thorax W Contrast  Result date:???????December 17, 2021 11:19 CST  Result status:???????Auth (Verified)  Result title:???????CT Thorax W Contrast  Performed by:???????Akira Negrete MD on December 17, 2021 11:25 CST  Verified by:???????Akira Negrete MD on December 17, 2021 11:38 CST  Encounter info:???????[id number], [facility], Outpatient, 12/17/2021 - 12/17/2021 [2]     [1]?CT Abdomen and Pelvis W Contrast; Akira Nergete MD 12/17/2021 11:19 CST  [2]?CT Thorax W Contrast; Akira Negrete MD 12/17/2021 11:19 CST

## 2022-05-04 NOTE — HISTORICAL [facility] CERNER
This is a historical note converted from [facility]. Formatting and pictures may have been removed.  Please reference [facility] for original formatting and attached multimedia. Chief Complaint  Sent by [facility] to get blood transfusion ? ?had abnormal labs this morning  History of Present Illness  This is a 64 y/o female with PMH COPD, HTN, dyslipidemia, who comes in for evaluation of anemia.? Patient has a history of smoking and was found to have a right upper lobe mass.? Patient is scheduled for a biopsy of the mass?next Tuesday?and went to [facility] for pre-op labs and was found to have a hemoglobin of 6.9.? Patient was advised to come to the ER for blood transfusion.? Patient reports to feel fatigued and weak.  Patient denies any chest pain, palpitations, shortness of breath, melena, hematuria.? Patient has never received blood transfusions in the past.  Review of Systems  All other 14 point review of systems were reviewed and are negative  Physical Exam  Vitals & Measurements  T:?36.4? ?C (Oral)? HR:?94(Peripheral)? HR:?94(Monitored)? RR:?19? BP:?146/73? SpO2:?97%? WT:?75?kg?  Gen: NAD, AAOX4  HEENT: NC/AT, PERRL, anicteric sclera, no JVD  CVS: S2S2+, RRR, no murmurs  Lungs: CTA B/L, no crackles, no wheezes  Abdomen: +BS,soft, NT/ND  Extremities: no edema, no clubbing, no cyanosis  Neuro: no acute focal neurological deficits  Assessment/Plan  Orders:  amitriptyline, 10 mg, form: Tab, Oral, qPM, first dose 09/11/20 22:22:00 CDT, STAT  atorvastatin, 10 mg, form: Tab, Oral, Daily, first dose 09/11/20 22:23:00 CDT, STAT  clopidogrel, 75 mg, form: Tab, Oral, Daily, first dose 09/11/20 22:22:00 CDT, STAT  DULoxetine, 60 mg, form: Cap-DR, Oral, Daily, first dose 09/11/20 22:22:00 CDT, STAT  gabapentin, 100 mg, form: Cap, Oral, TID, first dose 09/11/20 22:22:00 CDT, STAT  hydrochlorothiazide, 25 mg, form: Tab, Oral, Daily, first dose 09/11/20 22:23:00 CDT, STAT  montelukast, 10 mg, form: Tab, Oral, Daily, first dose 09/11/20 22:22:00  CDT, STAT  This is a 64 y/o female with anemia  ?  1) Anemia  - Follow up anemia labs  - Will transfuse with 1 unit PRBC  - Check CBC in AM  ?  2) Hypokalemia  - Replete with oral potassium  - Monitor BMP  ?  3) COPD/HTN/Dyslipidemia  - All stable  - Resume home meds  ?  4) DVT PPX  - SCDs  ?  5) Code Ststus  - Full  ?   Problem List/Past Medical History  Ongoing  Abnormal imaging  Alkaline phosphatase level  Cholelithiasis  Chronic cough  COPD - Chronic obstructive pulmonary disease  Current smoker  Hepatomegaly  Obesity  PAD - Peripheral arterial disease  Tobacco user  Historical  No qualifying data  Procedure/Surgical History  Drainage of Neck Skin, External Approach (12/21/2018)  Incision and drainage of abscess (eg, carbuncle, suppurative hidradenitis, cutaneous or subcutaneous abscess, cyst, furuncle, or paronychia); simple or single (12/21/2018)   Medications  Inpatient  amitriptyline 10 mg oral tablet, 10 mg= 1 tab(s), Oral, qPM  atorvastatin 10 mg oral tablet, 10 mg= 1 tab(s), Oral, Daily  DULoxetine 60 mg oral delayed release capsule, 60 mg= 1 cap(s), Oral, Daily  gabapentin 100 mg oral capsule, 100 mg= 1 cap(s), Oral, TID  hydrochlorothiazide 25 mg oral tablet, 25 mg= 1 tab(s), Oral, Daily  montelukast 10 mg oral TABLET, 10 mg= 1 tab(s), Oral, Daily  Plavix 75 mg oral tablet, 75 mg= 1 tab(s), Oral, Daily  Home  acetaminophen-hydrocodone 325 mg-5 mg oral tablet, 1 tab(s), Oral, BID,? ?Not taking  albuterol 0.083% inhalation solution, 2.5 mg= 3 mL, NEB, q6hr,? ?Not taking  albuterol CFC free 90 mcg/inh inhalation aerosol with adapter, 2 puff(s), INH, q6hr,? ?Not taking  amitriptyline 10 mg oral tablet, 10 mg= 1 tab(s), Oral, qPM,? ?Not taking  Atrovent HFA 17 mcg/inh inhalation aerosol, 2 puff(s), INH, QID  diclofenac sodium 50 mg oral delayed release tablet, 50 mg= 1 tab(s), Oral, BID,? ?Not taking  diclofenac sodium 75 mg oral delayed release tablet, 75 mg= 1 tab(s), Oral, BID,? ?Not taking  DULoxetine 60  mg oral delayed release capsule, 60 mg= 1 cap(s), Oral, Daily,? ?Not taking  fluticasone 50 mcg/inh nasal spray, 1 spray(s), Nasal, Daily,? ?Not taking  gabapentin 100 mg oral capsule, 100 mg= 1 cap(s), Oral, TID,? ?Not taking  gabapentin 300 mg oral capsule, 300 mg= 1 cap(s), Oral, TID,? ?Not taking  hydroCHLOROthiazide 12.5 mg oral capsule, 25 mg= 2 cap(s), Oral, Daily  hydrochlorothiazide 25 mg oral tablet, 25 mg= 1 tab(s), Oral, Daily  loratadine 10 mg oral tablet, 10 mg= 1 tab(s), Oral, Daily,? ?Not taking  methocarbamol 750 mg oral tablet, 750 mg= 1 tab(s), Oral, TID,? ?Not taking  montelukast 10 mg oral TABLET, 10 mg= 1 tab(s), Oral, Daily,? ?Not taking  Plavix 75 mg oral tablet, 75 mg= 1 tab(s), Oral, Daily  pravastatin 40 mg oral tablet, 40 mg= 1 tab(s), Oral, Daily,? ?Not taking  Allergies  No Known Medication Allergies  Social History  Abuse/Neglect  No, 09/11/2020  No, 08/22/2020  No, 07/12/2020  No, 06/23/2020  No, No, Yes, 03/04/2020  No, 09/17/2019  Alcohol  Past, 06/23/2020  Past, Beer, Wine, Liquor, Alcohol use interferes with work or home: No. Others hurt by drinking: No. 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 Exercise frequency: Daily. Exercise type: Walking., 03/04/2020  Home/Environment  Lives with Children, Spouse, grandson. Living situation: Home/Independent. TV/Computer concerns: No. Single family house, 03/04/2020  Living situation: Home/Independent., 01/18/2018  Nutrition/Health  Regular, Low fat, Good, 03/04/2020  Sexual  Sexually active: Yes. Number of current partners 1. Sexual orientation: Straight or heterosexual. Gender Identity Identifies as female. No, 03/04/2020  Spiritual/Cultural  Shinto, No, 03/04/2020  Substance Use  Never, Drug use interferes with work/home: No. Household substance abuse  concerns: No., 03/04/2020  Never, 04/19/2017  Tobacco  10 or more cigarettes (1/2 pack or more)/day in last 30 days, No, 09/11/2020  10 or more cigarettes (1/2 pack or more)/day in last 30 days, No, 08/22/2020  10 or more cigarettes (1/2 pack or more)/day in last 30 days, No, 07/12/2020  10 or more cigarettes (1/2 pack or more)/day in last 30 days, Cigarettes, No, 10 per day., 06/23/2020  10 or more cigarettes (1/2 pack or more)/day in last 30 days, Cigarettes, No, Household tobacco concerns: No., 03/04/2020  10 or more cigarettes (1/2 pack or more)/day in last 30 days, Cigarettes, No, 09/17/2019  Family History  Heart disease: Sister and Brother.  Hypertension.: Sister and Brother.  Primary malignant neoplasm of bone: Mother.  Unknown cause of morbidity or mortality.....: Father.  Lab Results  Labs reviewed, significant for Hemoglbin of 7.3 and potassium of 2.7  Diagnostic Results  None

## 2022-05-04 NOTE — HISTORICAL [facility] CERNER
This is a historical note converted from [place]. Formatting and pictures may have been removed.  Please reference [place] for original formatting and attached multimedia. 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 Diagnosed 9/22/2020. Brain mets diagnosed 11/6/2020.  ?   Current Treatment:  Day 1: Pembrolizumab 200 mg IV plus pemetrexed 500 mg/m? IV plus carboplatin AUC 5; repeat cycles every 3 weeks for 4 cycles; followed by:  2.? Day 1: Pembrolizumab 200 mg IV every 3 weeks for 24 months;  3.? Day 1: Pemetrexed 500 mg/m? IV every 3 weeks indefinitely  Started 4/29/2021  ?   Cycle #4 due 7/1/2021  ?   Dose modifications & interruptions:  ?  ?   Treatment History:  SRS to brain metastasis on 12/3/2020.  Carboplatin/Taxol weekly along with RT  XRT started 12/16/2020  Started 12/16/2020, completed 1/29/2021  ?  ?   Past medical history: Cholelithiasis.? Chronic cough.? COPD.? Tobacco abuse.? Hypertension.? Dyslipidemia.? Hepatomegaly.? Obesity.? Peripheral artery disease.  Social history: . ?Lives in [place] with her  and family. ?Has 12 children. ?Has been smoking?1 pack of cigarettes daily for 40 years.? No history of alcohol or illicit drug abuse.  Family history:?Negative for cancers.  Health maintenance:  Screening mammogram in 2019 in [place], apparently unremarkable.  Mostly colonoscopy ever.  Menstrual and OB/GYN history:?Menopause?in her 50s.  ?  History of present illness:  65-year-old female referred by Dr. Steve Simpson, with lung cancer.  ?  For a full, detailed history, please see Dr. Ulrich note dated 11/27/2020.  ?  Interval History  6/10/2021: Patient presents to clinic for scheduled follow up visit. Patient presents for cycle 3 Carbo/Keytruda/Alimta. Patient reports that she has been really tired more than usual and experiencing alot of restlessness and anxiety,  and?thinks she is a little depressed?since beginning this regimen. Lab work reviewed with patient, stable for treatment. Patient reports adherence to folic acid daily. We will recheck b12 and folate levels at next visit. thyroid levels will be checked today. Home medications reviewed with patient. folic acid refilled. Patient reports good appetite. Recently placed on abx ointment for abscess on forehead in which patient says is causing headaches, however reports that rx norco manages her headache well, encouraged patient to take Tylenol OTC if ineffective follow up with PCP to manage headaches d/t headaches caused by abscess on forehead which?does not appear infected, no drainage, or swelling noted. Patient also concerned about mediport site, the glue has not fallen off and when attempt to pull it off appears that incision begins to come apart. Will consult surgery clinic for further evaluation.? Patient denies any further questions or concerns at this time.  Review of Systems  A complete 12-point ROS was performed in full with pertinent positives as described in interval history. Remainder of ROS done in full and are negative.  ?  Physical Exam  Vitals & Measurements  T:?37.0? ?C (Oral)? HR:?114(Peripheral)? RR:?18? BP:?129/73?  HT:?163.00?cm? WT:?91.200?kg? BMI:?34.33?  Physical Exam:  General: Alert and oriented, No acute distress.?  Appearance: Well-groomed wearing mask  HEENT: Normocephalic, Oral mucosa is moist. Pupils are equal, round and reactive to light, Extraocular movements are intact, Normal conjunctiva.?  Neck: Supple, Non-tender, No lymphadenopathy, No thyromegaly.?  Respiratory: Lungs are clear to auscultation, Respirations are non-labored, Breath sounds are equal, Symmetrical chest wall expansion.?  Cardiovascular: Normal rate, Regular rhythm, No edema.?  Breast: Breast exam not performed on todays visit.?  Gastrointestinal: Rounded, Soft, Non-tender, Non-distended, Normal bowel  sounds.?  Musculoskeletal: Normal strength. Ambulates without assistance  Integumentary: Warm, dry, healing abscess mid forehead  Neurologic: Alert, Oriented, No focal deficits, Cranial Nerves II-XII are grossly intact.?  Cognition and Speech: Oriented, Speech clear and coherent.?  Psychiatric: Cooperative, Appropriate mood & affect.?  ECOG Performance Scale:?1 - Capable of all self-care?able to perform light work activities.  ?  Assessment/Plan  1.?Cancer of upper lobe of right lung?C34.11  #Poorly differentiated carcinoma of lung  -11.4 cm right upper lobe mass, invading mediastinum (noncontrast chest CT)  -Large mass in 4R position (right lower paratracheal) (bronchoscopy and EBUS: 09/22/2020)  -EBUS, 4R, FNA: Poorly differentiated carcinoma (tumor of lung versus upper GI tract)  (EGD, colonoscopy:?02/10/2021: No malignancy; no biopsies collected)  -PET/CT (11/02/2020):?Right upper lung lobe mass has enlarged?9.9 x 9.8 x 14.0 cm,?previously, 8.1 x 7.4 x 11.4 cm;?  contiguous extension?of the mass into the mediastinum?versus right hilar enlarged lymph node?2.0 x 1.8 cm  -Brain MRI (11/06/2020):?Right occipital?5 x 4 mm?hemorrhagic metastasis without?edema  -11/06/2020: Referred to [facility] for SRS  -->  Tumor >7 cm, therefore, T4  Tumor invading mediastinum, therefore, T4  Ipsilateral mediastinal mass (??Lymphadenopathy), EBUS FNA positive,?therefore, N2  Solitary brain metastasis, therefore,?M1b  -->cT4 cN2 M1b, stage RICKIE  -SRS to brain metastasis (2100 cGy; 1 fraction)?(12/03/2020)  -Carbo/Taxol weekly x6, concurrent with RT?(12/16/2020-01/29/2021)  -Radiotherapy right lung (12/16/2020-01/28/2021) (6000 cGy; 30 fractions; 43 elapsed days)  -04/07/2021: Restaging bone scan: No bone metastasis  -04/07/2021: Restaging CT C/A/P with contrast (comparison: PET/CT dated 11/02/2020): Mild interval decrease in size of the mass involving the upper lobe of right lung (8.7 x 7.6 cm, previously 10.5 x 9.8 cm); prominent  precarinal lymph node also appears mildly reduced in size (7 mm, previously 9 mm); no new malignancy or metastatic disease in chest, abdomen, or pelvis  (Positive response to?chemoradiation therapy)  ?   #Sites of disease:  11.4 cm right upper lobe mass, invading mediastinum;?mediastinal lymphadenopathy;?right occipital brain metastasis  ?   #Molecular markers:  -BRAF mutation negative; ROS1 gene arrangement negative; ALK rearrangement negative; PD-L1 CPS <10 (CPS 5)?(erroneously, tested for?esophageal carcinoma); EGFR negative; MET exon 14 skipping mutation negative; NTRK gene fusion negative; RET rearrangement negative  -PD-L1 testing: QNS  ?  ?  ?   #Cholelithiasis.? Chronically elevated alkaline phosphatase.  -HIDA scan (04/18/2019): No gallbladder activity at 1 hour post isotope injection, compatible with cystic duct obstruction/acute cholecystitis  -02/07/2020: Limited abdominal ultrasound: Cholelithiasis, dilated CBD, hepatomegaly  ?   Plan:  -Solitary brain metastasis, s/p SRS?(12/03/2020)  -Subsequently,?definitive CRT concurrent chemoradiation therapy to cT4 cN2 disease?(12/16/2020-01/29/2021)  -Positive response to chemoradiation therapy?on restaging scans  ?   -Now, proceed with systemic chemotherapy?for stage IV disease  -Molecular markers ( mutations) negative  -PD-L1 testing: QNS  ?  -Proceed with systemic therapy now  -In general, 4 cycles of initial systemic therapy (with carboplatin and cisplatin) are recommended prior to maintenance therapy  -However, if patient is tolerating therapy well, consideration can be given to continue to 6 cycles  -Monitoring: Response assessment after 2 cycles, then every 2-4 cycles with CTs  ?  -Okay to proceed with Cycle?3 Carbo/Alimta/Keytruda today  -f/u in 3 weeks for Cycle?4 CBC,CMP W/NP  -f/u in 6 weeks w/Dr. Shoemaker CT results, CBC, CMP comp.of 4 cycles carbo/alimta/keytruda  -Will restage with contrast-enhanced CT scans of C/A/P?2 months after starting  chemotherapy. 7/16/2021  -Check baseline TSH and free T4, and monitor every 6 weeks (monitoring for the possibility of immune thyroiditis with immunotherapy).-check today  ?  -Check CBC and CMP every 10 days  -Restage with contrast-enhanced CT scans of C/A/P 2 months after initiation of chemotherapy  ?  -Brain MRI for surveillance of brain metastasis, deferred to radiation oncology  ?  -EGD Band colonoscopy?(02/10/2021)?with a few ulcers in sigmoid colon, internal hemorrhoids, no bleeding,?radiation esophagitis?(no biopsies taken)  ?  -Right upper back constant pain, 10/10, secondary to?large right upper lobe tumor  -Required narcotics  -04/15/2021:?Chest pain has completely resolved?and she does not need to take narcotics anymore; severe cough is also resolved  (Excellent symptom?response to chemoradiation therapy)  ?  -Mid-forehead abscess  referred to PCP  causing headaches, encouraged to take Tylenol?OTC will not refill [place] for healing abscess to forehead  ?  ?  ?  Ordered:  ?  2.?Iron deficiency anemia?D50.9  ?  #Iron deficiency anemia, FOBT positive  -09/11/2020: Hemoglobin 6.9.? Microcytosis.? Elevated RDW.? Low serum iron, low TIBC, low transferrin saturation, normal ferritin.? FOBT positive.? PRBC x2  -10/22/2020:?Iron deficiency (transferrin saturation 6%, ferritin elevated?to 248.95);?folate, B12 stores adequate; no monoclonal?gammopathy; no hemolysis; hypoproliferative anemia  -Feraheme?510 mg IV x2 (10/26/2020-11/03/2020)  -02/10/2021: Colonoscopy: A few ulcers in sigmoid colon; mild diverticulosis in sigmoid colon, without bleeding; internal hemorrhoids (no biopsies)  -02/10/2021: EGD: Moderately severe radiation esophagitis?(radiation esophagitis); normal stomach; duodenal mucosal atrophy (no biopsies)?  ?  -Iron deficiency anemia; FOBT positive  -Feraheme 510 mg IV x2 (10/26/2020-11/03/2020)?  -C/W folic acid daily-refilled today  -B12 level, folate level in 3 weeks  ?  ?  Referrals  Clinic Follow  up, *Est. 07/22/21 3:00:00 CDT, f/u w/MD ct scans results, Order for future visit, Cancer of upper lobe of right lung, [facility]  Lab Draw, *Est. 07/01/21 10:10:00 CDT, Order for future visit, Cancer of upper lobe of right lung, [facility]  Lab Draw, 07/22/21 9:20:00 CDT, Order for future visit, Cancer of upper lobe of right lung  Lab Draw, *Est. 07/22/21 3:00:00 CDT, Order for future visit, Cancer of upper lobe of right lung  Treatment/Doctor Visit, 06/10/21 8:30:00 CDT, F2F w/labs for #2 Alimta/Keytruda, Cancer of upper lobe of right lung, [facility]  Treatment/Doctor Visit, 07/01/21 11:00:00 CDT, f/u w/np carbo/alimta/keytruda cycle 4, Cancer of upper lobe of right lung, [facility]  Treatment/Doctor Visit, 07/22/21 10:20:00 CDT, Cancer of upper lobe of right lung   Problem List/Past Medical History  Ongoing  Abnormal imaging  Alkaline phosphatase level  Brain metastasis  Cancer of upper lobe of right lung  Cancer related pain  Cholelithiasis  Chronic cough  COPD - Chronic obstructive pulmonary disease  Current smoker  Dilated cbd, acquired  Hepatomegaly  Iron deficiency anemia  Microcytic anemia  Obesity  PAD - Peripheral arterial disease  Thrombocytosis  Tobacco user  Historical  No qualifying data  Procedure/Surgical History  Catheter Insertion Mediport (None) (05/12/2021)  Fluoroscopy of Superior Vena Cava using Low Osmolar Contrast, Guidance (05/12/2021)  Insertion of Infusion Device into Superior Vena Cava, Percutaneous Approach (05/12/2021)  Insertion of Totally Implantable Vascular Access Device into Chest Subcutaneous Tissue and Fascia, Open Approach (05/12/2021)  Insertion of tunneled centrally inserted central venous access device, with subcutaneous port; age 5 years or older (05/12/2021)  Colonoscopy (None) (02/10/2021)  Colonoscopy, flexible; diagnostic, including collection of specimen(s) by brushing or washing, when  performed (separate procedure) (02/10/2021)  Esophagogastroduodenoscopy (02/10/2021)  Esophagogastroduodenoscopy, flexible, transoral; diagnostic, including collection of specimen(s) by brushing or washing, when performed (separate procedure) (02/10/2021)  Inspection of Lower Intestinal Tract, Via Natural or Artificial Opening Endoscopic (02/10/2021)  Inspection of Upper Intestinal Tract, Via Natural or Artificial Opening Endoscopic (02/10/2021)  Transfusion, blood or blood components (11/30/2020)  Bronchoscopy, Ebus, 3 or More Samples (09/22/2020)  Bronchoscopy, rigid or flexible, including fluoroscopic guidance, when performed; with endobronchial ultrasound (EBUS) guided transtracheal and/or transbronchial sampling (eg, aspiration[s]/biopsy[ies]), 3 or more mediastinal and/or hilar lymph node stati (09/22/2020)  Extraction of Thorax Lymphatic, Via Natural or Artificial Opening Endoscopic, Diagnostic (09/22/2020)  Transfusion of Nonautologous Red Blood Cells into Peripheral Vein, Percutaneous Approach (09/12/2020)  Transfusion, blood or blood components (09/12/2020)  Transfusion of Nonautologous Red Blood Cells into Peripheral Vein, Percutaneous Approach (09/11/2020)  Transfusion, blood or blood components (09/11/2020)  PICC placement (2020)  Drainage of Neck Skin, External Approach (12/21/2018)  Incision and drainage of abscess (eg, carbuncle, suppurative hidradenitis, cutaneous or subcutaneous abscess, cyst, furuncle, or paronychia); simple or single (12/21/2018)   Medications  albuterol 0.083% inhalation solution, 2.5 mg= 3 mL, NEB, q6hr  albuterol CFC free 90 mcg/inh inhalation aerosol with adapter, 2 puff(s), INH, q6hr  Alimta (for IVPB)  aspirin 81 mg oral Delayed Release (EC) tablet  Benadryl (for IVPB), 25 mg, IV Piggyback, Once-chemo  Benadryl (for IVPB), 25 mg, IV Piggyback, Once-chemo  Benadryl (for IVPB), 25 mg, IV Piggyback, Once-chemo  Benadryl (for IVPB), 25 mg, IV Piggyback, Once-chemo  Benadryl  (for IVPB), 25 mg, IV Piggyback, Once-chemo  Benadryl (for IVPB), 25 mg, IV Piggyback, Once-chemo  Benadryl 50mg/ml Inj, 25 mg= 0.5 mL, IV Push, Once-chemo  buPROPion 150 mg oral tablet, extended release, 150 mg= 1 tab(s), Oral, Daily  carboplatin (for IVPB)  Cathflo, 2 mg= 2 mL, IV Push, Once-Unscheduled  Cin[place]i, 130 mg= 18 mL, IV Push, Once  cyanocobalamin, 1000 mcg, IM, Once-chemo  cyproheptadine 4 mg oral tablet, 4 mg= 1 tab(s), Oral, qPM  Decadron 4 mg oral tablet, 4 mg= 1 tab(s), Oral, BID  dexamethasone (for IVPB), 10 mg, IV Piggyback, Once-chemo  Fergon 240 mg (27 mg elemental iron) oral tablet, 240 mg= 1 tab(s), Oral, Daily, 3 refills  fluticasone 50 mcg/inh nasal spray, 1 spray(s), Daily  folic acid 1 mg oral tablet, 1 mg= 1 tab(s), Oral, Daily, 12 refills  folic acid 1 mg oral tablet, 1 mg= 1 tab(s), Oral, Daily, 12 refills  folic acid 1 mg oral tablet, 1 mg= 1 tab(s), Oral, Daily, 12 refills  gabapentin 300 mg oral capsule, 300 mg= 1 cap(s), Oral, TID  Heparin Flush 100 U/mL - 5 mL, 500 units= 5 mL, IV Push, Once-chemo  Heparin Flush 100 U/mL - 5 mL, 500 units= 5 mL, IV Push, Once-chemo  Heparin Flush 100 U/mL - 5 mL, 500 units= 5 mL, IV Push, Once-chemo  loratadine 10 mg oral tablet, 10 mg= 1 tab(s), Oral, Daily  megestrol 40 mg/mL oral suspension, 800 mg= 20 mL, Oral, Daily, 1 refills  montelukast 10 mg oral TABLET, 10 mg= 1 tab(s), Oral, Daily  Norco 10 mg-325 mg oral tablet, 1 tab(s), Oral, q6hr, PRN  ondansetron (for IVPB), 16 mg, IV Piggyback, Once-chemo  ondansetron 8 mg oral tablet, disintegrating, 8 mg= 1 tab(s), Oral, TID  ondansetron/dexamethasone, 1 EA, IV Piggyback, Once-chemo  pembrolizumab  tiZANidine 4 mg oral tablet, 4 mg= 1 tab(s), Oral, TID  Allergies  No Known Medication Allergies  Social History  Abuse/Neglect  No, No, Yes, 06/10/2021  Alcohol  Past, Beer, 05/06/2021  Employment/School  Employed, 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 Exercise frequency: Daily. Exercise type: Walking., 03/04/2020  Home/Environment  Lives with Children, Spouse, grandson. Living situation: Home/Independent. TV/Computer concerns: No. Single family house, 03/04/2020  Living situation: Home/Independent., 01/18/2018  Nutrition/Health  Regular, Low fat, Good, 03/04/2020  Sexual  Sexually active: Yes. Number of current partners 1. Sexual orientation: Straight or heterosexual. Gender Identity Identifies as female. No, 03/04/2020  Spiritual/Cultural  Mu-ism, No, 03/04/2020  Substance Use  Never, 05/06/2021  Tobacco  4 or less cigarettes(less than 1/4 pack)/day in last 30 days, No, 06/10/2021  Family History  Heart disease: Sister and Brother.  Hypertension.: Sister and Brother.  Primary malignant neoplasm of bone: Mother.  Unknown cause of morbidity or mortality.....: Father.  Health Maintenance  Health Maintenance  ???Pending?(in the next year)  ??? ??OverDue  ??? ? ? ?Influenza Vaccine due??10/01/20??and every 1??day(s)  ??? ? ? ?Alcohol Misuse Screening due??01/02/21??and every 1??year(s)  ??? ??Due?  ??? ? ? ?Breast Cancer Screening due??06/10/21??Unknown Frequency  ??? ? ? ?COPD Maintenance-Pulmonary Rehab Education due??06/10/21??and every 1??year(s)  ??? ? ? ?Cervical Cancer Screening due??06/10/21??Unknown Frequency  ??? ? ? ?Lung Cancer Screening due??06/10/21??and every 1??year(s)  ??? ? ? ?Tetanus Vaccine due??06/10/21??and every 10??year(s)  ??? ? ? ?Zoster Vaccine due??06/10/21??Unknown Frequency  ??? ??Refused?  ??? ? ? ?Smoking Cessation due??01/01/21??and every 1??year(s)  ??? ??Due In Future?  ??? ? ? ?ADL Screening not due until??09/15/21??and every 1??year(s)  ??? ? ? ?Aspirin Therapy for CVD Prevention not due until??10/22/21??and every 1??year(s)  ??? ? ? ?Obesity Screening not due until??01/01/22??and every 1??year(s)  ??? ? ? ?COPD Management-COPD Medications Prescribed not due until??04/27/22??and every  1??year(s)  ??? ? ? ?COPD Management-Oxygen Assessment not due until??05/12/22??and every 1??year(s)  ???Satisfied?(in the past 1 year)  ??? ??Satisfied?  ??? ? ? ?ADL Screening on??09/15/20.??Satisfied by Betsey Lockett  ??? ? ? ?Aspirin Therapy for CVD Prevention on??10/22/20.  ??? ? ? ?Blood Pressure Screening on??06/10/21.??Satisfied by Zelda Jesus LPN  ??? ? ? ?Body Mass Index Check on??06/10/21.??Satisfied by Zelda Jesus LPN  ??? ? ? ?COPD Management-Oxygen Assessment on??05/12/21.??Satisfied by Gaetano DUQUE, Dee  ??? ? ? ?COPD Management-COPD Medications Prescribed on??04/27/21.  ??? ? ? ?Colorectal Screening on??02/10/21.  ??? ? ? ?Coronary Artery Disease Maintenance-Antiplatelet Agent Prescribed on??10/22/20.  ??? ? ? ?Depression Screening on??06/10/21.??Satisfied by Zelda Jesus LPN  ??? ? ? ?Diabetes Screening on??06/10/21.??Satisfied by George Albert Jr.  ??? ? ? ?Hypertension Management-Blood Pressure on??06/10/21.??Satisfied by Zelda Jesus LPN  ??? ? ? ?Hypertension Management-BMP on??06/10/21.??Satisfied by George Albert Jr.  ??? ? ? ?Influenza Vaccine on??03/23/21.??Satisfied by Patricia Montoya  ??? ? ? ?Obesity Screening on??06/10/21.??Satisfied by Zelda Jesus LPN  ??? ??Refused?  ??? ? ? ?Smoking Cessation on??09/15/20.??Recorded by Betsey Lockett??Reason: Patient Refuses  ?  Lab Results  Test Name Test Result Date/Time   Sodium Lvl 141 mmol/L 06/10/2021 08:15 CDT   Potassium Lvl 4.4 mmol/L 06/10/2021 08:15 CDT   Chloride 105 mmol/L 06/10/2021 08:15 CDT   CO2 26 mmol/L 06/10/2021 08:15 CDT   Calcium Lvl 9.9 mg/dL 06/10/2021 08:15 CDT   Glucose Lvl 100 mg/dL 06/10/2021 08:15 CDT   BUN 7.8 mg/dL (Low) 06/10/2021 08:15 CDT   Creatinine 0.74 mg/dL 06/10/2021 08:15 CDT   Est Creat Clearance Ser 66.80 mL/min 06/10/2021 09:07 CDT   eGFR- 06/10/2021 08:15 CDT   eGFR-WILFRID 84 mL/min/1.73 m2 (Low) 06/10/2021 08:15 CDT   Bili Total  0.5 mg/dL 06/10/2021 08:15 CDT   Bili Direct 0.3 mg/dL 06/10/2021 08:15 CDT   Bili Indirect 0.20 mg/dL 06/10/2021 08:15 CDT   AST 28 unit/L 06/10/2021 08:15 CDT   ALT 34 unit/L 06/10/2021 08:15 CDT   Alk Phos 220 unit/L (High) 06/10/2021 08:15 CDT   Total Protein 7.6 gm/dL 06/10/2021 08:15 CDT   Albumin Lvl 3.4 gm/dL 06/10/2021 08:15 CDT   Globulin 4.2 gm/dL (High) 06/10/2021 08:15 CDT   A/G Ratio 0.8 ratio (Low) 06/10/2021 08:15 CDT   WBC 5.7 x10(3)/mcL 06/10/2021 08:15 CDT   RBC 2.91 x10(6)/mcL (Low) 06/10/2021 08:15 CDT   Hgb 9.6 gm/dL (Low) 06/10/2021 08:15 CDT   Hct 31.0 % (Low) 06/10/2021 08:15 CDT   Platelet 224 x10(3)/mcL 06/10/2021 08:15 CDT   .5 fL (High) 06/10/2021 08:15 CDT   MCH 33.0 pg 06/10/2021 08:15 CDT   MCHC 31.0 gm/dL 06/10/2021 08:15 CDT   RDW 16.5 % (High) 06/10/2021 08:15 CDT   MPV 9.3 fL 06/10/2021 08:15 CDT   Abs Neut 2.59 x10(3)/mcL 06/10/2021 08:15 CDT   Neutro Auto 45 % 06/10/2021 08:15 CDT   Lymph Auto 25 % 06/10/2021 08:15 CDT   Mono Auto 28 % 06/10/2021 08:15 CDT   Eos Auto 0 % 06/10/2021 08:15 CDT   Abs Eos 0.0 x10(3)/mcL 06/10/2021 08:15 CDT   Basophil Auto 0 % 06/10/2021 08:15 CDT   Abs Neutro 2.59 x10(3)/mcL 06/10/2021 08:15 CDT   Abs Lymph 1.4 x10(3)/mcL 06/10/2021 08:15 CDT   Abs Mono 1.6 x10(3)/mcL (High) 06/10/2021 08:15 CDT   Abs Baso 0.0 x10(3)/mcL 06/10/2021 08:15 CDT   NRBC% 0.0 % 06/10/2021 08:15 CDT   IG% 1 % 06/10/2021 08:15 CDT   IG# 0.060 06/10/2021 08:15 CDT

## 2022-05-04 NOTE — HISTORICAL [facility] C[facility]ER
This is a historical note converted from [place]. Formatting and pictures may have been removed.  Please reference [place] for original formatting and attached multimedia. 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 Diagnosed 9/22/2020. Brain mets diagnosed 11/6/2020.  ?   Current Treatment:  Carboplatin/Taxol weekly along with RT  XRT started 12/16/2020  Started 12/16/2020  Week #4 due 1/15/2021  ?   Dose modifications & interruptions:  Week #2 delayed 1 week d/t patient not presenting for toxicity check  ?   Treatment History:  SRS to brain metastasis on 12/3/2020.  ?  ?   Past medical history: Cholelithiasis.? Chronic cough.? COPD.? Tobacco abuse.? Hypertension.? Dyslipidemia.? Hepatomegaly.? Obesity.? Peripheral artery disease.  Social history: . ?Lives in [place] with her  and family. ?Has 12 children. ?Has been smoking?1 pack of cigarettes daily for 40 years.? No history of alcohol or illicit drug abuse.  Family history:?Negative for cancers.  Health maintenance:  Screening mammogram in 2019 in [place], apparently unremarkable.  Mostly colonoscopy ever.  Menstrual and OB/GYN history:?Menopause?in her 50s.  ?   History of present illness:  65-year-old female referred by Dr. Steve Simpson, with lung cancer.  ?   For a full, detailed history, please see Dr. Ulrich note dated 11/27/2020.  ?  Interval History  1/15/2021: Patient?presents for?follow up?on weekly Carbo/Taxol + XRT; she?is scheduled to receive week #4 today. VS stable. She has no complaints today; denies nausea, vomiting, diarrhea, constipation, mouth sores, abdominal pain, and neuropathy. She reports a good appetite. Na low at 135; CO2 low at 19; remaining electrolytes WNL. BUN/creatinine/eGFR WNL. Nonfasting blood glucose elevated at 313. Bili WNL. AST/ALT elevated at 37 & 82, respectively; Alk Phos improved to 298.  WBC 4.0; H&H 8.7 & 28.7; plts 323k; ANC 3870. She does not know how many weeks of XRT she is scheduled to have or when she is estimated to complete XRT; she will Rad Onc today when she presents after chemo. Will recheck labs in one week prior to treatment and have her follow up in 2 weeks with labs prior to treatment. She is amenable to this plan.  Review of Systems  A complete 12-point?ROS was performed in full with pertinent positives as described in interval history. Remainder of ROS done in full and are negative.  Physical Exam  Vitals & Measurements  T:?37.2? ?C (Oral)? HR:?107(Peripheral)? RR:?20? BP:?127/78?  BMI:?26.75?  General: ?Alert and oriented, No acute distress.??  Appearance: Well-groomed; patient smells of smoke.  HEENT: ?Normocephalic, Oral mucosa is moist.?Pupils are equal, round and reactive to light, Extraocular movements are intact, Normal conjunctiva.?  Neck: ?Supple, Non-tender, No lymphadenopathy, No thyromegaly.??  Respiratory: ?Lungs are clear to auscultation, Respirations are non-labored, Breath sounds are equal, Symmetrical chest wall expansion.??  Cardiovascular: ?Normal rate, Regular rhythm, No edema.??  Breast:??Breast exam not performed on todays visit.??  Gastrointestinal: Rounded,?Soft, Non-tender, Non-distended, Normal bowel sounds.??  Musculoskeletal: ?Normal strength.??  Integumentary: ?Warm, dry, intact.?Artificial nails.?  Neurologic: ?Alert, Oriented, No focal deficits, Cranial Nerves II-XII are grossly intact.??  Cognition and Speech: ?Oriented, Speech clear and coherent.??Wearing face mask.  Psychiatric: ?Cooperative, Appropriate mood & affect. ?  ECOG Performance Scale:?1 - Capable of light work.?  Assessment/Plan  1.?Cancer of upper lobe of right lung?C34.11  #Poorly differentiated carcinoma of lung  -11.4 cm right upper lobe mass, invading mediastinum (noncontrast chest CT)  -Large mass in 4R position (right lower paratracheal) (bronchoscopy and EBUS: 09/22/2020)  -EBUS,  4R, FNA: Poorly differentiated carcinoma (tumor of lung versus upper GI tract)  -PET/CT (11/02/2020):?Right upper lung lobe mass has enlarged?9.9 x 9.8 x 14.0 cm,?previously, 8.1 x 7.4 x 11.4 cm;?  contiguous extension?of the mass into the mediastinum?versus right hilar enlarged lymph node?2.0 x 1.8 cm  -Brain MRI (11/06/2020):?Right occipital?5 x 4 mm?hemorrhagic metastasis without visual edema  -11/06/2020: Referred to [facility] for SRS  -->  Tumor >7 cm, therefore, T4  Tumor invading mediastinum, therefore, T4  Ipsilateral mediastinal mass (??Lymphadenopathy), EBUS FNA positive,?therefore, N2  ?Solitary brain metastasis, therefore,?M1b  -->  cT4 cN2 M1b, stage RICKIE  ?  ?   #??Hypercalcemia?(11/25/2020)  -11/25/2020: Calcium 10.3 (elevated for the very first time). ?BUN 21, elevated. ?Creatinine 0.79, normal. ?Albumin 2.4.  -11/25/2020:?Intact PTH level 32.9, normal. ?Ionized calcium level 5.4, within normal limits.  -11/25/2020:?IV fluids + Zometa 4 mg IV x1  ?  ?   #??Iron deficiency anemia, FOBT positive  -09/11/2020: Hemoglobin 6.9.? Microcytosis.? Elevated RDW.? Low serum iron, low TIBC, low transferrin saturation, normal ferritin.? FOBT positive.? PRBC x2  -10/22/2020:?Iron deficiency (transferrin saturation 6%, ferritin elevated?to 248.95);?folate, B12 stores adequate; no monoclonal?gammopathy; no hemolysis; hypoproliferative anemia  -Feraheme?510 mg IV x2 (10/26/2020-11/03/2020)  ?   #??Thrombocytosis since 09/11/2020 ([phone])  -Could be reactive to iron deficiency anemia?versus metastatic lung cancer  -10/22/2020:?JAK2 mutation negative. ?BCR-ABL 1?fusion transcripts negative.? ESR, CRP elevated.? Iron deficiency anemia.  ?   #Cholelithiasis.? Chronically elevated alkaline phosphatase.  -HIDA scan (04/18/2019): No gallbladder activity at 1 hour post isotope injection, compatible with cystic duct obstruction/acute cholecystitis  -02/07/2020: Limited abdominal ultrasound: Cholelithiasis, dilated CBD,  hepatomegaly  ?  ?  Plan:  -Solitary brain metastasis  -SRS to brain metastasis (referred to radiation oncology)?(as of 11/27/2020,?pending)  -Subsequently, definitive concurrent chemoradiation therapy to cT4 cN2 disease  (Carboplatin/Taxol weekly along with RT) (see below)  -Followed by consideration of?systemic chemotherapy?(for stage IV disease)  -Check lung cancer biopsy tissue for EGFR mutation, ALK mutation, BRAF mutation, ROS1 gene rearrangement, PD-L1 expression, RET rearrangement, MET exon 14 skipping mutation, NTRK gene fusion?(already ordered)  ?  -Has been referred to GI for colonoscopy and EGD  (Possibility of upper GI primary?on mediastinal mass biopsy?via EBUS, apart from lung primary)  (Iron deficiency anemia, FOBT positive)  ?  -Iron deficiency anemia  -FOBT positive  -Feraheme 510 mg IV x2 (10/26/2020-11/03/2020)  -11/27/2020: Hemoglobin down to 7.3;?no bleeding in any form; will transfuse PRBC x2 units  -Assess response to Feraheme?with repeat serum iron, TIBC, ferritin 6 weeks post completion (mid December)  ?  -Cough suppressants?for dry cough  ?  -Thrombocytosis is?reactive to iron deficiency and underlying metastatic lung cancer  ?  Ok to proceed with week #4 of Carbo/Taxol today.  Check CBC, CMP, Mg on 1/22/2021 to be reviewed by infusion nurse.  If labs WNL, ok to proceed with week #5 on 1/22/2021.  Follow up in?2 weeks (F2F on 1/29/2021) with CBC, CMP, Mg.  ?  Discussion:  Concurrent chemoradiation therapy:  Day 1: Paclitaxel 45-50 mg/m? IV, followed by:  Day 1 carboplatin AUC 2  Repeat weekly x7 weeks with RT, with/without additional  Day 1: Paclitaxel 200 mg per square IV  Day 1: Carboplatin AUC 6 IV  Repeat every 3 weeks x2 cycles starting 2-4 weeks after completion of concurrent CRT  2.?Cancer related pain?G89.3  -Right upper back constant pain, 10/10, secondary to?large right upper lobe tumor  -Well-controlled with Norco 10 mg every 4 hours as needed  -Instructions given?to mitigate  constipation with narcotics (plenty of fluids;?prune juice twice daily, with Metamucil;?as needed Metamucil, etc.)?  ?   Continue Norco 10/325mg q4hr prn.?   Problem List/Past Medical History  Ongoing  Abnormal imaging  Alkaline phosphatase level  Brain metastasis  Cancer of upper lobe of right lung  Cancer related pain  Cholelithiasis  Chronic cough  COPD - Chronic obstructive pulmonary disease  Current smoker  Dilated cbd, acquired  Hepatomegaly  Iron deficiency anemia  Microcytic anemia  Obesity  PAD - Peripheral arterial disease  Thrombocytosis  Tobacco user  Historical  No qualifying data  Procedure/Surgical History  Transfusion, blood or blood components (11/30/2020)  Bronchoscopy, Ebus, 3 or More Samples (09/22/2020)  Bronchoscopy, rigid or flexible, including fluoroscopic guidance, when performed; with endobronchial ultrasound (EBUS) guided transtracheal and/or transbronchial sampling (eg, aspiration[s]/biopsy[ies]), 3 or more mediastinal and/or hilar lymph node stati (09/22/2020)  Extraction of Thorax Lymphatic, Via Natural or Artificial Opening Endoscopic, Diagnostic (09/22/2020)  Transfusion of Nonautologous Red Blood Cells into Peripheral Vein, Percutaneous Approach (09/12/2020)  Transfusion, blood or blood components (09/12/2020)  Transfusion of Nonautologous Red Blood Cells into Peripheral Vein, Percutaneous Approach (09/11/2020)  Transfusion, blood or blood components (09/11/2020)  Drainage of Neck Skin, External Approach (12/21/2018)  Incision and drainage of abscess (eg, carbuncle, suppurative hidradenitis, cutaneous or subcutaneous abscess, cyst, furuncle, or paronychia); simple or single (12/21/2018)   Medications  acetaminophen-hydrocodone 325 mg-5 mg oral tablet, 1 tab(s), Oral, q6hr, PRN,? ?Not Taking, Completed Rx  amitriptyline 10 mg oral tablet, 10 mg= 1 tab(s), Oral, qPM,? ?Not Taking, Completed Rx  aspirin 81 mg oral Delayed Release (EC) tablet  Benadryl (for IVPB), 25 mg, IV Piggyback,  Once-chemo  Benadryl (for IVPB), 25 mg, IV Piggyback, Once-chemo  Benadryl (for IVPB), 25 mg, IV Piggyback, Once-chemo  benzonatate 100 mg oral capsule,? ?Not taking  clopidogrel 75 mg oral tablet, 75 mg= 1 tab(s), Oral, Daily,? ?Not Taking, Completed Rx  Decadron 4 mg oral tablet, 4 mg= 1 tab(s), Oral, BID  DULoxetine 60 mg oral delayed release capsule, 60 mg= 1 cap(s), Oral, Daily,? ?Not Taking, Completed Rx  Fergon 240 mg (27 mg elemental iron) oral tablet, 240 mg= 1 tab(s), Oral, Daily, 3 refills  gabapentin 100 mg oral capsule, 100 mg= 1 cap(s), Oral, TID,? ?Not taking  gabapentin 300 mg oral capsule, 300 mg= 1 cap(s), Oral, TID  Heparin Flush 100 U/mL - 5 mL, 500 units= 5 mL, IV Push, Once-chemo  hydroCHLOROthiazide 12.5 mg oral capsule, 25 mg= 2 cap(s), Oral, Daily,? ?Not taking  K-Dur 20 oral tablet, extended release, 40 mEq= 2 tab(s), Oral, Daily,? ?Not taking  megestrol 40 mg/mL oral suspension, 800 mg= 20 mL, Oral, Daily, 1 refills  montelukast 10 mg oral TABLET, 10 mg= 1 tab(s), Oral, Daily,? ?Not taking  MoviPrep oral powder for reconstitution, 240 mL, Oral, Daily,? ?Investigating: will need for colonoscopy  Norco 10 mg-325 mg oral tablet, 1 tab(s), Oral, q4hr, PRN  pravastatin 40 mg oral tablet, 40 mg= 1 tab(s), Oral, Daily,? 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 Exercise frequency: Daily. Exercise type: Walking., 03/04/2020  Home/Environment  Lives with Children, Spouse, grandson. Living situation: Home/Independent. TV/Computer concerns: No. Single family house, 03/04/2020  Living situation: Home/Independent.,  01/18/2018  Nutrition/Health  Regular, Low fat, Good, 03/04/2020  Sexual  Sexually active: Yes. Number of current partners 1. Sexual orientation: Straight or heterosexual. Gender Identity Identifies as female. No, 03/04/2020  Spiritual/Cultural  Hoahaoism, No, 03/04/2020  Substance Use  Never, 01/15/2021  Tobacco  4 or less cigarettes(less than 1/4 pack)/day in last 30 days, No, 01/15/2021  Family History  Heart disease: Sister and Brother.  Hypertension.: Sister and Brother.  Primary malignant neoplasm of bone: Mother.  Unknown cause of morbidity or mortality.....: Father.  Health Maintenance  Health Maintenance  ???Pending?(in the next year)  ??? ??OverDue  ??? ? ? ?COPD Management-COPD Medications Prescribed due??04/19/18??and every 1??year(s)  ??? ? ? ?Influenza Vaccine due??10/01/20??and every 1??day(s)  ??? ??Due?  ??? ? ? ?Colorectal Screening due??12/11/20??and every 3??month(s)  ??? ? ? ?Alcohol Misuse Screening due??01/02/21??and every 1??year(s)  ??? ? ? ?Breast Cancer Screening due??01/15/21??Unknown Frequency  ??? ? ? ?COPD Maintenance-Pulmonary Rehab Education due??01/15/21??and every 1??year(s)  ??? ? ? ?Cervical Cancer Screening due??01/15/21??Unknown Frequency  ??? ? ? ?Lung Cancer Screening due??01/15/21??and every 1??year(s)  ??? ? ? ?Tetanus Vaccine due??01/15/21??and every 10??year(s)  ??? ? ? ?Zoster Vaccine due??01/15/21??Unknown Frequency  ??? ??Refused?  ??? ? ? ?Smoking Cessation due??01/01/21??and every 1??year(s)  ??? ??Due In Future?  ??? ? ? ?ADL Screening not due until??09/15/21??and every 1??year(s)  ??? ? ? ?Aspirin Therapy for CVD Prevention not due until??10/22/21??and every 1??year(s)  ??? ? ? ?Obesity Screening not due until??01/01/22??and every 1??year(s)  ??? ? ? ?COPD Management-Oxygen Assessment not due until??01/08/22??and every 1??year(s)  ???Satisfied?(in the past 1 year)  ??? ??Satisfied?  ??? ? ? ?ADL Screening on??09/15/20.??Satisfied by Betsey Lockett  ??? ? ? ?Alcohol  Misuse Screening on??03/03/20.??Satisfied by Agnes Crawley LPN.  ??? ? ? ?Aspirin Therapy for CVD Prevention on??10/22/20.  ??? ? ? ?Blood Pressure Screening on??01/15/21.??Satisfied by Napoleon Montoyaa  ??? ? ? ?Body Mass Index Check on??01/15/21.??Satisfied by Napoleon Montoyaa  ??? ? ? ?COPD Management-Oxygen Assessment on??01/08/21.??Satisfied by Alvin DUQUE, Yesica Chow  ??? ? ? ?Colorectal Screening on??09/11/20.??Satisfied by Colton Moseley  ??? ? ? ?Coronary Artery Disease Maintenance-Antiplatelet Agent Prescribed on??10/22/20.  ??? ? ? ?Depression Screening on??01/15/21.??Satisfied by Patricia Montoya  ??? ? ? ?Diabetes Screening on??01/15/21.??Satisfied by Ning Jaeger  ??? ? ? ?Hypertension Management-Blood Pressure on??01/15/21.??Satisfied by Patricia Montoya  ??? ? ? ?Hypertension Management-BMP on??01/15/21.??Satisfied by Ning Jaeger  ??? ? ? ?Influenza Vaccine on??01/15/21.??Satisfied by Patricia Montoya  ??? ? ? ?Lipid Screening on??04/20/20.??Satisfied by Venita Underwood  ??? ? ? ?Obesity Screening on??01/15/21.??Satisfied by Patricia Montoya  ??? ??Refused?  ??? ? ? ?Smoking Cessation on??09/15/20.??Recorded by Betsey Lockett??Reason: Patient Refuses  ?  Lab Results  Test Name Test Result Date/Time   Sodium Lvl 135 mmol/L (Low) 01/15/2021 07:19 CST   Potassium Lvl 4.7 mmol/L 01/15/2021 07:19 CST   Chloride 103 mmol/L 01/15/2021 07:19 CST   CO2 19 mmol/L (Low) 01/15/2021 07:19 CST   Calcium Lvl 10.1 mg/dL 01/15/2021 07:19 CST   Magnesium Lvl 1.75 mg/dL 01/15/2021 07:19 CST   Glucose Lvl 313 mg/dL (High) 01/15/2021 07:19 CST   BUN 20.0 mg/dL 01/15/2021 07:19 CST   Creatinine 0.82 mg/dL 01/15/2021 07:19 CST   eGFR-AA 90 01/15/2021 07:19 CST   eGFR-WILFRID 75 mL/min/1.73 m2 (Low) 01/15/2021 07:19 CST   Bili Total 0.4 mg/dL 01/15/2021 07:19 CST   Bili Direct 0.3 mg/dL 01/15/2021 07:19 CST   Bili Indirect 0.10 mg/dL 01/15/2021 07:19 CST   AST 37 unit/L (High) 01/15/2021 07:19 CST   ALT 82 unit/L  (High) 01/15/2021 07:19 CST   Alk Phos 298 unit/L (High) 01/15/2021 07:19 CST   Total Protein 7.7 gm/dL (High) 01/15/2021 07:19 CST   Albumin Lvl 2.9 gm/dL (Low) 01/15/2021 07:19 CST   Globulin 4.8 gm/dL (High) 01/15/2021 07:19 CST   A/G Ratio 0.6 ratio (Low) 01/15/2021 07:19 CST   WBC 4.0 x10(3)/mcL (Low) 01/15/2021 07:19 CST   RBC 3.15 x10(6)/mcL (Low) 01/15/2021 07:19 CST   Hgb 8.7 gm/dL (Low) 01/15/2021 07:19 CST   Hct 28.7 % (Low) 01/15/2021 07:19 CST   Platelet 323 x10(3)/mcL 01/15/2021 07:19 CST   MCV 91.1 fL 01/15/2021 07:19 CST   MCH 27.6 pg 01/15/2021 07:19 CST   MCHC 30.3 gm/dL (Low) 01/15/2021 07:19 CST   RDW 24.3 % (High) 01/15/2021 07:19 CST   MPV 8.6 fL 01/15/2021 07:19 CST   Abs Neut 3.87 x10(3)/mcL 01/15/2021 07:19 CST   Neutro Auto 96 % 01/15/2021 07:19 CST   Lymph Auto 2 % 01/15/2021 07:19 CST   Mono Auto 1 % 01/15/2021 07:19 CST   Abs Neutro 3.87 x10(3)/mcL 01/15/2021 07:19 CST   Abs Lymph 0.1 x10(3)/mcL (Low) 01/15/2021 07:19 CST   Abs Mono 0.0 x10(3)/mcL (Low) 01/15/2021 07:19 CST   IG% 1 % 01/15/2021 07:19 CST   IG# 0.030 01/15/2021 07:19 CST

## 2022-05-04 NOTE — HISTORICAL [facility] CERN[facility]
This is a historical note converted from [facility]. Formatting and pictures may have been removed.  Please reference [employer] for original formatting and attached multimedia. Chief Complaint  dysphagia  History of Present Illness  65yoF with hx metastatic lung cancer s/p chemoradiation who presents with difficulty swallowing. She has pain localized to her right throat with swallowing and reports a choking sensation. She also complains of acid reflux for which she takes TUMS with some relief. She has a chronic cough. She has never before had a EGD or a colonoscopy. She has a history of constipation. She denies diarrhea or hematochezia. She has been NPO since midnight, clear liquids only yesterday, and she has completed her bowel prep. She takes 81mg aspirin daily, which she last took yesterday.  Physical Exam  Gen - chronically ill appearing, resting in bed, nad  Neuro - alert and oriented  Head - atraumatic, normocephalic  Pulm - normal wob, no respiratory distress, + dry cough  Ext - moves all spontaneously  Skin - warm and dry  Assessment/Plan  65yoF with hx RUL lung cancer with brain mets presents with dysphagia and reflux symptoms s/p chemoradiation. Plan for EGD with possible dilation today, as well as colonoscopy.   Problem List/Past Medical History  Ongoing  Abnormal imaging  Alkaline phosphatase level  Brain metastasis  Cancer of upper lobe of right lung  Cancer related pain  Cholelithiasis  Chronic cough  COPD - Chronic obstructive pulmonary disease  Current smoker  Dilated cbd, acquired  Hepatomegaly  Iron deficiency anemia  Microcytic anemia  Obesity  PAD - Peripheral arterial disease  Thrombocytosis  Tobacco user  Historical  No qualifying data  Procedure/Surgical History  Transfusion, blood or blood components (11/30/2020)  Bronchoscopy, Ebus, 3 or More Samples (09/22/2020)  Bronchoscopy, rigid or flexible, including fluoroscopic guidance, when performed; with endobronchial ultrasound (EBUS) guided  transtracheal and/or transbronchial sampling (eg, aspiration[s]/biopsy[ies]), 3 or more mediastinal and/or hilar lymph node stati (09/22/2020)  Extraction of Thorax Lymphatic, Via Natural or Artificial Opening Endoscopic, Diagnostic (09/22/2020)  Transfusion of Nonautologous Red Blood Cells into Peripheral Vein, Percutaneous Approach (09/12/2020)  Transfusion, blood or blood components (09/12/2020)  Transfusion of Nonautologous Red Blood Cells into Peripheral Vein, Percutaneous Approach (09/11/2020)  Transfusion, blood or blood components (09/11/2020)  PICC placement (2020)  Drainage of Neck Skin, External Approach (12/21/2018)  Incision and drainage of abscess (eg, carbuncle, suppurative hidradenitis, cutaneous or subcutaneous abscess, cyst, furuncle, or paronychia); simple or single (12/21/2018)   Medications  Inpatient  Benadryl (for IVPB), 25 mg, IV Piggyback, Once-chemo  Benadryl (for IVPB), 25 mg, IV Piggyback, Once-chemo  Benadryl (for IVPB), 25 mg, IV Piggyback, Once-chemo  Benadryl (for IVPB), 25 mg, IV Piggyback, Once-chemo  Benadryl (for IVPB), 25 mg, IV Piggyback, Once-chemo  Benadryl (for IVPB), 25 mg, IV Piggyback, Once-chemo  buffered lidocaine 2% - 0.5 ml syringe, 10 mg= 0.5 mL, Subcutaneous, As Directed  Heparin Flush 100 U/mL - 5 mL, 500 units= 5 mL, IV Push, Once-chemo  Heparin Flush 100 U/mL - 5 mL, 500 units= 5 mL, IV Push, Once-chemo  IVF Lactated Ringers LR Infusion 1,000 mL, 1000 mL, IV  Lidocaine Viscous 2% mucous membrane solution, 15 mL/EA, N/A, Once  Home  acetaminophen-hydrocodone 325 mg-10 mg oral tablet, 1 tab(s), Oral, q4hr, PRN  acetaminophen-hydrocodone 325 mg-5 mg oral tablet, 1 tab(s), Oral, q6hr, PRN,? ?Not Taking, Completed Rx  amitriptyline 10 mg oral tablet, 10 mg= 1 tab(s), Oral, qPM,? ?Not Taking, Completed Rx  aspirin 81 mg oral Delayed Release (EC) tablet  benzonatate 100 mg oral capsule,? ?Not taking  clopidogrel 75 mg oral tablet, 75 mg= 1 tab(s), Oral, Daily,? ?Not  Taking, Completed Rx  Decadron 4 mg oral tablet, 4 mg= 1 tab(s), Oral, BID  DULoxetine 60 mg oral delayed release capsule, 60 mg= 1 cap(s), Oral, Daily,? ?Not Taking, Completed Rx  Fergon 240 mg (27 mg elemental iron) oral tablet, 240 mg= 1 tab(s), Oral, Daily, 3 refills  gabapentin 100 mg oral capsule, 100 mg= 1 cap(s), Oral, TID,? ?Not taking  gabapentin 300 mg oral capsule, 300 mg= 1 cap(s), Oral, TID  hydroCHLOROthiazide 12.5 mg oral capsule, 25 mg= 2 cap(s), Oral, Daily,? ?Not taking  K-Dur 20 oral tablet, extended release, 40 mEq= 2 tab(s), Oral, Daily,? ?Not taking  megestrol 40 mg/mL oral suspension, 800 mg= 20 mL, Oral, Daily, 1 refills  montelukast 10 mg oral TABLET, 10 mg= 1 tab(s), Oral, Daily,? ?Not taking  MoviPrep oral powder for reconstitution, 240 mL, Oral, Daily,? ?Investigating: will need for colonoscopy  pravastatin 40 mg oral tablet, 40 mg= 1 tab(s), Oral, Daily,? 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 Exercise frequency: Daily. Exercise type: Walking., 03/04/2020  Home/Environment  Lives with Children, Spouse, grandson. Living situation: Home/Independent. TV/Computer concerns: No. Single family house, 03/04/2020  Living situation: Home/Independent., 01/18/2018  Nutrition/Health  Regular, Low fat, Good, 03/04/2020  Sexual  Sexually active: Yes. Number of current partners 1. Sexual orientation: Straight or heterosexual. Gender Identity Identifies as female. No, 03/04/2020  Spiritual/Cultural  Amish, No, 03/04/2020  Substance Use  Never, 01/29/2021  Tobacco  4 or less cigarettes(less than 1/4 pack)/day in last 30  days, No, 01/29/2021  Family History  Heart disease: Sister and Brother.  Hypertension.: Sister and Brother.  Primary malignant neoplasm of bone: Mother.  Unknown cause of morbidity or mortality.....: Father.      Patient with metastatic lung cancer to brain s/p chemoradiation here for EGD and colonoscopy for SELMA and odynophagia.? Since starting radiation she has had difficulty with pain with swallowing, usually upper esophagus.? Mild dysphagia associated with pain.? This has limited her intake.? She only takes tums prn.? She reports a 10 lb weight loss.? She has been having constipation with treatment.? Sometimes has to take stool softeners with help.? No blood or melena in stool.? No prior colonoscopy. Not on plavix any longer.

## 2022-05-04 NOTE — HISTORICAL [facility] CERNER
This is a historical note converted from [place]. Formatting and pictures may have been removed.  Please reference [place] for original formatting and attached multimedia. History of Present Illness  Past medical history: Cholelithiasis.? Chronic cough.? COPD.? Tobacco abuse.? Hypertension.? Dyslipidemia.? Hepatomegaly.? Obesity.? Peripheral artery disease.  Social history: . ?Lives in [place] with her  and family. ?Has 12 children. ?Has been smoking?1 pack of cigarettes daily for 40 years.? No history of alcohol or illicit drug abuse.  Family history:?Negative for cancers.  Health maintenance:  Screening mammogram in 2019 in [place], apparently unremarkable.  Mostly colonoscopy ever.  Menstrual and OB/GYN history:?Menopause?in her 50s.  ?  ?   Reason for follow-up:  Poorly differentiated carcinoma of right lung, stage IV: Right upper lobe mass, mediastinal lymphadenopathy, brain metastasis  Brain metastasis  Iron deficiency anemia; FOBT positive  Thrombocytosis  ?  ?   History of present illness:  65-year-old female referred by Dr. Steve Simpson, with lung cancer.  ?  -11.4 cm right upper lobe mass, invading mediastinum (noncontrast chest CT)  -Large mass in 4R position (right lower paratracheal) (bronchoscopy and EBUS: 09/22/2020)  -EBUS, 4R, FNA: Poorly differentiated carcinoma (tumor of lung versus upper GI tract)  (EGD, colonoscopy:?02/10/2021: No malignancy; no biopsies collected)  -PET/CT (11/02/2020):?Right upper lung lobe mass has enlarged?9.9 x 9.8 x 14.0 cm,?previously, 8.1 x 7.4 x 11.4 cm;?  contiguous extension?of the mass into the mediastinum?versus right hilar enlarged lymph node?2.0 x 1.8 cm  -Brain MRI (11/06/2020):?Right occipital?5 x 4 mm?hemorrhagic metastasis without?edema  -11/06/2020: Referred to [facility] for SRS  >>  Tumor >7 cm, therefore, T4  Tumor invading mediastinum, therefore, T4  Ipsilateral mediastinal mass (??Lymphadenopathy), EBUS FNA positive,?therefore, N2  Solitary  brain metastasis, therefore,?M1b  >>cT4 cN2 M1b, stage RICKIE  -SRS to brain metastasis (2100 cGy; 1 fraction)?(12/03/2020)  -Carbo/Taxol weekly x6, concurrent with RT?(12/16/2020-01/29/2021)  -Radiotherapy right lung (12/16/2020-01/28/2021) (6000 cGy; 30 fractions; 43 elapsed days)  -04/07/2021: Restaging bone scan: No bone metastasis  -04/07/2021: Restaging CT C/A/P with contrast (comparison: PET/CT dated 11/02/2020): Mild interval decrease in size of the mass involving the upper lobe of right lung (8.7 x 7.6 cm, previously 10.5 x 9.8 cm); prominent precarinal lymph node also appears mildly reduced in size (7 mm, previously 9 mm); no new malignancy or metastatic disease in chest, abdomen, or pelvis  (Positive response to?chemoradiation therapy)  -Subsequently, carbo/Alimta/Keytruda x4?(04/29/2021-07/09/2021)  -No brain metastases on surveillance brain MRI (05/03/2021)  -Hospitalized 07/11/2021-07/22/2021:?[facility]: Postobstructive pneumonia, and acute hypoxemic?respiratory failure, septic shock, HUNG; successfully treated  -08/09/2021: Surveillance brain MRI with contrast (comparison: 05/03/2021):  -08/20/2021: Restaging CT C/A/P with contrast (comparison: 04/07/2021: Similar size of right upper lobe mass extending to the hilum with increased peripheral opacities and fluid peripheral to the mass, cannot exclude postobstructive infection (8-9 cm, similar to prior); small right pleural effusion; stable mediastinal lymph nodes (subcarinal, 8 mm)  -Hospitalized 08/18/2021-08/21/2021: Anemia, pneumonia; hemoglobin 6.1, PRBC x1 unit; Augmentin plus Levaquin for pneumonia; sinus tachycardia, requiring Ativan; remained afebrile; CT chest without contrast (08/19/2021) indicated postobstructive pneumonia and lepidic spread of tumor, no interval change from prior; CTA chest PE study showed no large central or segmental pulmonary thromboemboli; interval enlargement of known right upper lobe mass and progression of  surrounding irregular airspace consolidation and ipsilateral pleural effusion, etc.  -08/19/2021: CT chest without contrast: Size of the right upper lobe mass with associated consolidation is unchanged in size and extent from 08/10/2021; small right pleural effusion; postobstructive pneumonia versus lepidic spread of tumor, no change from prior  -08/20/2021: CTA chest PE protocol (comparison: 07/11/2021): No PE; interval enlargement of known right upper lobe mass and progression of surrounding irregular airspace consolidation and ipsilateral pleural effusion (large right upper lobe mass 9.4 x 12.6 x 7.6 cm); cholelithiasis, similar dilated appearance of common bile duct, without definitive visualization of distal obstructive etiology  -Alimta/Keytruda every 3 weeks maintenance started 09/09/2021  -11/11/2021: Surveillance brain MRI with and without contrast (comparison: 08/09/2021): No acute intracranial findings or evidence of metastasis  -11/18/2021: TSH and free T4 normal  ?  ?  Interval history:  10/22/2020:  Presents for?initial medical oncology consultation, accompanied by her daughter,Russel.  ?   12/09/2021:  -Hospitalized 08/18/2021-08/21/2021: Anemia, pneumonia; hemoglobin 6.1, PRBC x1 unit; Augmentin plus Levaquin for pneumonia; sinus tachycardia, requiring Ativan; remained afebrile; CT chest without contrast (08/19/2021) indicated postobstructive pneumonia and lepidic spread of tumor, no interval change from prior; CTA chest PE study showed no large central or segmental pulmonary thromboemboli; interval enlargement of known right upper lobe mass and progression of surrounding irregular airspace consolidation and ipsilateral pleural effusion, etc.  -08/19/2021: CT chest without contrast: Size of the right upper lobe mass with associated consolidation is unchanged in size and extent from 08/10/2021; small right pleural effusion; postobstructive pneumonia versus lepidic spread of tumor, no change from  prior  -08/20/2021: CTA chest PE protocol (comparison: 07/11/2021): No PE; interval enlargement of known right upper lobe mass and progression of surrounding irregular airspace consolidation and ipsilateral pleural effusion (large right upper lobe mass 9.4 x 12.6 x 7.6 cm); cholelithiasis, similar dilated appearance of common bile duct, without definitive visualization of distal obstructive etiology  -08/17/2021: Hemoglobin 6.1, , serum iron 16, TIBC 175, transferrin saturation 9%, ferritin >1675.56 (relative iron deficiency)  -No showed 08/26/2021  -Check liquid biopsy for PD-L1 and KRAS mutation  -Feraheme 510 mg IV x2 (09/09/2021, 09/16/2021)  -Alimta/Keytruda every 3 weeks maintenance started 09/09/2021  -11/11/2021: Surveillance brain MRI with and without contrast (comparison: 08/09/2021): No acute intracranial findings or evidence of metastasis  -11/18/2021: TSH and free T4 normal  -12/09/2021: Labs reviewed; potassium 5.3, sodium 135, alk phos 606 (chronically elevated but higher today); TSH and free T4 normal; hemoglobin 9.7, stable, platelets 623,000/mm? (chronically elevated; CBC otherwise unremarkable  Presents for a follow-up visit, accompanied by female family member. Overall, doing very well. Good appetite. Good energy. Occasional fatigue. No unusual headaches or focal neurological symptoms. Requesting refill of pain medications and coughs suppressant. Still has some residual pain in the right upper posterior chest wall secondary to large right upper lobe malignant tumor which has shrunk with treatment. No significant dyspnea. No fevers, chills, or hemoptysis. No abdominal pain, nausea, or vomiting.  ?  ?  Review of systems:  All systems reviewed, and found to be negative except for the symptoms detailed above.  ?  ?   Physical examination:  VITAL SIGNS:? Reviewed.? ?  GENERAL:? In no apparent distress.?  HEAD:? No signs of head trauma.  EYES:? Pupils are equal.? Extraocular motions  intact.?  EARS:? Hearing grossly intact.  MOUTH:? Oropharynx is normal.  NECK:? No adenopathy, no JVD.? ?  CHEST:? Chest with clear breath sounds bilaterally.? No wheezes, rales, or rhonchi.?  CARDIAC:? Regular rate and rhythm.? S1 and S2, without murmurs, gallops, or rubs.  VASCULAR:? No Edema.? Peripheral pulses normal and equal in all extremities.  ABDOMEN:? Soft, without detectable tenderness.? No sign of distention.? No? ?rebound or guarding, and no masses palpated.? ?Bowel Sounds normal.  MUSCULOSKELETAL:? Good range of motion of all major joints. Extremities without clubbing, cyanosis or edema.?  NEUROLOGIC EXAM:? Alert and oriented x 3.? No focal sensory or strength deficits.? ?Speech normal.? Follows commands.  PSYCHIATRIC:? Mood normal.  SKIN:? No rash or lesions.  10/22/2020:?Lungs clear to auscultation. ?No crepitations or rhonchi.? No supraclavicular or axillary lymphadenopathy palpable.? Heart sounds normal. ?Abdomen benign.? No swelling in legs.? Conjunctiva pale.  11/27/2020: In no acute discomfort.? Conjunctive pale.  03/23/2021:?Not examined; it was a telemedicine visit.  04/15/2021:?Looks well and healthy. ?Cheerful.? Bilateral rhonchi. ?No palpable lymphadenopathy.  ?  ?   Assessment:  #Poorly differentiated carcinoma of lung:  To summarize:  -Poorly differentiated carcinoma of lung, 14.0 cm right upper lobe mass invading mediastinum  -EBUS 09/20/2020  -cT4 cN2 M1b, stage RICKIE  -EGD/colonoscopy negative  -5 mm right occipital hemorrhagic metastasis 11/06/2020  -S/p SRS?to brain metastasis (2100 cGy; 1 fraction)?(12/03/2020)  -S/p chemoradiation therapy (12/2020-01/2021) for intrathoracic disease, with positive response,  -Followed by carbo/Alimta/Keytruda x4 for metastatic disease (solitary brain metastasis)?with stable disease;?questionable progression on CTs?08/2021)  -No brain metastases on surveillance brain MRI (11/11/2021)  ?  ?  #Sites of disease:  14 cm right upper lobe mass, invading  mediastinum;?mediastinal lymphadenopathy;?right occipital brain metastasis  ?  ?  #Molecular markers:  -BRAF mutation negative; ROS1 gene arrangement negative; ALK rearrangement negative; PD-L1 CPS <10 (CPS 5)?(erroneously, tested for?esophageal carcinoma); EGFR negative; MET exon 14 skipping mutation negative; NTRK gene fusion negative; RET rearrangement negative  -PD-L1 testing: QNS  ?  ?  #Hypercalcemia?(11/25/2020)  -11/25/2020: Calcium 10.3 (elevated for the very first time). ?BUN 21, elevated. ?Creatinine 0.79, normal. ?Albumin 2.4.  -11/25/2020:?Intact PTH level 32.9, normal. ?Ionized calcium level 5.4, within normal limits.  -11/25/2020:?IV fluids + Zometa 4 mg IV x1  ?  ?  #Iron deficiency anemia, FOBT positive  -09/11/2020: Hemoglobin 6.9.? Microcytosis.? Elevated RDW.? Low serum iron, low TIBC, low transferrin saturation, normal ferritin.? FOBT positive.? 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  ?  #Thrombocytosis since 09/11/2020 (072-101K)  -Could be reactive to iron deficiency anemia?versus metastatic lung cancer  -10/22/2020:?JAK2 mutation negative. ?BCR-ABL 1?fusion transcripts negative.? ESR, CRP elevated.? Iron deficiency anemia.  -Subsequently, resolved?(post?parenteral iron therapy;?post?chemoradiation therapy for lung cancer)  -08/17/2021: Hemoglobin 6.1, , serum iron 16, TIBC 175, transferrin saturation 9%, ferritin >1675.56 (relative iron deficiency)  ?  ?  #Cholelithiasis.? Chronically elevated alkaline  phosphatase.  -HIDA scan (04/18/2019): No gallbladder activity at 1 hour post isotope injection, compatible with cystic duct obstruction/acute cholecystitis  -02/07/2020: Limited abdominal ultrasound: Cholelithiasis, dilated CBD, hepatomegaly  (12/03/2020)  ?  ?  Plan:  Need restaging scans at this time  ?  -Potassium 5.3 today  -Not on potassium supplements  -We will prescribe Kayexalate 30 g p.o. x1 dose  -Recheck potassium level tomorrow  ?  -Alimta/Keytruda maintenance started 09/09/2021  >>>  -Continue Alimta/Keytruda maintenance every 3 weeks (Alimta indefinitely; pembrolizumab for 24 months)  -Check CBC and CMP every 3 weeks?before each cycle of chemotherapy  -Check TSH?every 6 weeks; next, mid January 22  -Restage with contrast-enhanced CT scans of C/A/P?in 3 months?(November)?(due now)  -Surveillance brain MRI scans deferred?to radiation oncology?(no metastasis on surveillance brain MRI?11/11/2021)  ?  Chemotherapy regimen:  1.? Day 1: Pembrolizumab 200 mg IV plus pemetrexed 500 mg/m? IV plus carboplatin AUC 5; repeat cycles every 3 weeks for 4 cycles; followed by:  2.? Day 1: Pembrolizumab 200 mg IV every 3 weeks for 24 months;  3.? Day 1: Pemetrexed 500 mg/m? IV every 3 weeks indefinitely  ?  Check liquid biopsy?for PD-L1 and KRAS mutation.  ?  -Iron deficiency anemia; FOBT positive;;?s/p Feraheme x2 (10/26/2020-11/03/2020)  -EGD and colonoscopy?(02/10/2021)?with a few ulcers in sigmoid colon, internal hemorrhoids, no bleeding,?radiation esophagitis?(no biopsies taken)  -08/17/2021: Hemoglobin 6.1, relative iron deficiency (serum iron 16, TIBC 175,?transferrin saturation 9%, ferritin?>1675.56), PRBC x1 unit as inpatient  -S/p Feraheme 510 mg IV x2 (09/09/2021, 09/16/2021)  >>>  -No improvement in hemoglobin  -Check reticulocyte count, serum iron, TIBC, ferritin,?B12 level, RBC folate, LDH,?haptoglobin  ?  -Thrombocytosis is?reactive to iron deficiency and underlying metastatic lung cancer  ?  Pain  medications and?cough suppressant?prescription filled today.  ?  Follow-up with me in 3 weeks, with scans and labs.  ?  Above discussed with her. ?All questions answered.  Discussed labs and scans and gave her copies of relevant reports.  She understands and agrees with this plan.  Physical Exam  Vitals & Measurements  T:?37.2? ?C (Oral)? HR:?96(Peripheral)? BP:?131/72? SpO2:?100%?  HT:?160.00?cm? WT:?82.600?kg? BMI:?32.27?   Problem List/Past Medical History  Ongoing  Abnormal imaging  Alkaline phosphatase level  Brain metastasis  Cancer of upper lobe of right lung  Cancer related pain  Cholelithiasis  Chronic cough  COPD - Chronic obstructive pulmonary disease  Current smoker  Dilated cbd, acquired  Hepatomegaly  Iron deficiency anemia  Microcytic anemia  Obesity  PAD - Peripheral arterial disease  Thrombocytosis  Tobacco user  Historical  No qualifying data  Procedure/Surgical History  Transfusion of Nonautologous Red Blood Cells into Peripheral Vein, Percutaneous Approach (08/18/2021)  Insertion of Infusion Device into Superior Vena Cava, Percutaneous Approach (07/12/2021)  Introduction of Vasopressor into Central Vein, Percutaneous Approach (07/12/2021)  Catheter Insertion Mediport (None) (05/12/2021)  Fluoroscopy of Superior Vena Cava using Low Osmolar Contrast, Guidance (05/12/2021)  Insertion of Infusion Device into Superior Vena Cava, Percutaneous Approach (05/12/2021)  Insertion of Totally Implantable Vascular Access Device into Chest Subcutaneous Tissue and Fascia, Open Approach (05/12/2021)  Insertion of tunneled centrally inserted central venous access device, with subcutaneous port; age 5 years or older (05/12/2021)  Colonoscopy (None) (02/10/2021)  Colonoscopy, flexible; diagnostic, including collection of specimen(s) by brushing or washing, when performed (separate procedure) (02/10/2021)  Esophagogastroduodenoscopy (02/10/2021)  Esophagogastroduodenoscopy, flexible, transoral; diagnostic, including  collection of specimen(s) by brushing or washing, when performed (separate procedure) (02/10/2021)  Inspection of Lower Intestinal Tract, Via Natural or Artificial Opening Endoscopic (02/10/2021)  Inspection of Upper Intestinal Tract, Via Natural or Artificial Opening Endoscopic (02/10/2021)  Transfusion, blood or blood components (11/30/2020)  Bronchoscopy, Ebus, 3 or More Samples (09/22/2020)  Bronchoscopy, rigid or flexible, including fluoroscopic guidance, when performed; with endobronchial ultrasound (EBUS) guided transtracheal and/or transbronchial sampling (eg, aspiration[s]/biopsy[ies]), 3 or more mediastinal and/or hilar lymph node stati (09/22/2020)  Extraction of Thorax Lymphatic, Via Natural or Artificial Opening Endoscopic, Diagnostic (09/22/2020)  Transfusion of Nonautologous Red Blood Cells into Peripheral Vein, Percutaneous Approach (09/12/2020)  Transfusion, blood or blood components (09/12/2020)  Transfusion of Nonautologous Red Blood Cells into Peripheral Vein, Percutaneous Approach (09/11/2020)  Transfusion, blood or blood components (09/11/2020)  PICC placement (2020)  Drainage of Neck Skin, External Approach (12/21/2018)  Incision and drainage of abscess (eg, carbuncle, suppurative hidradenitis, cutaneous or subcutaneous abscess, cyst, furuncle, or paronychia); simple or single (12/21/2018)   Medications  adenosine, 6 mg= 2 mL, IV Push, Once  alPRAzolam 0.25 mg oral tab, 0.25 mg= 1 tab(s), Oral, TID  aspirin 81 mg oral Delayed Release (EC) tablet  Benadryl (for IVPB), 25 mg, IV Piggyback, Once-chemo  Benadryl (for IVPB), 25 mg, IV Piggyback, Once-chemo  Benadryl (for IVPB), 25 mg, IV Piggyback, Once-chemo  Benadryl (for IVPB), 25 mg, IV Piggyback, Once-chemo  Benadryl (for IVPB), 25 mg, IV Piggyback, Once-chemo  Benadryl (for IVPB), 25 mg, IV Piggyback, Once-chemo  buPROPion 150 mg oral tablet, extended release, 150 mg= 1 tab(s), Oral, Daily,? ?Not taking  codeine-guaifenesin 10 mg-100 mg/5 mL  oral syrup, 10 mL, Oral, q4hr, PRN  codeine-promethazine 10 mg-6.25 mg/5 mL oral syrup, 5 mL, Oral, Once a day (at bedtime)  cyproheptadine 4 mg oral tablet, 4 mg= 1 tab(s), Oral, Daily,? ?Not taking  Decadron 4 mg oral tablet, 4 mg= 1 tab(s), Oral, BID  dexamethasone 4 mg oral tablet, 4 mg= 1 tab(s), Oral, BID, 12 refills  DuoNeb 0.5 mg-2.5 mg/3 mL inhalation solution, 3 mL, NEB, QID  Fergon 240 mg (27 mg elemental iron) oral tablet, 240 mg= 1 tab(s), Oral, Daily, 3 refills  folic acid 1 mg oral tablet, 1 mg= 1 tab(s), Oral, Daily, 12 refills  folic acid 1 mg oral tablet, 1 mg= 1 tab(s), Oral, Daily, 12 refills  gabapentin 300 mg oral capsule, 300 mg= 1 cap(s), Oral, TID  Heparin Flush 100 U/mL - 5 mL, 500 units= 5 mL, IV Push, Once-chemo  Heparin Flush 100 U/mL - 5 mL, 500 units= 5 mL, IV Push, Once-chemo  loratadine 10 mg oral capsule, 10 mg= 1 cap(s), Oral, Daily  megestrol 40 mg/mL oral suspension, 800 mg= 20 mL, Oral, Daily, 1 refills  metoprolol tartrate 25 mg oral tab, 25 mg= 1 tab(s), Oral, BID, 3 refills  metoprolol tartrate 50 mg oral tab, 50 mg= 1 tab(s), Oral, BID,? 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 Exercise frequency: Daily. Exercise type: Walking., 03/04/2020  Home/Environment  Lives with Children, Spouse, grandson. Living situation: Home/Independent. TV/Computer concerns: No. Single family house, 03/04/2020  Living situation: Home/Independent., 01/18/2018  Nutrition/Health  Regular, Low fat, Good, 03/04/2020  Sexual  Sexually active: Yes. Number of current partners 1. Sexual orientation: Straight or heterosexual. Gender Identity  Identifies as female. No, 03/04/2020  Spiritual/Cultural  [place], No, 03/04/2020  Substance Use  Never, 11/18/2021  Tobacco  Former smoker, quit more than 30 days ago, No, 11/18/2021  Family History  Heart disease: Sister and Brother.  Hypertension.: Sister and Brother.  Primary malignant neoplasm of bone: Mother.  Unknown cause of morbidity or mortality.....: Father.  Immunizations  Vaccine Date Status   influenza virus vaccine, inactivated 10/05/2011 Recorded

## 2022-05-04 NOTE — HISTORICAL [facility]ER
This is a historical note converted from [place]. Formatting and pictures may have been removed.  Please reference [place] for original formatting and attached multimedia. Admit and Discharge Dates  Admit Date: 09/11/2020  Discharge Date: 09/14/2020  Physicians  Attending Physician - ER, Physician  Admitting Physician - ER, Physician  Primary Care Physician - Miller [place], Alexandro HORVATH  Primary Care Physician - Haroon Cherry  Discharge Diagnosis  1.?Anemia?D64.9,?Anemia?D64.9  2.?Hypokalemia?E87.6  3.?Tobacco user?Z72.0  Anemia?[id number]  Surgical Procedures  No procedures recorded for this visit.  Immunizations  No immunizations recorded for this visit.  Hospital Course  64yo female admitted for anemia and low potassium. She was transfused with 2 units of PRBCs and replaced with K.? She is suppose to undergo lung biopsy at [facility].? She is doing well at this time.? She did have some blood in her stools but her H&H is steadily rising.? She can undergo further workup at [facility].? He is stable for discharge today.  Time Spent on discharge  D/C=34mins  Objective  Vitals & Measurements  T:?36.9? ?C (Oral)? TMIN:?36.4? ?C (Oral)? TMAX:?37.1? ?C (Oral)? HR:?94(Peripheral)? HR:?98(Monitored)? RR:?20? BP:?114/59? SpO2:?93%?  Physical Exam  General: ?Alert and oriented, No acute distress. ?  ??????? ? Appearance: Well nourished. ?  ??????? ? Behavior: Appropriate. ?  ??????? ? Skin: Normal for ethnicity. ?  ?????Eye: ?Pupils are equal, round and reactive to light, Extraocular movements are intact. ?  ?????HENT: ?Normocephalic, Normal hearing, Oral mucosa is moist, No pharyngeal erythema. ?  ?????Neck: ?Supple, Non-tender, No carotid bruit, No jugular venous distention, No lymphadenopathy, No thyromegaly. ?  ?????Respiratory: ?Lungs are clear to auscultation, Breath sounds are equal, No chest wall tenderness. ?  ?????Cardiovascular: ?Normal rate, Regular rhythm, No murmur, No gallop, Good pulses equal in all  extremities, Normal peripheral perfusion, No edema. ?  ?????Gastrointestinal: ?Soft, Non-tender, Non-distended, Normal bowel sounds, No organomegaly. ?  ?????Genitourinary: ?No costovertebral angle tenderness, No urethral discharge. ?  ?????Lymphatics: ?No lymphadenopathy neck, axilla, groin. ?  ?????Musculoskeletal: ?Normal range of motion, Normal strength, No tenderness, No swelling, Normal gait. ?  ?????Integumentary: ?Warm, Dry, Pink, Intact, No rash. ?  ?????Neurologic: ?Alert, Oriented, Normal sensory, Normal motor function, No focal deficits, Cranial Nerves II-XII are grossly intact. ?  ?????Psychiatric: ?Cooperative, Appropriate mood & affect, Normal judgment. ?  Patient Discharge Condition  stable  Discharge Disposition  home   Discharge Medication Reconciliation  Prescribed  DULoxetine (DULoxetine 60 mg oral delayed release capsule)?60 mg, Oral, Daily  acetaminophen-HYDROcodone (acetaminophen-hydrocodone 325 mg-5 mg oral tablet)?1 tab(s), Oral, q6hr, PRN pain  amitriptyline (amitriptyline 10 mg oral tablet)?10 mg, Oral, qPM  ferrous gluconate (Fergon 240 mg (27 mg elemental iron) oral tablet)?240 mg, Oral, Daily  montelukast (montelukast 10 mg oral TABLET)?10 mg, Oral, Daily  potassium chloride (K-Dur 20 oral tablet, extended release)?20 mEq, Oral, Daily  Continue  gabapentin (gabapentin 100 mg oral capsule)?100 mg, Oral, TID  hydrochlorothiazide (hydroCHLOROthiazide 12.5 mg oral capsule)?25 mg, Oral, Daily  Discontinue  DULoxetine (DULoxetine 60 mg oral delayed release capsule)?60 mg, Oral, Daily  acetaminophen-HYDROcodone (acetaminophen-hydrocodone 325 mg-5 mg oral tablet)?1 tab(s), Oral, BID  albuterol (albuterol 0.083% inhalation solution)?2.5 mg, NEB, q6hr  albuterol (albuterol CFC free 90 mcg/inh inhalation aerosol with adapter)?2 puff(s), INH, q6hr  amitriptyline (amitriptyline 10 mg oral tablet)?10 mg, Oral, qPM  clopidogrel (Plavix 75 mg oral tablet)?75 mg, Oral, Daily  diclofenac (diclofenac  sodium 50 mg oral delayed release tablet)?50 mg, Oral, BID  diclofenac (diclofenac sodium 75 mg oral delayed release tablet)?75 mg, Oral, BID  fluticasone nasal (fluticasone 50 mcg/inh nasal spray)?1 spray(s), Nasal, Daily  gabapentin (gabapentin 300 mg oral capsule)?300 mg, Oral, TID  hydrochlorothiazide (hydrochlorothiazide 25 mg oral tablet)?25 mg, Oral, Daily  ipratropium (Atrovent HFA 17 mcg/inh inhalation aerosol)?2 puff(s), INH, QID  loratadine (loratadine 10 mg oral tablet)?10 mg, Oral, Daily  methocarbamol (methocarbamol 750 mg oral tablet)?750 mg, Oral, TID  montelukast (montelukast 10 mg oral TABLET)?10 mg, Oral, Daily  pravastatin (pravastatin 40 mg oral tablet)?40 mg, Oral, Daily  Education and Orders Provided  Chronic Obstructive Pulmonary Disease, Easy-to-Read  Chronic Obstructive Pulmonary Disease, Easy-to-Read  Discharge - 09/14/20 14:02:00 CDT, Home?  Discharge Diet - Cardiac?  Discharge Activity - Activity as Tolerated?  Follow up  pcp in 1-2 weeks  Car Seat Challenge  No Qualifying Data

## 2022-05-04 NOTE — HISTORICAL [facility] CERNER
This is a historical note converted from [employer]. Formatting and pictures may have been removed.  Please reference [employer] for original formatting and attached multimedia. 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 Diagnosed 9/22/2020. Brain mets diagnosed 11/6/2020.  ?   Current Treatment:  Day 1: Pembrolizumab 200 mg IV plus pemetrexed 500 mg/m? IV plus carboplatin AUC 5; repeat cycles every 3 weeks for 4 cycles; followed by:  2.? Day 1: Pembrolizumab 200 mg IV every 3 weeks for 24 months;  3.? Day 1: Pemetrexed 500 mg/m? IV every 3 weeks indefinitely  Started 4/29/2021  ?   Cycle #3 due 6/09/2021  ?   Dose modifications & interruptions:  ?  ?   Treatment History:  SRS to brain metastasis on 12/3/2020.  Carboplatin/Taxol weekly along with RT  XRT started 12/16/2020  Started 12/16/2020, completed 1/29/2021  ?  ?   Past medical history: Cholelithiasis.? Chronic cough.? COPD.? Tobacco abuse.? Hypertension.? Dyslipidemia.? Hepatomegaly.? Obesity.? Peripheral artery disease.  Social history: . ?Lives in [place] with her  and family. ?Has 12 children. ?Has been smoking?1 pack of cigarettes daily for 40 years.? No history of alcohol or illicit drug abuse.  Family history:?Negative for cancers.  Health maintenance:  Screening mammogram in 2019 in [place], apparently unremarkable.  Mostly colonoscopy ever.  Menstrual and OB/GYN history:?Menopause?in her 50s.  ?  History of present illness:  65-year-old female referred by Dr. Steve Simpson, with lung cancer.  ?  For a full, detailed history, please see Dr. Ulrich note dated 11/27/2020.  ?  Interval History  5/20/2021:  Presents today?alone for follow up visit. Patient currently receiving Carbo/Alimta/Keytruda. Patient reports tolerating well. She states?that she is just getting over a head cold, but still has occasional headaches ?Patient  also reports a painful pimple on forehead that does not want to heal. Instructed patient to follow up with PCP to further assess. Patient Amenable to this plan. Denies HA, fever, night sweat, SOB, CP, edema, neuropathy. Denies N/V/C/D, abdominal pain, change in bowel/bladder habits, blood in the urine/stool. Good appetite, stable weight, denies unintentional weight loss/gain. Labs reviewed with patient,?stable for treatment.?Reviewed patient medications. No refills requested. Denies any further questions or concerns.  Review of Systems  A complete 12-point ROS was performed in full with pertinent positives as described in interval history. Remainder of ROS done in full and are negative.  ?  Physical Exam  Vitals & Measurements  T:?37.0? ?C (Oral)? HR:?106(Peripheral)? RR:?18? BP:?121/70?  HT:?163.00?cm? WT:?88.600?kg? BMI:?33.35?  Physical Exam:  General: Alert and oriented, No acute distress.?  Appearance: Well-groomed, wearing mask  HEENT: Normocephalic, Oral mucosa is moist. Pupils are equal, round and reactive to light, Extraocular movements are intact, Normal conjunctiva.?  Neck: Supple, Non-tender, No lymphadenopathy, No thyromegaly.?  Respiratory: Lungs are clear to auscultation, Respirations are non-labored, Breath sounds are equal, Symmetrical chest wall expansion.?  Cardiovascular: Normal rate, Regular rhythm, No edema.?  Breast: Breast exam not performed on todays visit.?  Gastrointestinal: Rounded, Soft, Non-tender, Non-distended, Normal bowel sounds.?  Musculoskeletal: Normal strength. Ambulates without assistance  Integumentary: Warm, dry, intact.?  Neurologic: Alert, Oriented, No focal deficits, Cranial Nerves II-XII are grossly intact.?  Cognition and Speech: Oriented, Speech clear and coherent.?  Psychiatric: Cooperative, Appropriate mood & affect.?  ECOG Performance Scale: 2 - Capable of all self-care but unable to carry out any work activities. Up and about greater than 50 percent of waking  hours.?  ?  ?  Assessment/Plan  1.?Cancer of upper lobe of right lung?C34.11  #Poorly differentiated carcinoma of lung  -11.4 cm right upper lobe mass, invading mediastinum (noncontrast chest CT)  -Large mass in 4R position (right lower paratracheal) (bronchoscopy and EBUS: 09/22/2020)  -EBUS, 4R, FNA: Poorly differentiated carcinoma (tumor of lung versus upper GI tract)  (EGD, colonoscopy:?02/10/2021: No malignancy; no biopsies collected)  -PET/CT (11/02/2020):?Right upper lung lobe mass has enlarged?9.9 x 9.8 x 14.0 cm,?previously, 8.1 x 7.4 x 11.4 cm;?  contiguous extension?of the mass into the mediastinum?versus right hilar enlarged lymph node?2.0 x 1.8 cm  -Brain MRI (11/06/2020):?Right occipital?5 x 4 mm?hemorrhagic metastasis without?edema  -11/06/2020: Referred to [facility] for SRS  -->  Tumor >7 cm, therefore, T4  Tumor invading mediastinum, therefore, T4  Ipsilateral mediastinal mass (??Lymphadenopathy), EBUS FNA positive,?therefore, N2  Solitary brain metastasis, therefore,?M1b  -->cT4 cN2 M1b, stage RICKIE  -SRS to brain metastasis (2100 cGy; 1 fraction)?(12/03/2020)  -Carbo/Taxol weekly x6, concurrent with RT?(12/16/2020-01/29/2021)  -Radiotherapy right lung (12/16/2020-01/28/2021) (6000 cGy; 30 fractions; 43 elapsed days)  -04/07/2021: Restaging bone scan: No bone metastasis  -04/07/2021: Restaging CT C/A/P with contrast (comparison: PET/CT dated 11/02/2020): Mild interval decrease in size of the mass involving the upper lobe of right lung (8.7 x 7.6 cm, previously 10.5 x 9.8 cm); prominent precarinal lymph node also appears mildly reduced in size (7 mm, previously 9 mm); no new malignancy or metastatic disease in chest, abdomen, or pelvis  (Positive response to?chemoradiation therapy)  ?   #Sites of disease:  11.4 cm right upper lobe mass, invading mediastinum;?mediastinal lymphadenopathy;?right occipital brain metastasis  ?   #Molecular markers:  -BRAF mutation negative; ROS1 gene arrangement negative;  ALK rearrangement negative; PD-L1 CPS <10 (CPS 5)?(erroneously, tested for?esophageal carcinoma); EGFR negative; MET exon 14 skipping mutation negative; NTRK gene fusion negative; RET rearrangement negative  -PD-L1 testing: QNS  ?   #Hypercalcemia?(11/25/2020)  -11/25/2020: Calcium 10.3 (elevated for the very first time). ?BUN 21, elevated. ?Creatinine 0.79, normal. ?Albumin 2.4.  -11/25/2020:?Intact PTH level 32.9, normal. ?Ionized calcium level 5.4, within normal limits.  -11/25/2020:?IV fluids + Zometa 4 mg IV x1  ?   #Iron deficiency anemia, FOBT positive  -09/11/2020: Hemoglobin 6.9.? Microcytosis.? Elevated RDW.? Low serum iron, low TIBC, low transferrin saturation, normal ferritin.? FOBT positive.? 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   #Thrombocytosis since 09/11/2020 ([id number])  -Could be reactive to iron deficiency anemia?versus metastatic lung cancer  -10/22/2020:?JAK2 mutation negative. ?BCR-ABL 1?fusion transcripts negative.? ESR, CRP elevated.? Iron deficiency anemia.  -Subsequently, resolved?(post?parenteral iron therapy;?post?chemoradiation therapy for lung cancer)  ?   #Cholelithiasis.? Chronically elevated alkaline phosphatase.  -HIDA scan (04/18/2019): No gallbladder activity at 1 hour post isotope injection, compatible with cystic duct obstruction/acute cholecystitis  -02/07/2020: Limited abdominal ultrasound: Cholelithiasis, dilated CBD, hepatomegaly  ?   Plan:  -Solitary brain metastasis, s/p  SRS?(12/03/2020)  -Subsequently,?definitive CRT concurrent chemoradiation therapy to cT4 cN2 disease?(12/16/2020-01/29/2021)  -Positive response to chemoradiation therapy?on restaging scans  ?  -Now, proceed with systemic chemotherapy?for stage IV disease  -Molecular markers ( mutations) negative  -PD-L1 testing: QNS  ?  -Proceed with systemic therapy now  -In general, 4 cycles of initial systemic therapy (with carboplatin and cisplatin) are recommended prior to maintenance therapy  -However, if patient is tolerating therapy well, consideration can be given to continue to 6 cycles  -Monitoring: Response assessment after 2 cycles, then every 2-4 cycles with CTs  ?  5/20/2021  -Okay to proceed with Cycle 2 Carbo/Alimta/Keytruda today  -f/u in 3 weeks for Cycle 3 CBC,CMP W/NP  -Will restage with contrast-enhanced CT scans of C/A/P?2 months after starting chemotherapy. 7/2021-ORDERED TODAY  -Check baseline TSH and free T4, and monitor every 6 weeks (monitoring for the possibility of immune thyroiditis with immunotherapy).  ?  -Check CBC and CMP every 10 days  -Restage with contrast-enhanced CT scans of C/A/P 2 months after initiation of chemotherapy  ?  -Brain MRI for surveillance of brain metastasis, deferred to radiation oncology  ?  -EGD Band colonoscopy?(02/10/2021)?with a few ulcers in sigmoid colon, internal hemorrhoids, no bleeding,?radiation esophagitis?(no biopsies taken)  ?  -Iron deficiency anemia; FOBT positive  -Feraheme 510 mg IV x2 (10/26/2020-11/03/2020)?  ?  -Right upper back constant pain, 10/10, secondary to?large right upper lobe tumor  -Required narcotics  -04/15/2021:?Chest pain has completely resolved?and she does not need to take narcotics anymore; severe cough is also resolved  (Excellent symptom?response to chemoradiation therapy)  ?  -Thrombocytosis is?reactive to iron deficiency and underlying metastatic lung cancer  -Subsequently, resolved  ?  Start chemotherapy as soon as possible  after formal chemotherapy teaching with nursing staff  ?  Patient tolerating chemotherapy with mild side effects.  Follow up in 2 weeks (5/20/2021) with CBC, CMP prior to cycle #2.  Ordered:  ?   Problem List/Past Medical History  Ongoing  Abnormal imaging  Alkaline phosphatase level  Brain metastasis  Cancer of upper lobe of right lung  Cancer related pain  Cholelithiasis  Chronic cough  COPD - Chronic obstructive pulmonary disease  Current smoker  Dilated cbd, acquired  Hepatomegaly  Iron deficiency anemia  Microcytic anemia  Obesity  PAD - Peripheral arterial disease  Thrombocytosis  Tobacco user  Historical  No qualifying data  Procedure/Surgical History  Catheter Insertion Mediport (None) (05/12/2021)  Fluoroscopy of Superior Vena Cava using Low Osmolar Contrast, Guidance (05/12/2021)  Insertion of Infusion Device into Superior Vena Cava, Percutaneous Approach (05/12/2021)  Insertion of Totally Implantable Vascular Access Device into Chest Subcutaneous Tissue and Fascia, Open Approach (05/12/2021)  Insertion of tunneled centrally inserted central venous access device, with subcutaneous port; age 5 years or older (05/12/2021)  Colonoscopy (None) (02/10/2021)  Colonoscopy, flexible; diagnostic, including collection of specimen(s) by brushing or washing, when performed (separate procedure) (02/10/2021)  Esophagogastroduodenoscopy (02/10/2021)  Esophagogastroduodenoscopy, flexible, transoral; diagnostic, including collection of specimen(s) by brushing or washing, when performed (separate procedure) (02/10/2021)  Inspection of Lower Intestinal Tract, Via Natural or Artificial Opening Endoscopic (02/10/2021)  Inspection of Upper Intestinal Tract, Via Natural or Artificial Opening Endoscopic (02/10/2021)  Transfusion, blood or blood components (11/30/2020)  Bronchoscopy, Ebus, 3 or More Samples (09/22/2020)  Bronchoscopy, rigid or flexible, including fluoroscopic guidance, when performed; with endobronchial ultrasound  (EBUS) guided transtracheal and/or transbronchial sampling (eg, aspiration[s]/biopsy[ies]), 3 or more mediastinal and/or hilar lymph node stati (09/22/2020)  Extraction of Thorax Lymphatic, Via Natural or Artificial Opening Endoscopic, Diagnostic (09/22/2020)  Transfusion of Nonautologous Red Blood Cells into Peripheral Vein, Percutaneous Approach (09/12/2020)  Transfusion, blood or blood components (09/12/2020)  Transfusion of Nonautologous Red Blood Cells into Peripheral Vein, Percutaneous Approach (09/11/2020)  Transfusion, blood or blood components (09/11/2020)  PICC placement (2020)  Drainage of Neck Skin, External Approach (12/21/2018)  Incision and drainage of abscess (eg, carbuncle, suppurative hidradenitis, cutaneous or subcutaneous abscess, cyst, furuncle, or paronychia); simple or single (12/21/2018)   Medications  albuterol 0.083% inhalation solution, 2.5 mg= 3 mL, NEB, q6hr  albuterol CFC free 90 mcg/inh inhalation aerosol with adapter, 2 puff(s), INH, q6hr  Alimta (for IVPB)  aspirin 81 mg oral Delayed Release (EC) tablet  Benadryl (for IVPB), 25 mg, IV Piggyback, Once-chemo  Benadryl (for IVPB), 25 mg, IV Piggyback, Once-chemo  Benadryl (for IVPB), 25 mg, IV Piggyback, Once-chemo  Benadryl (for IVPB), 25 mg, IV Piggyback, Once-chemo  Benadryl (for IVPB), 25 mg, IV Piggyback, Once-chemo  Benadryl (for IVPB), 25 mg, IV Piggyback, Once-chemo  Benadryl 50mg/ml Inj, 25 mg= 0.5 mL, IV Push, Once-chemo  carboplatin (for IVPB)  Cinvanti, 130 mg= 18 mL, IV Push, Once  cyanocobalamin, 1000 mcg, IM, Once-chemo  cyproheptadine 4 mg oral tablet, 4 mg= 1 tab(s), Oral, qPM  Decadron 4 mg oral tablet, 4 mg= 1 tab(s), Oral, BID  dexamethasone (for IVPB), 10 mg, IV Piggyback, Once-chemo  Fergon 240 mg (27 mg elemental iron) oral tablet, 240 mg= 1 tab(s), Oral, Daily, 3 refills  fluticasone 50 mcg/inh nasal spray, 1 spray(s), Daily  folic acid 1 mg oral tablet, 1 mg= 1 tab(s), Oral, Daily, 12 refills  folic acid 1 mg  oral tablet, 1 mg= 1 tab(s), Oral, Daily, 12 refills  gabapentin 300 mg oral capsule, 300 mg= 1 cap(s), Oral, TID  Heparin Flush 100 U/mL - 5 mL, 500 units= 5 mL, IV Push, Once-chemo  Heparin Flush 100 U/mL - 5 mL, 500 units= 5 mL, IV Push, Once-chemo  Heparin Flush 100 U/mL - 5 mL, 500 units= 5 mL, IV Push, Once-chemo  loratadine 10 mg oral tablet, 10 mg= 1 tab(s), Oral, Daily  megestrol 40 mg/mL oral suspension, 800 mg= 20 mL, Oral, Daily, 1 refills  montelukast 10 mg oral TABLET, 10 mg= 1 tab(s), Oral, Daily  Norco 10 mg-325 mg oral tablet, 1 tab(s), Oral, q6hr, PRN,? 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 Exercise frequency: Daily. Exercise type: Walking., 03/04/2020  Home/Environment  Lives with Children, Spouse, grandson. Living situation: Home/Independent. TV/Computer concerns: No. Single family house, 03/04/2020  Living situation: Home/Independent., 01/18/2018  Nutrition/Health  Regular, Low fat, Good, 03/04/2020  Sexual  Sexually active: Yes. Number of current partners 1. Sexual orientation: Straight or heterosexual. Gender Identity Identifies as female. No, 03/04/2020  Spiritual/Cultural  Rastafari, No, 03/04/2020  Substance Use  Never, 05/06/2021  Tobacco  4 or less cigarettes(less than 1/4 pack)/day in last 30 days, No, 05/12/2021  Family History  Heart disease: Sister and Brother.  Hypertension.: Sister and Brother.  Primary malignant neoplasm of bone: Mother.  Unknown cause of morbidity or  mortality.....: Father.  Health Maintenance  Health Maintenance  ???Pending?(in the next year)  ??? ??OverDue  ??? ? ? ?Influenza Vaccine due??10/01/20??and every 1??day(s)  ??? ? ? ?Alcohol Misuse Screening due??01/02/21??and every 1??year(s)  ??? ??Due?  ??? ? ? ?Breast Cancer Screening due??05/20/21??Unknown Frequency  ??? ? ? ?COPD Maintenance-Pulmonary Rehab Education due??05/20/21??and every 1??year(s)  ??? ? ? ?Cervical Cancer Screening due??05/20/21??Unknown Frequency  ??? ? ? ?Lung Cancer Screening due??05/20/21??and every 1??year(s)  ??? ? ? ?Tetanus Vaccine due??05/20/21??and every 10??year(s)  ??? ? ? ?Zoster Vaccine due??05/20/21??Unknown Frequency  ??? ??Refused?  ??? ? ? ?Smoking Cessation due??01/01/21??and every 1??year(s)  ??? ??Due In Future?  ??? ? ? ?ADL Screening not due until??09/15/21??and every 1??year(s)  ??? ? ? ?Aspirin Therapy for CVD Prevention not due until??10/22/21??and every 1??year(s)  ??? ? ? ?Obesity Screening not due until??01/01/22??and every 1??year(s)  ??? ? ? ?COPD Management-COPD Medications Prescribed not due until??04/27/22??and every 1??year(s)  ??? ? ? ?COPD Management-Oxygen Assessment not due until??05/12/22??and every 1??year(s)  ???Satisfied?(in the past 1 year)  ??? ??Satisfied?  ??? ? ? ?ADL Screening on??09/15/20.??Satisfied by Betsey Lockett  ??? ? ? ?Aspirin Therapy for CVD Prevention on??10/22/20.  ??? ? ? ?Blood Pressure Screening on??05/20/21.??Satisfied by Zelda Jesus LPN  ??? ? ? ?Body Mass Index Check on??05/20/21.??Satisfied by Zelda Jesus LPN  ??? ? ? ?COPD Management-Oxygen Assessment on??05/12/21.??Satisfied by Dee Salter RN  ??? ? ? ?COPD Management-COPD Medications Prescribed on??04/27/21.  ??? ? ? ?Colorectal Screening on??02/10/21.  ??? ? ? ?Coronary Artery Disease Maintenance-Antiplatelet Agent Prescribed on??10/22/20.  ??? ? ? ?Depression Screening on??05/20/21.??Satisfied by Zelda Jesus LPN  ??? ? ?  ?Diabetes Screening on??05/20/21.??Satisfied by George Albert Jr.  ??? ? ? ?Hypertension Management-Blood Pressure on??05/20/21.??Satisfied by Zelda Jesus LPN  ??? ? ? ?Hypertension Management-BMP on??05/20/21.??Satisfied by George Albert Jr.  ??? ? ? ?Influenza Vaccine on??03/23/21.??Satisfied by Patricia Montoya  ??? ? ? ?Obesity Screening on??05/20/21.??Satisfied by Zelda Jesus LPN  ??? ??Refused?  ??? ? ? ?Smoking Cessation on??09/15/20.??Recorded by Betsey Lockett??Reason: Patient Refuses  ?  Lab Results  Test Name Test Result Date/Time   Sodium Lvl 141 mmol/L 05/20/2021 08:21 CDT   Potassium Lvl 4.4 mmol/L 05/20/2021 08:21 CDT   Chloride 107 mmol/L 05/20/2021 08:21 CDT   CO2 26 mmol/L 05/20/2021 08:21 CDT   Calcium Lvl 9.8 mg/dL 05/20/2021 08:21 CDT   Glucose Lvl 99 mg/dL 05/20/2021 08:21 CDT   BUN 12.6 mg/dL 05/20/2021 08:21 CDT   Creatinine 0.77 mg/dL 05/20/2021 08:21 CDT   eGFR-AA 97 05/20/2021 08:21 CDT   eGFR-WILFRID 80 mL/min/1.73 m2 (Low) 05/20/2021 08:21 CDT   Bili Total 0.4 mg/dL 05/20/2021 08:21 CDT   Bili Direct 0.3 mg/dL 05/20/2021 08:21 CDT   Bili Indirect 0.10 mg/dL 05/20/2021 08:21 CDT   AST 24 unit/L 05/20/2021 08:21 CDT   ALT 55 unit/L 05/20/2021 08:21 CDT   Alk Phos 217 unit/L (High) 05/20/2021 08:21 CDT   Total Protein 7.7 gm/dL (High) 05/20/2021 08:21 CDT   Albumin Lvl 3.5 gm/dL 05/20/2021 08:21 CDT   Globulin 4.2 gm/dL (High) 05/20/2021 08:21 CDT   A/G Ratio 0.8 ratio (Low) 05/20/2021 08:21 CDT   WBC 4.2 x10(3)/mcL (Low) 05/20/2021 08:21 CDT   RBC 3.28 x10(6)/mcL (Low) 05/20/2021 08:21 CDT   Hgb 10.6 gm/dL (Low) 05/20/2021 08:21 CDT   Hct 34.6 % (Low) 05/20/2021 08:21 CDT   Platelet 241 x10(3)/mcL 05/20/2021 08:21 CDT   .5 fL (High) 05/20/2021 08:21 CDT   MCH 32.3 pg 05/20/2021 08:21 CDT   MCHC 30.6 gm/dL (Low) 05/20/2021 08:21 CDT   RDW 15.0 % (High) 05/20/2021 08:21 CDT   MPV 8.6 fL 05/20/2021 08:21 CDT   Abs Neut 2.08 x10(3)/mcL (Low) 05/20/2021  08:21 CDT   Neutro Auto 49 % 05/20/2021 08:21 CDT   Lymph Auto 20 % 05/20/2021 08:21 CDT   Mono Auto 29 % 05/20/2021 08:21 CDT   Eos Auto 1 % 05/20/2021 08:21 CDT   Abs Eos 0.0 x10(3)/mcL 05/20/2021 08:21 CDT   Basophil Auto 0 % 05/20/2021 08:21 CDT   Abs Neutro 2.08 x10(3)/mcL (Low) 05/20/2021 08:21 CDT   Abs Lymph 0.8 x10(3)/mcL 05/20/2021 08:21 CDT   Abs Mono 1.2 x10(3)/mcL 05/20/2021 08:21 CDT   Abs Baso 0.0 x10(3)/mcL 05/20/2021 08:21 CDT   IG% 1 % 05/20/2021 08:21 CDT   IG# 0.050 05/20/2021 08:21 CDT

## 2022-05-04 NOTE — HISTORICAL [facility] CERNER
This is a historical note converted from [employer]. Formatting and pictures may have been removed.  Please reference [employer] for original formatting and attached multimedia. Indication for Surgery  65-year-old female with stage Christiane squamous?cell carcinoma of the lung presents?for Mediport placement.  Preoperative Diagnosis  Stage Christiane squamous cell carcinoma of the lung  Postoperative Diagnosis  Same  Operation  L IJ Mediport placement  Surgeon(s)  Dr. Martin  ?  Resident(s)  Sarthak Henderson MD PGY-3  Leti Zaragoza MD PGY-1  Anesthesia  MAC  Estimated Blood Loss  5 mL  Findings  Properly placed catheter tip at the cavo-atrial junction visualized with fluoroscopy  Specimen(s)  None  Complications  None  Technique  After appropriate consents were obtained, the patient was taken to the operating room and placed in supine position and placed under MAC. Patients bilateral chest and neck was prepped and draped in sterile fashion. A proper timeout was performed confirming correct patient, site, procedure, and administration of preoperative antibiotic and anticoagulation. A large bore needle was used to penetrate the skin and venous access?of the left internal jugular vein?was obtained under ultrasound guidance. ?A guidewire was inserted into the needle and confirmed to be in the superior vena cava by fluoroscopy. Next a 3 cm incision was made on the upper chest and deepened with electrocautery. ?A 2 x 3 cm port pocket was created in the subcutaneous space overlying the pectoralis fascia inferior to the incision.?The catheter was attached to the port with a locking washer. The catheter was then tunneled through the subcutaneous fat to exit the skin at the wire. ?A dilator and sheath were inserted over the guidewire into the central venous system under fluoroscopic guidance. The guidewire and dilator were removed from the sheath. The catheter was cut to the correct length using fluoroscopy for measurement. The catheter was  inserted into the sheath and then the sheath was torn away. The catheter was pulled back such that the tip was at the atriocaval junction. The the port was fixed to the pectoralis fascia with two 2-0?Prolene?stitches. A Jenkins needle was used to aspirate then flush the port with heparinized saline. ?The incision was then reapproximated with 3-0 Vicryl, 4-0 Monocryl, and Dermabond.?The patient tolerated the procedure without complications.?The needle and sponge count were accurate. The patient was transferred to recovery room in stable condition. Dr. Martin?was present and scrubbed for the entirety of the operation.?  ?  Leti Zaragoza MD  [facility] General Surgery, PGY-1  ?  This certifies I was present during the entire period between opening and closing of the surgical field.  ?  Americo Martin MD

## 2022-05-04 NOTE — HISTORICAL [facility]
This is a historical note converted from [employer]. Formatting and pictures may have been removed.  Please reference [employer] for original formatting and attached multimedia. Admit and Discharge Dates  Admit Date: 08/25/2021  Discharge Date: 08/26/2021  Physicians  Attending Physician - Efren COLORADO, Enrrique FAUSTIN  Admitting Physician - Efren [place], Enrrique FAUSTIN  Consulting Physician - Navid [place], Duncan MACIAS  Primary Care Physician - Haroon Cherry  Primary Care Physician - Alexandro Bhatt MD  Discharge Diagnosis  1.?SVT (supraventricular tachycardia)?I47.1  2.?Anemia?D64.9  3.?Non-small cell lung cancer?C34.90  4.?COPD - Chronic obstructive pulmonary disease?J44.9  Mass of upper lobe of right lung?R91.8  Mediastinal mass?J98.59  Tachycardia?[id number]  Surgical Procedures  No procedures recorded for this visit.  Immunizations  No immunizations recorded for this visit.  Hospital Course  65yo female admitted for tachycardia and anemia.? She kemp shave lung cancer as well.? hemoglobin was 7.3 and her HR was in the 170s.? She was given 12mg of Adenosine.? Tele-cardiology was consulted.? She was started on metoprolol and transfused 2 units of PRBCs.? She was stable the following days and discharged home in stable condition.  Time Spent on discharge  D/C=33mins  Objective  Physical Exam  General: ?Alert and oriented, No acute distress. ?  ??????? ? Appearance: Well nourished. ?  ??????? ? Behavior: Appropriate. ?  ??????? ? Skin: Normal for ethnicity. ?  ?????Eye: ?Pupils are equal, round and reactive to light, Extraocular movements are intact. ?  ?????HENT: ?Normocephalic, Normal hearing, Oral mucosa is moist, No pharyngeal erythema. ?  ?????Neck: ?Supple, Non-tender, No carotid bruit, No jugular venous distention, No lymphadenopathy, No thyromegaly. ?  ?????Respiratory: ?Lungs are clear to auscultation, Breath sounds are equal, No chest wall tenderness. ?  ?????Cardiovascular: ?Normal rate, Regular  rhythm, No murmur, No gallop, Good pulses equal in all extremities, Normal peripheral perfusion, No edema. ?  ?????Gastrointestinal: ?Soft, Non-tender, Non-distended, Normal bowel sounds, No organomegaly. ?  ?????Genitourinary: ?No costovertebral angle tenderness, No urethral discharge. ?  ?????Lymphatics: ?No lymphadenopathy neck, axilla, groin. ?  ?????Musculoskeletal: ?Normal range of motion, Normal strength, No tenderness, No swelling, Normal gait. ?  ?????Integumentary: ?Warm, Dry, Pink, Intact, No rash. ?  ?????Neurologic: ?Alert, Oriented, Normal sensory, Normal motor function, No focal deficits, Cranial Nerves II-XII are grossly intact. ?  ?????Psychiatric: ?Cooperative, Appropriate mood & affect, Normal judgment. ?  Patient Discharge Condition  stable  Discharge Disposition  home   Discharge Medication Reconciliation  Prescribed  metoprolol (metoprolol tartrate 25 mg oral tab)?25 mg, Oral, BID  Continue  acetaminophen-HYDROcodone (Norco 10 mg-325 mg oral tablet)?1 tab(s), Oral, q6hr, PRN as needed for pain  alPRAzolam (Xanax 0.25 mg oral tablet)?0.25 mg, Oral, BID  albuterol-ipratropium (DuoNeb 0.5 mg-2.5 mg/3 mL inhalation solution)?3 mL, NEB, QID  aspirin (aspirin 81 mg oral Delayed Release (EC) tablet)  buPROPion (buPROPion 150 mg oral tablet, extended release)?150 mg, Oral, Daily  cyproheptadine (cyproheptadine 4 mg oral tablet)?4 mg, Oral, Daily  ferrous gluconate (Fergon 240 mg (27 mg elemental iron) oral tablet)?240 mg, Oral, Daily  folic acid (folic acid 1 mg oral tablet)?1 mg, Oral, Daily  gabapentin (gabapentin 300 mg oral capsule)?300 mg, Oral, TID  loratadine (loratadine 10 mg oral capsule)?10 mg, Oral, Daily  megestrol (megestrol 40 mg/mL oral suspension)?800 mg, Oral, Daily  ondansetron (ondansetron 8 mg oral tablet, disintegrating)?8 mg, Oral, TID  Discontinue  amoxicillin-clavulanate (amoxicillin-clavulanate 875 mg-125 mg oral tablet)?1 tab(s), Oral, q12hr  dexamethasone (dexamethasone 4 mg  oral tablet)?4 mg, Oral, BID  levoFLOXacin (levofloxacin 750 mg oral tablet)?750 mg, Oral, q24hr  Education and Orders Provided  Chronic Obstructive Pulmonary Disease, Easy-to-Read  Supraventricular Tachycardia, Adult, Easy-to-Read  Discharge - 08/26/21 9:51:00 CDT, Home?  Discharge Diet - Cardiac?  Discharge Activity - Activity as Tolerated?  Follow up  Haroon Cherry, on 09/09/2021  Markos May, within 7 to 10 days  [employer]  No Qualifying Data

## 2022-05-12 PROBLEM — C79.31 BRAIN METASTASIS: Status: ACTIVE | Noted: 2022-01-01

## 2022-05-12 NOTE — PROGRESS NOTES
Chief complaint:  Metastatic lung cancer; follow up     Reason for follow-up:  Poorly differentiated carcinoma of right lung, stage IV: Right upper lobe mass, mediastinal lymphadenopathy, brain metastasis  Brain metastasis  Iron deficiency anemia; FOBT positive  Thrombocytosis    71 year old with metastatic lung cancer, currently on maintenance therapy with Alimta Keytruda.     Interval history: Seen in follow up today; accompanied by her daughter. Feels she is tolerating her treatment well. No new significant symptoms or concerns; utilizing supp oxygen at night; breathing stable today. Following closely with rad onc at [facility]. Noted intermittent symptoms of nausea in the mornings reporting improvement with prescribed antiemetics. Requesting refill today on her Norco. Noted troubles with her chest port in the interim. She is pending port removal on 6/7/22.    ROS: Negative as otherwise stated in HPI    .  Vitals:    05/12/22 0914   BP: 113/71   Pulse: 88   Resp: 18   Temp: 97.7 °F (36.5 °C)     General: Alert and oriented. No acute distress  Eye: Pupils are equal, round and reactive to light, Extraocular movements are intact. Normal conjunctiva  HENT: Normocephalic. Oropharynx exam deferred; mask in place due to coronavirus  Neck: Supple, Non-tender  Respiratory: Respirations are non-labored, Symmetrical chest wall expansion. Breath sounds CTA bilaterally  Cardiovascular: Regular rate, rhythm, Normal peripheral perfusion, No bilateral lower extremity edema  Breast: Exam deferred  Gastrointestinal: Non-distended, Present bowel sounds   GYN: Exam deferred  Genitourinary: Exam deferred  Lymphatics: No lymphadenopathy appreciated  Musculoskeletal: Moves all extremities  Integumentary: Intact. Warm, dry. No rashes, or lesions to visible skin  Neurologic: No focal deficits  Psychiatric: Cooperative. Appropriate mood and affect       Recent Lab Results       05/12/22  0851        Albumin/Globulin Ratio 0.4        Albumin 2.5       Alkaline Phosphatase 341       ALT 12       AST 10       Baso # 0.01       Basophil % 0.1       BILIRUBIN TOTAL 0.4       BUN 12.1       Calcium 10.4       Chloride 102       CO2 26       Creatinine 0.77       eGFR if  >60       Eos # 0.00       Eosinophil % 0.0       Globulin, Total 5.9       Glucose 118       Hematocrit 34.4       Hemoglobin 10.2       Immature Grans (Abs) 0.06       Immature Granulocytes 0.9       Lymph # 0.54       LYMPH % 8.1       MCH 28.1       MCHC 29.7       MCV 94.8       Mono # 0.13       Mono % 1.9       MPV 8.7       Neut # 6.0       Neut % 89.0       nRBC 0.0       Platelets 763       Potassium 4.2       PROTEIN TOTAL 8.4       RBC 3.63       RDW 16.7       Sodium 137       WBC 6.7           Poorly differentiated carcinoma of lung  -14.0 cm right upper lobe mass invading mediastinum  -EBUS 09/20/2020  -cT4 cN2 M1b, stage RICKIE  -EGD/colonoscopy negative  -5 mm right occipital hemorrhagic metastasis 11/06/2020  -S/p SRS to brain metastasis (2100 cGy; 1 fraction) (12/03/2020)  -S/p chemoradiation therapy (12/2020-01/2021) for intrathoracic disease (oligometastatic disease), with positive response  -Followed by carbo/Alimta/Keytruda x4 for metastatic disease (solitary brain metastasis) with stable disease; questionable progression on CTs 08/2021)  -Alimta/Keytruda maintenance started 09/09/2021  -No brain metastases on surveillance brain MRI (11/11/2021)  -No progression on restaging CTs post maintenance Alimta/Keytruda x6 (12/17/2021)  -No brain metastasis on surveillance brain MRI (03/14/2022)  -Difficult to interpret restaging CTs 03/21/2022    Sites of disease:  14 cm right upper lobe mass, invading mediastinum; mediastinal lymphadenopathy; right occipital brain metastasis    Molecular markers:  -BRAF mutation negative; ROS1 gene arrangement negative; ALK rearrangement negative; PD-L1 CPS <10 (CPS 5) (erroneously, tested for esophageal carcinoma); EGFR  negative; MET exon 14 skipping mutation negative; NTRK gene fusion negative; RET rearrangement negative  -PD-L1 testing: QNS    Hypercalcemia   -11/25/2020: Calcium 10.3 (elevated for the very first time). BUN 21, elevated. Creatinine 0.79, normal. Albumin 2.4.  -11/25/2020: Intact PTH level 32.9, normal. Ionized calcium level 5.4, within normal limits.  -11/25/2020: IV fluids + Zometa 4 mg IV x1  -03/24/2022: Calcium 10.7, albumin 2.8, M spike 0.02 g/dL, IgG kappa; IgG, IgA, IgM normal; free kappa light chains 2.22, elevated; lambda light chains, kappa/lambda ratio normal; PTH related peptide <0.4; ionized calcium level 5.34 mg/dL, normal; 25 hydroxy vitamin D level 38 ng/mL, normal; Intact PTH 52.7, normal  (03/24/2022: Mild hypercalcemia; 0.02 g/dL IgG kappa M spike; intact PTH normal; PTH related peptide normal; vitamin D level normal; ionized calcium level normal; kappa/lambda ratio normal)  -stable    Iron deficiency anemia,   -FOBT positive  (Anemia of chronic disease)  -09/11/2020: Hemoglobin 6.9. Microcytosis. Elevated RDW. Low serum iron, low TIBC, low transferrin saturation, normal ferritin. FOBT positive. 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 BCR-ABL 1 fusion transcripts negative. ESR, CRP elevated. Iron deficiency anemia.  -Subsequently, resolved (post parenteral iron therapy; post chemoradiation therapy for lung cancer)  -08/17/2021: Hemoglobin 6.1, , serum iron 16, TIBC 175, transferrin saturation 9%, ferritin >1675.56 (relative iron deficiency)    Cholelithiasis; chronically elevated alkaline phosphatase, possible choledocholithiasis:  -HIDA scan (04/18/2019): No gallbladder activity at 1 hour post isotope injection, compatible with cystic duct obstruction/acute cholecystitis  -02/07/2020: Limited abdominal ultrasound: Cholelithiasis, dilated CBD, hepatomegaly  (12/03/2020)  -12/17/2021: Restaging CT C/A/P with contrast: Gallstones noted, apart from other findings; questionable slightly increased periductal dilatation  -Alk phos: 567 (12/29/2021); 337 (12/13/2021); 606 (12/09/2021),  (Bilirubin, AST, ALT normal)  -03/21/2022: MRCP (dilated common bile duct): Extrahepatic biliary ductal dilation similar to December 2021; small distal common bile duct filling defects seen, cannot exclude choledocholithiasis; cholelithiasis; no MRI evidence of acute cholecystitis; hepatomegaly with steatosis; small right pleural effusion  - Has been referred to GI for evaluation    Chest wall pain  -continue on Norco 10/325mg every 4 hours as needed - refilled today    Plan:  -Alimta/Keytruda maintenance started 09/09/2021  -No progression post Alimta/Keytruda x6 cycles on restaging CTs (12/17/2021)  -Difficult to interpret restaging CTs dated 03/21/2022    -Continue Alimta/Keytruda maintenance every 3 weeks (Alimta indefinitely; pembrolizumab for 24 months)  -Check CBC and CMP every 3 weeks before each  cycle of chemotherapy  -Check TSH every 6 weeks; next, middle of May  -Restage with contrast-enhanced CT scans of C/A/P in 3 months (late June)  -Surveillance brain MRI scans deferred to radiation oncology (no metastasis on surveillance brain MRI (03/14/2022)  -Per IR, not enough fluid to allow right-sided thoracentesis (increasing right pleural fluid noted on restaging CTs 03/21/2022    Chemotherapy regimen:  1. Day 1: Pembrolizumab 200 mg IV plus pemetrexed 500 mg/m² IV plus carboplatin AUC 5; repeat cycles every 3 weeks for 4 cycles; followed by:  2. Day 1: Pembrolizumab 200 mg IV every 3 weeks for 24 months;  3. Day 1: Pemetrexed 500 mg/m² IV every 3 weeks indefinitely    Labs unable to perform liquid biopsy for PD-L1 and KRAS mutation    -We referred the patient to pulmonary for biopsy of right lung mass, specifically to enable PD-L1 and KRAS mutation testing but she no showed    In 6 months (September' 22), recheck SPEP, ANNA, FLC assay     Follow-up  Labs reviewed/discussed; ok to proceed with C16 Alimta/Pembrolizumab tomorrow   RT for labs, visit, treatment in 3 weeks   Restaging CT CAP currently scheduled 6/7/22 however will be rescheduled given patient's availability for transportation

## 2022-05-13 NOTE — NURSING
Patient here for [facility] Keytruda & Alimta. Patient states pain med rx was not sent to her pharmacy. Patient states pain to upper back.   11:15am Patient stated her pharmacy received pain med rx refill.

## 2022-05-20 NOTE — HISTORICAL [facility] CERNER
This is a historical note converted from [place]. Formatting and pictures may have been removed.  Please reference [place] for original formatting and attached multimedia. 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 Diagnosed 9/22/2020. Brain mets diagnosed 11/6/2020.  ?   Current Treatment:  2.? Day 1: Pembrolizumab 200 mg IV every 3 weeks for 24 months;  3.? Day 1: Pemetrexed 500 mg/m? IV every 3 weeks indefinitely  Started 9/9/2021  ?   Cycle #9 ?due 3/2/2022  ?   Dose modifications & interruptions:  Cycle #1 delayed 6 weeks d/t multiple hospitalizations  ?   Treatment History:  1. SRS to brain metastasis on 12/3/2020.  2. Carboplatin/Taxol weekly along with RT. XRT started 12/16/2020. Chemo started 12/16/2020; completed 1/29/2021.  3. Carbo/Alimta/Keytruda every 3 weeks x 4 cycles. Started 4/29/2021. Completed 4 cycles on 7/9/2021.  ?  ?   Past medical history: Cholelithiasis.? Chronic cough.? COPD.? Tobacco abuse.? Hypertension.? Dyslipidemia.? Hepatomegaly.? Obesity.? Peripheral artery disease.  Social history: . ?Lives in [place] with her  and family. ?Has 12 children. ?Has been smoking?1 pack of cigarettes daily for 40 years.? No history of alcohol or illicit drug abuse.  Family history:?Negative for cancers.  Health maintenance:  Screening mammogram in 2019 in [place], apparently unremarkable.  Mostly colonoscopy ever.  Menstrual and OB/GYN history:?Menopause?in her 50s.  ?   History of present illness:  65-year-old female referred by Dr. Steve Simpson, with lung cancer.  ?  For a full, detailed history, please see Dr. Ulrich note dated 11/27/2020.  ?  Interval History  2/9/2022: Patient presents today for scheduled follow up for metastatic right lung cancer. She is due to receive cycle 8 of Alimta/Keytruda today.?She reports tolerating treatment well. She does  report URI, home health nurse told patient to have oncology providers prescribe ABX to treat URI. Patient advised to follow up with PCP for Upper respiratory infection. Patient reports wheezing with wet productive cough with clear-yellow sputum. She denies any fever, chills, or body aches. Lab work reviewed with patient, stable for treatment today, however Potassium level elevated?5.5 will prescribe 1 bottle of Kayexalate for patient. Plt elevated 651, Alk phos 498. Medications reviewed, she request refill on pain medication. She denies any further questions or concerns.  Review of Systems  A complete 12-point ROS was performed in full with pertinent positives as described in interval history. Remainder of ROS done in full and are negative.  ?  Physical Exam  Vitals & Measurements  T:?37.2? ?C (Oral)? HR:?95(Peripheral)? RR:?20? BP:?134/79?  HT:?160.00?cm? WT:?77.500?kg? BMI:?30.27?  Physical Exam:  General: Alert and oriented, No acute distress.?  Appearance: Well-groomed wearing mask  HEENT: Normocephalic, Oral mucosa is moist. Pupils are equal, round and reactive to light, Extraocular movements are intact, Normal conjunctiva.?  Neck: Supple, Non-tender, No lymphadenopathy, No thyromegaly.?  Respiratory: Lungs are clear to auscultation, Respirations are non-labored, Breath sounds are equal, Symmetrical chest wall expansion.?  Cardiovascular: Normal rate, Regular rhythm, No edema.?  Breast: Breast exam not performed on todays visit.?  Gastrointestinal: Rounded, Soft, Non-tender, Non-distended, Normal bowel sounds.?  Musculoskeletal: Normal strength. Ambulates without assistance  Integumentary: Warm, dry, intact.?  Neurologic: Alert, Oriented, No focal deficits, Cranial Nerves II-XII are grossly intact.?  Cognition and Speech: Oriented, Speech clear and coherent.?  Psychiatric: Cooperative, Appropriate mood & affect.?  ECOG Performance Scale:?1 - Capable of all self-care able to carry out light work  activities.  Assessment/Plan  1.?Cancer of upper lobe of right lung?C34.11  #Poorly differentiated carcinoma of lung:  To summarize:  -Poorly differentiated carcinoma of lung, 14.0 cm right upper lobe mass invading mediastinum  -EBUS 09/20/2020  -cT4 cN2 M1b, stage RICKIE  -EGD/colonoscopy negative  -5 mm right occipital hemorrhagic metastasis 11/06/2020  -S/p SRS?to brain metastasis (2100 cGy; 1 fraction)?(12/03/2020)  -S/p chemoradiation therapy (12/2020-01/2021) for intrathoracic disease, with positive response,  -Followed by carbo/Alimta/Keytruda x4 for metastatic disease (solitary brain metastasis)?with stable disease;?questionable progression on CTs?08/2021)  -No brain metastases on surveillance brain MRI (11/11/2021)  ?  ?   #Sites of disease:  14 cm right upper lobe mass, invading mediastinum;?mediastinal lymphadenopathy;?right occipital brain metastasis  ?  ?   #Molecular markers:  -BRAF mutation negative; ROS1 gene arrangement negative; ALK rearrangement negative; PD-L1 CPS <10 (CPS 5)?(erroneously, tested for?esophageal carcinoma); EGFR negative; MET exon 14 skipping mutation negative; NTRK gene fusion negative; RET rearrangement negative  -PD-L1 testing: QNS  ?  ?   #Hypercalcemia?(11/25/2020)  -11/25/2020: Calcium 10.3 (elevated for the very first time). ?BUN 21, elevated. ?Creatinine 0.79, normal. ?Albumin 2.4.  -11/25/2020:?Intact PTH level 32.9, normal. ?Ionized calcium level 5.4, within normal limits.  -11/25/2020:?IV fluids + Zometa 4 mg IV x1  ?  ?  #Iron deficiency anemia, FOBT positive  -09/11/2020: Hemoglobin 6.9.? Microcytosis.? Elevated RDW.? Low serum iron, low TIBC, low transferrin saturation, normal ferritin.? FOBT positive.? 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  ?  #Thrombocytosis since 09/11/2020 ([phone])  -Could be reactive to iron deficiency anemia?versus metastatic lung cancer  -10/22/2020:?JAK2 mutation negative. ?BCR-ABL 1?fusion transcripts negative.? ESR, CRP elevated.? Iron deficiency anemia.  -Subsequently, resolved?(post?parenteral iron therapy;?post?chemoradiation therapy for lung cancer)  -08/17/2021: Hemoglobin 6.1, , serum iron 16, TIBC 175, transferrin saturation 9%, ferritin >1675.56 (relative iron deficiency)  ?  ?  #Cholelithiasis.? Chronically elevated alkaline phosphatase.  -HIDA scan (04/18/2019): No gallbladder activity at 1 hour post isotope injection, compatible with cystic duct obstruction/acute cholecystitis  -02/07/2020: Limited abdominal ultrasound: Cholelithiasis, dilated CBD, hepatomegaly  (12/03/2020)  ?  Plan:  -Potassium 5.5 today  -Not on potassium supplements  -We will prescribe Kayexalate 30 g p.o. x1 dose  ?  ?  -Alimta/Keytruda maintenance started 09/09/2021  >>>  -Continue Alimta/Keytruda maintenance every 3 weeks (Alimta indefinitely; pembrolizumab for 24 months)  -Check CBC and CMP every 3 weeks?before each cycle of chemotherapy  -Check TSH?every 6 weeks; next, mid?March 22  -Restage with contrast-enhanced CT scans of C/A/P?in 3 months?(November)?(due now)  -Surveillance brain MRI scans deferred?to radiation oncology?(no metastasis on surveillance brain MRI?11/11/2021)  ?  Chemotherapy regimen:  1.? Day 1: Pembrolizumab 200 mg IV plus pemetrexed 500 mg/m? IV plus carboplatin AUC 5; repeat cycles every 3 weeks for 4 cycles; followed by:  2.? Day 1: Pembrolizumab 200 mg IV every 3 weeks for 24 months;  3.? Day 1: Pemetrexed 500 mg/m? IV every 3 weeks  indefinitely  ?  Check liquid biopsy?for PD-L1 and KRAS mutation.  ?  -Iron deficiency anemia; FOBT positive;;?s/p Feraheme x2 (10/26/2020-11/03/2020)  -EGD and colonoscopy?(02/10/2021)?with a few ulcers in sigmoid colon, internal hemorrhoids, no bleeding,?radiation esophagitis?(no biopsies taken)  -08/17/2021: Hemoglobin 6.1, relative iron deficiency (serum iron 16, TIBC 175,?transferrin saturation 9%, ferritin?>1675.56), PRBC x1 unit as inpatient  -S/p Feraheme 510 mg IV x2 (09/09/2021, 09/16/2021)  >>>  -No improvement in hemoglobin  -Check reticulocyte count, serum iron, TIBC, ferritin,?B12 level, RBC folate, LDH,?haptoglobin  ?  -Thrombocytosis is?reactive to iron deficiency and underlying metastatic lung cancer  ?  Pain medications and?cough suppressant?prescription filled today.  ?  Ok to proceed with cycle #8 of Keytruda/Alimta today.  Check CBC and CMP every 3 weeks?before each cycle of chemotherapy.  Check TSH?every 6 weeks; next, mid March 22  Restage with contrast-enhanced CT scans of C/A/P?in 3 months; scheduled for 3/2/2022 will move to later date  Surveillance brain MRI scans deferred?to radiation oncology-Scheduled for 3/21/2022  Follow up in 3 weeks (F2F w/NP?on 3/2/2022)?CBC, CMP, prior to cycle #9.?  ?  Ordered:  ?   Problem List/Past Medical History  Ongoing  Abnormal imaging  Adjustment and management of vascular access device  Alkaline phosphatase level  Brain metastasis  Cancer of upper lobe of right lung  Cancer related pain  Cholelithiasis  Chronic cough  COPD - Chronic obstructive pulmonary disease  Current smoker  Dilated cbd, acquired  Hepatomegaly  Iron deficiency anemia  Microcytic anemia  Obesity  PAD - Peripheral arterial disease  Thrombocytosis  Tobacco user  Historical  No qualifying data  Procedure/Surgical History  Transfusion of Nonautologous Red Blood Cells into Peripheral Vein, Percutaneous Approach (08/18/2021)  Insertion of Infusion Device into Superior Vena Cava,  Percutaneous Approach (07/12/2021)  Introduction of Vasopressor into Central Vein, Percutaneous Approach (07/12/2021)  Catheter Insertion Mediport (None) (05/12/2021)  Fluoroscopy of Superior Vena Cava using Low Osmolar Contrast, Guidance (05/12/2021)  Insertion of Infusion Device into Superior Vena Cava, Percutaneous Approach (05/12/2021)  Insertion of Totally Implantable Vascular Access Device into Chest Subcutaneous Tissue and Fascia, Open Approach (05/12/2021)  Insertion of tunneled centrally inserted central venous access device, with subcutaneous port; age 5 years or older (05/12/2021)  Colonoscopy (None) (02/10/2021)  Colonoscopy, flexible; diagnostic, including collection of specimen(s) by brushing or washing, when performed (separate procedure) (02/10/2021)  Esophagogastroduodenoscopy (02/10/2021)  Esophagogastroduodenoscopy, flexible, transoral; diagnostic, including collection of specimen(s) by brushing or washing, when performed (separate procedure) (02/10/2021)  Inspection of Lower Intestinal Tract, Via Natural or Artificial Opening Endoscopic (02/10/2021)  Inspection of Upper Intestinal Tract, Via Natural or Artificial Opening Endoscopic (02/10/2021)  Transfusion, blood or blood components (11/30/2020)  Bronchoscopy, Ebus, 3 or More Samples (09/22/2020)  Bronchoscopy, rigid or flexible, including fluoroscopic guidance, when performed; with endobronchial ultrasound (EBUS) guided transtracheal and/or transbronchial sampling (eg, aspiration[s]/biopsy[ies]), 3 or more mediastinal and/or hilar lymph node stati (09/22/2020)  Extraction of Thorax Lymphatic, Via Natural or Artificial Opening Endoscopic, Diagnostic (09/22/2020)  Transfusion of Nonautologous Red Blood Cells into Peripheral Vein, Percutaneous Approach (09/12/2020)  Transfusion, blood or blood components (09/12/2020)  Transfusion of Nonautologous Red Blood Cells into Peripheral Vein, Percutaneous Approach (09/11/2020)  Transfusion, blood or blood  components (09/11/2020)  PICC placement (2020)  Drainage of Neck Skin, External Approach (12/21/2018)  Incision and drainage of abscess (eg, carbuncle, suppurative hidradenitis, cutaneous or subcutaneous abscess, cyst, furuncle, or paronychia); simple or single (12/21/2018)   Medications  adenosine, 6 mg= 2 mL, IV Push, Once  Alimta (for IVPB)  alPRAzolam 0.25 mg oral tab, 0.25 mg= 1 tab(s), Oral, TID  aspirin 81 mg oral Delayed Release (EC) tablet  Benadryl (for IVPB), 25 mg, IV Piggyback, Once-chemo  Benadryl (for IVPB), 25 mg, IV Piggyback, Once-chemo  Benadryl (for IVPB), 25 mg, IV Piggyback, Once-chemo  Benadryl (for IVPB), 25 mg, IV Piggyback, Once-chemo  Benadryl (for IVPB), 25 mg, IV Piggyback, Once-chemo  Benadryl (for IVPB), 25 mg, IV Piggyback, Once-chemo  Bentyl 10 mg oral capsule, 10 mg= 1 cap(s), Oral, q6hr, PRN  buPROPion 150 mg oral tablet, extended release, 150 mg= 1 tab(s), Oral, Daily,? ?Not taking  codeine-guaifenesin 10 mg-100 mg/5 mL oral syrup, 10 mL, Oral, q4hr, PRN  codeine-promethazine 10 mg-6.25 mg/5 mL oral syrup, 5 mL, Oral, Once a day (at bedtime)  cyanocobalamin, 1000 mcg= 1 mL, IM, Once-chemo  cyproheptadine 4 mg oral tablet, 4 mg= 1 tab(s), Oral, Daily,? ?Not taking  Decadron 4 mg oral tablet, 4 mg= 1 tab(s), Oral, BID  dexamethasone (for IVPB), 10 mg, IV Piggyback, Once-chemo  DuoNeb 0.5 mg-2.5 mg/3 mL inhalation solution, 3 mL, NEB, QID  Fergon 240 mg (27 mg elemental iron) oral tablet, 240 mg= 1 tab(s), Oral, Daily, 3 refills  gabapentin 300 mg oral capsule, 300 mg= 1 cap(s), Oral, TID  Heparin Flush 100 U/mL - 5 mL, 500 units, IV Push, Once-chemo  Heparin Flush 100 U/mL - 5 mL, 500 units= 5 mL, IV Push, Once-chemo  Heparin Flush 100 U/mL - 5 mL, 500 units= 5 mL, IV Push, Once-chemo  Heparin Lock Flush 100 units/mL intravenous solution, 100 units= 1 mL, IV Push, Once-chemo  Keytruda  loratadine 10 mg oral capsule, 10 mg= 1 cap(s), Oral, Daily  megestrol 40 mg/mL oral suspension,  800 mg= 20 mL, Oral, Daily, 1 refills  metoprolol tartrate 25 mg oral tab, 25 mg= 1 tab(s), Oral, BID, 3 refills  metoprolol tartrate 50 mg oral tab, 50 mg= 1 tab(s), Oral, BID,? 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 Exercise frequency: Daily. Exercise type: Walking., 03/04/2020  Home/Environment  Lives with Children, Spouse, grandson. Living situation: Home/Independent. TV/Computer concerns: No. Single family house, 03/04/2020  Living situation: Home/Independent., 01/18/2018  Nutrition/Health  Regular, Low fat, Good, 03/04/2020  Sexual  Sexually active: Yes. Number of current partners 1. Sexual orientation: Straight or heterosexual. Gender Identity Identifies as female. No, 03/04/2020  Spiritual/Cultural  Gnosticism, No, 03/04/2020  Substance Use  Never, 02/09/2022  Tobacco  Former smoker, quit more than 30 days ago, No, 02/09/2022  Family History  Heart disease: Sister and Brother.  Hypertension.: Sister and Brother.  Primary malignant neoplasm of bone: Mother.  Unknown cause of morbidity or mortality.....: Father.  Immunizations  Vaccine Date Status   influenza virus vaccine, inactivated 10/05/2011 Recorded   Health Maintenance  Health Maintenance  ???Pending?(in the next year)  ??? ??OverDue  ??? ? ? ?ADL Screening due??09/15/21??and every 1??year(s)  ??? ? ? ?Influenza Vaccine due??10/01/21??and every 1??day(s)  ??? ? ? ?Cognitive Screening due??01/02/22??and every 1??year(s)  ???  ??Due?  ??? ? ? ?Alcohol Misuse Screening due??01/02/22??and every 1??year(s)  ??? ? ? ?Bone Density Screening due??02/09/22??Variable frequency  ??? ? ? ?Breast Cancer Screening due??02/09/22??Unknown Frequency  ??? ? ? ?COPD Maintenance-Pulmonary Rehab Education due??02/09/22??and every 1??year(s)  ??? ? ? ?IPPE due??02/09/22??One-time only  ??? ? ? ?Lung Cancer Screening due??02/09/22??and every 1??year(s)  ??? ? ? ?Medicare Annual Wellness Exam due??02/09/22??and every 1??year(s)  ??? ? ? ?Pneumococcal Vaccine due??02/09/22??Unknown Frequency  ??? ? ? ?Tetanus Vaccine due??02/09/22??and every 10??year(s)  ??? ? ? ?Zoster Vaccine due??02/09/22??Unknown Frequency  ??? ??Refused?  ??? ? ? ?Smoking Cessation due??01/01/22??and every 1??year(s)  ??? ??Due In Future?  ??? ? ? ?COPD Management-COPD Medications Prescribed not due until??08/18/22??and every 1??year(s)  ??? ? ? ?Aspirin Therapy for CVD Prevention not due until??08/26/22??and every 1??year(s)  ??? ? ? ?Obesity Screening not due until??01/01/23??and every 1??year(s)  ??? ? ? ?Advance Directive not due until??01/02/23??and every 1??year(s)  ??? ? ? ?Fall Risk Assessment not due until??01/02/23??and every 1??year(s)  ??? ? ? ?Functional Assessment not due until??01/02/23??and every 1??year(s)  ??? ? ? ?COPD Management-Oxygen Assessment not due until??01/19/23??and every 1??year(s)  ???Satisfied?(in the past 1 year)  ??? ??Satisfied?  ??? ? ? ?Advance Directive on??01/19/22.??Satisfied by Venita Chaney  ??? ? ? ?Aspirin Therapy for CVD Prevention on??08/26/21.??Satisfied by Tomas DUQUE, Gale Mcgarry  ??? ? ? ?Blood Pressure Screening on??02/09/22.??Satisfied by Joana Yates  ??? ? ? ?Body Mass Index Check on??02/09/22.??Satisfied by Becky Garcia RN  ??? ? ? ?COPD Maintenance-Spirometry on??07/21/21.??Satisfied by She Cunningham  ??? ? ? ?COPD Management-COPD Medications Prescribed on??08/18/21.??Satisfied by Gennaro Rodriguez DO  ??? ? ? ?COPD  Management-Oxygen Assessment on??01/19/22.??Satisfied by Venita Chaney  ??? ? ? ?Colorectal Screening on??02/10/21.??Satisfied by Agnes Crawley LPN  ??? ? ? ?Depression Screening on??02/09/22.??Satisfied by Joana Yates  ??? ? ? ?Diabetes Screening on??02/09/22.??Satisfied by Aletha Salter MT  ??? ? ? ?Fall Risk Assessment on??02/09/22.??Satisfied by Becky Garcia RN  ??? ? ? ?Functional Assessment on??02/09/22.??Satisfied by Becky Garcia RN  ??? ? ? ?Hypertension Management-Blood Pressure on??02/09/22.??Satisfied by Joana Yates  ??? ? ? ?Hypertension Management-BMP on??02/09/22.??Satisfied by Aletha Salter MT  ??? ? ? ?Influenza Vaccine on??02/09/22.??Satisfied by Becky Garcia RN  ??? ? ? ?Obesity Screening on??02/09/22.??Satisfied by Becky Garcia RN  ?  Lab Results  Test Name Test Result Date/Time   Sodium Lvl 137 mmol/L 02/09/2022 09:00 CST   Potassium Lvl 5.5 mmol/L (High) 02/09/2022 09:00 CST   Chloride 105 mmol/L 02/09/2022 09:00 CST   CO2 22 mmol/L (Low) 02/09/2022 09:00 CST   Calcium Lvl 10.4 mg/dL 02/09/2022 09:00 CST   Glucose Lvl 274 mg/dL (High) 02/09/2022 09:00 CST   BUN 16.9 mg/dL 02/09/2022 09:00 CST   Creatinine 0.81 mg/dL 02/09/2022 09:00 CST   Est Creat Clearance Ser 57.26 mL/min 02/09/2022 10:05 CST   eGFR-AA 91 02/09/2022 09:00 CST   eGFR-WILFRID 75 mL/min/1.73 m2 (Low) 02/09/2022 09:00 CST   Bili Total 0.4 mg/dL 02/09/2022 09:00 CST   Bili Direct 0.2 mg/dL 02/09/2022 09:00 CST   Bili Indirect 0.20 mg/dL 02/09/2022 09:00 CST   AST 37 unit/L (High) 02/09/2022 09:00 CST   ALT 49 unit/L 02/09/2022 09:00 CST   Alk Phos 498 unit/L (High) 02/09/2022 09:00 CST   Total Protein 7.7 gm/dL (High) 02/09/2022 09:00 CST   Albumin Lvl 2.8 gm/dL (Low) 02/09/2022 09:00 CST   Globulin 4.9 gm/dL (High) 02/09/2022 09:00 CST   A/G Ratio 0.6 ratio (Low) 02/09/2022 09:00 CST   TSH 0.5275 uIU/mL 02/09/2022 09:00 CST   WBC 6.0 x10(3)/mcL 02/09/2022 09:00 CST   RBC 3.65 x10(6)/mcL (Low)  02/09/2022 09:00 CST   Hgb 10.5 gm/dL (Low) 02/09/2022 09:00 CST   Hct 35.2 % 02/09/2022 09:00 CST   Platelet 651 x10(3)/mcL (High) 02/09/2022 09:00 CST   MCV 96.4 fL 02/09/2022 09:00 CST   MCH 28.8 pg 02/09/2022 09:00 CST   MCHC 29.8 gm/dL (Low) 02/09/2022 09:00 CST   RDW 18.3 % (High) 02/09/2022 09:00 CST   MPV 8.9 fL 02/09/2022 09:00 CST   Abs Neut 5.28 x10(3)/mcL 02/09/2022 09:00 CST   Neutro Auto 88 % 02/09/2022 09:00 CST   Lymph Auto 7 % 02/09/2022 09:00 CST   Mono Auto 4 % 02/09/2022 09:00 CST   Abs Neutro 5.28 x10(3)/mcL 02/09/2022 09:00 CST   Abs Lymph 0.4 x10(3)/mcL (Low) 02/09/2022 09:00 CST   Abs Mono 0.3 x10(3)/mcL 02/09/2022 09:00 CST   NRBC% 0.0 % 02/09/2022 09:00 CST   IG% 1 % 02/09/2022 09:00 CST   IG# 0.070 02/09/2022 09:00 CST

## 2022-05-20 NOTE — HISTORICAL [facility] CERNER
This is a historical note converted from [place]. Formatting and pictures may have been removed.  Please reference [place] for original formatting and attached multimedia. History of Present Illness  Past medical history: Cholelithiasis.? Chronic cough.? COPD.? Tobacco abuse.? Hypertension.? Dyslipidemia.? Hepatomegaly.? Obesity.? Peripheral artery disease.  Social history: . ?Lives in [place] with her  and family. ?Has 12 children. ?Has been smoking?1 pack of cigarettes daily for 40 years.? No history of alcohol or illicit drug abuse.  Family history:?Negative for cancers.  Health maintenance:  Screening mammogram in 2019 in [place], apparently unremarkable.  Mostly colonoscopy ever.  Menstrual and OB/GYN history:?Menopause?in her 50s.  ?  ?   Reason for follow-up:  Poorly differentiated carcinoma of right lung, stage IV: Right upper lobe mass, mediastinal lymphadenopathy, brain metastasis  Brain metastasis  Iron deficiency anemia; FOBT positive  Thrombocytosis  ?  ?   History of present illness:  65-year-old female referred by Dr. Steve Simpson, with lung cancer.  ?   -11.4 cm right upper lobe mass, invading mediastinum (noncontrast chest CT)  -Large mass in 4R position (right lower paratracheal) (bronchoscopy and EBUS: 09/22/2020)  -EBUS, 4R, FNA: Poorly differentiated carcinoma (tumor of lung versus upper GI tract)  (EGD, colonoscopy:?02/10/2021: No malignancy; no biopsies collected)  -PET/CT (11/02/2020):?Right upper lung lobe mass has enlarged?9.9 x 9.8 x 14.0 cm,?previously, 8.1 x 7.4 x 11.4 cm;?  contiguous extension?of the mass into the mediastinum?versus right hilar enlarged lymph node?2.0 x 1.8 cm  -Brain MRI (11/06/2020):?Right occipital?5 x 4 mm?hemorrhagic metastasis without?edema  -11/06/2020: Referred to [facility] for SRS  >>  Tumor >7 cm, therefore, T4  Tumor invading mediastinum, therefore, T4  Ipsilateral mediastinal mass (??Lymphadenopathy), EBUS FNA positive,?therefore, N2  Solitary  brain metastasis, therefore,?M1b  >>cT4 cN2 M1b, stage RICKIE  -SRS to brain metastasis (2100 cGy; 1 fraction)?(12/03/2020)  -Carbo/Taxol weekly x6, concurrent with RT?(12/16/2020-01/29/2021)  -Radiotherapy right lung (12/16/2020-01/28/2021) (6000 cGy; 30 fractions; 43 elapsed days)  -04/07/2021: Restaging bone scan: No bone metastasis  -04/07/2021: Restaging CT C/A/P with contrast (comparison: PET/CT dated 11/02/2020): Mild interval decrease in size of the mass involving the upper lobe of right lung (8.7 x 7.6 cm, previously 10.5 x 9.8 cm); prominent precarinal lymph node also appears mildly reduced in size (7 mm, previously 9 mm); no new malignancy or metastatic disease in chest, abdomen, or pelvis  (Positive response to?chemoradiation therapy)  -Subsequently, carbo/Alimta/Keytruda x4?(04/29/2021-07/09/2021)  -No brain metastases on surveillance brain MRI (05/03/2021)  -Hospitalized 07/11/2021-07/22/2021:?[facility]: Postobstructive pneumonia, and acute hypoxemic?respiratory failure, septic shock, HUNG; successfully treated  -08/09/2021: Surveillance brain MRI with contrast (comparison: 05/03/2021):  -08/20/2021: Restaging CT C/A/P with contrast (comparison: 04/07/2021: Similar size of right upper lobe mass extending to the hilum with increased peripheral opacities and fluid peripheral to the mass, cannot exclude postobstructive infection (8-9 cm, similar to prior); small right pleural effusion; stable mediastinal lymph nodes (subcarinal, 8 mm)  -Hospitalized 08/18/2021-08/21/2021: Anemia, pneumonia; hemoglobin 6.1, PRBC x1 unit; Augmentin plus Levaquin for pneumonia; sinus tachycardia, requiring Ativan; remained afebrile; CT chest without contrast (08/19/2021) indicated postobstructive pneumonia and lepidic spread of tumor, no interval change from prior; CTA chest PE study showed no large central or segmental pulmonary thromboemboli; interval enlargement of known right upper lobe mass and progression of  surrounding irregular airspace consolidation and ipsilateral pleural effusion, etc.  -08/19/2021: CT chest without contrast: Size of the right upper lobe mass with associated consolidation is unchanged in size and extent from 08/10/2021; small right pleural effusion; postobstructive pneumonia versus lepidic spread of tumor, no change from prior  -08/20/2021: CTA chest PE protocol (comparison: 07/11/2021): No PE; interval enlargement of known right upper lobe mass and progression of surrounding irregular airspace consolidation and ipsilateral pleural effusion (large right upper lobe mass 9.4 x 12.6 x 7.6 cm); cholelithiasis, similar dilated appearance of common bile duct, without definitive visualization of distal obstructive etiology  -Alimta/Keytruda every 3 weeks maintenance started 09/09/2021  -11/11/2021: Surveillance brain MRI with and without contrast (comparison: 08/09/2021): No acute intracranial findings or evidence of metastasis  -11/18/2021: TSH and free T4 normal  -Cycle #6 of maintenance Alimta/Keytruda on 12/29/2021  -12/17/2021: Restaging CTs of C/A/P with contrast (comparison: August 10 and August 9, 2021): Similar appearance of irregular right upper lung masslike consolidation and adjacent fluid; slightly improved aeration of right lower lobe; questionably slightly increased biliary ductal dilation; small mediastinal lymph nodes are stable; there are gallstones  -Alk phos: 567 (12/29/2021); 337 (12/13/2021); 606 (12/09/2021),  -Bilirubin, AST, ALT normal  -12/09/2021: No hemolysis; iron stores normal (transferrin saturation 21%, ferritin 4167.95); B12 479, normal; LDH low, haptoglobin elevated; RBC folate normal; hemoglobin 9.7, reticulocyte count 3.1%  -12/29/2021: TSH normal  -Per pathology, unable to perform liquid biopsy for PD-L1 and KRAS mutations  -Cycle #9 of maintenance Alimta/Keytruda on 03/03/2022  -03/14/2022: Surveillance brain MRI with contrast: The comparison: 11/11/2021): No  intracranial metastasis  -03/21/2022: MRCP (dilated common bile duct): Extrahepatic biliary ductal dilation similar to December 2021; small distal common bile duct filling defects seen, cannot exclude choledocholithiasis; cholelithiasis; no MRI evidence of acute cholecystitis; hepatomegaly with steatosis; small right pleural effusion  -03/21/2022: Restaging CT C/A/P with contrast (comparison: 12/17/2021): Right upper lobe irregular consolidation overall similar size although associated serpiginous enhancement at medial apex has increased 53 x 17 mm, previously 38 x 15 mm millimeter and increasing right pleural effusion  -Hypercalcemia: Calcium 10.6, albumin 2.7 (03/03/2022); calcium 10.7, albumin 2.7 (01/19/2022)  ?  ?  Interval history:  10/22/2020:  Presents for?initial medical oncology consultation, accompanied by her daughter,Russel.  ?  03/24/2022:  -Cycle #9 of maintenance Alimta/Keytruda on 03/03/2022  -03/14/2022: Surveillance brain MRI with contrast: The comparison: 11/11/2021): No intracranial metastasis  -03/21/2022: MRCP (dilated common bile duct): Extrahepatic biliary ductal dilation similar to December 2021; small distal common bile duct filling defects seen, cannot exclude choledocholithiasis; cholelithiasis; no MRI evidence of acute cholecystitis; hepatomegaly with steatosis; small right pleural effusion  -03/21/2022: Restaging CT C/A/P with contrast (comparison: 12/17/2021): Right upper lobe irregular consolidation overall similar size although associated serpiginous enhancement at medial apex has increased 53 x 17 mm, previously 38 x 15 mm millimeter and increasing right pleural effusion  -Hypercalcemia: Calcium 10.6, albumin 2.7 (03/03/2022); calcium 10.7, albumin 2.7 (01/19/2022)  -03/24/2022: WBC 4.4, hemoglobin 12 point WBC 4.0, platelets 519,000/mm?, ANC 3.27  Presents for follow-up visit, accompanied by her daughter.? Overall, doing very well.? Good appetite.? Reasonably good energy.? ECOG 1.?  No chest pain, cough, dyspnea, hemoptysis, fevers, or chills.? No unusual headaches or focal neurological symptoms.? No abdominal pain, nausea, vomiting.? No significant side effects with ongoing Alimta/Keytruda maintenance.? Requesting refill of Norco.? She is taking about 5 pills every day for right posterior chest wall pain.? From today, we will cut down the dose to Norco 10 mg not to be taken more than twice a day.  ?  ?  Review of systems:  All systems reviewed, and found to be negative except for the symptoms detailed above.  ?  ?   Physical examination:  VITAL SIGNS:? Reviewed.? ?  GENERAL:? In no apparent distress.?  HEAD:? No signs of head trauma.  EYES:? Pupils are equal.? Extraocular motions intact.?  EARS:? Hearing grossly intact.  MOUTH:? Oropharynx is normal.  NECK:? No adenopathy, no JVD.? ?  CHEST:? Chest with clear breath sounds bilaterally.? No wheezes, rales, or rhonchi.?  CARDIAC:? Regular rate and rhythm.? S1 and S2, without murmurs, gallops, or rubs.  VASCULAR:? No Edema.? Peripheral pulses normal and equal in all extremities.  ABDOMEN:? Soft, without detectable tenderness.? No sign of distention.? No? ?rebound or guarding, and no masses palpated.? ?Bowel Sounds normal.  MUSCULOSKELETAL:? Good range of motion of all major joints. Extremities without clubbing, cyanosis or edema.?  NEUROLOGIC EXAM:? Alert and oriented x 3.? No focal sensory or strength deficits.? ?Speech normal.? Follows commands.  PSYCHIATRIC:? Mood normal.  SKIN:? No rash or lesions.  10/22/2020:?Lungs clear to auscultation. ?No crepitations or rhonchi.? No supraclavicular or axillary lymphadenopathy palpable.? Heart sounds normal. ?Abdomen benign.? No swelling in legs.? Conjunctiva pale.  11/27/2020: In no acute discomfort.? Conjunctive pale.  03/23/2021:?Not examined; it was a telemedicine visit.  04/15/2021:?Looks well and healthy. ?Cheerful.? Bilateral rhonchi. ?No palpable lymphadenopathy.  ?  ?  Assessment:  #Poorly  differentiated carcinoma of lung:  To summarize:  -Poorly differentiated carcinoma of lung, 14.0 cm right upper lobe mass invading mediastinum  -EBUS 09/20/2020  -cT4 cN2 M1b, stage RICKIE  -EGD/colonoscopy negative  -5 mm right occipital hemorrhagic metastasis 11/06/2020  -S/p SRS?to brain metastasis (2100 cGy; 1 fraction)?(12/03/2020)  -S/p chemoradiation therapy (12/2020-01/2021) for intrathoracic disease, with positive response,  -Followed by carbo/Alimta/Keytruda x4 for metastatic disease (solitary brain metastasis)?with stable disease;?questionable progression on CTs?08/2021)  -No brain metastases on surveillance brain MRI (11/11/2021)  -No progression on restaging CTs?post?maintenance Alimta/Keytruda x6?(12/17/2021)  -No brain metastasis on surveillance brain MRI (03/14/2022)  -Difficult to interpret restaging CTs 03/21/2022  ?  ?  #Sites of disease:  14 cm right upper lobe mass, invading mediastinum;?mediastinal lymphadenopathy;?right occipital brain metastasis  ?  ?  #Molecular markers:  -BRAF mutation negative; ROS1 gene arrangement negative; ALK rearrangement negative; PD-L1 CPS <10 (CPS 5)?(erroneously, tested for?esophageal carcinoma); EGFR negative; MET exon 14 skipping mutation negative; NTRK gene fusion negative; RET rearrangement negative  -PD-L1 testing: QNS  ?  ?  #Hypercalcemia?(11/25/2020)  -11/25/2020: Calcium 10.3 (elevated for the very first time). ?BUN 21, elevated. ?Creatinine 0.79, normal. ?Albumin 2.4.  -11/25/2020:?Intact PTH level 32.9, normal. ?Ionized calcium level 5.4, within normal limits.  -11/25/2020:?IV fluids + Zometa 4 mg IV x1  ?  ?  #Iron deficiency anemia, FOBT positive  (Anemia of chronic disease)  -09/11/2020: Hemoglobin 6.9.? Microcytosis.? Elevated RDW.? Low serum iron, low TIBC, low transferrin saturation, normal ferritin.? FOBT positive.? 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  ?  #Thrombocytosis since 09/11/2020 (542-277K)  -Could be reactive to iron deficiency anemia?versus metastatic lung cancer  -10/22/2020:?JAK2 mutation negative. ?BCR-ABL 1?fusion transcripts negative.? ESR, CRP elevated.? Iron deficiency anemia.  -Subsequently, resolved?(post?parenteral iron therapy;?post?chemoradiation therapy for lung cancer)  -08/17/2021: Hemoglobin 6.1, , serum iron 16, TIBC 175, transferrin saturation 9%, ferritin >1675.56 (relative iron deficiency)  ?  ?  #Cholelithiasis;?chronically elevated alkaline phosphatase:  -HIDA scan (04/18/2019): No gallbladder activity at 1 hour post isotope injection, compatible with cystic duct obstruction/acute cholecystitis  -02/07/2020: Limited abdominal ultrasound: Cholelithiasis, dilated CBD, hepatomegaly  (12/03/2020)  -12/17/2021:?Restaging CT C/A/P with contrast:?Gallstones noted, apart from other findings;?questionable?slightly increased?periductal dilatation  -Alk phos: 567 (12/29/2021); 337 (12/13/2021); 606 (12/09/2021),  (Bilirubin, AST, ALT normal)  -03/21/2022: MRCP (dilated common bile duct): Extrahepatic biliary ductal dilation similar to December 2021; small distal common  bile duct filling defects seen, cannot exclude choledocholithiasis; cholelithiasis; no MRI evidence of acute cholecystitis; hepatomegaly with steatosis; small right pleural effusion  ?  ?  Plan:  -Alimta/Keytruda maintenance started 09/09/2021  -No progression?post Alimta/Keytruda?x6 cycles?on restaging CTs (12/17/2021)  -Difficult to interpret restaging CTs dated 03/21/2022  >>>  -Continue Alimta/Keytruda maintenance every 3 weeks (Alimta indefinitely; pembrolizumab for 24 months)  -Check CBC and CMP every 3 weeks?before each cycle of chemotherapy  -Check TSH?every 6 weeks; next,?due now  -Restage with contrast-enhanced CT scans of C/A/P?in 3 months?(late June)  -Surveillance brain MRI scans deferred?to radiation oncology?(no metastasis on surveillance brain MRI?((03/14/2022)  -Refer to IR for diagnostic right thoracentesis?(increasing right pleural fluid?noted on restaging CTs?03/21/2022  (Send fluid for cytology,?glucose,?protein, albumin,?LDH, amylase, Gram stain, culture,?cell count, differential)  ?  Chemotherapy regimen:  1.? Day 1: Pembrolizumab 200 mg IV plus pemetrexed 500 mg/m? IV plus carboplatin AUC 5; repeat cycles every 3 weeks for 4 cycles; followed by:  2.? Day 1: Pembrolizumab 200 mg IV every 3 weeks for 24 months;  3.? Day 1: Pemetrexed 500 mg/m? IV every 3 weeks indefinitely  ?  Labs unable to perform?liquid biopsy?for PD-L1 and KRAS mutation  >>>  -We referred the patient?to pulmonary for biopsy of right lung mass, specifically to enable PD-L1 and KRAS mutation testing but she no showed  ?  -Iron deficiency anemia; FOBT positive;;?s/p Feraheme x2 (10/26/2020-11/03/2020)  -No improvement in hemoglobin despite normalization of iron stores?with?Feraheme  (Anemia of chronic disease)  ?  -Cholelithiasis; chronically elevated alkaline phosphatase  -Questionable slightly increased biliary ductal dilation on CT C/A/P with contrast (12/17/2021)  -03/21/2022: MRCP (dilated common bile duct): Extrahepatic  biliary ductal dilation similar to December 2021; small distal common bile duct filling defects seen, cannot exclude choledocholithiasis; cholelithiasis; no MRI evidence of acute cholecystitis; hepatomegaly with steatosis; small right pleural effusion  >>>  -Refer to?GI for evaluation  ?  -Hypercalcemia: Calcium 10.6, albumin 2.7 (03/03/2022); calcium 10.7, albumin 2.7 (01/19/2022)  >>>  -Repeat CMP  -Check for ionized calcium level, intact PTH level,?PTH related peptide, SPEP, ANNA, 25 hydroxy vitamin D  ?  -Thrombocytosis is?reactive to iron deficiency and underlying metastatic lung cancer  ?  On Norco?for right posterior chest wall pain  -03/24/2022:?From now on,?Norco 10 mg pills not more than twice a day?  ?  Follow-up visit with me?in 3 weeks.  ?  Above discussed with her?and her daughter. ?All questions answered.  Discussed labs and scans and gave her copies of relevant reports.  She understands and agrees with this plan, and was appreciative.  Physical Exam  Vitals & Measurements  T:?36.8? ?C (Oral)? HR:?82(Peripheral)? RR:?20? BP:?120/74?  HT:?168.00?cm? WT:?79.300?kg? BMI:?28.1?   Problem List/Past Medical History  Ongoing  Abnormal imaging  Adjustment and management of vascular access device  Alkaline phosphatase level  Brain metastasis  Cancer of upper lobe of right lung  Cancer related pain  Cholelithiasis  Chronic cough  COPD - Chronic obstructive pulmonary disease  Current smoker  Dilated cbd, acquired  Hepatomegaly  Iron deficiency anemia  Microcytic anemia  Obesity  PAD - Peripheral arterial disease  Thrombocytosis  Tobacco user  Historical  No qualifying data  Procedure/Surgical History  Contrast injection(s) for radiologic evaluation of existing central venous access device, including fluoroscopy, image documentation and report (02/09/2022)  Transfusion of Nonautologous Red Blood Cells into Peripheral Vein, Percutaneous Approach (08/18/2021)  Insertion of Infusion Device into Superior Vena Cava,  Percutaneous Approach (07/12/2021)  Introduction of Vasopressor into Central Vein, Percutaneous Approach (07/12/2021)  Catheter Insertion Mediport (None) (05/12/2021)  Fluoroscopy of Superior Vena Cava using Low Osmolar Contrast, Guidance (05/12/2021)  Insertion of Infusion Device into Superior Vena Cava, Percutaneous Approach (05/12/2021)  Insertion of Totally Implantable Vascular Access Device into Chest Subcutaneous Tissue and Fascia, Open Approach (05/12/2021)  Insertion of tunneled centrally inserted central venous access device, with subcutaneous port; age 5 years or older (05/12/2021)  Colonoscopy (None) (02/10/2021)  Colonoscopy, flexible; diagnostic, including collection of specimen(s) by brushing or washing, when performed (separate procedure) (02/10/2021)  Esophagogastroduodenoscopy (02/10/2021)  Esophagogastroduodenoscopy, flexible, transoral; diagnostic, including collection of specimen(s) by brushing or washing, when performed (separate procedure) (02/10/2021)  Inspection of Lower Intestinal Tract, Via Natural or Artificial Opening Endoscopic (02/10/2021)  Inspection of Upper Intestinal Tract, Via Natural or Artificial Opening Endoscopic (02/10/2021)  Transfusion, blood or blood components (11/30/2020)  Bronchoscopy, Ebus, 3 or More Samples (09/22/2020)  Bronchoscopy, rigid or flexible, including fluoroscopic guidance, when performed; with endobronchial ultrasound (EBUS) guided transtracheal and/or transbronchial sampling (eg, aspiration[s]/biopsy[ies]), 3 or more mediastinal and/or hilar lymph node stati (09/22/2020)  Extraction of Thorax Lymphatic, Via Natural or Artificial Opening Endoscopic, Diagnostic (09/22/2020)  Transfusion of Nonautologous Red Blood Cells into Peripheral Vein, Percutaneous Approach (09/12/2020)  Transfusion, blood or blood components (09/12/2020)  Transfusion of Nonautologous Red Blood Cells into Peripheral Vein, Percutaneous Approach (09/11/2020)  Transfusion, blood or blood  components (09/11/2020)  PICC placement (2020)  Drainage of Neck Skin, External Approach (12/21/2018)  Incision and drainage of abscess (eg, carbuncle, suppurative hidradenitis, cutaneous or subcutaneous abscess, cyst, furuncle, or paronychia); simple or single (12/21/2018)   Medications  alPRAzolam 0.25 mg oral tab, 0.25 mg= 1 tab(s), Oral, TID  aspirin 81 mg oral Delayed Release (EC) tablet  Bentyl 10 mg oral capsule, 10 mg= 1 cap(s), Oral, q6hr, PRN  buPROPion 150 mg oral tablet, extended release, 150 mg= 1 tab(s), Oral, Daily,? ?Not taking  codeine-guaifenesin 10 mg-100 mg/5 mL oral syrup, 10 mL, Oral, q4hr, PRN  codeine-promethazine 10 mg-6.25 mg/5 mL oral syrup, 5 mL, Oral, Once a day (at bedtime)  cyproheptadine 4 mg oral tablet, 4 mg= 1 tab(s), Oral, Daily,? ?Not taking  Decadron 4 mg oral tablet, 4 mg= 1 tab(s), Oral, BID  DuoNeb 0.5 mg-2.5 mg/3 mL inhalation solution, 3 mL, NEB, QID  Fergon 240 mg (27 mg elemental iron) oral tablet, 240 mg= 1 tab(s), Oral, Daily, 3 refills  gabapentin 300 mg oral capsule, 300 mg= 1 cap(s), Oral, TID  Heparin Flush 100 U/mL - 5 mL, 500 units= 5 mL, IV Push, Once-chemo  Heparin Flush 100 U/mL - 5 mL, 500 units= 5 mL, IV Push, Once-chemo  loratadine 10 mg oral capsule, 10 mg= 1 cap(s), Oral, Daily  megestrol 40 mg/mL oral suspension, 800 mg= 20 mL, Oral, Daily, 1 refills  metoprolol tartrate 25 mg oral tab, 25 mg= 1 tab(s), Oral, BID, 3 refills  metoprolol tartrate 50 mg oral tab, 50 mg= 1 tab(s), Oral, BID,? 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  Exercise frequency: Daily. Exercise type: Walking., 03/04/2020  Home/Environment  Lives with Children, Spouse, grandson. Living situation: Home/Independent. TV/Computer concerns: No. Single family house, 03/04/2020  Living situation: Home/Independent., 01/18/2018  Nutrition/Health  Regular, Low fat, Good, 03/04/2020  Sexual  Sexually active: Yes. Number of current partners 1. Sexual orientation: Straight or heterosexual. Gender Identity Identifies as female. No, 03/04/2020  Spiritual/Cultural  Baptism, No, 03/04/2020  Substance Use  Never, 02/09/2022  Tobacco  Former smoker, quit more than 30 days ago, No, 03/03/2022  Family History  Heart disease: Sister and Brother.  Hypertension.: Sister and Brother.  Primary malignant neoplasm of bone: Mother.  Unknown cause of morbidity or mortality.....: Father.  Immunizations  Vaccine Date Status   influenza virus vaccine, inactivated 10/05/2011 Recorded

## 2022-05-20 NOTE — HISTORICAL [facility] CERNER
This is a historical note converted from [place]. Formatting and pictures may have been removed.  Please reference [place] for original formatting and attached multimedia. 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 Diagnosed 9/22/2020. Brain mets diagnosed 11/6/2020.  ?   Current Treatment:  2.? Day 1: Pembrolizumab 200 mg IV every 3 weeks for 24 months;  3.? Day 1: Pemetrexed 500 mg/m? IV every 3 weeks indefinitely  Started 9/9/2021  ?   Cycle #10??due 3/24/2022  ?   Dose modifications & interruptions:  Cycle #1 delayed 6 weeks d/t multiple hospitalizations  ?   Treatment History:  1. SRS to brain metastasis on 12/3/2020.  2. Carboplatin/Taxol weekly along with RT. XRT started 12/16/2020. Chemo started 12/16/2020; completed 1/29/2021.  3. Carbo/Alimta/Keytruda every 3 weeks x 4 cycles. Started 4/29/2021. Completed 4 cycles on 7/9/2021.  ?  ?   Past medical history: Cholelithiasis.? Chronic cough.? COPD.? Tobacco abuse.? Hypertension.? Dyslipidemia.? Hepatomegaly.? Obesity.? Peripheral artery disease.  Social history: . ?Lives in [place] with her  and family. ?Has 12 children. ?Has been smoking?1 pack of cigarettes daily for 40 years.? No history of alcohol or illicit drug abuse.  Family history:?Negative for cancers.  Health maintenance:  Screening mammogram in 2019 in [place], apparently unremarkable.  Mostly colonoscopy ever.  Menstrual and OB/GYN history:?Menopause?in her 50s.  ?   History of present illness:  65-year-old female referred by Dr. Steve Simpson, with lung cancer.  ?  For a full, detailed history, please see Dr. Ulrich note dated 11/27/2020.  ?  Interval History  3/3/2022: Patient presents today for scheduled follow up for metastatic right lung cancer. She is due to receive cycle?9 of Alimta/Keytruda today.?She reports tolerating treatment well. She does report  URI unrelieved. She reports persistent productive cough with green to?yellow sputum. She also reports?hot flashes/drenching night sweats. We will complete CXR today to rule out infection. She reports pain in her back and chest area. Requesting refills on?pain medication. She also request cough medication, she says she has been trying OTC cough medications but none have been effective. Lab work reviewed with patient, stable for treatment. Calcium slightly elevated 10.6, Alk phos elevated 385, albumin low 2.7, wbc slightly low?3.9, Hgb slightly low 11.0 Plt elevated?596. No further needs or questions voiced at this time.  Review of Systems  A complete 12-point ROS was performed in full with pertinent positives as described in interval history. Remainder of ROS done in full and are negative.  ?  Physical Exam  Vitals & Measurements  T:?37.0? ?C (Oral)? HR:?82(Peripheral)? RR:?18? BP:?122/75? SpO2:?96%?  HT:?160?cm? WT:?81.910?kg? BMI:?31.99?  Physical Exam:  General: Alert and oriented, No acute distress.?  Appearance: Well-groomed wearing mask  HEENT: Normocephalic, Oral mucosa is moist. Pupils are equal, round and reactive to light, Extraocular movements are intact, Normal conjunctiva.?  Neck: Supple, Non-tender, No lymphadenopathy, No thyromegaly.?  Respiratory: Lungs are clear to auscultation, Respirations are non-labored, Breath sounds are equal, Symmetrical chest wall expansion.?  Cardiovascular: Normal rate, Regular rhythm, No edema.?  Breast: Breast exam not performed on todays visit.?  Gastrointestinal: Rounded, Soft, Non-tender, Non-distended, Normal bowel sounds.?  Musculoskeletal: Normal strength. Ambulates without assistance  Integumentary: Warm, dry, intact.?  Neurologic: Alert, Oriented, No focal deficits, Cranial Nerves II-XII are grossly intact.?  Cognition and Speech: Oriented, Speech clear and coherent.?  Psychiatric: Cooperative, Appropriate mood & affect.?  ECOG Performance Scale:?1 - Capable of  all self-care?able to carry out light work activities  ?  Assessment/Plan  1.?Cancer of upper lobe of right lung?C34.11  #Poorly differentiated carcinoma of lung:  To summarize:  -Poorly differentiated carcinoma of lung, 14.0 cm right upper lobe mass invading mediastinum  -EBUS 09/20/2020  -cT4 cN2 M1b, stage RICKIE  -EGD/colonoscopy negative  -5 mm right occipital hemorrhagic metastasis 11/06/2020  -S/p SRS?to brain metastasis (2100 cGy; 1 fraction)?(12/03/2020)  -S/p chemoradiation therapy (12/2020-01/2021) for intrathoracic disease, with positive response,  -Followed by carbo/Alimta/Keytruda x4 for metastatic disease (solitary brain metastasis)?with stable disease;?questionable progression on CTs?08/2021)  -No brain metastases on surveillance brain MRI (11/11/2021)  ?  ?   #Sites of disease:  14 cm right upper lobe mass, invading mediastinum;?mediastinal lymphadenopathy;?right occipital brain metastasis  ?  ?   #Molecular markers:  -BRAF mutation negative; ROS1 gene arrangement negative; ALK rearrangement negative; PD-L1 CPS <10 (CPS 5)?(erroneously, tested for?esophageal carcinoma); EGFR negative; MET exon 14 skipping mutation negative; NTRK gene fusion negative; RET rearrangement negative  -PD-L1 testing: QNS  ?  ?   #Hypercalcemia?(11/25/2020)  -11/25/2020: Calcium 10.3 (elevated for the very first time). ?BUN 21, elevated. ?Creatinine 0.79, normal. ?Albumin 2.4.  -11/25/2020:?Intact PTH level 32.9, normal. ?Ionized calcium level 5.4, within normal limits.  -11/25/2020:?IV fluids + Zometa 4 mg IV x1  ?  ?  #Iron deficiency anemia, FOBT positive  -09/11/2020: Hemoglobin 6.9.? Microcytosis.? Elevated RDW.? Low serum iron, low TIBC, low transferrin saturation, normal ferritin.? FOBT positive.? 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  ?  #Thrombocytosis since 09/11/2020 ([phone])  -Could be reactive to iron deficiency anemia?versus metastatic lung cancer  -10/22/2020:?JAK2 mutation negative. ?BCR-ABL 1?fusion transcripts negative.? ESR, CRP elevated.? Iron deficiency anemia.  -Subsequently, resolved?(post?parenteral iron therapy;?post?chemoradiation therapy for lung cancer)  -08/17/2021: Hemoglobin 6.1, , serum iron 16, TIBC 175, transferrin saturation 9%, ferritin >1675.56 (relative iron deficiency)  ?  ?  #Cholelithiasis.? Chronically elevated alkaline phosphatase.  -HIDA scan (04/18/2019): No gallbladder activity at 1 hour post isotope injection, compatible with cystic duct obstruction/acute cholecystitis  -02/07/2020: Limited abdominal ultrasound: Cholelithiasis, dilated CBD, hepatomegaly  (12/03/2020)  ?  Plan:  -Alimta/Keytruda maintenance started 09/09/2021  >>>  -Continue Alimta/Keytruda maintenance every 3 weeks (Alimta indefinitely; pembrolizumab for 24 months)  -Check CBC and CMP every 3 weeks?before each cycle of chemotherapy  -Check TSH?every 6 weeks; next, mid?March 22  -Restage with contrast-enhanced CT scans of C/A/P?in 3 months?(November)?(due now)  -Surveillance brain MRI scans deferred?to radiation oncology?(no metastasis on surveillance brain MRI?11/11/2021)  ?  Chemotherapy regimen:  1.? Day 1: Pembrolizumab 200 mg IV plus pemetrexed 500 mg/m? IV plus carboplatin AUC 5; repeat cycles every 3 weeks for 4 cycles; followed by:  2.? Day 1: Pembrolizumab 200 mg IV every 3 weeks for 24 months;  3.? Day 1: Pemetrexed 500 mg/m? IV every 3 weeks indefinitely  ?  Check liquid biopsy?for PD-L1 and KRAS  mutation.  ?  -Iron deficiency anemia; FOBT positive;;?s/p Feraheme x2 (10/26/2020-11/03/2020)  -EGD and colonoscopy?(02/10/2021)?with a few ulcers in sigmoid colon, internal hemorrhoids, no bleeding,?radiation esophagitis?(no biopsies taken)  -08/17/2021: Hemoglobin 6.1, relative iron deficiency (serum iron 16, TIBC 175,?transferrin saturation 9%, ferritin?>1675.56), PRBC x1 unit as inpatient  -S/p Feraheme 510 mg IV x2 (09/09/2021, 09/16/2021)  >>>  -No improvement in hemoglobin  -Check reticulocyte count, serum iron, TIBC, ferritin,?B12 level, RBC folate, LDH,?haptoglobin  ?  -Thrombocytosis is?reactive to iron deficiency and underlying metastatic lung cancer  ?  Pain medications and?cough suppressant?prescription filled today.  ?  -CXR today to rule out infection  -Ok to proceed with cycle #9 of Keytruda/Alimta today.  -Check CBC and CMP every 3 weeks?before each cycle of chemotherapy.  -Check TSH?every 6 weeks; next, mid March 22  -Restage with contrast-enhanced CT scans of C/A/P?in 3 months; scheduled for 3/21/2022  -Surveillance brain MRI scans deferred?to radiation oncology-Scheduled for 3/14/2022  -Follow up in 3 weeks (F2F w/?on 3/2/2022)?CBC, CMP, TSH, Free T4 and MRI + CT results prior to cycle #10.?  -Referral to surgery for Mediport removal and?replacement-sent today  Ordered:  ?   Problem List/Past Medical History  Ongoing  Abnormal imaging  Adjustment and management of vascular access device  Alkaline phosphatase level  Brain metastasis  Cancer of upper lobe of right lung  Cancer related pain  Cholelithiasis  Chronic cough  COPD - Chronic obstructive pulmonary disease  Current smoker  Dilated cbd, acquired  Hepatomegaly  Iron deficiency anemia  Microcytic anemia  Obesity  PAD - Peripheral arterial disease  Thrombocytosis  Tobacco user  Historical  No qualifying data  Procedure/Surgical History  Contrast injection(s) for radiologic evaluation of existing central venous access device,  including fluoroscopy, image documentation and report (02/09/2022)  Transfusion of Nonautologous Red Blood Cells into Peripheral Vein, Percutaneous Approach (08/18/2021)  Insertion of Infusion Device into Superior Vena Cava, Percutaneous Approach (07/12/2021)  Introduction of Vasopressor into Central Vein, Percutaneous Approach (07/12/2021)  Catheter Insertion Mediport (None) (05/12/2021)  Fluoroscopy of Superior Vena Cava using Low Osmolar Contrast, Guidance (05/12/2021)  Insertion of Infusion Device into Superior Vena Cava, Percutaneous Approach (05/12/2021)  Insertion of Totally Implantable Vascular Access Device into Chest Subcutaneous Tissue and Fascia, Open Approach (05/12/2021)  Insertion of tunneled centrally inserted central venous access device, with subcutaneous port; age 5 years or older (05/12/2021)  Colonoscopy (None) (02/10/2021)  Colonoscopy, flexible; diagnostic, including collection of specimen(s) by brushing or washing, when performed (separate procedure) (02/10/2021)  Esophagogastroduodenoscopy (02/10/2021)  Esophagogastroduodenoscopy, flexible, transoral; diagnostic, including collection of specimen(s) by brushing or washing, when performed (separate procedure) (02/10/2021)  Inspection of Lower Intestinal Tract, Via Natural or Artificial Opening Endoscopic (02/10/2021)  Inspection of Upper Intestinal Tract, Via Natural or Artificial Opening Endoscopic (02/10/2021)  Transfusion, blood or blood components (11/30/2020)  Bronchoscopy, Ebus, 3 or More Samples (09/22/2020)  Bronchoscopy, rigid or flexible, including fluoroscopic guidance, when performed; with endobronchial ultrasound (EBUS) guided transtracheal and/or transbronchial sampling (eg, aspiration[s]/biopsy[ies]), 3 or more mediastinal and/or hilar lymph node stati (09/22/2020)  Extraction of Thorax Lymphatic, Via Natural or Artificial Opening Endoscopic, Diagnostic (09/22/2020)  Transfusion of Nonautologous Red Blood Cells into Peripheral  Vein, Percutaneous Approach (09/12/2020)  Transfusion, blood or blood components (09/12/2020)  Transfusion of Nonautologous Red Blood Cells into Peripheral Vein, Percutaneous Approach (09/11/2020)  Transfusion, blood or blood components (09/11/2020)  PICC placement (2020)  Drainage of Neck Skin, External Approach (12/21/2018)  Incision and drainage of abscess (eg, carbuncle, suppurative hidradenitis, cutaneous or subcutaneous abscess, cyst, furuncle, or paronychia); simple or single (12/21/2018)   Medications  Alimta (for IVPB)  alPRAzolam 0.25 mg oral tab, 0.25 mg= 1 tab(s), Oral, TID  aspirin 81 mg oral Delayed Release (EC) tablet  Bentyl 10 mg oral capsule, 10 mg= 1 cap(s), Oral, q6hr, PRN  buPROPion 150 mg oral tablet, extended release, 150 mg= 1 tab(s), Oral, Daily,? ?Not taking  codeine-guaifenesin 10 mg-100 mg/5 mL oral syrup, 10 mL, Oral, q4hr, PRN  codeine-promethazine 10 mg-6.25 mg/5 mL oral syrup, 5 mL, Oral, Once a day (at bedtime)  cyanocobalamin, 1000 mcg, IM, Once-chemo  cyproheptadine 4 mg oral tablet, 4 mg= 1 tab(s), Oral, Daily,? ?Not taking  Decadron 4 mg oral tablet, 4 mg= 1 tab(s), Oral, BID  dexamethasone (for IVPB), 10 mg, IV Piggyback, Once-chemo  DuoNeb 0.5 mg-2.5 mg/3 mL inhalation solution, 3 mL, NEB, QID  Fergon 240 mg (27 mg elemental iron) oral tablet, 240 mg= 1 tab(s), Oral, Daily, 3 refills  gabapentin 300 mg oral capsule, 300 mg= 1 cap(s), Oral, TID  Heparin Flush 100 U/mL - 5 mL, 500 units, IV Push, Once-chemo  Heparin Flush 100 U/mL - 5 mL, 500 units= 5 mL, IV Push, Once-chemo  Heparin Flush 100 U/mL - 5 mL, 500 units= 5 mL, IV Push, Once-chemo  Heparin Lock Flush 100 units/mL intravenous solution, 100 units= 1 mL, IV Push, Once-chemo  Keytruda  loratadine 10 mg oral capsule, 10 mg= 1 cap(s), Oral, Daily  megestrol 40 mg/mL oral suspension, 800 mg= 20 mL, Oral, Daily, 1 refills  metoprolol tartrate 25 mg oral tab, 25 mg= 1 tab(s), Oral, BID, 3 refills  metoprolol tartrate 50 mg  oral tab, 50 mg= 1 tab(s), Oral, BID,? 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 Exercise frequency: Daily. Exercise type: Walking., 03/04/2020  Home/Environment  Lives with Children, Spouse, grandson. Living situation: Home/Independent. TV/Computer concerns: No. Single family house, 03/04/2020  Living situation: Home/Independent., 01/18/2018  Nutrition/Health  Regular, Low fat, Good, 03/04/2020  Sexual  Sexually active: Yes. Number of current partners 1. Sexual orientation: Straight or heterosexual. Gender Identity Identifies as female. No, 03/04/2020  Spiritual/Cultural  Orthodoxy, No, 03/04/2020  Substance Use  Never, 02/09/2022  Tobacco  Former smoker, quit more than 30 days ago, No, 03/03/2022  Family History  Heart disease: Sister and Brother.  Hypertension.: Sister and Brother.  Primary malignant neoplasm of bone: Mother.  Unknown cause of morbidity or mortality.....: Father.  Immunizations  Vaccine Date Status   influenza virus vaccine, inactivated 10/05/2011 Recorded   Health Maintenance  Health Maintenance  ???Pending?(in the next year)  ??? ??OverDue  ??? ? ? ?ADL Screening due??09/15/21??and every 1??year(s)  ??? ? ? ?Influenza Vaccine due??10/01/21??and every 1??day(s)  ??? ? ? ?Alcohol Misuse Screening due??01/02/22??and every 1??year(s)  ??? ? ? ?Cognitive Screening due??01/02/22??and every 1??year(s)  ??? ??Due?  ??? ? ? ?Bone Density Screening due??03/03/22??Variable  frequency  ??? ? ? ?Breast Cancer Screening due??03/03/22??Unknown Frequency  ??? ? ? ?COPD Maintenance-Pulmonary Rehab Education due??03/03/22??and every 1??year(s)  ??? ? ? ?IPPE due??03/03/22??One-time only  ??? ? ? ?Lung Cancer Screening due??03/03/22??and every 1??year(s)  ??? ? ? ?Medicare Annual Wellness Exam due??03/03/22??and every 1??year(s)  ??? ? ? ?Pneumococcal Vaccine due??03/03/22??Unknown Frequency  ??? ? ? ?Tetanus Vaccine due??03/03/22??and every 10??year(s)  ??? ? ? ?Zoster Vaccine due??03/03/22??Unknown Frequency  ??? ??Refused?  ??? ? ? ?Smoking Cessation due??01/01/22??and every 1??year(s)  ??? ??Due In Future?  ??? ? ? ?COPD Management-COPD Medications Prescribed not due until??08/18/22??and every 1??year(s)  ??? ? ? ?Aspirin Therapy for CVD Prevention not due until??08/26/22??and every 1??year(s)  ??? ? ? ?Obesity Screening not due until??01/01/23??and every 1??year(s)  ??? ? ? ?Advance Directive not due until??01/02/23??and every 1??year(s)  ??? ? ? ?Fall Risk Assessment not due until??01/02/23??and every 1??year(s)  ??? ? ? ?Functional Assessment not due until??01/02/23??and every 1??year(s)  ???Satisfied?(in the past 1 year)  ??? ??Satisfied?  ??? ? ? ?Advance Directive on??01/19/22.??Satisfied by Venita Chaney  ??? ? ? ?Aspirin Therapy for CVD Prevention on??08/26/21.??Satisfied by Gale Marin RN  ??? ? ? ?Blood Pressure Screening on??03/03/22.??Satisfied by NIC Paige Ericka  ??? ? ? ?Body Mass Index Check on??03/03/22.??Satisfied by Joseph Patel RN, Lacy  ??? ? ? ?COPD Maintenance-Spirometry on??07/21/21.??Satisfied by She Cunningham  ??? ? ? ?COPD Management-COPD Medications Prescribed on??08/18/21.??Satisfied by Gennaro Rodriguez DO  ??? ? ? ?COPD Management-Oxygen Assessment on??03/03/22.??Satisfied by NIC Paige Ericka  ??? ? ? ?Depression Screening on??03/03/22.??Satisfied by NIC Paige Ericka  ??? ? ? ?Diabetes Screening on??03/03/22.??Satisfied by  Kevan Osorio.  ??? ? ? ?Fall Risk Assessment on??03/03/22.??Satisfied by Lacy Brady RN  ??? ? ? ?Functional Assessment on??03/03/22.??Satisfied by Lacy Brady RN  ??? ? ? ?Hypertension Management-Blood Pressure on??03/03/22.??Satisfied by NIC Paige Ericka  ??? ? ? ?Hypertension Management-BMP on??03/03/22.??Satisfied by Kevan Osorio.  ??? ? ? ?Influenza Vaccine on??03/03/22.??Satisfied by Lacy Brady RN  ??? ? ? ?Obesity Screening on??03/03/22.??Satisfied by Lacy Brady RN  ?  Lab Results  Test Name Test Result Date/Time   Sodium Lvl 138 mmol/L 03/03/2022 09:02 CST   Potassium Lvl 4.5 mmol/L 03/03/2022 09:02 CST   Chloride 106 mmol/L 03/03/2022 09:02 CST   CO2 24 mmol/L 03/03/2022 09:02 CST   Calcium Lvl 10.6 mg/dL (High) 03/03/2022 09:02 CST   Magnesium Lvl 2.00 mg/dL 03/03/2022 09:02 CST   Glucose Lvl 193 mg/dL (High) 03/03/2022 09:02 CST   BUN 12.5 mg/dL 03/03/2022 09:02 CST   Creatinine 0.75 mg/dL 03/03/2022 09:02 CST   Est Creat Clearance Ser 83.86 mL/min 03/03/2022 10:09 CST   eGFR- 03/03/2022 09:02 CST   eGFR-WILFRID 82 mL/min/1.73 m2 (Low) 03/03/2022 09:02 CST   Bili Total 0.3 mg/dL 03/03/2022 09:02 CST   Bili Direct 0.2 mg/dL 03/03/2022 09:02 CST   Bili Indirect 0.10 mg/dL 03/03/2022 09:02 CST   AST 15 unit/L 03/03/2022 09:02 CST   ALT 23 unit/L 03/03/2022 09:02 CST   Alk Phos 385 unit/L (High) 03/03/2022 09:02 CST   Total Protein 7.9 gm/dL (High) 03/03/2022 09:02 CST   Albumin Lvl 2.7 gm/dL (Low) 03/03/2022 09:02 CST   Globulin 5.2 gm/dL (High) 03/03/2022 09:02 CST   A/G Ratio 0.5 ratio (Low) 03/03/2022 09:02 CST   WBC 3.9 x10(3)/mcL (Low) 03/03/2022 09:02 CST   RBC 3.81 x10(6)/mcL (Low) 03/03/2022 09:02 CST   Hgb 11.0 gm/dL (Low) 03/03/2022 09:02 CST   Hct 36.7 % 03/03/2022 09:02 CST   Platelet 596 x10(3)/mcL (High) 03/03/2022 09:02 CST   MCV 96.3 fL 03/03/2022 09:02 CST   MCH 28.9 pg 03/03/2022 09:02 CST   MCHC 30.0 gm/dL (Low) 03/03/2022 09:02 CST   RDW 17.2 % (High) 03/03/2022  09:02 CST   MPV 8.5 fL 03/03/2022 09:02 CST   Abs Neut 3.11 x10(3)/mcL 03/03/2022 09:02 CST   Neutro Auto 80 % 03/03/2022 09:02 CST   Lymph Auto 13 % 03/03/2022 09:02 CST   Mono Auto 7 % 03/03/2022 09:02 CST   Abs Neutro 3.11 x10(3)/mcL 03/03/2022 09:02 CST   Abs Lymph 0.5 x10(3)/mcL (Low) 03/03/2022 09:02 CST   Abs Mono 0.3 x10(3)/mcL 03/03/2022 09:02 CST   NRBC% 0.0 % 03/03/2022 09:02 CST   IG% 0 % 03/03/2022 09:02 CST   IG# 0.020 03/03/2022 09:02 CST

## 2022-06-02 NOTE — PROGRESS NOTES
Chief complaint:  Metastatic lung cancer; follow up     Reason for follow-up:  Poorly differentiated carcinoma of right lung, stage IV: Right upper lobe mass, mediastinal lymphadenopathy, brain metastasis  Iron deficiency anemia; FOBT positive  Thrombocytosis    66 year old with metastatic lung cancer, currently on maintenance therapy with Alimta Keytruda.     Interval history: Seen in follow up today 6/2/22; accompanied by her daughter. Reportedly not feeling well the past 4 days; spending most of her days in bed having generalized weakness; aches, productive cough with yellow sputum; no reported fevers. Denies any other infection s/s. Denies other problems or concerns.     ROS: Negative as otherwise stated in HPI    General: Alert and oriented. + fatigue  Eye: Pupils are equal, round and reactive to light, Extraocular movements are intact. Normal conjunctiva  HENT: Normocephalic. Oropharynx exam deferred; mask in place due to coronavirus  Neck: Supple, Non-tender  Respiratory: Respirations are non-labored, Symmetrical chest wall expansion. Coarse breath sounds   Cardiovascular: Regular rate, rhythm, Normal peripheral perfusion, No bilateral lower extremity edema  Breast: Exam deferred  Gastrointestinal: Non-distended, Present bowel sounds   GYN: Exam deferred  Genitourinary: Exam deferred  Lymphatics: No lymphadenopathy appreciated  Musculoskeletal: Moves all extremities  Integumentary: Intact. Warm, dry. No rashes, or lesions to visible skin  Neurologic: No focal deficits  Psychiatric: Cooperative. Appropriate mood and affect     Lab Results   Component Value Date    WBC 12.3 (H) 06/02/2022    RBC 3.20 (L) 06/02/2022    HGB 9.5 (L) 06/02/2022    HCT 31.2 (L) 06/02/2022    MCV 97.5 (H) 06/02/2022    MCH 29.7 06/02/2022    MCHC 30.4 (L) 06/02/2022    RDW 19.9 (H) 06/02/2022     (H) 06/02/2022    MPV 9.6 06/02/2022     CMP  Sodium Level   Date Value Ref Range Status   06/02/2022 137 136 - 145 mmol/L Final      Potassium Level   Date Value Ref Range Status   06/02/2022 4.8 3.5 - 5.1 mmol/L Final     Carbon Dioxide   Date Value Ref Range Status   06/02/2022 29 23 - 31 mmol/L Final     Blood Urea Nitrogen   Date Value Ref Range Status   06/02/2022 10.8 9.8 - 20.1 mg/dL Final     Creatinine   Date Value Ref Range Status   06/02/2022 0.75 0.55 - 1.02 mg/dL Final     Calcium Level Total   Date Value Ref Range Status   06/02/2022 9.6 8.4 - 10.2 mg/dL Final     Albumin Level   Date Value Ref Range Status   06/02/2022 2.1 (L) 3.4 - 4.8 gm/dL Final     Bilirubin Total   Date Value Ref Range Status   06/02/2022 0.5 <=1.5 mg/dL Final     Alkaline Phosphatase   Date Value Ref Range Status   06/02/2022 201 (H) 40 - 150 unit/L Final     Aspartate Aminotransferase   Date Value Ref Range Status   06/02/2022 15 5 - 34 unit/L Final     Alanine Aminotransferase   Date Value Ref Range Status   06/02/2022 9 0 - 55 unit/L Final     Estimated GFR-Non    Date Value Ref Range Status   04/22/2022 81 >=90        Poorly differentiated carcinoma of lung  -14.0 cm right upper lobe mass invading mediastinum  -EBUS 09/20/2020  -cT4 cN2 M1b, stage RICKIE  -EGD/colonoscopy negative  -5 mm right occipital hemorrhagic metastasis 11/06/2020  -S/p SRS to brain metastasis (2100 cGy; 1 fraction) (12/03/2020)  -S/p chemoradiation therapy (12/2020-01/2021) for intrathoracic disease (oligometastatic disease), with positive response  -Followed by carbo/Alimta/Keytruda x4 for metastatic disease (solitary brain metastasis) with stable disease; questionable progression on CTs 08/2021)  -Alimta / Keytruda maintenance started 09/09/2021  -No brain metastases on surveillance brain MRI (11/11/2021)  -No progression on restaging CTs post maintenance Alimta/Keytruda x6 (12/17/2021)  -No brain metastasis on surveillance brain MRI (03/14/2022)  -Difficult to interpret restaging CTs 03/21/2022    Sites of disease:  14 cm right upper lobe mass, invading  mediastinum; mediastinal lymphadenopathy; right occipital brain metastasis    Molecular markers:  -BRAF mutation negative; ROS1 gene arrangement negative; ALK rearrangement negative; PD-L1 CPS <10 (CPS 5) (erroneously, tested for esophageal carcinoma); EGFR negative; MET exon 14 skipping mutation negative; NTRK gene fusion negative; RET rearrangement negative  -PD-L1 testing: QNS    Hypercalcemia   -11/25/2020: Calcium 10.3 (elevated for the very first time). BUN 21, elevated. Creatinine 0.79, normal. Albumin 2.4.  -11/25/2020: Intact PTH level 32.9, normal. Ionized calcium level 5.4, within normal limits.  -11/25/2020: IV fluids + Zometa 4 mg IV x1  -03/24/2022: Calcium 10.7, albumin 2.8, M spike 0.02 g/dL, IgG kappa; IgG, IgA, IgM normal; free kappa light chains 2.22, elevated; lambda light chains, kappa/lambda ratio normal; PTH related peptide <0.4; ionized calcium level 5.34 mg/dL, normal; 25 hydroxy vitamin D level 38 ng/mL, normal; Intact PTH 52.7, normal  (03/24/2022: Mild hypercalcemia; 0.02 g/dL IgG kappa M spike; intact PTH normal; PTH related peptide normal; vitamin D level normal; ionized calcium level normal; kappa/lambda ratio normal)  -stable    Iron deficiency anemia,   -FOBT positive  (Anemia of chronic disease)  -09/11/2020: Hemoglobin 6.9. Microcytosis. Elevated RDW. Low serum iron, low TIBC, low transferrin saturation, normal ferritin. FOBT positive. PRBC x2  -10/22/2020: Iron deficiency (transferrin saturation 6%, ferritin elevated to 248.95); folate, B12 stores adequate; no monoclonal gammopathy; no hemolysis; hypoproliferative anemia  -Feraheme 510 mg IV x2 (10/26/2020-11/03/2020)  -02/10/2021: Colonoscopy: A few ulcers in sigmoid colon; mild diverticulosis in sigmoid colon, without bleeding; internal hemorrhoids (no biopsies)  -02/10/2021: EGD: Moderately severe radiation esophagitis (radiation esophagitis); normal stomach; duodenal mucosal atrophy (no biopsies)  -08/17/2021: Hemoglobin 6.1,  relative iron deficiency (serum iron 16, TIBC 175, transferrin saturation 9%, ferritin >1675.56), PRBC x1 unit as inpatient  -S/p Feraheme 510 mg IV x2 (09/09/2021, 09/16/2021)  -No improvement in hemoglobin despite normalization of iron stores (12/09/2021)  -12/09/2021: Iron, B12, folate stores normal; no hemolysis  (Anemia of chronic disease)    Thrombocytosis since 09/11/2020 ([id number])  -Could be reactive to iron deficiency anemia versus metastatic lung cancer  -10/22/2020: JAK2 mutation negative. BCR-ABL 1 fusion transcripts negative. ESR, CRP elevated. Iron deficiency anemia.  -Subsequently, resolved (post parenteral iron therapy; post chemoradiation therapy for lung cancer)  -08/17/2021: Hemoglobin 6.1, , serum iron 16, TIBC 175, transferrin saturation 9%, ferritin >1675.56 (relative iron deficiency)  -6/2/2022: Likely reactive as noted above      Cholelithiasis; chronically elevated alkaline phosphatase, possible choledocholithiasis:  -HIDA scan (04/18/2019): No gallbladder activity at 1 hour post isotope injection, compatible with cystic duct obstruction/acute cholecystitis  -02/07/2020: Limited abdominal ultrasound: Cholelithiasis, dilated CBD, hepatomegaly  (12/03/2020)  -12/17/2021: Restaging CT C/A/P with contrast: Gallstones noted, apart from other findings; questionable slightly increased periductal dilatation  -Alk phos: 567 (12/29/2021); 337 (12/13/2021); 606 (12/09/2021),  (Bilirubin, AST, ALT normal)  -03/21/2022: MRCP (dilated common bile duct): Extrahepatic biliary ductal dilation similar to December 2021; small distal common bile duct filling defects seen, cannot exclude choledocholithiasis; cholelithiasis; no MRI evidence of acute cholecystitis; hepatomegaly with steatosis; small right pleural effusion  - Has been referred to GI for evaluation    Chest wall pain  -continue on Norco 10/325mg every 4 hours as needed     Plan:  -Alimta/Keytruda maintenance started 09/09/2021  -No  progression post Alimta/Keytruda x 6 cycles on restaging CTs (12/17/2021)  -Difficult to interpret restaging CTs dated 03/21/2022    -Continue Alimta/Keytruda maintenance every 3 weeks (Alimta indefinitely; pembrolizumab for 24 months)  -Check CBC and CMP every 3 weeks before each cycle of chemotherapy  -Check TSH every 6 weeks; next, mid July   -Restage with contrast-enhanced CT scans of C/A/P in 3 months (late June); pending r/s to July 2022  -Surveillance brain MRI scans deferred to radiation oncology (no metastasis on surveillance brain MRI (03/14/2022)  -Per IR, not enough fluid to allow right-sided thoracentesis (increasing right pleural fluid noted on restaging CTs 03/21/2022    Chemotherapy regimen:  1. Day 1: Pembrolizumab 200 mg IV plus pemetrexed 500 mg/m² IV plus carboplatin AUC 5; repeat cycles every 3 weeks for 4 cycles; followed by:  2. Day 1: Pembrolizumab 200 mg IV every 3 weeks for 24 months;  3. Day 1: Pemetrexed 500 mg/m² IV every 3 weeks indefinitely    Labs unable to perform liquid biopsy for PD-L1 and KRAS mutation    -We referred the patient to pulmonary for biopsy of right lung mass, specifically to enable PD-L1 and KRAS mutation testing but she no showed    In 6 months (September' 22), recheck SPEP, ANNA, FLC assay         Follow-up  -Labs reviewed/discussed; mild elevation in white count; afebrile; presenting today with productive cough/congestion; cannot r/o respiratory infection; will therefore hold C17 Alimta / Keytruda today. Have advised she pursue Covid testing; have ordered stat chest XR and will notify her of the results; empirically starting on oral Levofloxacin 750 mg daily x 7 days   -Restaging CT CAP scheduled 6/7/22; given limitation in contrast availability, known metastatic disease, new c/o bone aches; mildly elevated Alk Phos; will pursue PET/CT for further evaluation and r/o disease progression; to be done prior to her next clinic follow up. This has been ordered   -Refills  today on Norco, Megestrol  -RTC for labs, provider visit, scan review, ? treatment in 3 weeks pending clinical stability  -Advised in the interim on pursuing ED evaluation in the event she develops any fevers, rigors, acute cardiac and/or progressive respiratory symptoms      Addendum: Patient notified of CXR results from 6/2/22 revealing new left basilar airspace opacity which may represent developing consolidation. - Will continue on oral antibiotic therapy as noted above

## 2022-06-07 NOTE — H&P (VIEW-ONLY)
[facility] Vascular Surgery   Clinic H&P     CC: Mediport replacement vs mediport repositioning       Subjective:     HPI: Pt is a 66 yr old female with PMH of HTN, cholelithiasis, COPD, hepatomegaly, peripheral arterial disease, poorly differentiated carcinoma of the right lung (cT4 cN2 M1b, stage 4A) with right upper lobe mass, mediastinal lymphadenopathy, and brain mets who presented to vascular surgery clinic for mediport replacement vs mediport repositioning. Pt had a mediport placement at [facility] on 05/12/21. Pt says that chemotherapy via the mediport worked 3 times after placement but stopped working due to possible displacement of mediport; pt had to use an IV line for chemotherapy since then. Chemotherapy regimen includes Alimta/Keytruda maintenance every 3 weeks (Alimta indefinitely; pembrolizumab for 24 months). Denies fever, chills or mediport pain, erythema, induration.      PMH:  History reviewed. No pertinent past medical history.  *Poorly differentiated carcinoma of the right lung (stage 4) with right upper lobe mass, mediastinal lymphadenopathy, and brain mets   *HTN   *Cholelithiasis   *COPD   *Hepatomegaly   *PAD     PSH:  History reviewed. No pertinent surgical history.   *Mediport placement (05/12/21)  *PICC placement (2020)   *Incision and drainage (12/21/2018)     Medications:  Current Outpatient Medications on File Prior to Visit   Medication Sig Dispense Refill    HYDROcodone-acetaminophen (NORCO)  mg per tablet Take 1 tablet by mouth every 4 (four) hours as needed for Pain. 90 tablet 0    levoFLOXacin (LEVAQUIN) 750 MG tablet Take 1 tablet (750 mg total) by mouth once daily. for 7 days 7 tablet 0    megestroL (MEGACE) 400 mg/10 mL (40 mg/mL) Susp Take 10 mLs (400 mg total) by mouth 2 (two) times daily. 480 mL 1     No current facility-administered medications on file prior to visit.        Allergies:  Review of patient's allergies indicates:  No Known Allergies      Social Hx:  Social History      Tobacco Use   Smoking Status Current Every Day Smoker   Smokeless Tobacco Former User      Social History     Substance and Sexual Activity   Alcohol Use Not Currently   *Alcohol: Past drinker. Hasn't had alcohol for years   *Tobacco: Smokes 1 pack for 2 days. Smoking since 19 yrs old   *Illicit: N/A     Relevant Family Hx:  History reviewed. No pertinent family history.      Objective:     Physical Exam:  Gen: NAD  Neuro: awake, alert, answering questions appropriately  CV: RRR  Resp: non-labored breathing, PATRICIA  Abd: soft, ND, NT  Ext: moves all 4 spontaneously and purposefully  Skin: warm, well perfused. Mediport placed. No PICC.      Labs:  N/A     Imaging:  N/A     Assessment/Plan:  Pt is a 66 yr old female with PMH of poorly differentiated adenocarcinoma of the right lung (stage 4) with right upper lobe mass, mediastinal lymphadenopathy, and brain mets who presented to vascular surgery clinic for mediport replacement vs mediport repositioning. Pt has been taking Alimta/Keytruda maintenance every 3 weeks (Alimta indefinitely; pembrolizumab for 24 months) past 2-3 months.   - prior Left IJ mediport placed 7/2021. Apparently began to malfunction after only 1-2 months of use. She has been receiving chemo via peripheral line.   - Schedule mediport replacement on 06/17/22  - will need to discuss with [facility] staff if would prefer procedure to be done in IR.    Cuate Nix  U General Surgery PGY 1

## 2022-06-07 NOTE — PROGRESS NOTES
[facility] Vascular Surgery   Clinic H&P     CC: Mediport replacement vs mediport repositioning       Subjective:     HPI: Pt is a 66 yr old female with PMH of HTN, cholelithiasis, COPD, hepatomegaly, peripheral arterial disease, poorly differentiated carcinoma of the right lung (cT4 cN2 M1b, stage 4A) with right upper lobe mass, mediastinal lymphadenopathy, and brain mets who presented to vascular surgery clinic for mediport replacement vs mediport repositioning. Pt had a mediport placement at [facility] on 05/12/21. Pt says that chemotherapy via the mediport worked 3 times after placement but stopped working due to possible displacement of mediport; pt had to use an IV line for chemotherapy since then. Chemotherapy regimen includes Alimta/Keytruda maintenance every 3 weeks (Alimta indefinitely; pembrolizumab for 24 months). Denies fever, chills or mediport pain, erythema, induration.      PMH:  History reviewed. No pertinent past medical history.  *Poorly differentiated carcinoma of the right lung (stage 4) with right upper lobe mass, mediastinal lymphadenopathy, and brain mets   *HTN   *Cholelithiasis   *COPD   *Hepatomegaly   *PAD     PSH:  History reviewed. No pertinent surgical history.   *Mediport placement (05/12/21)  *PICC placement (2020)   *Incision and drainage (12/21/2018)     Medications:  Current Outpatient Medications on File Prior to Visit   Medication Sig Dispense Refill    HYDROcodone-acetaminophen (NORCO)  mg per tablet Take 1 tablet by mouth every 4 (four) hours as needed for Pain. 90 tablet 0    levoFLOXacin (LEVAQUIN) 750 MG tablet Take 1 tablet (750 mg total) by mouth once daily. for 7 days 7 tablet 0    megestroL (MEGACE) 400 mg/10 mL (40 mg/mL) Susp Take 10 mLs (400 mg total) by mouth 2 (two) times daily. 480 mL 1     No current facility-administered medications on file prior to visit.        Allergies:  Review of patient's allergies indicates:  No Known Allergies      Social Hx:  Social History      Tobacco Use   Smoking Status Current Every Day Smoker   Smokeless Tobacco Former User      Social History     Substance and Sexual Activity   Alcohol Use Not Currently   *Alcohol: Past drinker. Hasn't had alcohol for years   *Tobacco: Smokes 1 pack for 2 days. Smoking since 19 yrs old   *Illicit: N/A     Relevant Family Hx:  History reviewed. No pertinent family history.      Objective:     Physical Exam:  Gen: NAD  Neuro: awake, alert, answering questions appropriately  CV: RRR  Resp: non-labored breathing, PATRICIA  Abd: soft, ND, NT  Ext: moves all 4 spontaneously and purposefully  Skin: warm, well perfused. Mediport placed. No PICC.      Labs:  N/A     Imaging:  N/A     Assessment/Plan:  Pt is a 66 yr old female with PMH of poorly differentiated adenocarcinoma of the right lung (stage 4) with right upper lobe mass, mediastinal lymphadenopathy, and brain mets who presented to vascular surgery clinic for mediport replacement vs mediport repositioning. Pt has been taking Alimta/Keytruda maintenance every 3 weeks (Alimta indefinitely; pembrolizumab for 24 months) past 2-3 months.   - prior Left IJ mediport placed 7/2021. Apparently began to malfunction after only 1-2 months of use. She has been receiving chemo via peripheral line.   - Schedule mediport replacement on 06/17/22  - will need to discuss with [facility] staff if would prefer procedure to be done in IR.    Cuate Nix  U General Surgery PGY 1

## 2022-06-08 NOTE — ANESTHESIA PREPROCEDURE EVALUATION
06/08/2022  Adrienne Urbina is a 66 y.o., female.    COVID STATUS: PREV. COVID=NEG; TEST DOS  BETA-BLOCKER: METOPROLOL    PAT NURSE PHONE INTERVIEW 6/8/22    PROBLEM LIST:  -  MALFUNCTIONING MEDIPORT, STAGE IV LUNG ADENOCARCINOMA w/BRAIN METs, MEDIASTINAL LAD (LUNG Dx 9/22/20, BRAIN Dx 11/6/20) - SRS to BRAIN 12/3/20; CHEMO/XRT 12/16/20-1/29/21; CHEMO 4/29/21-7/9/21; ALIMTA/KEYTRUDA MAINTENANCE Q3 WEEKS (STARTED 9/9/21) (ALIMTA INDEFINITELY; KEYTRUDA for 24mos.)      - S/P 9/22/20 EBUS      - S/P 5/12/21 MEDIPORT      - 3/14/22 MRI BRAIN = No acute intracranial finding or metastatic lesion. Chronic microangiopathic ischemia      - PREV. SCHEDULED 2/2022 EBUS --> 4/22/22 HEME/ONC REFERRAL to PULM for EBUS  -  PAROXYSMAL SVT       - HOSP. 8/25-8/26/21 w/SVT, CONVERTED to SINUS TACH w/ADENOSINE  -  HTN  -  PAD - on ASA 81mg; AORTIC ATHEROSCLEROSIS on XRAY  -  ANXIETY/DEPRESSION  -  CHRONIC LBP; LUMBAR DISC DISEASE  -  CHOLELITHIASIS  -  HEPATOMEGALY w/STEATOSIS  -  THROMBOCYTOSIS - 6/2/22 [id number]  -  ANEMIA - 6/2/22 H&H 9/31  -  SMOKER 40+ PPY; COPD - QUICK RELIEF USE 1-2x/WEEK, NEB Tx 4x/DAY - O2 @ 2L/NC @ HS  -  HOSP. 7/11-7/22/21 w/ACUTE HYPOXEMIC RESP. FAILURE, ACUTE SEPSIS, PNA  -  HOSP. 4/2-4/5/22 w/CAP, HYPOTENSION, HYPERCHLOREMIA, ELEVATED ALK PHOS,   -  3/21/22 CT = INCREASING RIGHT PLEURAL EFFUSION; 4/4/22 per DR. BLANCO = Not enough fluid to safely attempt Thoracentesis.  -  6/2/22 CXR= New left basilar airspace opacity may represent developing consolidation. A right upper lung opacification with cavitation. No pleural effusion.    AM Rx DOS: NORCO PRN, GABAPENTIN, ALBUTEROL INHAL or DUONEB Tx PRN, METOPROLOL, ATROVENT, BENTYL PRN    ORDERS -   SURGEON: 3/21/22 CT THORAX; 4/3/22 EKG; 6/2/22 CBC, CMP, TSH, CXR; NO NEW ORDERS   ANESTHESIA: NONE  CARDs (CIS) -  MAY 12/2/21 OV; 10/12/21 OV, EKG; 10/13/21  ECHO- EF 50%, RVSP 22mmHg; 9/2/21 EKG; 9/25/21 EKG; 2/18/20 EKG (in MEDIA)  CARDs (CIS) - 4/12/22 OV (in MEDIA)    Pre-op Assessment    I have reviewed the Patient Summary Reports.     I have reviewed the Nursing Notes. I have reviewed the NPO Status.   I have reviewed the Medications.     Review of Systems  Anesthesia Hx:  Denies Family Hx of Anesthesia complications.   Denies Personal Hx of Anesthesia complications.       Physical Exam  General: Well nourished    Airway:  Mallampati: II / II  Mouth Opening: Normal  TM Distance: Normal  Tongue: Normal  Neck ROM: Normal ROM    Dental:  Edentulous    Chest/Lungs:  Normal Respiratory Rate, Rales, Basilar    Heart:  Rate: Normal  Rhythm: Regular Rhythm        Anesthesia Plan  Type of Anesthesia, risks & benefits discussed:    Anesthesia Type: MAC  Intra-op Monitoring Plan: Standard ASA Monitors  Post Op Pain Control Plan: multimodal analgesia and IV/PO Opioids PRN  Induction:  IV  Informed Consent: Informed consent signed with the Patient and all parties understand the risks and agree with anesthesia plan.  All questions answered.   ASA Score: 4  Day of Surgery Review of History & Physical: H&P Update referred to the surgeon/provider.I have interviewed and examined the patient. I have reviewed the patient's H&P dated: There are no significant changes. H&P completed by Anesthesiologist.    Ready For Surgery From Anesthesia Perspective.     .          4/4/22 ECHO              3/21/22 CT THORAX, ABD/PELVIS

## 2022-06-17 NOTE — ANESTHESIA POSTPROCEDURE EVALUATION
Anesthesia Post Evaluation    Patient: Adrienne Urbina    Procedure(s) Performed: Procedure(s) (LRB):  Placement, Mediport and removal of prior malfunctioning mediport (Right)  REPAIR, ARTERY, CAROTID (Left)    Final Anesthesia Type: general      Patient location during evaluation: PACU  Patient participation: Yes- Able to Participate  Level of consciousness: awake and alert  Post-procedure vital signs: reviewed and stable  Pain management: adequate  Airway patency: patent    PONV status at discharge: No PONV  Anesthetic complications: no      Cardiovascular status: hemodynamically stable  Respiratory status: unassisted  Hydration status: euvolemic  Follow-up not needed.          Vitals Value Taken Time   /61 06/17/22 1057   Temp 36.5 °C (97.7 °F) 06/17/22 1020   Pulse 91 06/17/22 1057   Resp 18 06/17/22 1057   SpO2 97 % 06/17/22 1057         No case tracking events are documented in the log.      Pain/Soraida Score: Pain Rating Prior to Med Admin: 6 (6/17/2022 10:57 AM)  Pain Rating Post Med Admin: 0 (6/17/2022 10:54 AM)  Soraida Score: 9 (6/17/2022 10:57 AM)

## 2022-06-17 NOTE — ANESTHESIA POSTPROCEDURE EVALUATION
Anesthesia Post Evaluation    Patient: Adrienne Urbina   PT sustained inadvertent left carotid defect and GETA was introduced to facilitate repair    Procedure(s) Performed: Procedure(s) (LRB):  Placement, Mediport and removal of prior malfunctioning mediport (Right)  REPAIR, ARTERY, CAROTID (Left)    Final Anesthesia Type: general      Patient location during evaluation: PACU  Patient participation: Yes- Able to Participate  Level of consciousness: awake and alert  Post-procedure vital signs: reviewed and stable  Pain management: adequate  Airway patency: patent    PONV status at discharge: No PONV  Anesthetic complications: no      Cardiovascular status: hemodynamically stable  Respiratory status: unassisted  Hydration status: euvolemic  Follow-up not needed.          Vitals Value Taken Time   /68 06/17/22 1110   Temp 36.6 °C (97.9 °F) 06/17/22 1110   Pulse 93 06/17/22 1144   Resp 20 06/17/22 1214   SpO2 96 % 06/17/22 1144         Event Time   Out of Recovery 11:15:00         Pain/Soraida Score: Pain Rating Prior to Med Admin: 10 (6/17/2022 12:14 PM)  Pain Rating Post Med Admin: 0 (6/17/2022 10:54 AM)  Soraida Score: 9 (6/17/2022 11:10 AM)

## 2022-06-17 NOTE — PLAN OF CARE
Problem: Surgical Site Infection  Goal: Absence of Infection Signs and Symptoms  Intervention: Prevent or Manage Infection  Flowsheets (Taken 6/17/2022 1416)  Infection Management: aseptic technique maintained

## 2022-06-17 NOTE — TRANSFER OF CARE
Anesthesia Transfer of Care Note    Patient: Adrienne Urbina    Procedure(s) Performed: Procedure(s) (LRB):  Placement, Mediport and removal of prior malfunctioning mediport (Right)  REPAIR, ARTERY, CAROTID (Left)    Patient location: PACU    Anesthesia Type: general    Transport from OR: Transported from OR on room air with adequate spontaneous ventilation    Post pain: adequate analgesia    Post assessment: no apparent anesthetic complications    Post vital signs: stable    Level of consciousness: sedated    Nausea/Vomiting: no nausea/vomiting    Complications: none    Transfer of care protocol was followed    ORAL AIRWAY, VENITILATING WELL, VSS  Last vitals:   Visit Vitals  /61 (BP Location: Left arm, Patient Position: Lying)   Pulse 91   Temp 36.5 °C (97.7 °F) (Temporal)   Resp 18   Wt 75.1 kg (165 lb 9.1 oz)   SpO2 97%   Breastfeeding No   BMI 26.93 kg/m²

## 2022-06-17 NOTE — ANESTHESIA PROCEDURE NOTES
Intubation    Date/Time: 6/17/2022 8:09 AM  Performed by: Diya Pompa CRNA  Authorized by: Tiffany Polo MD     Intubation:     Induction:  Intravenous    Intubated:  Postinduction    Mask Ventilation:  Easy with oral airway    Attempts:  1    Attempted By:  CRNA    Method of Intubation:  Direct    Blade:  Robbins 2    Laryngeal View Grade: Grade I - full view of cords      Difficult Airway Encountered?: No      Complications:  None    Airway Device:  Oral endotracheal tube    Airway Device Size:  7.5    Style/Cuff Inflation:  Cuffed (inflated to minimal occlusive pressure)    Tube secured:  21    Secured at:  The lips    Placement Verified By:  Capnometry    Complicating Factors:  None    Findings Post-Intubation:  BS equal bilateral and atraumatic/condition of teeth unchanged  Notes:      ASKED TO INTUBATE BY STAFF SURGEON FOR CAROTID REPAIR

## 2022-06-17 NOTE — OP NOTE
[facility] - Periop Services  General Surgery  Operative Note    SUMMARY     Date of Procedure: 2022     Procedure:   1. L IJ Mediport removal  2. L subclavian artery cutdown and primary repair  3. R IJ Mediport placement    Surgeon(s) and Role:  Staff: Juan Luis Healy MD  Resident(s): Alexis Scheuermann, MD; Cuate Nix MD    Pre-Operative Diagnosis: Metastatic lung cancer    Post-Operative Diagnosis:   1. Metastatic lung cancer  2. Cannulation of left subclavian artery    Anesthesia: GETA    Description of Technical Procedures:   The patient was identified in the pre-op holding area by name, , and MRN. After verifying that written informed consent had been obtained, the patient was taken to the OR and placed on the table in the supine position. GETA was administered by anesthesia w/o complications. The B/L neck and chest were prepped and draped in the usual sterile fashion. A standard time-out was performed, again identifying the correct patient and procedure to be performed.     The patient's Mediport was easily palpable in the L chest wall. A #15 blade was used to make an incision through the patient's previous scar overlying the Mediport. Bovie electrocautery was used to deepen the incision until the Mediport was encountered. Two sutures were identified holding the Mediport in place in the subcutaneous pocket. Both sutures were cut, and the Mediport and the attached catheter were easily removed.     Attempts were made to cannulate the L subclavian vein w/o success. US guidance was then used to identify the L IJ vein, which was quite small and measured only about 0.5 cc in diameter. Under US guidance, the L IJ appeared to be cannulated w/ return of dark red venous-appearing blood. A guide wire was placed over the needle. Fluoroscopy was used to visualize the wire, which crossed midline and appeared to be in the SVC. The needle was removed, and a dilator w/ peel away sheath was inserted over the  wire. The dilator and wire were removed, and pulsatile blood flow was seen coming from the peel away sheath. At this time, there was concern that the arterial circulation had been cannulated, and the wire was actually in the ascending aorta rather than the SVC.     A #15 blade was used to make an incision around the peel away sheath. A combination of Bovie electrocautery and blunt dissected were used to deepen the incision until the sheath was identified entering the subclavian artery between the vertebral artery and thyrocervical trunk. Proximal and distal control of the subclavian artery was obtained w/ vessel loops. The sheath was removed, and the resultant defect in the subclavian artery was closed w/ 6-0 Prolene sutures. The area was irrigated w/ sterile saline, suctioned, and found to be hemostatic.     Attention was then turned to the R neck/chest. US was used to identify the R IJ vein, which appeared 1.5-2.0 cm in diameter. The R IJ was easily cannulated. A guide wire was passed over the needle, and appropriate position of the wire in the IVC was confirmed on fluoroscopy. A dilatory/peel away sheath was placed over the wire; then the wire and dilator were removed. A #15 blade was used to make an incision on the R chest wall. Bovie electrocautery and blunt dissection were used to create a subcutaneous pocket beneath this incision. The Mediport catheter was tunneled through the subcutaneous space from the chest wall incision to the peel away sheath exit point. The catheter was inserted into the peel away sheath, and the sheath was removed. Under fluoroscopic guidance, the catheter was pulled back until the rest was seen at the atrocaval junction. Excess external catheter length was cut off. The catheter was attached to the Mediport, and the Mediport was placed in the subcutaneous pocket. Fluoroscopy was once again used to confirm appropriate placement and curvature of the catheter and Mediport. The Mediport  flushed and aspirated easily, and was locked w/ heparin.     All three incisions were closed in two layers w/ 3-0 Vicryl in the subcutaneous tissue and 4-0 Monocryl in the skin. Dermabond was applied.     The patient was awakened from anesthesia, extubated, and brought to PACU in a stable condition having suffered no untoward events.     All suture, needle, and instrument counts were correct x2 at the conclusion of the case.     Dr. Healy was present for all critical portions of the case.     Estimated Blood Loss (EBL): 20 cc           Implants: None    Drains: None           Condition: Stable    Disposition: PACU    Alexis Scheuermann, MD   [employer] General Surgery, PGY3  06/17/2022 9:42 AM

## 2022-06-17 NOTE — DISCHARGE SUMMARY
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 Pt had a mediport placement at [facility] on 05/12/21. Pt says that chemotherapy via the mediport worked 3 times after placement but stopped working due to possible displacement of mediport; pt had to use an IV line for chemotherapy since then. Chemotherapy regimen includes Alimta/Keytruda maintenance every 3 weeks (Alimta indefinitely; pembrolizumab for 24 months). Denies fever, chills or mediport pain, erythema, induration."    Procedure(s) (LRB):  Placement, Mediport and removal of prior malfunctioning mediport (Right)  REPAIR, ARTERY, CAROTID (Left)     Hospital Course: Patient was brought to the operating room for mediport placement. During the procedure, patient incurred an arterial injury as noted in the operative note. She was admitted for observation and was fit for discharge later in the day. Her pulse exam was 2+ in bilateral carotids, brachials, and radials. She suffered no untoward event postoperatively. Patient was ambulating and tolerating a diet without nausea or vomiting. She was given appropriate discharge and return instructions.    Patient was not prescribed any additional narcotics due to her active narcotic scripts, with medications immediately available at home.    Consults: "   Consults (From admission, onward)        Status Ordering Provider     IP consult to case management  Once        Provider:  (Not yet assigned)    Completed SHIRLEY LEWIS III          Significant Diagnostic Studies: Labs:     Pending Diagnostic Studies:     None        Final Active Diagnoses:    Diagnosis Date Noted POA    PRINCIPAL PROBLEM:  Stage IV adenocarcinoma of lung [C34.90] 03/22/2022 Yes      Problems Resolved During this Admission:      Discharged Condition: good    Disposition: Home or Self Care    Follow Up:    Patient Instructions:      Diet Adult Regular     Other restrictions (specify):   Order Comments: OK to shower in 2 days. Do not swim/submerge in water for 2 weeks.     Notify your health care provider if you experience any of the following:  redness, tenderness, or signs of infection (pain, swelling, redness, odor or green/yellow discharge around incision site)     No dressing needed     Activity as tolerated     Medications:  Reconciled Home Medications:      Medication List      CONTINUE taking these medications    albuterol 90 mcg/actuation inhaler  Commonly known as: PROVENTIL/VENTOLIN HFA     albuterol-ipratropium 2.5 mg-0.5 mg/3 mL nebulizer solution  Commonly known as: DUO-NEB     ATROVENT HFA 17 mcg/actuation inhaler  Generic drug: ipratropium     dexAMETHasone 4 MG Tab  Commonly known as: DECADRON  Take 4 mg by mouth.     dicyclomine 10 MG capsule  Commonly known as: BENTYL  Take 10 mg by mouth 3 (three) times daily.     gabapentin 300 MG capsule  Commonly known as: NEURONTIN  Take 300 mg by mouth.     guaiFENesin-codeine 100-10 mg/5 ml  mg/5 mL syrup  Commonly known as: TUSSI-ORGANIDIN NR  Take 10 mLs by mouth.     HYDROcodone-acetaminophen  mg per tablet  Commonly known as: NORCO  Take 1 tablet by mouth every 4 (four) hours as needed for Pain.     megestroL 400 mg/10 mL (40 mg/mL) Susp  Commonly known as: MEGACE  Take 10 mLs (400 mg total) by mouth 2 (two) times daily.      metoprolol tartrate 25 MG tablet  Commonly known as: LOPRESSOR  Take 25 mg by mouth.     montelukast 10 mg tablet  Commonly known as: SINGULAIR     OXYGEN-AIR DELIVERY SYSTEMS MISC  oxygen, See Instructions, home concentrator and portable @ 2L per nasal cannula continuous to keep SATS at 88% or greater J18.9, C34.9, J96.9, R09.02, # 1 EA, 0 Refill(s)            Hilario Evangelista MD  General Surgery  [facility] Med Surg Telemetry

## 2022-06-21 PROBLEM — R93.5 ABNORMAL MRI OF THE ABDOMEN: Status: ACTIVE | Noted: 2022-01-01

## 2022-06-21 PROBLEM — R79.89 ELEVATED LFTS: Status: ACTIVE | Noted: 2022-01-01

## 2022-06-21 NOTE — ASSESSMENT & PLAN NOTE
Per review of laboratory results, she has a history of intermittently elevated LFTs since August 2021, predominantly isolated elevated alkaline phosphatase.  MRI MRCP without contrast March 21, 2022 revealed:   1. Extrahepatic biliary ductal dilatation similar to December 2021.  Small distal common bile duct filling defects seen on some series. I  cannot exclude choledocholithiasis. Consider ERCP if there is other  clinical evidence of biliary obstruction.  2. Cholelithiasis. No MRI evidence of acute cholecystitis.  3. Hepatomegaly with steatosis.  4. Small right pleural effusion.    CT scan thorax, abdomen, and pelvis with contrast March 21, 2022 revealed:  Right upper lobe irregular consolidation overall similar size although  associated serpiginous enhancement at medial apex has increased 53 mm  x 17 mm image 62 series 602 sagittal versus prior measurement 38 mm x  15 mm and increasing right pleural effusion     No new or adverse finding in the abdomen or pelvis    Laboratory results June 2, 2022 revealed WBC 12.3, hemoglobin 9.5, hematocrit 31.2, MCV 97.5, MCHC 30.4, RDW 19.9, platelets 823, and otherwise unremarkable CBC; albumin 2.1, total protein 7.9, globulin 5.8, alkaline phosphatase 201, and otherwise unremarkable CMP.  Lifestyle and dietary modifications provided  Recommend good control of comorbidities  Will proceed with further workup for elevated LFTs (see above)  Call with updates  F/u clinic visit with NP in 4 months  ED precautions provided

## 2022-06-21 NOTE — PROGRESS NOTES
Subjective:       Patient ID: Adrienne Urbina is a 66 y.o. female.    Chief Complaint: Other (Choledocholithiasis)    This 66-year-old  female with a history of cholelithiasis, COPD, tobacco use, hypertension, dyslipidemia, obesity, PAD, lung cancer with brain metastasis and mediastinal lymphadenopathy (receives chemotherapy every 3 weeks), SELMA, and thrombocytosis is referred for abnormal imaging. She presents accompanied by her family member. Per review of laboratory results, she has a history of intermittently elevated LFTs since August 2021, predominantly isolated elevated alkaline phosphatase.    MRI MRCP without contrast March 21, 2022 revealed:   1. Extrahepatic biliary ductal dilatation similar to December 2021.  Small distal common bile duct filling defects seen on some series. I  cannot exclude choledocholithiasis. Consider ERCP if there is other  clinical evidence of biliary obstruction.  2. Cholelithiasis. No MRI evidence of acute cholecystitis.  3. Hepatomegaly with steatosis.  4. Small right pleural effusion.    CT scan thorax, abdomen, and pelvis with contrast March 21, 2022 revealed:  Right upper lobe irregular consolidation overall similar size although  associated serpiginous enhancement at medial apex has increased 53 mm  x 17 mm image 62 series 602 sagittal versus prior measurement 38 mm x  15 mm and increasing right pleural effusion     No new or adverse finding in the abdomen or pelvis    Today, she presents for an initial visit. She denies nocturnal symptoms, appetite changes, unintentional weight loss, fever, chills, nausea, vomiting, hematemesis, odynophagia, dysphagia, acid reflux, pyrosis, early satiety, or abdominal pain.  Bowel habits are described as fluctuating between formed and soft stools a few times per day without melena, hematochezia, fecal urgency, fecal incontinence, or pain with defecation. She denies icterus, jaundice, pruritus, confusion, headaches, vision  changes, cough, shortness of breath, chest pain, abdominal/lower extremity swelling, dark-colored urine, or pale-colored stools.     Laboratory results June 2, 2022 revealed WBC 12.3, hemoglobin 9.5, hematocrit 31.2, MCV 97.5, MCHC 30.4, RDW 19.9, platelets 823, and otherwise unremarkable CBC; albumin 2.1, total protein 7.9, globulin 5.8, alkaline phosphatase 201, and otherwise unremarkable CMP.    FOBT positive September 11, 2020. Colonoscopy February 10, 2021 revealed examined portion of ileum normal, few ulcers in the sigmoid colon, mild diverticulosis in the sigmoid colon with no evidence of diverticular bleeding, internal hemorrhoids. No repeat colonoscopy recommended. EGD February 10, 2021 revealed moderately severe radiation esophagitis, esophagogastric landmarks identified, normal stomach, duodenal mucosal atrophy. She denies regular NSAID use or use of blood thinners. She smokes 5-10 cigarettes per day and denies alcohol use. She denies a family history of IBD, colon polyps, or colon cancer.                                 Review of Systems   All other systems reviewed and are negative.        Objective:      Physical Exam  Constitutional:       Appearance: Normal appearance.   HENT:      Head: Normocephalic.      Mouth/Throat:      Mouth: Mucous membranes are moist.   Eyes:      Extraocular Movements: Extraocular movements intact.      Conjunctiva/sclera: Conjunctivae normal.      Pupils: Pupils are equal, round, and reactive to light.   Cardiovascular:      Rate and Rhythm: Normal rate and regular rhythm.      Pulses: Normal pulses.      Heart sounds: Normal heart sounds.   Pulmonary:      Effort: Pulmonary effort is normal.      Comments: Audible wheezing noted to all lung fields  Abdominal:      General: Bowel sounds are normal.      Palpations: Abdomen is soft.   Musculoskeletal:         General: Normal range of motion.      Cervical back: Normal range of motion and neck supple.   Skin:     General:  Skin is warm and dry.   Neurological:      General: No focal deficit present.      Mental Status: She is alert and oriented to person, place, and time.   Psychiatric:         Mood and Affect: Mood normal.         Behavior: Behavior normal.         Thought Content: Thought content normal.         Judgment: Judgment normal.         Assessment/Plan:     Problem List Items Addressed This Visit        GI    Elevated LFTs     Per review of laboratory results, she has a history of intermittently elevated LFTs since August 2021, predominantly isolated elevated alkaline phosphatase.  MRI MRCP without contrast March 21, 2022 revealed:   1. Extrahepatic biliary ductal dilatation similar to December 2021.  Small distal common bile duct filling defects seen on some series. I  cannot exclude choledocholithiasis. Consider ERCP if there is other  clinical evidence of biliary obstruction.  2. Cholelithiasis. No MRI evidence of acute cholecystitis.  3. Hepatomegaly with steatosis.  4. Small right pleural effusion.    CT scan thorax, abdomen, and pelvis with contrast March 21, 2022 revealed:  Right upper lobe irregular consolidation overall similar size although  associated serpiginous enhancement at medial apex has increased 53 mm  x 17 mm image 62 series 602 sagittal versus prior measurement 38 mm x  15 mm and increasing right pleural effusion     No new or adverse finding in the abdomen or pelvis    Laboratory results June 2, 2022 revealed WBC 12.3, hemoglobin 9.5, hematocrit 31.2, MCV 97.5, MCHC 30.4, RDW 19.9, platelets 823, and otherwise unremarkable CBC; albumin 2.1, total protein 7.9, globulin 5.8, alkaline phosphatase 201, and otherwise unremarkable CMP.  Lifestyle and dietary modifications provided  Recommend good control of comorbidities  Will proceed with further workup for elevated LFTs (see above)  Call with updates  F/u clinic visit with NP in 4 months  ED precautions provided           Relevant Orders    Actin (Smooth  Muscle) Antibody (IgG)    Alpha-1-Antitrypsin    Antimitochondrial Antibody    ANTINUCLEAR ANTIBODIES    Anti-Liver, Kidney, Microsome Ab    Ceruloplasmin    Ferritin    Tissue Transglutaminase, IgA    US Abdomen Limited    Protime-INR    Iron and TIBC    Hepatitis A Antibody, IgM    Hepatitis B Core Antibody, IgM    Hepatitis B Surface Antigen    Hepatitis C Antibody    [place] GENERIC ORDERABLE WANG FibroSure    Abnormal MRI of the abdomen - Primary     Per review of laboratory results, she has a history of intermittently elevated LFTs since August 2021, predominantly isolated elevated alkaline phosphatase.  MRI MRCP without contrast March 21, 2022 revealed:   1. Extrahepatic biliary ductal dilatation similar to December 2021.  Small distal common bile duct filling defects seen on some series. I  cannot exclude choledocholithiasis. Consider ERCP if there is other  clinical evidence of biliary obstruction.  2. Cholelithiasis. No MRI evidence of acute cholecystitis.  3. Hepatomegaly with steatosis.  4. Small right pleural effusion.    CT scan thorax, abdomen, and pelvis with contrast March 21, 2022 revealed:  Right upper lobe irregular consolidation overall similar size although  associated serpiginous enhancement at medial apex has increased 53 mm  x 17 mm image 62 series 602 sagittal versus prior measurement 38 mm x  15 mm and increasing right pleural effusion     No new or adverse finding in the abdomen or pelvis    Laboratory results June 2, 2022 revealed WBC 12.3, hemoglobin 9.5, hematocrit 31.2, MCV 97.5, MCHC 30.4, RDW 19.9, platelets 823, and otherwise unremarkable CBC; albumin 2.1, total protein 7.9, globulin 5.8, alkaline phosphatase 201, and otherwise unremarkable CMP.  Lifestyle and dietary modifications provided  Recommend good control of comorbidities  Will proceed with further workup for elevated LFTs (see above)  Call with updates  F/u clinic visit with NP in 4 months  ED precautions provided            Relevant Orders    Actin (Smooth Muscle) Antibody (IgG)    Alpha-1-Antitrypsin    Antimitochondrial Antibody    ANTINUCLEAR ANTIBODIES    Anti-Liver, Kidney, Microsome Ab    Ceruloplasmin    Ferritin    Tissue Transglutaminase, IgA    US Abdomen Limited    Protime-INR    Iron and TIBC    Hepatitis A Antibody, IgM    Hepatitis B Core Antibody, IgM    Hepatitis B Surface Antigen    Hepatitis C Antibody    [place] GENERIC ORDERABLE WANG FibroSure

## 2022-06-21 NOTE — PROGRESS NOTES
[facility] General Surgery      Subjective:  66 year old s/p right sided IJ mediport placement. Surgical procedure was complicated by left sided subclavian artery  cannulation resulting in cutdown and repair. Patient wounds healing well however complaining of some hoarseness and pain. She denies shortness of breath. Denies fevers or chills     Objective:    Vitals:  Vitals:    06/21/22 1243   BP: (!) 83/57   Pulse: (!) 117   Resp: 20   Temp: 98.8 °F (37.1 °C)        Physical Exam:  Gen: NAD  Neuro: awake, alert, answering questions appropriately  Neck: left side incision CDI, no induration or palpable mass. Right sided mediport in place, incision CDI   CV: RRR  Resp: non-labored breathing, PATRICIA  Abd: soft, ND, NT  Ext: moves all 4 spontaneously and purposefully  Skin: warm, well perfused        Assessment/Plan:  66 year old s/p right sided IJ mediport placement. Surgical procedure was complicated by left sided subclavian artery  cannulation resulting in cutdown and repair. Patient wounds healing well however complaining of some hoarseness and pain. She denies shortness of breath. Denies fevers or chills.  - will send referral to [facility] for possible DL given hoarseness.   - Patient wounds healing approp but still with some pain, will send with 8 more Norco until she is seen by oncology   - return to clinic PRN.      Cuate Drew MD   \[facility] [place] [facility]\"" General Surgery, PGY1  06/21/2022 1:15 PM

## 2022-06-22 PROBLEM — D75.838 REACTIVE THROMBOCYTOSIS: Status: ACTIVE | Noted: 2022-01-01

## 2022-06-22 PROBLEM — D63.8 ANEMIA OF CHRONIC DISEASE: Status: ACTIVE | Noted: 2022-01-01

## 2022-06-22 PROBLEM — K80.50 CHOLEDOCHOLITHIASIS: Status: ACTIVE | Noted: 2022-01-01

## 2022-06-22 PROBLEM — C34.91 CARCINOMA OF RIGHT LUNG: Status: ACTIVE | Noted: 2022-01-01

## 2022-06-22 PROBLEM — K83.8 COMMON BILE DUCT DILATATION: Status: ACTIVE | Noted: 2022-01-01

## 2022-06-22 NOTE — PROGRESS NOTES
Past medical history: Cholelithiasis.  Chronic cough.  COPD.  Tobacco abuse.  Hypertension.  Dyslipidemia.  Hepatomegaly.  Obesity.  Peripheral artery disease.  Social history: .  Lives in [place] with her  and family.  Has 12 children.  Has been smoking 1 pack of cigarettes daily for 40 years.  No history of alcohol or illicit drug abuse.  Family history: Negative for cancers.  Health maintenance:   Screening mammogram in 2019 in [place], apparently unremarkable.   Mostly colonoscopy ever.  Menstrual and OB/GYN history: Menopause in her 50s.      Reason for follow-up:  -poorly differentiated carcinoma right lung, stage IV; right upper lobe mass, mediastinal lymphadenopathy, brain metastasis, S/P EB was 09/20/2020  -brain metastasis  -anemia of chronic disease, iron-deficiency anemia, FOBT +  -reactive thrombocytosis  -cholelithiasis, dilated common biliary duct, choledocholithiasis      History of present illness:   65-year-old female referred by Dr. Steve Simpson, with lung cancer.     -11.4 cm right upper lobe mass, invading mediastinum (noncontrast chest CT)   -Large mass in 4R position (right lower paratracheal) (bronchoscopy and EBUS: 09/22/2020)   -EBUS, 4R, FNA: Poorly differentiated carcinoma (tumor of lung versus upper GI tract)   (EGD, colonoscopy: 02/10/2021: No malignancy; no biopsies collected)   -PET/CT (11/02/2020): Right upper lung lobe mass has enlarged 9.9 x 9.8 x 14.0 cm, previously, 8.1 x 7.4 x 11.4 cm;    contiguous extension of the mass into the mediastinum versus right hilar enlarged lymph node 2.0 x 1.8 cm   -Brain MRI (11/06/2020): Right occipital 5 x 4 mm hemorrhagic metastasis without edema   -11/06/2020: Referred to [facility] for SRS   >>   Tumor >7 cm, therefore, T4   Tumor invading mediastinum, therefore, T4   Ipsilateral mediastinal mass (? Lymphadenopathy), EBUS FNA positive, therefore, N2  Solitary brain metastasis, therefore, M1b   >>cT4 cN2 M1b, stage RICKIE   -SRS to  brain metastasis (2100 cGy; 1 fraction) (12/03/2020)  -Carbo/Taxol weekly x6, concurrent with RT (12/16/2020-01/29/2021)   -Radiotherapy right lung (12/16/2020-01/28/2021) (6000 cGy; 30 fractions; 43 elapsed days)  -04/07/2021: Restaging bone scan: No bone metastasis   -04/07/2021: Restaging CT C/A/P with contrast (comparison: PET/CT dated 11/02/2020): Mild interval decrease in size of the mass involving the upper lobe of right lung (8.7 x 7.6 cm, previously 10.5 x 9.8 cm); prominent precarinal lymph node also appears mildly reduced in size (7 mm, previously 9 mm); no new malignancy or metastatic disease in chest, abdomen, or pelvis   (Positive response to chemoradiation therapy)   -Subsequently, carbo/Alimta/Keytruda x4 (04/29/2021-07/09/2021)   -No brain metastases on surveillance brain MRI (05/03/2021)   -Hospitalized 07/11/2021-07/22/2021: [facility]: Postobstructive pneumonia, and acute hypoxemic respiratory failure, septic shock, HUNG; successfully treated  -08/09/2021: Surveillance brain MRI with contrast (comparison: 05/03/2021):   -08/20/2021: Restaging CT C/A/P with contrast (comparison: 04/07/2021: Similar size of right upper lobe mass extending to the hilum with increased peripheral opacities and fluid peripheral to the mass, cannot exclude postobstructive infection (8-9 cm, similar to prior); small right pleural effusion; stable mediastinal lymph nodes (subcarinal, 8 mm)   -Hospitalized 08/18/2021-08/21/2021: Anemia, pneumonia; hemoglobin 6.1, PRBC x1 unit; Augmentin plus Levaquin for pneumonia; sinus tachycardia, requiring Ativan; remained afebrile; CT chest without contrast (08/19/2021) indicated postobstructive pneumonia and lepidic spread of tumor, no interval change from prior; CTA chest PE study showed no large central or segmental pulmonary thromboemboli; interval enlargement of known right upper lobe mass and progression of surrounding irregular airspace consolidation and ipsilateral  pleural effusion, etc.   -08/19/2021: CT chest without contrast: Size of the right upper lobe mass with associated consolidation is unchanged in size and extent from 08/10/2021; small right pleural effusion; postobstructive pneumonia versus lepidic spread of tumor, no change from prior   -08/20/2021: CTA chest PE protocol (comparison: 07/11/2021): No PE; interval enlargement of known right upper lobe mass and progression of surrounding irregular airspace consolidation and ipsilateral pleural effusion (large right upper lobe mass 9.4 x 12.6 x 7.6 cm); cholelithiasis, similar dilated appearance of common bile duct, without definitive visualization of distal obstructive etiology   -Alimta/Keytruda every 3 weeks maintenance started 09/09/2021   -11/11/2021: Surveillance brain MRI with and without contrast (comparison: 08/09/2021): No acute intracranial findings or evidence of metastasis   -11/18/2021: TSH and free T4 normal   -Cycle #6 of maintenance Alimta/Keytruda on 12/29/2021   -12/17/2021: Restaging CTs of C/A/P with contrast (comparison: August 10 and August 9, 2021): Similar appearance of irregular right upper lung masslike consolidation and adjacent fluid; slightly improved aeration of right lower lobe; questionably slightly increased biliary ductal dilation; small mediastinal lymph nodes are stable; there are gallstones   -Alk phos: 567 (12/29/2021); 337 (12/13/2021); 606 (12/09/2021),   -Bilirubin, AST, ALT normal   -12/09/2021: No hemolysis; iron stores normal (transferrin saturation 21%, ferritin 4167.95); B12 479, normal; LDH low, haptoglobin elevated; RBC folate normal; hemoglobin 9.7, reticulocyte count 3.1%   -12/29/2021: TSH normal   -Per pathology, unable to perform liquid biopsy for PD-L1 and KRAS mutations   -Cycle #9 of maintenance Alimta/Keytruda on 03/03/2022   -03/14/2022: Surveillance brain MRI with contrast: The comparison: 11/11/2021): No intracranial metastasis   -03/21/2022: MRCP (dilated  common bile duct): Extrahepatic biliary ductal dilation similar to December 2021; small distal common bile duct filling defects seen, cannot exclude choledocholithiasis; cholelithiasis; no MRI evidence of acute cholecystitis; hepatomegaly with steatosis; small right pleural effusion   -03/21/2022: Restaging CT C/A/P with contrast (comparison: 12/17/2021): Right upper lobe irregular consolidation overall similar size although associated serpiginous enhancement at medial apex has increased 53 x 17 mm, previously 38 x 15 mm millimeter and increasing right pleural effusion   -Hypercalcemia: Calcium 10.6, albumin 2.7 (03/03/2022); calcium 10.7, albumin 2.7 (01/19/2022)   -Cycle number #10 of maintenance Alimta/Keytruda on 03/24/2022   -Hospitalized in 04/02/2022-04/05/2022 ([facility]): Weakness; difficulty walking to the restroom; lightheaded when standing; heart rate 153, blood pressure 98/63; pulse ox 98% on room air; 200 catheter bolus normal saline; heart rate and blood pressure improved; Levaquin for community-acquired pneumonia; overall, improved   -04/04/2022: TTE: LVEF 55%   -04/04/2022: IR: Not enough fluid for thoracentesis  -Cycle 11 of Alimta/Keytruda maintenance on 04/22/2022  -04/22/2022: TSH 0.3208, suppressed  -cycle 16 of maintenance Alimta/Keytruda on 05/13/2022  -cycle 17 of maintenance Alimta/Keytruda was due 06/03/2022  -restaging PET-CT is pending  -[id number]:  TSH normal (1.7894)  -06/17/2022:  MediPort placed (previous MediPort stop functioning due to displacement) (left IJ MediPort removed; left subclavian artery cutdown and primary repair; right IJ MediPort placement)      Interval history:  10/22/2020:   Presents for initial medical oncology consultation, accompanied by her daughter, Leslie.    04/22/2022:   -03/24/2022: Calcium 10.7, albumin 2.8, M spike 0.02 g/dL, IgG kappa; IgG, IgA, IgM normal; free kappa light chains 2.22, elevated; lambda light chains, kappa/lambda ratio normal; PTH related  peptide <0.4; ionized calcium level 5.34 mg/dL, normal; 25 hydroxy vitamin D level 38 ng/mL, normal-Cycle #10 of maintenance Alimta/Keytruda on 03/24/2022   -Hospitalized in 04/02/2022-04/05/2022 ([facility]): Weakness; difficulty walking to the restroom; lightheaded when standing; heart rate 153, blood pressure 98/63; pulse ox 98% on room air; bolus normal saline; heart rate and blood pressure improved; Levaquin for community-acquired pneumonia; overall, improved   -04/04/2022: TTE: LVEF 55%   -04/04/2022: IR: Not enough fluid for thoracentesis   -No showed 04/13/2022Presents for follow-up visit.  Feeling a lot better.  Eating well.  Feeling stronger.  No dizziness.  No unusual headaches or focal neurological symptoms.  No undue chest pain, cough, significant dyspnea, hemoptysis, fevers, or chills.  Chronic stable shortness of breath, cough, wheezing, weakness, and fatigue.  Also, some hot flashes.  Numbness and tingling in hands but this is not severe.    06/23/2022:  -Cycle 11 of Alimta/Keytruda maintenance on 04/22/2022  -04/22/2022: TSH 0.3208, suppressed  -cycle 16 of maintenance Alimta/Keytruda on 05/13/2022  -cycle 17 of maintenance Alimta/Keytruda was due 06/03/2022 but was not administered because she was not feeling well  -restaging PET-CT is pending  -[id number]:  TSH normal (1.7894)  -06/17/2022:  MediPort placed (previous MediPort stop functioning due to displacement) (left IJ MediPort removed; left subclavian artery cutdown and primary repair; right IJ MediPort placement)  -presents for a follow-up visit.  Overall, more less, stable.  Says that her voice has been hoarse ever since MediPort placement on 06/17/2022 which was complicated by accidental injury to left subclavian artery, requiring repair.  No unusual headaches or focal neurological symptoms.  Surveillance brain MRI scans are referred to Radiation Oncology.  Appetite is preserved with maggots.  She is requesting refill of pain medications; she  has pain in the right anterior and posterior chest secondary to large right lung tumor.  Stable chronic symptoms include generalized weakness, night sweats, hot flashes, exertional dyspnea, cough, wheezing, abdominal pain, nausea, occasional diarrhea, and itching.  No hemoptysis, fevers, or chills.  Apparently, restaging PET-CT is scheduled for 06/29/2022      Review of systems:   All systems reviewed, and found to be negative except for the symptoms detailed above.      Physical examination:   VITAL SIGNS:  Reviewed.      GENERAL:  In no apparent distress.    HEAD:  No signs of head trauma.   EYES:  Pupils are equal.  Extraocular motions intact.    EARS:  Hearing grossly intact.   MOUTH:  Oropharynx is normal.   NECK:  No adenopathy, no JVD.      CHEST:  Chest with clear breath sounds bilaterally.  No wheezes, rales, or rhonchi.    CARDIAC:  Regular rate and rhythm.  S1 and S2, without murmurs, gallops, or rubs.   VASCULAR:  No Edema.  Peripheral pulses normal and equal in all extremities.   ABDOMEN:  Soft, without detectable tenderness.  No sign of distention.  No   rebound or guarding, and no masses palpated.   Bowel Sounds normal.   MUSCULOSKELETAL:  Good range of motion of all major joints. Extremities without clubbing, cyanosis or edema.    NEUROLOGIC EXAM:  Alert and oriented x 3.  No focal sensory or strength deficits.   Speech normal.  Follows commands.   PSYCHIATRIC:  Mood normal.   SKIN:  No rash or lesions.  10/22/2020: Lungs clear to auscultation.  No crepitations or rhonchi.  No supraclavicular or axillary lymphadenopathy palpable.  Heart sounds normal.  Abdomen benign.  No swelling in legs.  Conjunctiva pale.  11/27/2020: In no acute discomfort.  Conjunctive pale.  03/23/2021: Not examined; it was a telemedicine visit.  04/15/2021: Looks well and healthy.  Cheerful.  Bilateral rhonchi.  No palpable lymphadenopathy.      Assessment:   #Poorly differentiated carcinoma of lung:  To summarize:   -Poorly  differentiated carcinoma of lung, 14.0 cm right upper lobe mass invading mediastinum   -EBUS 09/20/2020   -cT4 cN2 M1b, stage RICKIE   -EGD/colonoscopy negative   -5 mm right occipital hemorrhagic metastasis 11/06/2020. -S/p SRS to brain metastasis (2100 cGy; 1 fraction) (12/03/2020)   -S/p chemoradiation therapy (12/2020-01/2021) for intrathoracic disease (oligometastatic disease), with positive response   -Followed by carbo/Alimta/Keytruda x4 for metastatic disease (solitary brain metastasis) with stable disease; questionable progression on CTs 08/2021)   -Alimta/Keytruda maintenance started 09/09/2021   -No brain metastases on surveillance brain MRI (11/11/2021)   -No progression on restaging CTs post maintenance Alimta/Keytruda x6 (12/17/2021)   -No brain metastasis on surveillance brain MRI (03/14/2022)   -Difficult to interpret restaging CTs 03/21/2022      #Sites of disease:   14 cm right upper lobe mass, invading mediastinum; mediastinal lymphadenopathy; right occipital brain metastasis       #Molecular markers:  -BRAF mutation negative; ROS1 gene arrangement negative; ALK rearrangement negative; PD-L1 CPS <10 (CPS 5) (erroneously, tested for esophageal carcinoma); EGFR negative; MET exon 14 skipping mutation negative; NTRK gene fusion negative; RET rearrangement negative   -PD-L1 testing: QNS       #Hypercalcemia (11/25/2020)  -11/25/2020: Calcium 10.3 (elevated for the very first time).  BUN 21, elevated.  Creatinine 0.79, normal.  Albumin 2.4.   -11/25/2020: Intact PTH level 32.9, normal.  Ionized calcium level 5.4, within normal limits.   -11/25/2020: IV fluids + Zometa 4 mg IV x1   -03/24/2022: Calcium 10.7, albumin 2.8, M spike 0.02 g/dL, IgG kappa; IgG, IgA, IgM normal; free kappa light chains 2.22, elevated; lambda light chains, kappa/lambda ratio normal; PTH related peptide <0.4; ionized calcium level 5.34 mg/dL, normal; 25 hydroxy vitamin D level 38 ng/mL, normal; Intact PTH 52.7, normal  (03/24/2022:  Mild hypercalcemia; 0.02 g/dL IgG kappa M spike; intact PTH normal; PTH related peptide normal; vitamin D level normal; ionized calcium level normal; kappa/lambda ratio normal)       #Iron deficiency anemia, FOBT positive:   (Anemia of chronic disease)   -09/11/2020: Hemoglobin 6.9.  Microcytosis.  Elevated RDW.  Low serum iron, low TIBC, low transferrin saturation, normal ferritin.  FOBT positive.  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); normal stomach; duodenal mucosal atrophy (no biopsies)   -08/17/2021: Hemoglobin 6.1, relative iron deficiency (serum iron 16, TIBC 175, transferrin saturation 9%, ferritin >1675.56), PRBC x1 unit as inpatient   -S/p Feraheme 510 mg IV x2 (09/09/2021, 09/16/2021)   -No improvement in hemoglobin despite normalization of iron stores (12/09/2021)   -12/09/2021: Iron, B12, folate stores normal; no hemolysis  (Anemia of chronic disease)       #Thrombocytosis since 09/11/2020 ([id number])   -Could be reactive to iron deficiency anemia versus metastatic lung cancer   -10/22/2020: JAK2 mutation negative.  BCR-ABL 1 fusion transcripts negative.  ESR, CRP elevated.  Iron deficiency anemia.   -Subsequently, resolved (post parenteral iron therapy; post chemoradiation therapy for lung cancer)   -08/17/2021: Hemoglobin 6.1, , serum iron 16, TIBC 175, transferrin saturation 9%, ferritin >1675.56 (relative iron deficiency)       # Cholelithiasis; chronically elevated alkaline phosphatase, possible choledocholithiasis:   -HIDA scan (04/18/2019): No gallbladder  activity at 1 hour post isotope injection, compatible with cystic duct obstruction/acute cholecystitis   -02/07/2020: Limited abdominal ultrasound: Cholelithiasis, dilated CBD, hepatomegaly   (12/03/2020)   -12/17/2021: Restaging CT C/A/P with contrast: Gallstones noted, apart from other findings; questionable slightly increased periductal dilatation   -Alk phos: 567 (12/29/2021); 337 (12/13/2021); 606 (12/09/2021),   (Bilirubin, AST, ALT normal)   -03/21/2022: MRCP (dilated common bile duct): Extrahepatic biliary ductal dilation similar to December 2021; small distal common bile duct filling defects seen, cannot exclude choledocholithiasis; cholelithiasis; no MRI evidence of acute cholecystitis; hepatomegaly with steatosis; small right pleural effusion      Plan:  -pending:  Restaging PET-CT       -Alimta/Keytruda maintenance started 09/09/2021   -No progression post Alimta/Keytruda x6 cycles on restaging CTs (12/17/2021)   -Difficult to interpret restaging CTs dated 03/21/2022   >>>  -Continue Alimta/Keytruda maintenance every 3 weeks (Alimta indefinitely; pembrolizumab for 24 months)   -Check CBC and CMP every 3 weeks before each cycle of chemotherapy   -Check TSH every 6 weeks; mid July  -pending:  Restaging PET-CT (apparently, scheduled for 06/29/2022)   -Surveillance brain MRI scans deferred to radiation oncology (no metastasis on surveillance brain MRI (03/14/2022)   -Per IR, not enough fluid to allow right-sided thoracentesis (increasing right pleural fluid noted on restaging CTs 03/21/2022     Chemotherapy regimen:   1.  Day 1: Pembrolizumab 200 mg IV plus pemetrexed 500 mg/m² IV plus carboplatin AUC 5; repeat cycles every 3 weeks for 4 cycles; followed by:   2.  Day 1: Pembrolizumab 200 mg IV every 3 weeks for 24 months;   3.  Day 1: Pemetrexed 500 mg/m² IV every 3 weeks indefinitely     Labs unable to perform liquid biopsy for PD-L1 and KRAS mutation   >>>  -We referred the patient to pulmonary for biopsy  of right lung mass, specifically to enable PD-L1 and KRAS mutation testing but she no showed     -Iron deficiency anemia; FOBT positive;; s/p Feraheme x2 (10/26/2020-11/03/2020)   -No improvement in hemoglobin despite normalization of iron stores with Feraheme   (Anemia of chronic disease)     -Cholelithiasis; chronically elevated alkaline phosphatase   -Questionable slightly increased biliary ductal dilation on CT C/A/P with contrast (12/17/2021)   -03/21/2022: MRCP (dilated common bile duct): Extrahepatic biliary ductal dilation similar to December 2021; small distal common bile duct filling defects seen, cannot exclude choledocholithiasis; cholelithiasis; no MRI evidence of acute cholecystitis; hepatomegaly with steatosis; small right pleural effusion   >>>  -Referred to GI for evaluation     -Hypercalcemia: Calcium 10.6, albumin 2.7 (03/03/2022); calcium 10.7, albumin 2.7 (01/19/2022)   -03/24/2022: Mild hypercalcemia; 0.02 g/dL IgG kappa M spike; intact PTH normal; PTH related peptide normal; vitamin D level normal; ionized calcium level normal; kappa/lambda ratio normal   >>>  -In 6 months (September' 22), recheck SPEP, ANNA, FLC assay      -Thrombocytosis is reactive to iron deficiency and underlying metastatic lung cancer     On Norco for right posterior chest wall pain  -03/24/2022: From now on, Norco 10 mg pills no more than twice a day     Follow-up with me in 2 weeks, after restaging PET-CT.    Above discussed with her.  All questions answered.   Discussed labs and scans and gave her copies of relevant reports.   She understands and agrees with this plan, and was appreciative.

## 2022-06-23 NOTE — TELEPHONE ENCOUNTER
Please order the following for evaluation of anemia:    Retic count, serum iron, TIBC, ferritin, B12 level, RBC folate, LDH, haptoglobin.

## 2022-06-23 NOTE — TELEPHONE ENCOUNTER
Orders for today:    Pending:  Restaging PET-CT    Continue Alimta/Keytruda every 3 weeks  CBC and CMP every 3 weeks  TSH every 6 weeks, next, mid July    Check on the referred to GI for evaluation of dilated common bile duct and choledocholithiasis    In September, SPEP, ANNA, FLC assay    Follow-up with me in 3 weeks.

## 2022-06-24 NOTE — NURSING
Pt here for keytruda, alimta infusion.  Pt has new MP placement since last MP was no longer in proper place.  Pt is coughing , reports SOB.  Pt was seen by provider yesterday.

## 2022-07-11 NOTE — TELEPHONE ENCOUNTER
----- Message from VICTOR HUGO Marsh sent at 7/11/2022  1:38 PM CDT -----  Please notify findings of gallstones and persistent dilatation of common bile duct from previous imaging.  I have compared current abdominal ultrasound to previous MRI and abdominal ultrasound results (compared common bile duct dilatation).  In the past, she was recommended ERCP secondary to possible choledocholithiasis which was not completed.  Please schedule patient for repeat MRCP at this time to further evaluate abnormal abdominal ultrasound findings.  Please schedule this to be completed at [facility] sooner than later. Thanks

## 2022-07-11 NOTE — PROGRESS NOTES
Please notify findings of gallstones and persistent dilatation of common bile duct from previous imaging.  I have compared current abdominal ultrasound to previous MRI and abdominal ultrasound results (compared common bile duct dilatation).  In the past, she was recommended ERCP secondary to possible choledocholithiasis which was not completed.  Please schedule patient for repeat MRCP at this time to further evaluate abnormal abdominal ultrasound findings.  Please schedule this to be completed at [facility] sooner than later. Thanks

## 2022-07-17 PROBLEM — Q31.8 VOCAL CORD ANOMALY: Status: ACTIVE | Noted: 2022-01-01

## 2022-07-17 NOTE — PROGRESS NOTES
Past medical history: Cholelithiasis.  Chronic cough.  COPD.  Tobacco abuse.  Hypertension.  Dyslipidemia.  Hepatomegaly.  Obesity.  Peripheral artery disease.  Social history: .  Lives in [place] with her  and family.  Has 12 children.  Has been smoking 1 pack of cigarettes daily for 40 years.  No history of alcohol or illicit drug abuse.  Family history: Negative for cancers.  Health maintenance:   Screening mammogram in 2019 in [place], apparently unremarkable.   Mostly colonoscopy ever.  Menstrual and OB/GYN history: Menopause in her 50s.      Reason for follow-up:  -poorly differentiated carcinoma right lung, stage IV; right upper lobe mass, mediastinal lymphadenopathy, brain metastasis, S/P EB was 09/20/2020  -brain metastasis a  -abnormal right vocal cord on PET-CT  -anemia of chronic disease, iron-deficiency anemia, FOBT +  -reactive thrombocytosis  -cholelithiasis, dilated common biliary duct, choledocholithiasis      History of present illness:   65-year-old female referred by Dr. Steve Simpson, with lung cancer.     -11.4 cm right upper lobe mass, invading mediastinum (noncontrast chest CT)   -Large mass in 4R position (right lower paratracheal) (bronchoscopy and EBUS: 09/22/2020)   -EBUS, 4R, FNA: Poorly differentiated carcinoma (tumor of lung versus upper GI tract)   (EGD, colonoscopy: 02/10/2021: No malignancy; no biopsies collected)   -PET/CT (11/02/2020): Right upper lung lobe mass has enlarged 9.9 x 9.8 x 14.0 cm, previously, 8.1 x 7.4 x 11.4 cm;    contiguous extension of the mass into the mediastinum versus right hilar enlarged lymph node 2.0 x 1.8 cm   -Brain MRI (11/06/2020): Right occipital 5 x 4 mm hemorrhagic metastasis without edema   -11/06/2020: Referred to [facility] for SRS   >>   Tumor >7 cm, therefore, T4   Tumor invading mediastinum, therefore, T4   Ipsilateral mediastinal mass (? Lymphadenopathy), EBUS FNA positive, therefore, N2  Solitary brain metastasis, therefore,  M1b   >>cT4 cN2 M1b, stage RICKIE   -SRS to brain metastasis (2100 cGy; 1 fraction) (12/03/2020)  -Carbo/Taxol weekly x6, concurrent with RT (12/16/2020-01/29/2021)   -Radiotherapy right lung (12/16/2020-01/28/2021) (6000 cGy; 30 fractions; 43 elapsed days)  -04/07/2021: Restaging bone scan: No bone metastasis   -04/07/2021: Restaging CT C/A/P with contrast (comparison: PET/CT dated 11/02/2020): Mild interval decrease in size of the mass involving the upper lobe of right lung (8.7 x 7.6 cm, previously 10.5 x 9.8 cm); prominent precarinal lymph node also appears mildly reduced in size (7 mm, previously 9 mm); no new malignancy or metastatic disease in chest, abdomen, or pelvis   (Positive response to chemoradiation therapy)   -Subsequently, carbo/Alimta/Keytruda x4 (04/29/2021-07/09/2021)   -No brain metastases on surveillance brain MRI (05/03/2021)   -Hospitalized 07/11/2021-07/22/2021: [facility]: Postobstructive pneumonia, and acute hypoxemic respiratory failure, septic shock, HUNG; successfully treated  -08/09/2021: Surveillance brain MRI with contrast (comparison: 05/03/2021):   -08/20/2021: Restaging CT C/A/P with contrast (comparison: 04/07/2021: Similar size of right upper lobe mass extending to the hilum with increased peripheral opacities and fluid peripheral to the mass, cannot exclude postobstructive infection (8-9 cm, similar to prior); small right pleural effusion; stable mediastinal lymph nodes (subcarinal, 8 mm)   -Hospitalized 08/18/2021-08/21/2021: Anemia, pneumonia; hemoglobin 6.1, PRBC x1 unit; Augmentin plus Levaquin for pneumonia; sinus tachycardia, requiring Ativan; remained afebrile; CT chest without contrast (08/19/2021) indicated postobstructive pneumonia and lepidic spread of tumor, no interval change from prior; CTA chest PE study showed no large central or segmental pulmonary thromboemboli; interval enlargement of known right upper lobe mass and progression of surrounding  irregular airspace consolidation and ipsilateral pleural effusion, etc.   -08/19/2021: CT chest without contrast: Size of the right upper lobe mass with associated consolidation is unchanged in size and extent from 08/10/2021; small right pleural effusion; postobstructive pneumonia versus lepidic spread of tumor, no change from prior   -08/20/2021: CTA chest PE protocol (comparison: 07/11/2021): No PE; interval enlargement of known right upper lobe mass and progression of surrounding irregular airspace consolidation and ipsilateral pleural effusion (large right upper lobe mass 9.4 x 12.6 x 7.6 cm); cholelithiasis, similar dilated appearance of common bile duct, without definitive visualization of distal obstructive etiology   -Alimta/Keytruda every 3 weeks maintenance started 09/09/2021   -11/11/2021: Surveillance brain MRI with and without contrast (comparison: 08/09/2021): No acute intracranial findings or evidence of metastasis   -11/18/2021: TSH and free T4 normal   -Cycle #6 of maintenance Alimta/Keytruda on 12/29/2021   -12/17/2021: Restaging CTs of C/A/P with contrast (comparison: August 10 and August 9, 2021): Similar appearance of irregular right upper lung masslike consolidation and adjacent fluid; slightly improved aeration of right lower lobe; questionably slightly increased biliary ductal dilation; small mediastinal lymph nodes are stable; there are gallstones   -Alk phos: 567 (12/29/2021); 337 (12/13/2021); 606 (12/09/2021),   -Bilirubin, AST, ALT normal   -12/09/2021: No hemolysis; iron stores normal (transferrin saturation 21%, ferritin 4167.95); B12 479, normal; LDH low, haptoglobin elevated; RBC folate normal; hemoglobin 9.7, reticulocyte count 3.1%   -12/29/2021: TSH normal   -Per pathology, unable to perform liquid biopsy for PD-L1 and KRAS mutations   -Cycle #9 of maintenance Alimta/Keytruda on 03/03/2022   -03/14/2022: Surveillance brain MRI with contrast: The comparison: 11/11/2021): No  intracranial metastasis   -03/21/2022: MRCP (dilated common bile duct): Extrahepatic biliary ductal dilation similar to December 2021; small distal common bile duct filling defects seen, cannot exclude choledocholithiasis; cholelithiasis; no MRI evidence of acute cholecystitis; hepatomegaly with steatosis; small right pleural effusion   -03/21/2022: Restaging CT C/A/P with contrast (comparison: 12/17/2021): Right upper lobe irregular consolidation overall similar size although associated serpiginous enhancement at medial apex has increased 53 x 17 mm, previously 38 x 15 mm millimeter and increasing right pleural effusion   -Hypercalcemia: Calcium 10.6, albumin 2.7 (03/03/2022); calcium 10.7, albumin 2.7 (01/19/2022)   -Cycle number #10 of maintenance Alimta/Keytruda on 03/24/2022   -Hospitalized in 04/02/2022-04/05/2022 ([facility]): Weakness; difficulty walking to the restroom; lightheaded when standing; heart rate 153, blood pressure 98/63; pulse ox 98% on room air; 200 catheter bolus normal saline; heart rate and blood pressure improved; Levaquin for community-acquired pneumonia; overall, improved   -04/04/2022: TTE: LVEF 55%   -04/04/2022: IR: Not enough fluid for thoracentesis  -Cycle 11 of Alimta/Keytruda maintenance on 04/22/2022  -04/22/2022: TSH 0.3208, suppressed  -cycle 16 of maintenance Alimta/Keytruda on 05/13/2022  -cycle 17 of maintenance Alimta/Keytruda was due 06/03/2022  -restaging PET-CT is pending  -[id number]:  TSH normal (1.7894)  -06/17/2022:  MediPort placed (previous MediPort stop functioning due to displacement) (left IJ MediPort removed; left subclavian artery cutdown and primary repair; right IJ MediPort placement)  -06/29/2022:  Restaging PET-CT (comparison:  CT C/A/P 03/21/2022):  1. 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  Max SUV is 12 and corresponds with area of irregular enhancement seen on previous diagnostic contrast enhanced  CT.  2. Clustered micro nodularity at the right lung base with mild FDG avidity is nonspecific.  This could be related to endobronchial spread of secretions with inflammatory or infectious bronchiolitis related to the necrotic mass at the apex versus intrapulmonary metastases.  This is new compared to prior diagnostic CT  3. Vague nodular infiltrate at the posterior left upper lobe is mildly FDG avid and also new compared to prior diagnostic CT.  Differential considerations include pneumonitis or malignancy  4. Mild uptake in the region of recent procedure compatible with postoperative inflammation and healing response.  Continued attention recommended on follow-up exams  5. Pronounced, asymmetric hypermetabolism at the right vocal cord.  This could be related to compensatory function of the right vocal cord due to left vocal cord paralysis or infiltrative lesion of the right cord.  Correlate with direct visual inspection  -07/11/2022:  Limited abdominal ultrasound: Heterogenicity of the liver parenchyma with no definite abnormal echogenicity. Cholelithiasis with a wall enhancing sign. Persistent dilatation of the common bile duct; if clinically indicated MRCP might prove helpful for further assessment      Interval history:  10/22/2020:   Presents for initial medical oncology consultation, accompanied by her daughter, Leslie.    06/23/2022:  -Cycle 11 of Alimta/Keytruda maintenance on 04/22/2022  -04/22/2022: TSH 0.3208, suppressed  -cycle 16 of maintenance Alimta/Keytruda on 05/13/2022  -cycle 17 of maintenance Alimta/Keytruda was due 06/03/2022 but was not administered because she was not feeling well  -restaging PET-CT is pending  -[id number]:  TSH normal (1.7894)  -06/17/2022:  MediPort placed (previous MediPort stop functioning due to displacement) (left IJ MediPort removed; left subclavian artery cutdown and primary repair; right IJ MediPort placement)  -presents for a follow-up visit.  Overall, more less,  stable.  Says that her voice has been hoarse ever since MediPort placement on 06/17/2022 which was complicated by accidental injury to left subclavian artery, requiring repair.  No unusual headaches or focal neurological symptoms.  Surveillance brain MRI scans are referred to Radiation Oncology.  Appetite is preserved with maggots.  She is requesting refill of pain medications; she has pain in the right anterior and posterior chest secondary to large right lung tumor.  Stable chronic symptoms include generalized weakness, night sweats, hot flashes, exertional dyspnea, cough, wheezing, abdominal pain, nausea, occasional diarrhea, and itching.  No hemoptysis, fevers, or chills.  Apparently, restaging PET-CT is scheduled for 06/29/2022 07/18/2022:  -06/29/2022:  Restaging PET-CT (comparison:  CT C/A/P 03/21/2022):  1. 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  Max SUV is 12 and corresponds with area of irregular enhancement seen on previous diagnostic contrast enhanced CT.  2. Clustered micro nodularity at the right lung base with mild FDG avidity is nonspecific.  This could be related to endobronchial spread of secretions with inflammatory or infectious bronchiolitis related to the necrotic mass at the apex versus intrapulmonary metastases.  This is new compared to prior diagnostic CT  3. Vague nodular infiltrate at the posterior left upper lobe is mildly FDG avid and also new compared to prior diagnostic CT.  Differential considerations include pneumonitis or malignancy  4. Mild uptake in the region of recent procedure compatible with postoperative inflammation and healing response.  Continued attention recommended on follow-up exams  5. Pronounced, asymmetric hypermetabolism at the right vocal cord.  This could be related to compensatory function of the right vocal cord due to left vocal cord paralysis or infiltrative lesion of the right cord.  Correlate with  direct visual inspection  -07/11/2022:  Limited abdominal ultrasound: Heterogenicity of the liver parenchyma with no definite abnormal echogenicity. Cholelithiasis with a wall enhancing sign. Persistent dilatation of the common bile duct; if clinically indicated MRCP might prove helpful for further assessment  -labs reviewed:  07/18/2022:  Hemoglobin 11.2.  .3.  Platelets 646 K. differential count more or less unremarkable.  Alk-phos 404, chronically elevated.  AST 41.  ALT 56. Bilirubin normal.  TSH and free T4 normal.     Presents for follow-up visit, accompanied by a female family member, probably her daughter.  Overall, stable.  Chronic symptoms including generalized weakness, shortness of breath, night sweats, hot flashes, hoarseness, wheezing, and back and anterior chest pain, 10/10 severity.  She is requesting that pain medication be changed to every 4 hours from the q.6 hours currently.  No unusual headaches or focal neurological symptoms.  Surveillance brain MRI scans are being ordered by Radiation Oncology; next scan is apparently due 1st of next month (according to her daughter).      Review of systems:   All systems reviewed, and found to be negative except for the symptoms detailed above.      Physical examination:   VITAL SIGNS:  Reviewed.      GENERAL:  In no apparent distress.    HEAD:  No signs of head trauma.   EYES:  Pupils are equal.  Extraocular motions intact.    EARS:  Hearing grossly intact.   MOUTH:  Oropharynx is normal.   NECK:  No adenopathy, no JVD.      CHEST:  Chest with clear breath sounds bilaterally.  No wheezes, rales, or rhonchi.    CARDIAC:  Regular rate and rhythm.  S1 and S2, without murmurs, gallops, or rubs.   VASCULAR:  No Edema.  Peripheral pulses normal and equal in all extremities.   ABDOMEN:  Soft, without detectable tenderness.  No sign of distention.  No   rebound or guarding, and no masses palpated.   Bowel Sounds normal.   MUSCULOSKELETAL:  Good range of  motion of all major joints. Extremities without clubbing, cyanosis or edema.    NEUROLOGIC EXAM:  Alert and oriented x 3.  No focal sensory or strength deficits.   Speech normal.  Follows commands.   PSYCHIATRIC:  Mood normal.   SKIN:  No rash or lesions.  10/22/2020: Lungs clear to auscultation.  No crepitations or rhonchi.  No supraclavicular or axillary lymphadenopathy palpable.  Heart sounds normal.  Abdomen benign.  No swelling in legs.  Conjunctiva pale.  11/27/2020: In no acute discomfort.  Conjunctive pale.  03/23/2021: Not examined; it was a telemedicine visit.  04/15/2021: Looks well and healthy.  Cheerful.  Bilateral rhonchi.  No palpable lymphadenopathy.      Assessment:   #Poorly differentiated carcinoma of lung:  To summarize:   -Poorly differentiated carcinoma of lung, 14.0 cm right upper lobe mass invading mediastinum   -EBUS 09/20/2020   -cT4 cN2 M1b, stage RICKIE   -EGD/colonoscopy negative   -5 mm right occipital hemorrhagic metastasis 11/06/2020. -S/p SRS to brain metastasis (2100 cGy; 1 fraction) (12/03/2020)   -S/p chemoradiation therapy (12/2020-01/2021) for intrathoracic disease (oligometastatic disease), with positive response   -Followed by carbo/Alimta/Keytruda x4 for metastatic disease (solitary brain metastasis) with stable disease; questionable progression on CTs 08/2021)   -Alimta/Keytruda maintenance started 09/09/2021   -No brain metastases on surveillance brain MRI (11/11/2021)   -No progression on restaging CTs post maintenance Alimta/Keytruda x6 (12/17/2021)   -No brain metastasis on surveillance brain MRI (03/14/2022)   -Difficult to interpret restaging CTs 03/21/2022  -pronounced asymmetric hypermetabolism of right vocal cord on restaging PET-CT 06/29/2022      #Sites of disease:   14 cm right upper lobe mass, invading mediastinum; mediastinal lymphadenopathy; right occipital brain metastasis       #Molecular markers:  -BRAF mutation negative; ROS1 gene arrangement negative; ALK  rearrangement negative; PD-L1 CPS <10 (CPS 5) (erroneously, tested for esophageal carcinoma); EGFR negative; MET exon 14 skipping mutation negative; NTRK gene fusion negative; RET rearrangement negative   -PD-L1 testing: QNS       #Hypercalcemia (11/25/2020)  -11/25/2020: Calcium 10.3 (elevated for the very first time).  BUN 21, elevated.  Creatinine 0.79, normal.  Albumin 2.4.   -11/25/2020: Intact PTH level 32.9, normal.  Ionized calcium level 5.4, within normal limits.   -11/25/2020: IV fluids + Zometa 4 mg IV x1   -03/24/2022: Calcium 10.7, albumin 2.8, M spike 0.02 g/dL, IgG kappa; IgG, IgA, IgM normal; free kappa light chains 2.22, elevated; lambda light chains, kappa/lambda ratio normal; PTH related peptide <0.4; ionized calcium level 5.34 mg/dL, normal; 25 hydroxy vitamin D level 38 ng/mL, normal; Intact PTH 52.7, normal  (03/24/2022: Mild hypercalcemia; 0.02 g/dL IgG kappa M spike; intact PTH normal; PTH related peptide normal; vitamin D level normal; ionized calcium level normal; kappa/lambda ratio normal)       #Iron deficiency anemia, FOBT positive:   (Anemia of chronic disease)   -09/11/2020: Hemoglobin 6.9.  Microcytosis.  Elevated RDW.  Low serum iron, low TIBC, low transferrin saturation, normal ferritin.  FOBT positive.  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  .3.       #Thrombocytosis since 09/11/2020 ([id number])   -Could be reactive to iron deficiency anemia versus metastatic lung cancer   -10/22/2020: JAK2 mutation negative.  BCR-ABL 1 fusion transcripts negative.  ESR, CRP elevated.  Iron deficiency anemia.   -Subsequently, resolved (post parenteral iron therapy; post chemoradiation therapy for lung cancer)   -08/17/2021: Hemoglobin 6.1, , serum iron 16, TIBC 175, transferrin saturation 9%, ferritin >1675.56 (relative iron deficiency)  -07/18/2022:  Platelets 446 K       # Cholelithiasis; chronically elevated alkaline phosphatase, possible choledocholithiasis:   -HIDA scan (04/18/2019): No gallbladder activity at 1 hour post isotope injection, compatible with cystic duct obstruction/acute cholecystitis   -02/07/2020: Limited abdominal ultrasound: Cholelithiasis, dilated CBD, hepatomegaly   (12/03/2020)   -12/17/2021: Restaging CT C/A/P with contrast: Gallstones noted, apart from other findings; questionable slightly increased periductal dilatation   -Alk phos: 567 (12/29/2021); 337 (12/13/2021); 606 (12/09/2021),   (Bilirubin, AST, ALT normal)   -03/21/2022: MRCP (dilated common bile duct): Extrahepatic biliary ductal dilation similar to December 2021; small distal common bile duct filling defects seen, cannot exclude choledocholithiasis; cholelithiasis; no MRI evidence of acute cholecystitis; hepatomegaly with steatosis; small right pleural effusion      Plan:  Pending:  Retic count, serum iron, TIBC, ferritin, B12, LDH, haptoglobin      -pronounced asymmetric hypermetabolism of right vocal cord on restaging PET-CT 06/29/2022  >>>  -refer to ENT ASAP for evaluation     -Alimta/Keytruda maintenance started  09/09/2021   -No progression post Alimta/Keytruda x6 cycles on restaging CTs (12/17/2021)   -Difficult to interpret restaging CTs dated 03/21/2022  -S/P restaging PET-CT 06/29/2022, with no definite signs of progression   >>>  -Continue Alimta/Keytruda maintenance every 3 weeks (Alimta indefinitely; pembrolizumab for 24 months)   -Check CBC and CMP every 3 weeks before each cycle of chemotherapy   -Check TSH every 6 weeks; next, end of August  -re-stage with contrast enhanced CT scans of C/A/P in 3 months (end of September)   -Surveillance brain MRI scans deferred to radiation oncology (no metastasis on surveillance brain MRI (03/14/2022)   -Per IR, not enough fluid to allow right-sided thoracentesis (increasing right pleural fluid noted on restaging CTs 03/21/2022)     Chemotherapy regimen:   1.  Day 1: Pembrolizumab 200 mg IV plus pemetrexed 500 mg/m² IV plus carboplatin AUC 5; repeat cycles every 3 weeks for 4 cycles; followed by:   2.  Day 1: Pembrolizumab 200 mg IV every 3 weeks for 24 months;   3.  Day 1: Pemetrexed 500 mg/m² IV every 3 weeks indefinitely     Labs unable to perform liquid biopsy for PD-L1 and KRAS mutation   >>>  -We referred the patient to pulmonary for biopsy of right lung mass, specifically to enable PD-L1 and KRAS mutation testing but she no showed     -Iron deficiency anemia; FOBT positive;; s/p Feraheme x2 (10/26/2020-11/03/2020)   -No improvement in hemoglobin despite normalization of iron stores with Feraheme   (Anemia of chronic disease)     -Cholelithiasis; chronically elevated alkaline phosphatase   -Questionable slightly increased biliary ductal dilation on CT C/A/P with contrast (12/17/2021)   -03/21/2022: MRCP (dilated common bile duct): Extrahepatic biliary ductal dilation similar to December 2021; small distal common bile duct filling defects seen, cannot exclude choledocholithiasis; cholelithiasis; no MRI evidence of acute cholecystitis; hepatomegaly with steatosis; small  right pleural effusion   >>>  -Referred to GI for evaluation     -Hypercalcemia: Calcium 10.6, albumin 2.7 (03/03/2022); calcium 10.7, albumin 2.7 (01/19/2022)   -03/24/2022: Mild hypercalcemia; 0.02 g/dL IgG kappa M spike; intact PTH normal; PTH related peptide normal; vitamin D level normal; ionized calcium level normal; kappa/lambda ratio normal   >>>  -In 6 months (September' 22), recheck SPEP, ANNA, FLC assay      -Thrombocytosis is reactive to iron deficiency and underlying metastatic lung cancer     On Norco for right posterior chest wall pain  -03/24/2022: From now on, Norco 10 mg pills no more than twice a day   -07/18/2022:  Per her request, will change to every 4 hours p.r.n.    Follow-up with NP in 3 weeks.    Above discussed with her.  All questions answered.   Discussed labs and scans and gave her copies of relevant reports.   She understands and agrees with this plan, and was appreciative.

## 2022-07-18 PROBLEM — M54.9 BACK PAIN: Status: ACTIVE | Noted: 2022-01-01

## 2022-07-18 PROBLEM — R07.9 CHEST PAIN: Status: ACTIVE | Noted: 2022-01-01

## 2022-07-18 NOTE — TELEPHONE ENCOUNTER
Orders for today:    Refer to ENT ASAP for evaluation of hypermetabolism of right vocal cord noted on restaging PET-CT 06/29/2022    Check CBC, CMP, retic count, serum iron, TIBC, ferritin, B12 level, LDH, haptoglobin    Continue Alimta/Keytruda every 3 weeks  CBC and CMP every 3 weeks before each cycle of chemotherapy  Check TSH every 6 weeks; next, end of this month  Re-stage with contrast enhanced CT scans of C/A/P end of September    Follow-up with GI for biliary ductal dilatation    In September, SPEP, ANNA, FLC assay    Follow-up with NP in 3 weeks.

## 2022-08-01 NOTE — TELEPHONE ENCOUNTER
Pt had not shown up for her [facility] appointment.  Spoke to pt's daughter Leslie.  She stated that her mom was not feeling well and will call us back to reschedule.  Gave daughter our contact information.

## 2022-08-08 NOTE — PROGRESS NOTES
Past medical history: Cholelithiasis.  Chronic cough.  COPD.  Tobacco abuse.  Hypertension.  Dyslipidemia.  Hepatomegaly.  Obesity.  Peripheral artery disease.  Social history: .  [place]  and family.  Has 12 children.  Has been smoking 1 pack of cigarettes daily for 40 years.  No history of alcohol or illicit drug abuse.  Family history: Negative for cancers.  Health maintenance:   Screening mammogram in 2019 in [place], apparently unremarkable.   Mostly colonoscopy ever.  Menstrual and OB/GYN history: Menopause in her 50s.    Reason for follow-up:  -poorly differentiated carcinoma right lung, stage IV; right upper lobe mass, mediastinal lymphadenopathy, brain metastasis, S/P EB was 09/20/2020  -brain metastasis a  -abnormal right vocal cord on PET-CT  -anemia of chronic disease, iron-deficiency anemia, FOBT +  -reactive thrombocytosis  -cholelithiasis, dilated common biliary duct, choledocholithiasis    Current Treatment   Alimta/Keytruda maintenance started 09/09/2021    Treatment History  -SRS to brain metastasis (2100 cGy; 1 fraction) (12/03/2020)  -Carbo/Taxol weekly x6, concurrent with RT (12/16/2020-01/29/2021)  -Radiotherapy right lung (12/16/2020-01/28/2021) (6000 cGy; 30 fractions; 43 elapsed days)  -Carbo/Alimta/Keytruda x4 (04/29/2021-07/09/2021)    History of present illness:  66 -year-old female referred by Dr. Steve Simpson, with lung cancer.     -11.4 cm right upper lobe mass, invading mediastinum (noncontrast chest CT)   -Large mass in 4R position (right lower paratracheal) (bronchoscopy and EBUS: 09/22/2020)   -EBUS, 4R, FNA: Poorly differentiated carcinoma (tumor of lung versus upper GI tract)   (EGD, colonoscopy: 02/10/2021: No malignancy; no biopsies collected)   -PET/CT (11/02/2020): Right upper lung lobe mass has enlarged 9.9 x 9.8 x 14.0 cm, previously, 8.1 x 7.4 x 11.4 cm;    contiguous extension of the mass into the mediastinum versus right hilar enlarged  lymph node 2.0 x 1.8 cm   -Brain MRI (11/06/2020): Right occipital 5 x 4 mm hemorrhagic metastasis without edema   -11/06/2020: Referred to [facility] for SRS   >>   Tumor >7 cm, therefore, T4   Tumor invading mediastinum, therefore, T4   Ipsilateral mediastinal mass (? Lymphadenopathy), EBUS FNA positive, therefore, N2  Solitary brain metastasis, therefore, M1b   >>cT4 cN2 M1b, stage RICKIE   -SRS to brain metastasis (2100 cGy; 1 fraction) (12/03/2020)  -Carbo/Taxol weekly x6, concurrent with RT (12/16/2020-01/29/2021)   -Radiotherapy right lung (12/16/2020-01/28/2021) (6000 cGy; 30 fractions; 43 elapsed days)  -04/07/2021: Restaging bone scan: No bone metastasis   -04/07/2021: Restaging CT C/A/P with contrast (comparison: PET/CT dated 11/02/2020): Mild interval decrease in size of the mass involving the upper lobe of right lung (8.7 x 7.6 cm, previously 10.5 x 9.8 cm); prominent precarinal lymph node also appears mildly reduced in size (7 mm, previously 9 mm); no new malignancy or metastatic disease in chest, abdomen, or pelvis   (Positive response to chemoradiation therapy)   -Subsequently, carbo/Alimta/Keytruda x4 (04/29/2021-07/09/2021)   -No brain metastases on surveillance brain MRI (05/03/2021)   -Hospitalized 07/11/2021-07/22/2021: [facility]: Postobstructive pneumonia, and acute hypoxemic respiratory failure, septic shock, HUNG; successfully treated  -08/09/2021: Surveillance brain MRI with contrast (comparison: 05/03/2021):   -08/20/2021: Restaging CT C/A/P with contrast (comparison: 04/07/2021: Similar size of right upper lobe mass extending to the hilum with increased peripheral opacities and fluid peripheral to the mass, cannot exclude postobstructive infection (8-9 cm, similar to prior); small right pleural effusion; stable mediastinal lymph nodes (subcarinal, 8 mm)   -Hospitalized 08/18/2021-08/21/2021: Anemia, pneumonia; hemoglobin 6.1, PRBC x1 unit; Augmentin plus Levaquin for pneumonia;  sinus tachycardia, requiring Ativan; remained afebrile; CT chest without contrast (08/19/2021) indicated postobstructive pneumonia and lepidic spread of tumor, no interval change from prior; CTA chest PE study showed no large central or segmental pulmonary thromboemboli; interval enlargement of known right upper lobe mass and progression of surrounding irregular airspace consolidation and ipsilateral pleural effusion, etc.   -08/19/2021: CT chest without contrast: Size of the right upper lobe mass with associated consolidation is unchanged in size and extent from 08/10/2021; small right pleural effusion; postobstructive pneumonia versus lepidic spread of tumor, no change from prior   -08/20/2021: CTA chest PE protocol (comparison: 07/11/2021): No PE; interval enlargement of known right upper lobe mass and progression of surrounding irregular airspace consolidation and ipsilateral pleural effusion (large right upper lobe mass 9.4 x 12.6 x 7.6 cm); cholelithiasis, similar dilated appearance of common bile duct, without definitive visualization of distal obstructive etiology   -Alimta/Keytruda every 3 weeks maintenance started 09/09/2021   -11/11/2021: Surveillance brain MRI with and without contrast (comparison: 08/09/2021): No acute intracranial findings or evidence of metastasis   -11/18/2021: TSH and free T4 normal   -Cycle #6 of maintenance Alimta/Keytruda on 12/29/2021   -12/17/2021: Restaging CTs of C/A/P with contrast (comparison: August 10 and August 9, 2021): Similar appearance of irregular right upper lung masslike consolidation and adjacent fluid; slightly improved aeration of right lower lobe; questionably slightly increased biliary ductal dilation; small mediastinal lymph nodes are stable; there are gallstones   -Alk phos: 567 (12/29/2021); 337 (12/13/2021); 606 (12/09/2021),   -Bilirubin, AST, ALT normal   -12/09/2021: No hemolysis; iron stores normal (transferrin saturation 21%, ferritin 4167.95); B12  479, normal; LDH low, haptoglobin elevated; RBC folate normal; hemoglobin 9.7, reticulocyte count 3.1%   -12/29/2021: TSH normal   -Per pathology, unable to perform liquid biopsy for PD-L1 and KRAS mutations   -Cycle #9 of maintenance Alimta/Keytruda on 03/03/2022   -03/14/2022: Surveillance brain MRI with contrast: The comparison: 11/11/2021): No intracranial metastasis   -03/21/2022: MRCP (dilated common bile duct): Extrahepatic biliary ductal dilation similar to December 2021; small distal common bile duct filling defects seen, cannot exclude choledocholithiasis; cholelithiasis; no MRI evidence of acute cholecystitis; hepatomegaly with steatosis; small right pleural effusion   -03/21/2022: Restaging CT C/A/P with contrast (comparison: 12/17/2021): Right upper lobe irregular consolidation overall similar size although associated serpiginous enhancement at medial apex has increased 53 x 17 mm, previously 38 x 15 mm millimeter and increasing right pleural effusion   -Hypercalcemia: Calcium 10.6, albumin 2.7 (03/03/2022); calcium 10.7, albumin 2.7 (01/19/2022)   -Cycle number #10 of maintenance Alimta/Keytruda on 03/24/2022   -Hospitalized in 04/02/2022-04/05/2022 ([facility]): Weakness; difficulty walking to the restroom; lightheaded when standing; heart rate 153, blood pressure 98/63; pulse ox 98% on room air; 200 catheter bolus normal saline; heart rate and blood pressure improved; Levaquin for community-acquired pneumonia; overall, improved   -04/04/2022: TTE: LVEF 55%   -04/04/2022: IR: Not enough fluid for thoracentesis  -Cycle 11 of Alimta/Keytruda maintenance on 04/22/2022  -04/22/2022: TSH 0.3208, suppressed  -cycle 16 of maintenance Alimta/Keytruda on 05/13/2022  -cycle 17 of maintenance Alimta/Keytruda was due 06/03/2022  -restaging PET-CT is pending  -[id number]:  TSH normal (1.7894)  -06/17/2022:  MediPort placed (previous MediPort stop functioning due to displacement) (left IJ MediPort removed; left  subclavian artery cutdown and primary repair; right IJ MediPort placement)  -06/29/2022:  Restaging PET-CT (comparison:  CT C/A/P 03/21/2022):  1. 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  Max SUV is 12 and corresponds with area of irregular enhancement seen on previous diagnostic contrast enhanced CT.  2. Clustered micro nodularity at the right lung base with mild FDG avidity is nonspecific.  This could be related to endobronchial spread of secretions with inflammatory or infectious bronchiolitis related to the necrotic mass at the apex versus intrapulmonary metastases.  This is new compared to prior diagnostic CT  3. Vague nodular infiltrate at the posterior left upper lobe is mildly FDG avid and also new compared to prior diagnostic CT.  Differential considerations include pneumonitis or malignancy  4. Mild uptake in the region of recent procedure compatible with postoperative inflammation and healing response.  Continued attention recommended on follow-up exams  5. Pronounced, asymmetric hypermetabolism at the right vocal cord.  This could be related to compensatory function of the right vocal cord due to left vocal cord paralysis or infiltrative lesion of the right cord.  Correlate with direct visual inspection  -07/11/2022:  Limited abdominal ultrasound: Heterogenicity of the liver parenchyma with no definite abnormal echogenicity. Cholelithiasis with a wall enhancing sign. Persistent dilatation of the common bile duct; if clinically indicated MRCP might prove helpful for further assessment    Interval history:  10/22/2020:   Presents for initial medical oncology consultation, accompanied by her daughter, Leslie.    06/23/2022:  -Cycle 11 of Alimta/Keytruda maintenance on 04/22/2022  -04/22/2022: TSH 0.3208, suppressed  -cycle 16 of maintenance Alimta/Keytruda on 05/13/2022  -cycle 17 of maintenance Alimta / Keytruda was due 06/03/2022 but was not  administered because she was not feeling well  -[id number]:  TSH normal (1.7894)  -06/17/2022:  MediPort placed (previous MediPort stop functioning due to displacement) (left IJ MediPort removed; left subclavian artery cutdown and primary repair; right IJ MediPort placement)  -presents for a follow-up visit.  Overall, more less, stable.  Says that her voice has been hoarse ever since MediPort placement on 06/17/2022 which was complicated by accidental injury to left subclavian artery, requiring repair.  No unusual headaches or focal neurological symptoms.  Surveillance brain MRI scans are referred to Radiation Oncology.  Appetite is preserved with maggots.  She is requesting refill of pain medications; she has pain in the right anterior and posterior chest secondary to large right lung tumor.  Stable chronic symptoms include generalized weakness, night sweats, hot flashes, exertional dyspnea, cough, wheezing, abdominal pain, nausea, occasional diarrhea, and itching.  No hemoptysis, fevers, or chills.  Apparently, restaging PET-CT is scheduled for 06/29/2022 07/18/2022:  -06/29/2022:  Restaging PET-CT (comparison:  CT C/A/P 03/21/2022):  1. 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  Max SUV is 12 and corresponds with area of irregular enhancement seen on previous diagnostic contrast enhanced CT.  2. Clustered micro nodularity at the right lung base with mild FDG avidity is nonspecific.  This could be related to endobronchial spread of secretions with inflammatory or infectious bronchiolitis related to the necrotic mass at the apex versus intrapulmonary metastases.  This is new compared to prior diagnostic CT  3. Vague nodular infiltrate at the posterior left upper lobe is mildly FDG avid and also new compared to prior diagnostic CT.  Differential considerations include pneumonitis or malignancy  4. Mild uptake in the region of recent procedure compatible with  postoperative inflammation and healing response.  Continued attention recommended on follow-up exams  5. Pronounced, asymmetric hypermetabolism at the right vocal cord.  This could be related to compensatory function of the right vocal cord due to left vocal cord paralysis or infiltrative lesion of the right cord.  Correlate with direct visual inspection  -07/11/2022:  Limited abdominal ultrasound: Heterogenicity of the liver parenchyma with no definite abnormal echogenicity. Cholelithiasis with a wall enhancing sign. Persistent dilatation of the common bile duct; if clinically indicated MRCP might prove helpful for further assessment  -labs reviewed:  07/18/2022:  Hemoglobin 11.2.  .3.  Platelets 646 K. differential count more or less unremarkable.  Alk-phos 404, chronically elevated.  AST 41.  ALT 56. Bilirubin normal.  TSH and free T4 normal.       8/8/2022  Seen in follow up today; accompanied by her daughter. Noted chronic symptoms including generalized weakness, shortness of breath, night sweats, hot flashes, hoarseness, wheezing, and back and anterior chest pain; stable today overall. No new or worsening symptoms. Surveillance brain MRI scans are being ordered by Radiation Oncology; next scan is apparently due 1st of next month (according to her daughter). No new treatment related concerns or problems. Due to receive Alimta / Keytruda today. Labs stable.     Review of systems: All systems reviewed, and found to be negative except for the symptoms detailed above.    Physical examination:  General: Alert and oriented. No acute distress  Eye: Pupils are equal, round and reactive to light, Extraocular movements are intact. Normal conjunctiva  HENT: Normocephalic. Oropharynx exam deferred; mask in place due to coronavirus  Neck: Supple, Non-tender  Respiratory: Respirations are non-labored, Symmetrical chest wall expansion. Breath sounds CTA bilaterally  Cardiovascular: Regular rate, rhythm, Normal  peripheral perfusion, No bilateral lower extremity edema  Breast: Exam deferred  Gastrointestinal: Non-distended, Present bowel sounds   GYN: Exam deferred  Genitourinary: Exam deferred  Lymphatics: No lymphadenopathy appreciated  Musculoskeletal: Moves all extremities  Integumentary: Intact. Warm, dry. No rashes, or lesions to visible skin  Neurologic: No focal deficits  Psychiatric: Cooperative. Appropriate mood and affect       Assessment:   #Poorly differentiated carcinoma of lung:    To summarize:   -Poorly differentiated carcinoma of lung, 14.0 cm right upper lobe mass invading mediastinum   -EBUS 09/20/2020   -cT4 cN2 M1b, stage RICKIE   -EGD/colonoscopy negative   -5 mm right occipital hemorrhagic metastasis 11/06/2020. -S/p SRS to brain metastasis (2100 cGy; 1 fraction) (12/03/2020)   -S/p chemoradiation therapy (12/2020-01/2021) for intrathoracic disease (oligometastatic disease), with positive response   -Followed by carbo/Alimta/Keytruda x4 for metastatic disease (solitary brain metastasis) with stable disease; questionable progression on CTs 08/2021)   -Alimta/Keytruda maintenance started 09/09/2021   -No brain metastases on surveillance brain MRI (11/11/2021)   -No progression on restaging CTs post maintenance Alimta/Keytruda x6 (12/17/2021)   -No brain metastasis on surveillance brain MRI (03/14/2022)   -Difficult to interpret restaging CTs 03/21/2022  -pronounced asymmetric hypermetabolism of right vocal cord on restaging PET-CT 06/29/2022    #Sites of disease:   14 cm right upper lobe mass, invading mediastinum; mediastinal lymphadenopathy; right occipital brain metastasis    #Molecular markers:  -BRAF mutation negative; ROS1 gene arrangement negative; ALK rearrangement negative; PD-L1 CPS <10 (CPS 5) (erroneously, tested for esophageal carcinoma); EGFR negative; MET exon 14 skipping mutation negative; NTRK gene fusion negative; RET rearrangement negative   -PD-L1 testing: QNS    #Hypercalcemia  (11/25/2020)  -11/25/2020: Calcium 10.3 (elevated for the very first time).  BUN 21, elevated.  Creatinine 0.79, normal.  Albumin 2.4.   -11/25/2020: Intact PTH level 32.9, normal.  Ionized calcium level 5.4, within normal limits.   -11/25/2020: IV fluids + Zometa 4 mg IV x1   -03/24/2022: Calcium 10.7, albumin 2.8, M spike 0.02 g/dL, IgG kappa; IgG, IgA, IgM normal; free kappa light chains 2.22, elevated; lambda light chains, kappa/lambda ratio normal; PTH related peptide <0.4; ionized calcium level 5.34 mg/dL, normal; 25 hydroxy vitamin D level 38 ng/mL, normal; Intact PTH 52.7, normal  (03/24/2022: Mild hypercalcemia; 0.02 g/dL IgG kappa M spike; intact PTH normal; PTH related peptide normal; vitamin D level normal; ionized calcium level normal; kappa/lambda ratio normal)    #Iron deficiency anemia, FOBT positive:   (Anemia of chronic disease)   -09/11/2020: Hemoglobin 6.9.  Microcytosis.  Elevated RDW.  Low serum iron, low TIBC, low transferrin saturation, normal ferritin.  FOBT positive.  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  .3.    #Thrombocytosis since 09/11/2020 ([phone])   -Could be reactive to iron deficiency anemia versus metastatic lung cancer   -10/22/2020: JAK2 mutation negative.  BCR-ABL 1 fusion transcripts negative.  ESR, CRP elevated.  Iron deficiency anemia.   -Subsequently, resolved (post parenteral iron therapy; post chemoradiation therapy for lung cancer)   -08/17/2021: Hemoglobin 6.1, , serum iron 16, TIBC 175, transferrin saturation 9%, ferritin >1675.56 (relative iron deficiency)  -07/18/2022:  Platelets 446 K    # Cholelithiasis; chronically elevated alkaline phosphatase, possible choledocholithiasis:   -HIDA scan (04/18/2019): No gallbladder activity at 1 hour post isotope injection, compatible with cystic duct obstruction/acute cholecystitis   -02/07/2020: Limited abdominal ultrasound: Cholelithiasis, dilated CBD, hepatomegaly   (12/03/2020)   -12/17/2021: Restaging CT C/A/P with contrast: Gallstones noted, apart from other findings; questionable slightly increased periductal dilatation   -Alk phos: 567 (12/29/2021); 337 (12/13/2021); 606 (12/09/2021),   (Bilirubin, AST, ALT normal)   -03/21/2022: MRCP (dilated common bile duct): Extrahepatic biliary ductal dilation similar to December 2021; small distal common bile duct filling defects seen, cannot exclude choledocholithiasis; cholelithiasis; no MRI evidence of acute cholecystitis; hepatomegaly with steatosis; small right pleural effusion      Plan:  Pending:  Retic count, serum iron, TIBC, ferritin, B12, LDH, haptoglobin    -pronounced asymmetric hypermetabolism of right vocal cord on restaging PET-CT 06/29/2022  >>>  -referred to ENT ASAP for evaluation     -Alimta/Keytruda maintenance started 09/09/2021   -No progression post Alimta/Keytruda x 6 cycles on restaging CTs (12/17/2021)   -Difficult to interpret restaging CTs dated 03/21/2022  -S/P restaging PET-CT 06/29/2022, with no definite signs of progression   >>>  -Continue  Alimta/Keytruda maintenance every 3 weeks (Alimta indefinitely; pembrolizumab for 24 months)  -Check CBC and CMP every 3 weeks before each cycle of chemotherapy  -Check TSH every 6 weeks; next, end of August  -Re-stage with contrast enhanced CT scans of C/A/P in 3 months (end of September)  -Surveillance brain MRI scans deferred to radiation oncology (no metastasis on surveillance brain MRI (03/14/2022)  -Per IR, not enough fluid to allow right-sided thoracentesis (increasing right pleural fluid noted on restaging CTs 03/21/2022)     Chemotherapy regimen:   1.  Day 1: Pembrolizumab 200 mg IV plus pemetrexed 500 mg/m² IV plus carboplatin AUC 5; repeat cycles every 3 weeks for 4 cycles; followed by:   2.  Day 1: Pembrolizumab 200 mg IV every 3 weeks for 24 months;   3.  Day 1: Pemetrexed 500 mg/m² IV every 3 weeks indefinitely     Labs unable to perform liquid biopsy for PD-L1 and KRAS mutation   >>>  -We referred the patient to pulmonary for biopsy of right lung mass, specifically to enable PD-L1 and KRAS mutation testing but she no showed     -Iron deficiency anemia; FOBT positive;; s/p Feraheme x2 (10/26/2020-11/03/2020)   -No improvement in hemoglobin despite normalization of iron stores with Feraheme   (Anemia of chronic disease)     -Cholelithiasis; chronically elevated alkaline phosphatase   -Questionable slightly increased biliary ductal dilation on CT C/A/P with contrast (12/17/2021)   -03/21/2022: MRCP (dilated common bile duct): Extrahepatic biliary ductal dilation similar to December 2021; small distal common bile duct filling defects seen, cannot exclude choledocholithiasis; cholelithiasis; no MRI evidence of acute cholecystitis; hepatomegaly with steatosis; small right pleural effusion   >>>  -Referred to GI for evaluation; following      -Hypercalcemia: Calcium 10.6, albumin 2.7 (03/03/2022); calcium 10.7, albumin 2.7 (01/19/2022)   -03/24/2022: Mild hypercalcemia; 0.02 g/dL IgG kappa M spike; intact  PTH normal; PTH related peptide normal; vitamin D level normal; ionized calcium level normal; kappa/lambda ratio normal   >>>  -In 6 months (September' 22), recheck SPEP, ANNA, FLC assay      -Thrombocytosis is reactive to iron deficiency and underlying metastatic lung cancer     On Norco for right posterior chest wall pain  -03/24/2022: From now on, Norco 10 mg pills no more than twice a day   -07/18/2022:  Per her request, changed to every 4 hours p.r.n.      Clinically, ok to proceed with maintenance Alimta / Keytruda today  RTC for labs, NP visit, treatment in 3 weeks         Above discussed with her.  All questions answered.  Discussed labs and scans and gave her copies of relevant reports.  She understands and agrees with this plan, and was appreciative.

## 2022-08-29 NOTE — PROGRESS NOTES
Reason for follow-up:  -poorly differentiated carcinoma right lung, stage IV; right upper lobe mass, mediastinal lymphadenopathy, brain metastasis, S/P EB was 09/20/2020  -brain metastasis a  -abnormal right vocal cord on PET-CT  -anemia of chronic disease, iron-deficiency anemia, FOBT +  -reactive thrombocytosis  -cholelithiasis, dilated common biliary duct, choledocholithiasis    Current Treatment   Alimta/Keytruda maintenance   started 09/09/2021    Treatment History  -SRS to brain metastasis (2100 cGy; 1 fraction) (12/03/2020)  -Carbo/Taxol weekly x6, concurrent with RT (12/16/2020-01/29/2021)  -Radiotherapy right lung (12/16/2020-01/28/2021) (6000 cGy; 30 fractions; 43 elapsed days)  -Carbo/Alimta/Keytruda x4 (04/29/2021-07/09/2021)    Past medical history: Cholelithiasis.  Chronic cough.  COPD.  Tobacco abuse.  Hypertension.  Dyslipidemia.  Hepatomegaly.  Obesity.  Peripheral artery disease.  Social history: .  Lives in [place] with her  and family.  Has 12 children.  Has been smoking 1 pack of cigarettes daily for 40 years.  No history of alcohol or illicit drug abuse.  Family history: Negative for cancers.  Health maintenance:   Screening mammogram in 2019 in [place], apparently unremarkable.   Mostly colonoscopy ever.  Menstrual and OB/GYN history: Menopause in her 50s.      History of present illness:  66 -year-old female referred by Dr. Steve Simpson, with lung cancer.     -11.4 cm right upper lobe mass, invading mediastinum (noncontrast chest CT)   -Large mass in 4R position (right lower paratracheal) (bronchoscopy and EBUS: 09/22/2020)   -EBUS, 4R, FNA: Poorly differentiated carcinoma (tumor of lung versus upper GI tract)   (EGD, colonoscopy: 02/10/2021: No malignancy; no biopsies collected)   -PET/CT (11/02/2020): Right upper lung lobe mass has enlarged 9.9 x 9.8 x 14.0 cm, previously, 8.1 x 7.4 x 11.4 cm;    contiguous extension of the mass into the mediastinum versus right hilar enlarged  lymph node 2.0 x 1.8 cm   -Brain MRI (11/06/2020): Right occipital 5 x 4 mm hemorrhagic metastasis without edema   -11/06/2020: Referred to [facility] for SRS   >>   Tumor >7 cm, therefore, T4   Tumor invading mediastinum, therefore, T4   Ipsilateral mediastinal mass (? Lymphadenopathy), EBUS FNA positive, therefore, N2  Solitary brain metastasis, therefore, M1b   >>cT4 cN2 M1b, stage RICKIE   -SRS to brain metastasis (2100 cGy; 1 fraction) (12/03/2020)  -Carbo/Taxol weekly x6, concurrent with RT (12/16/2020-01/29/2021)   -Radiotherapy right lung (12/16/2020-01/28/2021) (6000 cGy; 30 fractions; 43 elapsed days)  -04/07/2021: Restaging bone scan: No bone metastasis   -04/07/2021: Restaging CT C/A/P with contrast (comparison: PET/CT dated 11/02/2020): Mild interval decrease in size of the mass involving the upper lobe of right lung (8.7 x 7.6 cm, previously 10.5 x 9.8 cm); prominent precarinal lymph node also appears mildly reduced in size (7 mm, previously 9 mm); no new malignancy or metastatic disease in chest, abdomen, or pelvis   (Positive response to chemoradiation therapy)   -Subsequently, carbo/Alimta/Keytruda x4 (04/29/2021-07/09/2021)   -No brain metastases on surveillance brain MRI (05/03/2021)   -Hospitalized 07/11/2021-07/22/2021: [facility]: Postobstructive pneumonia, and acute hypoxemic respiratory failure, septic shock, HUNG; successfully treated  -08/09/2021: Surveillance brain MRI with contrast (comparison: 05/03/2021):   -08/20/2021: Restaging CT C/A/P with contrast (comparison: 04/07/2021: Similar size of right upper lobe mass extending to the hilum with increased peripheral opacities and fluid peripheral to the mass, cannot exclude postobstructive infection (8-9 cm, similar to prior); small right pleural effusion; stable mediastinal lymph nodes (subcarinal, 8 mm)   -Hospitalized 08/18/2021-08/21/2021: Anemia, pneumonia; hemoglobin 6.1, PRBC x1 unit; Augmentin plus Levaquin for pneumonia;  sinus tachycardia, requiring Ativan; remained afebrile; CT chest without contrast (08/19/2021) indicated postobstructive pneumonia and lepidic spread of tumor, no interval change from prior; CTA chest PE study showed no large central or segmental pulmonary thromboemboli; interval enlargement of known right upper lobe mass and progression of surrounding irregular airspace consolidation and ipsilateral pleural effusion, etc.   -08/19/2021: CT chest without contrast: Size of the right upper lobe mass with associated consolidation is unchanged in size and extent from 08/10/2021; small right pleural effusion; postobstructive pneumonia versus lepidic spread of tumor, no change from prior   -08/20/2021: CTA chest PE protocol (comparison: 07/11/2021): No PE; interval enlargement of known right upper lobe mass and progression of surrounding irregular airspace consolidation and ipsilateral pleural effusion (large right upper lobe mass 9.4 x 12.6 x 7.6 cm); cholelithiasis, similar dilated appearance of common bile duct, without definitive visualization of distal obstructive etiology   -Alimta/Keytruda every 3 weeks maintenance started 09/09/2021   -11/11/2021: Surveillance brain MRI with and without contrast (comparison: 08/09/2021): No acute intracranial findings or evidence of metastasis   -11/18/2021: TSH and free T4 normal   -Cycle #6 of maintenance Alimta/Keytruda on 12/29/2021   -12/17/2021: Restaging CTs of C/A/P with contrast (comparison: August 10 and August 9, 2021): Similar appearance of irregular right upper lung masslike consolidation and adjacent fluid; slightly improved aeration of right lower lobe; questionably slightly increased biliary ductal dilation; small mediastinal lymph nodes are stable; there are gallstones   -Alk phos: 567 (12/29/2021); 337 (12/13/2021); 606 (12/09/2021),   -Bilirubin, AST, ALT normal   -12/09/2021: No hemolysis; iron stores normal (transferrin saturation 21%, ferritin 4167.95); B12  479, normal; LDH low, haptoglobin elevated; RBC folate normal; hemoglobin 9.7, reticulocyte count 3.1%   -12/29/2021: TSH normal   -Per pathology, unable to perform liquid biopsy for PD-L1 and KRAS mutations   -Cycle #9 of maintenance Alimta/Keytruda on 03/03/2022   -03/14/2022: Surveillance brain MRI with contrast: The comparison: 11/11/2021): No intracranial metastasis   -03/21/2022: MRCP (dilated common bile duct): Extrahepatic biliary ductal dilation similar to December 2021; small distal common bile duct filling defects seen, cannot exclude choledocholithiasis; cholelithiasis; no MRI evidence of acute cholecystitis; hepatomegaly with steatosis; small right pleural effusion   -03/21/2022: Restaging CT C/A/P with contrast (comparison: 12/17/2021): Right upper lobe irregular consolidation overall similar size although associated serpiginous enhancement at medial apex has increased 53 x 17 mm, previously 38 x 15 mm millimeter and increasing right pleural effusion   -Hypercalcemia: Calcium 10.6, albumin 2.7 (03/03/2022); calcium 10.7, albumin 2.7 (01/19/2022)   -Cycle number #10 of maintenance Alimta/Keytruda on 03/24/2022   -Hospitalized in 04/02/2022-04/05/2022 ([facility]): Weakness; difficulty walking to the restroom; lightheaded when standing; heart rate 153, blood pressure 98/63; pulse ox 98% on room air; 200 catheter bolus normal saline; heart rate and blood pressure improved; Levaquin for community-acquired pneumonia; overall, improved   -04/04/2022: TTE: LVEF 55%   -04/04/2022: IR: Not enough fluid for thoracentesis  -Cycle 11 of Alimta/Keytruda maintenance on 04/22/2022  -04/22/2022: TSH 0.3208, suppressed  -cycle 16 of maintenance Alimta/Keytruda on 05/13/2022  -cycle 17 of maintenance Alimta/Keytruda was due 06/03/2022  -restaging PET-CT is pending  -[id number]:  TSH normal (1.7894)  -06/17/2022:  MediPort placed (previous MediPort stop functioning due to displacement) (left IJ MediPort removed; left  subclavian artery cutdown and primary repair; right IJ MediPort placement)  -06/29/2022:  Restaging PET-CT (comparison:  CT C/A/P 03/21/2022):  1. 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  Max SUV is 12 and corresponds with area of irregular enhancement seen on previous diagnostic contrast enhanced CT.  2. Clustered micro nodularity at the right lung base with mild FDG avidity is nonspecific.  This could be related to endobronchial spread of secretions with inflammatory or infectious bronchiolitis related to the necrotic mass at the apex versus intrapulmonary metastases.  This is new compared to prior diagnostic CT  3. Vague nodular infiltrate at the posterior left upper lobe is mildly FDG avid and also new compared to prior diagnostic CT.  Differential considerations include pneumonitis or malignancy  4. Mild uptake in the region of recent procedure compatible with postoperative inflammation and healing response.  Continued attention recommended on follow-up exams  5. Pronounced, asymmetric hypermetabolism at the right vocal cord.  This could be related to compensatory function of the right vocal cord due to left vocal cord paralysis or infiltrative lesion of the right cord.  Correlate with direct visual inspection  -07/11/2022:  Limited abdominal ultrasound: Heterogenicity of the liver parenchyma with no definite abnormal echogenicity. Cholelithiasis with a wall enhancing sign. Persistent dilatation of the common bile duct; if clinically indicated MRCP might prove helpful for further assessment    Interval history 8/29/2022: Patient presents today alone for scheduled follow up for adenocarcinoma of the lung. She is due to receive cycle 20 of Alimta,Carbo,Keytruda today. Patient reports that ever since she had mediport removed she has had worsening SOB. She also reports hoarsness that has progressively worsened. Will send patient for CXR today. Patient  amenable. To note patient has COPD. Lab work pending during office visit. Patient request refills on pain medication however at this time pain medication is not due. Patient aware of fill date. No further questions or concerns voiced.       8/8/2022  Seen in follow up today; accompanied by her daughter. Noted chronic symptoms including generalized weakness, shortness of breath, night sweats, hot flashes, hoarseness, wheezing, and back and anterior chest pain; stable today overall. No new or worsening symptoms. Surveillance brain MRI scans are being ordered by Radiation Oncology; next scan is apparently due 1st of next month (according to her daughter). No new treatment related concerns or problems. Due to receive Alimta / Keytruda today. Labs stable.     Review of systems: All systems reviewed, and found to be negative except for the symptoms detailed above.    Physical examination:  General: Alert and oriented. No acute distress  Eye: Pupils are equal, round and reactive to light, Extraocular movements are intact. Normal conjunctiva  HENT: Normocephalic. Oropharynx exam deferred; mask in place due to coronavirus  Neck: Supple, Non-tender  Respiratory: Respirations are non-labored, Symmetrical chest wall expansion. Breath sounds CTA bilaterally  Cardiovascular: Regular rate, rhythm, Normal peripheral perfusion, No bilateral lower extremity edema  Breast: Exam deferred  Gastrointestinal: Non-distended, Present bowel sounds   GYN: Exam deferred  Genitourinary: Exam deferred  Lymphatics: No lymphadenopathy appreciated  Musculoskeletal: Moves all extremities  Integumentary: Intact. Warm, dry. No rashes, or lesions to visible skin  Neurologic: No focal deficits  Psychiatric: Cooperative. Appropriate mood and affect       Assessment:   #Poorly differentiated carcinoma of lung:    To summarize:   -Poorly differentiated carcinoma of lung, 14.0 cm right upper lobe mass invading mediastinum   -EBUS 09/20/2020   -cT4 cN2 M1b,  stage RICKIE   -EGD/colonoscopy negative   -5 mm right occipital hemorrhagic metastasis 11/06/2020. -S/p SRS to brain metastasis (2100 cGy; 1 fraction) (12/03/2020)   -S/p chemoradiation therapy (12/2020-01/2021) for intrathoracic disease (oligometastatic disease), with positive response   -Followed by carbo/Alimta/Keytruda x4 for metastatic disease (solitary brain metastasis) with stable disease; questionable progression on CTs 08/2021)   -Alimta/Keytruda maintenance started 09/09/2021   -No brain metastases on surveillance brain MRI (11/11/2021)   -No progression on restaging CTs post maintenance Alimta/Keytruda x6 (12/17/2021)   -No brain metastasis on surveillance brain MRI (03/14/2022)   -Difficult to interpret restaging CTs 03/21/2022  -pronounced asymmetric hypermetabolism of right vocal cord on restaging PET-CT 06/29/2022    #Sites of disease:   14 cm right upper lobe mass, invading mediastinum; mediastinal lymphadenopathy; right occipital brain metastasis    #Molecular markers:  -BRAF mutation negative; ROS1 gene arrangement negative; ALK rearrangement negative; PD-L1 CPS <10 (CPS 5) (erroneously, tested for esophageal carcinoma); EGFR negative; MET exon 14 skipping mutation negative; NTRK gene fusion negative; RET rearrangement negative   -PD-L1 testing: QNS    #Hypercalcemia (11/25/2020)  -11/25/2020: Calcium 10.3 (elevated for the very first time).  BUN 21, elevated.  Creatinine 0.79, normal.  Albumin 2.4.   -11/25/2020: Intact PTH level 32.9, normal.  Ionized calcium level 5.4, within normal limits.   -11/25/2020: IV fluids + Zometa 4 mg IV x1   -03/24/2022: Calcium 10.7, albumin 2.8, M spike 0.02 g/dL, IgG kappa; IgG, IgA, IgM normal; free kappa light chains 2.22, elevated; lambda light chains, kappa/lambda ratio normal; PTH related peptide <0.4; ionized calcium level 5.34 mg/dL, normal; 25 hydroxy vitamin D level 38 ng/mL, normal; Intact PTH 52.7, normal  (03/24/2022: Mild hypercalcemia; 0.02 g/dL IgG  kappa M spike; intact PTH normal; PTH related peptide normal; vitamin D level normal; ionized calcium level normal; kappa/lambda ratio normal)    #Iron deficiency anemia, FOBT positive:   (Anemia of chronic disease)   -09/11/2020: Hemoglobin 6.9.  Microcytosis.  Elevated RDW.  Low serum iron, low TIBC, low transferrin saturation, normal ferritin.  FOBT positive.  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  .3.  -8/29/2022:H&H 8.7,28.0    #Thrombocytosis since 09/11/2020 ([id number])   -Could be reactive to iron deficiency anemia versus metastatic lung cancer   -10/22/2020: JAK2 mutation negative.  BCR-ABL 1 fusion transcripts negative.  ESR, CRP elevated.  Iron deficiency anemia.   -Subsequently, resolved (post parenteral iron therapy; post chemoradiation therapy for lung cancer)   -08/17/2021: Hemoglobin 6.1, , serum iron 16, TIBC 175, transferrin saturation 9%, ferritin >1675.56 (relative iron deficiency)  -07/18/2022:  Platelets 446 K  -8/29/2022: Platelets 1,146    # Cholelithiasis; chronically elevated alkaline  phosphatase, possible choledocholithiasis:   -HIDA scan (04/18/2019): No gallbladder activity at 1 hour post isotope injection, compatible with cystic duct obstruction/acute cholecystitis   -02/07/2020: Limited abdominal ultrasound: Cholelithiasis, dilated CBD, hepatomegaly   (12/03/2020)   -12/17/2021: Restaging CT C/A/P with contrast: Gallstones noted, apart from other findings; questionable slightly increased periductal dilatation   -Alk phos: 567 (12/29/2021); 337 (12/13/2021); 606 (12/09/2021),   (Bilirubin, AST, ALT normal)   -03/21/2022: MRCP (dilated common bile duct): Extrahepatic biliary ductal dilation similar to December 2021; small distal common bile duct filling defects seen, cannot exclude choledocholithiasis; cholelithiasis; no MRI evidence of acute cholecystitis; hepatomegaly with steatosis; small right pleural effusion      Plan:  Pending:  Retic count, serum iron, TIBC, ferritin, B12, LDH, haptoglobin    -pronounced asymmetric hypermetabolism of right vocal cord on restaging PET-CT 06/29/2022  >>>  -referred to ENT ASAP for evaluation     -Alimta/Keytruda maintenance started 09/09/2021   -No progression post Alimta/Keytruda x 6 cycles on restaging CTs (12/17/2021)   -Difficult to interpret restaging CTs dated 03/21/2022  -S/P restaging PET-CT 06/29/2022, with no definite signs of progression   >>>    -Continue Alimta/Keytruda maintenance every 3 weeks (Alimta indefinitely; pembrolizumab for 24 months)  -Check CBC and CMP every 3 weeks before each cycle of chemotherapy  -Check TSH every 6 weeks; next, end of August  -Re-stage with contrast enhanced CT scans of C/A/P in 3 months (end of September)  -Surveillance brain MRI scans deferred to radiation oncology (no metastasis on surveillance brain MRI (03/14/2022)  -Per IR, not enough fluid to allow right-sided thoracentesis (increasing right pleural fluid noted on restaging CTs 03/21/2022)     Chemotherapy regimen:   1.  Day 1: Pembrolizumab 200 mg IV  plus pemetrexed 500 mg/m² IV plus carboplatin AUC 5; repeat cycles every 3 weeks for 4 cycles; followed by:   2.  Day 1: Pembrolizumab 200 mg IV every 3 weeks for 24 months;   3.  Day 1: Pemetrexed 500 mg/m² IV every 3 weeks indefinitely     Labs unable to perform liquid biopsy for PD-L1 and KRAS mutation   >>>  -We referred the patient to pulmonary for biopsy of right lung mass, specifically to enable PD-L1 and KRAS mutation testing but she no showed     -Iron deficiency anemia; FOBT positive;; s/p Feraheme x2 (10/26/2020-11/03/2020)   -No improvement in hemoglobin despite normalization of iron stores with Feraheme   (Anemia of chronic disease)     -Cholelithiasis; chronically elevated alkaline phosphatase   -Questionable slightly increased biliary ductal dilation on CT C/A/P with contrast (12/17/2021)   -03/21/2022: MRCP (dilated common bile duct): Extrahepatic biliary ductal dilation similar to December 2021; small distal common bile duct filling defects seen, cannot exclude choledocholithiasis; cholelithiasis; no MRI evidence of acute cholecystitis; hepatomegaly with steatosis; small right pleural effusion   >>>  -Referred to GI for evaluation; following      -Hypercalcemia: Calcium 10.6, albumin 2.7 (03/03/2022); calcium 10.7, albumin 2.7 (01/19/2022)   -03/24/2022: Mild hypercalcemia; 0.02 g/dL IgG kappa M spike; intact PTH normal; PTH related peptide normal; vitamin D level normal; ionized calcium level normal; kappa/lambda ratio normal   >>>  -In 6 months (September' 22), recheck SPEP, ANNA, FLC assay      -Thrombocytosis is reactive to iron deficiency and underlying metastatic lung cancer     On Norco for right posterior chest wall pain  -03/24/2022: From now on, Norco 10 mg pills no more than twice a day   -07/18/2022:  Per her request, changed to every 4 hours p.r.n.      CXR today rule out infection  ER now for worsening SOB and critical Plt count of 1,146  Hold cycle 20 of Alimta/Keytruda today will  re-attempt in 1 week after worsening symptom investigations resolved.   Follow up with NP in 1 week with lab work prior to Alimta/Keytruda cycle 20     Above discussed with her.  All questions answered.  She understands and agrees with this plan, and was appreciative.

## 2022-08-29 NOTE — DISCHARGE INSTRUCTIONS
There is no blood clot seen on your CT scan.  Rachana Gutierrez NP will call you about further recommendations.  Continue all current medicines and keep all currently scheduled appointments.

## 2022-08-29 NOTE — ED PROVIDER NOTES
Encounter Date: 8/29/2022       History     Chief Complaint   Patient presents with    Shortness of Breath     Patient presents to ED after referred by oncologist to rule out PE. Hx stage 4 lung CA. C/o productive cough and SOB     Oncology patient referred to the ER for CTA to rule out pulmonary embolism.  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  She had a relatively recent removal of a left neck MediPort and has been more hoarse since that time, vocal cord is abnormal by PET scan and she is being referred to ENT for further workup.  Other complex history includes hypertension, COPD, peripheral vascular disease, pleural effusion, multiple surgeries, repair of carotid artery earlier this summer with respect to the above-mentioned MediPort procedure, chronic tobacco abuse, others as listed.  She was noted on labs today to have progression of underlying thrombocytosis, platelet counts have generally been in the range of 500-700,000 and today's value was 1.1 million.  She does have some dyspnea and cough on an ongoing basis without significant recent change.  At the time of presentation, oxygen saturation is normal, vital signs unremarkable, resting comfortably, no other specific complaints.    The history is provided by the patient. No  was used.   Review of patient's allergies indicates:  No Known Allergies  Past Medical History:   Diagnosis Date    Anxiety and depression     Cancer     lung with brain metastasis    Cholelithiasis     COPD (chronic obstructive pulmonary disease)     Hypertension     Lumbar disc disease     PAD (peripheral artery disease)     Paroxysmal SVT (supraventricular tachycardia)     Pleural effusion      Past Surgical History:   Procedure Laterality Date    MEDIPORT INSERTION, DOUBLE      REPAIR OF CAROTID ARTERY Left 6/17/2022    Procedure: REPAIR, ARTERY,  CAROTID;  Surgeon: Juan Luis Healy III, MD;  Location: [facility];  Service: General;  Laterality: Left;     History reviewed. No pertinent family history.  Social History     Tobacco Use    Smoking status: Some Days     Packs/day: 0.25     Years: 49.00     Pack years: 12.25     Types: Cigarettes     Last attempt to quit: 2022     Years since quittin.1    Smokeless tobacco: Never   Substance Use Topics    Alcohol use: Never    Drug use: Never     Review of Systems   Constitutional:  Negative for activity change, fatigue and fever.   HENT:  Positive for voice change. Negative for congestion, ear pain, facial swelling, nosebleeds, sinus pressure and sore throat.    Eyes:  Negative for pain, discharge, redness and visual disturbance.   Respiratory:  Positive for cough and shortness of breath. Negative for choking, chest tightness and wheezing.    Cardiovascular:  Negative for chest pain, palpitations and leg swelling.   Gastrointestinal:  Negative for abdominal distention, abdominal pain, nausea and vomiting.   Endocrine: Negative for heat intolerance, polydipsia and polyuria.   Genitourinary:  Negative for difficulty urinating, dysuria, flank pain, hematuria and urgency.   Musculoskeletal:  Negative for back pain, gait problem, joint swelling and myalgias.   Skin:  Negative for color change and rash.   Allergic/Immunologic: Negative for environmental allergies and food allergies.   Neurological:  Negative for dizziness, weakness, numbness and headaches.   Hematological:  Negative for adenopathy. Does not bruise/bleed easily.   Psychiatric/Behavioral:  Negative for agitation and behavioral problems. The patient is not nervous/anxious.    All other systems reviewed and are negative.    Physical Exam     Initial Vitals [22 1051]   BP Pulse Resp Temp SpO2   138/82 93 20 97.9 °F (36.6 °C) 99 %      MAP       --         Physical Exam    Nursing note and vitals reviewed.  Constitutional: She appears well-developed and  well-nourished. She is not diaphoretic. No distress.   Sitting up in wheelchair, mildly chronically ill-appearing and hoarse voice but no acute distress and no respiratory distress   HENT:   Head: Normocephalic and atraumatic.   Mouth/Throat: No oropharyngeal exudate.   hoarse   Eyes: Conjunctivae and EOM are normal. Pupils are equal, round, and reactive to light. Right eye exhibits no discharge. Left eye exhibits no discharge. No scleral icterus.   Neck: Neck supple. No thyromegaly present. No tracheal deviation present. No JVD present.   Healing left low lateral neck MediPort removal site without sign of infection   Normal range of motion.  Cardiovascular:  Normal rate, regular rhythm, normal heart sounds and intact distal pulses.     Exam reveals no gallop and no friction rub.       No murmur heard.  Pulmonary/Chest: No stridor. No respiratory distress. She has no wheezes. She has no rhonchi. She has no rales. She exhibits no tenderness.   Decreased air entry throughout with scattered mild wheezing bilaterally; right upper chest MediPort site looks good   Abdominal: Abdomen is soft. Bowel sounds are normal. She exhibits no distension and no mass. There is no abdominal tenderness. There is no rebound and no guarding.   Musculoskeletal:         General: No tenderness or edema. Normal range of motion.      Cervical back: Normal range of motion and neck supple.     Neurological: She is alert and oriented to person, place, and time. She has normal strength.   Skin: Skin is warm and dry. No rash and no abscess noted. No erythema.   Psychiatric: She has a normal mood and affect. Her behavior is normal. Judgment and thought content normal.       ED Course   Procedures  Labs Reviewed - No data to display       Imaging Results              CTA Chest Non-Coronary (PE Study) (Final result)  Result time 08/29/22 13:42:19      Final result by Abdoul Cai MD (08/29/22 13:42:19)                   Impression:       Findings seen consistent with large cavitating mass in the right upper lobe which appears worse than the prior examination.  The mass is causing sharp truncation of the right upper lobe pulmonary artery.  The remainder the pulmonary artery on the right and left side appears to be free of embolism    Small right-sided pleural effusion    Small pericardial effusion      Electronically signed by: Abdoul Cai  Date:    08/29/2022  Time:    13:42               Narrative:    EXAMINATION:  CTA CHEST NON CORONARY    CLINICAL HISTORY:  Pulmonary embolism (PE) suspected, high prob;    TECHNIQUE:  Low dose axial images, sagittal and coronal reformations were obtained from the thoracic inlet to the lung bases following the IV administration of contrast..  Contrast timing was optimized to evaluate the pulmonary arteries.  MIP images were performed.  Automated exposure control was utilized    COMPARISON:  None    FINDINGS:  There are changes seen consistent with COPD in the lungs bilaterally.  There is diffuse pleural thickening seen in the right mid and upper hemithorax with a large cavitating mass seen in the right upper hemithorax involving essentially the entire right upper hemithorax.  There is adenopathy seen in the right hilum and right paratracheal region.  The mass appears more prominent than the prior examination from 03/21/2022.  There is a small right-sided pleural effusion.  There is a small pericardial effusion that measures 11 mm.    There is abrupt truncation of the right upper lobe pulmonary artery secondary to the mass.  No areas of pulmonary emboli are seen in the remainder of the pulmonary arteries.    The thoracic aorta appears normal.  No mediastinal lymphadenopathy is seen.  The heart appears normal.    Upper abdomen shows no acute abnormality.                                    2:13 PM Discussed with Rachana Gutierrez of Hematology-Oncology, CT shows mass effect of expanding lung cancer mass causing  some obstruction in the right pulmonary circuit but no other clot.  Discharge, she will continue outpatient management, Hematology-Oncology will contact her for next recommendations.       Medications   iohexoL (OMNIPAQUE 350) 350 mg iodine/mL injection (has no administration in time range)   iopamidoL (ISOVUE-370) injection 100 mL (100 mLs Intravenous Given 8/29/22 1328)                          Clinical Impression:   Final diagnoses:  [R06.02] SOB (shortness of breath) (Primary)      ED Disposition Condition    Discharge Stable          ED Prescriptions    None       Follow-up Information       Follow up With Specialties Details Why Contact Info    [facility] Emergency Medicine  As needed [address] [facility] [id number] [phone]             Akira Montaño MD  08/29/22 [id number]

## 2022-08-29 NOTE — Clinical Note
Hold cycle 20 alimta/keytruda today Follow up with np in 1 week with lab work + infusion cycle 20 alimta/keytruda

## 2022-09-07 NOTE — PROGRESS NOTES
Reason for follow-up:  -poorly differentiated carcinoma right lung, stage IV; right upper lobe mass, mediastinal lymphadenopathy, brain metastasis, S/P EB was 09/20/2020  -brain metastasis a  -abnormal right vocal cord on PET-CT  -anemia of chronic disease, iron-deficiency anemia, FOBT +  -reactive thrombocytosis  -cholelithiasis, dilated common biliary duct, choledocholithiasis    Current Treatment   Alimta/Keytruda maintenance   started 09/09/2021    Treatment History  -SRS to brain metastasis (2100 cGy; 1 fraction) (12/03/2020)  -Carbo/Taxol weekly x6, concurrent with RT (12/16/2020-01/29/2021)  -Radiotherapy right lung (12/16/2020-01/28/2021) (6000 cGy; 30 fractions; 43 elapsed days)  -Carbo/Alimta/Keytruda x4 (04/29/2021-07/09/2021)    Past medical history: Cholelithiasis.  Chronic cough.  COPD.  Tobacco abuse.  Hypertension.  Dyslipidemia.  Hepatomegaly.  Obesity.  Peripheral artery disease.  Social history: .  Lives in [place] with her  and family.  Has 12 children.  Has been smoking 1 pack of cigarettes daily for 40 years.  No history of alcohol or illicit drug abuse.  Family history: Negative for cancers.  Health maintenance:   Screening mammogram in 2019 in [place], apparently unremarkable.   Mostly colonoscopy ever.  Menstrual and OB/GYN history: Menopause in her 50s.      History of present illness:  66 -year-old female referred by Dr. Steve Simpson, with lung cancer.     -11.4 cm right upper lobe mass, invading mediastinum (noncontrast chest CT)   -Large mass in 4R position (right lower paratracheal) (bronchoscopy and EBUS: 09/22/2020)   -EBUS, 4R, FNA: Poorly differentiated carcinoma (tumor of lung versus upper GI tract)   (EGD, colonoscopy: 02/10/2021: No malignancy; no biopsies collected)   -PET/CT (11/02/2020): Right upper lung lobe mass has enlarged 9.9 x 9.8 x 14.0 cm, previously, 8.1 x 7.4 x 11.4 cm;    contiguous extension of the mass into the mediastinum versus right hilar enlarged  lymph node 2.0 x 1.8 cm   -Brain MRI (11/06/2020): Right occipital 5 x 4 mm hemorrhagic metastasis without edema   -11/06/2020: Referred to [facility] for SRS   >>   Tumor >7 cm, therefore, T4   Tumor invading mediastinum, therefore, T4   Ipsilateral mediastinal mass (? Lymphadenopathy), EBUS FNA positive, therefore, N2  Solitary brain metastasis, therefore, M1b   >>cT4 cN2 M1b, stage RICKIE   -SRS to brain metastasis (2100 cGy; 1 fraction) (12/03/2020)  -Carbo/Taxol weekly x6, concurrent with RT (12/16/2020-01/29/2021)   -Radiotherapy right lung (12/16/2020-01/28/2021) (6000 cGy; 30 fractions; 43 elapsed days)  -04/07/2021: Restaging bone scan: No bone metastasis   -04/07/2021: Restaging CT C/A/P with contrast (comparison: PET/CT dated 11/02/2020): Mild interval decrease in size of the mass involving the upper lobe of right lung (8.7 x 7.6 cm, previously 10.5 x 9.8 cm); prominent precarinal lymph node also appears mildly reduced in size (7 mm, previously 9 mm); no new malignancy or metastatic disease in chest, abdomen, or pelvis   (Positive response to chemoradiation therapy)   -Subsequently, carbo/Alimta/Keytruda x4 (04/29/2021-07/09/2021)   -No brain metastases on surveillance brain MRI (05/03/2021)   -Hospitalized 07/11/2021-07/22/2021: [facility]: Postobstructive pneumonia, and acute hypoxemic respiratory failure, septic shock, HUNG; successfully treated  -08/09/2021: Surveillance brain MRI with contrast (comparison: 05/03/2021):   -08/20/2021: Restaging CT C/A/P with contrast (comparison: 04/07/2021: Similar size of right upper lobe mass extending to the hilum with increased peripheral opacities and fluid peripheral to the mass, cannot exclude postobstructive infection (8-9 cm, similar to prior); small right pleural effusion; stable mediastinal lymph nodes (subcarinal, 8 mm)   -Hospitalized 08/18/2021-08/21/2021: Anemia, pneumonia; hemoglobin 6.1, PRBC x1 unit; Augmentin plus Levaquin for pneumonia;  sinus tachycardia, requiring Ativan; remained afebrile; CT chest without contrast (08/19/2021) indicated postobstructive pneumonia and lepidic spread of tumor, no interval change from prior; CTA chest PE study showed no large central or segmental pulmonary thromboemboli; interval enlargement of known right upper lobe mass and progression of surrounding irregular airspace consolidation and ipsilateral pleural effusion, etc.   -08/19/2021: CT chest without contrast: Size of the right upper lobe mass with associated consolidation is unchanged in size and extent from 08/10/2021; small right pleural effusion; postobstructive pneumonia versus lepidic spread of tumor, no change from prior   -08/20/2021: CTA chest PE protocol (comparison: 07/11/2021): No PE; interval enlargement of known right upper lobe mass and progression of surrounding irregular airspace consolidation and ipsilateral pleural effusion (large right upper lobe mass 9.4 x 12.6 x 7.6 cm); cholelithiasis, similar dilated appearance of common bile duct, without definitive visualization of distal obstructive etiology   -Alimta/Keytruda every 3 weeks maintenance started 09/09/2021   -11/11/2021: Surveillance brain MRI with and without contrast (comparison: 08/09/2021): No acute intracranial findings or evidence of metastasis   -11/18/2021: TSH and free T4 normal   -Cycle #6 of maintenance Alimta/Keytruda on 12/29/2021   -12/17/2021: Restaging CTs of C/A/P with contrast (comparison: August 10 and August 9, 2021): Similar appearance of irregular right upper lung masslike consolidation and adjacent fluid; slightly improved aeration of right lower lobe; questionably slightly increased biliary ductal dilation; small mediastinal lymph nodes are stable; there are gallstones   -Alk phos: 567 (12/29/2021); 337 (12/13/2021); 606 (12/09/2021),   -Bilirubin, AST, ALT normal   -12/09/2021: No hemolysis; iron stores normal (transferrin saturation 21%, ferritin 4167.95); B12  479, normal; LDH low, haptoglobin elevated; RBC folate normal; hemoglobin 9.7, reticulocyte count 3.1%   -12/29/2021: TSH normal   -Per pathology, unable to perform liquid biopsy for PD-L1 and KRAS mutations   -Cycle #9 of maintenance Alimta/Keytruda on 03/03/2022   -03/14/2022: Surveillance brain MRI with contrast: The comparison: 11/11/2021): No intracranial metastasis   -03/21/2022: MRCP (dilated common bile duct): Extrahepatic biliary ductal dilation similar to December 2021; small distal common bile duct filling defects seen, cannot exclude choledocholithiasis; cholelithiasis; no MRI evidence of acute cholecystitis; hepatomegaly with steatosis; small right pleural effusion   -03/21/2022: Restaging CT C/A/P with contrast (comparison: 12/17/2021): Right upper lobe irregular consolidation overall similar size although associated serpiginous enhancement at medial apex has increased 53 x 17 mm, previously 38 x 15 mm millimeter and increasing right pleural effusion   -Hypercalcemia: Calcium 10.6, albumin 2.7 (03/03/2022); calcium 10.7, albumin 2.7 (01/19/2022)   -Cycle number #10 of maintenance Alimta/Keytruda on 03/24/2022   -Hospitalized in 04/02/2022-04/05/2022 ([facility]): Weakness; difficulty walking to the restroom; lightheaded when standing; heart rate 153, blood pressure 98/63; pulse ox 98% on room air; 200 catheter bolus normal saline; heart rate and blood pressure improved; Levaquin for community-acquired pneumonia; overall, improved   -04/04/2022: TTE: LVEF 55%   -04/04/2022: IR: Not enough fluid for thoracentesis  -Cycle 11 of Alimta/Keytruda maintenance on 04/22/2022  -04/22/2022: TSH 0.3208, suppressed  -cycle 16 of maintenance Alimta/Keytruda on 05/13/2022  -cycle 17 of maintenance Alimta/Keytruda was due 06/03/2022  -restaging PET-CT is pending  -[id number]:  TSH normal (1.7894)  -06/17/2022:  MediPort placed (previous MediPort stop functioning due to displacement) (left IJ MediPort removed; left  subclavian artery cutdown and primary repair; right IJ MediPort placement)  -06/29/2022:  Restaging PET-CT (comparison:  CT C/A/P 03/21/2022):  1. 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  Max SUV is 12 and corresponds with area of irregular enhancement seen on previous diagnostic contrast enhanced CT.  2. Clustered micro nodularity at the right lung base with mild FDG avidity is nonspecific.  This could be related to endobronchial spread of secretions with inflammatory or infectious bronchiolitis related to the necrotic mass at the apex versus intrapulmonary metastases.  This is new compared to prior diagnostic CT  3. Vague nodular infiltrate at the posterior left upper lobe is mildly FDG avid and also new compared to prior diagnostic CT.  Differential considerations include pneumonitis or malignancy  4. Mild uptake in the region of recent procedure compatible with postoperative inflammation and healing response.  Continued attention recommended on follow-up exams  5. Pronounced, asymmetric hypermetabolism at the right vocal cord.  This could be related to compensatory function of the right vocal cord due to left vocal cord paralysis or infiltrative lesion of the right cord.  Correlate with direct visual inspection  -07/11/2022:  Limited abdominal ultrasound: Heterogenicity of the liver parenchyma with no definite abnormal echogenicity. Cholelithiasis with a wall enhancing sign. Persistent dilatation of the common bile duct; if clinically indicated MRCP might prove helpful for further assessment    Interval history 8/29/2022: Patient presents today along [facility] her daughter for scheduled follow up for adenocarcinoma of the lung. She is due to receive cycle 20 of Alimta,Carbo,Keytruda today. Patient reports that she continues to have worsening SOB, wheezing, coughing, and green-tinged sputum. Lab work reviewed with patient, platelet count has improved but still  elevated. She reports fair appetite and energy level. She denies any fever, chills. She is aware of upcoming CT scans. Discussed follow up appointments with patient. No further questions needs or concerns voiced.        Review of systems: All systems reviewed, and found to be negative except for the symptoms detailed above.    General: Alert and oriented. NAD  Eye: Pupils are equal, round and reactive to light, Extraocular movements are intact. Normal conjunctiva  HENT: Normocephalic. Oropharynx exam deferred; mask in place due to coronavirus  Neck: Supple, Non-tender  Respiratory: Respirations are non-labored, Symmetrical chest wall expansion. Breath sounds CTA bilaterally  Cardiovascular: Regular rate, rhythm, Normal peripheral perfusion, No bilateral lower extremity edema  Gastrointestinal: Non-distended, Present bowel sounds   Genitourinary: Exam deferred  Lymphatics: No lymphadenopathy appreciated  Musculoskeletal: Moves all extremities  Integumentary: Intact. Warm, dry. No rashes, or lesions to visible skin  Neurologic: No focal deficits  Psychiatric: Cooperative. Appropriate mood and affect   ECOG Performance Scale: 1 - Capable of all self-care able to carry out light work activities.        Assessment:   #Poorly differentiated carcinoma of lung:    To summarize:   -Poorly differentiated carcinoma of lung, 14.0 cm right upper lobe mass invading mediastinum   -EBUS 09/20/2020   -cT4 cN2 M1b, stage RICKIE   -EGD/colonoscopy negative   -5 mm right occipital hemorrhagic metastasis 11/06/2020. -S/p SRS to brain metastasis (2100 cGy; 1 fraction) (12/03/2020)   -S/p chemoradiation therapy (12/2020-01/2021) for intrathoracic disease (oligometastatic disease), with positive response   -Followed by carbo/Alimta/Keytruda x4 for metastatic disease (solitary brain metastasis) with stable disease; questionable progression on CTs 08/2021)   -Alimta/Keytruda maintenance started 09/09/2021   -No brain metastases on surveillance  brain MRI (11/11/2021)   -No progression on restaging CTs post maintenance Alimta/Keytruda x6 (12/17/2021)   -No brain metastasis on surveillance brain MRI (03/14/2022)   -Difficult to interpret restaging CTs 03/21/2022  -pronounced asymmetric hypermetabolism of right vocal cord on restaging PET-CT 06/29/2022    #Sites of disease:   14 cm right upper lobe mass, invading mediastinum; mediastinal lymphadenopathy; right occipital brain metastasis    #Molecular markers:  -BRAF mutation negative; ROS1 gene arrangement negative; ALK rearrangement negative; PD-L1 CPS <10 (CPS 5) (erroneously, tested for esophageal carcinoma); EGFR negative; MET exon 14 skipping mutation negative; NTRK gene fusion negative; RET rearrangement negative   -PD-L1 testing: QNS    #Hypercalcemia (11/25/2020)  -11/25/2020: Calcium 10.3 (elevated for the very first time).  BUN 21, elevated.  Creatinine 0.79, normal.  Albumin 2.4.   -11/25/2020: Intact PTH level 32.9, normal.  Ionized calcium level 5.4, within normal limits.   -11/25/2020: IV fluids + Zometa 4 mg IV x1   -03/24/2022: Calcium 10.7, albumin 2.8, M spike 0.02 g/dL, IgG kappa; IgG, IgA, IgM normal; free kappa light chains 2.22, elevated; lambda light chains, kappa/lambda ratio normal; PTH related peptide <0.4; ionized calcium level 5.34 mg/dL, normal; 25 hydroxy vitamin D level 38 ng/mL, normal; Intact PTH 52.7, normal  (03/24/2022: Mild hypercalcemia; 0.02 g/dL IgG kappa M spike; intact PTH normal; PTH related peptide normal; vitamin D level normal; ionized calcium level normal; kappa/lambda ratio normal)    #Iron deficiency anemia, FOBT positive:   (Anemia of chronic disease)   -09/11/2020: Hemoglobin 6.9.  Microcytosis.  Elevated RDW.  Low serum iron, low TIBC, low transferrin saturation, normal ferritin.  FOBT positive.  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  .3.  -8/29/2022:H&H 8.7,28.0    #Thrombocytosis since 09/11/2020 ([id number])   -Could be reactive to iron deficiency anemia versus metastatic lung cancer   -10/22/2020: JAK2 mutation negative.  BCR-ABL 1 fusion transcripts negative.  ESR, CRP elevated.  Iron deficiency anemia.   -Subsequently, resolved (post parenteral iron therapy; post chemoradiation therapy for lung cancer)   -08/17/2021: Hemoglobin 6.1, , serum iron 16, TIBC 175, transferrin saturation 9%, ferritin >1675.56 (relative iron deficiency)  -07/18/2022:  Platelets 446 K  -8/29/2022: Platelets 1,146    # Cholelithiasis; chronically elevated alkaline phosphatase, possible choledocholithiasis:   -HIDA scan (04/18/2019): No gallbladder activity at 1 hour post isotope injection, compatible with cystic duct obstruction/acute cholecystitis   -02/07/2020: Limited abdominal ultrasound: Cholelithiasis, dilated CBD, hepatomegaly   (12/03/2020)   -12/17/2021: Restaging CT C/A/P with contrast: Gallstones noted, apart from other findings; questionable slightly increased periductal dilatation   -Alk phos: 567 (12/29/2021); 337 (12/13/2021); 606 (12/09/2021),   (Bilirubin, AST, ALT  normal)   -03/21/2022: MRCP (dilated common bile duct): Extrahepatic biliary ductal dilation similar to December 2021; small distal common bile duct filling defects seen, cannot exclude choledocholithiasis; cholelithiasis; no MRI evidence of acute cholecystitis; hepatomegaly with steatosis; small right pleural effusion      Plan:    -pronounced asymmetric hypermetabolism of right vocal cord on restaging PET-CT 06/29/2022  >>>  -referred to ENT ASAP for evaluation     -Alimta/Keytruda maintenance started 09/09/2021   -No progression post Alimta/Keytruda x 6 cycles on restaging CTs (12/17/2021)   -Difficult to interpret restaging CTs dated 03/21/2022  -S/P restaging PET-CT 06/29/2022, with no definite signs of progression   >>>    -Continue Alimta/Keytruda maintenance every 3 weeks (Alimta indefinitely; pembrolizumab for 24 months)  -Check CBC and CMP every 3 weeks before each cycle of chemotherapy  -Check TSH every 6 weeks; next, end of August  -Re-stage with contrast enhanced CT scans of C/A/P in 3 months (end of September)  -Surveillance brain MRI scans deferred to radiation oncology (no metastasis on surveillance brain MRI (03/14/2022)  -Per IR, not enough fluid to allow right-sided thoracentesis (increasing right pleural fluid noted on restaging CTs 03/21/2022)     Chemotherapy regimen:   1.  Day 1: Pembrolizumab 200 mg IV plus pemetrexed 500 mg/m² IV plus carboplatin AUC 5; repeat cycles every 3 weeks for 4 cycles; followed by:   2.  Day 1: Pembrolizumab 200 mg IV every 3 weeks for 24 months;   3.  Day 1: Pemetrexed 500 mg/m² IV every 3 weeks indefinitely     Labs unable to perform liquid biopsy for PD-L1 and KRAS mutation   >>>  -We referred the patient to pulmonary for biopsy of right lung mass, specifically to enable PD-L1 and KRAS mutation testing but she no showed     -Iron deficiency anemia; FOBT positive;; s/p Feraheme x2 (10/26/2020-11/03/2020)   -No improvement in hemoglobin despite normalization of  iron stores with Feraheme   (Anemia of chronic disease)     -Cholelithiasis; chronically elevated alkaline phosphatase   -Questionable slightly increased biliary ductal dilation on CT C/A/P with contrast (12/17/2021)   -03/21/2022: MRCP (dilated common bile duct): Extrahepatic biliary ductal dilation similar to December 2021; small distal common bile duct filling defects seen, cannot exclude choledocholithiasis; cholelithiasis; no MRI evidence of acute cholecystitis; hepatomegaly with steatosis; small right pleural effusion   >>>  -Referred to GI for evaluation; following      -Hypercalcemia: Calcium 10.6, albumin 2.7 (03/03/2022); calcium 10.7, albumin 2.7 (01/19/2022)   -03/24/2022: Mild hypercalcemia; 0.02 g/dL IgG kappa M spike; intact PTH normal; PTH related peptide normal; vitamin D level normal; ionized calcium level normal; kappa/lambda ratio normal   >>>  -In 6 months (September' 22), recheck SPEP, ANNA, FLC assay-Patient provided urine jug today     -Thrombocytosis is reactive to iron deficiency and underlying metastatic lung cancer  Worsening   8/29/2022-1146  9/7/2022-500     On [place] for right posterior chest wall pain  -03/24/2022: From now on, Norco 10 mg pills no more than twice a day   -07/18/2022:  Per her request, changed to every 4 hours p.r.n.    Hold treatment cycle 20 for tomorrow due to patient worsening symptoms   Follow up with  in 1-2 weeks with lab work to review scans  Start Levaquin 750mg po x7 days-rx sent to pharmacy  Start Prednisone 40mg po taper as instructed rx sent to pharmacy    Above discussed with her.  All questions answered.  She understands and agrees with this plan, and was appreciative.

## 2022-09-10 NOTE — DISCHARGE INSTRUCTIONS
Please continue to take your Levaquin and prednisone prescribed by your oncologist.  If symptoms reoccur or any concerns please seek medical attention promptly.  Otherwise, follow-up with your oncologist in the next 2 days for medical re-evaluation and let them know you were in the emergency department

## 2022-09-10 NOTE — ED PROVIDER NOTES
Encounter Date: 9/10/2022       History     Chief Complaint   Patient presents with    Atrial Fibrillation     Brought in by [facility] for A-fib RVR     Patient with a history of lung cancer presents via EMS from her home for concerns of chest pressure and palpitations.  Patient states around 5:00 a.m. she felt some chest pressure and palpitations that went away.  She stated approximately 7:00 a.m. she felt them again.  EMS was subsequently called and they thought she may be in AFib RVR.  Rhythm strip showed heart rate was 177 beats per minute.  Patient states that she has been taking prednisone and [facility]quin [facility] since the 7th of this month for a lung infection.  She does any fevers.  In the emergency department her heart rate is in the 1 teens sinus tachycardia and she denies any chest pain at this time.  She denies any fevers nausea vomiting or diarrhea.  Denies any pain in her body at this time.  She continues to smoke approximately half a pack per day cigarettes.    Review of patient's allergies indicates:  No Known Allergies  Past Medical History:   Diagnosis Date    Anxiety and depression     Cancer     lung with brain metastasis    Cholelithiasis     COPD (chronic obstructive pulmonary disease)     Hypertension     Lumbar disc disease     PAD (peripheral artery disease)     Paroxysmal SVT (supraventricular tachycardia)     Pleural effusion      Past Surgical History:   Procedure Laterality Date    MEDIPORT INSERTION, DOUBLE      REPAIR OF CAROTID ARTERY Left 2022    Procedure: REPAIR, ARTERY, CAROTID;  Surgeon: Juan Luis Healy III, MD;  Location: [facility];  Service: General;  Laterality: Left;     History reviewed. No pertinent family history.  Social History     Tobacco Use    Smoking status: Every Day     Packs/day: 0.25     Years: 49.00     Pack years: 12.25     Types: Cigarettes     Last attempt to quit: 2022     Years since quittin.1    Smokeless tobacco: Never   Substance Use Topics     Alcohol use: Never    Drug use: Never     Review of Systems   Constitutional:  Negative for fever.   HENT:  Negative for sore throat.    Respiratory:  Negative for shortness of breath.    Cardiovascular:  Positive for palpitations. Negative for chest pain.   Gastrointestinal:  Negative for nausea.   Genitourinary:  Negative for dysuria.   Musculoskeletal:  Negative for back pain.   Skin:  Negative for rash.   Neurological:  Negative for weakness.   Hematological:  Does not bruise/bleed easily.     Physical Exam     Initial Vitals [09/10/22 0840]   BP Pulse Resp Temp SpO2   112/68 (!) 123 18 98.9 °F (37.2 °C) 100 %      MAP       --         Physical Exam    Nursing note and vitals reviewed.  Constitutional: She is not diaphoretic. No distress.   Frail appearing patient   HENT:   Head: Normocephalic and atraumatic.   Mouth/Throat: Oropharynx is clear and moist. No oropharyngeal exudate.   Eyes: EOM are normal. Pupils are equal, round, and reactive to light.   Neck:   Normal range of motion.  Cardiovascular:  Regular rhythm.           Tachycardia with regular rhythm   Pulmonary/Chest: No stridor. She has wheezes.   Good air movement bilaterally with diffuse wheezing   Abdominal: Abdomen is soft. Bowel sounds are normal. She exhibits no distension.   Musculoskeletal:         General: Normal range of motion.      Cervical back: Normal range of motion.     Neurological: She is alert and oriented to person, place, and time.   Psychiatric: She has a normal mood and affect.       ED Course   Procedures  Labs Reviewed   COMPREHENSIVE METABOLIC PANEL - Abnormal; Notable for the following components:       Result Value    Albumin Level 2.0 (*)     Globulin 4.7 (*)     Albumin/Globulin Ratio 0.4 (*)     Alkaline Phosphatase 205 (*)     All other components within normal limits   B-TYPE NATRIURETIC PEPTIDE - Abnormal; Notable for the following components:    Natriuretic Peptide 162.9 (*)     All other components within normal  limits   URINALYSIS - Abnormal; Notable for the following components:    Protein, UA Trace (*)     All other components within normal limits   CBC WITH DIFFERENTIAL - Abnormal; Notable for the following components:    RBC 2.79 (*)     Hgb 8.1 (*)     Hct 27.2 (*)     MCV 97.5 (*)     MCHC 29.8 (*)     RDW 20.1 (*)     Platelet 411 (*)     IG# 0.09 (*)     All other components within normal limits   MANUAL DIFFERENTIAL - Abnormal; Notable for the following components:    Neut Man 86 (*)     Lymph Man 5 (*)     Abs Neut calc 9.483 (*)     Abs Lymp 0.545 (*)     Platelet Est Increased (*)     All other components within normal limits   URINALYSIS, MICROSCOPIC - Abnormal; Notable for the following components:    Squamous Epithelial Cells, UA Many (*)     All other components within normal limits   TROPONIN I - Normal   MAGNESIUM - Normal   LACTIC ACID, PLASMA - Normal   TROPONIN I - Normal   BLOOD CULTURE OLG   BLOOD CULTURE OLG   CBC W/ AUTO DIFFERENTIAL    Narrative:     The following orders were created for panel order CBC auto differential.  Procedure                               Abnormality         Status                     ---------                               -----------         ------                     CBC with Differential[[id number]]        Abnormal            Final result               Manual Differential[[id number]]          Abnormal            Final result                 Please view results for these tests on the individual orders.     EKG Readings: (Independently Interpreted)   Time 8:47 a.m.   Rate of 115, sinus tachycardia, normal axis and intervals, no concerning ST depressions or elevations     Imaging Results              CT Chest With Contrast (Final result)  Result time 09/10/22 10:43:29      Final result by Americo Govea MD (09/10/22 10:43:29)                   Impression:        1. Similar appearance of large cavitating lesion in right upper lobe with extension to the right hilum and some  displacement versus invasion of the suprahilar right mediastinum.  Differential considerations include neoplastic processes and infectious/inflammatory processes.  2. Interval decrease in alveolar opacities in the left lung base.  3. Changes of emphysema/COPD.  4. Interval decrease in pericardial effusion.  5. Partially visualized peripherally calcified focus in gallbladder may be a large gallstone, but gallbladder wall calcifications extending out of field of view cannot be excluded.      Electronically signed by: Americo Govea MD  Date:    09/10/2022  Time:    10:43               Narrative:      CT SCAN OF CHEST WITH CONTRAST:    CPT: 37841    Total DLP: 216.7 mGy-cm. Automatic exposure control was utilized to limit the radiation dose to the patient.  Total contrast: 100.0 mL in left antecubital vein.    HISTORY: Right upper lobe cavitary lesion on comparison CT angiogram of the chest from 08/29/2022.    Comparison: CTA from 08/29/2022.    Technique: Multiple contiguous axial images were acquired from the thoracic inlet to the upper abdomen with contrast administration. Coronal and sagittal reformatted images were also submitted.    FINDINGS:    There is similar appearance of the large right upper lobe cavitary lesion with nodular soft tissue in its periphery and an air-fluid level.  Soft tissue extends into the right hilum similar appearance to previous exam, and there is truncation of segments of the right pulmonary artery as seen on the previous exam.  No soft tissue density foci are in the visualized pulmonary arteries.  There is similar extension versus displacement of into the suprahilar right mediastinum.  There are similar emphysematous changes in the left lung and interval decrease in previously seen alveolar opacities in the left lingula and base of the left lower lobe.  There is no effusion.  The heart is normal in size.  There is interval decrease in the previously seen left pericardial effusion.  The  thoracic aorta is normal in caliber with no evidence for dissection.  The thyroid gland is poorly visualized in the thoracic and with heterogeneity from thyroid nodules/lesions.  There is a peripherally calcified focus in the gallbladder partially visualized on this exam.  There is a cervicothoracic levoscoliosis with changes from spondylosis.                                       X-Ray Chest AP Portable (In process)                      Medications   HYDROcodone-acetaminophen  mg per tablet 1 tablet (has no administration in time range)   lactated ringers bolus 1,000 mL (0 mLs Intravenous Stopped 9/10/22 1000)   iopamidoL (ISOVUE-370) injection 100 mL (100 mLs Intravenous Given 9/10/22 0945)     Medical Decision Making:   Initial Assessment:   Rhythm strip by EMS that show what appears to be sinus tachycardia versus SVT..  The patient has a history of SVT.  In the emergency department patient is asymptomatic but her heart rate is 115 beats per minute.  She denies any chest pain at this time.  No recent fevers illnesses or loss of fluids.  Workup in progress  Clinical Tests:   Lab Tests: Reviewed  Medical Tests: Reviewed  ED Management:  Patient heart rate has normalized in the emergency department with fluids.  Her labs within normal limits are trending within her baseline.  CT of her chest shows interval development of her known neoplastic disease.  Cannot exclude infectious etiologies overlying neoplastic disease however patient is already on Levaquin and prednisone by her oncologist.  I discussed with the patient observation emergency department verses going home and stated she would rather go home.  I did discuss risks with her patient declined inpatient observation.  I did ask if we could run a 2nd troponin on her to ensure is negative.  She has agreed with this plan.  Pending discharge    Time 11:57 a.m.  Patient's 2nd troponin negative.  Patient requesting a pain pill as her lower back and legs hurt.   This is a chronic condition for her and she normally takes Norco 10 mg every 4 hours.  Norco was provided patient will be discharged at this time we discussed return precautions the patient is stage she would like to go home at this time.  Heart rate in the 90s to discharge.  No bouts of SVT or AFib in the emergency department           ED Course as of 09/10/22 1201   Sat Sep 10, 2022   1026 Hemoglobin(!): 8.1 [DL]      ED Course User Index  [DL] Orestes North MD               Clinical Impression:   Final diagnoses:  [J06.9] URI with cough and congestion  [R00.0] Tachycardia  [C34.90] Malignant neoplasm of lung, unspecified laterality, unspecified part of lung (Primary)        ED Disposition Condition    Discharge Stable          ED Prescriptions    None       Follow-up Information       Follow up With Specialties Details Why Contact Info    Your oncologist  In 2 days      [facility] - Emergency Dept Emergency Medicine  As needed, If symptoms worsen [id number]  [facility] [id number]  [phone]             Orestes North MD  09/10/22 1201

## 2022-09-20 NOTE — Clinical Note
Orders for today:   Refer to ENT for evaluation of abnormal right vocal cord noted on PET-CT 06/29/2022  Please order PET-CT for evaluation of possible progression of lung cancer  For now, continue Alimta/Keytruda maintenance TSH and free T4 every 6 weeks CBC and CMP every 3 weeks before each cycle of chemotherapy Today, check SPEP, ANNA, FLC assay  Send a consultation request to Pulmonary for follow-up with respect of COPD.  Follow-up visit with me in 3 weeks, with PET-CT.

## 2022-09-20 NOTE — PROGRESS NOTES
Past medical history: Cholelithiasis.  Chronic cough.  COPD.  Tobacco abuse.  Hypertension.  Dyslipidemia.  Hepatomegaly.  Obesity.  Peripheral artery disease.  Social history: .  Lives in [place] with her  and family.  Has 12 children.  Has been smoking 1 pack of cigarettes daily for 40 years.  No history of alcohol or illicit drug abuse.  Family history: Negative for cancers.  Health maintenance:   Screening mammogram in 2019 in [place], apparently unremarkable.   Mostly colonoscopy ever.  Menstrual and OB/GYN history: Menopause in her 50s.      Reason for follow-up:  -poorly differentiated carcinoma right lung, stage IV; right upper lobe mass, mediastinal lymphadenopathy, brain metastasis, S/P EB was 09/20/2020  -brain metastasis a  -abnormal right vocal cord on PET-CT  -anemia of chronic disease, iron-deficiency anemia, FOBT +  -reactive thrombocytosis  -cholelithiasis, dilated common biliary duct, choledocholithiasis      History of present illness:   65-year-old female referred by Dr. Steve Simpson, with lung cancer.     -11.4 cm right upper lobe mass, invading mediastinum (noncontrast chest CT)   -Large mass in 4R position (right lower paratracheal) (bronchoscopy and EBUS: 09/22/2020)   -EBUS, 4R, FNA: Poorly differentiated carcinoma (tumor of lung versus upper GI tract)   (EGD, colonoscopy: 02/10/2021: No malignancy; no biopsies collected)   -PET/CT (11/02/2020): Right upper lung lobe mass has enlarged 9.9 x 9.8 x 14.0 cm, previously, 8.1 x 7.4 x 11.4 cm;    contiguous extension of the mass into the mediastinum versus right hilar enlarged lymph node 2.0 x 1.8 cm   -Brain MRI (11/06/2020): Right occipital 5 x 4 mm hemorrhagic metastasis without edema   -11/06/2020: Referred to [facility] for SRS   >>   Tumor >7 cm, therefore, T4   Tumor invading mediastinum, therefore, T4   Ipsilateral mediastinal mass (? Lymphadenopathy), EBUS FNA positive, therefore, N2  Solitary brain metastasis, therefore,  M1b   >>cT4 cN2 M1b, stage RICKIE   -SRS to brain metastasis (2100 cGy; 1 fraction) (12/03/2020)  -Carbo/Taxol weekly x6, concurrent with RT (12/16/2020-01/29/2021)   -Radiotherapy right lung (12/16/2020-01/28/2021) (6000 cGy; 30 fractions; 43 elapsed days)  -04/07/2021: Restaging bone scan: No bone metastasis   -04/07/2021: Restaging CT C/A/P with contrast (comparison: PET/CT dated 11/02/2020): Mild interval decrease in size of the mass involving the upper lobe of right lung (8.7 x 7.6 cm, previously 10.5 x 9.8 cm); prominent precarinal lymph node also appears mildly reduced in size (7 mm, previously 9 mm); no new malignancy or metastatic disease in chest, abdomen, or pelvis   (Positive response to chemoradiation therapy)   -Subsequently, carbo/Alimta/Keytruda x4 (04/29/2021-07/09/2021)   -No brain metastases on surveillance brain MRI (05/03/2021)   -Hospitalized 07/11/2021-07/22/2021: [facility]: Postobstructive pneumonia, and acute hypoxemic respiratory failure, septic shock, HUNG; successfully treated  -08/09/2021: Surveillance brain MRI with contrast (comparison: 05/03/2021):   -08/20/2021: Restaging CT C/A/P with contrast (comparison: 04/07/2021: Similar size of right upper lobe mass extending to the hilum with increased peripheral opacities and fluid peripheral to the mass, cannot exclude postobstructive infection (8-9 cm, similar to prior); small right pleural effusion; stable mediastinal lymph nodes (subcarinal, 8 mm)   -Hospitalized 08/18/2021-08/21/2021: Anemia, pneumonia; hemoglobin 6.1, PRBC x1 unit; Augmentin plus Levaquin for pneumonia; sinus tachycardia, requiring Ativan; remained afebrile; CT chest without contrast (08/19/2021) indicated postobstructive pneumonia and lepidic spread of tumor, no interval change from prior; CTA chest PE study showed no large central or segmental pulmonary thromboemboli; interval enlargement of known right upper lobe mass and progression of surrounding  irregular airspace consolidation and ipsilateral pleural effusion, etc.   -08/19/2021: CT chest without contrast: Size of the right upper lobe mass with associated consolidation is unchanged in size and extent from 08/10/2021; small right pleural effusion; postobstructive pneumonia versus lepidic spread of tumor, no change from prior   -08/20/2021: CTA chest PE protocol (comparison: 07/11/2021): No PE; interval enlargement of known right upper lobe mass and progression of surrounding irregular airspace consolidation and ipsilateral pleural effusion (large right upper lobe mass 9.4 x 12.6 x 7.6 cm); cholelithiasis, similar dilated appearance of common bile duct, without definitive visualization of distal obstructive etiology   -Alimta/Keytruda every 3 weeks maintenance started 09/09/2021   -11/11/2021: Surveillance brain MRI with and without contrast (comparison: 08/09/2021): No acute intracranial findings or evidence of metastasis   -11/18/2021: TSH and free T4 normal   -Cycle #6 of maintenance Alimta/Keytruda on 12/29/2021   -12/17/2021: Restaging CTs of C/A/P with contrast (comparison: August 10 and August 9, 2021): Similar appearance of irregular right upper lung masslike consolidation and adjacent fluid; slightly improved aeration of right lower lobe; questionably slightly increased biliary ductal dilation; small mediastinal lymph nodes are stable; there are gallstones   -Alk phos: 567 (12/29/2021); 337 (12/13/2021); 606 (12/09/2021),   -Bilirubin, AST, ALT normal   -12/09/2021: No hemolysis; iron stores normal (transferrin saturation 21%, ferritin 4167.95); B12 479, normal; LDH low, haptoglobin elevated; RBC folate normal; hemoglobin 9.7, reticulocyte count 3.1%   -12/29/2021: TSH normal   -Per pathology, unable to perform liquid biopsy for PD-L1 and KRAS mutations   -Cycle #9 of maintenance Alimta/Keytruda on 03/03/2022   -03/14/2022: Surveillance brain MRI with contrast: The comparison: 11/11/2021): No  intracranial metastasis   -03/21/2022: MRCP (dilated common bile duct): Extrahepatic biliary ductal dilation similar to December 2021; small distal common bile duct filling defects seen, cannot exclude choledocholithiasis; cholelithiasis; no MRI evidence of acute cholecystitis; hepatomegaly with steatosis; small right pleural effusion   -03/21/2022: Restaging CT C/A/P with contrast (comparison: 12/17/2021): Right upper lobe irregular consolidation overall similar size although associated serpiginous enhancement at medial apex has increased 53 x 17 mm, previously 38 x 15 mm millimeter and increasing right pleural effusion   -Hypercalcemia: Calcium 10.6, albumin 2.7 (03/03/2022); calcium 10.7, albumin 2.7 (01/19/2022)   -Cycle number #10 of maintenance Alimta/Keytruda on 03/24/2022   -Hospitalized in 04/02/2022-04/05/2022 ([facility]): Weakness; difficulty walking to the restroom; lightheaded when standing; heart rate 153, blood pressure 98/63; pulse ox 98% on room air; 200 catheter bolus normal saline; heart rate and blood pressure improved; Levaquin for community-acquired pneumonia; overall, improved   -04/04/2022: TTE: LVEF 55%   -04/04/2022: IR: Not enough fluid for thoracentesis  -Cycle 11 of Alimta/Keytruda maintenance on 04/22/2022  -04/22/2022: TSH 0.3208, suppressed  -cycle 16 of maintenance Alimta/Keytruda on 05/13/2022  -cycle 17 of maintenance Alimta/Keytruda was due 06/03/2022  -restaging PET-CT is pending  -[id number]:  TSH normal (1.7894)  -06/17/2022:  MediPort placed (previous MediPort stop functioning due to displacement) (left IJ MediPort removed; left subclavian artery cutdown and primary repair; right IJ MediPort placement)  -06/29/2022:  Restaging PET-CT (comparison:  CT C/A/P 03/21/2022):  1. 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  Max SUV is 12 and corresponds with area of irregular enhancement seen on previous diagnostic contrast enhanced  CT.  2. Clustered micro nodularity at the right lung base with mild FDG avidity is nonspecific.  This could be related to endobronchial spread of secretions with inflammatory or infectious bronchiolitis related to the necrotic mass at the apex versus intrapulmonary metastases.  This is new compared to prior diagnostic CT  3. Vague nodular infiltrate at the posterior left upper lobe is mildly FDG avid and also new compared to prior diagnostic CT.  Differential considerations include pneumonitis or malignancy  4. Mild uptake in the region of recent procedure compatible with postoperative inflammation and healing response.  Continued attention recommended on follow-up exams  5. Pronounced, asymmetric hypermetabolism at the right vocal cord.  This could be related to compensatory function of the right vocal cord due to left vocal cord paralysis or infiltrative lesion of the right cord.  Correlate with direct visual inspection  -07/11/2022:  Limited abdominal ultrasound: Heterogenicity of the liver parenchyma with no definite abnormal echogenicity. Cholelithiasis with a wall enhancing sign. Persistent dilatation of the common bile duct; if clinically indicated MRCP might prove helpful for further assessment  -cycle 19 of chemotherapy (maintenance Alimta/Keytruda) on 08/08/2022; cycle 20 held on 08/29/2022 because of worsening symptoms  -08/29/2022: CTA chest (PE protocol): Large cavitary mass right upper lobe which appears worse than prior examination (involving essentially the entire right upper hemithorax; causing sharp truncation of the right upper lobe pulmonary artery; adenopathy right hilum and right paratracheal region; mass appears more prominent since 03/21/2022; small right-sided pleural effusion; small pericardial effusion  -09/07/2022: Office visit:  Worsening dyspnea, wheezing, coughing, green tinged sputum; fair appetite and energy level; no fevers or chills; chemotherapy had; prescribed antibiotic and  steroids  -09/10/2022: CT chest with contrast (comparison:  CTA 08/29/2022):  Similar appearance of large cavitary lesion right upper lobe with extension to right hilum and some displacement versus invasion of suprahilar right mediastinum; interval decrease in alveolar opacities left lung base; interval decrease in pericardial effusion, etc.  -09/12/2022:  Restaging CTs C/A/P with contrast:  Large cavitary mass right upper lobe with associated area of adenopathy and pleural thickening, overall unchanged since 09/10/2022 exam; porcelain gallbladder, filling defects within the gallbladder  -08/08/2022: Vitamin B12 level 696, normal.  LDH normal.  -08/29/2022: TSH normal.  Free T4 normal.      Interval history:  10/22/2020:   Presents for initial medical oncology consultation, accompanied by her daughter, Leslie.    09/20/2022:  -cycle 19 of chemotherapy (maintenance Alimta/Keytruda) on 08/08/2022; cycle 20 held on 08/29/2022 because of worsening symptoms  -08/29/2022: CTA chest (PE protocol): Large cavitary mass right upper lobe which appears worse than prior examination (involving essentially the entire right upper hemithorax; causing sharp truncation of the right upper lobe pulmonary artery; adenopathy right hilum and right paratracheal region; mass appears more prominent since 03/21/2022; small right-sided pleural effusion; small pericardial effusion  -09/07/2022: Office visit:  Worsening dyspnea, wheezing, coughing, green tinged sputum; fair appetite and energy level; no fevers or chills; chemotherapy had; prescribed antibiotic and steroids  -09/10/2022: CT chest with contrast (comparison:  CTA 08/29/2022):  Similar appearance of large cavitary lesion right upper lobe with extension to right hilum and some displacement versus invasion of suprahilar right mediastinum; interval decrease in alveolar opacities left lung base; interval decrease in pericardial effusion, etc.  -09/12/2022:  Restaging CTs C/A/P with  contrast:  Large cavitary mass right upper lobe with associated area of adenopathy and pleural thickening, overall unchanged since 09/10/2022 exam; porcelain gallbladder, filling defects within the gallbladder  -08/08/2022: Vitamin B12 level 696, normal.  LDH normal.  -08/29/2022: TSH normal.  Free T4 normal.  Presents for follow-up visit.  Slightly worsening dyspnea.  However, ECOG 2.  No fevers, chills, hemoptysis, significant sputum production, unusual headaches, focal neurological symptoms, etc..  Appetite is good with Megace.  Quit smoking 3 months ago.  Some nausea, numbness and tingling, and itching.  Some chilly sensations and sweats.  Some hot flashes and chronic stable generalized weakness or fatigue.      Review of systems:   All systems reviewed, and found to be negative except for the symptoms detailed above.      Physical examination:   VITAL SIGNS:  Reviewed.      GENERAL:  In no apparent distress.    HEAD:  No signs of head trauma.   EYES:  Pupils are equal.  Extraocular motions intact.    EARS:  Hearing grossly intact.   MOUTH:  Oropharynx is normal.   NECK:  No adenopathy, no JVD.      CHEST:  Chest with clear breath sounds bilaterally.  No wheezes, rales, or rhonchi.    CARDIAC:  Regular rate and rhythm.  S1 and S2, without murmurs, gallops, or rubs.   VASCULAR:  No Edema.  Peripheral pulses normal and equal in all extremities.   ABDOMEN:  Soft, without detectable tenderness.  No sign of distention.  No   rebound or guarding, and no masses palpated.   Bowel Sounds normal.   MUSCULOSKELETAL:  Good range of motion of all major joints. Extremities without clubbing, cyanosis or edema.    NEUROLOGIC EXAM:  Alert and oriented x 3.  No focal sensory or strength deficits.   Speech normal.  Follows commands.   PSYCHIATRIC:  Mood normal.   SKIN:  No rash or lesions.  10/22/2020: Lungs clear to auscultation.  No crepitations or rhonchi.  No supraclavicular or axillary lymphadenopathy palpable.  Heart sounds  normal.  Abdomen benign.  No swelling in legs.  Conjunctiva pale.  11/27/2020: In no acute discomfort.  Conjunctive pale.  03/23/2021: Not examined; it was a telemedicine visit.  04/15/2021: Looks well and healthy.  Cheerful.  Bilateral rhonchi.  No palpable lymphadenopathy.      Assessment:   #Poorly differentiated carcinoma of lung:  To summarize:   -Poorly differentiated carcinoma of lung, 14.0 cm right upper lobe mass invading mediastinum   -EBUS 09/20/2020   -cT4 cN2 M1b, stage RICKIE   -EGD/colonoscopy negative   -5 mm right occipital hemorrhagic metastasis 11/06/2020. -S/p SRS to brain metastasis (2100 cGy; 1 fraction) (12/03/2020)   -S/p chemoradiation therapy (12/2020-01/2021) for intrathoracic disease (oligometastatic disease), with positive response   -Followed by carbo/Alimta/Keytruda x4 for metastatic disease (solitary brain metastasis) with stable disease; questionable progression on CTs 08/2021)   -Alimta/Keytruda maintenance started 09/09/2021   -No brain metastases on surveillance brain MRI (11/11/2021)   -No progression on restaging CTs post maintenance Alimta/Keytruda x6 (12/17/2021)   -No brain metastasis on surveillance brain MRI (03/14/2022)   -Difficult to interpret restaging CTs 03/21/2022  -pronounced asymmetric hypermetabolism of right vocal cord on restaging PET-CT 06/29/2022  -likely, progression post Alimta/Keytruda X 19 (on CTA chest, CT chest with contrast, restaging CTs C/A/P August-September ' 22)   (large cavitary mass right upper lung lobe, worse since 03/21/2022)      #Sites of disease:   14 cm right upper lobe mass, invading mediastinum; mediastinal lymphadenopathy; right occipital brain metastasis       #Molecular markers:  -BRAF mutation negative; ROS1 gene arrangement negative; ALK rearrangement negative; PD-L1 CPS <10 (CPS 5) (erroneously, tested for esophageal carcinoma); EGFR negative; MET exon 14 skipping mutation negative; NTRK gene fusion negative; RET rearrangement  negative   -PD-L1 testing: QNS       #Hypercalcemia (11/25/2020)  -11/25/2020: Calcium 10.3 (elevated for the very first time).  BUN 21, elevated.  Creatinine 0.79, normal.  Albumin 2.4.   -11/25/2020: Intact PTH level 32.9, normal.  Ionized calcium level 5.4, within normal limits.   -11/25/2020: IV fluids + Zometa 4 mg IV x1   -03/24/2022: Calcium 10.7, albumin 2.8, M spike 0.02 g/dL, IgG kappa; IgG, IgA, IgM normal; free kappa light chains 2.22, elevated; lambda light chains, kappa/lambda ratio normal; PTH related peptide <0.4; ionized calcium level 5.34 mg/dL, normal; 25 hydroxy vitamin D level 38 ng/mL, normal; Intact PTH 52.7, normal  (03/24/2022: Mild hypercalcemia; 0.02 g/dL IgG kappa M spike; intact PTH normal; PTH related peptide normal; vitamin D level normal; ionized calcium level normal; kappa/lambda ratio normal)       #Iron deficiency anemia, FOBT positive:   (Anemia of chronic disease)   -09/11/2020: Hemoglobin 6.9.  Microcytosis.  Elevated RDW.  Low serum iron, low TIBC, low transferrin saturation, normal ferritin.  FOBT positive.  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  .3.       #Thrombocytosis since 09/11/2020 ([id number])   -Could be reactive to iron deficiency anemia versus metastatic lung cancer   -10/22/2020: JAK2 mutation negative.  BCR-ABL 1 fusion transcripts negative.  ESR, CRP elevated.  Iron deficiency anemia.   -Subsequently, resolved (post parenteral iron therapy; post chemoradiation therapy for lung cancer)   -08/17/2021: Hemoglobin 6.1, , serum iron 16, TIBC 175, transferrin saturation 9%, ferritin >1675.56 (relative iron deficiency)  -07/18/2022:  Platelets 446 K       # Cholelithiasis; chronically elevated alkaline phosphatase, possible choledocholithiasis:   -HIDA scan (04/18/2019): No gallbladder activity at 1 hour post isotope injection, compatible with cystic duct obstruction/acute cholecystitis   -02/07/2020: Limited abdominal ultrasound: Cholelithiasis, dilated CBD, hepatomegaly   (12/03/2020)   -12/17/2021: Restaging CT C/A/P with contrast: Gallstones noted, apart from other findings; questionable slightly increased periductal dilatation   -Alk phos: 567 (12/29/2021); 337 (12/13/2021); 606 (12/09/2021),   (Bilirubin, AST, ALT normal)   -03/21/2022: MRCP (dilated common bile duct): Extrahepatic biliary ductal dilation similar to December 2021; small distal common bile duct filling defects seen, cannot exclude choledocholithiasis; cholelithiasis; no MRI evidence of acute cholecystitis; hepatomegaly with steatosis; small right pleural effusion      Plan:  -pronounced asymmetric hypermetabolism of right vocal cord on restaging PET-CT 06/29/2022  >>>  -refer to ENT ASAP for evaluation     -Alimta/Keytruda maintenance started 09/09/2021  -likely, progression post Alimta/Keytruda X 19 (on CTA chest, CT chest with contrast, restaging CTs C/A/P August-September ' 22)   (large cavitary mass right upper lung lobe, worse since 03/21/2022)   >>>  -suspicion of progression post  Alimta/Keytruda X 19   -will evaluate with PET-CT  -in the interim, continue Alimta/Keytruda maintenance  -Continue Alimta/Keytruda maintenance every 3 weeks (Alimta indefinitely; pembrolizumab for 24 months)  -check TSH and free T4 every 6 weeks; next, mid October  -check CBC and CMP every 3 weeks before each cycle of chemotherapy  -Surveillance brain MRI scans deferred to radiation oncology (no metastasis on surveillance brain MRI (03/14/2022)     Chemotherapy regimen:   1.  Day 1: Pembrolizumab 200 mg IV plus pemetrexed 500 mg/m² IV plus carboplatin AUC 5; repeat cycles every 3 weeks for 4 cycles; followed by:   2.  Day 1: Pembrolizumab 200 mg IV every 3 weeks for 24 months;   3.  Day 1: Pemetrexed 500 mg/m² IV every 3 weeks indefinitely     Labs unable to perform liquid biopsy for PD-L1 and KRAS mutation   >>>  -We referred the patient to pulmonary for biopsy of right lung mass, specifically to enable PD-L1 and KRAS mutation testing but she no showed     -Iron deficiency anemia; FOBT positive;; s/p Feraheme x2 (10/26/2020-11/03/2020)   -No improvement in hemoglobin despite normalization of iron stores with Feraheme   (Anemia of chronic disease)     -Cholelithiasis; chronically elevated alkaline phosphatase   -Questionable slightly increased biliary ductal dilation on CT C/A/P with contrast (12/17/2021)   -03/21/2022: MRCP (dilated common bile duct): Extrahepatic biliary ductal dilation similar to December 2021; small distal common bile duct filling defects seen, cannot exclude choledocholithiasis; cholelithiasis; no MRI evidence of acute cholecystitis; hepatomegaly with steatosis; small right pleural effusion   >>>  -Referred to GI for evaluation     -Hypercalcemia: Calcium 10.6, albumin 2.7 (03/03/2022); calcium 10.7, albumin 2.7 (01/19/2022)   -03/24/2022: Mild hypercalcemia; 0.02 g/dL IgG kappa M spike; intact PTH normal; PTH related peptide normal; vitamin D level normal; ionized calcium level normal;  kappa/lambda ratio normal   >>>  -In 6 months (September' 22), recheck SPEP, ANNA, FLC assay      -Thrombocytosis is reactive to iron deficiency and underlying metastatic lung cancer     On Norco for right posterior chest wall pain  -03/24/2022: From now on, Norco 10 mg pills no more than twice a day   -07/18/2022:  Per her request, will change to every 4 hours p.r.n.    Send a consultation request to Pulmonary for follow-up with respect of COPD.    Follow-up visit with me in 3 weeks, with PET-CT.    Above discussed with her and her daughter.  All questions answered.   Discussed labs and scans and gave her copies of relevant reports.   She understands and agrees with this plan, and was appreciative.

## 2022-10-10 PROBLEM — C34.90 STAGE IV ADENOCARCINOMA OF LUNG: Chronic | Status: ACTIVE | Noted: 2022-01-01

## 2022-10-10 PROBLEM — J18.9 PNEUMONIA DUE TO INFECTIOUS ORGANISM: Chronic | Status: ACTIVE | Noted: 2022-01-01

## 2022-10-10 PROBLEM — C79.31 BRAIN METASTASES: Chronic | Status: ACTIVE | Noted: 2022-01-01

## 2022-10-10 PROBLEM — D64.9 SYMPTOMATIC ANEMIA: Status: ACTIVE | Noted: 2022-01-01

## 2022-10-10 PROBLEM — I48.20 CHRONIC A-FIB: Chronic | Status: ACTIVE | Noted: 2022-01-01

## 2022-10-10 PROBLEM — R65.20 SEVERE SEPSIS: Chronic | Status: ACTIVE | Noted: 2022-01-01

## 2022-10-10 PROBLEM — A41.9 SEVERE SEPSIS: Chronic | Status: ACTIVE | Noted: 2022-01-01

## 2022-10-10 PROBLEM — R65.20 SEVERE SEPSIS: Status: ACTIVE | Noted: 2022-01-01

## 2022-10-10 PROBLEM — J96.20 ACUTE ON CHRONIC RESPIRATORY FAILURE: Chronic | Status: ACTIVE | Noted: 2022-01-01

## 2022-10-10 PROBLEM — J44.1 COPD WITH EXACERBATION: Chronic | Status: ACTIVE | Noted: 2022-01-01

## 2022-10-10 PROBLEM — D64.9 SYMPTOMATIC ANEMIA: Chronic | Status: ACTIVE | Noted: 2022-01-01

## 2022-10-10 PROBLEM — A41.9 SEVERE SEPSIS: Status: ACTIVE | Noted: 2022-01-01

## 2022-10-10 NOTE — ASSESSMENT & PLAN NOTE
Severe sepsis present on arrival to emergency room   This secondary to pneumonia but could not rule out other causes including UTI.    Patient very minor compromise, on oxygen, COPD, chemotherapy.    Will cover for g negatives, g positives anaerobes and atypicals.

## 2022-10-10 NOTE — ED PROVIDER NOTES
Encounter Date: 10/10/2022       History     Chief Complaint   Patient presents with    Shortness of Breath     Brought per [facility] for SOB since this am. Hx of stage 3 lung CA     Patient was brought into the ED with sob since this Am. The patient has hx of stage 4 lung CA as well as COPD. The patient was given duo neb in route per EMS.     Review of patient's allergies indicates:  No Known Allergies  Past Medical History:   Diagnosis Date    Anxiety and depression     Cancer     lung with brain metastasis    Cholelithiasis     COPD (chronic obstructive pulmonary disease)     Hypertension     Lumbar disc disease     PAD (peripheral artery disease)     Paroxysmal SVT (supraventricular tachycardia)     Pleural effusion      Past Surgical History:   Procedure Laterality Date    MEDIPORT INSERTION, DOUBLE      REPAIR OF CAROTID ARTERY Left 2022    Procedure: REPAIR, ARTERY, CAROTID;  Surgeon: Juan Luis Healy III, MD;  Location: [facility];  Service: General;  Laterality: Left;     No family history on file.  Social History     Tobacco Use    Smoking status: Every Day     Packs/day: 0.25     Years: 49.00     Pack years: 12.25     Types: Cigarettes     Last attempt to quit: 2022     Years since quittin.2    Smokeless tobacco: Never   Substance Use Topics    Alcohol use: Never    Drug use: Never     Review of Systems   Constitutional:  Positive for activity change and fatigue. Negative for chills and fever.   HENT:  Positive for congestion. Negative for sore throat.    Respiratory:  Positive for chest tightness, shortness of breath and wheezing.    Cardiovascular:  Negative for chest pain.   Gastrointestinal:  Negative for nausea.   Genitourinary:  Negative for dysuria.   Musculoskeletal:  Negative for back pain.   Skin:  Negative for rash.   Neurological:  Negative for weakness.   Hematological:  Does not bruise/bleed easily.   All other systems reviewed and are negative.    Physical Exam     Initial  Vitals [10/10/22 1240]   BP Pulse Resp Temp SpO2   (!) 142/76 (!) 145 (!) 27 98.2 °F (36.8 °C) 100 %      MAP       --         Physical Exam    Nursing note and vitals reviewed.  Constitutional: Vital signs are normal. She appears well-developed.  Non-toxic appearance. She does not have a sickly appearance. She appears distressed.   HENT:   Head: Normocephalic and atraumatic.   Eyes: Conjunctivae, EOM and lids are normal. Pupils are equal, round, and reactive to light. Lids are everted and swept, no foreign bodies found.   Neck: Trachea normal and phonation normal. Neck supple. No thyroid mass and no thyromegaly present.   Normal range of motion.   Full passive range of motion without pain.     Cardiovascular:  Normal rate, regular rhythm, S1 normal, S2 normal, normal heart sounds, intact distal pulses and normal pulses.           Pulmonary/Chest: She has wheezes. She has rhonchi.   Abdominal: Abdomen is soft.   Musculoskeletal:         General: Normal range of motion.      Cervical back: Full passive range of motion without pain, normal range of motion and neck supple.     Lymphadenopathy:     She has no cervical adenopathy.   Neurological: She is alert and oriented to person, place, and time.   Generally weak   Skin: Skin is warm, dry and intact. Capillary refill takes less than 2 seconds.   Psychiatric: She has a normal mood and affect. Her speech is normal and behavior is normal. Judgment normal. Cognition and memory are normal.       ED Course   Procedures  Labs Reviewed   COMPREHENSIVE METABOLIC PANEL - Abnormal; Notable for the following components:       Result Value    Blood Urea Nitrogen 22.0 (*)     Albumin Level 1.7 (*)     Globulin 5.4 (*)     Albumin/Globulin Ratio 0.3 (*)     Alkaline Phosphatase 184 (*)     All other components within normal limits   CBC WITH DIFFERENTIAL - Abnormal; Notable for the following components:    WBC 14.1 (*)     RBC 2.78 (*)     Hgb 7.7 (*)     Hct 25.2 (*)     MCHC 30.6  (*)     RDW 20.8 (*)     Neut # 11.0 (*)     Mono # 1.53 (*)     IG# 0.35 (*)     All other components within normal limits   TROPONIN I - Normal   COVID/FLU A&B PCR - Normal   CBC W/ AUTO DIFFERENTIAL    Narrative:     The following orders were created for panel order CBC auto differential.  Procedure                               Abnormality         Status                     ---------                               -----------         ------                     CBC with Differential[[id number]]        Abnormal            Final result                 Please view results for these tests on the individual orders.   OCCULT BLOOD,STOOL,SCREEN (1-3)    Narrative:     The following orders were created for panel order Occult Blood, Stool Screening (1 -3).  Procedure                               Abnormality         Status                     ---------                               -----------         ------                     Occult Blood Stool, CA S...[[id number]]                                                 OCCULT BLOOD STOOL, CA S...[[id number]]                                                   Please view results for these tests on the individual orders.   OCCULT BLOOD STOOL, CA SCREEN   OCCULT BLOOD STOOL, CA SCREEN 2ND SPEC   TYPE & SCREEN        ECG Results              EKG 12-lead (In process)  Result time 10/10/22 13:23:58      In process by Interface, Lab In [facility] (10/10/22 13:23:58)                   Narrative:    Test Reason : R06.02,    Vent. Rate : 138 BPM     Atrial Rate : 138 BPM     P-R Int : 148 ms          QRS Dur : 094 ms      QT Int : 368 ms       P-R-T Axes : 041 -36 071 degrees     QTc Int : 557 ms    Sinus tachycardia  Left axis deviation  Abnormal ECG  When compared with ECG of 10-SEP-2022 08:47,  No significant change was found    Referred By: [email or web address]   SELF           Confirmed By:                                   Imaging Results              X-Ray Chest 1 View (Final result)  Result time  10/10/22 14:31:30      Final result by Orestes Bahena MD (10/10/22 14:31:30)                   Impression:      1. Continued opacification of the right upper lung with cavitary lesion right mid lung  2. Infiltrate, atelectasis, and small pleural reaction lung bases  3. Right central line/MediPort      Electronically signed by: Orestes Bahena  Date:    10/10/2022  Time:    14:31               Narrative:    EXAMINATION:  XR CHEST 1 VIEW    CLINICAL HISTORY:  , Shortness of breath.    COMPARISON:  09/10/2022    FINDINGS:  An AP view or more reveals the heart to be normal in size.  There is continued opacification of the right upper lung.  A cavitary lesion is evident at the right mid lung.  Right central line/MediPort is unchanged in position.  Infiltrate, atelectasis, pleural reaction, and/or scarring is evident at the lung bases.                                       Medications   methylPREDNISolone sodium succinate injection 125 mg (has no administration in time range)                 ED Course as of 10/10/22 1502   Mon Oct 10, 2022   1500 Spoke to Dr Melendez. He is asking about the ED md and will come see the patient. He wanted to add iron studies. He did not say clearly if he was going to admit the patient so I will await for him to come see the patient.  [SL]      ED Course User Index  [SL] VICTOR HUGO Sosa                 Clinical Impression:   Final diagnoses:  [R06.02] Shortness of breath               VICTOR HUGO Sosa  10/15/22 1385

## 2022-10-10 NOTE — H&P
[facility] - Emergency Dept  [facility] Medicine  History & Physical    Patient Name: Adrienne Urbina  MRN: [id number]  Patient Class: IP- Inpatient  Admission Date: 10/10/2022  Attending Physician: Jacek Melendez MD   Primary Care Provider: Gregorio Cherry NP         Patient information was obtained from patient, relative(s) and ER records.     Subjective:     Principal Problem:Severe sepsis    Chief Complaint:   Chief Complaint   Patient presents with    Shortness of Breath     Brought per [facility] for SOB since this am. Hx of stage 3 lung CA    weakness , SOB and low oxygen         HPI: 67 years old  female past medical history of stage IV lung cancer with metastasis to the brain, chronic hypoxic respiratory failure due to COPD, AFib, hypertension was brought to the hospital via EMS.    Patient herself is a poor historian, she is weak she has fatigue she is tired and her daughter provides majority of the information.    Patient had chemotherapy about 2 weeks ago last time.  She follows up with [facility].  In the past patient had brain metastasis treated but per family member they are very small at this time and there are not going to do anything with it other than continue current chemotherapy   Patient at home uses 2 L of oxygen during the night only, she uses nebulized treatment, she is on long term prednisone for her COPD.    Patient follows up with Cardiology as well for AFib.  She is on aspirin as a blood thinner and metoprolol but due to her low blood pressure she has not been able to take it.    On arrival to emergency room noticed to be tachycardic, hypoxic, tachypneic and low blood pressure.  Her blood work shows anemia.  Patient has been coughing greenish and sometime she has blood in the sputum.    Admitted to the hospital as an inpatient with severe sepsis most likely due to compressive pneumonia.      Past Medical History:   Diagnosis Date    Anxiety and  depression     Cancer     lung with brain metastasis    Cholelithiasis     COPD (chronic obstructive pulmonary disease)     Hypertension     Lumbar disc disease     PAD (peripheral artery disease)     Paroxysmal SVT (supraventricular tachycardia)     Pleural effusion        Past Surgical History:   Procedure Laterality Date    MEDIPORT INSERTION, DOUBLE      REPAIR OF CAROTID ARTERY Left 6/17/2022    Procedure: REPAIR, ARTERY, CAROTID;  Surgeon: Juan Luis Healy III, MD;  Location: [facility];  Service: General;  Laterality: Left;       Review of patient's allergies indicates:  No Known Allergies    No current facility-administered medications on file prior to encounter.     Current Outpatient Medications on File Prior to Encounter   Medication Sig    albuterol-ipratropium (DUO-NEB) 2.5 mg-0.5 mg/3 mL nebulizer solution Take 3 mLs by nebulization every 4 (four) hours as needed.    dicyclomine (BENTYL) 10 MG capsule Take 10 mg by mouth 3 (three) times daily.    fluticasone propionate (FLONASE) 50 mcg/actuation nasal spray     gabapentin (NEURONTIN) 300 MG capsule Take 300 mg by mouth 3 (three) times daily.    guaiFENesin-codeine 100-10 mg/5 ml (TUSSI-ORGANIDIN NR)  mg/5 mL syrup Take 10 mLs by mouth as needed.    HYDROcodone-acetaminophen (NORCO)  mg per tablet Take 1 tablet by mouth every 4 (four) hours as needed for Pain.    levoFLOXacin (LEVAQUIN) 750 MG tablet Take 1 tablet (750 mg total) by mouth once daily.    megestroL (MEGACE) 400 mg/10 mL (40 mg/mL) Susp Take 10 mLs (400 mg total) by mouth 2 (two) times daily.    metoprolol tartrate (LOPRESSOR) 25 MG tablet Take 25 mg by mouth 2 (two) times daily.    montelukast (SINGULAIR) 10 mg tablet Take 10 mg by mouth once daily.    nystatin (MYCOSTATIN) 100,000 unit/mL suspension Take 4 mLs by mouth as needed.    OXYGEN-AIR DELIVERY SYSTEMS [facility]   oxygen, See Instructions, home concentrator and portable @ 2L per nasal cannula continuous to  keep SATS at 88% or greater J18.9, C34.9, J96.9, R09.02, # 1 EA, 0 Refill(s)    predniSONE (DELTASONE) 10 MG tablet Take 4 tablets by mouth once a day for 2 days, then take 3 tablets by mouth for 2 days, then take 2 tablets by mouth for 2 days, then 1 tablet by mouth for 2 days then 0.5 tablet by mouth for 2 days.     Family History    None       Tobacco Use    Smoking status: Every Day     Packs/day: 0.25     Years: 49.00     Pack years: 12.25     Types: Cigarettes     Last attempt to quit: 2022     Years since quittin.2    Smokeless tobacco: Never   Substance and Sexual Activity    Alcohol use: Never    Drug use: Never    Sexual activity: Yes     Review of Systems   Constitutional:  Positive for activity change, appetite change, chills, diaphoresis and fatigue. Negative for fever and unexpected weight change.   HENT:  Positive for sore throat.         Dry mouth.   Eyes: Negative.    Respiratory:  Positive for cough, chest tightness, shortness of breath and wheezing. Negative for choking.    Cardiovascular:  Positive for palpitations and leg swelling. Negative for chest pain.   Gastrointestinal:  Positive for abdominal distention and abdominal pain. Negative for constipation, nausea and vomiting.   Endocrine: Negative.    Genitourinary: Negative.    Musculoskeletal:  Positive for arthralgias, back pain and gait problem.   Allergic/Immunologic: Negative.    Neurological:  Positive for dizziness, seizures, weakness, light-headedness and headaches. Negative for tremors and syncope.   Hematological: Negative.    All other systems reviewed and are negative.  Objective:     Vital Signs (Most Recent):  Temp: 98.2 °F (36.8 °C) (10/10/22 1240)  Pulse: (!) 136 (10/10/22 143)  Resp: (!) 25 (10/10/22 1432)  BP: (!) 94/55 (10/10/22 1432)  SpO2: 100 % (10/10/22 1432)   Vital Signs (24h Range):  Temp:  [98.2 °F (36.8 °C)] 98.2 °F (36.8 °C)  Pulse:  [130-145] 136  Resp:  [18-27] 25  SpO2:  [96 %-100 %] 100 %  BP:  ()/(48-76) 94/55     Weight: 74.8 kg (165 lb)  Body mass index is 26.63 kg/m².    Physical Exam  Vitals and nursing note reviewed. Exam conducted with a chaperone present.   Constitutional:       Appearance: She is underweight.      Interventions: Nasal cannula in place.      Comments: Frail weak appearance.   HENT:      Head: Normocephalic and atraumatic. Right periorbital erythema present.   Eyes:      General: Lids are normal. Lids are everted, no foreign bodies appreciated.   Neck:      Thyroid: No thyroid mass.   Cardiovascular:      Rate and Rhythm: Regular rhythm. Tachycardia present. FrequentExtrasystoles are present.     Heart sounds: S1 normal. Murmur heard.   Systolic murmur is present with a grade of 2/6.   Pulmonary:      Effort: Tachypnea and accessory muscle usage present.      Breath sounds: Decreased air movement present. Examination of the right-upper field reveals decreased breath sounds. Examination of the left-upper field reveals decreased breath sounds. Examination of the right-middle field reveals rales. Examination of the left-middle field reveals rales. Examination of the right-lower field reveals wheezing. Examination of the left-lower field reveals wheezing. Decreased breath sounds, wheezing and rales present.   Abdominal:      General: Abdomen is flat. Bowel sounds are decreased. There are no signs of injury.      Palpations: Abdomen is soft.      Comments: Epigastric pain on palpation.   Musculoskeletal:      Cervical back: Full passive range of motion without pain and normal range of motion.      Right lower le+ Edema present.      Left lower le+ Edema present.      Comments: Significant muscle weakness.  Significant muscle mass decreased from her previous 1.  Muscle strength is 2/5 symmetrically.   Lymphadenopathy:      Cervical: No cervical adenopathy.   Skin:     General: Skin is cool.      Capillary Refill: Capillary refill takes 2 to 3 seconds.   Neurological:       Mental Status: She is oriented to person, place, and time. She is lethargic.   Psychiatric:         Behavior: Behavior is cooperative.           Significant Labs: All pertinent labs within the past 24 hours have been reviewed.  Recent Lab Results         10/10/22  1531   10/10/22  1452   10/10/22  1256   10/10/22  1254        Influenza A, Molecular       Not Detected       Influenza B, Molecular       Not Detected       Albumin/Globulin Ratio     0.3         Albumin     1.7         Alkaline Phosphatase     184         ALT     5         AST     11         Baso #     0.04         Basophil %     0.3         BILIRUBIN TOTAL     0.5         BUN     22.0         Calcium     9.4         Chloride     102         CO2     26         Creatinine     0.81         eGFR     >60         Eos #     0.02         Eosinophil %     0.1         Globulin, Total     5.4         Glucose     110         Group & Rh     O POS         Hematocrit     25.2         Hemoglobin     7.7         Immature Grans (Abs)     0.35         Immature Granulocytes     2.5         Indirect Marvin GEL     NEG         Lymph #     1.21         LYMPH %     8.6         MCH     27.7         MCHC     30.6         MCV     90.6         Mono #     1.53         Mono %     10.8         MPV     9.9         Neut #     11.0         Neut %     77.7         Occult Blood   Negative           Platelets     383         Potassium     3.6         PROTEIN TOTAL     7.1         RBC     2.78         RDW     20.8         Retic 1.15             Retic Count Abs 0.0300             SARS-CoV2 (COVID-19) Qualitative PCR       Not Detected       Sodium     143         Troponin I     0.015         WBC     14.1                  COMPARISON:  09/10/2022     FINDINGS:  An AP view or more reveals the heart to be normal in size.  There is continued opacification of the right upper lung.  A cavitary lesion is evident at the right mid lung.  Right central line/MediPort is unchanged in position.   Infiltrate, atelectasis, pleural reaction, and/or scarring is evident at the lung bases.     Impression:     1. Continued opacification of the right upper lung with cavitary lesion right mid lung  2. Infiltrate, atelectasis, and small pleural reaction lung bases  3. Right central line/MediPort        Electronically signed by: Orestes Bahena  Date:                                            10/10/2022  Time:                                           14:31           Exam Ended: 10/10/22 13:15 Last Resulted: 10/10/22 14:31                Significant Imaging:     Assessment/Plan:     * Severe sepsis  Severe sepsis present on arrival to emergency room   This secondary to pneumonia but could not rule out other causes including UTI.    Patient very minor compromise, on oxygen, COPD, chemotherapy.    Will cover for g negatives, g positives anaerobes and atypicals.      Acute on chronic respiratory failure  Patient with acute on chronic hypoxic respiratory failure present on arrival.    Her home oxygen is just 1-2 L but her needs in emergency room to keep oxygen saturation above 88 percentile are to 4 L.    Will continue oxygen support and keep saturation above 90.     Pneumonia due to infectious organism  Suspect right upper lobe pneumonia compressive pneumonia and atelectasis.    Patient will be on vanc, cefepime and Levaquin.  To cover for g negatives, g positive and anaerobes.      COPD with exacerbation  Examination with significant rhonchi wheezing bilaterally widespread and greenish productive sputum.    Continue steroids nebulizer treatment and IV antibiotics.      Symptomatic anemia  Patient baseline anemia is above 9 currently is in the 7.    With transfuse patient 1 unit of packed red blood cells.      Stage IV adenocarcinoma of lung  Follow-up with oncology.      Chronic a-fib  Patient on beta-blockers at home.    Due to sepsis and low blood pressure there is no much room for beta blockers on top of COPD  exacerbation.    If patient persists to have AFib with RVR consider digoxin versus amiodarone.        VTE Risk Mitigation (From admission, onward)         Ordered     Reason for no Mechanical VTE Prophylaxis  Once        Question:  Reasons:  Answer:  Active Bleeding    10/10/22 1555     Place ASHLYN hose  Until discontinued         10/10/22 1555     IP VTE HIGH RISK PATIENT  Once         10/10/22 1555     Place sequential compression device  Until discontinued         10/10/22 1555     Place sequential compression device  Until discontinued         10/10/22 1505               Lengthy discussion with patient's and family member.    Explained to them that patient has advanced cancer, multiple comorbidities including COPD and chronic hypoxic respiratory failure   Family to some degree in denial regarding patient's future prognosis.    Will admit patient to the hospital continue aggressive treatment.  Patient wishes to be full code at present time.    Jacek Melendez MD  Department of Hospital Medicine   [facility] - Emergency Dept

## 2022-10-10 NOTE — ASSESSMENT & PLAN NOTE
Patient on beta-blockers at home.    Due to sepsis and low blood pressure there is no much room for beta blockers on top of COPD exacerbation.    If patient persists to have AFib with RVR consider digoxin versus amiodarone.

## 2022-10-10 NOTE — SUBJECTIVE & OBJECTIVE
Past Medical History:   Diagnosis Date    Anxiety and depression     Cancer     lung with brain metastasis    Cholelithiasis     COPD (chronic obstructive pulmonary disease)     Hypertension     Lumbar disc disease     PAD (peripheral artery disease)     Paroxysmal SVT (supraventricular tachycardia)     Pleural effusion        Past Surgical History:   Procedure Laterality Date    MEDIPORT INSERTION, DOUBLE      REPAIR OF CAROTID ARTERY Left 6/17/2022    Procedure: REPAIR, ARTERY, CAROTID;  Surgeon: Juan Luis Healy III, MD;  Location: [facility];  Service: General;  Laterality: Left;       Review of patient's allergies indicates:  No Known Allergies    No current facility-administered medications on file prior to encounter.     Current Outpatient Medications on File Prior to Encounter   Medication Sig    albuterol-ipratropium (DUO-NEB) 2.5 mg-0.5 mg/3 mL nebulizer solution Take 3 mLs by nebulization every 4 (four) hours as needed.    dicyclomine (BENTYL) 10 MG capsule Take 10 mg by mouth 3 (three) times daily.    fluticasone propionate (FLONASE) 50 mcg/actuation nasal spray     gabapentin (NEURONTIN) 300 MG capsule Take 300 mg by mouth 3 (three) times daily.    guaiFENesin-codeine 100-10 mg/5 ml (TUSSI-ORGANIDIN NR)  mg/5 mL syrup Take 10 mLs by mouth as needed.    HYDROcodone-acetaminophen (NORCO)  mg per tablet Take 1 tablet by mouth every 4 (four) hours as needed for Pain.    levoFLOXacin (LEVAQUIN) 750 MG tablet Take 1 tablet (750 mg total) by mouth once daily.    megestroL (MEGACE) 400 mg/10 mL (40 mg/mL) Susp Take 10 mLs (400 mg total) by mouth 2 (two) times daily.    metoprolol tartrate (LOPRESSOR) 25 MG tablet Take 25 mg by mouth 2 (two) times daily.    montelukast (SINGULAIR) 10 mg tablet Take 10 mg by mouth once daily.    nystatin (MYCOSTATIN) 100,000 unit/mL suspension Take 4 mLs by mouth as needed.    OXYGEN-AIR DELIVERY SYSTEMS [facility]   oxygen, See Instructions, home concentrator and portable @ 2L per  nasal cannula continuous to keep SATS at 88% or greater J18.9, C34.9, J96.9, R09.02, # 1 EA, 0 Refill(s)    predniSONE (DELTASONE) 10 MG tablet Take 4 tablets by mouth once a day for 2 days, then take 3 tablets by mouth for 2 days, then take 2 tablets by mouth for 2 days, then 1 tablet by mouth for 2 days then 0.5 tablet by mouth for 2 days.     Family History    None       Tobacco Use    Smoking status: Every Day     Packs/day: 0.25     Years: 49.00     Pack years: 12.25     Types: Cigarettes     Last attempt to quit: 2022     Years since quittin.2    Smokeless tobacco: Never   Substance and Sexual Activity    Alcohol use: Never    Drug use: Never    Sexual activity: Yes     Review of Systems   Constitutional:  Positive for activity change, appetite change, chills, diaphoresis and fatigue. Negative for fever and unexpected weight change.   HENT:  Positive for sore throat.         Dry mouth.   Eyes: Negative.    Respiratory:  Positive for cough, chest tightness, shortness of breath and wheezing. Negative for choking.    Cardiovascular:  Positive for palpitations and leg swelling. Negative for chest pain.   Gastrointestinal:  Positive for abdominal distention and abdominal pain. Negative for constipation, nausea and vomiting.   Endocrine: Negative.    Genitourinary: Negative.    Musculoskeletal:  Positive for arthralgias, back pain and gait problem.   Allergic/Immunologic: Negative.    Neurological:  Positive for dizziness, seizures, weakness, light-headedness and headaches. Negative for tremors and syncope.   Hematological: Negative.    All other systems reviewed and are negative.  Objective:     Vital Signs (Most Recent):  Temp: 98.2 °F (36.8 °C) (10/10/22 1240)  Pulse: (!) 136 (10/10/22 143)  Resp: (!) 25 (10/10/22 1432)  BP: (!) 94/55 (10/10/22 143)  SpO2: 100 % (10/10/22 1432)   Vital Signs (24h Range):  Temp:  [98.2 °F (36.8 °C)] 98.2 °F (36.8 °C)  Pulse:  [130-145] 136  Resp:  [18-27] 25  SpO2:  [96  %-100 %] 100 %  BP: ()/(48-76) 94/55     Weight: 74.8 kg (165 lb)  Body mass index is 26.63 kg/m².    Physical Exam  Vitals and nursing note reviewed. Exam conducted with a chaperone present.   Constitutional:       Appearance: She is underweight.      Interventions: Nasal cannula in place.      Comments: Frail weak appearance.   HENT:      Head: Normocephalic and atraumatic. Right periorbital erythema present.   Eyes:      General: Lids are normal. Lids are everted, no foreign bodies appreciated.   Neck:      Thyroid: No thyroid mass.   Cardiovascular:      Rate and Rhythm: Regular rhythm. Tachycardia present. FrequentExtrasystoles are present.     Heart sounds: S1 normal. Murmur heard.   Systolic murmur is present with a grade of 2/6.   Pulmonary:      Effort: Tachypnea and accessory muscle usage present.      Breath sounds: Decreased air movement present. Examination of the right-upper field reveals decreased breath sounds. Examination of the left-upper field reveals decreased breath sounds. Examination of the right-middle field reveals rales. Examination of the left-middle field reveals rales. Examination of the right-lower field reveals wheezing. Examination of the left-lower field reveals wheezing. Decreased breath sounds, wheezing and rales present.   Abdominal:      General: Abdomen is flat. Bowel sounds are decreased. There are no signs of injury.      Palpations: Abdomen is soft.      Comments: Epigastric pain on palpation.   Musculoskeletal:      Cervical back: Full passive range of motion without pain and normal range of motion.      Right lower le+ Edema present.      Left lower le+ Edema present.      Comments: Significant muscle weakness.  Significant muscle mass decreased from her previous 1.  Muscle strength is 2/5 symmetrically.   Lymphadenopathy:      Cervical: No cervical adenopathy.   Skin:     General: Skin is cool.      Capillary Refill: Capillary refill takes 2 to 3 seconds.    Neurological:      Mental Status: She is oriented to person, place, and time. She is lethargic.   Psychiatric:         Behavior: Behavior is cooperative.           Significant Labs: All pertinent labs within the past 24 hours have been reviewed.  Recent Lab Results         10/10/22  1531   10/10/22  1452   10/10/22  1256   10/10/22  1254        Influenza A, Molecular       Not Detected       Influenza B, Molecular       Not Detected       Albumin/Globulin Ratio     0.3         Albumin     1.7         Alkaline Phosphatase     184         ALT     5         AST     11         Baso #     0.04         Basophil %     0.3         BILIRUBIN TOTAL     0.5         BUN     22.0         Calcium     9.4         Chloride     102         CO2     26         Creatinine     0.81         eGFR     >60         Eos #     0.02         Eosinophil %     0.1         Globulin, Total     5.4         Glucose     110         Group & Rh     O POS         Hematocrit     25.2         Hemoglobin     7.7         Immature Grans (Abs)     0.35         Immature Granulocytes     2.5         Indirect Marvin GEL     NEG         Lymph #     1.21         LYMPH %     8.6         MCH     27.7         MCHC     30.6         MCV     90.6         Mono #     1.53         Mono %     10.8         MPV     9.9         Neut #     11.0         Neut %     77.7         Occult Blood   Negative           Platelets     383         Potassium     3.6         PROTEIN TOTAL     7.1         RBC     2.78         RDW     20.8         Retic 1.15             Retic Count Abs 0.0300             SARS-CoV2 (COVID-19) Qualitative PCR       Not Detected       Sodium     143         Troponin I     0.015         WBC     14.1                  COMPARISON:  09/10/2022     FINDINGS:  An AP view or more reveals the heart to be normal in size.  There is continued opacification of the right upper lung.  A cavitary lesion is evident at the right mid lung.  Right central line/MediPort is unchanged  in position.  Infiltrate, atelectasis, pleural reaction, and/or scarring is evident at the lung bases.     Impression:     1. Continued opacification of the right upper lung with cavitary lesion right mid lung  2. Infiltrate, atelectasis, and small pleural reaction lung bases  3. Right central line/MediPort        Electronically signed by: Orestes Bahena  Date:                                            10/10/2022  Time:                                           14:31           Exam Ended: 10/10/22 13:15 Last Resulted: 10/10/22 14:31                Significant Imaging:

## 2022-10-10 NOTE — ASSESSMENT & PLAN NOTE
Patient baseline anemia is above 9 currently is in the 7.    With transfuse patient 1 unit of packed red blood cells.

## 2022-10-10 NOTE — ASSESSMENT & PLAN NOTE
Suspect right upper lobe pneumonia compressive pneumonia and atelectasis.    Patient will be on vanc, cefepime and Levaquin.  To cover for g negatives, g positive and anaerobes.

## 2022-10-10 NOTE — ASSESSMENT & PLAN NOTE
Examination with significant rhonchi wheezing bilaterally widespread and greenish productive sputum.    Continue steroids nebulizer treatment and IV antibiotics.

## 2022-10-10 NOTE — HPI
67 years old  female past medical history of stage IV lung cancer with metastasis to the brain, chronic hypoxic respiratory failure due to COPD, AFib, hypertension was brought to the hospital via EMS.    Patient herself is a poor historian, she is weak she has fatigue she is tired and her daughter provides majority of the information.    Patient had chemotherapy about 2 weeks ago last time.  She follows up with [facility].  In the past patient had brain metastasis treated but per family member they are very small at this time and there are not going to do anything with it other than continue current chemotherapy   Patient at home uses 2 L of oxygen during the night only, she uses nebulized treatment, she is on long term prednisone for her COPD.    Patient follows up with Cardiology as well for AFib.  She is on aspirin as a blood thinner and metoprolol but due to her low blood pressure she has not been able to take it.    On arrival to emergency room noticed to be tachycardic, hypoxic, tachypneic and low blood pressure.  Her blood work shows anemia.  Patient has been coughing greenish and sometime she has blood in the sputum.    Admitted to the hospital as an inpatient with severe sepsis most likely due to compressive pneumonia.

## 2022-10-10 NOTE — ASSESSMENT & PLAN NOTE
Patient with acute on chronic hypoxic respiratory failure present on arrival.    Her home oxygen is just 1-2 L but her needs in emergency room to keep oxygen saturation above 88 percentile are to 4 L.    Will continue oxygen support and keep saturation above 90.

## 2022-10-10 NOTE — ED PROVIDER NOTES
Pt seen and examined.  I performed the substantive portion of the encounter.  She presents with SOB, cough x 4-5 days, gradually worsening.  Given neb treatment en route by EMS with minimal improvement.  Has known lung CA.  Work-up reveals no acute changes on CXR, but leukocytosis and productive cough concerning for infectious process.  BP trended down in ED and HR remains elevated.  She is also anemic with Hgb below 8, qualifying her for transfusion.  Hospital medicine consulted and recommends covering pt for sepsis, so antibiotics ordered after cultures drawn.  Significant delays in treatment protocol were encountered due to chemistry analyzer being down and blood having to be sent by  to [place].  Will get CTA chest to exclude PE as a culprit, given her risk factors.     Enrrique Hernandez MD  10/10/22 4503

## 2022-10-11 PROBLEM — J18.9 PNEUMONIA DUE TO INFECTIOUS ORGANISM: Status: ACTIVE | Noted: 2022-01-01

## 2022-10-11 PROBLEM — J44.9 COPD (CHRONIC OBSTRUCTIVE PULMONARY DISEASE): Status: ACTIVE | Noted: 2022-01-01

## 2022-10-11 PROBLEM — J44.1 COPD WITH EXACERBATION: Status: ACTIVE | Noted: 2022-01-01

## 2022-10-11 PROBLEM — J96.20 ACUTE ON CHRONIC RESPIRATORY FAILURE: Status: ACTIVE | Noted: 2022-01-01

## 2022-10-12 PROBLEM — J44.9 COPD (CHRONIC OBSTRUCTIVE PULMONARY DISEASE): Chronic | Status: ACTIVE | Noted: 2022-01-01

## 2022-10-12 PROBLEM — J98.4 CAVITARY LESION OF LUNG: Chronic | Status: ACTIVE | Noted: 2022-01-01

## 2022-10-12 NOTE — PT/OT/SLP EVAL
Physical Therapy Evaluation    Patient Name:  Adrienne Urbina   MRN:  [id number]    Recommendations:     Discharge Recommendations:      Discharge Equipment Recommendations:     Barriers to discharge: None    Assessment:     Adrienne Urbina is a 67 y.o. female admitted with a medical diagnosis of Severe sepsis.  She presents with the following impairments/functional limitations:  weakness, impaired endurance, gait instability, impaired balance.    Patient eager to get OOB and sit in bedside chair.  A bit unsteady when she stood up, likely due to her not getting OOB for a while.  Felt much better once sitting up.    Rehab Prognosis: Good; patient would benefit from acute skilled PT services to address these deficits and reach maximum level of function.    Recent Surgery: * No surgery found *      Plan:     During this hospitalization, patient to be seen daily to address the identified rehab impairments via gait training, therapeutic activities, therapeutic exercises and progress toward the following goals:    Plan of Care Expires:  11/09/22    Subjective     Chief Complaint: weakness, fatigue    Living Environment:  Lives at home with family  Prior to admission, patients level of function was independent without AD, on 2L O2.      Objective:     Communicated with nurse prior to session.  Patient found HOB elevated with oxygen, telemetry, pulse ox (continuous), PureWick  upon PT entry to room.    General Precautions: Standard, fall   Orthopedic Precautions:    Braces:    Respiratory Status: Nasal cannula, flow 2 L/min    Exams:  RLE Strength: Deficits: 4/5  LLE Strength: Deficits: 4/5    Functional Mobility:  Bed Mobility:     Scooting: contact guard assistance  Supine to Sit: contact guard assistance  Transfers:     Sit to Stand:  minimum assistance with hand-held assist  Gait: 3 ft with B HHA min A        Patient left up in chair with call button in reach.    GOALS:   Multidisciplinary Problems       Physical Therapy  Goals          Problem: Physical Therapy    Goal Priority Disciplines Outcome Goal Variances Interventions   Physical Therapy Goal     PT, PT/OT Ongoing, Progressing     Description: Goals to be met by: 22     Patient will increase functional independence with mobility by performin. Supine to sit with Modified [place]  2. Sit to stand transfer with Modified [place]  3. Gait  x 150 feet with Modified [place] using No Assistive Device.                          History:     Past Medical History:   Diagnosis Date    Anxiety and depression     Cancer     lung with brain metastasis    Cholelithiasis     COPD (chronic obstructive pulmonary disease)     Hypertension     Lumbar disc disease     PAD (peripheral artery disease)     Paroxysmal SVT (supraventricular tachycardia)     Pleural effusion        Past Surgical History:   Procedure Laterality Date    MEDIPORT INSERTION, DOUBLE      REPAIR OF CAROTID ARTERY Left 2022    Procedure: REPAIR, ARTERY, CAROTID;  Surgeon: Juan Luis Healy III, MD;  Location: [facility];  Service: General;  Laterality: Left;       Time Tracking:     PT Received On: 10/12/22  PT Start Time: 1045     PT Stop Time: 1100  PT Total Time (min): 15 min     Billable Minutes: Evaluation 15      10/12/2022

## 2022-10-12 NOTE — PROGRESS NOTES
[facility] Medicine Progress Note    Patient Name: Adrienne Urbina  Age: 67 y.o.   Code Status: Full Code  MRN: [id number]  Admission Date: 10/10/2022 12:44 PM   Patient Class: IP- Inpatient  Hospital Length of Stay: 2 days  Attending Provider: Joseluis Moseley MD  Primary Care Provider: Gregorio Cherry NP   Chief Complaint:   Chief Complaint   Patient presents with    Shortness of Breath     Brought per [facility] for SOB since this am. Hx of stage 3 lung CA    weakness , SOB and low oxygen            SUBJECTIVE:     Follow-up:  Severe sepsis    HPI:  67 years old  female past medical history of stage IV lung cancer with metastasis to the brain, chronic hypoxic respiratory failure due to COPD, AFib, hypertension was brought to the hospital via EMS.    Patient herself is a poor historian, she is weak she has fatigue she is tired and her daughter provides majority of the information.    Patient had chemotherapy about 2 weeks ago last time.  She follows up with [facility] group.  In the past patient had brain metastasis treated but per family member they are very small at this time and there are not going to do anything with it other than continue current chemotherapy   Patient at home uses 2 L of oxygen during the night only, she uses nebulized treatment, she is on long term prednisone for her COPD.    Patient follows up with Cardiology as well for AFib.  She is on aspirin as a blood thinner and metoprolol but due to her low blood pressure she has not been able to take it.    On arrival to emergency room noticed to be tachycardic, hypoxic, tachypneic and low blood pressure.  Her blood work shows anemia.  Patient has been coughing greenish and sometime she has blood in the sputum.    Admitted to the hospital as an inpatient with severe sepsis most likely due to compressive pneumonia.     Hospital Coarse:  10/11  Feels better today. Hasnt been OOB. Dyspnea improved. Cough remains  productive. Sputum sent for Cx. On Levaquin. Hgb 7.0, 2u PRBC ordered. K+ 3.4, replacing.     10/12  Transfused 2u PRBC yest, Hgb 9.7 from 7.0. Cr 1.04 from 0.88 and 0.81. Alb 1.7. States she feels slightly better today. Still with productive cough. Got OOB and sat in recliner about 2h today. States decent PO intake. Denies any new complaints. Had a very long discussion with pt and her daughters in room about poor overall prognosis and end-of-life issues/planning. Pt appears receptive to idea of hospice but daughters are visibly uncomfortable addressing likelihood of pt deteriorating in very near future and avoid even discussing hospice at this time. Pt became very tachycardic today with -180's. Lopressor 5mg IV minimally effective. Digoxin 0.125mg IV ineffective. Cardizem 10mg IV ordered currently and awaiting response.          Scheduled Meds:   azithromycin  500 mg Intravenous Q24H    dicyclomine  10 mg Oral TID    digoxin  125 mcg Intravenous Once    diltiaZEM  10 mg Intravenous Once    famotidine (PF)  20 mg Intravenous BID    gabapentin  300 mg Oral TID    guaiFENesin  600 mg Oral BID    levoFLOXacin  750 mg Intravenous Q24H    methylPREDNISolone sodium succinate injection  60 mg Intravenous Q12H    metoprolol        metoprolol tartrate  25 mg Oral BID    mirtazapine  15 mg Oral QHS    montelukast  10 mg Oral Daily    mupirocin   Nasal BID    polyethylene glycol  17 g Oral Daily     Continuous Infusions:  PRN Meds:   sodium chloride    acetaminophen    ALPRAZolam    aluminum-magnesium hydroxide-simethicone    guaiFENesin-codeine 100-10 mg/5 ml    HYDROcodone-acetaminophen    naloxone    ondansetron    sodium chloride 0.9%    sodium chloride 0.9%      Lines/Drains:   Lines/Drains/Airways       Central Venous Catheter Line  Duration             Port A Cath Single Lumen right subclavian -- days                      Allergies as of 10/10/2022    (No Known Allergies)         Review of Systems: 10 systems  reviewed all pertinent positives and negatives stated in HPI, otherwise negative.       OBJECTIVE:     Vital Signs (Most Recent)  Temp: 97.6 °F (36.4 °C) (10/12/22 1234)  Pulse: (!) 196 (10/12/22 1500)  Resp: 20 (10/12/22 1234)  BP: 113/79 (10/12/22 1500)  SpO2: (!) 94 % (10/12/22 1234)    Vital Signs Range (Last 24H):  Temp:  [97.6 °F (36.4 °C)-98.5 °F (36.9 °C)]   Pulse:  []   Resp:  [20-26]   BP: (108-149)/(54-87)   SpO2:  [93 %-100 %]     I & O (Last 24H):  Intake/Output Summary (Last 24 hours) at 10/12/2022 1557  Last data filed at 10/12/2022 1200  Gross per 24 hour   Intake 1501.66 ml   Output 1075 ml   Net 426.66 ml       Physical Exam  Constitutional:       General: She is not in acute distress.     Appearance: She is ill-appearing. She is not toxic-appearing.   HENT:      Head: Normocephalic and atraumatic.      Nose: Nose normal.      Mouth/Throat:      Mouth: Mucous membranes are moist.      Pharynx: Oropharynx is clear.   Eyes:      Extraocular Movements: Extraocular movements intact.      Conjunctiva/sclera: Conjunctivae normal.      Pupils: Pupils are equal, round, and reactive to light.   Cardiovascular:      Rate and Rhythm: Regular rhythm. Tachycardia present.      Heart sounds: No murmur heard.  Pulmonary:      Effort: Pulmonary effort is normal. No respiratory distress.      Breath sounds: Rhonchi and rales present. No wheezing.   Abdominal:      General: Bowel sounds are normal.      Palpations: Abdomen is soft.      Tenderness: There is no abdominal tenderness.   Musculoskeletal:         General: Normal range of motion.      Cervical back: Normal range of motion.      Right lower leg: No edema.      Left lower leg: No edema.   Skin:     General: Skin is warm and dry.   Neurological:      General: No focal deficit present.      Mental Status: She is alert and oriented to person, place, and time. Mental status is at baseline.   Psychiatric:         Mood and Affect: Mood normal.          Behavior: Behavior normal.         Thought Content: Thought content normal.         Judgment: Judgment normal.        Recent Labs   Lab 10/10/22  1256 10/11/22  1233 10/12/22  0638   WBC 14.1* 12.2* 13.3*   HGB 7.7* 7.0* 9.7*   HCT 25.2* 22.1* 30.3*    415* 408*   MCV 90.6 92.1 90.4        Recent Labs   Lab 10/11/22  0231 10/11/22  1235 10/12/22  0638    136 139   K 3.3* 3.4* 3.9   CHLORIDE 100 99 101   CO2 24 25 27   BUN 15.0 13.0 15.0   CREATININE 0.82 0.88 1.04*   GLUCOSE 206* 260* 139*   MG 1.60 1.60 1.70   PHOS 3.9 2.8 3.0   CALCIUM 8.9 8.8 9.0   ALBUMIN 1.4* 1.6* 1.7*   BILITOT 0.6  --   --    ALKPHOS 178*  --   --    ALT 6  --   --    AST 6  --   --    CRP >240.00*  --   --            ASSESSMENT/PLAN:       Severe sepsis    Acute on chronic respiratory failure    Pneumonia due to infectious organism    Stage IV adenocarcinoma of lung    Brain metastases    Symptomatic anemia    COPD (chronic obstructive pulmonary disease)    Chronic a-fib    Anxiety and depression      - transfer to ICU  - [facility] gtt IV  - s/p 2u PRBC  - am labs  - Levaquin  - sputum CX prelim Many GNR's    Prolonged Care time 30 min  End of life discussion 40 min  Critical Care time 35 min    VTE Risk Mitigation (From admission, onward)           Ordered     Reason for no Mechanical VTE Prophylaxis  Once        Question:  Reasons:  Answer:  Active Bleeding    10/10/22 1555     Place ASHLYN hose  Until discontinued         10/10/22 1555     IP VTE HIGH RISK PATIENT  Once         10/10/22 1555     Place sequential compression device  Until discontinued         10/10/22 1555     Place sequential compression device  Until discontinued         10/10/22 1505                           Joseluis Moseley MD  [facility] Medicine   [facility]

## 2022-10-12 NOTE — DISCHARGE INSTRUCTIONS
[facility] will see you at home.  Phone is [phone]    [facility] will provide your oxygen and portable tanks.

## 2022-10-12 NOTE — PLAN OF CARE
Pt lives at home and is cared for by [facility] members. Rx- [facility].  PCP:Gregorio Hart.  Has walker, BSC and home O2.  They would like home health w/Tramaine LIZARRAGA at d/c.  Referral sent.

## 2022-10-12 NOTE — HOSPITAL COURSE
10/11  Feels better today. Hasnt been OOB. Dyspnea improved. Cough remains productive. Sputum sent for Cx. On Levaquin. Hgb 7.0, 2u PRBC ordered. K+ 3.4, replacing.     10/12  Transfused 2u PRBC yest, Hgb 9.7 from 7.0. Cr 1.04 from 0.88 and 0.81. Alb 1.7. States she feels slightly better today. Still with productive cough. Got OOB and sat in recliner about 2h today. States decent PO intake. Denies any new complaints. Had a very long discussion with pt and her daughters in room about poor overall prognosis and end-of-life issues/planning. Pt appears receptive to idea of hospice but daughters are visibly uncomfortable addressing likelihood of pt deteriorating in very near future and avoid even discussing hospice at this time. Pt became very tachycardic today with -180's. Lopressor 5mg IV minimally effective. Digoxin 0.125mg IV ineffective. Cardizem 10mg IV ordered currently and awaiting response.     10/13  HR controlled in ICU. Feels well today and breathing comfortably. Followed up on EOL wishes/care discussion from yesterday and asked her thoughts and her family's thoughts. She is still unsure but does state she wishes to die at home. She called her daughter and we spoke to her on speakerphone. She expresses an overall tone of denial pertaining to her mothers advancing terminal condition and avoidant of any talk or plans other than aggressive medical treatment. There is a palpable feeling of reluctant submissiveness by the patient to her daughter. Code status was later discussed alone with pt and she states that she wishes to remain FULL CODE. Thought pts condition is improved somewhat from presentation, it is felt that pt very unlikely to improve much more significantly from current status. If she is able to progress enough to leave hospital and cont to choose continued aggressive medical interventions, her likelihood of readmission soon afterwards is very high.   - WBC 16.5 cont to trend upwards despite  improvement in CRP of 106.7 from >240. Chem panel rather normal x Albumin 1.6. SputumCx prelim MANY GNR's; currently on Levaquin/Azithro. CXR today unchanged.    10/14  No new complaints. Feels the same. On Cardizem gtt higher rate. Seen by Cardiology and transitioning to PO Cardizem and Increasing Metoprolol. Spoke with pts daughters at length about current condition, prognosis and EOL issues. Their expectations are for pt to go home after PNA tx'd. Will contact pts Oncologist,  in [place].    10/15  No new complaints. Walked with PT. Still issues with tachycardia, currently in Aflutter. Back on Cardizem gtt. Responded to Lopressor 5mg IV x1. HR last around 100. WBC improving. K+ 5.3 and slowly trending up past couple days. Lokelma given today. Cont on Meropenem for Serratia marcescens on sputum Cx.    10/16  Feels well today. No current complaints. Pt converted to NSR with controlled HR in 80's overnight after Lopressor PO incr from 25mg to 100mg BID. Taken off Cardizem gtt. Has remained stable since. She will be downgraded fre ICU and transferred to the floor. Labs stable if not very slightly improved. Daughter updated when visiting pt.    10/17  Pt w/o new complaints and feels well. Discharged home in stable condition.

## 2022-10-12 NOTE — PROGRESS NOTES
[facility] Medicine Progress Note    Patient Name: Adrienne Urbina  Age: 67 y.o.   Code Status: Full Code  MRN: [id number]  Admission Date: 10/10/2022 12:44 PM   Patient Class: IP- Inpatient  Hospital Length of Stay: 1 days  Attending Provider: Joseluis Moseley MD  Primary Care Provider: Gregorio Cherry NP   Chief Complaint:   Chief Complaint   Patient presents with    Shortness of Breath     Brought per [facility] for SOB since this am. Hx of stage 3 lung CA    weakness , SOB and low oxygen            SUBJECTIVE:     Follow-up:  Severe sepsis    HPI:  67 years old  female past medical history of stage IV lung cancer with metastasis to the brain, chronic hypoxic respiratory failure due to COPD, AFib, hypertension was brought to the hospital via EMS.    Patient herself is a poor historian, she is weak she has fatigue she is tired and her daughter provides majority of the information.    Patient had chemotherapy about 2 weeks ago last time.  She follows up with [facility].  In the past patient had brain metastasis treated but per family member they are very small at this time and there are not going to do anything with it other than continue current chemotherapy   Patient at home uses 2 L of oxygen during the night only, she uses nebulized treatment, she is on long term prednisone for her COPD.    Patient follows up with Cardiology as well for AFib.  She is on aspirin as a blood thinner and metoprolol but due to her low blood pressure she has not been able to take it.    On arrival to emergency room noticed to be tachycardic, hypoxic, tachypneic and low blood pressure.  Her blood work shows anemia.  Patient has been coughing greenish and sometime she has blood in the sputum.    Admitted to the hospital as an inpatient with severe sepsis most likely due to compressive pneumonia.     Hospital Coarse:  10/11  Feels better today. Hasnt been OOB. Dyspnea improved. Cough remains  productive. Sputum sent for Cx. On Levaquin. Hgb 7.0, 2u PRBC ordered. K+ 3.4, replacing.        Scheduled Meds:   albuterol-ipratropium  3 mL Nebulization Q4H    dicyclomine  10 mg Oral TID    famotidine (PF)  20 mg Intravenous BID    gabapentin  300 mg Oral TID    guaiFENesin  600 mg Oral BID    levoFLOXacin  500 mg Intravenous Q24H    methylPREDNISolone sodium succinate injection  60 mg Intravenous Q12H    mirtazapine  15 mg Oral QHS    montelukast  10 mg Oral Daily    mupirocin   Nasal BID    polyethylene glycol  17 g Oral Daily    potassium chloride  40 mEq Oral BID     Continuous Infusions:  PRN Meds:   sodium chloride    acetaminophen    albuterol-ipratropium    ALPRAZolam    aluminum-magnesium hydroxide-simethicone    guaiFENesin-codeine 100-10 mg/5 ml    HYDROcodone-acetaminophen    naloxone    ondansetron    sodium chloride 0.9%    sodium chloride 0.9%      Lines/Drains:   Lines/Drains/Airways       Central Venous Catheter Line  Duration             Port A Cath Single Lumen right subclavian -- days                      Allergies as of 10/10/2022    (No Known Allergies)         Review of Systems: 10 systems reviewed all pertinent positives and negatives stated in HPI, otherwise negative.       OBJECTIVE:     Vital Signs (Most Recent)  Temp: 98.5 °F (36.9 °C) (10/11/22 2028)  Pulse: (!) 139 (10/11/22 2028)  Resp: 20 (10/11/22 2031)  BP: 113/68 (10/11/22 2028)  SpO2: (!) 93 % (10/11/22 2028)    Vital Signs Range (Last 24H):  Temp:  [97.8 °F (36.6 °C)-98.5 °F (36.9 °C)]   Pulse:  []   Resp:  [18-24]   BP: ()/(47-70)   SpO2:  [87 %-100 %]     I & O (Last 24H):  Intake/Output Summary (Last 24 hours) at 10/11/2022 2039  Last data filed at 10/11/2022 1353  Gross per 24 hour   Intake 780 ml   Output 500 ml   Net 280 ml       Physical Exam  Constitutional:       Appearance: Normal appearance. She is ill-appearing.   HENT:      Head: Normocephalic and atraumatic.      Nose: Nose normal.      Mouth/Throat:       Mouth: Mucous membranes are moist.      Pharynx: Oropharynx is clear.   Eyes:      Extraocular Movements: Extraocular movements intact.      Conjunctiva/sclera: Conjunctivae normal.      Pupils: Pupils are equal, round, and reactive to light.   Cardiovascular:      Rate and Rhythm: Regular rhythm. Tachycardia present.      Heart sounds: Normal heart sounds.   Pulmonary:      Effort: Pulmonary effort is normal.      Breath sounds: Rhonchi and rales present. No wheezing.   Abdominal:      General: Bowel sounds are normal.      Palpations: Abdomen is soft.   Musculoskeletal:         General: Normal range of motion.      Cervical back: Normal range of motion.      Right lower leg: No edema.      Left lower leg: No edema.   Skin:     General: Skin is warm and dry.   Neurological:      General: No focal deficit present.      Mental Status: She is alert and oriented to person, place, and time.   Psychiatric:         Mood and Affect: Mood normal.         Behavior: Behavior normal.         Thought Content: Thought content normal.         Judgment: Judgment normal.        Recent Labs   Lab 10/10/22  1256 10/11/22  1233   WBC 14.1* 12.2*   HGB 7.7* 7.0*   HCT 25.2* 22.1*    415*   MCV 90.6 92.1        Recent Labs   Lab 10/10/22  1256 10/10/22  1527 10/11/22  0231 10/11/22  1235     --  136 136   K 3.6  --  3.3* 3.4*   CHLORIDE 102  --  100 99   CO2 26  --  24 25   BUN 22.0*  --  15.0 13.0   CREATININE 0.81  --  0.82 0.88   GLUCOSE 110  --  206* 260*   MG  --   --  1.60 1.60   PHOS  --   --  3.9 2.8   CALCIUM 9.4  --  8.9 8.8   ALBUMIN 1.7*  --  1.4* 1.6*   BILITOT 0.5  --  0.6  --    ALKPHOS 184*  --  178*  --    ALT 5  --  6  --    AST 11  --  6  --    CRP  --   --  >240.00*  --    FERRITIN  --  6,982.07*  --   --            ASSESSMENT/PLAN:       Severe sepsis    Acute on chronic respiratory failure    Pneumonia due to infectious organism    Stage IV adenocarcinoma of lung    Brain metastases    Symptomatic  anemia    COPD (chronic obstructive pulmonary disease)    Chronic a-fib    Anxiety and depression      - transfuse 2u PRBC  - Levaquin  - emvmny2r labs  - PT consult  - replace K+        VTE Risk Mitigation (From admission, onward)           Ordered     Reason for no Mechanical VTE Prophylaxis  Once        Question:  Reasons:  Answer:  Active Bleeding    10/10/22 1555     Place ASHLYN hose  Until discontinued         10/10/22 1555     IP VTE HIGH RISK PATIENT  Once         10/10/22 1555     Place sequential compression device  Until discontinued         10/10/22 1555     Place sequential compression device  Until discontinued         10/10/22 1505                           Joseluis Moseley MD  [facility] Medicine   [facility]

## 2022-10-13 NOTE — PROGRESS NOTES
"Inpatient Nutrition Evaluation    Admit Date: 10/10/2022   Total duration of encounter: 3 days    Nutrition Recommendation/Prescription     Rec'd continue Soft and Bite Sized Diet as tolerated and assist with intake.   Boost Plus, TID.  Boost Plus Provides 360 kcal, 14 g protein per serving.  Monitor intake, tolerance, weight, and labs.     RD following and available as needed.  Thank you.     Nutrition Assessment     Chart Review    Reason Seen: continuous nutrition monitoring    Diagnosis:  Severe Sepsis.     Relevant Medical History:     Acute on chronic respiratory failure    Pneumonia due to infectious organism    Stage IV adenocarcinoma of lung    Brain metastases    Symptomatic anemia    COPD (chronic obstructive pulmonary disease)    Chronic a-fib    Anxiety and depression    Nutrition-Related Medications: Methylprednisolone.     Nutrition-Related Labs:  10/13: WBC 16.5(H); H/H 11.0/36.4(L); GLU 65(L); Alk Phos 178(H); Alb 1.6(L); Pre Alb 5.9(L)    Diet Order: Diet Soft & Bite Sized  Oral Supplement Order: none at this time  Appetite/Oral Intake: good/50-75% of meals  Factors Affecting Nutritional Intake: none identified at this time  Food/Baptism/Cultural Preferences: none reported    Skin Integrity: other (see comments) (redness to buttock)  Wound(s):       Comments    10/13:  Pt with 75% recorded intake per EMR recorded intake. Noted GLU/Pre Alb low. Will add Boost Plus and continue to monitor during stay.     Anthropometrics    Height: 5' 6" (167.6 cm) Height Method: Stated  Last Weight: 76.4 kg (168 lb 6.9 oz) (10/13/22 0600) Weight Method: Bed Scale  BMI (Calculated): 27.2  BMI Classification: overweight (BMI 25-29.9)     Ideal Body Weight (IBW), Female: 130 lb     % Ideal Body Weight, Female (lb): 126.92 %                             Usual Weight Provided By: EMR weight history    Wt Readings from Last 5 Encounters:   10/13/22 76.4 kg (168 lb 6.9 oz)   09/20/22 74.4 kg (164 lb)   09/10/22 75.3 kg " (166 lb)   09/07/22 73.9 kg (163 lb)   08/29/22 74.5 kg (164 lb 3.9 oz)     Weight Change(s) Since Admission:  Admit Weight: 74.8 kg (165 lb) (10/10/22 1240)      Patient Education    Not applicable.    Monitoring & Evaluation     Dietitian will monitor food and beverage intake, energy intake, weight, electrolyte/renal panel, and glucose/endocrine profile.  Nutrition Risk/Follow-Up: low (follow-up in 5-7 days)  Patients assigned 'low nutrition risk' status do not qualify for a full nutritional assessment but will be monitored and re-evaluated in a 5-7 day time period.

## 2022-10-13 NOTE — PLAN OF CARE
Pt is alert and orient x 4 at the time of assessment. Pt is accompanied by spouse. Pain is controlled with current regimen. Pt has been coughing often and it is productive. Whenever pt coughs heart rate does increase to 160s. Dr is aware. Without coughing, heart rate remains between 108-117. Pt is to receive 2 units prbc. Will continue to monitor

## 2022-10-13 NOTE — PT/OT/SLP PROGRESS
Physical Therapy Treatment    Patient Name:  Adrienne Urbina   MRN:  [id number]    Recommendations:     Discharge Recommendations:      Discharge Equipment Recommendations:     Barriers to discharge: None    Assessment:     Adrienne Urbina is a 67 y.o. female admitted with a medical diagnosis of Severe sepsis.  She presents with the following impairments/functional limitations:  weakness, impaired endurance, gait instability, impaired balance, impaired functional mobility.    Pt awake, agreeable to mobility. She required min-CGA to get from supine to sitting at EOB. She was able to stand with CGA and ambulated with B HHA and 3L O2 x 40'. Pt was a little fatigued but all vitals were WNL. She participated in sit to stands, tolerated it fair. Pt left up in chair, reviewed exercises to perform as tolerated throughout the day.    Rehab Prognosis: Good; patient would benefit from acute skilled PT services to address these deficits and reach maximum level of function.    Recent Surgery: * No surgery found *      Plan:     During this hospitalization, patient to be seen daily to address the identified rehab impairments via therapeutic activities, therapeutic exercises, gait training and progress toward the following goals:    Plan of Care Expires:  11/09/22    Subjective     Chief Complaint: weakness  Patient/Family Comments/goals: to get stronger  Pain/Comfort:         Objective:     Communicated with patient prior to session.  Patient found HOB elevated with peripheral IV, PureWick, pulse ox (continuous), telemetry, oxygen upon PT entry to room.     General Precautions: Standard, fall   Orthopedic Precautions:    Braces:    Respiratory Status: Nasal cannula, flow 3 L/min     Functional Mobility:  Bed Mobility:     Supine to Sit: contact guard assistance and minimum assistance  Transfers:     Sit to Stand:  contact guard assistance with no AD  Gait: 4' with B HHA and CGA for balance  Balance: CGA in standing with B  HHA      AM-PAC 6 CLICK MOBILITY          Therapeutic Activities and Exercises:   See mobility above    Patient left up in chair with all lines intact and call button in reach..    GOALS:   Multidisciplinary Problems       Physical Therapy Goals          Problem: Physical Therapy    Goal Priority Disciplines Outcome Goal Variances Interventions   Physical Therapy Goal     PT, PT/OT Ongoing, Progressing     Description: Goals to be met by: 22     Patient will increase functional independence with mobility by performin. Supine to sit with Modified [place]  2. Sit to stand transfer with Modified [place]  3. Gait  x 150 feet with Modified [place] using No Assistive Device.                          Time Tracking:     PT Received On: 10/13/22  PT Start Time: 0950     PT Stop Time: 1010  PT Total Time (min): 20 min     Billable Minutes: Therapeutic Activity 20    Treatment Type: Treatment  PT/PTA: PTA           10/13/2022

## 2022-10-14 NOTE — CONSULTS
[facility] - ICU  Cardiology  Consult Note    Patient Name: Adrienne Urbina  MRN: [id number]  Admission Date: 10/10/2022  Hospital Length of Stay: 4 days  Code Status: Full Code   Attending Provider: Joseluis Moseley MD   Consulting Provider: Joey Banda MD  Primary Care Physician: Gregorio Cherry NP  Principal Problem:Severe sepsis    Patient information was obtained from patient and primary team.     Inpatient consult to Cardiology  Consult performed by: VICTOR HUGO Koch  Consult ordered by: Joseluis Moseley MD  Reason for consult: Tachycardia      Subjective:     Chief Complaint: SOB       HPI: Patient is a 68 yo female known to [facility]. PMH of SVT, PAD, COPD, anemia and metastatic lung cancer (undergoing chemo). She presented to ED with complaints of shortness of breath. She was diagnosed with pneumonia and admitted for further treatment. She was also found to be anemic and had transfusion of PRBC. She got tachycardic and was treated with IV lopressor and IV digoxin which did not help. She was started on a cardizem gtt which helped improve her HR. She is currently resting w/o distress.     Studies:    Echo 10/21: 50%, mild AI    Seen at bedside  Resting comfortably  Tele: Atrial fib with HR 90's (dilt gtt 15)      Past Surgical History:   Procedure Laterality Date    MEDIPORT INSERTION, DOUBLE      REPAIR OF CAROTID ARTERY Left 6/17/2022    Procedure: REPAIR, ARTERY, CAROTID;  Surgeon: Juan Luis Healy III, MD;  Location: [facility];  Service: General;  Laterality: Left;       Review of patient's allergies indicates:  No Known Allergies    No current facility-administered medications on file prior to encounter.     Current Outpatient Medications on File Prior to Encounter   Medication Sig    dicyclomine (BENTYL) 10 MG capsule Take 10 mg by mouth 3 (three) times daily.    gabapentin (NEURONTIN) 300 MG capsule Take 300 mg by mouth 3 (three) times daily.    metoprolol tartrate (LOPRESSOR) 25 MG tablet Take 25  mg by mouth 2 (two) times daily.    albuterol-ipratropium (DUO-NEB) 2.5 mg-0.5 mg/3 mL nebulizer solution Take 3 mLs by nebulization every 4 (four) hours as needed.    guaiFENesin-codeine 100-10 mg/5 ml (TUSSI-ORGANIDIN NR)  mg/5 mL syrup Take 10 mLs by mouth as needed.    HYDROcodone-acetaminophen (NORCO)  mg per tablet Take 1 tablet by mouth every 4 (four) hours as needed for Pain.    loratadine (CLARITIN) 10 mg tablet Take 10 mg by mouth once daily.    megestroL (MEGACE) 400 mg/10 mL (40 mg/mL) Susp Take 10 mLs (400 mg total) by mouth 2 (two) times daily.    OXYGEN-AIR DELIVERY SYSTEMS MISC   oxygen, See Instructions, home concentrator and portable @ 2L per nasal cannula continuous to keep SATS at 88% or greater J18.9, C34.9, J96.9, R09.02, # 1 EA, 0 Refill(s)    predniSONE (DELTASONE) 10 MG tablet Take 4 tablets by mouth once a day for 2 days, then take 3 tablets by mouth for 2 days, then take 2 tablets by mouth for 2 days, then 1 tablet by mouth for 2 days then 0.5 tablet by mouth for 2 days.     Family History    None       Tobacco Use    Smoking status: Every Day     Packs/day: 0.25     Years: 49.00     Pack years: 12.25     Types: Cigarettes     Last attempt to quit: 2022     Years since quittin.2    Smokeless tobacco: Never   Substance and Sexual Activity    Alcohol use: Never    Drug use: Never    Sexual activity: Yes     Review of Systems   Constitutional: Negative.   Cardiovascular: Negative.    Respiratory:  Positive for shortness of breath.    Objective:     Vital Signs (Most Recent):  Temp: 97 °F (36.1 °C) (10/14/22 0715)  Pulse: (!) 111 (10/14/22 0900)  Resp: (!) 31 (10/14/22 09)  BP: (!) 132/92 (10/14/22 0900)  SpO2: 100 % (10/14/22 0900)   Vital Signs (24h Range):  Temp:  [96.6 °F (35.9 °C)-97.8 °F (36.6 °C)] 97 °F (36.1 °C)  Pulse:  [] 111  Resp:  [12-35] 31  SpO2:  [93 %-100 %] 100 %  BP: ()/(66-98) 132/92     Weight: 76.3 kg (168 lb 3.4 oz)  Body mass index is  27.15 kg/m².    SpO2: 100 %  O2 Device (Oxygen Therapy): nasal cannula      Intake/Output Summary (Last 24 hours) at 10/14/2022 1138  Last data filed at 10/14/2022 0800  Gross per 24 hour   Intake 650 ml   Output 700 ml   Net -50 ml       Lines/Drains/Airways       Central Venous Catheter Line  Duration             Port A Cath Single Lumen right subclavian -- days              Peripheral Intravenous Line  Duration                  Peripheral IV - Single Lumen 10/12/22 2040 22 G Anterior;Left Wrist 1 day         Peripheral IV - Single Lumen 10/14/22 0300 20 G Right Antecubital <1 day                    Physical Exam  Constitutional:       Appearance: She is ill-appearing.   HENT:      Head: Normocephalic.      Nose: Nose normal.      Mouth/Throat:      Mouth: Mucous membranes are moist.   Eyes:      Pupils: Pupils are equal, round, and reactive to light.   Cardiovascular:      Rate and Rhythm: Tachycardia present. Rhythm irregular.   Pulmonary:      Breath sounds: Wheezing present.   Abdominal:      General: Abdomen is flat.   Musculoskeletal:      Cervical back: Normal range of motion.   Skin:     General: Skin is warm.   Neurological:      General: No focal deficit present.      Mental Status: She is alert.   Psychiatric:         Mood and Affect: Mood normal.        Significant Labs: CMP   Recent Labs   Lab 10/13/22  0306 10/14/22  0410    136   K 4.4 5.0   CO2 25 30   BUN 17.0 19.0   CREATININE 0.82 0.92   CALCIUM 9.4 9.6   ALBUMIN 1.6* 1.7*   BILITOT  --  0.4   ALKPHOS  --  160*   AST  --  13   ALT  --  11    and CBC   Recent Labs   Lab 10/13/22  0306 10/14/22  0410   WBC 16.5* 10.8   HGB 11.0* 12.1   HCT 36.4* 40.6   * 486*       Significant Imaging:   Assessment and Plan:   pAF with RVR  Pneumonia  Lung Ca, metastatic  COPD  Anemia (unclear source) s/p 2 units of prbc's during this admission    Plan:  Aspirin 81 mg po daily  Start Cardizem  mg po daily  Continue metoprolol bid  Wean dilt gtt  as tolerated  May not be an ideal candidate for NOAC given brain metastases and anemia    Thank you for your consult.     Joey Banda MD  Cardiology   [facility] - ICU

## 2022-10-14 NOTE — CONSULTS
[facility] - ICU  Hematology/Oncology  Consult Note    Patient Name: Adrienne Urbina  MRN: [id number]  Admission Date: 10/10/2022  Hospital Length of Stay: 4 days  Code Status: Full Code   Attending Provider: Joseluis Moseley MD  Consulting Provider: Lindsay Rudolph MD  Primary Care Physician: Gregorio Cherry NP  Principal Problem:Severe sepsis    Inpatient consult to Hematology/Oncology  Consult performed by: Lindsay Rudolph MD  Consult ordered by: Joseluis Moseley MD      Subjective:     HPI:      67 years old female with past medical history of stage IV lung cancer with metastasis to the brain, COPD, AFib, hypertension brought to the hospital via EMS and admitted with severe sepsis most likely due to pneumonia.     Patient had chemotherapy about 2 weeks ago last time.  In the past patient had brain metastasis treated but per family member they are very small at this time and there are not going to do anything with it other than continue current chemotherapy.      Noted to be anemic and transfused 2u PRBC.     According to the patient and her daughter she had been feeling a lot better since being admitted she was supposed to be seen ENT doctor but missed her appointment for biopsy and is not being rescheduled.  Discussed her metastatic disease, but patient would like to be continued with treatment for now and would not consider supportive care.  She would like to follow up as outpatient with her primary doctor.        Oncology Treatment Plan:   OP NSCLC CARBOPLATIN (AUC) PEMETREXED PEMBROLIZUMAB Q3W FOLLOWED BY MAINTENANCE PEMETREXED PEMBROLIZUMAB 200 MG Q3W      Poorly differentiated carcinoma of lung  - cT4 cN2 M1b, stage RICKIE  - 14 cm right upper lobe mass, invading mediastinum; mediastinal lymphadenopathy; right occipital brain metastasis   - EBUS 09/20/2020   - 5 mm right occipital hemorrhagic metastasis 11/06/2020. - s/p SRS to brain metastasis (2100 cGy; 1 fraction) (12/03/2020)   - s/p  chemoradiation therapy (12/2020-01/2021) for intrathoracic disease (oligometastatic disease), with positive response   - Followed by carbo/Alimta/Keytruda x4 for metastatic disease (solitary brain metastasis) with stable disease; questionable progression on CTs 08/2021)  - 08/29/2022: CTA chest (PE protocol): Large cavitary mass right upper lobe which appears worse than prior examination (involving essentially the entire right upper hemithorax; causing sharp truncation of the right upper lobe pulmonary artery; adenopathy right hilum and right paratracheal region; mass appears more prominent since 03/21/2022; small right-sided pleural effusion; small pericardial effusion   - Alimta/Keytruda maintenance started 09/09/2021  - pronounced asymmetric hypermetabolism of right vocal cord on restaging PET-CT 06/29/2022  - likely, progression post Alimta/Keytruda X 19 (on CTA chest, CT chest with contrast, restaging CTs C/A/P August-September ' 22)   (large cavitary mass right upper lung lobe, worse since 03/21/2022)                                Medications:  Continuous Infusions:   dilTIAZem 10 mg/hr (10/14/22 0457)     Scheduled Meds:   aspirin  81 mg Oral Daily    azithromycin  500 mg Intravenous Q24H    dicyclomine  10 mg Oral TID    diltiaZEM  360 mg Oral Daily    famotidine (PF)  20 mg Intravenous BID    gabapentin  300 mg Oral TID    guaiFENesin  600 mg Oral BID    levoFLOXacin  750 mg Intravenous Q24H    methylPREDNISolone sodium succinate injection  60 mg Intravenous Q12H    metoprolol tartrate  25 mg Oral BID    mirtazapine  15 mg Oral QHS    montelukast  10 mg Oral Daily    mupirocin   Nasal BID    polyethylene glycol  17 g Oral Daily     PRN Meds:sodium chloride, acetaminophen, ALPRAZolam, aluminum-magnesium hydroxide-simethicone, guaiFENesin-codeine 100-10 mg/5 ml, HYDROcodone-acetaminophen, naloxone, ondansetron, sodium chloride 0.9%, sodium chloride 0.9%     Review of patient's allergies indicates:  No Known  Allergies     Past Medical History:   Diagnosis Date    Anxiety and depression     Cancer     lung with brain metastasis    Cholelithiasis     COPD (chronic obstructive pulmonary disease)     Hypertension     Lumbar disc disease     PAD (peripheral artery disease)     Paroxysmal SVT (supraventricular tachycardia)     Pleural effusion      Past Surgical History:   Procedure Laterality Date    MEDIPORT INSERTION, DOUBLE      REPAIR OF CAROTID ARTERY Left 2022    Procedure: REPAIR, ARTERY, CAROTID;  Surgeon: Juan Luis Healy III, MD;  Location: [facility];  Service: General;  Laterality: Left;     Family History    None       Tobacco Use    Smoking status: Every Day     Packs/day: 0.25     Years: 49.00     Pack years: 12.25     Types: Cigarettes     Last attempt to quit: 2022     Years since quittin.2    Smokeless tobacco: Never   Substance and Sexual Activity    Alcohol use: Never    Drug use: Never    Sexual activity: Yes       Review of Systems   Constitutional:  Positive for activity change and fatigue.   HENT: Negative.     Respiratory:  Positive for cough and shortness of breath.    Cardiovascular: Negative.    Gastrointestinal: Negative.    Genitourinary: Negative.    Skin: Negative.    Neurological: Negative.    Hematological: Negative.    Objective:     Vital Signs (Most Recent):  Temp: 97.5 °F (36.4 °C) (10/14/22 1145)  Pulse: 98 (10/14/22 1215)  Resp: (!) 25 (10/14/22 1215)  BP: 115/63 (10/14/22 1200)  SpO2: 97 % (10/14/22 1311)   Vital Signs (24h Range):  Temp:  [96.6 °F (35.9 °C)-97.8 °F (36.6 °C)] 97.5 °F (36.4 °C)  Pulse:  [] 98  Resp:  [12-35] 25  SpO2:  [87 %-100 %] 97 %  BP: ()/(62-98) 115/63     Weight: 76.3 kg (168 lb 3.4 oz)  Body mass index is 27.15 kg/m².  Body surface area is 1.88 meters squared.      Intake/Output Summary (Last 24 hours) at 10/14/2022 1341  Last data filed at 10/14/2022 1255  Gross per 24 hour   Intake 890 ml   Output 700 ml   Net 190 ml       Physical  Exam  Constitutional:       Appearance: She is ill-appearing.   HENT:      Head: Normocephalic.      Mouth/Throat:      Mouth: Mucous membranes are moist.   Eyes:      Extraocular Movements: Extraocular movements intact.   Cardiovascular:      Rate and Rhythm: Normal rate.   Pulmonary:      Breath sounds: Wheezing and rhonchi present.      Comments: Dec breath sounds  on the right   Abdominal:      Palpations: Abdomen is soft.   Musculoskeletal:         General: No swelling.   Skin:     General: Skin is warm.   Neurological:      Mental Status: She is alert and oriented to person, place, and time.   Psychiatric:         Mood and Affect: Mood normal.       Significant Labs:   CBC:   Recent Labs   Lab 10/13/22  0306 10/14/22  0410   WBC 16.5* 10.8   HGB 11.0* 12.1   HCT 36.4* 40.6   * 486*    and CMP:   Recent Labs   Lab 10/13/22  0306 10/14/22  0410    136   K 4.4 5.0   CO2 25 30   BUN 17.0 19.0   CREATININE 0.82 0.92   CALCIUM 9.4 9.6   ALBUMIN 1.6* 1.7*   BILITOT  --  0.4   ALKPHOS  --  160*   AST  --  13   ALT  --  11       Diagnostic Results:  I have reviewed all pertinent imaging results/findings within the past 24 hours.    Assessment/Plan:     Active Diagnoses:    Diagnosis Date Noted POA    PRINCIPAL PROBLEM:  Severe sepsis [A41.9, R65.20] 10/10/2022 Yes    Cavitary lesion of lung [J98.4] 10/12/2022 Yes     Chronic    Anxiety and depression [F41.9, F32.A]  Yes     Chronic    Acute on chronic respiratory failure [J96.20] 10/10/2022 Yes    Pneumonia due to infectious organism [J18.9] 10/10/2022 Yes    COPD (chronic obstructive pulmonary disease) [J44.9] 10/10/2022 Yes     Chronic    Chronic a-fib [I48.20] 10/10/2022 Yes     Chronic    Symptomatic anemia [D64.9] 06/22/2022 Yes    Brain metastases [C79.31] 05/12/2022 Yes     Chronic    Stage IV adenocarcinoma of lung [C34.90] 03/22/2022 Yes     Chronic      Problems Resolved During this Admission:       Poorly differentiated carcinoma of lung  - cT4  cN2 M1b, stage RICKIE  - s/p SRS to brain metastasis (2100 cGy; 1 fraction) (12/03/2020)  - s/p chemoradiation therapy (12/2020-01/2021)   - s/p carbo/Alimta/Keytruda x4 for metastatic disease (solitary brain metastasis) with stable disease; questionable progression on CTs 08/2021)   - Alimta/Keytruda maintenance started 09/09/2021  - pronounced asymmetric hypermetabolism of right vocal cord on restaging PET-CT 06/29/2022  - 08/29/2022: CTA chest (PE protocol): Large cavitary mass right upper lobe which appears worse than prior examination (involving essentially the entire right upper hemithorax; causing sharp truncation of the right upper lobe pulmonary artery; adenopathy right hilum and right paratracheal region; mass appears more prominent since 03/21/2022; small right-sided pleural effusion; small pericardial effusion  - primary oncologist continuing with Alimta/Keytruda   - missed ENT appointment for evaluation of the vocal cord  - pt and daughter would still want to continue with management of cancer and dont want to consider supportive / palliative care          Iron deficiency anemia, FOBT positive:   (Anemia of chronic disease)  - Feraheme 510 mg IV x2 (10/26/2020-11/03/2020)  - 02/10/2021: Colonoscopy: A few ulcers in sigmoid colon; mild diverticulosis in sigmoid colon, without bleeding; internal hemorrhoids (no biopsies)  - 02/10/2021: EGD: 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  BCR-ABL 1 fusion transcripts negative.  ESR, CRP elevated.  Iron deficiency anemia.      PLAN:  - will recommend to continue with the treatment of respiratory failure and  pneumonia  - will hold on to any chemotherapy while being admitted  - will recommend to follow up with primary oncologist in a week on discharge     Thank you for your consult. I will sign off. Please contact us if you have any additional questions.    Lindsay Rudolph MD  Hematology/Oncology  [facility] - ICU

## 2022-10-14 NOTE — PT/OT/SLP PROGRESS
Physical Therapy Treatment    Patient Name:  Adrienne Urbina   MRN:  [id number]    Recommendations:     Discharge Recommendations:      Discharge Equipment Recommendations:     Barriers to discharge: None    Assessment:     Adrienne Urbina is a 67 y.o. female admitted with a medical diagnosis of Severe sepsis.  She presents with the following impairments/functional limitations:   .    Pt awake, agreeable to mobility. She required CGA to get from supine to sitting at EOB. She was able to stand with CGA and ambulated with B HHA and 3L O2 x 80'. Pt was a little fatigued but all vitals were WNL. Pt left up in chair, reviewed exercises to perform as tolerated throughout the day.    Rehab Prognosis: Good; patient would benefit from acute skilled PT services to address these deficits and reach maximum level of function.    Recent Surgery: * No surgery found *      Plan:     During this hospitalization, patient to be seen daily to address the identified rehab impairments via therapeutic activities, therapeutic exercises, gait training and progress toward the following goals:    Plan of Care Expires:  11/09/22    Subjective     Chief Complaint: weakness  Patient/Family Comments/goals: to get stronger  Pain/Comfort:         Objective:     Communicated with patient prior to session.  Patient found HOB elevated with   upon PT entry to room.     General Precautions: Standard, fall   Orthopedic Precautions:    Braces:    Respiratory Status: Nasal cannula, flow 3 L/min     Functional Mobility:  Bed Mobility:     Supine to Sit: contact guard assistance  Transfers:     Sit to Stand:  contact guard assistance with hand-held assist  Gait: 80' with B HHA and CGA for balance  Balance: CGA in standing with B HHA      AM-PAC 6 CLICK MOBILITY          Therapeutic Activities and Exercises:   See mobility above    Patient left up in chair with all lines intact and call button in reach..    GOALS:   Multidisciplinary Problems       Physical Therapy  Goals          Problem: Physical Therapy    Goal Priority Disciplines Outcome Goal Variances Interventions   Physical Therapy Goal     PT, PT/OT Ongoing, Progressing     Description: Goals to be met by: 22     Patient will increase functional independence with mobility by performin. Supine to sit with Modified [place]  2. Sit to stand transfer with Modified [place]  3. Gait  x 150 feet with Modified [place] using No Assistive Device.                          Time Tracking:     PT Received On:  10/14/22  PT Start Time:   1022    PT Stop Time:  1042  PT Total Time (min):   20 min    Billable Minutes: Therapeutic Activity 20                   10/14/2022

## 2022-10-14 NOTE — PLAN OF CARE
Problem: Adult Inpatient Plan of Care  Goal: Plan of Care Review  Outcome: Ongoing, Progressing  Goal: Patient-Specific Goal (Individualized)  Outcome: Ongoing, Progressing  Goal: Absence of Hospital-Acquired Illness or Injury  Outcome: Ongoing, Progressing  Goal: Optimal Comfort and Wellbeing  Outcome: Ongoing, Progressing  Goal: Readiness for Transition of Care  Outcome: Ongoing, Progressing     Problem: Adjustment to Illness (Sepsis/Septic Shock)  Goal: Optimal Coping  Outcome: Ongoing, Progressing     Problem: Bleeding (Sepsis/Septic Shock)  Goal: Absence of Bleeding  Outcome: Ongoing, Progressing     Problem: Glycemic Control Impaired (Sepsis/Septic Shock)  Goal: Blood Glucose Level Within Desired Range  Outcome: Ongoing, Progressing     Problem: Infection Progression (Sepsis/Septic Shock)  Goal: Absence of Infection Signs and Symptoms  Outcome: Ongoing, Progressing     Problem: Nutrition Impaired (Sepsis/Septic Shock)  Goal: Optimal Nutrition Intake  Outcome: Ongoing, Progressing     Problem: Fluid Imbalance (Pneumonia)  Goal: Fluid Balance  Outcome: Ongoing, Progressing     Problem: Infection (Pneumonia)  Goal: Resolution of Infection Signs and Symptoms  Outcome: Ongoing, Progressing     Problem: Respiratory Compromise (Pneumonia)  Goal: Effective Oxygenation and Ventilation  Outcome: Ongoing, Progressing     Problem: Infection  Goal: Absence of Infection Signs and Symptoms  Outcome: Ongoing, Progressing     Problem: Skin Injury Risk Increased  Goal: Skin Health and Integrity  Outcome: Ongoing, Progressing     Problem: Balance Impairment (Functional Deficit)  Goal: Improved Balance and Postural Control  Outcome: Ongoing, Progressing     Problem: Muscle Strength Impairment (Functional Deficit)  Goal: Improved Muscle Strength  Outcome: Ongoing, Progressing     Problem: Fall Injury Risk  Goal: Absence of Fall and Fall-Related Injury  Outcome: Ongoing, Progressing

## 2022-10-15 NOTE — PROGRESS NOTES
[facility] Medicine Progress Note    Patient Name: Adrienne Urbina  Age: 67 y.o.   Code Status: Full Code  MRN: [id number]  Admission Date: 10/10/2022 12:44 PM   Patient Class: IP- Inpatient  Hospital Length of Stay: 5 days  Attending Provider: Joseluis Moseley MD  Primary Care Provider: Gregorio Cherry NP   Chief Complaint:   Chief Complaint   Patient presents with    Shortness of Breath     Brought per [facility] for SOB since this am. Hx of stage 3 lung CA    weakness , SOB and low oxygen            SUBJECTIVE:     Follow-up:  Severe sepsis    HPI:  67 years old  female past medical history of stage IV lung cancer with metastasis to the brain, chronic hypoxic respiratory failure due to COPD, AFib, hypertension was brought to the hospital via EMS.    Patient herself is a poor historian, she is weak she has fatigue she is tired and her daughter provides majority of the information.    Patient had chemotherapy about 2 weeks ago last time.  She follows up with [facility].  In the past patient had brain metastasis treated but per family member they are very small at this time and there are not going to do anything with it other than continue current chemotherapy   Patient at home uses 2 L of oxygen during the night only, she uses nebulized treatment, she is on long term prednisone for her COPD.    Patient follows up with Cardiology as well for AFib.  She is on aspirin as a blood thinner and metoprolol but due to her low blood pressure she has not been able to take it.    On arrival to emergency room noticed to be tachycardic, hypoxic, tachypneic and low blood pressure.  Her blood work shows anemia.  Patient has been coughing greenish and sometime she has blood in the sputum.    Admitted to the hospital as an inpatient with severe sepsis most likely due to compressive pneumonia.     Hospital Coarse:  10/11  Feels better today. Hasnt been OOB. Dyspnea improved. Cough remains  productive. Sputum sent for Cx. On Levaquin. Hgb 7.0, 2u PRBC ordered. K+ 3.4, replacing.     10/12  Transfused 2u PRBC yest, Hgb 9.7 from 7.0. Cr 1.04 from 0.88 and 0.81. Alb 1.7. States she feels slightly better today. Still with productive cough. Got OOB and sat in recliner about 2h today. States decent PO intake. Denies any new complaints. Had a very long discussion with pt and her daughters in room about poor overall prognosis and end-of-life issues/planning. Pt appears receptive to idea of hospice but daughters are visibly uncomfortable addressing likelihood of pt deteriorating in very near future and avoid even discussing hospice at this time. Pt became very tachycardic today with -180's. Lopressor 5mg IV minimally effective. Digoxin 0.125mg IV ineffective. Cardizem 10mg IV ordered currently and awaiting response.     10/13  HR controlled in ICU. Feels well today and breathing comfortably. Followed up on EOL wishes/care discussion from yesterday and asked her thoughts and her family's thoughts. She is still unsure but does state she wishes to die at home. She called her daughter and we spoke to her on speakerphone. She expresses an overall tone of denial pertaining to her mothers advancing terminal condition and avoidant of any talk or plans other than aggressive medical treatment. There is a palpable feeling of reluctant submissiveness by the patient to her daughter. Code status was later discussed alone with pt and she states that she wishes to remain FULL CODE. Thought pts condition is improved somewhat from presentation, it is felt that pt very unlikely to improve much more significantly from current status. If she is able to progress enough to leave hospital and cont to choose continued aggressive medical interventions, her likelihood of readmission soon afterwards is very high.   - WBC 16.5 cont to trend upwards despite improvement in CRP of 106.7 from >240. Chem panel rather normal x Albumin 1.6.  SputumCx prelim MANY GNR's; currently on Levaquin/Azithro. CXR today unchanged.    10/14  No new complaints. Feels the same. On Cardizem gtt higher rate. Seen by Cardiology and transitioning to PO Cardizem and Increasing Metoprolol. Spoke with pts daughters at length about current condition, prognosis and EOL issues. Their expectations are for pt to go home after PNA tx'd. Will contact pts Oncologist,  in [place].    10/15  No new complaints. Walked with PT. Still issues with tachycardia, currently in Aflutter. Back on Cardizem gtt. Responded to Lopressor 5mg IV x1. HR last around 100. WBC improving. K+ 5.3 and slowly trending up past couple days. Lokelma given today. Cont on Meropenem for Serratia marcescens on sputum Cx.         Scheduled Meds:   aspirin  81 mg Oral Daily    dicyclomine  10 mg Oral TID    diltiaZEM  360 mg Oral Daily    famotidine (PF)  20 mg Intravenous BID    gabapentin  300 mg Oral TID    guaiFENesin  600 mg Oral BID    meropenem (MERREM) IVPB  1 g Intravenous Q8H    methylPREDNISolone sodium succinate injection  60 mg Intravenous Q12H    metoprolol        metoprolol tartrate  100 mg Oral BID    mirtazapine  15 mg Oral QHS    montelukast  10 mg Oral Daily    mupirocin   Nasal BID    polyethylene glycol  17 g Oral Daily    sodium zirconium cyclosilicate  5 g Oral Once     Continuous Infusions:   dilTIAZem 15 mg/hr (10/15/22 1102)    dilTIAZem 15 mg/hr (10/15/22 1145)     PRN Meds:   sodium chloride    acetaminophen    ALPRAZolam    aluminum-magnesium hydroxide-simethicone    guaiFENesin-codeine 100-10 mg/5 ml    HYDROcodone-acetaminophen    naloxone    ondansetron    sodium chloride 0.9%    sodium chloride 0.9%      Lines/Drains:   Lines/Drains/Airways       Central Venous Catheter Line  Duration             Port A Cath Single Lumen right subclavian -- days             Allergies as of 10/10/2022    (No Known Allergies)       Review of Systems: 10 systems reviewed all pertinent  positives and negatives stated in HPI, otherwise negative.       OBJECTIVE:     Vital Signs (Most Recent)  Temp: 98.4 °F (36.9 °C) (10/15/22 1501)  Pulse: 86 (10/15/22 1615)  Resp: (!) 23 (10/15/22 1615)  BP: 112/66 (10/15/22 1615)  SpO2: 100 % (10/15/22 1615)    Vital Signs Range (Last 24H):  Temp:  [96.8 °F (36 °C)-98.6 °F (37 °C)]   Pulse:  []   Resp:  [6-28]   BP: ()/(44-94)   SpO2:  [88 %-100 %]     I & O (Last 24H):  Intake/Output Summary (Last 24 hours) at 10/15/2022 1640  Last data filed at 10/15/2022 0615  Gross per 24 hour   Intake 1110 ml   Output 950 ml   Net 160 ml       Physical Exam  Constitutional:       General: She is not in acute distress.     Appearance: She is ill-appearing. She is not toxic-appearing.   HENT:      Head: Normocephalic and atraumatic.      Nose: Nose normal.      Mouth/Throat:      Mouth: Mucous membranes are moist.      Pharynx: Oropharynx is clear.   Eyes:      Extraocular Movements: Extraocular movements intact.      Conjunctiva/sclera: Conjunctivae normal.      Pupils: Pupils are equal, round, and reactive to light.   Cardiovascular:      Rate and Rhythm: Regular rhythm. Tachycardia present.      Heart sounds: No murmur heard.  Pulmonary:      Effort: Pulmonary effort is normal. No respiratory distress.      Breath sounds: Rhonchi and rales present. No wheezing.   Abdominal:      General: Bowel sounds are normal.      Palpations: Abdomen is soft.      Tenderness: There is no abdominal tenderness.   Musculoskeletal:         General: Normal range of motion.      Cervical back: Normal range of motion.      Right lower leg: No edema.      Left lower leg: No edema.   Skin:     General: Skin is warm and dry.   Neurological:      General: No focal deficit present.      Mental Status: She is alert and oriented to person, place, and time. Mental status is at baseline.   Psychiatric:         Mood and Affect: Mood normal.         Behavior: Behavior normal.         Thought  Content: Thought content normal.         Judgment: Judgment normal.        Recent Labs   Lab 10/13/22  0306 10/14/22  0410 10/15/22  0432   WBC 16.5* 10.8 8.8   HGB 11.0* 12.1 12.0   HCT 36.4* 40.6 39.0   * 486* 528*   MCV 94.5* 96.7* 94.2*        Recent Labs   Lab 10/14/22  0410 10/15/22  0432    138   K 5.0 5.3*   CHLORIDE 97* 98   CO2 30 30   BUN 19.0 24.0*   CREATININE 0.92 0.80   GLUCOSE 125* 177*   MG  --  1.70   PHOS  --  2.4   CALCIUM 9.6 9.2   ALBUMIN 1.7* 1.7*   BILITOT 0.4  --    ALKPHOS 160*  --    ALT 11  --    AST 13  --    CRP  --  40.00*   BNP  --  991.6*           ASSESSMENT/PLAN:       Severe sepsis    Acute on chronic respiratory failure    Pneumonia due to infectious organism    Stage IV adenocarcinoma of lung    Brain metastases    Cavitary lesion of lung    Symptomatic anemia    COPD (chronic obstructive pulmonary disease)    Chronic a-fib    Anxiety and depression      - ICU care  - Cardizem gtt IV  - Cardiology recs  - PO Metoprolol  - s/p 2u PRBC  - am labs  - Levaquin/Azithro stopped  - Meropenem started  - sputum CX grew Serratia marcescens          VTE Risk Mitigation (From admission, onward)           Ordered     Reason for no Mechanical VTE Prophylaxis  Once        Question:  Reasons:  Answer:  Active Bleeding    10/10/22 1555     Place ASHLYN hose  Until discontinued         10/10/22 1555     IP VTE HIGH RISK PATIENT  Once         10/10/22 1555     Place sequential compression device  Until discontinued         10/10/22 1555     Place sequential compression device  Until discontinued         10/10/22 1505                           Joseluis Moseley MD  Hospital Medicine [facility]

## 2022-10-15 NOTE — PT/OT/SLP PROGRESS
Physical Therapy Treatment    Patient Name:  Adrienne Urbina   MRN:  [id number]    Recommendations:     Discharge Recommendations:      Discharge Equipment Recommendations:     Barriers to discharge: None    Assessment:     Adrienne Urbina is a 67 y.o. female admitted with a medical diagnosis of Severe sepsis.  She presents with the following impairments/functional limitations:  weakness, impaired balance, impaired functional mobility, impaired endurance.    Pt tolerated tx well as she was able to increase her ambulation distance up to 100' while requiring HHA x 2 while on 3L of O2. Pt noted mild fatigue, but maintained good vitals throughout tx. Pt left sitting upright in chair upon completion of tx.    Rehab Prognosis: Good; patient would benefit from acute skilled PT services to address these deficits and reach maximum level of function.    Recent Surgery: * No surgery found *      Plan:     During this hospitalization, patient to be seen daily to address the identified rehab impairments via gait training, therapeutic activities, therapeutic exercises and progress toward the following goals:    Plan of Care Expires:  11/09/22    Subjective     Chief Complaint: weakness  Patient/Family Comments/goals: to get stronger  Pain/Comfort:  Pain Rating 1: 0/10      Objective:     Communicated with patient and nursing prior to session.  Patient found HOB elevated with telemetry, Reji upon PT entry to room.     General Precautions: Standard, fall   Orthopedic Precautions:    Braces:    Respiratory Status: Nasal cannula, flow 3 L/min     Functional Mobility:  Bed Mobility:     Supine to Sit: contact guard assistance  Transfers:     Sit to Stand:  contact guard assistance with hand-held assist  Gait: 100' x HHA x 2   Balance: CGA with standing balance      AM-PAC 6 CLICK MOBILITY          Therapeutic Activities and Exercises:   See above.     Patient left up in chair with all lines intact and her family present..    GOALS:    Multidisciplinary Problems       Physical Therapy Goals          Problem: Physical Therapy    Goal Priority Disciplines Outcome Goal Variances Interventions   Physical Therapy Goal     PT, PT/OT Ongoing, Progressing     Description: Goals to be met by: 22     Patient will increase functional independence with mobility by performin. Supine to sit with Modified [place]  2. Sit to stand transfer with Modified [place]  3. Gait  x 150 feet with Modified [place] using No Assistive Device.                          Time Tracking:     PT Received On: 10/15/22  PT Start Time: 0900     PT Stop Time: 0915  PT Total Time (min): 15 min     Billable Minutes: Therapeutic Activity 15 mins    Treatment Type: Treatment  PT/PTA: PT           10/15/2022

## 2022-10-16 NOTE — PT/OT/SLP PROGRESS
Physical Therapy Treatment    Patient Name:  Adrienne Urbina   MRN:  [id number]    Recommendations:     Discharge Recommendations:      Discharge Equipment Recommendations:     Barriers to discharge: None    Assessment:     Adrienne Urbina is a 67 y.o. female admitted with a medical diagnosis of Severe sepsis.  She presents with the following impairments/functional limitations:  weakness, impaired functional mobility, impaired balance, impaired endurance.    Pt tolerated tx well as she was able to walk a little bit farther in the hallway increasing her ambulation distance up to 120 ft while requiring HHA X 2 with CGA on 4L of O2. Pt noted some fatigue upon completion of tx and did vomit once she sat back down in the chair.     Rehab Prognosis: Good; patient would benefit from acute skilled PT services to address these deficits and reach maximum level of function.    Recent Surgery: * No surgery found *      Plan:     During this hospitalization, patient to be seen daily to address the identified rehab impairments via gait training, therapeutic activities, therapeutic exercises and progress toward the following goals:    Plan of Care Expires:  11/09/22    Subjective     Chief Complaint: weakness  Patient/Family Comments/goals: to get stronger   Pain/Comfort:  Pain Rating 1: 0/10      Objective:     Communicated with patient and nursing prior to session.  Patient found HOB elevated with telemetry, PureWick, oxygen upon PT entry to room.     General Precautions: Standard, fall   Orthopedic Precautions:    Braces:    Respiratory Status: Nasal cannula, flow 4 L/min     Functional Mobility:  Bed Mobility:     Supine to Sit: stand by assistance  Transfers:     Sit to Stand:  contact guard assistance with hand-held assist  Gait: 120' x HHA with CGA  Balance: Standing x CGA.       AM-PAC 6 CLICK MOBILITY          Therapeutic Activities and Exercises:   See above.     Patient left up in chair with all lines intact..    GOALS:    Multidisciplinary Problems       Physical Therapy Goals          Problem: Physical Therapy    Goal Priority Disciplines Outcome Goal Variances Interventions   Physical Therapy Goal     PT, PT/OT Ongoing, Progressing     Description: Goals to be met by: 22     Patient will increase functional independence with mobility by performin. Supine to sit with Modified [place]  2. Sit to stand transfer with Modified [place]  3. Gait  x 150 feet with Modified [place] using No Assistive Device.                          Time Tracking:     PT Received On: 10/16/22  PT Start Time: 820     PT Stop Time: 835  PT Total Time (min): 15 min     Billable Minutes: Therapeutic Activity 15 mins    Treatment Type: Treatment  PT/PTA: PT           10/16/2022

## 2022-10-17 PROBLEM — R79.89 ELEVATED LFTS: Status: RESOLVED | Noted: 2022-01-01 | Resolved: 2022-01-01

## 2022-10-17 PROBLEM — D75.838 REACTIVE THROMBOCYTOSIS: Status: RESOLVED | Noted: 2022-01-01 | Resolved: 2022-01-01

## 2022-10-17 PROBLEM — R07.9 CHEST PAIN: Status: RESOLVED | Noted: 2022-01-01 | Resolved: 2022-01-01

## 2022-10-17 PROBLEM — J18.9 PNEUMONIA DUE TO INFECTIOUS ORGANISM: Status: RESOLVED | Noted: 2022-01-01 | Resolved: 2022-01-01

## 2022-10-17 PROBLEM — K83.8 COMMON BILE DUCT DILATATION: Status: RESOLVED | Noted: 2022-01-01 | Resolved: 2022-01-01

## 2022-10-17 PROBLEM — A41.9 SEVERE SEPSIS: Status: RESOLVED | Noted: 2022-01-01 | Resolved: 2022-01-01

## 2022-10-17 PROBLEM — K80.50 CHOLEDOCHOLITHIASIS: Status: RESOLVED | Noted: 2022-01-01 | Resolved: 2022-01-01

## 2022-10-17 PROBLEM — J96.20 ACUTE ON CHRONIC RESPIRATORY FAILURE: Status: RESOLVED | Noted: 2022-01-01 | Resolved: 2022-01-01

## 2022-10-17 PROBLEM — I48.0 PAF (PAROXYSMAL ATRIAL FIBRILLATION): Chronic | Status: ACTIVE | Noted: 2022-01-01

## 2022-10-17 PROBLEM — C79.31 BRAIN METASTASES: Chronic | Status: RESOLVED | Noted: 2022-01-01 | Resolved: 2022-01-01

## 2022-10-17 PROBLEM — I48.0 PAF (PAROXYSMAL ATRIAL FIBRILLATION): Status: ACTIVE | Noted: 2022-01-01

## 2022-10-17 PROBLEM — M54.9 BACK PAIN: Status: RESOLVED | Noted: 2022-01-01 | Resolved: 2022-01-01

## 2022-10-17 PROBLEM — D64.9 SYMPTOMATIC ANEMIA: Status: RESOLVED | Noted: 2022-01-01 | Resolved: 2022-01-01

## 2022-10-17 PROBLEM — R65.20 SEVERE SEPSIS: Status: RESOLVED | Noted: 2022-01-01 | Resolved: 2022-01-01

## 2022-10-17 NOTE — PLAN OF CARE
Pt has an O2 concentrator at home from [facility] [place] and now needs continuous O2 for d/c.  Order sent to [facility] [place].  They will bring a portable O2 tank to room for d/c home.

## 2022-10-17 NOTE — PROGRESS NOTES
[facility] Medicine Progress Note    Patient Name: Adrienne Urbina  Age: 67 y.o.   Code Status: Full Code  MRN: [id number]  Admission Date: 10/10/2022 12:44 PM   Patient Class: IP- Inpatient  Hospital Length of Stay: 6 days  Attending Provider: Joseluis Moseley MD  Primary Care Provider: Gregorio Cherry NP   Chief Complaint:   Chief Complaint   Patient presents with    Shortness of Breath     Brought per [facility] for SOB since this am. Hx of stage 3 lung CA    weakness , SOB and low oxygen            SUBJECTIVE:     Follow-up:  Severe sepsis    HPI:  67 years old  female past medical history of stage IV lung cancer with metastasis to the brain, chronic hypoxic respiratory failure due to COPD, AFib, hypertension was brought to the hospital via EMS.    Patient herself is a poor historian, she is weak she has fatigue she is tired and her daughter provides majority of the information.    Patient had chemotherapy about 2 weeks ago last time.  She follows up with [facility].  In the past patient had brain metastasis treated but per family member they are very small at this time and there are not going to do anything with it other than continue current chemotherapy   Patient at home uses 2 L of oxygen during the night only, she uses nebulized treatment, she is on long term prednisone for her COPD.    Patient follows up with Cardiology as well for AFib.  She is on aspirin as a blood thinner and metoprolol but due to her low blood pressure she has not been able to take it.    On arrival to emergency room noticed to be tachycardic, hypoxic, tachypneic and low blood pressure.  Her blood work shows anemia.  Patient has been coughing greenish and sometime she has blood in the sputum.    Admitted to the hospital as an inpatient with severe sepsis most likely due to compressive pneumonia.     Hospital Coarse:  10/11  Feels better today. Hasnt been OOB. Dyspnea improved. Cough remains  productive. Sputum sent for Cx. On Levaquin. Hgb 7.0, 2u PRBC ordered. K+ 3.4, replacing.     10/12  Transfused 2u PRBC yest, Hgb 9.7 from 7.0. Cr 1.04 from 0.88 and 0.81. Alb 1.7. States she feels slightly better today. Still with productive cough. Got OOB and sat in recliner about 2h today. States decent PO intake. Denies any new complaints. Had a very long discussion with pt and her daughters in room about poor overall prognosis and end-of-life issues/planning. Pt appears receptive to idea of hospice but daughters are visibly uncomfortable addressing likelihood of pt deteriorating in very near future and avoid even discussing hospice at this time. Pt became very tachycardic today with -180's. Lopressor 5mg IV minimally effective. Digoxin 0.125mg IV ineffective. Cardizem 10mg IV ordered currently and awaiting response.     10/13  HR controlled in ICU. Feels well today and breathing comfortably. Followed up on EOL wishes/care discussion from yesterday and asked her thoughts and her family's thoughts. She is still unsure but does state she wishes to die at home. She called her daughter and we spoke to her on speakerphone. She expresses an overall tone of denial pertaining to her mothers advancing terminal condition and avoidant of any talk or plans other than aggressive medical treatment. There is a palpable feeling of reluctant submissiveness by the patient to her daughter. Code status was later discussed alone with pt and she states that she wishes to remain FULL CODE. Thought pts condition is improved somewhat from presentation, it is felt that pt very unlikely to improve much more significantly from current status. If she is able to progress enough to leave hospital and cont to choose continued aggressive medical interventions, her likelihood of readmission soon afterwards is very high.   - WBC 16.5 cont to trend upwards despite improvement in CRP of 106.7 from >240. Chem panel rather normal x Albumin 1.6.  SputumCx prelim MANY GNR's; currently on Levaquin/Azithro. CXR today unchanged.    10/14  No new complaints. Feels the same. On Cardizem gtt higher rate. Seen by Cardiology and transitioning to PO Cardizem and Increasing Metoprolol. Spoke with pts daughters at length about current condition, prognosis and EOL issues. Their expectations are for pt to go home after PNA tx'd. Will contact pts Oncologist,  in [place].    10/15  No new complaints. Walked with PT. Still issues with tachycardia, currently in Aflutter. Back on Cardizem gtt. Responded to Lopressor 5mg IV x1. HR last around 100. WBC improving. K+ 5.3 and slowly trending up past couple days. Lokelma given today. Cont on Meropenem for Serratia marcescens on sputum Cx.    10/16  Feels well today. No current complaints. Pt converted to NSR with controlled HR in 80's overnight after Lopressor PO incr from 25mg to 100mg BID. Taken off Cardizem gtt. Has remained stable since. She will be downgraded fre ICU and transferred to the floor. Labs stable if not very slightly improved. Daughter updated when visiting pt.         Scheduled Meds:   aspirin  81 mg Oral Daily    dicyclomine  10 mg Oral TID    diltiaZEM  360 mg Oral Daily    famotidine (PF)  20 mg Intravenous BID    gabapentin  300 mg Oral TID    guaiFENesin  600 mg Oral BID    meropenem (MERREM) IVPB  1 g Intravenous Q8H    methylPREDNISolone sodium succinate injection  60 mg Intravenous Q12H    metoprolol tartrate  100 mg Oral BID    mirtazapine  15 mg Oral QHS    montelukast  10 mg Oral Daily    polyethylene glycol  17 g Oral Daily    sodium zirconium cyclosilicate  5 g Oral Daily     Continuous Infusions:   dilTIAZem 15 mg/hr (10/15/22 1102)    dilTIAZem 15 mg/hr (10/15/22 1820)     PRN Meds:   sodium chloride    acetaminophen    ALPRAZolam    aluminum-magnesium hydroxide-simethicone    guaiFENesin-codeine 100-10 mg/5 ml    HYDROcodone-acetaminophen    naloxone    ondansetron    sodium chloride  0.9%    sodium chloride 0.9%      Lines/Drains:   Lines/Drains/Airways       Central Venous Catheter Line  Duration             Port A Cath Single Lumen right subclavian -- days                      Allergies as of 10/10/2022    (No Known Allergies)         Review of Systems: 10 systems reviewed all pertinent positives and negatives stated in HPI, otherwise negative.       OBJECTIVE:     Vital Signs (Most Recent)  Temp: 98.1 °F (36.7 °C) (10/16/22 2135)  Pulse: 76 (10/16/22 2135)  Resp: (!) 22 (10/16/22 2135)  BP: 133/85 (10/16/22 2135)  SpO2: 97 % (10/16/22 2135)    Vital Signs Range (Last 24H):  Temp:  [96.6 °F (35.9 °C)-98.9 °F (37.2 °C)]   Pulse:  [64-87]   Resp:  [16-30]   BP: (104-142)/(57-93)   SpO2:  [97 %-100 %]     I & O (Last 24H):  Intake/Output Summary (Last 24 hours) at 10/16/2022 2303  Last data filed at 10/16/2022 2146  Gross per 24 hour   Intake 662.7 ml   Output 1400 ml   Net -737.3 ml         Physical Exam  Constitutional:       General: She is not in acute distress.     Appearance: Normal appearance.   HENT:      Head: Normocephalic and atraumatic.      Nose: Nose normal.      Mouth/Throat:      Mouth: Mucous membranes are moist.      Pharynx: Oropharynx is clear.   Eyes:      Pupils: Pupils are equal, round, and reactive to light.   Cardiovascular:      Rate and Rhythm: Normal rate and regular rhythm.      Heart sounds: No murmur heard.  Pulmonary:      Effort: Pulmonary effort is normal.      Breath sounds: Rhonchi present.   Abdominal:      General: Bowel sounds are normal.      Palpations: Abdomen is soft.      Tenderness: There is no abdominal tenderness.   Musculoskeletal:         General: Normal range of motion.      Cervical back: Normal range of motion.   Skin:     General: Skin is dry.   Neurological:      General: No focal deficit present.      Mental Status: She is alert. Mental status is at baseline.   Psychiatric:         Mood and Affect: Mood normal.         Thought Content:  Thought content normal.         Judgment: Judgment normal.        Recent Labs   Lab 10/14/22  0410 10/15/22  0432 10/16/22  0338   WBC 10.8 8.8 11.5   HGB 12.1 12.0 11.5*   HCT 40.6 39.0 39.1   * 528* 550*   MCV 96.7* 94.2* 96.8*          Recent Labs   Lab 10/15/22  0432 10/16/22  0339    136   K 5.3* 5.1   CHLORIDE 98 99   CO2 30 29   BUN 24.0* 27.0*   CREATININE 0.80 0.76   GLUCOSE 177* 259*   MG 1.70 1.60   PHOS 2.4 2.3   CALCIUM 9.2 8.8   ALBUMIN 1.7* 2.0*   CRP 40.00*  --    .6*  --              ASSESSMENT/PLAN:       Severe sepsis    Acute on chronic respiratory failure    Pneumonia due to infectious organism    Stage IV adenocarcinoma of lung    Brain metastases    Cavitary lesion of lung    Symptomatic anemia    COPD (chronic obstructive pulmonary disease)    Chronic a-fib    Anxiety and depression      - off Cardizem gtt  - NSR controlled rate  - move to floor  - cont current  - am labs      VTE Risk Mitigation (From admission, onward)           Ordered     Reason for no Mechanical VTE Prophylaxis  Once        Question:  Reasons:  Answer:  Active Bleeding    10/10/22 1555     Place ASHLYN hose  Until discontinued         10/10/22 1555     IP VTE HIGH RISK PATIENT  Once         10/10/22 1555     Place sequential compression device  Until discontinued         10/10/22 1555     Place sequential compression device  Until discontinued         10/10/22 1505                           Joseluis Moseley MD  [facility] Medicine   [facility]

## 2022-10-17 NOTE — PROGRESS NOTES
[facility] Medicine Progress Note    Patient Name: Adrienne Urbina  Age: 67 y.o.   Code Status: Full Code  MRN: [id number]  Admission Date: 10/10/2022 12:44 PM   Patient Class: IP- Inpatient  Hospital Length of Stay: 6 days  Attending Provider: Joseluis Moseley MD  Primary Care Provider: Gregorio Cherry NP   Chief Complaint:   Chief Complaint   Patient presents with    Shortness of Breath     Brought per [facility] for SOB since this am. Hx of stage 3 lung CA    weakness , SOB and low oxygen            SUBJECTIVE:     Follow-up:  Severe sepsis    HPI:  67 years old  female past medical history of stage IV lung cancer with metastasis to the brain, chronic hypoxic respiratory failure due to COPD, AFib, hypertension was brought to the hospital via EMS.    Patient herself is a poor historian, she is weak she has fatigue she is tired and her daughter provides majority of the information.    Patient had chemotherapy about 2 weeks ago last time.  She follows up with [facility].  In the past patient had brain metastasis treated but per family member they are very small at this time and there are not going to do anything with it other than continue current chemotherapy   Patient at home uses 2 L of oxygen during the night only, she uses nebulized treatment, she is on long term prednisone for her COPD.    Patient follows up with Cardiology as well for AFib.  She is on aspirin as a blood thinner and metoprolol but due to her low blood pressure she has not been able to take it.    On arrival to emergency room noticed to be tachycardic, hypoxic, tachypneic and low blood pressure.  Her blood work shows anemia.  Patient has been coughing greenish and sometime she has blood in the sputum.    Admitted to the hospital as an inpatient with severe sepsis most likely due to compressive pneumonia.     Hospital Coarse:  10/11  Feels better today. Hasnt been OOB. Dyspnea improved. Cough remains  productive. Sputum sent for Cx. On Levaquin. Hgb 7.0, 2u PRBC ordered. K+ 3.4, replacing.     10/12  Transfused 2u PRBC yest, Hgb 9.7 from 7.0. Cr 1.04 from 0.88 and 0.81. Alb 1.7. States she feels slightly better today. Still with productive cough. Got OOB and sat in recliner about 2h today. States decent PO intake. Denies any new complaints. Had a very long discussion with pt and her daughters in room about poor overall prognosis and end-of-life issues/planning. Pt appears receptive to idea of hospice but daughters are visibly uncomfortable addressing likelihood of pt deteriorating in very near future and avoid even discussing hospice at this time. Pt became very tachycardic today with -180's. Lopressor 5mg IV minimally effective. Digoxin 0.125mg IV ineffective. Cardizem 10mg IV ordered currently and awaiting response.     10/13  HR controlled in ICU. Feels well today and breathing comfortably. Followed up on EOL wishes/care discussion from yesterday and asked her thoughts and her family's thoughts. She is still unsure but does state she wishes to die at home. She called her daughter and we spoke to her on speakerphone. She expresses an overall tone of denial pertaining to her mothers advancing terminal condition and avoidant of any talk or plans other than aggressive medical treatment. There is a palpable feeling of reluctant submissiveness by the patient to her daughter. Code status was later discussed alone with pt and she states that she wishes to remain FULL CODE. Thought pts condition is improved somewhat from presentation, it is felt that pt very unlikely to improve much more significantly from current status. If she is able to progress enough to leave hospital and cont to choose continued aggressive medical interventions, her likelihood of readmission soon afterwards is very high.   - WBC 16.5 cont to trend upwards despite improvement in CRP of 106.7 from >240. Chem panel rather normal x Albumin 1.6.  SputumCx prelim MANY GNR's; currently on Levaquin/Azithro. CXR today unchanged.    10/14  No new complaints. Feels the same. On Cardizem gtt higher rate. Seen by Cardiology and transitioning to PO Cardizem and Increasing Metoprolol. Spoke with pts daughters at length about current condition, prognosis and EOL issues. Their expectations are for pt to go home after PNA tx'd. Will contact pts Oncologist,  in [place].    10/15  No new complaints. Walked with PT. Still issues with tachycardia, currently in Aflutter. Back on Cardizem gtt. Responded to Lopressor 5mg IV x1. HR last around 100. WBC improving. K+ 5.3 and slowly trending up past couple days. Lokelma given today. Cont on Meropenem for Serratia marcescens on sputum Cx.     Last 24h: ***    Scheduled Meds:   aspirin  81 mg Oral Daily    dicyclomine  10 mg Oral TID    diltiaZEM  360 mg Oral Daily    famotidine (PF)  20 mg Intravenous BID    gabapentin  300 mg Oral TID    guaiFENesin  600 mg Oral BID    meropenem (MERREM) IVPB  1 g Intravenous Q8H    methylPREDNISolone sodium succinate injection  60 mg Intravenous Q12H    metoprolol tartrate  100 mg Oral BID    mirtazapine  15 mg Oral QHS    montelukast  10 mg Oral Daily    polyethylene glycol  17 g Oral Daily    sodium zirconium cyclosilicate  5 g Oral Daily     Continuous Infusions:   dilTIAZem 15 mg/hr (10/15/22 1102)    dilTIAZem 15 mg/hr (10/15/22 1820)     PRN Meds:   sodium chloride    acetaminophen    ALPRAZolam    aluminum-magnesium hydroxide-simethicone    guaiFENesin-codeine 100-10 mg/5 ml    HYDROcodone-acetaminophen    naloxone    ondansetron    sodium chloride 0.9%    sodium chloride 0.9%      Lines/Drains:   Lines/Drains/Airways       Central Venous Catheter Line  Duration             Port A Cath Single Lumen right subclavian -- days                      Allergies as of 10/10/2022    (No Known Allergies)         Review of Systems: 10 systems reviewed all  pertinent positives and negatives stated in HPI, otherwise negative.       OBJECTIVE:     Vital Signs (Most Recent)  Temp: 98.1 °F (36.7 °C) (10/16/22 2135)  Pulse: 76 (10/16/22 2135)  Resp: (!) 22 (10/16/22 2135)  BP: 133/85 (10/16/22 2135)  SpO2: 97 % (10/16/22 2135)    Vital Signs Range (Last 24H):  Temp:  [96.6 °F (35.9 °C)-98.9 °F (37.2 °C)]   Pulse:  [64-87]   Resp:  [16-30]   BP: (104-142)/(57-93)   SpO2:  [97 %-100 %]     I & O (Last 24H):  Intake/Output Summary (Last 24 hours) at 10/16/2022 2253  Last data filed at 10/16/2022 2146  Gross per 24 hour   Intake 662.7 ml   Output 1400 ml   Net -737.3 ml       Physical Exam     Recent Labs   Lab 10/14/22  0410 10/15/22  0432 10/16/22  0338   WBC 10.8 8.8 11.5   HGB 12.1 12.0 11.5*   HCT 40.6 39.0 39.1   * 528* 550*   MCV 96.7* 94.2* 96.8*        Recent Labs   Lab 10/15/22  0432 10/16/22  0339    136   K 5.3* 5.1   CHLORIDE 98 99   CO2 30 29   BUN 24.0* 27.0*   CREATININE 0.80 0.76   GLUCOSE 177* 259*   MG 1.70 1.60   PHOS 2.4 2.3   CALCIUM 9.2 8.8   ALBUMIN 1.7* 2.0*   CRP 40.00*  --    .6*  --            ASSESSMENT/PLAN:     Patient Active Problem List    Diagnosis Date Noted    Severe sepsis 10/10/2022    Acute on chronic respiratory failure 10/10/2022    Pneumonia due to infectious organism 10/10/2022    Cavitary lesion of lung 10/12/2022    Brain metastases 05/12/2022    Stage IV adenocarcinoma of lung 03/22/2022    Symptomatic anemia 06/22/2022    Anxiety and depression     COPD (chronic obstructive pulmonary disease) 10/10/2022    Chronic a-fib 10/10/2022    Chest pain 07/18/2022    Back pain 07/18/2022    Vocal cord anomaly 07/17/2022    Carcinoma of right lung 06/22/2022    Reactive thrombocytosis 06/22/2022    Common bile duct dilatation 06/22/2022    Choledocholithiasis 06/22/2022    Elevated LFTs 06/21/2022    Abnormal MRI of the abdomen 06/21/2022        ***      VTE Risk Mitigation (From admission, onward)            Ordered     Reason for no Mechanical VTE Prophylaxis  Once        Question:  Reasons:  Answer:  Active Bleeding    10/10/22 1555     Place ASHLYN hose  Until discontinued         10/10/22 1555     IP VTE HIGH RISK PATIENT  Once         10/10/22 1555     Place sequential compression device  Until discontinued         10/10/22 1555     Place sequential compression device  Until discontinued         10/10/22 1505                           Joseluis Moseley MD  Hospital Medicine [facility]

## 2022-10-17 NOTE — PT/OT/SLP PROGRESS
Physical Therapy Treatment    Patient Name:  Adrienne Urbina   MRN:  [id number]    Recommendations:     Discharge Recommendations:      Discharge Equipment Recommendations:     Barriers to discharge: None    Assessment:     Adrienne Urbina is a 67 y.o. female admitted with a medical diagnosis of Severe sepsis.  She presents with the following impairments/functional limitations:  weakness, impaired endurance, gait instability, impaired balance, impaired functional mobility.    Pt ambulated in hallway x 150' with HHA. She had some unsteadiness but no LOB. She was fatigued following but was able to participate in seated exercises.     Rehab Prognosis: Good; patient would benefit from acute skilled PT services to address these deficits and reach maximum level of function.    Recent Surgery: * No surgery found *      Plan:     During this hospitalization, patient to be seen daily to address the identified rehab impairments via gait training, therapeutic activities, therapeutic exercises and progress toward the following goals:    Plan of Care Expires:  11/09/22    Subjective     Chief Complaint: weakness  Patient/Family Comments/goals: to get stronger   Pain/Comfort:         Objective:     Communicated with patient and nursing prior to session.  Patient found HOB elevated with peripheral IV, PureWick, pulse ox (continuous), telemetry, oxygen upon PT entry to room.     General Precautions: Standard, fall   Orthopedic Precautions:    Braces:    Respiratory Status: Nasal cannula, flow 4 L/min     Functional Mobility:  Bed Mobility:     Supine to Sit: stand by assistance  Transfers:     Sit to Stand:  contact guard assistance with hand-held assist  Gait: 150' x HHA with CGA  Balance: Standing x CGA.       AM-PAC 6 CLICK MOBILITY          Therapeutic Activities and Exercises:   See above.     Patient left up in chair with all lines intact..    GOALS:   Multidisciplinary Problems       Physical Therapy Goals          Problem:  Physical Therapy    Goal Priority Disciplines Outcome Goal Variances Interventions   Physical Therapy Goal     PT, PT/OT Ongoing, Progressing     Description: Goals to be met by: 22     Patient will increase functional independence with mobility by performin. Supine to sit with Modified [place]  2. Sit to stand transfer with Modified [place]  3. Gait  x 150 feet with Modified [place] using No Assistive Device.                          Time Tracking:     PT Received On: 10/17/22  PT Start Time: 0950     PT Stop Time: 1010  PT Total Time (min): 20 min     Billable Minutes: Therapeutic Activity 20 mins    Treatment Type: Treatment  PT/PTA: PTA           10/17/2022

## 2022-10-17 NOTE — CONSULTS
[facility]   Cardiology  Progress Note    Patient Name: Adrienne Uribna  MRN: [id number]  Admission Date: 10/10/2022  Hospital Length of Stay: 7 days  Code Status: Full Code   Attending Provider: Joseluis Moseley MD   Primary Care Physician: Gregorio Cherry NP  Principal Problem:Severe sepsis    Patient information was obtained from patient and primary team.       Subjective:     Chief Complaint: SOB       HPI: Patient is a 66 yo female known to [facility]. PMH of SVT, PAD, COPD, anemia and metastatic lung cancer (undergoing chemo). She presented to ED with complaints of shortness of breath. She was diagnosed with pneumonia and admitted for further treatment. She was also found to be anemic and had transfusion of PRBC. She got tachycardic and was treated with IV lopressor and IV digoxin which did not help. She was started on a cardizem gtt which helped improve her HR. She is currently resting w/o distress.   10/17/22: Patient resting in bed without distress. She had some elevated HRs over the weekend and BB was increased. She is currently in a NSR.     Studies:    Echo 10/21: 50%, mild AI    Seen at bedside  Resting comfortably  Tele: Atrial fib with HR 90's (dilt gtt 15)      Past Surgical History:   Procedure Laterality Date    MEDIPORT INSERTION, DOUBLE      REPAIR OF CAROTID ARTERY Left 6/17/2022    Procedure: REPAIR, ARTERY, CAROTID;  Surgeon: Juan Luis Healy III, MD;  Location: [facility];  Service: General;  Laterality: Left;       Review of patient's allergies indicates:  No Known Allergies    No current facility-administered medications on file prior to encounter.     Current Outpatient Medications on File Prior to Encounter   Medication Sig    dicyclomine (BENTYL) 10 MG capsule Take 10 mg by mouth 3 (three) times daily.    gabapentin (NEURONTIN) 300 MG capsule Take 300 mg by mouth 3 (three) times daily.    metoprolol tartrate (LOPRESSOR) 25 MG tablet Take 25 mg by mouth 2 (two) times daily.     albuterol-ipratropium (DUO-NEB) 2.5 mg-0.5 mg/3 mL nebulizer solution Take 3 mLs by nebulization every 4 (four) hours as needed.    guaiFENesin-codeine 100-10 mg/5 ml (TUSSI-ORGANIDIN NR)  mg/5 mL syrup Take 10 mLs by mouth as needed.    loratadine (CLARITIN) 10 mg tablet Take 10 mg by mouth once daily.    megestroL (MEGACE) 400 mg/10 mL (40 mg/mL) Susp Take 10 mLs (400 mg total) by mouth 2 (two) times daily.    OXYGEN-AIR DELIVERY SYSTEMS [facility] [place]   oxygen, See Instructions, home concentrator and portable @ 2L per nasal cannula continuous to keep SATS at 88% or greater J18.9, C34.9, J96.9, R09.02, # 1 EA, 0 Refill(s)    predniSONE (DELTASONE) 10 MG tablet Take 4 tablets by mouth once a day for 2 days, then take 3 tablets by mouth for 2 days, then take 2 tablets by mouth for 2 days, then 1 tablet by mouth for 2 days then 0.5 tablet by mouth for 2 days.     Family History    None       Tobacco Use    Smoking status: Every Day     Packs/day: 0.25     Years: 49.00     Pack years: 12.25     Types: Cigarettes     Last attempt to quit: 2022     Years since quittin.2    Smokeless tobacco: Never   Substance and Sexual Activity    Alcohol use: Never    Drug use: Never    Sexual activity: Yes     Review of Systems   Constitutional: Negative.   Cardiovascular: Negative.    Respiratory:  Positive for shortness of breath.    Objective:     Vital Signs (Most Recent):  Temp: 98.6 °F (37 °C) (10/17/22 0725)  Pulse: 68 (10/17/22 07)  Resp: 16 (10/17/22 0742)  BP: 114/68 (10/17/22 0725)  SpO2: 95 % (10/17/22 0742)   Vital Signs (24h Range):  Temp:  [97 °F (36.1 °C)-98.9 °F (37.2 °C)] 98.6 °F (37 °C)  Pulse:  [66-87] 68  Resp:  [16-30] 16  SpO2:  [91 %-100 %] 95 %  BP: (111-142)/(57-93) 114/68     Weight: 77.7 kg (171 lb 4.8 oz)  Body mass index is 27.65 kg/m².    SpO2: 95 %  O2 Device (Oxygen Therapy): nasal cannula      Intake/Output Summary (Last 24 hours) at 10/17/2022 0906  Last data filed at 10/17/2022 0844  Gross  per 24 hour   Intake 480 ml   Output 1400 ml   Net -920 ml         Lines/Drains/Airways       Central Venous Catheter Line  Duration             Port A Cath Single Lumen right subclavian -- days              Peripheral Intravenous Line  Duration                  Peripheral IV - Single Lumen 10/15/22 1600 20 G Anterior;Left Hand 1 day         Peripheral IV - Single Lumen 10/15/22 1600 20 G Anterior;Right Wrist 1 day                    Physical Exam  Constitutional:       Appearance: She is ill-appearing.   HENT:      Head: Normocephalic.      Nose: Nose normal.      Mouth/Throat:      Mouth: Mucous membranes are moist.   Eyes:      Pupils: Pupils are equal, round, and reactive to light.   Cardiovascular:      Rate and Rhythm: Normal rate and regular rhythm.   Pulmonary:      Effort: Pulmonary effort is normal.   Abdominal:      General: Abdomen is flat.   Musculoskeletal:      Cervical back: Normal range of motion.   Skin:     General: Skin is warm and dry.   Neurological:      General: No focal deficit present.      Mental Status: She is alert.   Psychiatric:         Mood and Affect: Mood normal.        Significant Labs: CMP   Recent Labs   Lab 10/16/22  0339 10/17/22  0442    139   K 5.1 4.4   CO2 29 34*   BUN 27.0* 25.0*   CREATININE 0.76 0.71   CALCIUM 8.8 8.7   ALBUMIN 2.0* 1.7*      and CBC   Recent Labs   Lab 10/16/22  0338 10/17/22  0443   WBC 11.5 11.1   HGB 11.5* 11.5*   HCT 39.1 38.6   * 493*         Significant Imaging:   Assessment and Plan:   PAF with RVR    Currently NSR    Continue BB and CCB    Continue aspirin. May not be an ideal candidate for NOAC given brain metastases and anemia    Pneumonia    Abx per primary    Lung Ca, metastatic  COPD  Anemia (unclear source) s/p 2 units of prbc's during this admission      Thank you for your consult.   Please call with any further questions    VCITOR HUGO Callahan  Cardiology   [facility] - ICU

## 2022-10-17 NOTE — PLAN OF CARE
D/c med list sent to [facility]. D/c home today.  Portable O2 in room from [facility].

## 2022-10-18 NOTE — PROGRESS NOTES
Spoke with patient's daughter and she stated she was not with patient at the moment but requested a call back around 1pm.

## 2022-10-18 NOTE — PROGRESS NOTES
C3 nurse spoke with Adrienne Urbina's daughter, Leslie Bhatt for a TCC post hospital discharge follow up call. The patient has a scheduled HOSFU appointment with Gregorio Cherry NP on 10/19/22 @ 10:30.

## 2022-10-20 NOTE — PROGRESS NOTES
This patient presented SOB, and was unable to perform any of the Pulmonary exercises.  Testing discontinued without results.

## 2022-10-21 PROBLEM — R74.8 ELEVATED ALKALINE PHOSPHATASE LEVEL: Status: ACTIVE | Noted: 2022-01-01

## 2022-10-21 NOTE — ASSESSMENT & PLAN NOTE
Per review of laboratory results, she has a history of intermittently elevated LFTs since August 2021, predominantly isolated elevated alkaline phosphatase.  MRI MRCP without contrast March 21, 2022 revealed:   1. Extrahepatic biliary ductal dilatation similar to December 2021.  Small distal common bile duct filling defects seen on some series. I  cannot exclude choledocholithiasis. Consider ERCP if there is other  clinical evidence of biliary obstruction.  2. Cholelithiasis. No MRI evidence of acute cholecystitis.  3. Hepatomegaly with steatosis.  4. Small right pleural effusion.     CT scan thorax, abdomen, and pelvis with contrast March 21, 2022 revealed:  Right upper lobe irregular consolidation overall similar size although  associated serpiginous enhancement at medial apex has increased 53 mm  x 17 mm image 62 series 602 sagittal versus prior measurement 38 mm x  15 mm and increasing right pleural effusion     No new or adverse finding in the abdomen or pelvis    Further imaging since last office visit is as follows:  -06/29/2022:  Restaging PET-CT (comparison:  CT C/A/P 03/21/2022):  1. 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  Max SUV is 12 and corresponds with area of irregular enhancement seen on previous diagnostic contrast enhanced CT.  2. Clustered micro nodularity at the right lung base with mild FDG avidity is nonspecific.  This could be related to endobronchial spread of secretions with inflammatory or infectious bronchiolitis related to the necrotic mass at the apex versus intrapulmonary metastases.  This is new compared to prior diagnostic CT.  3. Vague nodular infiltrate at the posterior left upper lobe is mildly FDG avid and also new compared to prior diagnostic CT.  Differential considerations include pneumonitis or malignancy.  4. Mild uptake in the region of recent procedure compatible with postoperative inflammation and healing response.   Physical Therapy Daily Progress Note    Patient: Niki Plasencia   : 2005  Diagnosis/ICD-10 Code:  No diagnosis found.  Referring practitioner: Carlos Caruso MD  Date of Initial Visit: Type: THERAPY  Noted: 2021  Today's Date: 2021  Patient seen for 11 sessions      PT Recheck Due: 2021  PT MD Visit: 2021       Niki Plasencia         Subjective Evaluation    History of Present Illness    Subjective comment: Pt reports knee was a little sore yesterday from doing a lot of bending.  No pain in knee today  today.           Objective          Active Range of Motion     Right Knee   Flexion: 127 degrees   Extension: 0 degrees     Additional Active Range of Motion Details  AA knee flexion 132°      See Exercise, Manual, and Modality Logs for complete treatment.       Assessment & Plan     Assessment  Assessment details: Pt with good tolerance with additions of bounding.    No increase in pain only mm fatigue.  SLR very close to obtaining goal with a very slight lag noted after 12-14 reps.  Slight extension lag with ROM but able to attain 0 with effort.  Motion good.  Remains weak in the quad.  Pt and mom educated to take brace on vacation and no running/jumping.  Ice to go post tx session.     Goals  Plan Goals: Short Term Goals:  1) Patient I with HEP and have additoins/changes by next recertification.(met)    2) AROM R knee flexion >= 100°.(met)     3) AROM R knee extension <=5°. (met)     4) Patient able to ambulate with 1 axillary crutch, WBAT, good gait cycle with brace locked at 0° >= 300', non-antalgicaly, no increase in pain. (met)     5) Patient able to perform sit to/from stand with 1 UE A = WB B LE x20, no increase in pain. (met)    6) Patient able to perform R SLR x20 reps with no lag present (ongoing/progressing)     7) R Quad/hamstring >= 4/5. (partially met/ongoing)    8) Patient able to ascend/descend 3 steps reciprocally with no hand rail assist, no increase in pain  "x10 reps. (ongoing)    Long Term Goals (ALL PER MD PROTOCOL)  1) AROm of the R knee 0°->= 135°.    2) B LE 5/5.    3) Patient able to ambulate with no assistive device non-antalgicaly >= 1/4 mile.    4) Patient able to perform straight plane jogging activities with no antalgia and no increase in pain.    5) Patient able to perform SL horizontal jump within 6\" of R=L.    6) Patient able to perform vertical SL jump within 4\" of R=L.    7) Patient able to perform running, start/stop, and cutting activities with no increase in pain.    8) Patient to be I with final HEP.    Plan  Plan details: Next visit track walk for goal assessment       Progress per Plan of Care and Progress strengthening /stabilization /functional activity            Timed:  Manual Therapy:         mins  75262;  Therapeutic Exercise:    38     mins  29855;   Aquatic Therex :        mins  69134    Neuromuscular Flavio:  16      mins  96889;    Therapeutic Activity:          mins  93458;     Gait Training:           mins  31878;     Ultrasound:          mins  11170;    Electrical Stimulation:         mins  14141 ( );    Untimed:  Electrical Stimulation:         mins  77076 ( );  Mechanical Traction:         mins  21970;   Orthotic fit/train:         mins  99654    Timed Treatment:   54   mins   Total Treatment:     54   mins  Sindy Mo PTA  Physical Therapist Assistant        This document has been electronically signed by Sindy Mo PTA on June 1, 2021 16:25 CDT    " Continued attention recommended on follow-up exams.  5. Pronounced, asymmetric hypermetabolism at the right vocal cord.  This could be related to compensatory function of the right vocal cord due to left vocal cord paralysis or infiltrative lesion of the right cord.  Correlate with direct visual inspection.    -07/11/2022:  Limited abdominal ultrasound: Heterogenicity of the liver parenchyma with no definite abnormal echogenicity. Cholelithiasis with a wall enhancing sign. Persistent dilatation of the common bile duct; if clinically indicated MRCP might prove helpful for further assessment.    -08/29/2022: CTA chest (PE protocol): Large cavitary mass right upper lobe which appears worse than prior examination (involving essentially the entire right upper hemithorax; causing sharp truncation of the right upper lobe pulmonary artery; adenopathy right hilum and right paratracheal region; mass appears more prominent since 03/21/2022; small right-sided pleural effusion; small pericardial effusion.    -09/10/2022: CT chest with contrast (comparison:  CTA 08/29/2022):  Similar appearance of large cavitary lesion right upper lobe with extension to right hilum and some displacement versus invasion of suprahilar right mediastinum; interval decrease in alveolar opacities left lung base; interval decrease in pericardial effusion, etc.    -09/12/2022:  Restaging CTs C/A/P with contrast:  Large cavitary mass right upper lobe with associated area of adenopathy and pleural thickening, overall unchanged since 09/10/2022 exam; porcelain gallbladder, filling defects within the gallbladder.     Laboratory results June 2, 2022 revealed WBC 12.3, hemoglobin 9.5, hematocrit 31.2, MCV 97.5, MCHC 30.4, RDW 19.9, platelets 823, and otherwise unremarkable CBC; albumin 2.1, total protein 7.9, globulin 5.8, alkaline phosphatase 201, and otherwise unremarkable CMP.  Lifestyle and dietary modifications provided  Recommend good control of  comorbidities  Will proceed with further workup for elevated LFTs (see above)  Call with updates  F/u clinic visit with NP in 4 months on a Tuesday when Dr. Valadez is here  ED precautions provided

## 2022-10-21 NOTE — PROGRESS NOTES
Subjective:       Patient ID: Adrienne Urbina is a 67 y.o. female.    Chief Complaint: Elevated Hepatic Enzymes    This 67-year-old  female with a history of cholelithiasis, COPD, tobacco use, hypertension, dyslipidemia, obesity, PAD, lung cancer with brain metastasis and mediastinal lymphadenopathy (receives chemotherapy every 3 weeks), SELMA, and thrombocytosis presents accompanied by a family member for a follow-up visit.  She was initially referred for abnormal imaging and seen June 21, 2022.  Per review of laboratory results, she has a history of intermittently elevated LFTs since August 2021, predominantly isolated elevated alkaline phosphatase.     MRI MRCP without contrast March 21, 2022 revealed:   1. Extrahepatic biliary ductal dilatation similar to December 2021.  Small distal common bile duct filling defects seen on some series. I  cannot exclude choledocholithiasis. Consider ERCP if there is other  clinical evidence of biliary obstruction.  2. Cholelithiasis. No MRI evidence of acute cholecystitis.  3. Hepatomegaly with steatosis.  4. Small right pleural effusion.     CT scan thorax, abdomen, and pelvis with contrast March 21, 2022 revealed:  Right upper lobe irregular consolidation overall similar size although  associated serpiginous enhancement at medial apex has increased 53 mm  x 17 mm image 62 series 602 sagittal versus prior measurement 38 mm x  15 mm and increasing right pleural effusion     No new or adverse finding in the abdomen or pelvis     When initially seen, she denied nocturnal symptoms, appetite changes, unintentional weight loss, fever, chills, nausea, vomiting, hematemesis, odynophagia, dysphagia, acid reflux, pyrosis, early satiety, or abdominal pain.  Bowel habits were described as fluctuating between formed and soft stools a few times per day without melena, hematochezia, fecal urgency, fecal incontinence, or pain with defecation. She denied icterus, jaundice,  pruritus, confusion, headaches, vision changes, cough, shortness of breath, chest pain, abdominal/lower extremity swelling, dark-colored urine, or pale-colored stools.      Laboratory results June 2, 2022 revealed WBC 12.3, hemoglobin 9.5, hematocrit 31.2, MCV 97.5, MCHC 30.4, RDW 19.9, platelets 823, and otherwise unremarkable CBC; albumin 2.1, total protein 7.9, globulin 5.8, alkaline phosphatase 201, and otherwise unremarkable CMP.    Further imaging since last office visit is as follows:  -06/29/2022:  Restaging PET-CT (comparison:  CT C/A/P 03/21/2022):  1. 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  Max SUV is 12 and corresponds with area of irregular enhancement seen on previous diagnostic contrast enhanced CT.  2. Clustered micro nodularity at the right lung base with mild FDG avidity is nonspecific.  This could be related to endobronchial spread of secretions with inflammatory or infectious bronchiolitis related to the necrotic mass at the apex versus intrapulmonary metastases.  This is new compared to prior diagnostic CT.  3. Vague nodular infiltrate at the posterior left upper lobe is mildly FDG avid and also new compared to prior diagnostic CT.  Differential considerations include pneumonitis or malignancy.  4. Mild uptake in the region of recent procedure compatible with postoperative inflammation and healing response.  Continued attention recommended on follow-up exams.  5. Pronounced, asymmetric hypermetabolism at the right vocal cord.  This could be related to compensatory function of the right vocal cord due to left vocal cord paralysis or infiltrative lesion of the right cord.  Correlate with direct visual inspection.    -07/11/2022:  Limited abdominal ultrasound: Heterogenicity of the liver parenchyma with no definite abnormal echogenicity. Cholelithiasis with a wall enhancing sign. Persistent dilatation of the common bile duct; if clinically  indicated MRCP might prove helpful for further assessment.    -08/29/2022: CTA chest (PE protocol): Large cavitary mass right upper lobe which appears worse than prior examination (involving essentially the entire right upper hemithorax; causing sharp truncation of the right upper lobe pulmonary artery; adenopathy right hilum and right paratracheal region; mass appears more prominent since 03/21/2022; small right-sided pleural effusion; small pericardial effusion.    -09/10/2022: CT chest with contrast (comparison:  CTA 08/29/2022):  Similar appearance of large cavitary lesion right upper lobe with extension to right hilum and some displacement versus invasion of suprahilar right mediastinum; interval decrease in alveolar opacities left lung base; interval decrease in pericardial effusion, etc.    -09/12/2022:  Restaging CTs C/A/P with contrast:  Large cavitary mass right upper lobe with associated area of adenopathy and pleural thickening, overall unchanged since 09/10/2022 exam; porcelain gallbladder, filling defects within the gallbladder.    Today, she presents for a follow-up visit.  She denies nocturnal symptoms, appetite changes, unintentional weight loss, fever, chills, nausea, vomiting, hematemesis, odynophagia, dysphagia, acid reflux, pyrosis, early satiety, or abdominal pain.  Bowel habits are described as fluctuating between formed and soft stools a few times per day without melena, hematochezia, fecal urgency, fecal incontinence, or pain with defecation.  She denies icterus, jaundice, pruritus, confusion, headaches, vision changes, cough, shortness of breath, chest pain, abdominal/lower extremity swelling, dark-colored urine, or pale-colored stools.      FOBT positive September 11, 2020. Colonoscopy February 10, 2021 revealed examined portion of ileum normal, few ulcers in the sigmoid colon, mild diverticulosis in the sigmoid colon with no evidence of diverticular bleeding, internal hemorrhoids. No  repeat colonoscopy recommended. EGD February 10, 2021 revealed moderately severe radiation esophagitis, esophagogastric landmarks identified, normal stomach, duodenal mucosal atrophy. She denies regular NSAID use or use of blood thinners. She smokes 5-10 cigarettes per day and denies alcohol use. She denies a family history of IBD, colon polyps, or colon cancer.                       Review of patient's allergies indicates:  No Known Allergies    Past Medical History:   Diagnosis Date    Anxiety and depression     Cancer     lung with brain metastasis    Cholelithiasis     COPD (chronic obstructive pulmonary disease)     Hypertension     Lumbar disc disease     PAD (peripheral artery disease)     Paroxysmal SVT (supraventricular tachycardia)     Pleural effusion      Past Surgical History:   Procedure Laterality Date    COLONOSCOPY      MEDIPORT INSERTION, DOUBLE      REPAIR OF CAROTID ARTERY Left 06/17/2022    Procedure: REPAIR, ARTERY, CAROTID;  Surgeon: Juan Luis Healy III, MD;  Location: [facility];  Service: General;  Laterality: Left;    UPPER GASTROINTESTINAL ENDOSCOPY       Family History:   family history is not on file.    Social History:    reports that she has been smoking cigarettes. She has a 12.25 pack-year smoking history. She has never used smokeless tobacco. She reports that she does not drink alcohol and does not use drugs.    Review of Systems  Negative except as noted in the HPI.      Objective:      Physical Exam  Constitutional:       Appearance: Normal appearance.      Comments: Ambulates with walker   HENT:      Head: Normocephalic.      Mouth/Throat:      Mouth: Mucous membranes are moist.   Eyes:      Extraocular Movements: Extraocular movements intact.      Conjunctiva/sclera: Conjunctivae normal.      Pupils: Pupils are equal, round, and reactive to light.   Cardiovascular:      Rate and Rhythm: Normal rate and regular rhythm.      Pulses: Normal pulses.      Heart sounds: Normal heart sounds.    Pulmonary:      Effort: Pulmonary effort is normal.      Comments: Audible wheezing noted to all lung fields  Abdominal:      General: Bowel sounds are normal.      Palpations: Abdomen is soft.   Musculoskeletal:         General: Normal range of motion.      Cervical back: Normal range of motion and neck supple.   Skin:     General: Skin is warm and dry.   Neurological:      General: No focal deficit present.      Mental Status: She is alert and oriented to person, place, and time.   Psychiatric:         Mood and Affect: Mood normal.         Behavior: Behavior normal.         Thought Content: Thought content normal.         Judgment: Judgment normal.       Home Medications:     Current Outpatient Medications   Medication Sig    albuterol (PROVENTIL/VENTOLIN HFA) 90 mcg/actuation inhaler     albuterol-ipratropium (DUO-NEB) 2.5 mg-0.5 mg/3 mL nebulizer solution Take 3 mLs by nebulization every 4 (four) hours as needed.    aspirin 81 MG Chew Take 1 tablet (81 mg total) by mouth once daily.    ATROVENT HFA 17 mcg/actuation inhaler     cyproheptadine (PERIACTIN) 4 mg tablet     dicyclomine (BENTYL) 10 MG capsule Take 10 mg by mouth 3 (three) times daily.    diltiaZEM (CARDIZEM CD) 360 MG 24 hr capsule Take 1 capsule (360 mg total) by mouth once daily.    doxycycline (VIBRA-TABS) 100 MG tablet Take 1 tablet (100 mg total) by mouth every 12 (twelve) hours. for 7 days    gabapentin (NEURONTIN) 300 MG capsule Take 300 mg by mouth 3 (three) times daily.    guaiFENesin-codeine 100-10 mg/5 ml (TUSSI-ORGANIDIN NR)  mg/5 mL syrup Take 10 mLs by mouth as needed.    HUMULIN R REGULAR U-100 INSULN 100 unit/mL injection     HYDROcodone-acetaminophen (NORCO)  mg per tablet Take 1 tablet by mouth every 4 (four) hours as needed for Pain.    loratadine (CLARITIN) 10 mg tablet Take 10 mg by mouth once daily.    metFORMIN (GLUCOPHAGE) 500 MG tablet Take 500 mg by mouth 2 (two) times a day.    methylPREDNISolone (MEDROL  DOSEPACK) 4 mg tablet use as directed    metoprolol tartrate (LOPRESSOR) 100 MG tablet Take 1 tablet (100 mg total) by mouth 2 (two) times daily.    mirtazapine (REMERON) 15 MG tablet Take 1 tablet (15 mg total) by mouth every evening.    montelukast (SINGULAIR) 10 mg tablet as needed.    ONETOUCH DELICA PLUS LANCET 33 gauge Misc     ONETOUCH VERIO REFLECT METER [facility] [place]     ONETOUCH VERIO TEST STRIPS Strp     OXYGEN-AIR DELIVERY SYSTEMS [facility] [place]   oxygen, See Instructions, home concentrator and portable @ 2L per nasal cannula continuous to keep SATS at 88% or greater J18.9, C34.9, J96.9, R09.02, # 1 EA, 0 Refill(s)    tiZANidine (ZANAFLEX) 4 MG tablet      Laboratory Results:     Recent Results (from the past 2016 hour(s))   Comprehensive Metabolic Panel    Collection Time: 08/08/22  8:33 AM   Result Value Ref Range    Sodium Level 141 136 - 145 mmol/L    Potassium Level 4.7 3.5 - 5.1 mmol/L    Chloride 104 98 - 107 mmol/L    Carbon Dioxide 24 23 - 31 mmol/L    Glucose Level 200 (H) 82 - 115 mg/dL    Blood Urea Nitrogen 18.1 9.8 - 20.1 mg/dL    Creatinine 0.82 0.55 - 1.02 mg/dL    Calcium Level Total 10.3 (H) 8.4 - 10.2 mg/dL    Protein Total 8.1 (H) 5.8 - 7.6 gm/dL    Albumin Level 2.3 (L) 3.4 - 4.8 gm/dL    Globulin 5.8 (H) 2.4 - 3.5 gm/dL    Albumin/Globulin Ratio 0.4 (L) 1.1 - 2.0 ratio    Bilirubin Total 0.3 <=1.5 mg/dL    Alkaline Phosphatase 427 (H) 40 - 150 unit/L    Alanine Aminotransferase 21 0 - 55 unit/L    Aspartate Aminotransferase 14 5 - 34 unit/L    eGFR >60 mls/min/1.73/m2   Magnesium    Collection Time: 08/08/22  8:33 AM   Result Value Ref Range    Magnesium Level 2.10 1.60 - 2.60 mg/dL   Lactate Dehydrogenase    Collection Time: 08/08/22  8:33 AM   Result Value Ref Range    Lactate Dehydrogenase 165 125 - 220 U/L   Uric Acid    Collection Time: 08/08/22  8:33 AM   Result Value Ref Range    Uric Acid 4.2 2.6 - 6.0 mg/dL   TSH    Collection Time: 08/08/22  8:33 AM   Result Value Ref Range    Thyroid  Stimulating Hormone 0.5053 0.3500 - 4.9400 uIU/mL   T4, Free    Collection Time: 08/08/22  8:33 AM   Result Value Ref Range    Thyroxine Free 1.04 0.70 - 1.48 ng/dL   Vitamin B12    Collection Time: 08/08/22  8:33 AM   Result Value Ref Range    Vitamin B12 Level 696 213 - 816 pg/mL   CEA    Collection Time: 08/08/22  8:33 AM   Result Value Ref Range    Carcinoembryonic Antigen <1.73 0.00 - 3.00 ng/mL   CBC with Differential    Collection Time: 08/08/22  8:33 AM   Result Value Ref Range    WBC 7.9 4.5 - 11.5 x10(3)/mcL    RBC 3.59 (L) 4.20 - 5.40 x10(6)/mcL    Hgb 10.4 (L) 12.0 - 16.0 gm/dL    Hct 34.5 (L) 37.0 - 47.0 %    MCV 96.1 (H) 80.0 - 94.0 fL    MCH 29.0 27.0 - 31.0 pg    MCHC 30.1 (L) 33.0 - 36.0 mg/dL    RDW 16.6 11.5 - 17.0 %    Platelet 763 (H) 130 - 400 x10(3)/mcL    MPV 8.7 7.4 - 10.4 fL    Neut % 88.0 %    Lymph % 6.8 %    Mono % 3.8 %    Eos % 0.0 %    Basophil % 0.1 %    Lymph # 0.53 (L) 0.6 - 4.6 x10(3)/mcL    Neut # 6.9 2.1 - 9.2 x10(3)/mcL    Mono # 0.30 0.1 - 1.3 x10(3)/mcL    Eos # 0.00 0 - 0.9 x10(3)/mcL    Baso # 0.01 0 - 0.2 x10(3)/mcL    IG# 0.10 (H) 0 - 0.04 x10(3)/mcL    IG% 1.3 %    NRBC% 0.0 %   Comprehensive Metabolic Panel    Collection Time: 08/29/22  8:07 AM   Result Value Ref Range    Sodium Level 139 136 - 145 mmol/L    Potassium Level 4.4 3.5 - 5.1 mmol/L    Chloride 102 98 - 107 mmol/L    Carbon Dioxide 26 23 - 31 mmol/L    Glucose Level 152 (H) 82 - 115 mg/dL    Blood Urea Nitrogen 10.5 9.8 - 20.1 mg/dL    Creatinine 0.73 0.55 - 1.02 mg/dL    Calcium Level Total 10.0 8.4 - 10.2 mg/dL    Protein Total 7.9 (H) 5.8 - 7.6 gm/dL    Albumin Level 2.0 (L) 3.4 - 4.8 gm/dL    Globulin 5.9 (H) 2.4 - 3.5 gm/dL    Albumin/Globulin Ratio 0.3 (L) 1.1 - 2.0 ratio    Bilirubin Total 0.3 <=1.5 mg/dL    Alkaline Phosphatase 298 (H) 40 - 150 unit/L    Alanine Aminotransferase 11 0 - 55 unit/L    Aspartate Aminotransferase 9 5 - 34 unit/L    eGFR >60 mls/min/1.73/m2   TSH    Collection Time:  08/29/22  8:07 AM   Result Value Ref Range    Thyroid Stimulating Hormone 0.4820 0.3500 - 4.9400 uIU/mL   T4, Free    Collection Time: 08/29/22  8:07 AM   Result Value Ref Range    Thyroxine Free 1.12 0.70 - 1.48 ng/dL   Magnesium    Collection Time: 08/29/22  8:07 AM   Result Value Ref Range    Magnesium Level 1.80 1.60 - 2.60 mg/dL   CBC with Differential    Collection Time: 08/29/22  8:07 AM   Result Value Ref Range    WBC 9.8 4.5 - 11.5 x10(3)/mcL    RBC 2.99 (L) 4.20 - 5.40 x10(6)/mcL    Hgb 8.7 (L) 12.0 - 16.0 gm/dL    Hct 28.0 (L) 37.0 - 47.0 %    MCV 93.6 80.0 - 94.0 fL    MCH 29.1 27.0 - 31.0 pg    MCHC 31.1 (L) 33.0 - 36.0 mg/dL    RDW 20.9 (H) 11.5 - 17.0 %    Platelet 1,146 (HH) 130 - 400 x10(3)/mcL    MPV 8.6 7.4 - 10.4 fL    IPF 1.4 0.9 - 11.2 %    Neut % 87.4 %    Lymph % 5.7 %    Mono % 2.1 %    Eos % 0.0 %    Basophil % 0.2 %    Lymph # 0.56 (L) 0.6 - 4.6 x10(3)/mcL    Neut # 8.6 2.1 - 9.2 x10(3)/mcL    Mono # 0.21 0.1 - 1.3 x10(3)/mcL    Eos # 0.00 0 - 0.9 x10(3)/mcL    Baso # 0.02 0 - 0.2 x10(3)/mcL    IG# 0.45 (H) 0 - 0.04 x10(3)/mcL    IG% 4.6 %    NRBC% 0.3 %   Blood Smear Microscopic Exam    Collection Time: 08/29/22  8:07 AM   Result Value Ref Range    Anisocyte Slight (A) (none)    Platelet Est Increased (A) Normal, Adequate   Comprehensive Metabolic Panel    Collection Time: 09/07/22 12:04 PM   Result Value Ref Range    Sodium Level 140 136 - 145 mmol/L    Potassium Level 4.1 3.5 - 5.1 mmol/L    Chloride 104 98 - 107 mmol/L    Carbon Dioxide 25 23 - 31 mmol/L    Glucose Level 95 82 - 115 mg/dL    Blood Urea Nitrogen 9.6 (L) 9.8 - 20.1 mg/dL    Creatinine 0.75 0.55 - 1.02 mg/dL    Calcium Level Total 10.0 8.4 - 10.2 mg/dL    Protein Total 7.9 (H) 5.8 - 7.6 gm/dL    Albumin Level 2.2 (L) 3.4 - 4.8 gm/dL    Globulin 5.7 (H) 2.4 - 3.5 gm/dL    Albumin/Globulin Ratio 0.4 (L) 1.1 - 2.0 ratio    Bilirubin Total 0.6 <=1.5 mg/dL    Alkaline Phosphatase 270 (H) 40 - 150 unit/L    Alanine  Aminotransferase 12 0 - 55 unit/L    Aspartate Aminotransferase 13 5 - 34 unit/L    eGFR >60 mls/min/1.73/m2   CBC with Differential    Collection Time: 09/07/22 12:04 PM   Result Value Ref Range    WBC 11.9 (H) 4.5 - 11.5 x10(3)/mcL    RBC 3.08 (L) 4.20 - 5.40 x10(6)/mcL    Hgb 9.0 (L) 12.0 - 16.0 gm/dL    Hct 30.1 (L) 37.0 - 47.0 %    MCV 97.7 (H) 80.0 - 94.0 fL    MCH 29.2 27.0 - 31.0 pg    MCHC 29.9 (L) 33.0 - 36.0 mg/dL    RDW 20.6 (H) 11.5 - 17.0 %    Platelet 500 (H) 130 - 400 x10(3)/mcL    MPV 8.6 7.4 - 10.4 fL    Neut % 72.1 %    Lymph % 10.9 %    Mono % 14.8 %    Eos % 1.1 %    Basophil % 0.5 %    Lymph # 1.29 0.6 - 4.6 x10(3)/mcL    Neut # 8.6 2.1 - 9.2 x10(3)/mcL    Mono # 1.76 (H) 0.1 - 1.3 x10(3)/mcL    Eos # 0.13 0 - 0.9 x10(3)/mcL    Baso # 0.06 0 - 0.2 x10(3)/mcL    IG# 0.07 (H) 0 - 0.04 x10(3)/mcL    IG% 0.6 %    NRBC% 0.0 %   Troponin I    Collection Time: 09/10/22  9:14 AM   Result Value Ref Range    Troponin-I <0.010 0.000 - 0.045 ng/mL   Comprehensive metabolic panel    Collection Time: 09/10/22  9:14 AM   Result Value Ref Range    Sodium Level 141 136 - 145 mmol/L    Potassium Level 3.9 3.5 - 5.1 mmol/L    Chloride 107 98 - 107 mmol/L    Carbon Dioxide 26 23 - 31 mmol/L    Glucose Level 89 82 - 115 mg/dL    Blood Urea Nitrogen 16.0 9.8 - 20.1 mg/dL    Creatinine 0.72 0.55 - 1.02 mg/dL    Calcium Level Total 9.3 8.4 - 10.2 mg/dL    Protein Total 6.7 5.8 - 7.6 gm/dL    Albumin Level 2.0 (L) 3.4 - 4.8 gm/dL    Globulin 4.7 (H) 2.4 - 3.5 gm/dL    Albumin/Globulin Ratio 0.4 (L) 1.1 - 2.0 ratio    Bilirubin Total 0.2 <=1.5 mg/dL    Alkaline Phosphatase 205 (H) 40 - 150 unit/L    Alanine Aminotransferase 25 0 - 55 unit/L    Aspartate Aminotransferase 19 5 - 34 unit/L    eGFR >60 mls/min/1.73/m2   Magnesium    Collection Time: 09/10/22  9:14 AM   Result Value Ref Range    Magnesium Level 1.80 1.60 - 2.60 mg/dL   Brain natriuretic peptide    Collection Time: 09/10/22  9:14 AM   Result Value Ref Range     Natriuretic Peptide 162.9 (H) <=100.0 pg/mL   CBC with Differential    Collection Time: 09/10/22  9:14 AM   Result Value Ref Range    WBC 10.9 4.5 - 11.5 x10(3)/mcL    RBC 2.79 (L) 4.20 - 5.40 x10(6)/mcL    Hgb 8.1 (L) 12.0 - 16.0 gm/dL    Hct 27.2 (L) 37.0 - 47.0 %    MCV 97.5 (H) 80.0 - 94.0 fL    MCH 29.0 27.0 - 31.0 pg    MCHC 29.8 (L) 33.0 - 36.0 mg/dL    RDW 20.1 (H) 11.5 - 17.0 %    Platelet 411 (H) 130 - 400 x10(3)/mcL    MPV 8.3 7.4 - 10.4 fL    IG# 0.09 (H) 0 - 0.04 x10(3)/mcL    IG% 0.8 %   Blood Culture #1 **CANNOT BE ORDERED STAT**    Collection Time: 09/10/22  9:14 AM    Specimen: Blood   Result Value Ref Range    CULTURE, BLOOD ([facility]) No Growth at 5 days    Manual Differential    Collection Time: 09/10/22  9:14 AM   Result Value Ref Range    Neut Man 86 (H) 47 - 80 %    Lymph Man 5 (L) 13 - 40 %    Monocyte Man 7 2 - 11 %    Eos Man 1 0 - 8 %    Myelo Man 1 %    Abs Neut calc 9.483 (H) 2.1 - 9.2 x10(3)/mcL    Abs Mono 0.763 0.1 - 1.3 x10(3)/mcL    Abs Lymp 0.545 (L) 0.6 - 4.6 x10(3)/mcL    Abs Eos  0.109 0 - 0.9 x10(3)/mcL    Platelet Est Increased (A) Normal, Adequate   Lactic acid, plasma    Collection Time: 09/10/22  9:28 AM   Result Value Ref Range    Lactic Acid Level 1.5 0.5 - 2.2 mmol/L   Blood Culture #1 **CANNOT BE ORDERED STAT**    Collection Time: 09/10/22  9:28 AM    Specimen: Blood   Result Value Ref Range    CULTURE, BLOOD ([facility]) No Growth at 5 days    Urinalysis Clean Catch    Collection Time: 09/10/22 10:11 AM   Result Value Ref Range    Color, UA Yellow Yellow, Colorless, Other, Clear    Appearance, UA Clear Clear    Specific Gravity, UA 1.025     pH, UA 6.0 5.0, 5.5, 6.0, 6.5, 7.0, 7.5, 8.0, 8.5    Protein, UA Trace (A) Negative, 300  mg/dL    Glucose, UA Negative Negative, Normal mg/dL    Ketones, UA Negative Negative, +1, +2, +3, +4, +5, >=160, >=80 mg/dL    Blood, UA Negative Negative unit/L    Bilirubin, UA Negative Negative mg/dL    Urobilinogen, UA 0.2 0.2, 1.0, Normal mg/dL     Nitrites, UA Negative Negative    Leukocyte Esterase, UA Negative Negative, 75  unit/L   Urinalysis, Microscopic    Collection Time: 09/10/22 10:11 AM   Result Value Ref Range    Bacteria, UA Rare None Seen, Rare, Occasional /HPF    RBC, UA None Seen None Seen, 0-2, 3-5, 0-5 /HPF    WBC, UA None Seen None Seen, 0-2, 3-5, 0-5 /HPF    Squamous Epithelial Cells, UA Many (A) None Seen, Rare, Occasional, Occ /HPF   Troponin I    Collection Time: 09/10/22 11:14 AM   Result Value Ref Range    Troponin-I <0.010 0.000 - 0.045 ng/mL   [place] GENERIC ORDERABLE PROTEIN ELECTROPHORESIS 24HOUR URINE    Collection Time: 09/12/22 10:10 AM   Result Value Ref Range    [place] Generic Orderable SEE COMMENTS (A)    Comprehensive Metabolic Panel    Collection Time: 09/20/22  2:13 PM   Result Value Ref Range    Sodium Level 139 136 - 145 mmol/L    Potassium Level 3.9 3.5 - 5.1 mmol/L    Chloride 99 98 - 107 mmol/L    Carbon Dioxide 29 23 - 31 mmol/L    Glucose Level 142 (H) 82 - 115 mg/dL    Blood Urea Nitrogen 10.7 9.8 - 20.1 mg/dL    Creatinine 0.91 0.55 - 1.02 mg/dL    Calcium Level Total 9.7 8.4 - 10.2 mg/dL    Protein Total 7.4 5.8 - 7.6 gm/dL    Albumin Level 2.2 (L) 3.4 - 4.8 gm/dL    Globulin 5.2 (H) 2.4 - 3.5 gm/dL    Albumin/Globulin Ratio 0.4 (L) 1.1 - 2.0 ratio    Bilirubin Total 0.6 <=1.5 mg/dL    Alkaline Phosphatase 202 (H) 40 - 150 unit/L    Alanine Aminotransferase 11 0 - 55 unit/L    Aspartate Aminotransferase 6 5 - 34 unit/L    eGFR >60 mls/min/1.73/m2   Magnesium    Collection Time: 09/20/22  2:13 PM   Result Value Ref Range    Magnesium Level 2.00 1.60 - 2.60 mg/dL   CBC with Differential    Collection Time: 09/20/22  2:13 PM   Result Value Ref Range    WBC 10.4 4.5 - 11.5 x10(3)/mcL    RBC 3.36 (L) 4.20 - 5.40 x10(6)/mcL    Hgb 9.8 (L) 12.0 - 16.0 gm/dL    Hct 32.8 (L) 37.0 - 47.0 %    MCV 97.6 (H) 80.0 - 94.0 fL    MCH 29.2 27.0 - 31.0 pg    MCHC 29.9 (L) 33.0 - 36.0 mg/dL    RDW 19.3 (H) 11.5 - 17.0 %    Platelet  507 (H) 130 - 400 x10(3)/mcL    MPV 8.6 7.4 - 10.4 fL    Neut % 68.4 %    Lymph % 10.0 %    Mono % 20.0 %    Eos % 0.7 %    Basophil % 0.4 %    Lymph # 1.04 0.6 - 4.6 x10(3)/mcL    Neut # 7.1 2.1 - 9.2 x10(3)/mcL    Mono # 2.09 (H) 0.1 - 1.3 x10(3)/mcL    Eos # 0.07 0 - 0.9 x10(3)/mcL    Baso # 0.04 0 - 0.2 x10(3)/mcL    IG# 0.05 (H) 0 - 0.04 x10(3)/mcL    IG% 0.5 %    NRBC% 0.0 %   Comprehensive Metabolic Panel    Collection Time: 09/26/22  6:20 AM   Result Value Ref Range    Sodium Level 140 136 - 145 mmol/L    Potassium Level 4.2 3.5 - 5.1 mmol/L    Chloride 100 98 - 107 mmol/L    Carbon Dioxide 25 23 - 31 mmol/L    Glucose Level 125 (H) 82 - 115 mg/dL    Blood Urea Nitrogen 11.6 9.8 - 20.1 mg/dL    Creatinine 0.83 0.55 - 1.02 mg/dL    Calcium Level Total 10.0 8.4 - 10.2 mg/dL    Protein Total 8.1 (H) 5.8 - 7.6 gm/dL    Albumin Level 2.2 (L) 3.4 - 4.8 gm/dL    Globulin 5.9 (H) 2.4 - 3.5 gm/dL    Albumin/Globulin Ratio 0.4 (L) 1.1 - 2.0 ratio    Bilirubin Total 0.5 <=1.5 mg/dL    Alkaline Phosphatase 182 (H) 40 - 150 unit/L    Alanine Aminotransferase 6 0 - 55 unit/L    Aspartate Aminotransferase 8 5 - 34 unit/L    eGFR >60 mls/min/1.73/m2   Magnesium    Collection Time: 09/26/22  6:20 AM   Result Value Ref Range    Magnesium Level 2.00 1.60 - 2.60 mg/dL   CBC with Differential    Collection Time: 09/26/22  7:22 AM   Result Value Ref Range    WBC 5.5 4.5 - 11.5 x10(3)/mcL    RBC 3.49 (L) 4.20 - 5.40 x10(6)/mcL    Hgb 9.7 (L) 12.0 - 16.0 gm/dL    Hct 33.3 (L) 37.0 - 47.0 %    MCV 95.4 (H) 80.0 - 94.0 fL    MCH 27.8 27.0 - 31.0 pg    MCHC 29.1 (L) 33.0 - 36.0 mg/dL    RDW 18.6 (H) 11.5 - 17.0 %    Platelet 572 (H) 130 - 400 x10(3)/mcL    MPV 8.7 7.4 - 10.4 fL    Neut % 84.3 %    Lymph % 10.6 %    Mono % 4.4 %    Eos % 0.0 %    Basophil % 0.2 %    Lymph # 0.58 (L) 0.6 - 4.6 x10(3)/mcL    Neut # 4.6 2.1 - 9.2 x10(3)/mcL    Mono # 0.24 0.1 - 1.3 x10(3)/mcL    Eos # 0.00 0 - 0.9 x10(3)/mcL    Baso # 0.01 0 - 0.2  x10(3)/mcL    IG# 0.03 0 - 0.04 x10(3)/mcL    IG% 0.5 %    NRBC% 0.0 %   Immunoglobulins (IgG, IgA, IgM) Quantitative    Collection Time: 09/26/22  8:45 AM   Result Value Ref Range    IgG Level 1,673.00 (H) 522.00 - 1,631.00 mg/dL    IgA Level 552.0 (H) 69.0 - 517.0 mg/dL    IgM Level 51.0 33.0 - 293.0 mg/dL   Immunoglobulin Free LT Chains Blood    Collection Time: 09/26/22  8:45 AM   Result Value Ref Range    Kappa Free Light Chain 5.66 (H) 0.3300 - 1.94 mg/dL    Lambda Free Light Chain 6.72 (H) 0.5700 - 2.63 mg/dL    Kappa/Lambda FLC Ratio 0.8423 0.2600 - 1.65   Immunofixation & Protein Electrophoresis    Collection Time: 09/26/22  8:45 AM   Result Value Ref Range    Protein Total 8.1 (H) 5.8 - 7.6 gm/dL    Albumin Level 2.5 (L) 3.4 - 4.8 gm/dL    Globulin 5.6 (H) 2.4 - 3.5 gm/dL    Albumin/Globulin Ratio 0.4 (L) 1.1 - 2.0 ratio    Albumin, SPEP 31.14 (L) 48.1 - 59.5    Alpha 1 Glob % 8.12 (H) 2.3 - 4.9    Alpha 1 Glob 0.66 (H) 0.00 - 0.40 gm/dl    Alpha 2 Glob% 24.75 (H) 6.9 - 13    Alpha 2 Glob 2.00 (H) 0.40 - 1.00 gm/dl    Beta Glob % 15.74 13.8 - 19.7    Beta Glob 1.28 0.70 - 1.30 gm/dl    Gamma Globulin % 20.24 10.1 - 21.9    Gamma Globulin 1.64 0.40 - 1.80 gm/dl    M Demetrius % 0.23     M Demetrius 0.02 gm/dl   Pathologist Interpretation    Collection Time: 09/26/22  8:45 AM   Result Value Ref Range    Pathology Review       SPEP:  Total protein and globulin are both increased.  A/G ratio is low.      Serum protein electrophoresis shows a decreased albumin fraction, increased alpha-1-globulin fraction, and increased alpha-2-globulin fraction.     A monoclonal protein (M-spike) is present in the gamma region (0.02 g/dL).    ANNA:  Immunofixation shows an IgG monoclonal protein with kappa light chain specificity.    Comment:  The additional SPEP findings may be seen with malnutrition, hemorrhage, burns, impaired liver synthesis, protein-losing enteropathy, chronic infection, adrenal insufficiency, adrenocorticosteroid  therapy, advanced diabetes mellitus, and nephrotic syndrome.    Ave Hensley MD     Lactic acid, plasma    Collection Time: 10/10/22 12:45 PM   Result Value Ref Range    Lactic Acid Level 1.8 0.5 - 2.2 mmol/L   COVID/FLU A&B PCR    Collection Time: 10/10/22 12:54 PM   Result Value Ref Range    Influenza A PCR Not Detected Not Detected    Influenza B PCR Not Detected Not Detected    SARS-CoV-2 PCR Not Detected Not Detected   Immunoglobulin Free LT Chains Blood    Collection Time: 10/10/22 12:56 PM   Result Value Ref Range    Kappa Free Light Chain 6.31 (H) 0.3300 - 1.94 mg/dL    Lambda Free Light Chain 7.40 (H) 0.5700 - 2.63 mg/dL    Kappa/Lambda FLC Ratio 0.8527 0.2600 - 1.65   Comprehensive metabolic panel    Collection Time: 10/10/22 12:56 PM   Result Value Ref Range    Sodium Level 143 136 - 145 mmol/L    Potassium Level 3.6 3.5 - 5.1 mmol/L    Chloride 102 98 - 107 mmol/L    Carbon Dioxide 26 23 - 31 mmol/L    Glucose Level 110 82 - 115 mg/dL    Blood Urea Nitrogen 22.0 (H) 9.8 - 20.1 mg/dL    Creatinine 0.81 0.55 - 1.02 mg/dL    Calcium Level Total 9.4 8.4 - 10.2 mg/dL    Protein Total 7.1 5.8 - 7.6 gm/dL    Albumin Level 1.7 (L) 3.4 - 4.8 gm/dL    Globulin 5.4 (H) 2.4 - 3.5 gm/dL    Albumin/Globulin Ratio 0.3 (L) 1.1 - 2.0 ratio    Bilirubin Total 0.5 <=1.5 mg/dL    Alkaline Phosphatase 184 (H) 40 - 150 unit/L    Alanine Aminotransferase 5 0 - 55 unit/L    Aspartate Aminotransferase 11 5 - 34 unit/L    eGFR >60 mls/min/1.73/m2   Troponin I    Collection Time: 10/10/22 12:56 PM   Result Value Ref Range    Troponin-I 0.015 0.000 - 0.045 ng/mL   CBC with Differential    Collection Time: 10/10/22 12:56 PM   Result Value Ref Range    WBC 14.1 (H) 4.5 - 11.5 x10(3)/mcL    RBC 2.78 (L) 4.20 - 5.40 x10(6)/mcL    Hgb 7.7 (L) 12.0 - 16.0 gm/dL    Hct 25.2 (L) 37.0 - 47.0 %    MCV 90.6 80.0 - 94.0 fL    MCH 27.7 27.0 - 31.0 pg    MCHC 30.6 (L) 33.0 - 36.0 mg/dL    RDW 20.8 (H) 11.5 - 17.0 %    Platelet 383 130 -  400 x10(3)/mcL    MPV 9.9 7.4 - 10.4 fL    Neut % 77.7 %    Lymph % 8.6 %    Mono % 10.8 %    Eos % 0.1 %    Basophil % 0.3 %    Lymph # 1.21 0.6 - 4.6 x10(3)/mcL    Neut # 11.0 (H) 2.1 - 9.2 x10(3)/mcL    Mono # 1.53 (H) 0.1 - 1.3 x10(3)/mcL    Eos # 0.02 0 - 0.9 x10(3)/mcL    Baso # 0.04 0 - 0.2 x10(3)/mcL    IG# 0.35 (H) 0 - 0.04 x10(3)/mcL    IG% 2.5 %   Type & Screen    Collection Time: 10/10/22 12:56 PM   Result Value Ref Range    Group & Rh O POS     Indirect Marvin GEL NEG    Prepare RBC 2 Units; hgb 7.0    Collection Time: 10/10/22 12:56 PM   Result Value Ref Range    UNIT NUMBER [id number]     UNIT ABO/RH O POS     DISPENSE STATUS Transfused     Unit Expiration [id number]     Product Code C0245A69     Unit Blood Type Code 5100     CROSSMATCH INTERPRETATION Compatible     UNIT NUMBER [id number]     UNIT ABO/RH O POS     DISPENSE STATUS Transfused     Unit Expiration [id number]     Product Code N5608H18     Unit Blood Type Code 5100     CROSSMATCH INTERPRETATION Compatible    Occult Blood Stool, CA Screen    Collection Time: 10/10/22  2:52 PM   Result Value Ref Range    Occult Blood Stool 1 Negative Negative   Iron and TIBC    Collection Time: 10/10/22  3:27 PM   Result Value Ref Range    Iron Binding Capacity Unsaturated 86 70 - 310 ug/dL    Iron Level 25 (L) 50 - 170 ug/dL    Iron Binding Capacity Total 111 (L) 250 - 450 ug/dL    Iron Saturation 23 20 - 50 %   Ferritin    Collection Time: 10/10/22  3:27 PM   Result Value Ref Range    Ferritin Level 6,982.07 (H) 4.63 - 204.00 ng/mL   Reticulocytes    Collection Time: 10/10/22  3:31 PM   Result Value Ref Range    Retic Cnt Auto 1.15 1.1 - 2.1 %    RET# 0.0300 0.016 - 0.078   Blood Culture x two cultures. Draw prior to antibiotics    Collection Time: 10/10/22  4:20 PM    Specimen: Antecubital, Left; Blood   Result Value Ref Range    CULTURE, BLOOD ([facility]) No Growth at 5 days    Troponin I    Collection Time: 10/10/22  4:20 PM   Result Value Ref Range     Troponin-I 0.041 0.000 - 0.045 ng/mL   CK    Collection Time: 10/10/22  4:23 PM   Result Value Ref Range    Creatine Kinase 26 (L) 29 - 168 U/L   CK-MB    Collection Time: 10/10/22  4:23 PM   Result Value Ref Range    Creatine Kinase MB 0.8 <=3.4 ng/mL   TSH    Collection Time: 10/10/22  4:23 PM   Result Value Ref Range    Thyroid Stimulating Hormone 1.3345 0.3500 - 4.9400 uIU/mL   Blood Culture    Collection Time: 10/10/22  4:23 PM    Specimen: Hand, Right; Blood   Result Value Ref Range    CULTURE, BLOOD ([facility]) No Growth at 5 days    CK    Collection Time: 10/10/22  6:19 PM   Result Value Ref Range    Creatine Kinase 19 (L) 29 - 168 U/L   CK-MB    Collection Time: 10/10/22  6:19 PM   Result Value Ref Range    Creatine Kinase MB 1.0 <=3.4 ng/mL   CK    Collection Time: 10/11/22 12:15 AM   Result Value Ref Range    Creatine Kinase 12 (L) 29 - 168 U/L   CK-MB    Collection Time: 10/11/22 12:15 AM   Result Value Ref Range    Creatine Kinase MB 0.7 <=3.4 ng/mL   Prealbumin    Collection Time: 10/11/22  2:31 AM   Result Value Ref Range    Prealbumin 5.9 (L) 14.0 - 37.0 mg/dL   Magnesium    Collection Time: 10/11/22  2:31 AM   Result Value Ref Range    Magnesium Level 1.60 1.60 - 2.60 mg/dL   Phosphorus    Collection Time: 10/11/22  2:31 AM   Result Value Ref Range    Phosphorus Level 3.9 2.3 - 4.7 mg/dL   C-Reactive Protein    Collection Time: 10/11/22  2:31 AM   Result Value Ref Range    C-Reactive Protein >240.00 (H) <5.00 mg/L   Comprehensive Metabolic Panel    Collection Time: 10/11/22  2:31 AM   Result Value Ref Range    Sodium Level 136 136 - 145 mmol/L    Potassium Level 3.3 (L) 3.5 - 5.1 mmol/L    Chloride 100 98 - 107 mmol/L    Carbon Dioxide 24 23 - 31 mmol/L    Glucose Level 206 (H) 82 - 115 mg/dL    Blood Urea Nitrogen 15.0 9.8 - 20.1 mg/dL    Creatinine 0.82 0.55 - 1.02 mg/dL    Calcium Level Total 8.9 8.4 - 10.2 mg/dL    Protein Total 6.5 5.8 - 7.6 gm/dL    Albumin Level 1.4 (L) 3.4 - 4.8 gm/dL     Globulin 5.1 (H) 2.4 - 3.5 gm/dL    Albumin/Globulin Ratio 0.3 (L) 1.1 - 2.0 ratio    Bilirubin Total 0.6 <=1.5 mg/dL    Alkaline Phosphatase 178 (H) 40 - 150 unit/L    Alanine Aminotransferase 6 0 - 55 unit/L    Aspartate Aminotransferase 6 5 - 34 unit/L    eGFR >60 mls/min/1.73/m2   Respiratory Culture    Collection Time: 10/11/22 12:10 PM    Specimen: Sputum, Expectorated   Result Value Ref Range    Respiratory Culture Many Serratia marcescens (A)     GRAM STAIN Quality 3+     GRAM STAIN Many Gram Negative Rods        Susceptibility    Serratia marcescens -  (no method available)     Amox/K Clav >=32 Resistant      Cefazolin >=64 Resistant      Cefepime 0.5 Sensitive      Ceftriaxone 1 Sensitive      Cefuroxime >=64 Resistant      Ciprofloxacin >=4 Resistant      Gentamicin <=1 Sensitive      Levofloxacin >=8 Resistant      Meropenem <=0.25 Sensitive      Trimethoprim/Sulfamethoxazole <=20 Sensitive      Tetracycline >=16 Resistant      Tobramycin 2 Sensitive    CBC with Differential    Collection Time: 10/11/22 12:33 PM   Result Value Ref Range    WBC 12.2 (H) 4.5 - 11.5 x10(3)/mcL    RBC 2.40 (L) 4.20 - 5.40 x10(6)/mcL    Hgb 7.0 (L) 12.0 - 16.0 gm/dL    Hct 22.1 (L) 37.0 - 47.0 %    MCV 92.1 80.0 - 94.0 fL    MCH 29.2 27.0 - 31.0 pg    MCHC 31.7 (L) 33.0 - 36.0 mg/dL    RDW 21.1 (H) 11.5 - 17.0 %    Platelet 415 (H) 130 - 400 x10(3)/mcL    MPV 10.2 7.4 - 10.4 fL    Neut % 90.8 %    Lymph % 3.8 %    Mono % 2.6 %    Eos % 0.0 %    Basophil % 0.1 %    Lymph # 0.46 (L) 0.6 - 4.6 x10(3)/mcL    Neut # 11.1 (H) 2.1 - 9.2 x10(3)/mcL    Mono # 0.32 0.1 - 1.3 x10(3)/mcL    Eos # 0.00 0 - 0.9 x10(3)/mcL    Baso # 0.01 0 - 0.2 x10(3)/mcL    IG# 0.33 (H) 0 - 0.04 x10(3)/mcL    IG% 2.7 %   Renal Function Panel    Collection Time: 10/11/22 12:35 PM   Result Value Ref Range    Sodium Level 136 136 - 145 mmol/L    Potassium Level 3.4 (L) 3.5 - 5.1 mmol/L    Chloride 99 98 - 107 mmol/L    Carbon Dioxide 25 23 - 31 mmol/L     Glucose Level 260 (H) 82 - 115 mg/dL    Blood Urea Nitrogen 13.0 9.8 - 20.1 mg/dL    Creatinine 0.88 0.55 - 1.02 mg/dL    Calcium Level Total 8.8 8.4 - 10.2 mg/dL    Albumin Level 1.6 (L) 3.4 - 4.8 gm/dL    Phosphorus Level 2.8 2.3 - 4.7 mg/dL    eGFR >60 mls/min/1.73/m2   Magnesium    Collection Time: 10/11/22 12:35 PM   Result Value Ref Range    Magnesium Level 1.60 1.60 - 2.60 mg/dL   MTB PCR    Collection Time: 10/11/22 12:35 PM   Result Value Ref Range    MTB PCR ([facility]) Not Detected Not Detected   Acid Fast Smear    Collection Time: 10/11/22 12:35 PM   Result Value Ref Range    Acid Fast Smear For Mycobacterium SEE COMMENTS    Renal Function Panel    Collection Time: 10/12/22  6:38 AM   Result Value Ref Range    Sodium Level 139 136 - 145 mmol/L    Potassium Level 3.9 3.5 - 5.1 mmol/L    Chloride 101 98 - 107 mmol/L    Carbon Dioxide 27 23 - 31 mmol/L    Glucose Level 139 (H) 82 - 115 mg/dL    Blood Urea Nitrogen 15.0 9.8 - 20.1 mg/dL    Creatinine 1.04 (H) 0.55 - 1.02 mg/dL    Calcium Level Total 9.0 8.4 - 10.2 mg/dL    Albumin Level 1.7 (L) 3.4 - 4.8 gm/dL    Phosphorus Level 3.0 2.3 - 4.7 mg/dL    eGFR 59 mls/min/1.73/m2   Magnesium    Collection Time: 10/12/22  6:38 AM   Result Value Ref Range    Magnesium Level 1.70 1.60 - 2.60 mg/dL   CBC with Differential    Collection Time: 10/12/22  6:38 AM   Result Value Ref Range    WBC 13.3 (H) 4.5 - 11.5 x10(3)/mcL    RBC 3.35 (L) 4.20 - 5.40 x10(6)/mcL    Hgb 9.7 (L) 12.0 - 16.0 gm/dL    Hct 30.3 (L) 37.0 - 47.0 %    MCV 90.4 80.0 - 94.0 fL    MCH 29.0 27.0 - 31.0 pg    MCHC 32.0 (L) 33.0 - 36.0 mg/dL    RDW 19.1 (H) 11.5 - 17.0 %    Platelet 408 (H) 130 - 400 x10(3)/mcL    MPV 9.6 7.4 - 10.4 fL    IG# 0.69 (H) 0 - 0.04 x10(3)/mcL    IG% 5.2 %   Manual Differential    Collection Time: 10/12/22  6:38 AM   Result Value Ref Range    Neut Man 82 (H) 47 - 80 %    Band Neutrophil Man 3 0 - 11 %    Lymph Man 3 (L) 13 - 40 %    Monocyte Man 10 2 - 11 %    [place] Man 2 %     Abs Neut calc 11.571 (H) 2.1 - 9.2 x10(3)/mcL    Abs Mono 1.33 (H) 0.1 - 1.3 x10(3)/mcL    Abs Lymp 0.399 (L) 0.6 - 4.6 x10(3)/mcL    RBC Morph Normal Normal    Platelet Est Adequate Normal, Adequate   OCCULT BLOOD STOOL, CA SCREEN 2ND SPEC    Collection Time: 10/12/22  1:18 PM   Result Value Ref Range    Stool Color 2 Brown     Stool Consistancy 2 formed     Occult Blood Stool 2 Negative Negative, N/A   Renal Function Panel    Collection Time: 10/13/22  3:06 AM   Result Value Ref Range    Sodium Level 138 136 - 145 mmol/L    Potassium Level 4.4 3.5 - 5.1 mmol/L    Chloride 102 98 - 107 mmol/L    Carbon Dioxide 25 23 - 31 mmol/L    Glucose Level 65 (L) 82 - 115 mg/dL    Blood Urea Nitrogen 17.0 9.8 - 20.1 mg/dL    Creatinine 0.82 0.55 - 1.02 mg/dL    Calcium Level Total 9.4 8.4 - 10.2 mg/dL    Albumin Level 1.6 (L) 3.4 - 4.8 gm/dL    Phosphorus Level 2.7 2.3 - 4.7 mg/dL    eGFR >60 mls/min/1.73/m2   Magnesium    Collection Time: 10/13/22  3:06 AM   Result Value Ref Range    Magnesium Level 1.60 1.60 - 2.60 mg/dL   C-Reactive Protein    Collection Time: 10/13/22  3:06 AM   Result Value Ref Range    C-Reactive Protein 106.70 (H) <5.00 mg/L   CBC with Differential    Collection Time: 10/13/22  3:06 AM   Result Value Ref Range    WBC 16.5 (H) 4.5 - 11.5 x10(3)/mcL    RBC 3.85 (L) 4.20 - 5.40 x10(6)/mcL    Hgb 11.0 (L) 12.0 - 16.0 gm/dL    Hct 36.4 (L) 37.0 - 47.0 %    MCV 94.5 (H) 80.0 - 94.0 fL    MCH 28.6 27.0 - 31.0 pg    MCHC 30.2 (L) 33.0 - 36.0 mg/dL    RDW 20.1 (H) 11.5 - 17.0 %    Platelet 469 (H) 130 - 400 x10(3)/mcL    MPV 9.7 7.4 - 10.4 fL    IG# 1.21 (H) 0 - 0.04 x10(3)/mcL    IG% 7.3 %   Manual Differential    Collection Time: 10/13/22  3:06 AM   Result Value Ref Range    Neut Man 81 (H) 47 - 80 %    Band Neutrophil Man 4 0 - 11 %    Lymph Man 4 (L) 13 - 40 %    Monocyte Man 9 2 - 11 %    [place] Man 1 %    Myelo Man 1 %    Abs Neut calc 14.355 (H) 2.1 - 9.2 x10(3)/mcL    Abs Mono 1.485 (H) 0.1 - 1.3  x10(3)/mcL    Abs Lymp 0.66 0.6 - 4.6 x10(3)/mcL    RBC Morph Abnormal (A) Normal    Anisocyte Slight (A) (none)    Hypochrom Slight (A) (none)    Platelet Est Increased (A) Normal, Adequate   Comprehensive Metabolic Panel    Collection Time: 10/14/22  4:10 AM   Result Value Ref Range    Sodium Level 136 136 - 145 mmol/L    Potassium Level 5.0 3.5 - 5.1 mmol/L    Chloride 97 (L) 98 - 107 mmol/L    Carbon Dioxide 30 23 - 31 mmol/L    Glucose Level 125 (H) 82 - 115 mg/dL    Blood Urea Nitrogen 19.0 9.8 - 20.1 mg/dL    Creatinine 0.92 0.55 - 1.02 mg/dL    Calcium Level Total 9.6 8.4 - 10.2 mg/dL    Protein Total 7.1 5.8 - 7.6 gm/dL    Albumin Level 1.7 (L) 3.4 - 4.8 gm/dL    Globulin 5.4 (H) 2.4 - 3.5 gm/dL    Albumin/Globulin Ratio 0.3 (L) 1.1 - 2.0 ratio    Bilirubin Total 0.4 <=1.5 mg/dL    Alkaline Phosphatase 160 (H) 40 - 150 unit/L    Alanine Aminotransferase 11 0 - 55 unit/L    Aspartate Aminotransferase 13 5 - 34 unit/L    eGFR >60 mls/min/1.73/m2   CBC with Differential    Collection Time: 10/14/22  4:10 AM   Result Value Ref Range    WBC 10.8 4.5 - 11.5 x10(3)/mcL    RBC 4.20 4.20 - 5.40 x10(6)/mcL    Hgb 12.1 12.0 - 16.0 gm/dL    Hct 40.6 37.0 - 47.0 %    MCV 96.7 (H) 80.0 - 94.0 fL    MCH 28.8 27.0 - 31.0 pg    MCHC 29.8 (L) 33.0 - 36.0 mg/dL    RDW 20.6 (H) 11.5 - 17.0 %    Platelet 486 (H) 130 - 400 x10(3)/mcL    MPV 10.0 7.4 - 10.4 fL    IG# 1.06 (H) 0 - 0.04 x10(3)/mcL    IG% 9.8 %    NRBC% 0.6 %   Manual Differential    Collection Time: 10/14/22  4:10 AM   Result Value Ref Range    Neut Man 82 (H) 47 - 80 %    Band Neutrophil Man 3 0 - 11 %    Lymph Man 10 (L) 13 - 40 %    Monocyte Man 3 2 - 11 %    [place] Man 2 %    Abs Neut calc 9.396 (H) 2.1 - 9.2 x10(3)/mcL    Abs Mono 0.324 0.1 - 1.3 x10(3)/mcL    Abs Lymp 1.08 0.6 - 4.6 x10(3)/mcL    RBC Morph Abnormal (A) Normal    Anisocyte 1+ (A) (none)    Platelet Est Increased (A) Normal, Adequate   Renal Function Panel    Collection Time: 10/15/22  4:32 AM    Result Value Ref Range    Sodium Level 138 136 - 145 mmol/L    Potassium Level 5.3 (H) 3.5 - 5.1 mmol/L    Chloride 98 98 - 107 mmol/L    Carbon Dioxide 30 23 - 31 mmol/L    Glucose Level 177 (H) 82 - 115 mg/dL    Blood Urea Nitrogen 24.0 (H) 9.8 - 20.1 mg/dL    Creatinine 0.80 0.55 - 1.02 mg/dL    Calcium Level Total 9.2 8.4 - 10.2 mg/dL    Albumin Level 1.7 (L) 3.4 - 4.8 gm/dL    Phosphorus Level 2.4 2.3 - 4.7 mg/dL    eGFR >60 mls/min/1.73/m2   Magnesium    Collection Time: 10/15/22  4:32 AM   Result Value Ref Range    Magnesium Level 1.70 1.60 - 2.60 mg/dL   BNP    Collection Time: 10/15/22  4:32 AM   Result Value Ref Range    Natriuretic Peptide 991.6 (H) <=100.0 pg/mL   C-Reactive Protein    Collection Time: 10/15/22  4:32 AM   Result Value Ref Range    C-Reactive Protein 40.00 (H) <5.00 mg/L   CBC with Differential    Collection Time: 10/15/22  4:32 AM   Result Value Ref Range    WBC 8.8 4.5 - 11.5 x10(3)/mcL    RBC 4.14 (L) 4.20 - 5.40 x10(6)/mcL    Hgb 12.0 12.0 - 16.0 gm/dL    Hct 39.0 37.0 - 47.0 %    MCV 94.2 (H) 80.0 - 94.0 fL    MCH 29.0 27.0 - 31.0 pg    MCHC 30.8 (L) 33.0 - 36.0 mg/dL    RDW 20.0 (H) 11.5 - 17.0 %    Platelet 528 (H) 130 - 400 x10(3)/mcL    MPV 10.0 7.4 - 10.4 fL    IG# 0.55 (H) 0 - 0.04 x10(3)/mcL    IG% 6.2 %   Manual Differential    Collection Time: 10/15/22  4:32 AM   Result Value Ref Range    Neut Man 84 (H) 47 - 80 %    Band Neutrophil Man 4 0 - 11 %    Lymph Man 5 (L) 13 - 40 %    Monocyte Man 3 2 - 11 %    [place] Man 3 %    Myelo Man 1 %    Abs Neut calc 8.096 2.1 - 9.2 x10(3)/mcL    Abs Mono 0.264 0.1 - 1.3 x10(3)/mcL    Abs Lymp 0.44 (L) 0.6 - 4.6 x10(3)/mcL    RBC Morph Abnormal (A) Normal    Anisocyte 1+ (A) (none)    Platelet Est Increased (A) Normal, Adequate   CBC with Differential    Collection Time: 10/16/22  3:38 AM   Result Value Ref Range    WBC 11.5 4.5 - 11.5 x10(3)/mcL    RBC 4.04 (L) 4.20 - 5.40 x10(6)/mcL    Hgb 11.5 (L) 12.0 - 16.0 gm/dL    Hct 39.1 37.0 -  47.0 %    MCV 96.8 (H) 80.0 - 94.0 fL    MCH 28.5 27.0 - 31.0 pg    MCHC 29.4 (L) 33.0 - 36.0 mg/dL    RDW 19.8 (H) 11.5 - 17.0 %    Platelet 550 (H) 130 - 400 x10(3)/mcL    MPV 9.2 7.4 - 10.4 fL    IG# 0.64 (H) 0 - 0.04 x10(3)/mcL    IG% 5.5 %   Manual Differential    Collection Time: 10/16/22  3:38 AM   Result Value Ref Range    Neut Man 86 (H) 47 - 80 %    Band Neutrophil Man 2 0 - 11 %    Lymph Man 6 (L) 13 - 40 %    Monocyte Man 4 2 - 11 %    [place] Man 2 %    Abs Neut calc 10.35 (H) 2.1 - 9.2 x10(3)/mcL    Abs Mono 0.46 0.1 - 1.3 x10(3)/mcL    Abs Lymp 0.69 0.6 - 4.6 x10(3)/mcL    RBC Morph Abnormal (A) Normal    Anisocyte 1+ (A) (none)    Platelet Est Increased (A) Normal, Adequate   Renal Function Panel    Collection Time: 10/16/22  3:39 AM   Result Value Ref Range    Sodium Level 136 136 - 145 mmol/L    Potassium Level 5.1 3.5 - 5.1 mmol/L    Chloride 99 98 - 107 mmol/L    Carbon Dioxide 29 23 - 31 mmol/L    Glucose Level 259 (H) 82 - 115 mg/dL    Blood Urea Nitrogen 27.0 (H) 9.8 - 20.1 mg/dL    Creatinine 0.76 0.55 - 1.02 mg/dL    Calcium Level Total 8.8 8.4 - 10.2 mg/dL    Albumin Level 2.0 (L) 3.4 - 4.8 gm/dL    Phosphorus Level 2.3 2.3 - 4.7 mg/dL    eGFR >60 mls/min/1.73/m2   Magnesium    Collection Time: 10/16/22  3:39 AM   Result Value Ref Range    Magnesium Level 1.60 1.60 - 2.60 mg/dL   Renal Function Panel    Collection Time: 10/17/22  4:42 AM   Result Value Ref Range    Sodium Level 139 136 - 145 mmol/L    Potassium Level 4.4 3.5 - 5.1 mmol/L    Chloride 98 98 - 107 mmol/L    Carbon Dioxide 34 (H) 23 - 31 mmol/L    Glucose Level 342 (H) 82 - 115 mg/dL    Blood Urea Nitrogen 25.0 (H) 9.8 - 20.1 mg/dL    Creatinine 0.71 0.55 - 1.02 mg/dL    Calcium Level Total 8.7 8.4 - 10.2 mg/dL    Albumin Level 1.7 (L) 3.4 - 4.8 gm/dL    Phosphorus Level 1.4 (L) 2.3 - 4.7 mg/dL    eGFR >60 mls/min/1.73/m2   Magnesium    Collection Time: 10/17/22  4:42 AM   Result Value Ref Range    Magnesium Level 1.70 1.60 - 2.60  mg/dL   CBC with Differential    Collection Time: 10/17/22  4:43 AM   Result Value Ref Range    WBC 11.1 4.5 - 11.5 x10(3)/mcL    RBC 3.98 (L) 4.20 - 5.40 x10(6)/mcL    Hgb 11.5 (L) 12.0 - 16.0 gm/dL    Hct 38.6 37.0 - 47.0 %    MCV 97.0 (H) 80.0 - 94.0 fL    MCH 28.9 27.0 - 31.0 pg    MCHC 29.8 (L) 33.0 - 36.0 mg/dL    RDW 19.7 (H) 11.5 - 17.0 %    Platelet 493 (H) 130 - 400 x10(3)/mcL    MPV 9.3 7.4 - 10.4 fL    IG# 0.56 (H) 0 - 0.04 x10(3)/mcL    IG% 5.1 %   Manual Differential    Collection Time: 10/17/22  4:43 AM   Result Value Ref Range    Neut Man 89 (H) 47 - 80 %    Band Neutrophil Man 2 0 - 11 %    Lymph Man 3 (L) 13 - 40 %    Monocyte Man 4 2 - 11 %    [place] Man 2 %    Abs Neut calc 10.323 (H) 2.1 - 9.2 x10(3)/mcL    Abs Mono 0.444 0.1 - 1.3 x10(3)/mcL    Abs Lymp 0.333 (L) 0.6 - 4.6 x10(3)/mcL    RBC Morph Abnormal (A) Normal    Anisocyte 1+ (A) (none)    Hyperseg 2+ (A) (none)    Platelet Est Increased (A) Normal, Adequate     Assessment/Plan:     Problem List Items Addressed This Visit          GI    Abnormal MRI of the abdomen     Per review of laboratory results, she has a history of intermittently elevated LFTs since August 2021, predominantly isolated elevated alkaline phosphatase.  MRI MRCP without contrast March 21, 2022 revealed:   1. Extrahepatic biliary ductal dilatation similar to December 2021.  Small distal common bile duct filling defects seen on some series. I  cannot exclude choledocholithiasis. Consider ERCP if there is other  clinical evidence of biliary obstruction.  2. Cholelithiasis. No MRI evidence of acute cholecystitis.  3. Hepatomegaly with steatosis.  4. Small right pleural effusion.     CT scan thorax, abdomen, and pelvis with contrast March 21, 2022 revealed:  Right upper lobe irregular consolidation overall similar size although  associated serpiginous enhancement at medial apex has increased 53 mm  x 17 mm image 62 series 602 sagittal versus prior measurement 38 mm x  15 mm  and increasing right pleural effusion     No new or adverse finding in the abdomen or pelvis    Further imaging since last office visit is as follows:  -06/29/2022:  Restaging PET-CT (comparison:  CT C/A/P 03/21/2022):  1. 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  Max SUV is 12 and corresponds with area of irregular enhancement seen on previous diagnostic contrast enhanced CT.  2. Clustered micro nodularity at the right lung base with mild FDG avidity is nonspecific.  This could be related to endobronchial spread of secretions with inflammatory or infectious bronchiolitis related to the necrotic mass at the apex versus intrapulmonary metastases.  This is new compared to prior diagnostic CT.  3. Vague nodular infiltrate at the posterior left upper lobe is mildly FDG avid and also new compared to prior diagnostic CT.  Differential considerations include pneumonitis or malignancy.  4. Mild uptake in the region of recent procedure compatible with postoperative inflammation and healing response.  Continued attention recommended on follow-up exams.  5. Pronounced, asymmetric hypermetabolism at the right vocal cord.  This could be related to compensatory function of the right vocal cord due to left vocal cord paralysis or infiltrative lesion of the right cord.  Correlate with direct visual inspection.    -07/11/2022:  Limited abdominal ultrasound: Heterogenicity of the liver parenchyma with no definite abnormal echogenicity. Cholelithiasis with a wall enhancing sign. Persistent dilatation of the common bile duct; if clinically indicated MRCP might prove helpful for further assessment.    -08/29/2022: CTA chest (PE protocol): Large cavitary mass right upper lobe which appears worse than prior examination (involving essentially the entire right upper hemithorax; causing sharp truncation of the right upper lobe pulmonary artery; adenopathy right hilum and right  paratracheal region; mass appears more prominent since 03/21/2022; small right-sided pleural effusion; small pericardial effusion.    -09/10/2022: CT chest with contrast (comparison:  CTA 08/29/2022):  Similar appearance of large cavitary lesion right upper lobe with extension to right hilum and some displacement versus invasion of suprahilar right mediastinum; interval decrease in alveolar opacities left lung base; interval decrease in pericardial effusion, etc.    -09/12/2022:  Restaging CTs C/A/P with contrast:  Large cavitary mass right upper lobe with associated area of adenopathy and pleural thickening, overall unchanged since 09/10/2022 exam; porcelain gallbladder, filling defects within the gallbladder.     Laboratory results June 2, 2022 revealed WBC 12.3, hemoglobin 9.5, hematocrit 31.2, MCV 97.5, MCHC 30.4, RDW 19.9, platelets 823, and otherwise unremarkable CBC; albumin 2.1, total protein 7.9, globulin 5.8, alkaline phosphatase 201, and otherwise unremarkable CMP.  Lifestyle and dietary modifications provided  Recommend good control of comorbidities  Will proceed with further workup for elevated LFTs (see above)  Call with updates  F/u clinic visit with NP in 4 months on a Tuesday when Dr. Valadez is here  ED precautions provided         Relevant Orders    Actin (Smooth Muscle) Antibody (IgG)    Alpha-1-Antitrypsin    Anti-Liver, Kidney, Microsome Ab    Antimitochondrial Antibody    ANTINUCLEAR ANTIBODIES    Ceruloplasmin    Ferritin    Iron and TIBC    Tissue Transglutaminase, IgA    Hepatitis C Antibody    Hepatitis B Core Antibody, IgM    Hepatitis A Antibody, IgM    Hepatitis B Surface Antigen    MRI MRCP Without Contrast       Other    Elevated alkaline phosphatase level - Primary     Per review of laboratory results, she has a history of intermittently elevated LFTs since August 2021, predominantly isolated elevated alkaline phosphatase.  MRI MRCP without contrast March 21, 2022 revealed:   1.  Extrahepatic biliary ductal dilatation similar to December 2021.  Small distal common bile duct filling defects seen on some series. I  cannot exclude choledocholithiasis. Consider ERCP if there is other  clinical evidence of biliary obstruction.  2. Cholelithiasis. No MRI evidence of acute cholecystitis.  3. Hepatomegaly with steatosis.  4. Small right pleural effusion.     CT scan thorax, abdomen, and pelvis with contrast March 21, 2022 revealed:  Right upper lobe irregular consolidation overall similar size although  associated serpiginous enhancement at medial apex has increased 53 mm  x 17 mm image 62 series 602 sagittal versus prior measurement 38 mm x  15 mm and increasing right pleural effusion     No new or adverse finding in the abdomen or pelvis    Further imaging since last office visit is as follows:  -06/29/2022:  Restaging PET-CT (comparison:  CT C/A/P 03/21/2022):  1. 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  Max SUV is 12 and corresponds with area of irregular enhancement seen on previous diagnostic contrast enhanced CT.  2. Clustered micro nodularity at the right lung base with mild FDG avidity is nonspecific.  This could be related to endobronchial spread of secretions with inflammatory or infectious bronchiolitis related to the necrotic mass at the apex versus intrapulmonary metastases.  This is new compared to prior diagnostic CT.  3. Vague nodular infiltrate at the posterior left upper lobe is mildly FDG avid and also new compared to prior diagnostic CT.  Differential considerations include pneumonitis or malignancy.  4. Mild uptake in the region of recent procedure compatible with postoperative inflammation and healing response.  Continued attention recommended on follow-up exams.  5. Pronounced, asymmetric hypermetabolism at the right vocal cord.  This could be related to compensatory function of the right vocal cord due to left vocal  cord paralysis or infiltrative lesion of the right cord.  Correlate with direct visual inspection.    -07/11/2022:  Limited abdominal ultrasound: Heterogenicity of the liver parenchyma with no definite abnormal echogenicity. Cholelithiasis with a wall enhancing sign. Persistent dilatation of the common bile duct; if clinically indicated MRCP might prove helpful for further assessment.    -08/29/2022: CTA chest (PE protocol): Large cavitary mass right upper lobe which appears worse than prior examination (involving essentially the entire right upper hemithorax; causing sharp truncation of the right upper lobe pulmonary artery; adenopathy right hilum and right paratracheal region; mass appears more prominent since 03/21/2022; small right-sided pleural effusion; small pericardial effusion.    -09/10/2022: CT chest with contrast (comparison:  CTA 08/29/2022):  Similar appearance of large cavitary lesion right upper lobe with extension to right hilum and some displacement versus invasion of suprahilar right mediastinum; interval decrease in alveolar opacities left lung base; interval decrease in pericardial effusion, etc.    -09/12/2022:  Restaging CTs C/A/P with contrast:  Large cavitary mass right upper lobe with associated area of adenopathy and pleural thickening, overall unchanged since 09/10/2022 exam; porcelain gallbladder, filling defects within the gallbladder.     Laboratory results June 2, 2022 revealed WBC 12.3, hemoglobin 9.5, hematocrit 31.2, MCV 97.5, MCHC 30.4, RDW 19.9, platelets 823, and otherwise unremarkable CBC; albumin 2.1, total protein 7.9, globulin 5.8, alkaline phosphatase 201, and otherwise unremarkable CMP.  Lifestyle and dietary modifications provided  Recommend good control of comorbidities  Will proceed with further workup for elevated LFTs (see above)  Call with updates  F/u clinic visit with NP in 4 months on a Tuesday when Dr. Valadez is here  ED precautions provided         Relevant  Orders    Actin (Smooth Muscle) Antibody (IgG)    Alpha-1-Antitrypsin    Anti-Liver, Kidney, Microsome Ab    Antimitochondrial Antibody    ANTINUCLEAR ANTIBODIES    Ceruloplasmin    Ferritin    Iron and TIBC    Tissue Transglutaminase, IgA    Hepatitis C Antibody    Hepatitis B Core Antibody, IgM    Hepatitis A Antibody, IgM    Hepatitis B Surface Antigen    MRI MRCP Without Contrast

## 2022-10-21 NOTE — ASSESSMENT & PLAN NOTE
Per review of laboratory results, she has a history of intermittently elevated LFTs since August 2021, predominantly isolated elevated alkaline phosphatase.  MRI MRCP without contrast March 21, 2022 revealed:   1. Extrahepatic biliary ductal dilatation similar to December 2021.  Small distal common bile duct filling defects seen on some series. I  cannot exclude choledocholithiasis. Consider ERCP if there is other  clinical evidence of biliary obstruction.  2. Cholelithiasis. No MRI evidence of acute cholecystitis.  3. Hepatomegaly with steatosis.  4. Small right pleural effusion.     CT scan thorax, abdomen, and pelvis with contrast March 21, 2022 revealed:  Right upper lobe irregular consolidation overall similar size although  associated serpiginous enhancement at medial apex has increased 53 mm  x 17 mm image 62 series 602 sagittal versus prior measurement 38 mm x  15 mm and increasing right pleural effusion     No new or adverse finding in the abdomen or pelvis    Further imaging since last office visit is as follows:  -06/29/2022:  Restaging PET-CT (comparison:  CT C/A/P 03/21/2022):  1. 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  Max SUV is 12 and corresponds with area of irregular enhancement seen on previous diagnostic contrast enhanced CT.  2. Clustered micro nodularity at the right lung base with mild FDG avidity is nonspecific.  This could be related to endobronchial spread of secretions with inflammatory or infectious bronchiolitis related to the necrotic mass at the apex versus intrapulmonary metastases.  This is new compared to prior diagnostic CT.  3. Vague nodular infiltrate at the posterior left upper lobe is mildly FDG avid and also new compared to prior diagnostic CT.  Differential considerations include pneumonitis or malignancy.  4. Mild uptake in the region of recent procedure compatible with postoperative inflammation and healing response.   Continued attention recommended on follow-up exams.  5. Pronounced, asymmetric hypermetabolism at the right vocal cord.  This could be related to compensatory function of the right vocal cord due to left vocal cord paralysis or infiltrative lesion of the right cord.  Correlate with direct visual inspection.    -07/11/2022:  Limited abdominal ultrasound: Heterogenicity of the liver parenchyma with no definite abnormal echogenicity. Cholelithiasis with a wall enhancing sign. Persistent dilatation of the common bile duct; if clinically indicated MRCP might prove helpful for further assessment.    -08/29/2022: CTA chest (PE protocol): Large cavitary mass right upper lobe which appears worse than prior examination (involving essentially the entire right upper hemithorax; causing sharp truncation of the right upper lobe pulmonary artery; adenopathy right hilum and right paratracheal region; mass appears more prominent since 03/21/2022; small right-sided pleural effusion; small pericardial effusion.    -09/10/2022: CT chest with contrast (comparison:  CTA 08/29/2022):  Similar appearance of large cavitary lesion right upper lobe with extension to right hilum and some displacement versus invasion of suprahilar right mediastinum; interval decrease in alveolar opacities left lung base; interval decrease in pericardial effusion, etc.    -09/12/2022:  Restaging CTs C/A/P with contrast:  Large cavitary mass right upper lobe with associated area of adenopathy and pleural thickening, overall unchanged since 09/10/2022 exam; porcelain gallbladder, filling defects within the gallbladder.     Laboratory results June 2, 2022 revealed WBC 12.3, hemoglobin 9.5, hematocrit 31.2, MCV 97.5, MCHC 30.4, RDW 19.9, platelets 823, and otherwise unremarkable CBC; albumin 2.1, total protein 7.9, globulin 5.8, alkaline phosphatase 201, and otherwise unremarkable CMP.  Lifestyle and dietary modifications provided  Recommend good control of  comorbidities  Will proceed with further workup for elevated LFTs (see above)  Call with updates  F/u clinic visit with NP in 4 months on a Tuesday when Dr. Valadez is here  ED precautions provided

## 2022-10-24 NOTE — DISCHARGE SUMMARY
Discharge Summary   [facility] Hospital Medicine           Patient Name: Adrienne Urbina  : 1955  Age: 67 y.o.  MRN: [id number]    Admit Date: 10/10/2022 12:44 PM  Discharge Date: 10/17/2022  4:01 PM     Admitting Physician:  Jacek Melendez MD   Attending Physician: Katheryn Morris MD  Discharge Physician: Katheryn Morris MD  Primary Care Physician: Gregorio Cherry NP      Discharge Diagnoses:  Final Active Diagnoses:    Diagnosis Date Noted POA    Cavitary lesion of lung [J98.4] 10/12/2022 Yes     Chronic    Stage IV adenocarcinoma of lung [C34.90] 2022 Yes     Chronic    Anxiety and depression [F41.9, F32.A]  Yes     Chronic    COPD (chronic obstructive pulmonary disease) [J44.9] 10/10/2022 Yes     Chronic    PAF (paroxysmal atrial fibrillation) [I48.0] 10/10/2022 Yes     Chronic      Problems Resolved During this Admission:    Diagnosis Date Noted Date Resolved POA    PRINCIPAL PROBLEM:  Severe sepsis [A41.9, R65.20] 10/10/2022 10/17/2022 Yes    Acute on chronic respiratory failure [J96.20] 10/10/2022 10/17/2022 Yes    Pneumonia due to infectious organism [J18.9] 10/10/2022 10/17/2022 Yes    H/O Brain metastases (treated) [C79.31] 2022 10/17/2022 Yes     Chronic    Symptomatic anemia [D64.9] 2022 10/17/2022 Yes       Consults:  Consults (From admission, onward)          Status Ordering Provider     Inpatient consult to Cardiology  Once        Provider:  Markos Sarmiento MD    Completed KATHERYN MORRIS     Inpatient consult to Hematology/Oncology  Once        Provider:  Lindsay Rudolph MD    Completed KATHERYN MORRIS             Procedures :   No admission procedures for hospital encounter.     CC: Shortness of Breath (Brought per [facility] for SOB since this am. Hx of stage 3 lung CA) and weakness , SOB and low oxygen        HPI:  67 years old  female past medical history of stage IV lung cancer with metastasis to the brain, chronic hypoxic respiratory  failure due to COPD, AFib, hypertension was brought to the hospital via EMS.    Patient herself is a poor historian, she is weak she has fatigue she is tired and her daughter provides majority of the information.    Patient had chemotherapy about 2 weeks ago last time.  She follows up with [facility].  In the past patient had brain metastasis treated but per family member they are very small at this time and there are not going to do anything with it other than continue current chemotherapy   Patient at home uses 2 L of oxygen during the night only, she uses nebulized treatment, she is on long term prednisone for her COPD.    Patient follows up with Cardiology as well for AFib.  She is on aspirin as a blood thinner and metoprolol but due to her low blood pressure she has not been able to take it.    On arrival to emergency room noticed to be tachycardic, hypoxic, tachypneic and low blood pressure.  Her blood work shows anemia.  Patient has been coughing greenish and sometime she has blood in the sputum.    Admitted to the hospital as an inpatient with severe sepsis most likely due to compressive pneumonia.    Hospital Course:   10/11  Feels better today. Hasnt been OOB. Dyspnea improved. Cough remains productive. Sputum sent for Cx. On Levaquin. Hgb 7.0, 2u PRBC ordered. K+ 3.4, replacing.     10/12  Transfused 2u PRBC yest, Hgb 9.7 from 7.0. Cr 1.04 from 0.88 and 0.81. Alb 1.7. States she feels slightly better today. Still with productive cough. Got OOB and sat in recliner about 2h today. States decent PO intake. Denies any new complaints. Had a very long discussion with pt and her daughters in room about poor overall prognosis and end-of-life issues/planning. Pt appears receptive to idea of hospice but daughters are visibly uncomfortable addressing likelihood of pt deteriorating in very near future and avoid even discussing hospice at this time. Pt became very tachycardic today with -180's.  Lopressor 5mg IV minimally effective. Digoxin 0.125mg IV ineffective. Cardizem 10mg IV ordered currently and awaiting response.     10/13  HR controlled in ICU. Feels well today and breathing comfortably. Followed up on EOL wishes/care discussion from yesterday and asked her thoughts and her family's thoughts. She is still unsure but does state she wishes to die at home. She called her daughter and we spoke to her on speakerphone. She expresses an overall tone of denial pertaining to her mothers advancing terminal condition and avoidant of any talk or plans other than aggressive medical treatment. There is a palpable feeling of reluctant submissiveness by the patient to her daughter. Code status was later discussed alone with pt and she states that she wishes to remain FULL CODE. Thought pts condition is improved somewhat from presentation, it is felt that pt very unlikely to improve much more significantly from current status. If she is able to progress enough to leave hospital and cont to choose continued aggressive medical interventions, her likelihood of readmission soon afterwards is very high.   - WBC 16.5 cont to trend upwards despite improvement in CRP of 106.7 from >240. Chem panel rather normal x Albumin 1.6. SputumCx prelim MANY GNR's; currently on Levaquin/Azithro. CXR today unchanged.    10/14  No new complaints. Feels the same. On Cardizem gtt higher rate. Seen by Cardiology and transitioning to PO Cardizem and Increasing Metoprolol. Spoke with pts daughters at length about current condition, prognosis and EOL issues. Their expectations are for pt to go home after PNA tx'd. Will contact pts Oncologist,  in [place].    10/15  No new complaints. Walked with PT. Still issues with tachycardia, currently in Aflutter. Back on Cardizem gtt. Responded to Lopressor 5mg IV x1. HR last around 100. WBC improving. K+ 5.3 and slowly trending up past couple days. Lokelma given today. Cont on Meropenem  for Serratia marcescens on sputum Cx.    10/16  Feels well today. No current complaints. Pt converted to NSR with controlled HR in 80's overnight after Lopressor PO incr from 25mg to 100mg BID. Taken off Cardizem gtt. Has remained stable since. She will be downgraded freom ICU and transferred to the floor. Labs stable if not very slightly improved. Daughter updated when visiting pt.    10/17  Pt w/o new complaints and feels well. Discharged home in stable condition.     Significant Diagnostic Studies: See Full reports for all details    X-Ray Chest 1 View    Result Date: 10/15/2022  EXAMINATION: XR CHEST 1 VIEW CLINICAL HISTORY: pneumonia; TECHNIQUE: Single frontal view of the chest was performed. COMPARISON: Chest radiograph from 10/13/2022 FINDINGS: Right IJ port is present with tip in the high SVC.  Consolidation at the right upper lobe is unchanged.  Patchy consolidation at the right lower lobe is unchanged.  No new consolidation.  No pneumothorax.     1. No interval change compared to 10/13/2022. Electronically signed by: Enrrique Weston MD Date:    10/15/2022 Time:    09:05    X-Ray Chest 1 View    Result Date: 10/13/2022  EXAMINATION: XR CHEST 1 VIEW CLINICAL HISTORY: pneumonia;, . COMPARISON: 10/10/2022 FINDINGS: An AP view or more reveals the heart to be normal in size.  There is continued opacification of the right upper lung.  A cavitary lesion again evident at the right mid lung.  Mild hazy interstitial opacities are again evident at the lower lungs bilaterally.  Right central line/MediPort is unchanged.  The trachea is to the right of midline.  Atherosclerosis is seen within the aorta.     1. Persistent opacification right upper lung with cavitary lesion right mid lung which have a similar appearance to the prior exam 2. Hazy interstitial opacities at the lower lungs bilaterally 3. Atherosclerosis 4. Right central line/MediPort Electronically signed by: Orestes Bahena Date:    10/13/2022  Time:    09:20    X-Ray Chest 1 View    Result Date: 10/10/2022  EXAMINATION: XR CHEST 1 VIEW CLINICAL HISTORY: , Shortness of breath. COMPARISON: 09/10/2022 FINDINGS: An AP view or more reveals the heart to be normal in size.  There is continued opacification of the right upper lung.  A cavitary lesion is evident at the right mid lung.  Right central line/MediPort is unchanged in position.  Infiltrate, atelectasis, pleural reaction, and/or scarring is evident at the lung bases.     1. Continued opacification of the right upper lung with cavitary lesion right mid lung 2. Infiltrate, atelectasis, and small pleural reaction lung bases 3. Right central line/MediPort Electronically signed by: Orestes Bahena Date:    10/10/2022 Time:    14:31    CTA Chest Non-Coronary (PE Studies)    Result Date: 10/10/2022  EXAMINATION: CTA CHEST NON CORONARY (PE STUDIES) CLINICAL HISTORY: Pulmonary embolism (PE) suspected, high prob; TECHNIQUE: Helical imaging of the pulmonary arteries was performed.  Soft tissue and lung algorithms were applied.  Axial, sagittal and coronal images were generated.  MIP images were performed. Automated exposure control software was utilized to limit radiation exposure to the patient.  Total DLP for this study was 186 Mgycm. COMPARISON: CT of the chest 09/12/2022. FINDINGS: There is no central pulmonary arterial filling defect to suggest pulmonary thromboembolism. A large cavitary lesion is again noted in the right upper lobe with extends into the right hilum and mediastinum.  There is an air-fluid level within the cavitary lesion.  Right middle lobe consolidation is new since the prior study.  There is some nodularity in the basal segments of the right lower lobe.  Advanced COPD is again noted.  Small pleural effusions are present, left larger than right. There is no acute finding in the included abdomen, included body wall or included base of the neck.     1. No convincing evidence of pulmonary  thromboembolism. 2. Large cavitary lesion is again noted in the right upper lobe. 3. Right middle lobe consolidation. 4. Small pleural effusions left larger than the right. 5. There is newly apparent nodularity in the basal segments of right lower lobe.  These are nonspecific and likely inflammatory given their interval appearance.  Lymphangitic carcinomatosis is not excluded. 6. COPD. Electronically signed by: Kapil Melendez MD Date:    10/10/2022 Time:    17:42    X-Ray Chest AP Portable    Result Date: 10/10/2022  EXAMINATION: XR CHEST AP PORTABLE CLINICAL HISTORY: pneumonia f/u;, . COMPARISON: 10/10/2022 at 13:13. FINDINGS: An AP view or more reveals the heart to be normal in size.  There is continued opacification of the right upper lung.  A cavitary lesion again noted to the right mid lung.  Mild hazy interstitial opacities are again evident at the lower lungs bilaterally.  Right central line/MediPort is unchanged.  Atherosclerosis is seen within the aorta.  The trachea is to the right of midline.     1. Persistent opacification right upper lung with cavitary lesion right mid lung 2. Mild hazy interstitial opacities again evident at the lower lungs bilaterally. 3. Right central line/MediPort 4. Atherosclerosis Electronically signed by: Orestes Bahena Date:    10/10/2022 Time:    16:36    NM PET CT Routine FDG    Result Date: 9/30/2022  EXAMINATION: NM PET CT ROUTINE CLINICAL HISTORY: Non-small cell lung cancer (NSCLC), metastatic, assess treatment response; Malignant neoplasm of unspecified part of right bronchus or lung TECHNIQUE: Nine mCi of F18-FDG was administered intravenously.  After an approximately 60 min distribution time, PET/CT images were acquired from the skull base to the upper thighs.  Low dose CT images were acquired for anatomic correlation and attenuation correction. Automatic exposure control, adjustment of mA/kV or iterative reconstruction technique was used to reduce radiation. DLP: 640  mGycm Blood glucose: Not applicable.  Patient not a known diabetic. COMPARISON: PET-CT 06/29/2022 FINDINGS: Quality of the study: Adequate. LINES AND TUBES: Right internal jugular port terminates in the SVC. HEAD/NECK: Persistent asymmetric uptake at the right focal cord with max SUV of 10; previously max SUV 17. CHEST: Large peripherally hypermetabolic right apical mass is again seen with max SUV of 8.5 measured posterior superiorly; previous max SUV 12.  This mass has necrotic and cavitary components similar to previous. There is bronchial wall thickening, nodularity with consolidative opacities at the lung bases, most pronounced in the right middle lobe and lingula.  Max SUV in the right middle lobe is 7.7.  Max SUV in the lingula is 6.3.  This is progressed compared to prior exam. On today's exam there is very little cardiac uptake, unchanged from prior.  There is however residual focal hypermetabolism in the region of the right atrial appendage with max SUV of 9.9.  Previously max SUV in this region was 6.5. ABDOMEN/PELVIS: No abnormal metabolic activity seen. Cholelithiasis. MUSCULOSKELETAL: Photopenia related to post treatment change at the upper thoracic spine. Comments: physiologic FDG uptake within the brain, salivary glands, myocardium, GI and  tracts.     1. Large, centrally necrotic right apical pulmonary mass appears similar to the prior exam with persistent peripheral hypermetabolism.  Max SUV is 8.5 on today's exam compared to 12 previously. 2. Progressive basilar nodular infiltrates and consolidative opacities with increased FDG uptake compared to prior.  Appearance remains nonspecific and may be related to bronchiolitis/pneumonitis or neoplasm 3. Persistent asymmetric uptake at the right vocal cord.  On previous exams there was some question of medialization of the left vocal cord.  This areas obscured by motion on today's exam. 4. Focal uptake at the level of the right atrial appendage made more  "conspicuous on today's exam due to absence of significant FDG uptake elsewhere within the heart.  No correlate abnormality seen on prior contrast enhanced CT.  This is presumed to be physiologic. Electronically signed by: Merced Vazquez Date:    09/30/2022 Time:    14:32       Microbiology Results (last 7 days)       Procedure Component Value Units Date/Time    Blood Culture x two cultures. Draw prior to antibiotics [[id number]]  (Normal) Collected: 10/10/22 1620    Order Status: Completed Specimen: Blood from Antecubital, Left Updated: 10/16/22 1100     CULTURE, BLOOD ([facility]) No Growth at 5 days    Blood Culture [[id number]]  (Normal) Collected: 10/10/22 1623    Order Status: Completed Specimen: Blood from Hand, Right Updated: 10/16/22 1100     CULTURE, BLOOD ([facility]) No Growth at 5 days    Respiratory Culture [[id number]]  (Abnormal)  (Susceptibility) Collected: 10/11/22 1210    Order Status: Completed Specimen: Sputum, Expectorated Updated: 10/14/22 1055     Respiratory Culture Many Serratia marcescens     GRAM STAIN Quality 3+      Many Gram Negative Rods    Mycobacteria and Nocardia Culture [[id number]] Collected: 10/11/22 1235    Order Status: Sent Updated: 10/12/22 1126               Physical Exam:    Blood pressure 127/75, pulse 75, temperature 98.5 °F (36.9 °C), temperature source Oral, resp. rate 16, height 5' 6" (1.676 m), weight 77.7 kg (171 lb 4.8 oz), SpO2 96 %.    Physical Exam  Constitutional:       General: She is not in acute distress.     Appearance: She is ill-appearing. She is not toxic-appearing.   HENT:      Head: Normocephalic and atraumatic.      Nose: Nose normal.      Mouth/Throat:      Mouth: Mucous membranes are moist.      Pharynx: Oropharynx is clear.   Eyes:      Extraocular Movements: Extraocular movements intact.      Conjunctiva/sclera: Conjunctivae normal.      Pupils: Pupils are equal, round, and reactive to light.   Cardiovascular:      Rate and Rhythm: Regular rhythm. Tachycardia " present.      Heart sounds: No murmur heard.  Pulmonary:      Effort: Pulmonary effort is normal. No respiratory distress.      Breath sounds: Rhonchi and rales present. No wheezing.   Abdominal:      General: Bowel sounds are normal.      Palpations: Abdomen is soft.      Tenderness: There is no abdominal tenderness.   Musculoskeletal:         General: Normal range of motion.      Cervical back: Normal range of motion.      Right lower leg: No edema.      Left lower leg: No edema.   Skin:     General: Skin is warm and dry.   Neurological:      General: No focal deficit present.      Mental Status: She is alert and oriented to person, place, and time. Mental status is at baseline.   Psychiatric:         Mood and Affect: Mood normal.         Behavior: Behavior normal.         Thought Content: Thought content normal.         Judgment: Judgment normal.            Discharge Instructions:  Activity: activity as tolerated  Diet: regular diet  Discharge Medications:      Medication List        START taking these medications      aspirin 81 MG Chew  Take 1 tablet (81 mg total) by mouth once daily.     diltiaZEM 360 MG 24 hr capsule  Commonly known as: CARDIZEM CD  Take 1 capsule (360 mg total) by mouth once daily.     doxycycline 100 MG tablet  Commonly known as: VIBRA-TABS  Take 1 tablet (100 mg total) by mouth every 12 (twelve) hours. for 7 days     methylPREDNISolone 4 mg tablet  Commonly known as: MEDROL DOSEPACK  use as directed     mirtazapine 15 MG tablet  Commonly known as: REMERON  Take 1 tablet (15 mg total) by mouth every evening.            CHANGE how you take these medications      metoprolol tartrate 100 MG tablet  Commonly known as: LOPRESSOR  Take 1 tablet (100 mg total) by mouth 2 (two) times daily.  What changed:   medication strength  how much to take            CONTINUE taking these medications      albuterol-ipratropium 2.5 mg-0.5 mg/3 mL nebulizer solution  Commonly known as: DUO-NEB     dicyclomine 10  MG capsule  Commonly known as: BENTYL     gabapentin 300 MG capsule  Commonly known as: NEURONTIN     guaiFENesin-codeine 100-10 mg/5 ml  mg/5 mL syrup  Commonly known as: TUSSI-ORGANIDIN NR     loratadine 10 mg tablet  Commonly known as: CLARITIN     OXYGEN-AIR DELIVERY SYSTEMS MISC            STOP taking these medications      megestroL 400 mg/10 mL (40 mg/mL) Susp  Commonly known as: MEGACE     predniSONE 10 MG tablet  Commonly known as: DELTASONE            ASK your doctor about these medications      HYDROcodone-acetaminophen  mg per tablet  Commonly known as: NORCO  Take 1 tablet by mouth every 4 (four) hours as needed for Pain.  Ask about: Which instructions should I use?               Where to Get Your Medications        These medications were sent to [id number] - [place] - [address]  [address] [place] [address]      Phone: [phone]   diltiaZEM 360 MG 24 hr capsule  doxycycline 100 MG tablet  HYDROcodone-acetaminophen  mg per tablet  methylPREDNISolone 4 mg tablet  metoprolol tartrate 100 MG tablet  mirtazapine 15 MG tablet       Information about where to get these medications is not yet available    Ask your nurse or doctor about these medications  aspirin 81 MG Chew       Follow-Up:   Follow-up Information       Gregorio Cherry NP Follow up on 10/19/2022.    Specialty: Family Medicine  Why: 10:30am  Contact information:  [address] [place] [address]  [phone]               Duncan Shoemaker MD. Call today.    Specialty: Hematology and Oncology  Contact information:  [address] [facility] [address] [phone]                                 Disposition: Home or Self Care  Condition at Discharge: stable      Time spent on discharge = 33 minutes            Joseluis Moseley MD  Hospital Medicine  [facility]

## 2022-10-24 NOTE — PROGRESS NOTES
Please notify further workup for elevated LFTs unremarkable, aside from elevated ferritin.  For this, I have ordered HFE to rule hemochromatosis.  She can have this drawn at her convenience.  I will follow remaining laboratory results once received and reviewed.  Thanks

## 2022-11-01 NOTE — ED PROVIDER NOTES
Encounter Date: 10/31/2022       History     Chief Complaint   Patient presents with    Hemoptysis     Has   stage  4  lung  cancer.  Was  recently  in  the  hospital  for  pneumonia     67-year-old female presents to ED for hemoptysis. known stage IV lung cancer.  Receives chemotherapy regularly.  Last time was several weeks ago.  States he was admitted about a month ago for pneumonia and discharged on the . states recently she has been coughing a lot. states today she noticed some blood in her spit, became concerned and presented. has intermittent oxygen usage at home.  Denies any new chest pain or pressure.  No worsening shortness of breath, no pleuritic component, no trauma, no epistaxis, no fever chills congestion.  No other complaints or concerns at this time.  Daughter bedside.    Review of patient's allergies indicates:  No Known Allergies  Past Medical History:   Diagnosis Date    Anxiety and depression     Cancer     lung with brain metastasis    Cholelithiasis     COPD (chronic obstructive pulmonary disease)     Hypertension     Lumbar disc disease     PAD (peripheral artery disease)     Paroxysmal SVT (supraventricular tachycardia)     Pleural effusion      Past Surgical History:   Procedure Laterality Date    COLONOSCOPY      MEDIPORT INSERTION, DOUBLE      REPAIR OF CAROTID ARTERY Left 2022    Procedure: REPAIR, ARTERY, CAROTID;  Surgeon: Juan Luis Healy III, MD;  Location: [facility];  Service: General;  Laterality: Left;    UPPER GASTROINTESTINAL ENDOSCOPY       No family history on file.  Social History     Tobacco Use    Smoking status: Some Days     Packs/day: 0.25     Years: 49.00     Pack years: 12.25     Types: Cigarettes     Last attempt to quit: 2022     Years since quittin.3    Smokeless tobacco: Never   Substance Use Topics    Alcohol use: Never    Drug use: Never     Review of Systems   Constitutional:  Positive for fatigue. Negative for chills, diaphoresis and  fever.   HENT:  Negative for congestion, rhinorrhea, sinus pain and sore throat.    Eyes:  Negative for pain, discharge and itching.   Respiratory:  Positive for cough and shortness of breath. Negative for chest tightness.         Hemoptysis    Cardiovascular:  Positive for leg swelling. Negative for chest pain and palpitations.   Gastrointestinal:  Negative for abdominal pain, nausea and vomiting.   Genitourinary:  Negative for dysuria, flank pain and hematuria.   Musculoskeletal:  Negative for back pain and myalgias.   Skin:  Negative for color change and rash.   Neurological:  Positive for weakness. Negative for dizziness and headaches.   Psychiatric/Behavioral:  Negative for confusion. The patient is not hyperactive.      Physical Exam     Initial Vitals [10/31/22 2148]   BP Pulse Resp Temp SpO2   99/64 (!) 115 20 98.2 °F (36.8 °C) 97 %      MAP       --         Physical Exam    Vitals reviewed.  Constitutional: She is not diaphoretic. No distress.   HENT:   Head: Normocephalic and atraumatic.   Eyes: Conjunctivae and EOM are normal. Pupils are equal, round, and reactive to light.   Neck: Neck supple. No tracheal deviation present.   Normal range of motion.  Cardiovascular:  Normal rate, regular rhythm, normal heart sounds and intact distal pulses.           Pulmonary/Chest: Accessory muscle usage present. Tachypnea noted. She is in respiratory distress. She has decreased breath sounds. She has no wheezes. She has no rhonchi. She has rales.   Abdominal: Abdomen is soft. There is no abdominal tenderness. There is no rebound and no guarding.   Musculoskeletal:         General: Edema present.      Cervical back: Normal range of motion and neck supple.     Neurological: She is alert and oriented to person, place, and time. She has normal strength. GCS score is 15. GCS eye subscore is 4. GCS verbal subscore is 5. GCS motor subscore is 6.   Skin: Skin is warm and dry. Capillary refill takes less than 2 seconds. No  rash noted.   Psychiatric: She has a normal mood and affect. Her behavior is normal. Judgment and thought content normal.       ED Course   Critical Care    Date/Time: 11/1/2022 11:26 AM  Performed by: David Mcmillan MD  Authorized by: David Mcmillan MD   Total critical care time (exclusive of procedural time) : 45 minutes  Critical care time was exclusive of separately billable procedures and treating other patients.  Critical care was necessary to treat or prevent imminent or life-threatening deterioration of the following conditions: respiratory failure.  Critical care was time spent personally by me on the following activities: evaluation of patient's response to treatment, obtaining history from patient or surrogate, ordering and review of laboratory studies, pulse oximetry, review of old charts, development of treatment plan with patient or surrogate, examination of patient, ordering and performing treatments and interventions, ordering and review of radiographic studies and re-evaluation of patient's condition.      Labs Reviewed   COMPREHENSIVE METABOLIC PANEL - Abnormal; Notable for the following components:       Result Value    Albumin Level 2.2 (*)     Globulin 4.9 (*)     Albumin/Globulin Ratio 0.4 (*)     Alkaline Phosphatase 229 (*)     All other components within normal limits   CBC WITH DIFFERENTIAL - Abnormal; Notable for the following components:    RBC 3.17 (*)     Hgb 9.3 (*)     Hct 29.8 (*)     MCHC 31.2 (*)     RDW 20.7 (*)     All other components within normal limits   MAGNESIUM - Abnormal; Notable for the following components:    Magnesium Level 1.40 (*)     All other components within normal limits   MANUAL DIFFERENTIAL - Abnormal; Notable for the following components:    Neut Man 84 (*)     Lymph Man 7 (*)     Anisocyte 1+ (*)     Poik Slight (*)     Macrocyte 1+ (*)     Polychrom 1+ (*)     Schistocyte Slight (*)     Target Cell Slight (*)     Elliptocytosis Slight (*)     Success Cells  Slight (*)     All other components within normal limits    Narrative:     Platelet clumps observed   COMPREHENSIVE METABOLIC PANEL - Abnormal; Notable for the following components:    Albumin Level 2.0 (*)     Globulin 4.4 (*)     Albumin/Globulin Ratio 0.5 (*)     Alkaline Phosphatase 188 (*)     All other components within normal limits   IRON AND TIBC - Abnormal; Notable for the following components:    Iron Level 12 (*)     Transferrin 130 (*)     Iron Binding Capacity Total 161 (*)     Iron Saturation 7 (*)     All other components within normal limits   FERRITIN - Abnormal; Notable for the following components:    Ferritin Level 4,445.08 (*)     All other components within normal limits   CBC WITH DIFFERENTIAL - Abnormal; Notable for the following components:    RBC 3.01 (*)     Hgb 8.7 (*)     Hct 29.1 (*)     MCV 96.7 (*)     MCHC 29.9 (*)     RDW 21.0 (*)     Lymph # 0.56 (*)     All other components within normal limits   TROPONIN I - Normal   PHOSPHORUS - Normal   TSH - Normal   SARS-COV-2 RNA AMPLIFICATION, QUAL - Normal    Narrative:     The IDNOW COVID-19 assay is a rapid molecular in vitro diagnostic test utilizing an isothermal nucleic acid amplification technology intended for the qualitative detection of nucleic acid from the SARS-CoV-2 viral RNA in direct nasal, nasopharyngeal or throat swabs from individuals who are suspected of COVID-19 by their healthcare provider.   LACTIC ACID, PLASMA - Normal   RETICULOCYTES - Normal   FOLATE - Normal   CBC W/ AUTO DIFFERENTIAL    Narrative:     The following orders were created for panel order CBC auto differential.  Procedure                               Abnormality         Status                     ---------                               -----------         ------                     CBC with Differential[[id number]]        Abnormal            Final result               Manual Differential[[id number]]          Abnormal            Final result                  Please view results for these tests on the individual orders.   PROTIME-INR   PROCALCITONIN (PCT)   CBC W/ AUTO DIFFERENTIAL    Narrative:     The following orders were created for panel order CBC with Automated Differential.  Procedure                               Abnormality         Status                     ---------                               -----------         ------                     CBC with Differential[[id number]]        Abnormal            Final result                 Please view results for these tests on the individual orders.   EXTRA TUBES    Narrative:     The following orders were created for panel order EXTRA TUBES.  Procedure                               Abnormality         Status                     ---------                               -----------         ------                     Red Top Hold[[id number]]                                     In process                 Gold Top Hold[[id number]]                                    In process                   Please view results for these tests on the individual orders.   RED TOP HOLD   GOLD TOP HOLD   TYPE & SCREEN     EKG Readings: (Independently Interpreted)   Initial Reading: No STEMI. Rhythm: Sinus Tachycardia. Ectopy: No Ectopy. Conduction: Normal. Axis: Left Axis Deviation. Clinical Impression: Sinus Tachycardia   ECG Results              EKG 12-lead (Final result)  Result time 11/02/22 17:20:45      Final result by Interface, Lab In [facility] (11/02/22 17:20:45)                   Narrative:    Test Reason : R07.9,    Vent. Rate : 090 BPM     Atrial Rate : 090 BPM     P-R Int : 158 ms          QRS Dur : 076 ms      QT Int : 348 ms       P-R-T Axes : 069 007 032 degrees     QTc Int : 425 ms    Normal sinus rhythm  Normal ECG  When compared with ECG of 14-OCT-2022 11:03,  QRS duration has decreased  Confirmed by Juan Luis Beach MD ([id number]) on 11/2/2022 5:20:36 PM    Referred By: ASHLEY   SELF           Confirmed By:Juan Luis Beach MD                                      EKG 12-LEAD (Final result)  Result time 11/03/22 13:05:41      Final result by Unknown User (11/03/22 13:05:41)                                         EKG 12-LEAD (Final result)  Result time 11/07/22 09:53:02      Final result by Unknown User (11/07/22 09:53:02)                                         EKG 12-LEAD (Final result)  Result time 11/09/22 09:50:33      Final result by Unknown User (11/09/22 09:50:33)                                      Imaging Results              CTA Chest Non-Coronary (PE Studies) (Final result)  Result time 11/01/22 08:11:46      Final result by Surjit Beck MD (11/01/22 08:11:46)                   Impression:    Impression:    1. No filling defects are seen in the pulmonary arteries to suggest pulmonary embolus.    2. Again noted is a large thick walled cavitary lesion in the right upper lobe with an 8 x 7.5 cm soft tissue component within this cavitary lesion at the apex. There is some cortical erosion and scleroses involving the right 3rd, 4th and 5th ribs posteriorly. This is suggestive of infiltration of this mass into the chest wall. These findings are consistent with history of bronchogenic carcinoma. A 6 mm nodule with spiculated margin is seen in the left upper lobe (series 12, image 16). Again noted is collapse consolidation of the lingula of the left upper lobe. Patchy ill-defined airspace opacities are also seen in the basilar segments of the left lower lobe. These are new since the prior examination. These findings are suggestive of pneumonia. Correlate with clinical and laboratory findings as regards additional evaluation and follow-up.    3. Details and other findings as discussed above.    No significant discrepancy with overnight report.      Electronically signed by: Surjit Beck  Date:    11/01/2022  Time:    08:11               Narrative:      Technique:CT Scan of the chest was performed with intravenous contrast with direct  axial images as well as sagittal and coronal reconstruction images pulmonary embolus protocol.    Dosage Information:Automated Exposure Control was utilized.      Comparison:Comparison is with study dated 2022-10-10.    Clinical History:Hemoptysis (Has stage 4 lung cancer. Was recently in the hospital for pneumonia.    Findings:    Mediastinum:The right apical cavitary mass appears to infiltrate the mediastinum. There are calcified right hilar lymph nodes.    Heart:The heart size is within normal limits.    Aorta:No aortic dissection or aneurysm is seen. Mild aortic calcification is seen in the thoracic aorta.    Pulmonary Arteries:No filling defects are seen in the pulmonary arteries to suggest pulmonary embolus.    Lungs:The lungs are clear with no focal infiltrate or airspace disease. Again noted is a large thick walled cavitary lesion in the right upper lobe with an 8 x 7.5 cm soft tissue component within this cavitary lesion at the apex. There is some cortical erosion and scleroses involving the right 3rd, 4th and 5th ribs posteriorly. This is suggestive of infiltration of this mass into the chest wall. These findings are consistent with history of bronchogenic carcinoma. Again noted is collapse of the right middle lobe. Moderate emphysematous changes are noted in the left lung, predominantly involving the left upper lobe. A stable 6 mm nodule with spiculated margin is seen in the left upper lobe (series 12, image 16). Again noted is collapse consolidation of the lingula of the left upper lobe. Patchy ill-defined airspace opacities are also seen in the basilar segments of the left lower lobe. These are new since the prior examination. There are also subtle ill-defined nodular opacities in the right lower lobe. These findings are suggestive of pneumonia.    Pleura:No effusions or pneumothorax are identified. There is interval resolution of left pleural effusion.    Bony Structures:The bones are mildly  osteopenic. Mild degenerative changes are identified in the spine.    Abdomen:Calcific densities are seen in the spleen, likely reflect calcified granulomas. The other visualized upper abdominal organs appear unremarkable.                        Preliminary result by Surjit Beck MD (10/31/22 23:33:57)                   Narrative:    START OF REPORT:  Technique: CT Scan of the chest was performed with intravenous contrast with direct axial images as well as sagittal and coronal reconstruction images pulmonary embolus protocol.    Dosage Information: Automated Exposure Control was utilized.    Comparison: Comparison is with study dated2022-10-10 17:07:02.    Clinical History: Hemoptysis (Has stage 4 lung cancer. Was recently in the hospital for pneumonia.    Findings:  Mediastinum: The right apical cavitary mass appears to infiltrate the mediastinum. There are calcified right hilar lymph nodes.  Heart: The heart size is within normal limits.  Aorta: No aortic dissection or aneurysm is seen. Mild aortic calcification is seen in the thoracic aorta.  Pulmonary Arteries: No filling defects are seen in the pulmonary arteries to suggest pulmonary embolus.  Lungs: The lungs are clear with no focal infiltrate or airspace disease. Again noted is a large thick walled cavitary lesion in the right upper lobe with an 8 x 7.5 cm soft tissue component within this cavitary lesion at the apex. There is some cortical erosion and scleroses involving the right 3rd, 4th and 5th ribs posteriorly. This is suggestive of infiltration of this mass into the chest wall. These findings are consistent with history of bronchogenic carcinoma. Again noted is collapse of the right middle lobe. Moderate emphysematous changes are noted in the left lung, predominantly involving the left upper lobe. A 6 mm nodule with spiculated margin is seen in the left upper lobe (series 12, image 16). Again noted is collapse consolidation of the lingula of the  left upper lobe. Patchy ill-defined airspace opacities are also seen in the basilar segments of the left lower lobe. These are new since the prior examination. There are also subtle ill-defined nodular opacities in the right lower lobe. These findings are suggestive of pneumonia.  Pleura: No effusions or pneumothorax are identified. There is interval resolution of left pleural effusion.  Bony Structures: The bones are mildly osteopenic. Mild degenerative changes are identified in the spine.  Abdomen: Calcific densities are seen in the spleen, likely reflect calcified granulomas. The other visualized upper abdominal organs appear unremarkable.      Impression:  1. No filling defects are seen in the pulmonary arteries to suggest pulmonary embolus.  2. Again noted is a large thick walled cavitary lesion in the right upper lobe with an 8 x 7.5 cm soft tissue component within this cavitary lesion at the apex. There is some cortical erosion and scleroses involving the right 3rd, 4th and 5th ribs posteriorly. This is suggestive of infiltration of this mass into the chest wall. These findings are consistent with history of bronchogenic carcinoma. A 6 mm nodule with spiculated margin is seen in the left upper lobe (series 12, image 16). Again noted is collapse consolidation of the lingula of the left upper lobe. Patchy ill-defined airspace opacities are also seen in the basilar segments of the left lower lobe. These are new since the prior examination. These findings are suggestive of pneumonia. Correlate with clinical and laboratory findings as regards additional evaluation and follow-up.  3. Details and other findings as discussed above.                          Preliminary result by Cory Cummins Jr., MD (10/31/22 23:33:57)                   Narrative:    START OF REPORT:  Technique: CT Scan of the chest was performed with intravenous contrast with direct axial images as well as sagittal and coronal reconstruction  images pulmonary embolus protocol.    Dosage Information: Automated Exposure Control was utilized.    Comparison: Comparison is with study dated2022-10-10 17:07:02.    Clinical History: Hemoptysis (Has stage 4 lung cancer. Was recently in the hospital for pneumonia.    Findings:  Mediastinum: The right apical cavitary mass appears to infiltrate the mediastinum. There are calcified right hilar lymph nodes.  Heart: The heart size is within normal limits.  Aorta: No aortic dissection or aneurysm is seen. Mild aortic calcification is seen in the thoracic aorta.  Pulmonary Arteries: No filling defects are seen in the pulmonary arteries to suggest pulmonary embolus.  Lungs: The lungs are clear with no focal infiltrate or airspace disease. Again noted is a large thick walled cavitary lesion in the right upper lobe with an 8 x 7.5 cm soft tissue component within this cavitary lesion at the apex. There is some cortical erosion and scleroses involving the right 3rd, 4th and 5th ribs posteriorly. This is suggestive of infiltration of this mass into the chest wall. These findings are consistent with history of bronchogenic carcinoma. Again noted is collapse of the right middle lobe. Moderate emphysematous changes are noted in the left lung, predominantly involving the left upper lobe. A 6 mm nodule with spiculated margin is seen in the left upper lobe (series 12, image 16). Again noted is collapse consolidation of the lingula of the left upper lobe. Patchy ill-defined airspace opacities are also seen in the basilar segments of the left lower lobe. These are new since the prior examination. There are also subtle ill-defined nodular opacities in the right lower lobe. These findings are suggestive of pneumonia.  Pleura: No effusions or pneumothorax are identified. There is interval resolution of left pleural effusion.  Bony Structures: The bones are mildly osteopenic. Mild degenerative changes are identified in the  spine.  Abdomen: Calcific densities are seen in the spleen, likely reflect calcified granulomas. The other visualized upper abdominal organs appear unremarkable.      Impression:  1. No filling defects are seen in the pulmonary arteries to suggest pulmonary embolus.  2. Again noted is a large thick walled cavitary lesion in the right upper lobe with an 8 x 7.5 cm soft tissue component within this cavitary lesion at the apex. There is some cortical erosion and scleroses involving the right 3rd, 4th and 5th ribs posteriorly. This is suggestive of infiltration of this mass into the chest wall. These findings are consistent with history of bronchogenic carcinoma. A 6 mm nodule with spiculated margin is seen in the left upper lobe (series 12, image 16). Again noted is collapse consolidation of the lingula of the left upper lobe. Patchy ill-defined airspace opacities are also seen in the basilar segments of the left lower lobe. These are new since the prior examination. These findings are suggestive of pneumonia. Correlate with clinical and laboratory findings as regards additional evaluation and follow-up.  3. Details and other findings as discussed above.                                         X-Ray Chest AP Portable (Final result)  Result time 11/01/22 07:20:27      Final result by Gianluca Mack MD (11/01/22 07:20:27)                   Impression:      No adverse interval change.      Electronically signed by: Gianluca Mack  Date:    11/01/2022  Time:    07:20               Narrative:    EXAMINATION:  XR CHEST AP PORTABLE    CLINICAL HISTORY:  Chest Pain;    TECHNIQUE:  Single view of the chest    COMPARISON:  10/15/2022    FINDINGS:  Right upper lobe opacification remains with cavitary appearing right mid lung zone lesion.  The left hemithorax is clear.                                       Medications   sodium chloride 0.9% flush 10 mL (10 mLs Intravenous Given 11/7/22 0126)   naloxone 0.4 mg/mL injection 0.02  mg (has no administration in time range)   glucose chewable tablet 16 g (has no administration in time range)   glucose chewable tablet 24 g (has no administration in time range)   glucagon (human recombinant) injection 1 mg (has no administration in time range)   dextrose 10% bolus 125 mL (has no administration in time range)   dextrose 10% bolus 250 mL (has no administration in time range)   acetaminophen tablet 650 mg (650 mg Oral Given 11/9/22 0408)   ondansetron injection 4 mg (has no administration in time range)   benzonatate capsule 100 mg (100 mg Oral Given 11/1/22 2122)   ipratropium 0.02 % nebulizer solution 0.5 mg (0.5 mg Nebulization Given by Other 11/1/22 2345)   HYDROcodone-acetaminophen 5-325 mg per tablet 1 tablet (has no administration in time range)   dexmedeTOMIDine in 0.9 % NaCL 400 mcg/100 mL (4 mcg/mL) infusion (  Canceled Entry 11/2/22 1400)   dexmedetomidine (PRECEDEX) 400mcg/100mL 0.9% NaCL infusion (0.2 mcg/kg/hr × 76.6 kg Intravenous Rate/Dose Change 11/16/22 0900)   propofol (DIPRIVAN) 10 mg/mL infusion (20 mcg/kg/min × 76.6 kg Intravenous Rate/Dose Change 11/16/22 0900)   lactated ringers infusion (2,000 mLs Intravenous New Bag 11/4/22 0550)   0.9%  NaCl infusion (for blood administration) (has no administration in time range)   morphine injection 1 mg (1 mg Intravenous Given 11/11/22 1440)   lactated ringers infusion ( Intravenous Verify Only 11/7/22 1904)   amiodarone 360 mg/200 mL (1.8 mg/mL) infusion (1 mg/min Intravenous Not Given 11/7/22 1731)   metoprolol (LOPRESSOR) 5 mg/5 mL injection (  Canceled Entry 11/8/22 0030)   amiodarone 360 mg/200 mL (1.8 mg/mL) infusion (1 mg/min Intravenous Verify Only 11/8/22 1827)   acetaminophen tablet 650 mg (650 mg Oral Not Given 11/9/22 0900)   amiodarone tablet 200 mg (200 mg Oral Given 11/16/22 0849)   lactated ringers infusion ( Intravenous Verify Only 11/13/22 0459)   0.9%  NaCl infusion (for blood administration) (has no administration in  time range)   insulin detemir U-100 injection 5 Units (0 Units Subcutaneous Hold 11/15/22 2100)   metoclopramide HCl tablet 5 mg (has no administration in time range)   glucose chewable tablet 16 g (has no administration in time range)   glucose chewable tablet 24 g (has no administration in time range)   dextrose 50% injection 12.5 g (has no administration in time range)   dextrose 50% injection 25 g (has no administration in time range)   glucagon (human recombinant) injection 1 mg (has no administration in time range)   insulin aspart U-100 injection 1-10 Units (has no administration in time range)   pantoprazole injection 40 mg (40 mg Intravenous Given 11/16/22 0850)   magnesium sulfate 2g in water 50mL IVPB (premix) (has no administration in time range)   magnesium sulfate 2g in water 50mL IVPB (premix) (has no administration in time range)   calcium gluconate 1 g in NS IVPB (premixed) (has no administration in time range)   calcium gluconate 1 g in NS IVPB (premixed) (has no administration in time range)   calcium gluconate 1 g in NS IVPB (premixed) (has no administration in time range)   NORepinephrine 4 mg in dextrose 5 % 250 mL infusion (0 mcg/kg/min × 91 kg Intravenous Stopped 11/16/22 0200)   voriconazole tablet 200 mg (200 mg Oral Given 11/16/22 0849)   fentaNYL (SUBLIMAZE) 2,500 mcg in dextrose 5 % 250 mL infusion (100 mcg/hr Intravenous Rate/Dose Change 11/16/22 0900)   potassium chloride 10 mEq in 100 mL IVPB (10 mEq Intravenous New Bag 11/16/22 1043)   vasopressin (PITRESSIN) 0.2 Units/mL in dextrose 5 % 100 mL infusion (0.04 Units/min Intravenous New Bag 11/16/22 0857)   sodium chloride 0.9% bolus 1,000 mL (0 mLs Intravenous Stopped 10/31/22 2338)   iohexoL (OMNIPAQUE 350) injection 100 mL (100 mLs Intravenous Given 10/31/22 2336)   ceFEPIme (MAXIPIME) 2 g in dextrose 5 % in water (D5W) 5 % 50 mL IVPB (MB+) (0 g Intravenous Stopped 11/1/22 0340)   sodium chloride 0.9% bolus 1,000 mL (0 mLs  Intravenous Stopped 11/1/22 0139)   dextromethorphan-guaiFENesin  mg/5 ml liquid 5 mL (5 mLs Oral Given 11/1/22 0119)   albuterol-ipratropium 2.5 mg-0.5 mg/3 mL nebulizer solution 6 mL (6 mLs Nebulization Given 11/1/22 0212)   magnesium sulfate 2g in water 50mL IVPB (premix) (0 g Intravenous Stopped 11/1/22 1021)   lactated ringers bolus 1,000 mL (0 mLs Intravenous Stopped 11/1/22 1258)   ALPRAZolam tablet 0.5 mg (0.5 mg Oral Given 11/1/22 1220)   ALPRAZolam tablet 0.5 mg (0.5 mg Oral Given 11/1/22 2006)   mupirocin 2 % ointment ( Nasal Given 11/6/22 0823)   furosemide injection 10 mg (10 mg Intravenous Given 11/1/22 2342)   metoprolol injection 5 mg (5 mg Intravenous Given 11/2/22 0126)   albumin human 25% bottle 12.5 g (0 g Intravenous Stopped 11/2/22 0433)   ALPRAZolam tablet 0.5 mg (0.5 mg Oral Given 11/2/22 0539)   etomidate (AMIDATE) 2 mg/mL injection (20 mg  Given 11/2/22 1345)   rocuronium 10 mg/mL injection (80 mg  Given 11/2/22 0147)   lactated ringers bolus 1,000 mL (0 mLs Intravenous Stopped 11/6/22 1927)   lactated ringers bolus 500 mL (0 mLs Intravenous Stopped 11/7/22 0738)   metoprolol injection 5 mg (5 mg Intravenous Given 11/8/22 0025)   metoprolol injection 5 mg (5 mg Intravenous Given 11/8/22 0109)   potassium chloride 10 mEq in 100 mL IVPB (10 mEq Intravenous New Bag 11/9/22 1101)   magnesium sulfate 2g in water 50mL IVPB (premix) (0 g Intravenous Stopped 11/9/22 0800)   vancomycin (VANCOCIN) 1,500 mg in sodium chloride 0.9 % 250 mL IVPB (MB+) (0 mg Intravenous Stopped 11/10/22 1803)   potassium chloride 10 mEq in 100 mL IVPB (10 mEq Intravenous New Bag 11/12/22 1208)   magnesium sulfate in dextrose IVPB (premix) 1 g (0 g Intravenous Stopped 11/13/22 0655)   pantoprazole injection 80 mg (80 mg Intravenous Given 11/14/22 0821)   magnesium sulfate in dextrose IVPB (premix) 1 g (0 g Intravenous Stopped 11/15/22 0705)     Medical Decision Making:   Clinical Tests:   Lab Tests: Reviewed and  Ordered  Radiological Study: Ordered and Reviewed  Medical Tests: Reviewed and Ordered  ED Management:  67-year-old female presents to ER for hemoptysis.  Known stage IV lung cancer.  Reporting increased fatigue and shortness of breath.  Intermittent oxygen requirement.  On arrival has increased respiratory rate, appears weak and deconditioned.  rales at bilateral lung bases and is very weak on exam.  Mentating appropriately.  Workup demonstrates sinus tachycardia on EKG without acute ischemic changes.  Chest x-ray clear.  Labs demonstrate anemia. does not report profuse hemoptysis but does have intermittent blood-tinged sputum.  CT chest performed that demonstrated a cavitary lesion with invasive components consistent with pulmonary carcinoma. secondary to the patient's physical decline, high-risk condition (lung cancer with new hemoptysis) and new oxygen requirement will admit the patient for continued monitoring and possible bronchoscopy versus transfusion.  Inpatient team consulted and care formally transitioned.  Patient remained stable in my department. ([place])                        Clinical Impression:   Final diagnoses:  [J18.9] Pneumonia due to infectious organism, unspecified laterality, unspecified part of lung (Primary)  [R04.2] Hemoptysis  [C34.90] Malignant neoplasm of lung, unspecified laterality, unspecified part of lung  [D64.9] Anemia, unspecified type  [J98.4] Pulmonary cavitary lesion        ED Disposition Condition    Admit Stable                David Mcmillan MD  11/16/22 8811

## 2022-11-01 NOTE — PROGRESS NOTES
Pharmacokinetic Initial Assessment: IV Vancomycin    Assessment/Plan:    Initiate intravenous vancomycin with loading dose of 1000 mg once followed by a maintenance dose of vancomycin 1000mg IV every 12 hours  Desired empiric serum trough concentration is 10 to 20 mcg/mL  Draw vancomycin trough level 60 min prior to third dose on 11/2 at approximately 0700  Pharmacy will continue to follow and monitor vancomycin.      Please contact pharmacy at extension [phone] with any questions regarding this assessment.     Thank you for the consult,   Parth Diaz       Patient brief summary:  Adrienne Urbina is a 67 y.o. female initiated on antimicrobial therapy with IV Vancomycin for treatment of suspected  pneumonia    Drug Allergies:   Review of patient's allergies indicates:  No Known Allergies    Actual Body Weight:   77.1    Renal Function:   Estimated Creatinine Clearance: 85.3 mL/min (based on SCr of 0.68 mg/dL).,     Dialysis Method (if applicable):  N/A    CBC (last 72 hours):  Recent Labs   Lab Result Units 10/31/22  2211 11/01/22  0416   WBC x10(3)/mcL 9.2 8.4   Hgb gm/dL 9.3* 8.7*   Hct % 29.8* 29.1*   Platelet x10(3)/mcL 302 277   Mono % %  --  15.3   Monocyte Man % 9  --    Eos % %  --  0.6   Basophil % %  --  0.6       Metabolic Panel (last 72 hours):  Recent Labs   Lab Result Units 10/31/22  2211 10/31/22  2216 11/01/22  0416   Sodium Level mmol/L 139  --  139   Potassium Level mmol/L 4.2  --  4.1   Chloride mmol/L 101  --  105   Carbon Dioxide mmol/L 25  --  23   Glucose Level mg/dL 111  --  114   Blood Urea Nitrogen mg/dL 19.4  --  14.8   Creatinine mg/dL 0.76  --  0.68   Albumin Level gm/dL 2.2*  --  2.0*   Bilirubin Total mg/dL 0.5  --  0.6   Alkaline Phosphatase unit/L 229*  --  188*   Aspartate Aminotransferase unit/L 13  --  9   Alanine Aminotransferase unit/L 16  --  14   Magnesium Level mg/dL  --  1.40*  --    Phosphorus Level mg/dL  --  2.9  --        Drug levels (last 3 results):  No results for  input(s): VANCOMYCINRA, VANCORANDOM, VANCOMYCINPE, VANCOPEAK, VANCOMYCINTR, VANCOTROUGH in the last 72 hours.    Microbiologic Results:  Microbiology Results (last 7 days)       Procedure Component Value Units Date/Time    Blood Culture (site 1) [[id number]] Collected: 11/01/22 0416    Order Status: Sent Specimen: Blood Updated: 11/01/22 0428    Blood Culture (site 2) [[id number]] Collected: 11/01/22 0416    Order Status: Sent Specimen: Blood Updated: 11/01/22 0428    Gram Stain [[id number]]     Order Status: Sent Specimen: Sputum     Respiratory Culture [[id number]]     Order Status: Sent Specimen: Sputum

## 2022-11-01 NOTE — H&P
Critical Care Medicine History and Physical Note   [facility] - Emergency Dept      Patient Name: Adrienne Urbina  MRN: [id number]  Admission Date: 10/31/2022  Hospital Length of Stay: 0 days  Code Status: Full Code  Attending Provider: Glenn Cantu Jr., MD, *  Primary Care Provider: Gregorio Cherry NP   Principal Problem: <principal problem not specified>    HPI:  Ms. 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  Patient reports being in her usual state of health until noticing blood streaked sputum that progressed to complete blood filled sputum over the past x1.5 days.  She denies any trauma however does report chronic cough due to COPD which she feels may have contributed to recent symptoms. When asked, patient denies ever experiencing hemoptysis at any point in the past even throughout undergoing chemotherapy and radiation for right upper lobe lung mass (last chemotherapy x1.5 months ago; follows w/ Dr. Shoemaker).  These new symptoms scared her which prompted her ED presentation. Denies any history or DVT/PE. She also denies any current lightheadedness/dizziness, nausea/vomiting, fever/chills, chest pain, palpitations, new/worsening shortness of breath, hematemesis, hematochezia, melena, hematuria. Per chart review; patient was recently discharged from [facility] (10/17) in which she required ICU level care secondary to suspected Afib w/ RVR in addition to anemia requiring x2 units PRBCs and GNR related pneumonia.     ED Course:  Initial vitals significant for tachycardia (111), mildly low BP (104/58), and hypoxia on room air at 87%.  She was placed on 2 L cannula with improvement SpO2  96%.  1 hour after presentation patient became hypotensive (84/50) and tachypneic (26) in which x2 L NS boluses were administered with subsequent improvement to 118/51.  Initial lab significant for macrocytic anemia with H/H (9.3/29.9) however no leukocytosis.  Lactic acid WNL.  CTA thorax revealed no Pulmonary Embolism however showed persistent large thick-walled RUL cavitary lesion (8 x 7.5 cm) with soft tissue component within in addition to cortical erosions/sclerosis of right 3th/4th/5th posterior ribs and findings suggestive of pneumonia within left lower lobe.  Internal Medicine consulted for recurrent PNA, lung cancer with cavitary lesion, hemoptysis, and hypotension.    Past Medical History:   Diagnosis Date    Anxiety and depression     Cancer     lung with brain metastasis    Cholelithiasis     COPD (chronic obstructive pulmonary disease)     Hypertension     Lumbar disc disease     PAD (peripheral artery disease)     Paroxysmal SVT (supraventricular tachycardia)     Pleural effusion    -- Atrial Fibrillation  -- Tobacco Abuse      Past Surgical History:   Procedure Laterality Date    COLONOSCOPY      MEDIPORT INSERTION, DOUBLE      REPAIR OF CAROTID ARTERY Left 2022    Procedure: REPAIR, ARTERY, CAROTID;  Surgeon: Juan Luis Healy III, MD;  Location: [facility];  Service: General;  Laterality: Left;    UPPER GASTROINTESTINAL ENDOSCOPY           Social History     Socioeconomic History    Marital status:    Tobacco Use    Smoking status: Some Days     Packs/day: 0.25     Years: 49.00     Pack years: 12.25     Types: Cigarettes     Last attempt to quit: 2022     Years since quittin.3    Smokeless tobacco: Never   Substance and Sexual Activity    Alcohol use: Never    Drug use: Never    Sexual activity: Yes     Social Determinants of Health     Financial Resource Strain: Low Risk     Difficulty of Paying Living Expenses: Not very hard   Food Insecurity: No Food Insecurity    Worried About  Running Out of Food in the Last Year: Never true    Ran Out of Food in the Last Year: Never true   Transportation Needs: No Transportation Needs    Lack of Transportation (Medical): No    Lack of Transportation (Non-Medical): No   Physical Activity: Inactive    Days of Exercise per Week: 0 days    Minutes of Exercise per Session: 0 min   Stress: No Stress Concern Present    Feeling of Stress : Not at all   Social Connections: Socially Isolated    Frequency of Communication with Friends and Family: Twice a week    Frequency of Social Gatherings with Friends and Family: Three times a week    Attends [employer] Services: Never    Active Member of Clubs or Organizations: No    Attends Club or Organization Meetings: Never    Marital Status:    Housing Stability: Unknown    Unable to Pay for Housing in the Last Year: No    Unstable Housing in the Last Year: No     No family history on file.    Drug Allergies:   Review of patient's allergies indicates:  No Known Allergies      Current Infusions:  LR @ 100 cc/hr    Scheduled Medications:     benzonatate  200 mg Oral Once    folic acid  1 mg Oral Daily    gabapentin  300 mg Oral TID    metoprolol tartrate  100 mg Oral BID    piperacillin-tazobactam (ZOSYN) IVPB  4.5 g Intravenous Q8H         PRN Medications:   acetaminophen, albuterol, albuterol-ipratropium, benzonatate, dextrose 10%, dextrose 10%, glucagon (human recombinant), glucose, glucose, HYDROcodone-acetaminophen, insulin aspart U-100, naloxone, ondansetron, sodium chloride 0.9%, Pharmacy to dose Vancomycin consult **AND** vancomycin - pharmacy to dose      Review of Systems   Constitutional:  Negative for chills, fever, malaise/fatigue and weight loss.   HENT:  Positive for congestion. Negative for nosebleeds, sinus pain and sore throat.    Eyes:  Negative for blurred vision and double vision.   Respiratory:  Positive for cough, hemoptysis and sputum production. Negative for shortness of breath.     Cardiovascular:  Positive for leg swelling (B/L LE). Negative for chest pain, palpitations and orthopnea.   Gastrointestinal:  Negative for abdominal pain, blood in stool, constipation, diarrhea, heartburn, melena, nausea and vomiting.   Genitourinary:  Negative for dysuria and hematuria.   Musculoskeletal:  Negative for falls.   Neurological:  Negative for dizziness, tingling, loss of consciousness and headaches.       Vital Signs:    Vitals:    11/01/22 0335   BP:    Pulse:    Resp: 18   Temp:          Fluid Balance:     Intake/Output Summary (Last 24 hours) at 11/1/2022 0508  Last data filed at 11/1/2022 0340  Gross per 24 hour   Intake 1049 ml   Output --   Net 1049 ml         Physical Exam  Constitutional:       General: She is in acute distress.      Appearance: She is ill-appearing.   HENT:      Head: Normocephalic and atraumatic.      Mouth/Throat:      Mouth: Mucous membranes are moist.      Pharynx: No posterior oropharyngeal erythema.      Comments: Mild rust-colored sputum  Eyes:      Extraocular Movements: Extraocular movements intact.      Pupils: Pupils are equal, round, and reactive to light.   Neck:      Comments: Diffuse cutaneous scar located to left neck/anterior chest; chronic  Cardiovascular:      Rate and Rhythm: Regular rhythm. Tachycardia present.      Pulses: Normal pulses.      Heart sounds: Normal heart sounds. No murmur heard.    No friction rub.   Pulmonary:      Effort: Respiratory distress present.      Breath sounds: No stridor. Rales present. No wheezing or rhonchi.   Abdominal:      General: Bowel sounds are normal. There is no distension.      Palpations: Abdomen is soft.      Tenderness: There is no abdominal tenderness. There is no guarding or rebound.   Musculoskeletal:         General: Swelling (+1 B/L LE edema to mid-shins) present.      Right lower leg: Edema present.      Left lower leg: Edema present.   Skin:     Capillary Refill: Capillary refill takes less than 2  seconds.      Coloration: Skin is pale (sub-conjuntiva). Skin is not jaundiced.      Findings: No bruising.   Neurological:      General: No focal deficit present.      Mental Status: She is alert and oriented to person, place, and time. Mental status is at baseline.   Psychiatric:         Mood and Affect: Mood normal.         Behavior: Behavior normal.         Thought Content: Thought content normal.         Judgment: Judgment normal.         Ventilator   Patient currently not requiring mechanical ventilatory support      Laboratory Studies:     No results for input(s): PH, PCO2, PO2, HCO3, POCSATURATED, BE in the last 24 hours.    Recent Labs   Lab 11/01/22 0416   WBC 8.4   RBC 3.01*   HGB 8.7*   HCT 29.1*      MCV 96.7*   MCH 28.9   MCHC 29.9*       Recent Labs   Lab 10/31/22  2216 11/01/22  0416   GLUCOSE  --  114   NA  --  139   K  --  4.1   CO2  --  23   BUN  --  14.8   CREATININE  --  0.68   MG 1.40*  --          Microbiology Data:   Microbiology Results (last 7 days)       Procedure Component Value Units Date/Time    Blood Culture (site 1) [[id number]] Collected: 11/01/22 0416    Order Status: Sent Specimen: Blood Updated: 11/01/22 0428    Blood Culture (site 2) [[id number]] Collected: 11/01/22 0416    Order Status: Sent Specimen: Blood Updated: 11/01/22 0428    Gram Stain [[id number]]     Order Status: Sent Specimen: Sputum     Respiratory Culture [[id number]]     Order Status: Sent Specimen: Sputum             Imaging reviewed:  CTA Chest Non-Coronary (PE Studies)  START OF REPORT:  Technique: CT Scan of the chest was performed with intravenous contrast with direct axial images as well as sagittal and coronal reconstruction images pulmonary embolus protocol.    Dosage Information: Automated Exposure Control was utilized.    Comparison: Comparison is with study dated2022-10-10 17:07:02.    Clinical History: Hemoptysis (Has stage 4 lung cancer. Was recently in the hospital for  pneumonia.    Findings:  Mediastinum: The right apical cavitary mass appears to infiltrate the mediastinum. There are calcified right hilar lymph nodes.  Heart: The heart size is within normal limits.  Aorta: No aortic dissection or aneurysm is seen. Mild aortic calcification is seen in the thoracic aorta.  Pulmonary Arteries: No filling defects are seen in the pulmonary arteries to suggest pulmonary embolus.  Lungs: The lungs are clear with no focal infiltrate or airspace disease. Again noted is a large thick walled cavitary lesion in the right upper lobe with an 8 x 7.5 cm soft tissue component within this cavitary lesion at the apex. There is some cortical erosion and scleroses involving the right 3rd, 4th and 5th ribs posteriorly. This is suggestive of infiltration of this mass into the chest wall. These findings are consistent with history of bronchogenic carcinoma. Again noted is collapse of the right middle lobe. Moderate emphysematous changes are noted in the left lung, predominantly involving the left upper lobe. A 6 mm nodule with spiculated margin is seen in the left upper lobe (series 12, image 16). Again noted is collapse consolidation of the lingula of the left upper lobe. Patchy ill-defined airspace opacities are also seen in the basilar segments of the left lower lobe. These are new since the prior examination. There are also subtle ill-defined nodular opacities in the right lower lobe. These findings are suggestive of pneumonia.  Pleura: No effusions or pneumothorax are identified. There is interval resolution of left pleural effusion.  Bony Structures: The bones are mildly osteopenic. Mild degenerative changes are identified in the spine.  Abdomen: Calcific densities are seen in the spleen, likely reflect calcified granulomas. The other visualized upper abdominal organs appear unremarkable.    Impression:  1. No filling defects are seen in the pulmonary arteries to suggest pulmonary embolus.  2.  Again noted is a large thick walled cavitary lesion in the right upper lobe with an 8 x 7.5 cm soft tissue component within this cavitary lesion at the apex. There is some cortical erosion and scleroses involving the right 3rd, 4th and 5th ribs posteriorly. This is suggestive of infiltration of this mass into the chest wall. These findings are consistent with history of bronchogenic carcinoma. A 6 mm nodule with spiculated margin is seen in the left upper lobe (series 12, image 16). Again noted is collapse consolidation of the lingula of the left upper lobe. Patchy ill-defined airspace opacities are also seen in the basilar segments of the left lower lobe. These are new since the prior examination. These findings are suggestive of pneumonia. Correlate with clinical and laboratory findings as regards additional evaluation and follow-up.  3. Details and other findings as discussed above.        2D ECHO Results  Pending; ordered to have completed (11/1/22)      Pulmonary Functions Testing Results:    No results found for: FEV1, FVC, [id number], TLC, DLCO      Assessment:  1.) New-Onset Hemoptysis   -- Possibly s/t #2  2.) Hx of Stage IV Poorly Differentiated RUL Lung Carcinoma   -- Diagnosed (9/2020); s/p radiation, currently receiving chemotherapy q3 weeks   -- w/ regional bone metastasis  -- w/ brain metastasis  -- Followed by [facility], Dr. Shoemaker  -- Followed by Pulmonologist, Dr. ИРИНА Simpson  3.) Suspected Recurrent Pneumonia   -- LLL CT Thorax suspected PNA   -- Recent hospitalization x14 days ago   -- Possible HAP  4.) SIRS (2/4; tachycardia, tachypnea) w/ suspected PNA etiology as above  5.) Hx of Atrial Fibrillation (not on Eliquis)  6.) Macrocytic Anemia  7.) Hx of COPD (on home 2L NC q.Nightly)  8.) Hypomagnesemia  9.) Hx of HTN  10.) Hx of HLD  11.) Hx of PAD  12.) Hx of Tobacco use    Plan:  - Patient admitted to ICU for close monitoring of possible decompensation in setting of hypotension & acute onset  worsening hemoptysis w/ underlying hx of Stage IV RUL Lung Carcinoma  - CTA Thorax negative for PE  - Patient would likely benefit from further pulmonology evaluation while hospitalized s/t above  - Holding home Aspirin at this time; CTM for ongoing pulmonary bleeding  - Type/Screen completed in event of needing transfusion  - SIRS (2/4) w/ suspected infectious pulmonary source  - Hypotensive in ED; received x2L NS w/ improvement  - Pending Lactic Acid  - Pending Blood Cultures x2, UA, Urine Cx  - Pending Resp Culture, Resp Gram Stain, MRSA PCR  - S/t possible HAP w/ recent hospitalization & immunocompromised patient; initiated broad spectrum abx w/ Vanc/Zosyn (day 1); de-escalate per cultures/sensitivities  - Holding home Diltiazem 240 q.D & Metoprolol 100mg B.I.D in setting of hypotension & hemoptysis; re-start as appropriate  - Current resp status WNL, saturating well on 1L NC; PRN Albuterol/Duonebs in place  - Pending TTE; No Hx of CHF though B/L LE edema x3 weeks  - Continue home Gabapentin, Folic Acid  - Norco PRN for pain  - Tessalon Perles PRN  - NPO      DVT ppx: SCDs (in setting of hemoptysis)  GI ppx: Protonix 40mg IV q.D  Keep HOB elevated:  > 30*      Bladimir Marin MD  Internal Medicine PGY-2    Attending Addendum to Follow

## 2022-11-01 NOTE — PROGRESS NOTES
"Inpatient Nutrition Evaluation    Admit Date: 10/31/2022   Total duration of encounter: 1 day    Nutrition Recommendation/Prescription     Advance diet once medically appropriate -- goal diet Cardiac  Will send Boost Plus BID once appropriate; to provide 360 kcals and 14g pro per serving  Replenish electrolytes as needed  Monitor days NPO, wt, labs    Nutrition Assessment     Chart Review    Reason Seen: continuous nutrition monitoring    Diagnosis: New-Onset Hemoptysis  Suspected Recurrent Pneumonia  SIRS (2/; tachycardia, tachypnea) w/ suspected PNA etiology as above  Macrocytic Anemia  Hypomagnesemia    Relevant Medical History: 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 (diagnosed 2020, s/p radiation, currently receiving chemotherapy q3 weeks), atrial fibrillation (not on Eliquis), HTN, HLD, PAD, COPD (on nightly 2 L NC), tobacco use, and cholelithiasis    Nutrition-Related Medications: folic acid, mg sulfate, protonix, zosyn, prednisone, vanco, LR 100mL/hr    Nutrition-Related Labs: -Alb 2.0, Alk Phos 188, GFR >60, Mg 1.40    Diet Order: Diet NPO  Oral Supplement Order: none  Appetite/Oral Intake: NPO/NPO  Factors Affecting Nutritional Intake: nausea, NPO, and vomiting  Food/Roman Catholic/Cultural Preferences: none reported  Food Allergies: no known food allergies       Wound(s):   N/A    Comments  : Pt currently NPO during rounds; reports some nausea/vomiting, no other GI complaints. Pt reports good appetite and no recent wt loss pta; #. No c/s difficulties reported. Pt ok to add Boost Plus once diet advanced.      Anthropometrics    Height: 5' 6" (167.6 cm) Height Method: Stated  Last Weight: 77.1 kg (170 lb) (22 1251) Weight Method: Bed Scale  BMI (Calculated): 27.5  BMI Classification: overweight (BMI 25-29.9)     Ideal Body Weight (IBW), Female: 130 lb     % Ideal Body Weight, Female (lb): 130.77 %                    Usual Body Weight (UBW), k.91 kg " (# per pt)  % Usual Body Weight: 101.8     Usual Weight Provided By: patient    Wt Readings from Last 5 Encounters:   11/01/22 77.1 kg (170 lb)   10/21/22 75.7 kg (166 lb 12.8 oz)   10/16/22 77.7 kg (171 lb 4.8 oz)   09/20/22 74.4 kg (164 lb)   09/10/22 75.3 kg (166 lb)     Weight Change(s) Since Admission:  Admit Weight: 77.1 kg (170 lb) (11/01/22 0552)  11/1: # per pt; wt stable    Patient Education    Not applicable.    Monitoring & Evaluation     Dietitian will monitor food and beverage intake, weight, electrolyte/renal panel, and glucose/endocrine profile.  Nutrition Risk/Follow-Up: low (follow-up in 5-7 days)  Patients assigned 'low nutrition risk' status do not qualify for a full nutritional assessment but will be monitored and re-evaluated in a 5-7 day time period. Please consult if re-evaluation needed sooner.

## 2022-11-02 NOTE — PROGRESS NOTES
[facility] - ICU  Pulmonary Critical Care Note    Patient Name: Adrienne Urbina  MRN: [id number]  Admission Date: 10/31/2022  Hospital Length of Stay: 1 days  Code Status: Full Code  Attending Provider: Glenn Cantu Jr., MD, *  Primary Care Provider: Gregorio Cherry NP     Subjective:     HPI:   Ms. 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  Patient reports being in her usual state of health until noticing blood streaked sputum that progressed to complete blood filled sputum over the past x1.5 days.  She denies any trauma however does report chronic cough due to COPD which she feels may have contributed to recent symptoms. When asked, patient denies ever experiencing hemoptysis at any point in the past even throughout undergoing chemotherapy and radiation for right upper lobe lung mass (last chemotherapy x1.5 months ago; follows w/ Dr. Shoemaker).  These new symptoms scared her which prompted her ED presentation. Denies any history or DVT/PE. She also denies any current lightheadedness/dizziness, nausea/vomiting, fever/chills, chest pain, palpitations, new/worsening shortness of breath, hematemesis, hematochezia, melena, hematuria. Per chart review; patient was recently discharged from [facility] (10/17) in which she required ICU level care secondary to suspected Afib w/ RVR in addition to anemia requiring x2 units PRBCs and GNR related pneumonia.     Hospital Course/Significant events:      24 Hour Interval History:  Yesterday afternoon patient began coughing up copious amounts of bloody sputum.  Qualitatively she coughed up approximately 40-50 cc in the afternoon.  She continued to  cough up bloody sputum through the evening albeit less than in the afternoon.  She also complained of shortness of breath while being on nasal cannula at 2 L. H&H was checked in afternoon with a hemoglobin 8 0.9/292 which is the increase from the morning.  She was also given a 1 L bolus of fluid for possible hypotension.  Over the course of the evening she began progressively tachycardic up to 70.  Shows given 1 time dose of Lopressor 5 with no affect.  Therefore, she was started on Cardizem which is still currently running at 15 mg/hour.  Chest x-ray was also taken which reveals and increase in pulmonary vascular congestion with blunting of the left costophrenic angle.  Overnight team gave 1 time dose of Lasix 10 for possible fluid overload.  This morning she is sitting up in bed in mild distress.  She states that coughing bloody sputum has decreased.  Still feels anxious.  Has some shortness of breath but denies any chest tightness or pain.  Of vitals this morning pulses 125, respiratory rate is 29, blood pressure 137/65, and SpO2 is 97% on 2 L nasal cannula.  Input are last 24 hours is 3096 with output of 1400 mL.    Past Medical History:   Diagnosis Date    Anxiety and depression     Cancer     lung with brain metastasis    Cholelithiasis     COPD (chronic obstructive pulmonary disease)     Hypertension     Lumbar disc disease     PAD (peripheral artery disease)     Paroxysmal SVT (supraventricular tachycardia)     Pleural effusion        Social History     Socioeconomic History    Marital status:    Tobacco Use    Smoking status: Some Days     Packs/day: 0.25     Years: 49.00     Pack years: 12.25     Types: Cigarettes     Last attempt to quit: 2022     Years since quittin.3    Smokeless tobacco: Never   Substance and Sexual Activity    Alcohol use: Never    Drug use: Never    Sexual activity: Yes     Social Determinants of Health     Financial Resource Strain: Low Risk     Difficulty of Paying  Living Expenses: Not very hard   Food Insecurity: No Food Insecurity    Worried About Running Out of Food in the Last Year: Never true    Ran Out of Food in the Last Year: Never true   Transportation Needs: No Transportation Needs    Lack of Transportation (Medical): No    Lack of Transportation (Non-Medical): No   Physical Activity: Inactive    Days of Exercise per Week: 0 days    Minutes of Exercise per Session: 0 min   Stress: No Stress Concern Present    Feeling of Stress : Not at all   Social Connections: Socially Isolated    Frequency of Communication with Friends and Family: Twice a week    Frequency of Social Gatherings with Friends and Family: Three times a week    Attends Confucianism Services: Never    Active Member of Clubs or Organizations: No    Attends Club or Organization Meetings: Never    Marital Status:    Housing Stability: Unknown    Unable to Pay for Housing in the Last Year: No    Unstable Housing in the Last Year: No       Objective:     Current Outpatient Medications   Medication Instructions    albuterol (PROVENTIL/VENTOLIN HFA) 90 mcg/actuation inhaler Currently holding due to heart rate    albuterol-ipratropium (DUO-NEB) 2.5 mg-0.5 mg/3 mL nebulizer solution 3 mLs, Nebulization, Every 4 hours PRN, Currently holding due to heartrate    aspirin 81 mg, Oral, Daily    ATROVENT HFA 17 mcg/actuation inhaler Currently holding due to heartrate    cyproheptadine (PERIACTIN) 4 mg, Oral, As needed (PRN)    dicyclomine (BENTYL) 10 mg, Oral, 3 times daily PRN    diltiaZEM (CARDIZEM CD) 360 mg, Oral, Daily    folic acid (FOLVITE) 1 mg, Oral, Daily    gabapentin (NEURONTIN) 300 mg, Oral, 3 times daily    guaiFENesin-codeine 100-10 mg/5 ml (TUSSI-ORGANIDIN NR)  mg/5 mL syrup 10 mLs, Oral, As needed (PRN)    HUMULIN R REGULAR U-100 INSULN 100 unit/mL injection Holding since in patient stay    HYDROcodone-acetaminophen (NORCO)  mg per tablet 1 tablet, Oral, Every 4 hours PRN    loratadine  (CLARITIN) 10 mg, Oral, Daily    metFORMIN (GLUCOPHAGE) 500 mg, Oral, 2 times daily    methylPREDNISolone (MEDROL DOSEPACK) 4 mg tablet use as directed    metoprolol tartrate (LOPRESSOR) 100 mg, Oral, 2 times daily    mirtazapine (REMERON) 15 mg, Oral, Nightly    montelukast (SINGULAIR) 10 mg, Oral, As needed (PRN)    ONETOUCH DELICA PLUS LANCET 33 gauge Misc No dose, route, or frequency recorded.    ONETOUCH VERIO REFLECT METER Misc No dose, route, or frequency recorded.    ONETOUCH VERIO TEST STRIPS Strp No dose, route, or frequency recorded.    OXYGEN-AIR DELIVERY SYSTEMS [facility]   oxygen, See Instructions, home concentrator and portable @ 2L per nasal cannula continuous to keep SATS at 88% or greater J18.9, C34.9, J96.9, R09.02, # 1 EA, 0 Refill(s)    tiZANidine (ZANAFLEX) 4 MG tablet No dose, route, or frequency recorded.       Current Inpatient Medications   folic acid  1 mg Oral Daily    gabapentin  300 mg Oral TID    metoprolol  5 mg Intravenous Once    metoprolol tartrate  50 mg Oral BID    mupirocin   Nasal BID    pantoprozole (PROTONIX) IV  80 mg Intravenous Daily    piperacillin-tazobactam (ZOSYN) IVPB  4.5 g Intravenous Q8H    predniSONE  40 mg Oral Daily    vancomycin  1,000 mg Intravenous Q12H         Intake/Output Summary (Last 24 hours) at 11/2/2022 0457  Last data filed at 11/2/2022 0446  Gross per 24 hour   Intake 3096 ml   Output 1400 ml   Net 1696 ml       ROS 14 point review of systems assessed is negative except as stated above    Vital Signs (Most Recent):  Temp: 98.2 °F (36.8 °C) (11/02/22 0330)  Pulse: (!) 125 (11/02/22 0430)  Resp: (!) 29 (11/02/22 0430)  BP: 137/65 (11/02/22 0430)  SpO2: 97 % (11/02/22 0430)    Body mass index is 27.44 kg/m².  Weight: 77.1 kg (170 lb) Vital Signs (24h Range):  Temp:  [97.7 °F (36.5 °C)-98.6 °F (37 °C)] 98.2 °F (36.8 °C)  Pulse:  [] 125  Resp:  [19-35] 29  SpO2:  [91 %-100 %] 97 %  BP: ()/() 137/65     Physical Exam  Vitals and nursing note  reviewed.   Constitutional:       General: She is in acute distress.      Appearance: She is ill-appearing. She is not toxic-appearing.   HENT:      Head: Normocephalic and atraumatic.      Nose: Nose normal.      Mouth/Throat:      Mouth: Mucous membranes are moist.      Pharynx: Oropharynx is clear.      Comments: Dry crusted blood  Eyes:      Extraocular Movements: Extraocular movements intact.      Conjunctiva/sclera: Conjunctivae normal.      Pupils: Pupils are equal, round, and reactive to light.   Cardiovascular:      Rate and Rhythm: Regular rhythm. Tachycardia present.      Pulses: Normal pulses.      Heart sounds: Normal heart sounds. No murmur heard.    No gallop.   Pulmonary:      Breath sounds: Wheezing present.      Comments: Increased respiratory effort with use of accessory muscles  Very coarse breath sounds with upper airway gurgling  Inspiratory and expiratory wheezing appreciated in all lung fields.  Musculoskeletal:         General: Swelling (Lower extremities) present.      Right lower leg: No edema.      Left lower leg: No edema.   Skin:     General: Skin is warm and dry.   Neurological:      General: No focal deficit present.      Mental Status: She is alert and oriented to person, place, and time.       Mechanical ventilation support:       Lines/Drains/Airways       Central Venous Catheter Line  Duration             Port A Cath Single Lumen right subclavian -- days              Peripheral Intravenous Line  Duration                  Peripheral IV - Single Lumen 10/31/22 2215 20 G Left Antecubital 1 day         Peripheral IV - Single Lumen 11/01/22 0131 20 G Left;Posterior Wrist 1 day                    Significant Labs:    Lab Results   Component Value Date    WBC 8.4 11/01/2022    HGB 8.9 (L) 11/01/2022    HCT 29.2 (L) 11/01/2022    MCV 96.7 (H) 11/01/2022     11/01/2022         BMP  Lab Results   Component Value Date     11/01/2022    K 4.1 11/01/2022    CO2 23 11/01/2022     BUN 14.8 11/01/2022    CREATININE 0.68 11/01/2022    CALCIUM 8.5 11/01/2022    EGFRNONAA 81 04/22/2022       ABG  No results for input(s): PH, PO2, PCO2, HCO3, BE in the last 168 hours.        Significant Imaging:  I have reviewed all pertinent imaging within the past 24 hours.    Assessment/Plan:   1.) Massive Hemoptysis              -- Possibly s/t #2  2.) Hx of Stage IV Poorly Differentiated RUL Lung Carcinoma              -- Diagnosed (9/2020); s/p radiation, currently receiving chemotherapy q3 weeks              -- w/ regional bone metastasis  -- w/ brain metastasis  -- Followed by [facility], Dr. Shoemaker  -- Followed by Pulmonologist, Dr. ИРИНА Simpson  3.) Suspected Recurrent Pneumonia              -- LLL CT Thorax suspected PNA              -- Recent hospitalization x14 days ago              -- Possible HAP  4.) SIRS (2/4; tachycardia, tachypnea) w/ suspected PNA etiology as above  5.) Hx of Atrial Fibrillation/flutter (not on Eliquis)  6.) Macrocytic Anemia  7.) Hx of COPD (on home 2L NC q.Nightly)  8.) Hypomagnesemia  9.) Hx of HTN  10.) Hx of HLD  11.) Hx of PAD  12.) Hx of Tobacco use    Continue ICU level care for possible decompensation  Continue to hold all anticoagulation including aspirin for ongoing hemoptysis  Respiratory culture, MRSA PCR, and blood cultures pending.  Continue vanc and Zosyn will deescalate pending cultures  Continue Cardizem for atrial fibrillation/flutter  Labs pending this morning    DVT Prophylaxis:  None  GI Prophylaxis:  Protonix     Greater than 30 minutes of critical care was time spent personally by me on the following activities: development of treatment plan with patient or surrogate and bedside caregivers, discussions with consultants, evaluation of patient's response to treatment, examination of patient, ordering and performing treatments and interventions, ordering and review of laboratory studies, ordering and review of radiographic studies, pulse oximetry,  re-evaluation of patient's condition.  This critical care time did not overlap with that of any other provider or involve time for any procedures.     Pato Rodríguez DO  Pulmonary Critical Care Medicine  [facility] - ICU

## 2022-11-02 NOTE — PROCEDURES
[facility]   Endotracheal Intubation  Procedure Note    SUMMARY     Date of Procedure: 11/2/2022    Procedure: Endotracheal Intubation    Perfomed by: Pato Rodríguez DO        Indication: impending respiratory failure.    Pre-Procedure Diagnosis:  Acute hypercapnic respiratory failure    Post-Procedure Diagnosis:  Same        Description of the Findings of the Procedure:     Sedation: etomidate.  Paralytic: rocuronium.  Lidocaine: no.  Atropine: no.  Equipment: Cale 3 laryngoscope blade.  Cricoid Pressure: no.  Number of attempts: 1.  ETT location confirmed by by auscultation, by CXR, and ETCO2 monitor.        Complications:  Hypotension from sedation       Condition: Good        Disposition: ICU - intubated and critically ill.    Attestation:  I performed this procedure under the supervision of MD Pato Melo DO  Internal Medicine - PGY-2        Staff Attestation  I was present during intubation procedure.  Agree with above.  Patient tolerated well.    Frederic Del Rio MD

## 2022-11-02 NOTE — PROGRESS NOTES
Pharmacokinetic Assessment Follow Up: IV Vancomycin    Vancomycin serum concentration assessment(s):    The trough level was drawn correctly and can be used to guide therapy at this time. The measurement is within the desired definitive target range of 10 to 20 mcg/mL.    Vancomycin Regimen Plan:    Continue regimen to Vancomycin 1000 mg IV every 12 hours with next serum trough concentration measured at 60minutes prior to third dose on 11/3    Drug levels (last 3 results):  Recent Labs   Lab Result Units 11/02/22  0816   Vancomycin Trough ug/ml 11.4*       Pharmacy will continue to follow and monitor vancomycin.    Please contact pharmacy at extension [phone]   for questions regarding this assessment.    Thank you for the consult,   Betsy Portillo       Patient brief summary:  Adrienne Urbina is a 67 y.o. female initiated on antimicrobial therapy with IV Vancomycin for treatment of lower respiratory infection    The patient's current regimen is vancomycin 1gram q 12 h     Drug Allergies:   Review of patient's allergies indicates:  No Known Allergies    Actual Body Weight:   76.6kg    Renal Function:   Estimated Creatinine Clearance: 90.4 mL/min (based on SCr of 0.64 mg/dL).,     Dialysis Method (if applicable):  N/A    CBC (last 72 hours):  Recent Labs   Lab Result Units 10/31/22  2211 11/01/22  0416 11/01/22  1215 11/02/22  0317   WBC x10(3)/mcL 9.2 8.4  --  8.5   Hgb gm/dL 9.3* 8.7* 8.9* 8.8*   Hct % 29.8* 29.1* 29.2* 28.6*   Platelet x10(3)/mcL 302 277  --  296   Mono % %  --  15.3  --   --    Monocyte Man % 9  --   --  9   Eos % %  --  0.6  --   --    Eos Man %  --   --   --  2   Basophil % %  --  0.6  --   --    Basophil Man %  --   --   --  2       Metabolic Panel (last 72 hours):  Recent Labs   Lab Result Units 10/31/22  2211 10/31/22  2216 11/01/22  0416 11/01/22  1049 11/02/22  0317   Sodium Level mmol/L 139  --  139  --  138   Potassium Level mmol/L 4.2  --  4.1  --  4.0   Chloride mmol/L 101  --  105  --  102    Carbon Dioxide mmol/L 25  --  23  --  23   Glucose Level mg/dL 111  --  114  --  116*   Glucose, UA mg/dL  --   --   --  Normal  --    Blood Urea Nitrogen mg/dL 19.4  --  14.8  --  7.7*   Creatinine mg/dL 0.76  --  0.68  --  0.64   Albumin Level gm/dL 2.2*  --  2.0*  --  1.8*   Bilirubin Total mg/dL 0.5  --  0.6  --  0.6   Alkaline Phosphatase unit/L 229*  --  188*  --  183*   Aspartate Aminotransferase unit/L 13  --  9  --  9   Alanine Aminotransferase unit/L 16  --  14  --  11   Magnesium Level mg/dL  --  1.40*  --   --  1.70   Phosphorus Level mg/dL  --  2.9  --   --  4.1       Vancomycin Administrations:  vancomycin given in the last 96 hours                     vancomycin 1 gram/250 mL in sodium chloride 0.9% IVPB 1 g (g) 1 g New Bag 11/01/22 2118     1 g New Bag  0821                    Microbiologic Results:  Microbiology Results (last 7 days)       Procedure Component Value Units Date/Time    Blood Culture (site 1) [[id number]] Collected: 11/01/22 0416    Order Status: Resulted Specimen: Blood Updated: 11/01/22 0428    Blood Culture (site 2) [[id number]] Collected: 11/01/22 0416    Order Status: Resulted Specimen: Blood Updated: 11/01/22 0428    Gram Stain [[id number]]     Order Status: Sent Specimen: Sputum     Respiratory Culture [[id number]]     Order Status: Sent Specimen: Sputum

## 2022-11-03 NOTE — PROGRESS NOTES
Pharmacokinetic Assessment Follow Up: IV Vancomycin    Vancomycin serum concentration assessment(s):    The trough level was drawn correctly and can be used to guide therapy at this time. The measurement is above the desired definitive target range of 15 to 20 mcg/mL.    Vancomycin Regimen Plan:    Discontinue the scheduled vancomycin regimen and re-dose when the random level is less than 20 mcg/mL, next level to be drawn at 0430 on 11/4 with morning labs.    Drug levels (last 3 results):  Recent Labs   Lab Result Units 11/02/22  0816 11/03/22  0909   Vancomycin Trough ug/ml 11.4* 38.5*       Pharmacy will continue to follow and monitor vancomycin.    Please contact pharmacy at extension [phone] for questions regarding this assessment.    Thank you for the consult,   Kareem Velázquez       Patient brief summary:  Adrienne Urbina is a 67 y.o. female initiated on antimicrobial therapy with IV Vancomycin for treatment of lower respiratory infection    Drug Allergies:   Review of patient's allergies indicates:  No Known Allergies    Actual Body Weight:   77.8 kg    Renal Function:   Estimated Creatinine Clearance: 50.2 mL/min (A) (based on SCr of 1.16 mg/dL (H)).,     Dialysis Method (if applicable):  N/A    CBC (last 72 hours):  Recent Labs   Lab Result Units 10/31/22  2211 11/01/22  0416 11/01/22  1215 11/02/22  0317 11/03/22  0315   WBC x10(3)/mcL 9.2 8.4  --  8.5 8.7   Hgb gm/dL 9.3* 8.7* 8.9* 8.8* 7.8*   Hct % 29.8* 29.1* 29.2* 28.6* 25.3*   Platelet x10(3)/mcL 302 277  --  296 266   Mono % %  --  15.3  --   --  9.7   Monocyte Man % 9  --   --  9  --    Eos % %  --  0.6  --   --  0.0   Eos Man %  --   --   --  2  --    Basophil % %  --  0.6  --   --  0.5   Basophil Man %  --   --   --  2  --        Metabolic Panel (last 72 hours):  Recent Labs   Lab Result Units 10/31/22  2211 10/31/22  2216 11/01/22  0416 11/01/22  1049 11/02/22  0317 11/03/22  0315   Sodium Level mmol/L 139  --  139  --  138 140   Potassium Level mmol/L  4.2  --  4.1  --  4.0 3.9   Chloride mmol/L 101  --  105  --  102 103   Carbon Dioxide mmol/L 25  --  23  --  23 22*   Glucose Level mg/dL 111  --  114  --  116* 139*   Glucose, UA mg/dL  --   --   --  Normal  --   --    Blood Urea Nitrogen mg/dL 19.4  --  14.8  --  7.7* 16.0   Creatinine mg/dL 0.76  --  0.68  --  0.64 1.16*   Albumin Level gm/dL 2.2*  --  2.0*  --  1.8* 1.8*   Bilirubin Total mg/dL 0.5  --  0.6  --  0.6 0.5   Alkaline Phosphatase unit/L 229*  --  188*  --  183* 131   Aspartate Aminotransferase unit/L 13  --  9  --  9 7   Alanine Aminotransferase unit/L 16  --  14  --  11 7   Magnesium Level mg/dL  --  1.40*  --   --  1.70 1.70   Phosphorus Level mg/dL  --  2.9  --   --  4.1 4.7       Vancomycin Administrations:  vancomycin given in the last 96 hours                     vancomycin (VANCOCIN) 1,000 mg in sodium chloride 0.9% 250 mL IVPB (mg) 1,000 mg New Bag 11/02/22 2102    vancomycin 1 gram/250 mL in sodium chloride 0.9% IVPB 1 g (g) 1 g New Bag 11/02/22 0931     1 g New Bag 11/01/22 2118     1 g New Bag  0821                    Microbiologic Results:  Microbiology Results (last 7 days)       Procedure Component Value Units Date/Time    Fungal Culture [[id number]] Collected: 11/03/22 0915    Order Status: Sent Specimen: Endotrachial Tube from Endotracheal Aspirate Updated: 11/03/22 0922    Mycobacteria and Nocardia Culture [[id number]] Collected: 11/03/22 0915    Order Status: Sent Specimen: Respiratory from Endotracheal Aspirate Updated: 11/03/22 0922    Respiratory Culture [[id number]] Collected: 11/03/22 0607    Order Status: Sent Specimen: Respiratory from Endotracheal Aspirate Updated: 11/03/22 0610    Blood Culture (site 1) [[id number]]  (Normal) Collected: 11/01/22 0416    Order Status: Completed Specimen: Blood Updated: 11/02/22 1100     CULTURE, BLOOD ([facility]) No Growth At 24 Hours    Blood Culture (site 2) [[id number]]  (Normal) Collected: 11/01/22 0416    Order Status: Completed Specimen: Blood  Updated: 11/02/22 1100     CULTURE, BLOOD ([facility]) No Growth At 24 Hours    Gram Stain [[id number]]     Order Status: Sent Specimen: Sputum     Respiratory Culture [[id number]]     Order Status: Sent Specimen: Sputum

## 2022-11-03 NOTE — PROCEDURES
"Adrienne Urbina is a 67 y.o. female patient.    Temp: 96.5 °F (35.8 °C) (11/02/22 2100)  Pulse: 70 (11/02/22 2200)  Resp: (!) 26 (11/02/22 2200)  BP: 138/67 (11/02/22 2200)  SpO2: 100 % (11/02/22 2200)  Weight: 76.6 kg (168 lb 14 oz) (11/02/22 0600)  Height: 5' 6" (167.6 cm) (11/01/22 1251)    PICC  Date/Time: 11/2/2022 9:49 PM  Performed by: Dar Crowell RN  Consent Done: Yes  Time out: Immediately prior to procedure a time out was called to verify the correct patient, procedure, equipment, support staff and site/side marked as required  Indications: vascular access  Anesthesia: local infiltration    Description of findings: MLC placed, PICc insertion failed attempt  Preparation: skin prepped with ChloraPrep  Skin prep agent dried: skin prep agent completely dried prior to procedure  Sterile barriers: all five maximum sterile barriers used - cap, mask, sterile gown, sterile gloves, and large sterile sheet  Hand hygiene: hand hygiene performed prior to central venous catheter insertion  Location details: left basilic  Catheter type: single lumen  Catheter size: 4 Fr  Catheter Length: 15cm    Ultrasound guidance: yes  Vessel Caliber: medium and patent, compressibility normal  Needle advanced into vessel with real time Ultrasound guidance.  Guidewire confirmed in vessel.  Sterile sheath used.  Number of attempts: 1  Post-procedure: blood return through all ports, chlorhexidine patch and sterile dressing applied    Assessment: successful placement    PICC ordered- PICC insertion AYE taveras MD at bedside, MLC placed for access. Pt with [employer]port RCW not in use and in error. Mediport historical attempt at placement failed to LCW, unable to thread PICC pass LSCV    Name LYNDA Crowell RN [facility]  11/2/2022    "

## 2022-11-03 NOTE — PLAN OF CARE
Problem: Adult Inpatient Plan of Care  Goal: Plan of Care Review  Outcome: Ongoing, Progressing  Goal: Patient-Specific Goal (Individualized)  Outcome: Ongoing, Progressing  Goal: Absence of Hospital-Acquired Illness or Injury  Outcome: Ongoing, Progressing  Goal: Optimal Comfort and Wellbeing  Outcome: Ongoing, Progressing  Goal: Readiness for Transition of Care  Outcome: Ongoing, Progressing     Problem: Infection  Goal: Absence of Infection Signs and Symptoms  Outcome: Ongoing, Progressing     Problem: Skin Injury Risk Increased  Goal: Skin Health and Integrity  Outcome: Ongoing, Progressing     Problem: Communication Impairment (Mechanical Ventilation, Invasive)  Goal: Effective Communication  Outcome: Ongoing, Progressing     Problem: Device-Related Complication Risk (Mechanical Ventilation, Invasive)  Goal: Optimal Device Function  Outcome: Ongoing, Progressing     Problem: Inability to Wean (Mechanical Ventilation, Invasive)  Goal: Mechanical Ventilation Liberation  Outcome: Ongoing, Progressing     Problem: Nutrition Impairment (Mechanical Ventilation, Invasive)  Goal: Optimal Nutrition Delivery  Outcome: Ongoing, Progressing

## 2022-11-03 NOTE — PROGRESS NOTES
[facility] - ICU  Pulmonary Critical Care Note    Patient Name: Adrienne Urbina  MRN: [id number]  Admission Date: 10/31/2022  Hospital Length of Stay: 2 days  Code Status: Full Code  Attending Provider: Glenn Cantu Jr., MD, *  Primary Care Provider: Gregorio Cherry NP     Subjective:     HPI:   Ms. 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  Patient reports being in her usual state of health until noticing blood streaked sputum that progressed to complete blood filled sputum over the past x1.5 days.  She denies any trauma however does report chronic cough due to COPD which she feels may have contributed to recent symptoms. When asked, patient denies ever experiencing hemoptysis at any point in the past even throughout undergoing chemotherapy and radiation for right upper lobe lung mass (last chemotherapy x1.5 months ago; follows w/ Dr. Shoemaker).  These new symptoms scared her which prompted her ED presentation. Denies any history or DVT/PE. She also denies any current lightheadedness/dizziness, nausea/vomiting, fever/chills, chest pain, palpitations, new/worsening shortness of breath, hematemesis, hematochezia, melena, hematuria. Per chart review; patient was recently discharged from [facility] (10/17) in which she required ICU level care secondary to suspected Afib w/ RVR in addition to anemia requiring x2 units PRBCs and GNR related pneumonia.     Hospital Course/Significant events:  Intubated 11/02/2022    24 Hour Interval History:  S afternoon patient appeared obtunded.  ABG was taken and revealed the following values pH 7.06, pCO2 97, PO2 52.  Discussed plan of care with family  urgently at the bedside and they opted for full code status with intubation.  See procedure note for full details.  No significant events noted overnight.  She does remain intubated and mechanically ventilated on AC/450/26/5 +/45%.  She is being currently sedated with propofol at 35 mcg/kg/minute.  She is requiring vasopressor support for now is on Levophed at 0.04 mcg/kg/minute.  Vitals on the aforementioned started this morning with a pulse of 112, respiratory rate 26, blood pressure 91/55, and SpO2 100% on MV.  Has also been afebrile last 24 hours the T-max 36.6° C.  Input over last 24 hours 1093 mL with output of 100 mL.  ABG reviewed this morning is pH is 7.39, pCO2 40, PO2 84, serum bicarb 22.  Additionally, approximately 110 cc of bright red blood suctioned overnight.    Past Medical History:   Diagnosis Date    Anxiety and depression     Cancer     lung with brain metastasis    Cholelithiasis     COPD (chronic obstructive pulmonary disease)     Hypertension     Lumbar disc disease     PAD (peripheral artery disease)     Paroxysmal SVT (supraventricular tachycardia)     Pleural effusion        Social History     Socioeconomic History    Marital status:    Tobacco Use    Smoking status: Some Days     Packs/day: 0.25     Years: 49.00     Pack years: 12.25     Types: Cigarettes     Last attempt to quit: 2022     Years since quittin.3    Smokeless tobacco: Never   Substance and Sexual Activity    Alcohol use: Never    Drug use: Never    Sexual activity: Yes     Social Determinants of Health     Financial Resource Strain: Low Risk     Difficulty of Paying Living Expenses: Not very hard   Food Insecurity: No Food Insecurity    Worried About Running Out of Food in the Last Year: Never true    Ran Out of Food in the Last Year: Never true   Transportation Needs: No Transportation Needs    Lack of Transportation (Medical): No    Lack of Transportation (Non-Medical): No   Physical Activity: Inactive     Days of Exercise per Week: 0 days    Minutes of Exercise per Session: 0 min   Stress: No Stress Concern Present    Feeling of Stress : Not at all   Social Connections: Socially Isolated    Frequency of Communication with Friends and Family: Twice a week    Frequency of Social Gatherings with Friends and Family: Three times a week    Attends [employer] Services: Never    Active Member of Clubs or Organizations: No    Attends Club or Organization Meetings: Never    Marital Status:    Housing Stability: Unknown    Unable to Pay for Housing in the Last Year: No    Unstable Housing in the Last Year: No       Objective:     Current Outpatient Medications   Medication Instructions    albuterol (PROVENTIL/VENTOLIN HFA) 90 mcg/actuation inhaler Currently holding due to heart rate    albuterol-ipratropium (DUO-NEB) 2.5 mg-0.5 mg/3 mL nebulizer solution 3 mLs, Nebulization, Every 4 hours PRN, Currently holding due to heartrate    aspirin 81 mg, Oral, Daily    ATROVENT HFA 17 mcg/actuation inhaler Currently holding due to heartrate    cyproheptadine (PERIACTIN) 4 mg, Oral, As needed (PRN)    dicyclomine (BENTYL) 10 mg, Oral, 3 times daily PRN    diltiaZEM (CARDIZEM CD) 360 mg, Oral, Daily    folic acid (FOLVITE) 1 mg, Oral, Daily    gabapentin (NEURONTIN) 300 mg, Oral, 3 times daily    guaiFENesin-codeine 100-10 mg/5 ml (TUSSI-ORGANIDIN NR)  mg/5 mL syrup 10 mLs, Oral, As needed (PRN)    HUMULIN R REGULAR U-100 INSULN 100 unit/mL injection Holding since in patient stay    HYDROcodone-acetaminophen (NORCO)  mg per tablet 1 tablet, Oral, Every 4 hours PRN    loratadine (CLARITIN) 10 mg, Oral, Daily    metFORMIN (GLUCOPHAGE) 500 mg, Oral, 2 times daily    methylPREDNISolone (MEDROL DOSEPACK) 4 mg tablet use as directed    metoprolol tartrate (LOPRESSOR) 100 mg, Oral, 2 times daily    mirtazapine (REMERON) 15 mg, Oral, Nightly    montelukast (SINGULAIR) 10 mg, Oral, As needed (PRN)    ONETOUCH DELICA PLUS LANCET  33 gauge Misc No dose, route, or frequency recorded.    ONETOUCH VERIO REFLECT METER Misc No dose, route, or frequency recorded.    ONETOUCH VERIO TEST STRIPS Strp No dose, route, or frequency recorded.    OXYGEN-AIR DELIVERY SYSTEMS MIS   oxygen, See Instructions, home concentrator and portable @ 2L per nasal cannula continuous to keep SATS at 88% or greater J18.9, C34.9, J96.9, R09.02, # 1 EA, 0 Refill(s)    tiZANidine (ZANAFLEX) 4 MG tablet No dose, route, or frequency recorded.       Current Inpatient Medications   folic acid  1 mg Oral Daily    gabapentin  300 mg Oral TID    mupirocin   Nasal BID    pantoprozole (PROTONIX) IV  40 mg Intravenous Daily    piperacillin-tazobactam (ZOSYN) IVPB  4.5 g Intravenous Q8H    predniSONE  40 mg Oral Daily    vancomycin (VANCOCIN) IVPB  1,000 mg Intravenous Q12H         Intake/Output Summary (Last 24 hours) at 11/3/2022 0528  Last data filed at 11/3/2022 0504  Gross per 24 hour   Intake 1093.43 ml   Output 200 ml   Net 893.43 ml         Review of Systems   Unable to perform ROS: Intubated      Vital Signs (Most Recent):  Temp: 97.3 °F (36.3 °C) (11/03/22 0400)  Pulse: (!) 112 (11/03/22 0500)  Resp: (!) 26 (11/03/22 0500)  BP: (!) 91/55 (11/03/22 0500)  SpO2: 100 % (11/03/22 0500)    Body mass index is 27.26 kg/m².  Weight: 76.6 kg (168 lb 14 oz) Vital Signs (24h Range):  Temp:  [94 °F (34.4 °C)-97.9 °F (36.6 °C)] 97.3 °F (36.3 °C)  Pulse:  [] 112  Resp:  [14-34] 26  SpO2:  [86 %-100 %] 100 %  BP: ()/(50-91) 91/55     Physical Exam  Vitals and nursing note reviewed.   Constitutional:       General: She is not in acute distress.     Appearance: She is ill-appearing. She is not toxic-appearing.      Comments: Intubated and sedated   HENT:      Head: Normocephalic and atraumatic.      Nose: Nose normal.      Mouth/Throat:      Mouth: Mucous membranes are moist.      Pharynx: Oropharynx is clear.   Eyes:      Extraocular Movements: Extraocular movements intact.       Conjunctiva/sclera: Conjunctivae normal.      Pupils: Pupils are equal, round, and reactive to light.   Cardiovascular:      Rate and Rhythm: Regular rhythm. Tachycardia present.      Pulses: Normal pulses.      Heart sounds: Normal heart sounds. No murmur heard.    No gallop.   Pulmonary:      Effort: No respiratory distress.      Breath sounds: No stridor. No wheezing, rhonchi or rales.   Abdominal:      General: Abdomen is flat.      Palpations: Abdomen is soft.   Musculoskeletal:         General: Swelling (Lower extremities) present. No deformity.      Right lower leg: No edema.      Left lower leg: No edema.   Skin:     General: Skin is warm and dry.      Coloration: Skin is not jaundiced.      Findings: No bruising.   Neurological:      General: No focal deficit present.      Mental Status: She is alert and oriented to person, place, and time.      Comments: Patient sedated and therefore unable to accurately exam       Mechanical ventilation support:  Vent Mode: A/C (11/03/22 0315)  Set Rate: 26 BPM (11/03/22 0315)  Vt Set: 450 mL (11/03/22 0315)  PEEP/CPAP: 5 cmH20 (11/03/22 0315)  Oxygen Concentration (%): 45 (11/03/22 0315)  Peak Airway Pressure: 26 cmH2O (11/03/22 0315)  Plateau Pressure: 22.1 cmH20 (11/02/22 1400)  Total Ve: 13 L/m (11/03/22 0315)  F/VT Ratio<105 (RSBI): (!) 58.48 (11/03/22 0315)    Lines/Drains/Airways       Central Venous Catheter Line  Duration             Port A Cath Single Lumen right subclavian -- days              Drain  Duration                  NG/OG Tube 11/02/22 1355 Ocean sump 16 Fr. Left mouth <1 day         Urethral Catheter 11/02/22 1400 Silicone 16 Fr. <1 day              Airway  Duration                  Airway - Non-Surgical 11/02/22 1400 Endotracheal Tube <1 day              Peripheral Intravenous Line  Duration                  Peripheral IV - Single Lumen 10/31/22 2215 20 G Left Antecubital 2 days         Peripheral IV - Single Lumen 11/01/22 0131 20 G Left;Posterior  Wrist 2 days         Midline Catheter Insertion/Assessment  - Single Lumen 11/02/22 2149 Left basilic vein (medial side of arm)  <1 day                    Significant Labs:    Lab Results   Component Value Date    WBC 8.7 11/03/2022    HGB 7.8 (L) 11/03/2022    HCT 25.3 (L) 11/03/2022    MCV 94.4 (H) 11/03/2022     11/03/2022         BMP  Lab Results   Component Value Date     11/03/2022    K 3.9 11/03/2022    CO2 22 (L) 11/03/2022    BUN 16.0 11/03/2022    CREATININE 1.16 (H) 11/03/2022    CALCIUM 8.9 11/03/2022    EGFRNONAA 81 04/22/2022       ABG  Recent Labs   Lab 11/03/22  0526   PH 7.39   PO2 84   PCO2 40   HCO3 24.2           Significant Imaging:  I have reviewed all pertinent imaging within the past 24 hours.    Assessment/Plan:   1.) Massive Hemoptysis              -- Possibly s/t #2  2.) Hx of Stage IV Poorly Differentiated RUL Lung Carcinoma              -- Diagnosed (9/2020); s/p radiation, currently receiving chemotherapy q3 weeks              -- w/ regional bone metastasis  -- w/ brain metastasis  -- Followed by [facility], Dr. Shoemaker  -- Followed by Pulmonologist, Dr. ИРИНА Simpson  3.) Suspected Recurrent Pneumonia              -- LLL CT Thorax suspected PNA              -- Recent hospitalization x14 days ago              -- Possible HAP  4.) SIRS (2/4; tachycardia, tachypnea) w/ suspected PNA etiology as above  5.) Hx of Atrial Fibrillation/flutter (not on Eliquis)  6.) Macrocytic Anemia  7.) Hx of COPD (on home 2L NC q.Nightly)  8.) Hypomagnesemia  9.) Hx of HTN  10.) Hx of HLD  11.) Hx of PAD  12.) Hx of Tobacco use    On mechanical ventilation, wean as tolerated  Continue to hold all anticoagulation including aspirin for ongoing hemoptysis  Blood cultures-24 hours.  Respiratory culture MRSA PCR still pending.  Continue vancomycin and Zosyn.  Continue Cardizem for atrial fibrillation/flutter  Prognosis guarded.    DVT Prophylaxis:  None  GI Prophylaxis:  Protonix     Greater than 30  minutes of critical care was time spent personally by me on the following activities: development of treatment plan with patient or surrogate and bedside caregivers, discussions with consultants, evaluation of patient's response to treatment, examination of patient, ordering and performing treatments and interventions, ordering and review of laboratory studies, ordering and review of radiographic studies, pulse oximetry, re-evaluation of patient's condition.  This critical care time did not overlap with that of any other provider or involve time for any procedures.     Pato Rodríguez, DO  Pulmonary Critical Care Medicine  [facility] - ICU

## 2022-11-03 NOTE — PROGRESS NOTES
Inpatient Nutrition Assessment    Admit Date: 10/31/2022   Total duration of encounter: 3 days     Nutrition Recommendation/Prescription     Pt NPO/intubated yesterday; has OGT. Propofol infusing @ 18.4ml/hr providing 485 calories. Suggest TF Peptamen AF @ 20ml/hr ; increase as tolerated to goal rate 55ml/hr (23 hr cycle) to provide 1518 calories, 96 gm protein, 1027 ml free water. (TF + propofol = 2003 calories). Flush tube with 100 ml water every 4hrs.   When extubated ADAT to cardiac with boost plus bid  MVI/fe  Biweekly wt  Will monitor nutrition status     Communication of Recommendations: reviewed with nurse    Nutrition Assessment     Malnutrition Assessment/Nutrition-Focused Physical Exam    Malnutrition in the context of chronic illness  Degree of Malnutrition: does not meet criteria  Energy Intake: does not meet criteria  Interpretation of Weight Loss: does not meet criteria  Body Fat: does not meet criteria  Area of Body Fat Loss: does not meet criteria  Muscle Mass Loss: does not meet criteria  Area of Muscle Mass Loss: does not meet criteria  Fluid Accumulation: does not meet criteria  Edema: does not meet criteria  Reduced  Strength: unable to obtain  A minimum of two characteristics is recommended for diagnosis of either severe or non-severe malnutrition.    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 (diagnosed 09/2020, s/p radiation, currently receiving chemotherapy q3 weeks), atrial fibrillation (not on Eliquis), HTN, HLD, PAD, COPD (on nightly 2 L NC), tobacco use, and cholelithiasis    Nutrition-Related Medications: folic acid,  "pantoprazole, zosyn, prednisone, vancomycin, precedex, levophed, propofol   Calorie Containing IV Medications: no significant kcals from medications at this time and Diprivan @ 18 ml/hr (provides 485 kcal/d)    Nutrition-Related Labs:  (11-3) H/H 7.8/25.3(L) Gluc 139 Bun 16 Cr 1.1 Alb 1.8(L)     Diet/PN Order: Diet NPO  Oral Supplement Order: none  Tube Feeding Order: none  Appetite/Oral Intake: NPO/NPO  Factors Affecting Nutritional Intake: NPO and on mechanical ventilation  Food/Islam/Cultural Preferences: none reported  Food Allergies: none reported       Wound(s):   none reported     Comments  (11/3) Pt intubated yesterday (); on propofol @ 18ml/hr; has OGT; TF recs provided to maintain nutrition status; will forward to MD. Labs akcnowledged.     : Pt currently NPO during rounds; reports some nausea/vomiting, no other GI complaints. Pt reports good appetite and no recent wt loss pta; #. No c/s difficulties reported. Pt ok to add Boost Plus once diet advanced.    Anthropometrics    Height: 5' 6" (167.6 cm) Height Method: Stated  Last Weight: 77.8 kg (171 lb 8.3 oz) (22) Weight Method: Bed Scale  BMI (Calculated): 27.7  BMI Classification: overweight (BMI 25-29.9)     Ideal Body Weight (IBW), Female: 130 lb     % Ideal Body Weight, Female (lb): 130.77 %                    Usual Body Weight (UBW), k.91 kg (# per pt)  % Usual Body Weight: 101.8     Usual Weight Provided By: patient and EMR weight history    Wt Readings from Last 5 Encounters:   22 77.8 kg (171 lb 8.3 oz)   10/21/22 75.7 kg (166 lb 12.8 oz)   10/16/22 77.7 kg (171 lb 4.8 oz)   22 74.4 kg (164 lb)   09/10/22 75.3 kg (166 lb)     Weight Change(s) Since Admission:  Admit Weight: 77.1 kg (170 lb) (22 0552)  (11/3) 77.8kg     Estimated Needs    Weight Used For Calorie Calculations: 77.8 kg (171 lb 8.3 oz)  Energy Calorie Requirements (kcal): 1945 kcal/d; 25 maryjane/kg vent  Energy Need Method: " Kcal/kg  Weight Used For Protein Calculations: 77.8 kg (171 lb 8.3 oz)  Protein Requirements: 94 gm protein/d; 1.2 gm/kg  Fluid Requirements (mL): 1945ml/d; 1ml/maryjane  Temp: 97.2 °F (36.2 °C)       Enteral Nutrition    Patient not receiving enteral nutrition at this time.    Parenteral Nutrition    Patient not receiving parenteral nutrition support at this time.    Evaluation of Received Nutrient Intake    Calories: not meeting estimated needs  Protein: not meeting estimated needs    Patient Education    Not applicable.    Nutrition Diagnosis     PES: Inadequate oral intake related to vent as evidenced by NPO status . (new)    Interventions/Goals     Intervention(s): modified composition of meals/snacks, modified composition of enteral nutrition, modified rate of enteral nutrition, commercial beverage, multivitamin/mineral supplement therapy, and collaboration with other providers  Goal: Meet greater than 75% of nutritional needs by follow-up. (new)    Monitoring & Evaluation     Dietitian will monitor food and beverage intake, enteral nutrition intake, and weight change.  Nutrition Risk/Follow-Up: moderate (follow-up in 3-5 days)   Please consult if re-assessment needed sooner.

## 2022-11-04 NOTE — PLAN OF CARE
11/04/22 0931   Discharge Assessment   Assessment Type Discharge Planning Assessment   Confirmed/corrected address, phone number and insurance Yes   Confirmed Demographics Correct on Facesheet   Source of Information family   When was your last doctors appointment?   (Gregorio Hart)   Reason For Admission Atrial fib, Tachycardia, Hemoptysis, Chest pain, Pulmonary cavitary lesion, Malignant   Lives With child(dary), adult   Facility Arrived From: [facility]   Do you expect to return to your current living situation? Yes   Do you have help at home or someone to help you manage your care at home? Yes   Who are your caregiver(s) and their phone number(s)? ALEX MARTINEZ (Daughter)   [phone]   Prior to hospitilization cognitive status: Alert/Oriented   Current cognitive status: Unable to Assess   Mobility Management RW   Dressing/Bathing Difficulty bathing difficulty, assistance 1 person   Dressing/Bathing Management Shower Chair   Equipment Currently Used at Home oxygen;walker, rolling;bedside commode;shower chair   Patient currently being followed by outpatient case management? No   Do you currently have service(s) that help you manage your care at home? Yes   [facility]   Is the pt/caregiver preference to resume services with current agency Yes   Do you take prescription medications? Yes   Do you have prescription coverage? Yes   Coverage Medicaid   Do you have any problems affording any of your prescribed medications? No   Is the patient taking medications as prescribed? yes   Who is going to help you get home at discharge? Family   How do you get to doctors appointments? family or friend will provide   Are you on dialysis? No   Discharge Plan A Home with family;Home Health   DME Needed Upon Discharge  none   Discharge Plan discussed with: Adult children   Discharge Barriers Identified None   Physical Activity   On average, how many days per week do you engage in  moderate to strenuous exercise (like a brisk walk)? 0 days   On average, how many minutes do you engage in exercise at this level? 0 min   Financial Resource Strain   How hard is it for you to pay for the very basics like food, housing, medical care, and heating? Not very   Housing Stability   In the last 12 months, was there a time when you were not able to pay the mortgage or rent on time? N   In the last 12 months, how many places have you lived? 1   In the last 12 months, was there a time when you did not have a steady place to sleep or slept in a shelter (including now)? N   Transportation Needs   In the past 12 months, has lack of transportation kept you from medical appointments or from getting medications? no   In the past 12 months, has lack of transportation kept you from meetings, work, or from getting things needed for daily living? No   Food Insecurity   Within the past 12 months, you worried that your food would run out before you got the money to buy more. Never true   Within the past 12 months, the food you bought just didn't last and you didn't have money to get more. Never true   Stress   Do you feel stress - tense, restless, nervous, or anxious, or unable to sleep at night because your mind is troubled all the time - these days? Not at all   Social Connections   In a typical week, how many times do you talk on the phone with family, friends, or neighbors? More than 3   How often do you get together with friends or relatives? More than 3   How often do you attend Congregation or Episcopalian services? Never  (Restorationist)   Do you belong to any clubs or organizations such as Congregation groups, unions, fraternal or athletic groups, or school groups? No   How often do you attend meetings of the clubs or organizations you belong to? Never   Are you , , , , never , or living with a partner?    Alcohol Use   Q1: How often do you have a drink containing alcohol? Never   Q2:  How many drinks containing alcohol do you have on a typical day when you are drinking? None   Q3: How often do you have six or more drinks on one occasion? Never   Relationship/Environment   Name(s) of Who Lives With Patient ALEX MARTINEZ (Daughter)   [phone]   Additional family: Dghtr, Adrienne Saleh ([phone]); Dghtr, Lor Saleh ([phone]). Pt has 7 dghtrs & 4 sons; Resides with spouse, Toney Carlitos

## 2022-11-04 NOTE — PLAN OF CARE
Problem: Adult Inpatient Plan of Care  Goal: Plan of Care Review  Outcome: Ongoing, Progressing  Goal: Patient-Specific Goal (Individualized)  Outcome: Ongoing, Progressing  Goal: Absence of Hospital-Acquired Illness or Injury  Outcome: Ongoing, Progressing  Goal: Optimal Comfort and Wellbeing  Outcome: Ongoing, Progressing  Goal: Readiness for Transition of Care  Outcome: Ongoing, Progressing     Problem: Infection  Goal: Absence of Infection Signs and Symptoms  Outcome: Ongoing, Progressing     Problem: Skin Injury Risk Increased  Goal: Skin Health and Integrity  Outcome: Ongoing, Progressing     Problem: Communication Impairment (Mechanical Ventilation, Invasive)  Goal: Effective Communication  Outcome: Ongoing, Progressing     Problem: Device-Related Complication Risk (Mechanical Ventilation, Invasive)  Goal: Optimal Device Function  Outcome: Ongoing, Progressing     Problem: Inability to Wean (Mechanical Ventilation, Invasive)  Goal: Mechanical Ventilation Liberation  Outcome: Ongoing, Progressing     Problem: Nutrition Impairment (Mechanical Ventilation, Invasive)  Goal: Optimal Nutrition Delivery  Outcome: Ongoing, Progressing     Problem: Skin and Tissue Injury (Mechanical Ventilation, Invasive)  Goal: Absence of Device-Related Skin and Tissue Injury  Outcome: Ongoing, Progressing

## 2022-11-04 NOTE — CONSULTS
Inpatient Nutrition Assessment    Admit Date: 10/31/2022   Total duration of encounter: 4 days     Nutrition Recommendation/Prescription     Pt NPO/intubated ; has OGT. Consulted for TF recs.  Propofol infusing @ 16.4 ml/hr providing 425  calories. Suggest TF Peptamen AF @ 20ml/hr ; increase as tolerated to goal rate 55ml/hr (23 hr cycle) to provide 1518 calories, 96 gm protein, 1027 ml free water. (TF + propofol = 1943 calories). Flush tube with 100 ml water every 4hrs. Spoke with RN about TF order; will place as per protocol.   When extubated ADAT to cardiac with boost plus bid  MVI/fe  Biweekly wt  Will monitor nutrition status     Communication of Recommendations: reviewed with nurse    Nutrition Assessment     Malnutrition Assessment/Nutrition-Focused Physical Exam    Malnutrition in the context of chronic illness  Degree of Malnutrition: does not meet criteria  Energy Intake: does not meet criteria  Interpretation of Weight Loss: does not meet criteria  Body Fat: does not meet criteria  Area of Body Fat Loss: does not meet criteria  Muscle Mass Loss: does not meet criteria  Area of Muscle Mass Loss: does not meet criteria  Fluid Accumulation: does not meet criteria  Edema: does not meet criteria  Reduced  Strength: unable to obtain  A minimum of two characteristics is recommended for diagnosis of either severe or non-severe malnutrition.    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 (diagnosed 09/2020, s/p radiation, currently receiving chemotherapy q3 weeks), atrial fibrillation (not on Eliquis), HTN, HLD, PAD, COPD (on nightly 2 L NC),  "tobacco use, and cholelithiasis    Nutrition-Related Medications: folic acid, pantoprazole, zosyn, prednisone,  precedex, levophed, propofol   Calorie Containing IV Medications: Diprivan @ 16 ml/hr (provides 425 kcal/d)    Nutrition-Related Labs:  (11-3) H/H 7.8/25.3(L) Gluc 139 Bun 16 Cr 1.1 Alb 1.8(L)   () H/H 6.7/21.2(L) Gluc 121 Bun 26 Cr 1.8 K 4.0   Diet/PN Order: Diet NPO  Oral Supplement Order: none  Tube Feeding Order: none  Appetite/Oral Intake: NPO/NPO  Factors Affecting Nutritional Intake: NPO and on mechanical ventilation  Food/Quaker/Cultural Preferences: none reported  Food Allergies: none reported       Wound(s):   none reported     Comments  () pt remains NPO x 3 days today/ vent; consulted for TF recs; will initiate TF as per protocol; spoke to RN about TF order. Pt remains on propofol @ 16ml/hr--providing 425 calories.     (11/3) Pt intubated yesterday (); on propofol @ 18ml/hr; has OGT; TF recs provided to maintain nutrition status; will forward to MD. Labs akcnowledged.     : Pt currently NPO during rounds; reports some nausea/vomiting, no other GI complaints. Pt reports good appetite and no recent wt loss pta; #. No c/s difficulties reported. Pt ok to add Boost Plus once diet advanced.    Anthropometrics    Height: 5' 6" (167.6 cm) Height Method: Stated  Last Weight: 78.3 kg (172 lb 9.9 oz) (22 0558) Weight Method: Bed Scale  BMI (Calculated): 27.9  BMI Classification: overweight (BMI 25-29.9)     Ideal Body Weight (IBW), Female: 130 lb     % Ideal Body Weight, Female (lb): 130.77 %                    Usual Body Weight (UBW), k.91 kg (# per pt)  % Usual Body Weight: 101.8     Usual Weight Provided By: patient and EMR weight history    Wt Readings from Last 5 Encounters:   22 78.3 kg (172 lb 9.9 oz)   10/21/22 75.7 kg (166 lb 12.8 oz)   10/16/22 77.7 kg (171 lb 4.8 oz)   22 74.4 kg (164 lb)   09/10/22 75.3 kg (166 lb)     Weight Change(s) " Since Admission:  Admit Weight: 77.1 kg (170 lb) (11/01/22 0552)  (11/3) 77.8kg     Estimated Needs    Weight Used For Calorie Calculations: 77.8 kg (171 lb 8.3 oz)  Energy Calorie Requirements (kcal): 1945 kcal/d; 25 maryjane/kg vent  Energy Need Method: Kcal/kg  Weight Used For Protein Calculations: 77.8 kg (171 lb 8.3 oz)  Protein Requirements: 94 gm protein/d; 1.2 gm/kg  Fluid Requirements (mL): 1945ml/d; 1ml/maryjane  Temp: 98.3 °F (36.8 °C)       Enteral Nutrition    Patient not receiving enteral nutrition at this time.    Parenteral Nutrition    Patient not receiving parenteral nutrition support at this time.    Evaluation of Received Nutrient Intake    Calories: not meeting estimated needs  Protein: not meeting estimated needs    Patient Education    Not applicable.    Nutrition Diagnosis     PES: Inadequate oral intake related to vent as evidenced by NPO status . (continues)    Interventions/Goals     Intervention(s): modified composition of meals/snacks, modified composition of enteral nutrition, modified rate of enteral nutrition, commercial beverage, multivitamin/mineral supplement therapy, and collaboration with other providers  Goal: Meet greater than 75% of nutritional needs by follow-up. (goal not met)    Monitoring & Evaluation     Dietitian will monitor food and beverage intake, enteral nutrition intake, and weight change.  Nutrition Risk/Follow-Up: high (follow-up in 1-4 days)   Please consult if re-assessment needed sooner.

## 2022-11-04 NOTE — PROGRESS NOTES
[facility] - ICU  Pulmonary Critical Care Note    Patient Name: Adrienne Urbina  MRN: [id number]  Admission Date: 10/31/2022  Hospital Length of Stay: 3 days  Code Status: Full Code  Attending Provider: Glenn Cantu Jr., MD, *  Primary Care Provider: Gregorio Cherry NP     Subjective:     HPI:   Ms. 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  Patient reports being in her usual state of health until noticing blood streaked sputum that progressed to complete blood filled sputum over the past x1.5 days.  She denies any trauma however does report chronic cough due to COPD which she feels may have contributed to recent symptoms. When asked, patient denies ever experiencing hemoptysis at any point in the past even throughout undergoing chemotherapy and radiation for right upper lobe lung mass (last chemotherapy x1.5 months ago; follows w/ Dr. Shoemaker).  These new symptoms scared her which prompted her ED presentation. Denies any history or DVT/PE. She also denies any current lightheadedness/dizziness, nausea/vomiting, fever/chills, chest pain, palpitations, new/worsening shortness of breath, hematemesis, hematochezia, melena, hematuria. Per chart review; patient was recently discharged from [facility] (10/17) in which she required ICU level care secondary to suspected Afib w/ RVR in addition to anemia requiring x2 units PRBCs and GNR related pneumonia.     Hospital Course/Significant events:  Intubated 11/02/2022    24 Hour Interval History:  No acute events overnight.  She remains intubated and sedated.  She is currently on AC/450/26/5 +/45%. ABG this morning with a pH of 7.46, pCO2 36, PO2  119.  Will change vent settings to AC/450/20/5 +/35%.  Remains sedated with propofol running at 35 mcg/kg/minute with added Precedex yesterday afternoon running at 0.2 mcg/kg/hour.  Was able to wean Levophed over the course of the evening and is currently running at 0.25 mcg/kg/minute.  Vitals this morning are stable with the heart rate of 87, respiratory rate 26, blood pressure 98/74, SpO2 100%.  Input over 24 hour 1127 mL with output of 600 mL. She has also been afebrile the past 24 hours with a T-max of 37.1° C.  Blood cultures remain negative with no growth at 48 hours and respiratory culture unremarkable.  Fungal culture, mycobacteria, Nocardia culture still pending.  Remains on Zosyn and vancomycin.  Additionally suction canister with approximately 280 mL of bloody sputum (had 110 mL of bloody sputum yesterday morning).    Past Medical History:   Diagnosis Date    Anxiety and depression     Cancer     lung with brain metastasis    Cholelithiasis     COPD (chronic obstructive pulmonary disease)     Hypertension     Lumbar disc disease     PAD (peripheral artery disease)     Paroxysmal SVT (supraventricular tachycardia)     Pleural effusion        Social History     Socioeconomic History    Marital status:    Tobacco Use    Smoking status: Some Days     Packs/day: 0.25     Years: 49.00     Pack years: 12.25     Types: Cigarettes     Last attempt to quit: 2022     Years since quittin.3    Smokeless tobacco: Never   Substance and Sexual Activity    Alcohol use: Never    Drug use: Never    Sexual activity: Yes     Social Determinants of Health     Financial Resource Strain: Low Risk     Difficulty of Paying Living Expenses: Not very hard   Food Insecurity: No Food Insecurity    Worried About Running Out of Food in the Last Year: Never true    Ran Out of Food in the Last Year: Never true   Transportation Needs: No Transportation Needs    Lack of Transportation (Medical): No    Lack of Transportation  (Non-Medical): No   Physical Activity: Inactive    Days of Exercise per Week: 0 days    Minutes of Exercise per Session: 0 min   Stress: No Stress Concern Present    Feeling of Stress : Not at all   Social Connections: Socially Isolated    Frequency of Communication with Friends and Family: Twice a week    Frequency of Social Gatherings with Friends and Family: Three times a week    Attends Yarsanism Services: Never    Active Member of Clubs or Organizations: No    Attends Club or Organization Meetings: Never    Marital Status:    Housing Stability: Unknown    Unable to Pay for Housing in the Last Year: No    Unstable Housing in the Last Year: No       Objective:     Current Outpatient Medications   Medication Instructions    albuterol (PROVENTIL/VENTOLIN HFA) 90 mcg/actuation inhaler Currently holding due to heart rate    albuterol-ipratropium (DUO-NEB) 2.5 mg-0.5 mg/3 mL nebulizer solution 3 mLs, Nebulization, Every 4 hours PRN, Currently holding due to heartrate    aspirin 81 mg, Oral, Daily    ATROVENT HFA 17 mcg/actuation inhaler Currently holding due to heartrate    cyproheptadine (PERIACTIN) 4 mg, Oral, As needed (PRN)    dicyclomine (BENTYL) 10 mg, Oral, 3 times daily PRN    diltiaZEM (CARDIZEM CD) 360 mg, Oral, Daily    folic acid (FOLVITE) 1 mg, Oral, Daily    gabapentin (NEURONTIN) 300 mg, Oral, 3 times daily    guaiFENesin-codeine 100-10 mg/5 ml (TUSSI-ORGANIDIN NR)  mg/5 mL syrup 10 mLs, Oral, As needed (PRN)    HUMULIN R REGULAR U-100 INSULN 100 unit/mL injection Holding since in patient stay    HYDROcodone-acetaminophen (NORCO)  mg per tablet 1 tablet, Oral, Every 4 hours PRN    loratadine (CLARITIN) 10 mg, Oral, Daily    metFORMIN (GLUCOPHAGE) 500 mg, Oral, 2 times daily    methylPREDNISolone (MEDROL DOSEPACK) 4 mg tablet use as directed    metoprolol tartrate (LOPRESSOR) 100 mg, Oral, 2 times daily    mirtazapine (REMERON) 15 mg, Oral, Nightly    montelukast (SINGULAIR) 10 mg,  Oral, As needed (PRN)    ONETOUCH DELICA PLUS LANCET 33 gauge Misc No dose, route, or frequency recorded.    ONETOUCH VERIO REFLECT METER Misc No dose, route, or frequency recorded.    ONETOUCH VERIO TEST STRIPS Strp No dose, route, or frequency recorded.    OXYGEN-AIR DELIVERY SYSTEMS MISC   oxygen, See Instructions, home concentrator and portable @ 2L per nasal cannula continuous to keep SATS at 88% or greater J18.9, C34.9, J96.9, R09.02, # 1 EA, 0 Refill(s)    tiZANidine (ZANAFLEX) 4 MG tablet No dose, route, or frequency recorded.       Current Inpatient Medications   folic acid  1 mg Oral Daily    gabapentin  300 mg Oral TID    mupirocin   Nasal BID    pantoprozole (PROTONIX) IV  40 mg Intravenous Daily    piperacillin-tazobactam (ZOSYN) IVPB  4.5 g Intravenous Q8H    predniSONE  40 mg Oral Daily         Intake/Output Summary (Last 24 hours) at 11/4/2022 0633  Last data filed at 11/4/2022 0457  Gross per 24 hour   Intake 1127.63 ml   Output 600 ml   Net 527.63 ml         Review of Systems   Unable to perform ROS: Intubated      Vital Signs (Most Recent):  Temp: 98.7 °F (37.1 °C) (11/04/22 0306)  Pulse: 87 (11/04/22 0600)  Resp: (!) 26 (11/04/22 0600)  BP: 98/74 (11/04/22 0600)  SpO2: 100 % (11/04/22 0600)    Body mass index is 27.86 kg/m².  Weight: 78.3 kg (172 lb 9.9 oz) Vital Signs (24h Range):  Temp:  [97.2 °F (36.2 °C)-98.7 °F (37.1 °C)] 98.7 °F (37.1 °C)  Pulse:  [] 87  Resp:  [21-27] 26  SpO2:  [98 %-100 %] 100 %  BP: ()/(52-74) 98/74     Physical Exam  Vitals and nursing note reviewed.   Constitutional:       General: She is not in acute distress.     Appearance: She is ill-appearing. She is not toxic-appearing.      Comments: Intubated and sedated   HENT:      Head: Normocephalic and atraumatic.      Nose: Nose normal.      Mouth/Throat:      Mouth: Mucous membranes are moist.      Pharynx: Oropharynx is clear.   Eyes:      Extraocular Movements: Extraocular movements intact.       Conjunctiva/sclera: Conjunctivae normal.      Pupils: Pupils are equal, round, and reactive to light.   Cardiovascular:      Rate and Rhythm: Normal rate and regular rhythm.      Pulses: Normal pulses.      Heart sounds: Normal heart sounds. No murmur heard.    No gallop.   Pulmonary:      Effort: No respiratory distress.      Breath sounds: No stridor. No wheezing, rhonchi or rales.   Abdominal:      General: Abdomen is flat.      Palpations: Abdomen is soft.   Musculoskeletal:         General: No swelling or deformity.      Right lower leg: No edema.      Left lower leg: No edema.   Skin:     General: Skin is warm and dry.      Coloration: Skin is not jaundiced.      Findings: No bruising.   Neurological:      General: No focal deficit present.      Mental Status: She is alert and oriented to person, place, and time.      Comments: Patient sedated and therefore unable to accurately exam       Mechanical ventilation support:  Vent Mode: A/C (11/04/22 0500)  Set Rate: 26 BPM (11/04/22 0500)  Vt Set: 450 mL (11/04/22 0500)  PEEP/CPAP: 5 cmH20 (11/04/22 0500)  Oxygen Concentration (%): 45 (11/04/22 0500)  Peak Airway Pressure: 27.8 cmH2O (11/04/22 0500)  Plateau Pressure: 21.2 cmH20 (11/03/22 0731)  Total Ve: 11.2 L/m (11/04/22 0500)  F/VT Ratio<105 (RSBI): (!) 60.05 (11/04/22 0500)    Lines/Drains/Airways       Central Venous Catheter Line  Duration             Port A Cath Single Lumen right subclavian -- days              Drain  Duration                  NG/OG Tube 11/02/22 1355 [place] sump 16 Fr. Left mouth 1 day         Urethral Catheter 11/02/22 1400 Silicone 16 Fr. 1 day              Airway  Duration                  Airway - Non-Surgical 11/02/22 1400 Endotracheal Tube 1 day              Peripheral Intravenous Line  Duration                  Peripheral IV - Single Lumen 10/31/22 2215 20 G Left Antecubital 3 days         Peripheral IV - Single Lumen 11/01/22 0131 20 G Left;Posterior Wrist 3 days         Midline  Catheter Insertion/Assessment  - Single Lumen 11/02/22 2149 Left basilic vein (medial side of arm)  1 day                    Significant Labs:    Lab Results   Component Value Date    WBC 10.9 11/04/2022    HGB 6.7 (L) 11/04/2022    HCT 21.2 (L) 11/04/2022    MCV 91.4 11/04/2022     11/04/2022         BMP  Lab Results   Component Value Date     11/04/2022    K 4.0 11/04/2022    CO2 23 11/04/2022    BUN 26.2 (H) 11/04/2022    CREATININE 1.84 (H) 11/04/2022    CALCIUM 8.2 (L) 11/04/2022    EGFRNONAA 81 04/22/2022       ABG  Recent Labs   Lab 11/04/22  0401   PH 7.46*   PO2 119*   PCO2 36   HCO3 25.6             Significant Imaging:  I have reviewed all pertinent imaging within the past 24 hours.    Assessment/Plan:   1.) Massive Hemoptysis              -- Possibly s/t #2  2.) Hx of Stage IV Poorly Differentiated RUL Lung Carcinoma              -- Diagnosed (9/2020); s/p radiation, currently receiving chemotherapy q3 weeks              -- w/ regional bone metastasis  -- w/ brain metastasis  -- Followed by [facility], Dr. Shoemaker  -- Followed by Pulmonologist, Dr. ИРИНА Simpson  3.) Suspected Recurrent Pneumonia              -- LLL CT Thorax suspected PNA              -- Recent hospitalization x14 days ago              -- Possible HAP  4.) Anemia  5.) Hx of Atrial Fibrillation/flutter (not on Eliquis)  6.) Macrocytic Anemia  7.) Hx of COPD (on home 2L NC q.Nightly)  8.) Hypomagnesemia  9.) Hx of HTN  10.) Hx of HLD  11.) Hx of PAD  12.) Hx of Tobacco use    On mechanical ventilation, wean as tolerated.  Daily SBT as tolerated  Continue to hold all anticoagulation including aspirin for ongoing hemoptysis  Continue vancomycin and Zosyn.  Awaiting cultures  Continue Cardizem for atrial fibrillation/flutter  Type and Cross - transfuse 2 units  Prognosis guarded.    DVT Prophylaxis:  None  GI Prophylaxis:  Protonix     Greater than 30 minutes of critical care was time spent personally by me on the following  activities: development of treatment plan with patient or surrogate and bedside caregivers, discussions with consultants, evaluation of patient's response to treatment, examination of patient, ordering and performing treatments and interventions, ordering and review of laboratory studies, ordering and review of radiographic studies, pulse oximetry, re-evaluation of patient's condition.  This critical care time did not overlap with that of any other provider or involve time for any procedures.     Pato Rodríguez, DO  Pulmonary Critical Care Medicine  [facility] - ICU

## 2022-11-04 NOTE — PROGRESS NOTES
Pharmacokinetic Assessment Follow Up: IV Vancomycin    Vancomycin serum concentration assessment(s):    The random level was drawn correctly and can be used to guide therapy at this time. The measurement is above the desired definitive target range of 15 to 20 mcg/mL.    Vancomycin Regimen Plan:    Re-dose when the random level is less than 20 mcg/mL, next level to be drawn at 0430 on 11/5    Drug levels (last 3 results):  Recent Labs   Lab Result Units 11/02/22  0816 11/03/22  0909 11/04/22  0352   Vanc Lvl Random ug/ml  --   --  25.3*   Vancomycin Trough ug/ml 11.4* 38.5*  --        Pharmacy will continue to follow and monitor vancomycin.    Please contact pharmacy at extension [phone] for questions regarding this assessment.    Thank you for the consult,   Kareem Velázquez       Patient brief summary:  Adrienne Urbina is a 67 y.o. female initiated on antimicrobial therapy with IV Vancomycin for treatment of lower respiratory infection      Drug Allergies:   Review of patient's allergies indicates:  No Known Allergies    Actual Body Weight:   78.3 kg    Renal Function:   Estimated Creatinine Clearance: 31.8 mL/min (A) (based on SCr of 1.84 mg/dL (H)).,     Dialysis Method (if applicable):  N/A    CBC (last 72 hours):  Recent Labs   Lab Result Units 11/01/22  1215 11/02/22 0317 11/03/22 0315 11/04/22  0352   WBC x10(3)/mcL  --  8.5 8.7 10.9   Hgb gm/dL 8.9* 8.8* 7.8* 6.7*   Hct % 29.2* 28.6* 25.3* 21.2*   Platelet x10(3)/mcL  --  296 266 254   Mono % %  --   --  9.7  --    Monocyte Man %  --  9  --  4   Eos % %  --   --  0.0  --    Eos Man %  --  2  --   --    Basophil % %  --   --  0.5  --    Basophil Man %  --  2  --   --        Metabolic Panel (last 72 hours):  Recent Labs   Lab Result Units 11/01/22  1049 11/02/22 0317 11/03/22 0315 11/04/22  0352   Sodium Level mmol/L  --  138 140 140   Potassium Level mmol/L  --  4.0 3.9 4.0   Chloride mmol/L  --  102 103 104   Carbon Dioxide mmol/L  --  23 22* 23   Glucose Level  mg/dL  --  116* 139* 121*   Glucose, UA mg/dL Normal  --   --   --    Blood Urea Nitrogen mg/dL  --  7.7* 16.0 26.2*   Creatinine mg/dL  --  0.64 1.16* 1.84*   Albumin Level gm/dL  --  1.8* 1.8* 1.7*   Bilirubin Total mg/dL  --  0.6 0.5 0.5   Alkaline Phosphatase unit/L  --  183* 131 128   Aspartate Aminotransferase unit/L  --  9 7 8   Alanine Aminotransferase unit/L  --  11 7 5   Magnesium Level mg/dL  --  1.70 1.70 2.10   Phosphorus Level mg/dL  --  4.1 4.7 4.6       Vancomycin Administrations:  vancomycin given in the last 96 hours                     vancomycin (VANCOCIN) 1,000 mg in sodium chloride 0.9% 250 mL IVPB (mg) 1,000 mg New Bag 11/02/22 2102    vancomycin 1 gram/250 mL in sodium chloride 0.9% IVPB 1 g (g) 1 g New Bag 11/02/22 0931     1 g New Bag 11/01/22 2118     1 g New Bag  0821                    Microbiologic Results:  Microbiology Results (last 7 days)       Procedure Component Value Units Date/Time    Respiratory Culture [[id number]] Collected: 11/03/22 0607    Order Status: Completed Specimen: Respiratory from Endotracheal Aspirate Updated: 11/03/22 1448     GRAM STAIN Quality 1+      Rare Gram Negative Rods    Blood Culture (site 1) [[id number]]  (Normal) Collected: 11/01/22 0416    Order Status: Completed Specimen: Blood Updated: 11/03/22 1100     CULTURE, BLOOD ([facility]) No Growth At 48 Hours    Blood Culture (site 2) [[id number]]  (Normal) Collected: 11/01/22 0416    Order Status: Completed Specimen: Blood Updated: 11/03/22 1100     CULTURE, BLOOD ([facility]) No Growth At 48 Hours    Fungal Culture [[id number]] Collected: 11/03/22 0915    Order Status: Sent Specimen: Endotrachial Tube from Endotracheal Aspirate Updated: 11/03/22 0922    Mycobacteria and Nocardia Culture [[id number]] Collected: 11/03/22 0915    Order Status: Sent Specimen: Respiratory from Endotracheal Aspirate Updated: 11/03/22 0922    Gram Stain [[id number]]     Order Status: Sent Specimen: Sputum     Respiratory Culture [[id number]]      Order Status: Sent Specimen: Sputum

## 2022-11-05 NOTE — PROGRESS NOTES
[facility] - ICU  Pulmonary Critical Care Note    Patient Name: Adrienne Urbina  MRN: [id number]  Admission Date: 10/31/2022  Hospital Length of Stay: 4 days  Code Status: Full Code  Attending Provider: Glenn Cantu Jr., MD, *  Primary Care Provider: Gregorio Cherry NP     Subjective:     HPI:   Ms. 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  Patient reports being in her usual state of health until noticing blood streaked sputum that progressed to complete blood filled sputum over the past x1.5 days.  She denies any trauma however does report chronic cough due to COPD which she feels may have contributed to recent symptoms. When asked, patient denies ever experiencing hemoptysis at any point in the past even throughout undergoing chemotherapy and radiation for right upper lobe lung mass (last chemotherapy x1.5 months ago; follows w/ Dr. Shoemaker).  These new symptoms scared her which prompted her ED presentation. Denies any history or DVT/PE. She also denies any current lightheadedness/dizziness, nausea/vomiting, fever/chills, chest pain, palpitations, new/worsening shortness of breath, hematemesis, hematochezia, melena, hematuria. Per chart review; patient was recently discharged from [facility] (10/17) in which she required ICU level care secondary to suspected Afib w/ RVR in addition to anemia requiring x2 units PRBCs and GNR related pneumonia.     Hospital Course/Significant events:  Intubated 11/02/2022    24 Hour Interval History:  No acute events overnight.  She remains intubated and sedated.  She is currently on AC/450/20/5 +/35%. Remains sedated with propofol running at 35  mcg/kg/minute and Precedex at 0.2 mcg/kg/hour.  Levophed has been weaned off yesterday.  Vitals this morning are stable with the heart rate of 100, respiratory rate 23, blood pressure 112/62, SpO2 100%.  Input over 24 hour 1127 mL with output of 600 mL. She has also been afebrile the past 24 hours with a T-max of 37.1° C.  Blood cultures remain negative with no growth at 48 hours and respiratory culture unremarkable.  Fungal culture, mycobacteria, Nocardia culture still pending.  Remains on Zosyn and vancomycin.  Additionally suction canister with approximately 280 mL of bloody sputum (had 110 mL of bloody sputum yesterday morning).    Past Medical History:   Diagnosis Date    Anxiety and depression     Cancer     lung with brain metastasis    Cholelithiasis     COPD (chronic obstructive pulmonary disease)     Hypertension     Lumbar disc disease     PAD (peripheral artery disease)     Paroxysmal SVT (supraventricular tachycardia)     Pleural effusion        Social History     Socioeconomic History    Marital status:    Tobacco Use    Smoking status: Some Days     Packs/day: 0.25     Years: 49.00     Pack years: 12.25     Types: Cigarettes     Last attempt to quit: 2022     Years since quittin.3    Smokeless tobacco: Never   Substance and Sexual Activity    Alcohol use: Never    Drug use: Never    Sexual activity: Yes     Social Determinants of Health     Financial Resource Strain: Low Risk     Difficulty of Paying Living Expenses: Not very hard   Food Insecurity: No Food Insecurity    Worried About Running Out of Food in the Last Year: Never true    Ran Out of Food in the Last Year: Never true   Transportation Needs: No Transportation Needs    Lack of Transportation (Medical): No    Lack of Transportation (Non-Medical): No   Physical Activity: Inactive    Days of Exercise per Week: 0 days    Minutes of Exercise per Session: 0 min   Stress: No Stress Concern Present    Feeling of Stress : Not at all    Social Connections: Moderately Isolated    Frequency of Communication with Friends and Family: More than three times a week    Frequency of Social Gatherings with Friends and Family: More than three times a week    Attends [employer] Services: Never    Active Member of Clubs or Organizations: No    Attends Club or Organization Meetings: Never    Marital Status:    Housing Stability: Low Risk     Unable to Pay for Housing in the Last Year: No    Number of Places Lived in the Last Year: 1    Unstable Housing in the Last Year: No       Objective:     Current Outpatient Medications   Medication Instructions    albuterol (PROVENTIL/VENTOLIN HFA) 90 mcg/actuation inhaler Currently holding due to heart rate    albuterol-ipratropium (DUO-NEB) 2.5 mg-0.5 mg/3 mL nebulizer solution 3 mLs, Nebulization, Every 4 hours PRN, Currently holding due to heartrate    aspirin 81 mg, Oral, Daily    ATROVENT HFA 17 mcg/actuation inhaler Currently holding due to heartrate    cyproheptadine (PERIACTIN) 4 mg, Oral, As needed (PRN)    dicyclomine (BENTYL) 10 mg, Oral, 3 times daily PRN    diltiaZEM (CARDIZEM CD) 360 mg, Oral, Daily    folic acid (FOLVITE) 1 mg, Oral, Daily    gabapentin (NEURONTIN) 300 mg, Oral, 3 times daily    guaiFENesin-codeine 100-10 mg/5 ml (TUSSI-ORGANIDIN NR)  mg/5 mL syrup 10 mLs, Oral, As needed (PRN)    HUMULIN R REGULAR U-100 INSULN 100 unit/mL injection Holding since in patient stay    HYDROcodone-acetaminophen (NORCO)  mg per tablet 1 tablet, Oral, Every 4 hours PRN    loratadine (CLARITIN) 10 mg, Oral, Daily    metFORMIN (GLUCOPHAGE) 500 mg, Oral, 2 times daily    methylPREDNISolone (MEDROL DOSEPACK) 4 mg tablet use as directed    metoprolol tartrate (LOPRESSOR) 100 mg, Oral, 2 times daily    mirtazapine (REMERON) 15 mg, Oral, Nightly    montelukast (SINGULAIR) 10 mg, Oral, As needed (PRN)    ONETOUCH DELICA PLUS LANCET 33 gauge Misc No dose, route, or frequency recorded.    ONETOUCH VERIO  REFLECT METER Misc No dose, route, or frequency recorded.    ONETOUCH VERIO TEST STRIPS Strp No dose, route, or frequency recorded.    OXYGEN-AIR DELIVERY SYSTEMS MISC   oxygen, See Instructions, home concentrator and portable @ 2L per nasal cannula continuous to keep SATS at 88% or greater J18.9, C34.9, J96.9, R09.02, # 1 EA, 0 Refill(s)    tiZANidine (ZANAFLEX) 4 MG tablet No dose, route, or frequency recorded.       Current Inpatient Medications   folic acid  1 mg Oral Daily    gabapentin  300 mg Oral TID    mupirocin   Nasal BID    pantoprozole (PROTONIX) IV  40 mg Intravenous Daily    piperacillin-tazobactam (ZOSYN) IVPB  4.5 g Intravenous Q8H    predniSONE  40 mg Oral Daily         Intake/Output Summary (Last 24 hours) at 11/5/2022 0829  Last data filed at 11/5/2022 0620  Gross per 24 hour   Intake 4501.2 ml   Output 2150 ml   Net 2351.2 ml         Review of Systems   Unable to perform ROS: Intubated      Vital Signs (Most Recent):  Temp: 97.9 °F (36.6 °C) (11/05/22 0400)  Pulse: 100 (11/05/22 0732)  Resp: (!) 23 (11/05/22 0732)  BP: 112/62 (11/05/22 0500)  SpO2: 100 % (11/05/22 0732)    Body mass index is 28.47 kg/m².  Weight: 80 kg (176 lb 5.9 oz) Vital Signs (24h Range):  Temp:  [97.1 °F (36.2 °C)-98.2 °F (36.8 °C)] 97.9 °F (36.6 °C)  Pulse:  [] 100  Resp:  [12-27] 23  SpO2:  [93 %-100 %] 100 %  BP: (101-167)/(62-87) 112/62     Physical Exam  Vitals and nursing note reviewed.   Constitutional:       General: She is not in acute distress.     Appearance: She is ill-appearing. She is not toxic-appearing.      Comments: Intubated and sedated   HENT:      Head: Normocephalic and atraumatic.      Nose: Nose normal.      Mouth/Throat:      Mouth: Mucous membranes are moist.      Pharynx: Oropharynx is clear.   Eyes:      Extraocular Movements: Extraocular movements intact.      Conjunctiva/sclera: Conjunctivae normal.      Pupils: Pupils are equal, round, and reactive to light.   Cardiovascular:      Rate  and Rhythm: Normal rate and regular rhythm.      Pulses: Normal pulses.      Heart sounds: Normal heart sounds. No murmur heard.    No gallop.   Pulmonary:      Effort: No respiratory distress.      Breath sounds: No stridor. No wheezing, rhonchi or rales.   Abdominal:      General: Abdomen is flat.      Palpations: Abdomen is soft.   Musculoskeletal:         General: No swelling or deformity.      Right lower leg: No edema.      Left lower leg: No edema.   Skin:     General: Skin is warm and dry.      Coloration: Skin is not jaundiced.      Findings: No bruising.   Neurological:      General: No focal deficit present.      Mental Status: She is alert and oriented to person, place, and time.      Comments: Patient sedated and therefore unable to accurately exam       Mechanical ventilation support:  Vent Mode: A/C (11/05/22 0732)  Set Rate: 20 BPM (11/05/22 0732)  Vt Set: 450 mL (11/05/22 0732)  PEEP/CPAP: 5 cmH20 (11/05/22 0732)  Oxygen Concentration (%): 35 (11/05/22 0732)  Peak Airway Pressure: 30.8 cmH2O (11/05/22 0732)  Plateau Pressure: 22 cmH20 (11/05/22 0732)  Total Ve: 9.63 L/m (11/05/22 0732)  F/VT Ratio<105 (RSBI): (!) 52.04 (11/05/22 0732)    Lines/Drains/Airways       Central Venous Catheter Line  Duration             Port A Cath Single Lumen right subclavian -- days              Drain  Duration                  NG/OG Tube 11/02/22 1355 [place] sump 16 Fr. Left mouth 2 days         Urethral Catheter 11/02/22 1400 Silicone 16 Fr. 2 days              Airway  Duration                  Airway - Non-Surgical 11/02/22 1400 Endotracheal Tube 2 days              Peripheral Intravenous Line  Duration                  Peripheral IV - Single Lumen 10/31/22 2215 20 G Left Antecubital 4 days         Peripheral IV - Single Lumen 11/01/22 0131 20 G Left;Posterior Wrist 4 days         Midline Catheter Insertion/Assessment  - Single Lumen 11/02/22 2149 Left basilic vein (medial side of arm)  2 days                     Significant Labs:    Lab Results   Component Value Date    WBC 11.9 (H) 11/05/2022    HGB 10.1 (L) 11/05/2022    HCT 30.7 (L) 11/05/2022    MCV 89.2 11/05/2022     11/05/2022         BMP  Lab Results   Component Value Date     11/05/2022    K 4.0 11/05/2022    CO2 24 11/05/2022    BUN 26.6 (H) 11/05/2022    CREATININE 1.70 (H) 11/05/2022    CALCIUM 9.1 11/05/2022    EGFRNONAA 81 04/22/2022       ABG  Recent Labs   Lab 11/05/22  0441   PH 7.31*   PO2 59*   PCO2 55*   HCO3 27.7*             Significant Imaging:  I have reviewed all pertinent imaging within the past 24 hours.    Assessment/Plan:   1.) Massive Hemoptysis              -- Possibly s/t #2  2.) Hx of Stage IV Poorly Differentiated RUL Lung Carcinoma              -- Diagnosed (9/2020); s/p radiation, currently receiving chemotherapy q3 weeks              -- w/ regional bone metastasis  -- w/ brain metastasis  -- Followed by [facility], Dr. Shoemaker  -- Followed by Pulmonologist, Dr. ИРИНА Simpson  3.) Suspected Recurrent Pneumonia              -- LLL CT Thorax suspected PNA              -- Recent hospitalization x14 days ago              -- Possible HAP  4.) Anemia  5.) Hx of Atrial Fibrillation/flutter (not on Eliquis)  6.) Macrocytic Anemia  7.) Hx of COPD (on home 2L NC q.Nightly)  8.) Hypomagnesemia  9.) Hx of HTN  10.) Hx of HLD  11.) Hx of PAD  12.) Hx of Tobacco use    On mechanical ventilation, wean as tolerated.  Daily SBT as tolerated  Continue to hold all anticoagulation including aspirin for ongoing hemoptysis  Continue vancomycin and Zosyn.  Awaiting cultures  Continue Cardizem for atrial fibrillation/flutter  H/H currently stable  Prognosis guarded. Continue goals of care discussion with family    DVT Prophylaxis:  None  GI Prophylaxis:  Protonix     Greater than 30 minutes of critical care was time spent personally by me on the following activities: development of treatment plan with patient or surrogate and bedside  caregivers, discussions with consultants, evaluation of patient's response to treatment, examination of patient, ordering and performing treatments and interventions, ordering and review of laboratory studies, ordering and review of radiographic studies, pulse oximetry, re-evaluation of patient's condition.  This critical care time did not overlap with that of any other provider or involve time for any procedures.     Zaria Aguilar MD  Pulmonary Critical Care Medicine  [facility]

## 2022-11-06 NOTE — PROGRESS NOTES
[facility] - ICU  Pulmonary Critical Care Note    Patient Name: Adrienne Urbina  MRN: [id number]  Admission Date: 10/31/2022  Hospital Length of Stay: 5 days  Code Status: Full Code  Attending Provider: Glenn Cantu Jr., MD, *  Primary Care Provider: Gregorio Cherry NP     Subjective:     HPI:   Ms. 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  Patient reports being in her usual state of health until noticing blood streaked sputum that progressed to complete blood filled sputum over the past x1.5 days.  She denies any trauma however does report chronic cough due to COPD which she feels may have contributed to recent symptoms. When asked, patient denies ever experiencing hemoptysis at any point in the past even throughout undergoing chemotherapy and radiation for right upper lobe lung mass (last chemotherapy x1.5 months ago; follows w/ Dr. Shoemaker).  These new symptoms scared her which prompted her ED presentation. Denies any history or DVT/PE. She also denies any current lightheadedness/dizziness, nausea/vomiting, fever/chills, chest pain, palpitations, new/worsening shortness of breath, hematemesis, hematochezia, melena, hematuria. Per chart review; patient was recently discharged from [facility] (10/17) in which she required ICU level care secondary to suspected Afib w/ RVR in addition to anemia requiring x2 units PRBCs and GNR related pneumonia.     Hospital Course/Significant events:  Intubated 11/02/2022    24 Hour Interval History:  Since I last saw her on 11/04/2022 she has been weaned off of Levophed after receiving blood transfusion.  Her suction canister now has approximately 600  mL of blood (approximately 280 on the morning of 2022).  She remains intubated and mechanically ventilated on /20/5+/35% however at times patient breathing over vent.  ABG this morning with a pH is 7.36, pCO2 51, PO2 60.  Will change vent settings to 450/24/5+/45%.  Remains sedated with propofol at 40 mcg/kg/minute and Precedex at 0.6 mcg/kg/hour.  Vitals remained stable in past 24 hours.  She also has been afebrile in the past 24 hours with a T-max of 37.2° C.  Input in last 24 hours to 1594 cc with output of 2200 cc.  She started to develop a leukocytosis yesterday however CBC is pending this morning.  Blood cultures without growth at 96 hours.  Respiratory cultures again growing Serratia marcescens is currently on vanc and Zosyn.  Acid-fast smear for mycobacterium is negative.  And mycobacterium DNA by PCR was nondetected.  Fungal culture still pending.    Past Medical History:   Diagnosis Date    Anxiety and depression     Cancer     lung with brain metastasis    Cholelithiasis     COPD (chronic obstructive pulmonary disease)     Hypertension     Lumbar disc disease     PAD (peripheral artery disease)     Paroxysmal SVT (supraventricular tachycardia)     Pleural effusion        Social History     Socioeconomic History    Marital status:    Tobacco Use    Smoking status: Some Days     Packs/day: 0.25     Years: 49.00     Pack years: 12.25     Types: Cigarettes     Last attempt to quit: 2022     Years since quittin.3    Smokeless tobacco: Never   Substance and Sexual Activity    Alcohol use: Never    Drug use: Never    Sexual activity: Yes     Social Determinants of Health     Financial Resource Strain: Low Risk     Difficulty of Paying Living Expenses: Not very hard   Food Insecurity: No Food Insecurity    Worried About Running Out of Food in the Last Year: Never true    Ran Out of Food in the Last Year: Never true   Transportation Needs: No Transportation Needs    Lack of  Transportation (Medical): No    Lack of Transportation (Non-Medical): No   Physical Activity: Inactive    Days of Exercise per Week: 0 days    Minutes of Exercise per Session: 0 min   Stress: No Stress Concern Present    Feeling of Stress : Not at all   Social Connections: Moderately Isolated    Frequency of Communication with Friends and Family: More than three times a week    Frequency of Social Gatherings with Friends and Family: More than three times a week    Attends Voodoo Services: Never    Active Member of Clubs or Organizations: No    Attends Club or Organization Meetings: Never    Marital Status:    Housing Stability: Low Risk     Unable to Pay for Housing in the Last Year: No    Number of Places Lived in the Last Year: 1    Unstable Housing in the Last Year: No       Objective:     Current Outpatient Medications   Medication Instructions    albuterol (PROVENTIL/VENTOLIN HFA) 90 mcg/actuation inhaler Currently holding due to heart rate    albuterol-ipratropium (DUO-NEB) 2.5 mg-0.5 mg/3 mL nebulizer solution 3 mLs, Nebulization, Every 4 hours PRN, Currently holding due to heartrate    aspirin 81 mg, Oral, Daily    ATROVENT HFA 17 mcg/actuation inhaler Currently holding due to heartrate    cyproheptadine (PERIACTIN) 4 mg, Oral, As needed (PRN)    dicyclomine (BENTYL) 10 mg, Oral, 3 times daily PRN    diltiaZEM (CARDIZEM CD) 360 mg, Oral, Daily    folic acid (FOLVITE) 1 mg, Oral, Daily    gabapentin (NEURONTIN) 300 mg, Oral, 3 times daily    guaiFENesin-codeine 100-10 mg/5 ml (TUSSI-ORGANIDIN NR)  mg/5 mL syrup 10 mLs, Oral, As needed (PRN)    HUMULIN R REGULAR U-100 INSULN 100 unit/mL injection Holding since in patient stay    HYDROcodone-acetaminophen (NORCO)  mg per tablet 1 tablet, Oral, Every 4 hours PRN    loratadine (CLARITIN) 10 mg, Oral, Daily    metFORMIN (GLUCOPHAGE) 500 mg, Oral, 2 times daily    methylPREDNISolone (MEDROL DOSEPACK) 4 mg tablet use as directed    metoprolol  tartrate (LOPRESSOR) 100 mg, Oral, 2 times daily    mirtazapine (REMERON) 15 mg, Oral, Nightly    montelukast (SINGULAIR) 10 mg, Oral, As needed (PRN)    ONETOUCH DELICA PLUS LANCET 33 gauge Misc No dose, route, or frequency recorded.    ONETOUCH VERIO REFLECT METER Misc No dose, route, or frequency recorded.    ONETOUCH VERIO TEST STRIPS Strp No dose, route, or frequency recorded.    OXYGEN-AIR DELIVERY SYSTEMS [facility]   oxygen, See Instructions, home concentrator and portable @ 2L per nasal cannula continuous to keep SATS at 88% or greater J18.9, C34.9, J96.9, R09.02, # 1 EA, 0 Refill(s)    tiZANidine (ZANAFLEX) 4 MG tablet No dose, route, or frequency recorded.       Current Inpatient Medications   folic acid  1 mg Oral Daily    gabapentin  300 mg Oral TID    mupirocin   Nasal BID    pantoprozole (PROTONIX) IV  40 mg Intravenous Daily    piperacillin-tazobactam (ZOSYN) IVPB  4.5 g Intravenous Q8H    predniSONE  40 mg Oral Daily         Intake/Output Summary (Last 24 hours) at 11/6/2022 0552  Last data filed at 11/5/2022 1813  Gross per 24 hour   Intake 2238.3 ml   Output 1100 ml   Net 1138.3 ml         Review of Systems   Unable to perform ROS: Intubated      Vital Signs (Most Recent):  Temp: 99 °F (37.2 °C) (11/06/22 0400)  Pulse: 109 (11/06/22 0400)  Resp: (!) 22 (11/06/22 0400)  BP: 114/68 (11/06/22 0400)  SpO2: 95 % (11/06/22 0400)    Body mass index is 28.47 kg/m².  Weight: 80 kg (176 lb 5.9 oz) Vital Signs (24h Range):  Temp:  [97.9 °F (36.6 °C)-99 °F (37.2 °C)] 99 °F (37.2 °C)  Pulse:  [] 109  Resp:  [17-27] 22  SpO2:  [94 %-100 %] 95 %  BP: (106-142)/(55-86) 114/68     Physical Exam  Vitals and nursing note reviewed.   Constitutional:       General: She is not in acute distress.     Appearance: She is ill-appearing. She is not toxic-appearing.      Comments: Intubated and sedated   HENT:      Head: Normocephalic and atraumatic.      Nose: Nose normal.      Mouth/Throat:      Mouth: Mucous membranes are  moist.      Pharynx: Oropharynx is clear.   Eyes:      Extraocular Movements: Extraocular movements intact.      Conjunctiva/sclera: Conjunctivae normal.      Pupils: Pupils are equal, round, and reactive to light.   Cardiovascular:      Rate and Rhythm: Normal rate and regular rhythm.      Pulses: Normal pulses.      Heart sounds: Normal heart sounds. No murmur heard.    No gallop.   Pulmonary:      Effort: No respiratory distress.      Breath sounds: No stridor. No wheezing, rhonchi or rales.   Abdominal:      General: Abdomen is flat.      Palpations: Abdomen is soft.   Musculoskeletal:         General: No swelling or deformity.      Right lower leg: No edema.      Left lower leg: No edema.   Skin:     General: Skin is warm and dry.      Coloration: Skin is not jaundiced.      Findings: No bruising.   Neurological:      General: No focal deficit present.      Mental Status: She is alert and oriented to person, place, and time.      Comments: Patient sedated and therefore unable to accurately exam       Mechanical ventilation support:  Vent Mode: A/C (11/06/22 0447)  Set Rate: 20 BPM (11/06/22 0447)  Vt Set: 450 mL (11/06/22 0447)  PEEP/CPAP: 5 cmH20 (11/06/22 0447)  Oxygen Concentration (%): 35 (11/06/22 0447)  Peak Airway Pressure: 37.7 cmH2O (11/06/22 0447)  Plateau Pressure: 22 cmH20 (11/05/22 0732)  Total Ve: 10.5 L/m (11/06/22 0447)  F/VT Ratio<105 (RSBI): (!) 47.28 (11/06/22 0300)    Lines/Drains/Airways       Central Venous Catheter Line  Duration             Port A Cath Single Lumen right subclavian -- days              Drain  Duration                  NG/OG Tube 11/02/22 1355 [place] sump 16 Fr. Left mouth 3 days         Urethral Catheter 11/02/22 1400 Silicone 16 Fr. 3 days              Airway  Duration                  Airway - Non-Surgical 11/02/22 1400 Endotracheal Tube 3 days              Peripheral Intravenous Line  Duration                  Peripheral IV - Single Lumen 10/31/22 2215 20 G Left  Antecubital 5 days         Peripheral IV - Single Lumen 11/01/22 0131 20 G Left;Posterior Wrist 5 days         Midline Catheter Insertion/Assessment  - Single Lumen 11/02/22 2149 Left basilic vein (medial side of arm)  3 days                    Significant Labs:    Lab Results   Component Value Date    WBC 11.9 (H) 11/05/2022    HGB 10.1 (L) 11/05/2022    HCT 30.7 (L) 11/05/2022    MCV 89.2 11/05/2022     11/05/2022         BMP  Lab Results   Component Value Date     11/05/2022    K 4.0 11/05/2022    CO2 24 11/05/2022    BUN 26.6 (H) 11/05/2022    CREATININE 1.70 (H) 11/05/2022    CALCIUM 9.1 11/05/2022    EGFRNONAA 81 04/22/2022       ABG  Recent Labs   Lab 11/06/22  0446   PH 7.36   PO2 60*   PCO2 51*   HCO3 28.8*             Significant Imaging:  I have reviewed all pertinent imaging within the past 24 hours.    Assessment/Plan:   1.) Massive Hemoptysis              -- Possibly s/t #2  2.) Hx of Stage IV Poorly Differentiated RUL Lung Carcinoma              -- Diagnosed (9/2020); s/p radiation, currently receiving chemotherapy q3 weeks              -- w/ regional bone metastasis  -- w/ brain metastasis  -- Followed by [facility], Dr. Shoemaker  -- Followed by Pulmonologist, Dr. ИРИНА Simpson  3.) Suspected Recurrent Pneumonia              -- LLL CT Thorax suspected PNA              -- Recent hospitalization x14 days ago              -- Possible HAP  4.) Anemia  5.) Hx of Atrial Fibrillation/flutter (not on Eliquis)  6.) Macrocytic Anemia  7.) Hx of COPD (on home 2L NC q.Nightly)  8.) Hypomagnesemia  9.) Hx of HTN  10.) Hx of HLD  11.) Hx of PAD  12.) Hx of Tobacco use    On mechanical ventilation, wean as tolerated.  Daily SBT as tolerated  Will continue vancomycin until MRSA PCR returns.  Will discontinue Zosyn and add Merrem for  respiratory culture growing Serratia   Continue Cardizem for atrial fibrillation/flutter  H/H currently stable  Prognosis guarded. Continue goals of care discussion with  family    DVT Prophylaxis:  Continue to hold all anticoagulation including aspirin for ongoing hemoptysis  GI Prophylaxis:  Protonix     Greater than 30 minutes of critical care was time spent personally by me on the following activities: development of treatment plan with patient or surrogate and bedside caregivers, discussions with consultants, evaluation of patient's response to treatment, examination of patient, ordering and performing treatments and interventions, ordering and review of laboratory studies, ordering and review of radiographic studies, pulse oximetry, re-evaluation of patient's condition.  This critical care time did not overlap with that of any other provider or involve time for any procedures.     Pato Rodríguez,   Pulmonary Critical Care Medicine  [facility] - ICU

## 2022-11-07 NOTE — PROGRESS NOTES
Pharmacokinetic Assessment Follow Up: IV Vancomycin    Vancomycin serum concentration assessment(s):    The random level was drawn correctly and can be used to guide therapy at this time. The measurement is within the desired definitive target range of 10 to 20 mcg/mL.    Vancomycin Regimen Plan:    Continue regimen to Vancomycin 750 mg IV every 24H( Pulse dose based on level) hours with next serum trough concentration measured at 0800 prior to 3rd dose on November 8, 2022.     Drug levels (last 3 results):  Recent Labs   Lab Result Units 11/05/22 0358 11/07/22 0334   Vanc Lvl Random ug/ml 18.1 11.2*       Pharmacy will continue to follow and monitor vancomycin.    Please contact pharmacy at extension [phone] for questions regarding this assessment.    Thank you for the consult,   Leah Mckeon       Patient brief summary:  Adrienne Urbina is a 67 y.o. female initiated on antimicrobial therapy with IV Vancomycin for treatment of lower respiratory infection    The patient's current regimen is Vancomycin 750 mg Q 24H.     Drug Allergies:   Review of patient's allergies indicates:  No Known Allergies    Actual Body Weight:   84.1 kg    Renal Function:   Estimated Creatinine Clearance: 61.7 mL/min (based on SCr of 0.98 mg/dL).,     Dialysis Method (if applicable):  N/A    CBC (last 72 hours):  Recent Labs   Lab Result Units 11/05/22 0358 11/06/22 0533 11/06/22 1856 11/07/22 0334   WBC x10(3)/mcL 11.9* 10.3  --  18.1*   Hgb gm/dL 10.1* 10.2* 9.5* 10.8*   Hct % 30.7* 32.1* 30.8* 35.6*   Platelet x10(3)/mcL 211 197  --  212   Mono % % 7.1 10.5  --   --    Monocyte Man %  --   --   --  6   Eos % % 0.1 0.0  --   --    Basophil % % 0.3 0.5  --   --        Metabolic Panel (last 72 hours):  Recent Labs   Lab Result Units 11/05/22 0358 11/06/22 0524 11/07/22 0334   Sodium Level mmol/L 143 146* 144   Potassium Level mmol/L 4.0 4.1 3.9   Chloride mmol/L 108* 110* 108*   Carbon Dioxide mmol/L 24 27 26   Glucose Level mg/dL 178*  140* 149*   Blood Urea Nitrogen mg/dL 26.6* 27.2* 23.0*   Creatinine mg/dL 1.70* 1.23* 0.98   Albumin Level gm/dL 1.6* 1.6* 1.6*   Bilirubin Total mg/dL 0.5 0.4 0.7   Alkaline Phosphatase unit/L 128 117 151*   Aspartate Aminotransferase unit/L 11 10 25   Alanine Aminotransferase unit/L 7 6 17   Magnesium Level mg/dL 2.00 1.80  --    Phosphorus Level mg/dL 4.6 2.6  --        Vancomycin Administrations:  vancomycin given in the last 96 hours                     vancomycin 750 mg in sodium chloride 0.9% 250 mL IVPB (mg) 750 mg New Bag 11/07/22 0916     750 mg New Bag 11/06/22 0838                    Microbiologic Results:  Microbiology Results (last 7 days)       Procedure Component Value Units Date/Time    Blood Culture (site 1) [[id number]]  (Normal) Collected: 11/01/22 0416    Order Status: Completed Specimen: Blood Updated: 11/06/22 1000     CULTURE, BLOOD ([facility]) No Growth at 5 days    Blood Culture (site 2) [[id number]]  (Normal) Collected: 11/01/22 0416    Order Status: Completed Specimen: Blood Updated: 11/06/22 1000     CULTURE, BLOOD ([facility]) No Growth at 5 days    Respiratory Culture [[id number]]  (Abnormal)  (Susceptibility) Collected: 11/03/22 0607    Order Status: Completed Specimen: Respiratory from Endotracheal Aspirate Updated: 11/05/22 1049     Respiratory Culture Many Serratia marcescens     Comment: with rare normal respiratory jia        GRAM STAIN Quality 1+      Rare Gram Negative Rods    Fungal Culture [[id number]] Collected: 11/03/22 0915    Order Status: Sent Specimen: Endotrachial Tube from Endotracheal Aspirate Updated: 11/03/22 0922    Mycobacteria and Nocardia Culture [[id number]] Collected: 11/03/22 0915    Order Status: Sent Specimen: Respiratory from Endotracheal Aspirate Updated: 11/03/22 0922    Gram Stain [[id number]]     Order Status: Sent Specimen: Sputum     Respiratory Culture [[id number]]     Order Status: Sent Specimen: Sputum

## 2022-11-07 NOTE — PROGRESS NOTES
Pharmacokinetic Assessment Follow Up: IV Vancomycin    Vancomycin serum concentration assessment(s):    The random level was drawn correctly and can be used to guide therapy at this time. The measurement is within the desired definitive target range of 10 to 20 mcg/mL.    Vancomycin Regimen Plan:    Continue regimen to Vancomycin 750 mg IV every 24H( Pulse dose based on level) hours with next serum trough concentration measured at 0800 prior to 3rd dose on November 8, 2022.     Drug levels (last 3 results):  Recent Labs   Lab Result Units 11/05/22 0358 11/07/22 0334   Vanc Lvl Random ug/ml 18.1 11.2*       Pharmacy will continue to follow and monitor vancomycin.    Please contact pharmacy at extension [phone] for questions regarding this assessment.    Thank you for the consult,   Leah Mckeon       Patient brief summary:  Adrienne Urbina is a 67 y.o. female initiated on antimicrobial therapy with IV Vancomycin for treatment of lower respiratory infection    The patient's current regimen is Vancomycin 750 mg Q 24H.     Drug Allergies:   Review of patient's allergies indicates:  No Known Allergies    Actual Body Weight:   84.1 kg    Renal Function:   Estimated Creatinine Clearance: 61.7 mL/min (based on SCr of 0.98 mg/dL).,     Dialysis Method (if applicable):  N/A    CBC (last 72 hours):  Recent Labs   Lab Result Units 11/05/22 0358 11/06/22 0533 11/06/22 1856 11/07/22 0334   WBC x10(3)/mcL 11.9* 10.3  --  18.1*   Hgb gm/dL 10.1* 10.2* 9.5* 10.8*   Hct % 30.7* 32.1* 30.8* 35.6*   Platelet x10(3)/mcL 211 197  --  212   Mono % % 7.1 10.5  --   --    Monocyte Man %  --   --   --  6   Eos % % 0.1 0.0  --   --    Basophil % % 0.3 0.5  --   --        Metabolic Panel (last 72 hours):  Recent Labs   Lab Result Units 11/05/22 0358 11/06/22 0524 11/07/22 0334   Sodium Level mmol/L 143 146* 144   Potassium Level mmol/L 4.0 4.1 3.9   Chloride mmol/L 108* 110* 108*   Carbon Dioxide mmol/L 24 27 26   Glucose Level mg/dL 178*  140* 149*   Blood Urea Nitrogen mg/dL 26.6* 27.2* 23.0*   Creatinine mg/dL 1.70* 1.23* 0.98   Albumin Level gm/dL 1.6* 1.6* 1.6*   Bilirubin Total mg/dL 0.5 0.4 0.7   Alkaline Phosphatase unit/L 128 117 151*   Aspartate Aminotransferase unit/L 11 10 25   Alanine Aminotransferase unit/L 7 6 17   Magnesium Level mg/dL 2.00 1.80  --    Phosphorus Level mg/dL 4.6 2.6  --        Vancomycin Administrations:  vancomycin given in the last 96 hours                     vancomycin 750 mg in sodium chloride 0.9% 250 mL IVPB (mg) 750 mg New Bag 11/07/22 0916     750 mg New Bag 11/06/22 0838                    Microbiologic Results:  Microbiology Results (last 7 days)       Procedure Component Value Units Date/Time    Blood Culture (site 1) [[id number]]  (Normal) Collected: 11/01/22 0416    Order Status: Completed Specimen: Blood Updated: 11/06/22 1000     CULTURE, BLOOD ([facility]) No Growth at 5 days    Blood Culture (site 2) [[id number]]  (Normal) Collected: 11/01/22 0416    Order Status: Completed Specimen: Blood Updated: 11/06/22 1000     CULTURE, BLOOD ([facility]) No Growth at 5 days    Respiratory Culture [[id number]]  (Abnormal)  (Susceptibility) Collected: 11/03/22 0607    Order Status: Completed Specimen: Respiratory from Endotracheal Aspirate Updated: 11/05/22 1049     Respiratory Culture Many Serratia marcescens     Comment: with rare normal respiratory jia        GRAM STAIN Quality 1+      Rare Gram Negative Rods    Fungal Culture [[id number]] Collected: 11/03/22 0915    Order Status: Sent Specimen: Endotrachial Tube from Endotracheal Aspirate Updated: 11/03/22 0922    Mycobacteria and Nocardia Culture [[id number]] Collected: 11/03/22 0915    Order Status: Sent Specimen: Respiratory from Endotracheal Aspirate Updated: 11/03/22 0922    Gram Stain [[id number]]     Order Status: Sent Specimen: Sputum     Respiratory Culture [[id number]]     Order Status: Sent Specimen: Sputum

## 2022-11-07 NOTE — PROGRESS NOTES
[facility] - ICU  Pulmonary Critical Care Note    Patient Name: Adrienne Urbina  MRN: [id number]  Admission Date: 10/31/2022  Hospital Length of Stay: 6 days  Code Status: Full Code  Attending Provider: Glenn Cantu Jr., MD, *  Primary Care Provider: Gregorio Cherry NP     Subjective:     HPI:   Ms. 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  Patient reports being in her usual state of health until noticing blood streaked sputum that progressed to complete blood filled sputum over the past x1.5 days.  She denies any trauma however does report chronic cough due to COPD which she feels may have contributed to recent symptoms. When asked, patient denies ever experiencing hemoptysis at any point in the past even throughout undergoing chemotherapy and radiation for right upper lobe lung mass (last chemotherapy x1.5 months ago; follows w/ Dr. Shoemaker).  These new symptoms scared her which prompted her ED presentation. Denies any history or DVT/PE. She also denies any current lightheadedness/dizziness, nausea/vomiting, fever/chills, chest pain, palpitations, new/worsening shortness of breath, hematemesis, hematochezia, melena, hematuria. Per chart review; patient was recently discharged from [facility] (10/17) in which she required ICU level care secondary to suspected Afib w/ RVR in addition to anemia requiring x2 units PRBCs and GNR related pneumonia.     Hospital Course/Significant events:  Intubated 11/02/2022    24 Hour Interval History:  Spoke with family yesterday afternoon informed them that her prognosis is fairly guarded.  They state that they want to continue discussions with family.   Apparently  will be here today.  Otherwise overnight continues to put out blood through ETT - this morning there was close to 800 mL of maroon-colored blood in suction canister (600 mL previous stay).  Rectal tube placed overnight.  She remains intubated and mechanically ventilated with increasing ventilatory needs.  ABG this morning the pH is 7.3, pCO2 62, PO2 50 - on AC/450/24/5 +/45%.  Other vitals fairly stable past 24 hours and has been afebrile.  Intake in past 24 hours 1530 with output of 200 mL.  Continues to be sedated with Precedex at 0.6 mcg/kg/hour and propofol at 40 mcg/kg/minute.  On vancomycin and meropenam overnight.  White blood cell count with a significant jump to 18,000 this morning with increasing left shift.      Past Medical History:   Diagnosis Date    Anxiety and depression     Cancer     lung with brain metastasis    Cholelithiasis     COPD (chronic obstructive pulmonary disease)     Hypertension     Lumbar disc disease     PAD (peripheral artery disease)     Paroxysmal SVT (supraventricular tachycardia)     Pleural effusion        Social History     Socioeconomic History    Marital status:    Tobacco Use    Smoking status: Some Days     Packs/day: 0.25     Years: 49.00     Pack years: 12.25     Types: Cigarettes     Last attempt to quit: 2022     Years since quittin.3    Smokeless tobacco: Never   Substance and Sexual Activity    Alcohol use: Never    Drug use: Never    Sexual activity: Yes     Social Determinants of Health     Financial Resource Strain: Low Risk     Difficulty of Paying Living Expenses: Not very hard   Food Insecurity: No Food Insecurity    Worried About Running Out of Food in the Last Year: Never true    Ran Out of Food in the Last Year: Never true   Transportation Needs: No Transportation Needs    Lack of Transportation (Medical): No    Lack of Transportation (Non-Medical): No   Physical Activity: Inactive    Days of Exercise per Week: 0 days     Minutes of Exercise per Session: 0 min   Stress: No Stress Concern Present    Feeling of Stress : Not at all   Social Connections: Moderately Isolated    Frequency of Communication with Friends and Family: More than three times a week    Frequency of Social Gatherings with Friends and Family: More than three times a week    Attends Gnosticist Services: Never    Active Member of Clubs or Organizations: No    Attends Club or Organization Meetings: Never    Marital Status:    Housing Stability: Low Risk     Unable to Pay for Housing in the Last Year: No    Number of Places Lived in the Last Year: 1    Unstable Housing in the Last Year: No       Objective:     Current Outpatient Medications   Medication Instructions    albuterol (PROVENTIL/VENTOLIN HFA) 90 mcg/actuation inhaler Currently holding due to heart rate    albuterol-ipratropium (DUO-NEB) 2.5 mg-0.5 mg/3 mL nebulizer solution 3 mLs, Nebulization, Every 4 hours PRN, Currently holding due to heartrate    aspirin 81 mg, Oral, Daily    ATROVENT HFA 17 mcg/actuation inhaler Currently holding due to heartrate    cyproheptadine (PERIACTIN) 4 mg, Oral, As needed (PRN)    dicyclomine (BENTYL) 10 mg, Oral, 3 times daily PRN    diltiaZEM (CARDIZEM CD) 360 mg, Oral, Daily    folic acid (FOLVITE) 1 mg, Oral, Daily    gabapentin (NEURONTIN) 300 mg, Oral, 3 times daily    guaiFENesin-codeine 100-10 mg/5 ml (TUSSI-ORGANIDIN NR)  mg/5 mL syrup 10 mLs, Oral, As needed (PRN)    HUMULIN R REGULAR U-100 INSULN 100 unit/mL injection Holding since in patient stay    HYDROcodone-acetaminophen (NORCO)  mg per tablet 1 tablet, Oral, Every 4 hours PRN    loratadine (CLARITIN) 10 mg, Oral, Daily    metFORMIN (GLUCOPHAGE) 500 mg, Oral, 2 times daily    methylPREDNISolone (MEDROL DOSEPACK) 4 mg tablet use as directed    metoprolol tartrate (LOPRESSOR) 100 mg, Oral, 2 times daily    mirtazapine (REMERON) 15 mg, Oral, Nightly    montelukast (SINGULAIR) 10 mg, Oral, As needed  (PRN)    ONETOUCH DELICA PLUS LANCET 33 gauge Misc No dose, route, or frequency recorded.    ONETOUCH VERIO REFLECT METER Misc No dose, route, or frequency recorded.    ONETOUCH VERIO TEST STRIPS Strp No dose, route, or frequency recorded.    OXYGEN-AIR DELIVERY SYSTEMS MISC   oxygen, See Instructions, home concentrator and portable @ 2L per nasal cannula continuous to keep SATS at 88% or greater J18.9, C34.9, J96.9, R09.02, # 1 EA, 0 Refill(s)    tiZANidine (ZANAFLEX) 4 MG tablet No dose, route, or frequency recorded.       Current Inpatient Medications   folic acid  1 mg Oral Daily    gabapentin  300 mg Oral TID    meropenem (MERREM) IVPB  1 g Intravenous Q12H    pantoprozole (PROTONIX) IV  40 mg Intravenous Daily    predniSONE  40 mg Oral Daily    vancomycin (VANCOCIN) IVPB  750 mg Intravenous Q24H         Intake/Output Summary (Last 24 hours) at 11/7/2022 0546  Last data filed at 11/7/2022 0408  Gross per 24 hour   Intake 2871.82 ml   Output 1300 ml   Net 1571.82 ml         Review of Systems   Unable to perform ROS: Intubated      Vital Signs (Most Recent):  Temp: 99 °F (37.2 °C) (11/07/22 0400)  Pulse: (!) 117 (11/07/22 0400)  Resp: (!) 24 (11/07/22 0400)  BP: 118/64 (11/07/22 0400)  SpO2: (!) 91 % (11/07/22 0400)    Body mass index is 29.93 kg/m².  Weight: 84.1 kg (185 lb 6.5 oz) Vital Signs (24h Range):  Temp:  [97.2 °F (36.2 °C)-100.1 °F (37.8 °C)] 99 °F (37.2 °C)  Pulse:  [] 117  Resp:  [20-36] 24  SpO2:  [77 %-99 %] 91 %  BP: (102-161)/(52-84) 118/64     Physical Exam  Vitals and nursing note reviewed.   Constitutional:       General: She is not in acute distress.     Appearance: She is ill-appearing. She is not toxic-appearing.      Comments: Intubated and sedated   HENT:      Head: Normocephalic and atraumatic.      Nose: Nose normal.      Mouth/Throat:      Mouth: Mucous membranes are moist.      Pharynx: Oropharynx is clear.   Eyes:      Extraocular Movements: Extraocular movements intact.       Conjunctiva/sclera: Conjunctivae normal.      Pupils: Pupils are equal, round, and reactive to light.   Cardiovascular:      Rate and Rhythm: Normal rate and regular rhythm.      Pulses: Normal pulses.      Heart sounds: Normal heart sounds. No murmur heard.    No gallop.   Pulmonary:      Effort: No respiratory distress.      Breath sounds: No stridor. No wheezing, rhonchi or rales.   Abdominal:      General: Abdomen is flat.      Palpations: Abdomen is soft.   Musculoskeletal:         General: No swelling or deformity.      Right lower leg: No edema.      Left lower leg: No edema.   Skin:     General: Skin is warm and dry.      Coloration: Skin is not jaundiced.      Findings: No bruising.   Neurological:      General: No focal deficit present.      Mental Status: She is alert and oriented to person, place, and time.      Comments: Patient sedated and therefore unable to accurately exam       Mechanical ventilation support:  Vent Mode: A/C (11/07/22 0516)  Set Rate: 24 BPM (11/07/22 0516)  Vt Set: 450 mL (11/07/22 0516)  PEEP/CPAP: 5 cmH20 (11/07/22 0516)  Oxygen Concentration (%): 45 (11/07/22 0516)  Peak Airway Pressure: 28.6 cmH2O (11/07/22 0516)  Plateau Pressure: 25 cmH20 (11/06/22 0727)  Total Ve: 9.43 L/m (11/07/22 0516)  F/VT Ratio<105 (RSBI): (!) 60.15 (11/07/22 0300)    Lines/Drains/Airways       Central Venous Catheter Line  Duration             Port A Cath Single Lumen right subclavian -- days              Drain  Duration                  NG/OG Tube 11/02/22 1355 [place] sump 16 Fr. Left mouth 4 days         Urethral Catheter 11/02/22 1400 Silicone 16 Fr. 4 days         Rectal Tube 11/07/22 0408 rectal tube w/ balloon (indicate number of mLs) <1 day              Airway  Duration                  Airway - Non-Surgical 11/02/22 1400 Endotracheal Tube 4 days              Peripheral Intravenous Line  Duration                  Peripheral IV - Single Lumen 10/31/22 2215 20 G Left Antecubital 6 days          Peripheral IV - Single Lumen 11/01/22 0131 20 G Left;Posterior Wrist 6 days         Midline Catheter Insertion/Assessment  - Single Lumen 11/02/22 2149 Left basilic vein (medial side of arm)  4 days                    Significant Labs:    Lab Results   Component Value Date    WBC 18.1 (H) 11/07/2022    HGB 10.8 (L) 11/07/2022    HCT 35.6 (L) 11/07/2022    MCV 96.5 (H) 11/07/2022     11/07/2022         BMP  Lab Results   Component Value Date     11/07/2022    K 3.9 11/07/2022    CO2 26 11/07/2022    BUN 23.0 (H) 11/07/2022    CREATININE 0.98 11/07/2022    CALCIUM 9.7 11/07/2022    EGFRNONAA 81 04/22/2022       ABG  Recent Labs   Lab 11/07/22  0510   PH 7.30*   PO2 50*   PCO2 62*   HCO3 30.5*             Significant Imaging:  I have reviewed all pertinent imaging within the past 24 hours.    Assessment/Plan:   1.) Massive Hemoptysis  2.) Hx of Stage IV Poorly Differentiated RUL Lung Carcinoma  3.) Serratia M. Pneumonia  4.) Hx of Atrial Fibrillation/flutter (not on Eliquis)  5.) Hx of COPD (on home 2L NC q.Nightly)  6.) Hx of HTN  7.) Hx of HLD  8.) Hx of PAD    On mechanical ventilation, wean as tolerated.  Daily SBT as tolerated  Still waiting for MRSA PCR which was ordered on 11/01/2022.  Remains on vancomycin. D/c merrem and start Cefipime  Continue Cardizem for atrial fibrillation/flutter  H/H currently stable  Prognosis guarded    DVT Prophylaxis:  Continue to hold all anticoagulation including aspirin for ongoing hemoptysis  GI Prophylaxis:  Protonix     Greater than 30 minutes of critical care was time spent personally by me on the following activities: development of treatment plan with patient or surrogate and bedside caregivers, discussions with consultants, evaluation of patient's response to treatment, examination of patient, ordering and performing treatments and interventions, ordering and review of laboratory studies, ordering and review of radiographic studies, pulse oximetry,  re-evaluation of patient's condition.  This critical care time did not overlap with that of any other provider or involve time for any procedures.     Pato Rodríguez DO  Pulmonary Critical Care Medicine  [facility] - ICU

## 2022-11-07 NOTE — CONSULTS
[facility] - ICU  Cardiology  Consult Note    Patient Name: Adrienne Urbina  MRN: [id number]  Admission Date: 10/31/2022  Hospital Length of Stay: 6 days  Code Status: Full Code   Attending Provider: Glenn Cantu Jr., MD, *   Consulting Provider: Linus Christina MD  Primary Care Physician: Gregorio Cherry NP  Principal Problem:<principal problem not specified>    Patient information was obtained from past medical records and primary team.     Inpatient consult to Cardiology  Consult performed by: Linus Christina MD  Consult ordered by: Pato Rodríguez DO      Subjective:     Chief Complaint:  SVT     HPI: 68 yo female h/o lung cancer diagnosed 4 years ago and been undergoing treatment presented with acute respiratory failure and hemoptysis. Patient required intubation for respiratory failure. Cardiology was consulted for evaluation of tachycardia at 180 bpm. This was determined to be atrial flutter at 2:1 conduction. Patient was hypotensive requiring Levophed. Decision was made to cardiovert. Synchronized DCCV was attempted x1 with successful conversion to sinus rhythm at 120 bpm. After a short period she went back into AFL and was started on Amiodarone ggt after 150mg loading dose. This did successfully convert her and kept her in SR. Still requiring pressors after cardioversion. Art-line was also placed for continuous BP monitoring. ROS is otherwise unable to be obtained.     Past Medical History:   Diagnosis Date    Anxiety and depression     Cancer     lung with brain metastasis    Cholelithiasis     COPD (chronic obstructive pulmonary disease)     Hypertension     Lumbar disc disease     PAD (peripheral artery disease)     Paroxysmal SVT (supraventricular tachycardia)     Pleural effusion        Past Surgical History:   Procedure Laterality Date    COLONOSCOPY      MEDIPORT INSERTION, DOUBLE      REPAIR OF CAROTID ARTERY Left 06/17/2022    Procedure: REPAIR, ARTERY, CAROTID;  Surgeon: Juan Luis Healy III, MD;   Location: [facility];  Service: General;  Laterality: Left;    UPPER GASTROINTESTINAL ENDOSCOPY         Review of patient's allergies indicates:  No Known Allergies    No current facility-administered medications on file prior to encounter.     Current Outpatient Medications on File Prior to Encounter   Medication Sig    albuterol (PROVENTIL/VENTOLIN HFA) 90 mcg/actuation inhaler Currently holding due to heart rate    albuterol-ipratropium (DUO-NEB) 2.5 mg-0.5 mg/3 mL nebulizer solution Take 3 mLs by nebulization every 4 (four) hours as needed. Currently holding due to heartrate    aspirin 81 MG Chew Take 1 tablet (81 mg total) by mouth once daily.    ATROVENT HFA 17 mcg/actuation inhaler Currently holding due to heartrate    cyproheptadine (PERIACTIN) 4 mg tablet Take 4 mg by mouth as needed.    dicyclomine (BENTYL) 10 MG capsule Take 10 mg by mouth 3 (three) times daily as needed.    diltiaZEM (CARDIZEM CD) 360 MG 24 hr capsule Take 1 capsule (360 mg total) by mouth once daily.    folic acid (FOLVITE) 1 MG tablet Take 1 mg by mouth once daily.    gabapentin (NEURONTIN) 300 MG capsule Take 300 mg by mouth 3 (three) times daily.    guaiFENesin-codeine 100-10 mg/5 ml (TUSSI-ORGANIDIN NR)  mg/5 mL syrup Take 10 mLs by mouth as needed.    HUMULIN R REGULAR U-100 INSULN 100 unit/mL injection Holding since in patient stay    HYDROcodone-acetaminophen (NORCO)  mg per tablet Take 1 tablet by mouth every 4 (four) hours as needed for Pain.    loratadine (CLARITIN) 10 mg tablet Take 10 mg by mouth once daily.    metFORMIN (GLUCOPHAGE) 500 MG tablet Take 500 mg by mouth 2 (two) times a day.    methylPREDNISolone (MEDROL DOSEPACK) 4 mg tablet use as directed    metoprolol tartrate (LOPRESSOR) 100 MG tablet Take 1 tablet (100 mg total) by mouth 2 (two) times daily.    mirtazapine (REMERON) 15 MG tablet Take 1 tablet (15 mg total) by mouth every evening.    montelukast (SINGULAIR) 10 mg tablet Take 10 mg by mouth as  needed.    ONETOUCH DELICA PLUS LANCET 33 gauge Misc     ONETOUCH VERIO REFLECT METER [facility]     ONETOUCH VERIO TEST STRIPS Strp     OXYGEN-AIR DELIVERY SYSTEMS [facility]   oxygen, See Instructions, home concentrator and portable @ 2L per nasal cannula continuous to keep SATS at 88% or greater J18.9, C34.9, J96.9, R09.02, # 1 EA, 0 Refill(s)    tiZANidine (ZANAFLEX) 4 MG tablet      Family History    None       Tobacco Use    Smoking status: Some Days     Packs/day: 0.25     Years: 49.00     Pack years: 12.25     Types: Cigarettes     Last attempt to quit: 2022     Years since quittin.3    Smokeless tobacco: Never   Substance and Sexual Activity    Alcohol use: Never    Drug use: Never    Sexual activity: Yes     ROS  Unable to obtain    Objective:     Vital Signs (Most Recent):  Temp: 98.7 °F (37.1 °C) (22 1545)  Pulse: 88 (22 1415)  Resp: (!) 26 (22 1415)  BP: (!) 107/50 (22 1415)  SpO2: 96 % (22 1602)   Vital Signs (24h Range):  Temp:  [97.2 °F (36.2 °C)-100.1 °F (37.8 °C)] 98.7 °F (37.1 °C)  Pulse:  [] 88  Resp:  [0-34] 26  SpO2:  [77 %-100 %] 96 %  BP: ()/(33-83) 107/50  Arterial Line BP: (65)/(65) 65/65     Weight: 84.1 kg (185 lb 6.5 oz)  Body mass index is 29.93 kg/m².    SpO2: 96 %  O2 Device (Oxygen Therapy): ventilator      Intake/Output Summary (Last 24 hours) at 2022 1650  Last data filed at 2022 1436  Gross per 24 hour   Intake 2319 ml   Output 800 ml   Net 1519 ml       Lines/Drains/Airways       Central Venous Catheter Line  Duration             Port A Cath Single Lumen right subclavian -- days              Drain  Duration                  NG/OG Tube 22 1355 [place] sump 16 Fr. Left mouth 5 days         Urethral Catheter 22 1400 Silicone 16 Fr. 5 days         Rectal Tube 22 0408 rectal tube w/ balloon (indicate number of mLs) <1 day              Airway  Duration                  Airway - Non-Surgical 22 1400 Endotracheal Tube  5 days              Arterial Line  Duration             Arterial Line 11/07/22 1340 Right Radial <1 day              Peripheral Intravenous Line  Duration                  Midline Catheter Insertion/Assessment  - Single Lumen 11/02/22 2149 Left basilic vein (medial side of arm)  4 days         Peripheral IV - Single Lumen 11/07/22 1600 20 G Lateral;Left Breast <1 day                    Physical Exam  Constitutional:       Appearance: She is ill-appearing.   HENT:      Head: Normocephalic and atraumatic.   Cardiovascular:      Rate and Rhythm: Regular rhythm. Tachycardia present.      Heart sounds: No murmur heard.    No friction rub. No gallop.   Pulmonary:      Comments: Intubated  Abdominal:      General: Abdomen is flat.      Palpations: Abdomen is soft.   Musculoskeletal:      Right lower leg: Edema present.      Left lower leg: Edema present.   Neurological:      Comments: Sedated       Significant Labs: CMP   Recent Labs   Lab 11/06/22  0524 11/07/22  0334   * 144   K 4.1 3.9   CO2 27 26   BUN 27.2* 23.0*   CREATININE 1.23* 0.98   CALCIUM 9.4 9.7   ALBUMIN 1.6* 1.6*   BILITOT 0.4 0.7   ALKPHOS 117 151*   AST 10 25   ALT 6 17    and CBC   Recent Labs   Lab 11/06/22  0533 11/06/22  1856 11/07/22  0334   WBC 10.3  --  18.1*   HGB 10.2* 9.5* 10.8*   HCT 32.1* 30.8* 35.6*     --  212       Significant Imaging:   EKG:  Atrial flutter 2:1 conduction.     Assessment and Plan:     Atrial flutter  Unspecified shock  Acute respiratory failure  Lung cancer    - Continue Amiodarone ggt per protocol, then switch to PO 200mg BID  - Continue pressor support as needed  - Obtain formal TTE  - Will defer recommending anticoagulation for AFL in the setting of hemoptysis.   - Patient has very poor prognosis overall, would recommend goals of care discussion.    VTE Risk Mitigation (From admission, onward)           Ordered     IP VTE HIGH RISK PATIENT  Once         11/01/22 0324     Place sequential compression device   Until discontinued         11/01/22 0324                    Thank you for your consult. I will sign off. Please contact us if you have any additional questions.    Linus Christina MD  Cardiology   [facility] - ICU

## 2022-11-08 NOTE — PHYSICIAN QUERY
PT Name: Adrienne Urbina  MR #: [id number]     DOCUMENTATION CLARIFICATION     CDS: Alyse Fisher RN          Contact information:[email or web address] or (cell) [phone]     This form is a permanent document in the medical record.     Query Date: November 8, 2022    By submitting this query, we are merely seeking further clarification of documentation.  Please utilize your independent clinical judgment when addressing the question(s) below.  The Medical Record contains the following:  Indicators Supporting Clinical Findings Location in Medical Record   x Acute Illness (e.g. AMI, Sepsis, etc.) Suspected Recurrent Pneumonia  -- LLL CT Thorax suspected PNA   -- Recent hospitalization x14 days ago    -- Possible HAP    Unspecified shock    Massive Hemoptysis    suction canister with approximately 280 mL of bloody sputum (had 110 mL of bloody sputum yesterday morning).    Hx of Stage IV Poorly Differentiated RUL Lung Carcinoma  -- Diagnosed (9/2020); s/p radiation, currently receiving chemotherapy q3 weeks  -- w/ regional bone metastasis  -- w/ brain metastasis     H&P 11/1          Cards Consult 11/7    ICU PN 11/2    ICU PN 11/4      ICU PN 11/3     x Vital Signs  10/31: , RR 26, BP 84/50  11/2: Temp 98.2, , RR 29  11/3 Temp 97.3, , RR 26, BP 91/55  11/4: Temp 98.7, HR 87, RR 26, BP 98/74  11/7: Temp 99, , RR 24, /64   Vital Signs   x Acidosis documented developed decreased level of consciousness with associated hypercapnia in severe respiratory acidosis relation of mechanical ventilatory support   ICU PN 11/3   x ABGs/Labs  11/02/22 12:56   PH 7.06 !!   PCO2 97 !!   PO2 50 !!   HCO3 27.5 !   SATURATED O2 66.4 !   Base Deficit -3.9 !      ABG   x Hypotension or Low Blood Pressure documented admitted to ICU for close monitoring of possible decompensation in setting of hypotension & acute onset worsening hemoptysis w/ underlying hx of Stage IV RUL Lung Carcinoma   H&P 11/1   x Altered  Mental Status or Confusion patient appeared obtunded   Pulm CC PN 11/3    Diaphoresis, Cold Extremities or Cyanosis      Oliguria     x Medication/Treatment  -Vasopressors  -Inotropic Drugs  -IV Fluids   -IV Antibiotics  -Cardiac Assist Devices  -Hemodynamic Monitoring  -Blood/Blood Products is requiring vasopressor support for now is on Levophed at 0.04 mcg/kg/minute    Transfuse RBC 2 Units     Status: Completed 11/04/22 1410 -- Unit:   22  [id number]  [id number] ICU PN 11/3    Blood Admin flowsheet    x Other continues to put out blood through ETT - this morning there was close to 800 mL of maroon-colored blood in suction canister (600 mL previous stay).      continues to have significant hemoptysis via endotracheal tube    [x   ] Other Infectious Disease (please specify): ____ Serratia pneumonia without sepsis ______   ICU PN 11/7          Query answer 11/15               Provider, please specify diagnosis or diagnoses associated with above clinical findings.    [x    ] Hemorrhagic Shock     [    ] Other Shock (please specify): __________     [    ] Shock, Unspecified     [    ] Other Condition (please specify): _________     [  ] Clinically Undetermined         Please document in your progress notes daily for the duration of treatment until resolved and include in your discharge summary.     Form No. [id number]

## 2022-11-08 NOTE — PHYSICIAN QUERY
PT Name: Adrienne Urbina  MR #: [id number]    DOCUMENTATION CLARIFICATION      CDS: Alyse Fisher RN          Contact information:[email or web address] or (cell) [phone]     This form is a permanent document in the medical record.      Query Date: November 8, 2022    By submitting this query, we are merely seeking further clarification of documentation. Please utilize your independent clinical judgment when addressing the question(s) below.    The Medical Record contains the following:   Indicators  Supporting Clinical Findings Location in Medical Record   x Anemia documented Initial lab significant for macrocytic anemia with H/H (9.3/29.9)    ED Note 10/31    x H&H  10/31  22:11 11/01  04:16 11/03  03:15 11/04  03:52 11/04  09:59 11/05  03:58 11/06  18:56 11/07  03:34 11/08  03:12   Hgb 9.3 (L) 8.7 (L) 7.8 (L) 6.7 (L) 7.2 (L) 10.1 (L) 9.5 (L) 10.8 (L) 9.8 (L)   Hct 29.8 (L) 29.1 (L) 25.3 (L) 21.2 (L) 23.1 (L) 30.7 (L) 30.8 (L) 35.6 (L) 31.3 (L)      Lab Results    x BP                    HR 11/2:   11/3: ; BP 91/55  11/4 BP 98/74   Vital Signs    GI bleeding documented     x Acute bleeding (Non GI site) Massive Hemoptysis    suction canister with approximately 280 mL of bloody sputum (had 110 mL of bloody sputum yesterday morning).    continues to put out blood through ETT - this morning there was close to 800 mL of maroon-colored blood in suction canister (600 mL previous stay).    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     [x   ] Acute blood loss anemia      [   ] Anemia due to neoplastic disease     [   ] Anemia due to antineoplastic chemotherapy     [   ] Anemia, unspecified      [   ] Other Hematological Diagnosis (please specify): _________________     [   ] Clinically Undetermined              Please document in your progress notes daily for the duration of treatment, until resolved, and include in your discharge summary.    Form No. 91132

## 2022-11-08 NOTE — PROGRESS NOTES
[facility] - ICU  Pulmonary Critical Care Note    Patient Name: Adrienne Urbina  MRN: [id number]  Admission Date: 10/31/2022  Hospital Length of Stay: 7 days  Code Status: Full Code  Attending Provider: Glenn Cantu Jr., MD, *  Primary Care Provider: Gregorio Cherry NP     Subjective:     HPI:   Ms. 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  Patient reports being in her usual state of health until noticing blood streaked sputum that progressed to complete blood filled sputum over the past x1.5 days.  She denies any trauma however does report chronic cough due to COPD which she feels may have contributed to recent symptoms. When asked, patient denies ever experiencing hemoptysis at any point in the past even throughout undergoing chemotherapy and radiation for right upper lobe lung mass (last chemotherapy x1.5 months ago; follows w/ Dr. Shoemaker).  These new symptoms scared her which prompted her ED presentation. Denies any history or DVT/PE. She also denies any current lightheadedness/dizziness, nausea/vomiting, fever/chills, chest pain, palpitations, new/worsening shortness of breath, hematemesis, hematochezia, melena, hematuria. Per chart review; patient was recently discharged from [facility] (10/17) in which she required ICU level care secondary to suspected Afib w/ RVR in addition to anemia requiring x2 units PRBCs and GNR related pneumonia.     Hospital Course/Significant events:  Intubated 11/02/2022    24 Hour Interval History:  Yesterday after rounds at approximately 10:00 a.m. patient notably in SVT secondary to the atrial flutter with rates sustained in 170s.  At the bedside  patient was hypotensive and tachycardic.  Levophed was started along with amiodarone drip.  I called Cardiology they came to the bedside to evaluate patient.  At approximately 10:46 a.m. patient was cardioverted with 200 joules with resultant sinus tach in the 120s for short period of time before return back into the a flutter.   amiodarone loading dose was given and tachycardia subsequently improved.  Also spoke with family over the phone and discussed goals of care and informed them of her guarded prognosis and patient's family continues to want aggressive care.  This morning she is on AC mode 450/26/5 +/70% - ABG this morning with a pH is 7.32, pCO2 51, PO2 90, and serum bicarb 27.  Currently on amiodarone at 5.9 mcg/kg/minute, Precedex 1 mcg/kg/hour, Levophed 0.16 mcg/kg/minute, Diprivan 30 mcg/kg/minute.  Input in the last 24 hours 3891 with output of 950 mL.  Suction canister this morning with approximately 1.1 L (800ml 22) of maroon colored fluid as being suctioned from ET tube.  She remains afebrile with a T-max of 37.4° C in last 24 hours.  White blood cell count with significant jump this morning as well now up to 29.6 with increasing left shift.  She remains on cefepime and vancomycin.      Past Medical History:   Diagnosis Date    Anxiety and depression     Cancer     lung with brain metastasis    Cholelithiasis     COPD (chronic obstructive pulmonary disease)     Hypertension     Lumbar disc disease     PAD (peripheral artery disease)     Paroxysmal SVT (supraventricular tachycardia)     Pleural effusion        Social History     Socioeconomic History    Marital status:    Tobacco Use    Smoking status: Some Days     Packs/day: 0.25     Years: 49.00     Pack years: 12.25     Types: Cigarettes     Last attempt to quit: 2022     Years since quittin.3    Smokeless tobacco: Never   Substance and Sexual Activity    Alcohol use: Never    Drug use: Never    Sexual activity: Yes     Social  Determinants of Health     Financial Resource Strain: Low Risk     Difficulty of Paying Living Expenses: Not very hard   Food Insecurity: No Food Insecurity    Worried About Running Out of Food in the Last Year: Never true    Ran Out of Food in the Last Year: Never true   Transportation Needs: No Transportation Needs    Lack of Transportation (Medical): No    Lack of Transportation (Non-Medical): No   Physical Activity: Inactive    Days of Exercise per Week: 0 days    Minutes of Exercise per Session: 0 min   Stress: No Stress Concern Present    Feeling of Stress : Not at all   Social Connections: Moderately Isolated    Frequency of Communication with Friends and Family: More than three times a week    Frequency of Social Gatherings with Friends and Family: More than three times a week    Attends Roman [employer] Services: Never    Active Member of Clubs or Organizations: No    Attends Club or Organization Meetings: Never    Marital Status:    Housing Stability: Low Risk     Unable to Pay for Housing in the Last Year: No    Number of Places Lived in the Last Year: 1    Unstable Housing in the Last Year: No       Objective:     Current Outpatient Medications   Medication Instructions    albuterol (PROVENTIL/VENTOLIN HFA) 90 mcg/actuation inhaler Currently holding due to heart rate    albuterol-ipratropium (DUO-NEB) 2.5 mg-0.5 mg/3 mL nebulizer solution 3 mLs, Nebulization, Every 4 hours PRN, Currently holding due to heartrate    aspirin 81 mg, Oral, Daily    ATROVENT HFA 17 mcg/actuation inhaler Currently holding due to heartrate    cyproheptadine (PERIACTIN) 4 mg, Oral, As needed (PRN)    dicyclomine (BENTYL) 10 mg, Oral, 3 times daily PRN    diltiaZEM (CARDIZEM CD) 360 mg, Oral, Daily    folic acid (FOLVITE) 1 mg, Oral, Daily    gabapentin (NEURONTIN) 300 mg, Oral, 3 times daily    guaiFENesin-codeine 100-10 mg/5 ml (TUSSI-ORGANIDIN NR)  mg/5 mL syrup 10 mLs, Oral, As needed (PRN)    HUMULIN R REGULAR U-100  INSULN 100 unit/mL injection Holding since in patient stay    HYDROcodone-acetaminophen (NORCO)  mg per tablet 1 tablet, Oral, Every 4 hours PRN    loratadine (CLARITIN) 10 mg, Oral, Daily    metFORMIN (GLUCOPHAGE) 500 mg, Oral, 2 times daily    metoprolol tartrate (LOPRESSOR) 100 mg, Oral, 2 times daily    mirtazapine (REMERON) 15 mg, Oral, Nightly    montelukast (SINGULAIR) 10 mg, Oral, As needed (PRN)    ONETOUCH DELICA PLUS LANCET 33 gauge Misc No dose, route, or frequency recorded.    ONETOUCH VERIO REFLECT METER Misc No dose, route, or frequency recorded.    ONETOUCH VERIO TEST STRIPS Strp No dose, route, or frequency recorded.    OXYGEN-AIR DELIVERY SYSTEMS MIS   oxygen, See Instructions, home concentrator and portable @ 2L per nasal cannula continuous to keep SATS at 88% or greater J18.9, C34.9, J96.9, R09.02, # 1 EA, 0 Refill(s)    tiZANidine (ZANAFLEX) 4 MG tablet No dose, route, or frequency recorded.       Current Inpatient Medications   ceFEPime (MAXIPIME) IVPB  2 g Intravenous Q8H    metoprolol        pantoprozole (PROTONIX) IV  40 mg Intravenous Daily    vancomycin (VANCOCIN) IVPB  750 mg Intravenous Q24H         Intake/Output Summary (Last 24 hours) at 11/8/2022 0525  Last data filed at 11/8/2022 0348  Gross per 24 hour   Intake 4730.01 ml   Output 1400 ml   Net 3330.01 ml         Review of Systems   Unable to perform ROS: Intubated      Vital Signs (Most Recent):  Temp: 99.1 °F (37.3 °C) (11/08/22 0348)  Pulse: 108 (11/08/22 0400)  Resp: (!) 26 (11/08/22 0400)  BP: 122/69 (11/08/22 0400)  SpO2: 100 % (11/08/22 0456)    Body mass index is 31.42 kg/m².  Weight: 88.3 kg (194 lb 10.7 oz) Vital Signs (24h Range):  Temp:  [98.5 °F (36.9 °C)-99.4 °F (37.4 °C)] 99.1 °F (37.3 °C)  Pulse:  [] 108  Resp:  [0-34] 26  SpO2:  [86 %-100 %] 100 %  BP: ()/(33-93) 122/69  Arterial Line BP: ()/(35-65) 147/59     Physical Exam  Vitals and nursing note reviewed.   Constitutional:       General:  She is not in acute distress.     Appearance: She is ill-appearing. She is not toxic-appearing.      Comments: Intubated and sedated   HENT:      Head: Normocephalic and atraumatic.      Nose: Nose normal.      Mouth/Throat:      Mouth: Mucous membranes are moist.      Pharynx: Oropharynx is clear.   Eyes:      Extraocular Movements: Extraocular movements intact.      Conjunctiva/sclera: Conjunctivae normal.      Pupils: Pupils are equal, round, and reactive to light.   Cardiovascular:      Rate and Rhythm: Normal rate and regular rhythm.      Pulses: Normal pulses.      Heart sounds: Normal heart sounds. No murmur heard.    No gallop.   Pulmonary:      Effort: No respiratory distress.      Breath sounds: No stridor. No wheezing, rhonchi or rales.   Abdominal:      General: Abdomen is flat.      Palpations: Abdomen is soft.   Musculoskeletal:         General: No swelling or deformity.      Right lower leg: No edema.      Left lower leg: No edema.   Skin:     General: Skin is warm and dry.      Coloration: Skin is not jaundiced.      Findings: No bruising.   Neurological:      General: No focal deficit present.      Mental Status: She is alert and oriented to person, place, and time.      Comments: Patient sedated and therefore unable to accurately exam       Mechanical ventilation support:  Vent Mode: A/C (11/08/22 0456)  Set Rate: 26 BPM (11/08/22 0456)  Vt Set: 450 mL (11/08/22 0456)  PEEP/CPAP: 5 cmH20 (11/08/22 0456)  Oxygen Concentration (%): 70 (pO2 90 on AM ABG) (11/08/22 0456)  Peak Airway Pressure: 26.8 cmH2O (11/08/22 0456)  Plateau Pressure: 26 cmH20 (11/07/22 0700)  Total Ve: 10.9 L/m (11/08/22 0456)  F/VT Ratio<105 (RSBI): (!) 61.61 (11/08/22 0300)    Lines/Drains/Airways       Central Venous Catheter Line  Duration             Port A Cath Single Lumen right subclavian -- days              Drain  Duration                  NG/OG Tube 11/02/22 1355 [place] sump 16 Fr. Left mouth 5 days         Urethral  Catheter 11/02/22 1400 Silicone 16 Fr. 5 days         Rectal Tube 11/07/22 0408 rectal tube w/ balloon (indicate number of mLs) 1 day              Airway  Duration                  Airway - Non-Surgical 11/02/22 1400 Endotracheal Tube 5 days              Arterial Line  Duration             Arterial Line 11/07/22 1340 Right Radial <1 day              Peripheral Intravenous Line  Duration                  Midline Catheter Insertion/Assessment  - Single Lumen 11/02/22 2149 Left basilic vein (medial side of arm)  5 days         Peripheral IV - Single Lumen 11/07/22 1600 20 G Lateral;Left Breast <1 day                    Significant Labs:    Lab Results   Component Value Date    WBC 29.6 (H) 11/08/2022    HGB 9.8 (L) 11/08/2022    HCT 31.3 (L) 11/08/2022    MCV 95.1 (H) 11/08/2022     11/08/2022         BMP  Lab Results   Component Value Date     11/08/2022    K 4.0 11/08/2022    CO2 27 11/08/2022    BUN 21.6 (H) 11/08/2022    CREATININE 0.91 11/08/2022    CALCIUM 9.7 11/08/2022    EGFRNONAA 81 04/22/2022       ABG  Recent Labs   Lab 11/08/22  0432   PH 7.32*   PO2 90   PCO2 51*   HCO3 26.3*             Significant Imaging:  I have reviewed all pertinent imaging within the past 24 hours.    Assessment/Plan:   1.) Massive Hemoptysis  2.) Hx of Stage IV Poorly Differentiated RUL Lung Carcinoma  3.) Serratia M. Pneumonia with leukocytosis  4.) Hx of Atrial Fibrillation/flutter (not on Eliquis)  5.) Hx of COPD (on home 2L NC q.Nightly)  6.) Hx of HTN  7.) Hx of HLD  8.) Hx of PAD    On mechanical ventilation, wean as tolerated.  Daily SBT as tolerated  Still waiting for MRSA PCR which was ordered on 11/01/2022.  Continue vancomycin and cefepime.  Will discontinue vancomycin if MRSA PCR is negative  Continue amiodarone.  Will switch to 200 b.i.d. at end of amiodarone infusion protocol  H/H currently stable  Prognosis guarded    DVT Prophylaxis:  Continue to hold all anticoagulation including aspirin for ongoing  hemoptysis  GI Prophylaxis:  Protonix     Greater than 30 minutes of critical care was time spent personally by me on the following activities: development of treatment plan with patient or surrogate and bedside caregivers, discussions with consultants, evaluation of patient's response to treatment, examination of patient, ordering and performing treatments and interventions, ordering and review of laboratory studies, ordering and review of radiographic studies, pulse oximetry, re-evaluation of patient's condition.  This critical care time did not overlap with that of any other provider or involve time for any procedures.     Pato Rodríguez, DO  Pulmonary Critical Care Medicine  [facility] - ICU

## 2022-11-08 NOTE — CONSULTS
Inpatient Nutrition Assessment    Admit Date: 10/31/2022   Total duration of encounter: 8 days     Nutrition Recommendation/Prescription     Pt remains NPO/intubated ; has OGT.  Propofol infusing @ 18.4  ml/hr providing 485 calories. Pt on TF Peptamen AF @ goal rate 55ml/hr (23 hr cycle) provide 1518 calories, 96 gm protein, 1027 ml free water. (TF + propofol = 2003 calories). Flush tube with 100 ml water every 4hrs.   When extubated ADAT to cardiac with boost plus bid  MVI/fe  Biweekly wt  Will monitor nutrition status     Communication of Recommendations: reviewed with nurse    Nutrition Assessment     Malnutrition Assessment/Nutrition-Focused Physical Exam    Malnutrition in the context of chronic illness  Degree of Malnutrition: does not meet criteria  Energy Intake: does not meet criteria  Interpretation of Weight Loss: does not meet criteria  Body Fat: does not meet criteria  Area of Body Fat Loss: does not meet criteria  Muscle Mass Loss: does not meet criteria  Area of Muscle Mass Loss: does not meet criteria  Fluid Accumulation: does not meet criteria  Edema: does not meet criteria  Reduced  Strength: unable to obtain  A minimum of two characteristics is recommended for diagnosis of either severe or non-severe malnutrition.    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 (diagnosed 09/2020, s/p radiation, currently receiving chemotherapy q3 weeks), atrial fibrillation (not on Eliquis), HTN, HLD, PAD, COPD (on nightly 2 L NC), tobacco use, and cholelithiasis    Nutrition-Related Medications:  pantoprazole,   precedex, levophed, propofol ;  "maxipime, vancomycin   Calorie Containing IV Medications: Diprivan @ 18 ml/hr (provides 485  kcal/d)    Nutrition-Related Labs:  () H/H 6.7/21.2(L) Gluc 121 Bun 26 Cr 1.8 K 4.0   () H/H 9.8/31.3(L) Gluc 145 Bun 21.6(H) Cr 0.9 Alb 1.3   Diet/PN Order: Diet NPO  Oral Supplement Order: none  Tube Feeding Order:    (see below for calculation)  Appetite/Oral Intake: NPO/NPO  Factors Affecting Nutritional Intake: NPO and on mechanical ventilation  Food/Rastafarian/Cultural Preferences: none reported  Food Allergies: none reported       Wound(s):   none reported     Comments  () Pt remains NPO/vent; propofol @ 18ml/hr; TF infusing at goal rate 55ml/hr; aware pt poor prognosis; family desires full code at this time. Suggest continue current nutrition support. Labs acknowledged. Noted wt elevated--? Fluid/vs bedscale accuracy.     () pt remains NPO x 3 days today/ vent; consulted for TF recs; will initiate TF as per protocol; spoke to RN about TF order. Pt remains on propofol @ 16ml/hr--providing 425 calories.     (11/3) Pt intubated yesterday (); on propofol @ 18ml/hr; has OGT; TF recs provided to maintain nutrition status; will forward to MD. Labs akcnowledged.     : Pt currently NPO during rounds; reports some nausea/vomiting, no other GI complaints. Pt reports good appetite and no recent wt loss pta; #. No c/s difficulties reported. Pt ok to add Boost Plus once diet advanced.    Anthropometrics    Height: 5' 6" (167.6 cm) Height Method: Stated  Last Weight: 88.3 kg (194 lb 10.7 oz) (22 0352) Weight Method: Bed Scale  BMI (Calculated): 31.4  BMI Classification: overweight (BMI 25-29.9)     Ideal Body Weight (IBW), Female: 130 lb     % Ideal Body Weight, Female (lb): 130.77 %                    Usual Body Weight (UBW), k.91 kg (# per pt)  % Usual Body Weight: 101.8     Usual Weight Provided By: patient and EMR weight history    Wt Readings from Last 5 Encounters:   22 " 88.3 kg (194 lb 10.7 oz)   10/21/22 75.7 kg (166 lb 12.8 oz)   10/16/22 77.7 kg (171 lb 4.8 oz)   09/20/22 74.4 kg (164 lb)   09/10/22 75.3 kg (166 lb)     Weight Change(s) Since Admission:  Admit Weight: 77.1 kg (170 lb) (11/01/22 0552)  (11/3) 77.8kg   (11/8) wt elevated 88kg; ? Fluid/vs accuracy bed   Estimated Needs    Weight Used For Calorie Calculations: 77.8 kg (171 lb 8.3 oz)  Energy Calorie Requirements (kcal): 1945 kcal/d; 25 maryjane/kg vent  Energy Need Method: Kcal/kg  Weight Used For Protein Calculations: 77.8 kg (171 lb 8.3 oz)  Protein Requirements: 94 gm protein/d; 1.2 gm/kg  Fluid Requirements (mL): 1945ml/d; 1ml/maryjane  Temp: 99.2 °F (37.3 °C)       Enteral Nutrition    Formula: Peptamen AF  Rate/Volume: 55ml/hr   Water Flushes: 100 ml every 4 hrs   Additives/Modulars: none at this time  Route: orogastric tube  Method: continuous  Total Nutrition Provided by Tube Feeding, Additives, and Flushes:  Calories Provided  2003 kcal/d, 103% needs   Protein Provided  96 g/d, 100% needs   Fluid Provided  1627 ml/d, 83% needs   Continuous feeding calculations based on estimated 20 hr/d run time unless otherwise stated.    Parenteral Nutrition    Patient not receiving parenteral nutrition support at this time.    Evaluation of Received Nutrient Intake    Calories: meeting estimated needs  Protein: meeting estimated needs    Patient Education    Not applicable.    Nutrition Diagnosis     PES: Inadequate oral intake related to vent as evidenced by NPO status . (continues)    Interventions/Goals     Intervention(s): modified composition of meals/snacks, modified composition of enteral nutrition, modified rate of enteral nutrition, commercial beverage, multivitamin/mineral supplement therapy, and collaboration with other providers  Goal: Meet greater than 75% of nutritional needs by follow-up. (goal progressing)    Monitoring & Evaluation     Dietitian will monitor food and beverage intake, enteral nutrition intake, and  weight change.  Nutrition Risk/Follow-Up: high (follow-up in 1-4 days)   Please consult if re-assessment needed sooner.

## 2022-11-08 NOTE — PHYSICIAN QUERY
PT Name: Adrienne Urbina  MR #: [id number]     DOCUMENTATION CLARIFICATION     CDS: Alyse Fisher RN          Contact information:[email or web address] or (cell) [phone]     This form is a permanent document in the medical record.     Query Date: November 8, 2022    By submitting this query, we are merely seeking further clarification of documentation.  Please utilize your independent clinical judgment when addressing the question(s) below.  The Medical Record contains the following:  Indicators Supporting Clinical Findings Location in Medical Record   x HR         RR          BP        Temp 10/31: , RR 26, BP 84/50  11/2: Temp 98.2, , RR 29  11/3 Temp 97.3, , RR 26, BP 91/55  11/4: Temp 98.7, HR 87, RR 26, BP 98/74  11/7: Temp 99, , RR 24, /64   Vital Signs   x Lactic Acid          Procalcitonin  10/31/22 22:16   Procalcitonin 0.37      11/01/22 04:16   Lactate, David 1.1      Lab Results   x WBC           Bands          CRP     10/31/22 22:11 11/01/22 04:16 11/05/22 03:58 11/06/22 05:33 11/07/22 03:34 11/08/22 03:12   WBC 9.2 8.4 11.9 (H) 10.3 18.1 (H) 29.6 (H)      Lab Results    x Culture(s) Resp Cx 11/3- Many Serratia marcescens    Micro   x AMS, Confusion, LOC, etc. patient appeared obtunded   Pulm CC PN 11/3   x Organ Dysfunction/Failure acute on chronic respiratory failure requiring ongoing life support   ICU PN 11/3   x Bacteremia or Sepsis / Septic SIRS (2/4; tachycardia, tachypnea) w/ suspected PNA etiology    H&P 11/1   x Known or Suspected Source of Infection documented Suspected Recurrent Pneumonia  -- LLL CT Thorax suspected PNA   -- Recent hospitalization x14 days ago    -- Possible HAP   H&P 11/1    (Failed) Outpatient Treatment      Medication      Treatment     x Other Hx of Stage IV Poorly Differentiated RUL Lung Carcinoma  -- Diagnosed (9/2020); s/p radiation, currently receiving chemotherapy q3 weeks  -- w/ regional bone metastasis  -- w/ brain metastasis    ICU PN 11/3        Provider, please specify diagnosis or diagnoses associated with above clinical findings.    [   ] Sepsis due to Serratia Marcescens     [   ] Severe Sepsis with Acute Organ Dysfunction/Failure due to Serratia Marcescens     [   ] Sepsis with Septic Shock due to Serratia Marcescens     [   ] SIRS with infection but without Sepsis     [x   ] Other Infectious Disease (please specify): ____ Serratia pneumonia without sepsis ______     [  ] Clinically Undetermined       Please document in your progress notes daily for the duration of treatment until resolved and include in your discharge summary.

## 2022-11-08 NOTE — NURSING
At 1000 am Dr Rodríguez was called in regards to my patients heart rate and BP ,   sustained. Levophed was started. MD stated he was coming to floor. Cardiology was consulted.Cardiologist stated we will do cardioversion.  At 1046 we cardioverted pt with 200 celia per Dr Christina.  HR went to 120  but then back to Aflutter . Amio loading dose given and protocol followed.   Pt was already sedated and tolerated well.   Dr Christina placed an artline.

## 2022-11-09 NOTE — PROGRESS NOTES
[facility] - ICU  Pulmonary Critical Care Note    Patient Name: Adrienne Urbina  MRN: [id number]  Admission Date: 10/31/2022  Hospital Length of Stay: 8 days  Code Status: Full Code  Attending Provider: Glenn Cantu Jr., MD, *  Primary Care Provider: Gregorio Cherry NP     Subjective:     HPI:   Ms. 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  Patient reports being in her usual state of health until noticing blood streaked sputum that progressed to complete blood filled sputum over the past x1.5 days.  She denies any trauma however does report chronic cough due to COPD which she feels may have contributed to recent symptoms. When asked, patient denies ever experiencing hemoptysis at any point in the past even throughout undergoing chemotherapy and radiation for right upper lobe lung mass (last chemotherapy x1.5 months ago; follows w/ Dr. Shoemaker).  These new symptoms scared her which prompted her ED presentation. Denies any history or DVT/PE. She also denies any current lightheadedness/dizziness, nausea/vomiting, fever/chills, chest pain, palpitations, new/worsening shortness of breath, hematemesis, hematochezia, melena, hematuria. Per chart review; patient was recently discharged from [facility] (10/17) in which she required ICU level care secondary to suspected Afib w/ RVR in addition to anemia requiring x2 units PRBCs and GNR related pneumonia.     Hospital Course/Significant events:  Intubated 11/02/2022    24 Hour Interval History:  No acute events overnight. Periods of hypotension and hypoxia in past 24 hours. Still Atrial fibrillation. Febrile this morning with a T-max 38.6c. Input  of 4038 ml and output of 1900ml in 24h. Suction cansiter changed out. Previously had approx 1100 ml maroon colored fluid this morning has approx 30ml dark sputum. Remains intubated and sedated. Ac 450/26/5+/55% - ABG on these settings pH 7.33, PCO2 50, PO2 75 (Fio2 weaned down from 70%). On Amio gtt 11.3 mcg/kg/min, Precedex 1mcg/kg/h, propofol 40mcg/kg/min, and levophed 0.18mcg/kg/h. On vanc and cefepime for serratia PNA.     Past Medical History:   Diagnosis Date    Anxiety and depression     Cancer     lung with brain metastasis    Cholelithiasis     COPD (chronic obstructive pulmonary disease)     Hypertension     Lumbar disc disease     PAD (peripheral artery disease)     Paroxysmal SVT (supraventricular tachycardia)     Pleural effusion        Social History     Socioeconomic History    Marital status:    Tobacco Use    Smoking status: Some Days     Packs/day: 0.25     Years: 49.00     Pack years: 12.25     Types: Cigarettes     Last attempt to quit: 2022     Years since quittin.3    Smokeless tobacco: Never   Substance and Sexual Activity    Alcohol use: Never    Drug use: Never    Sexual activity: Yes     Social Determinants of Health     Financial Resource Strain: Low Risk     Difficulty of Paying Living Expenses: Not very hard   Food Insecurity: No Food Insecurity    Worried About Running Out of Food in the Last Year: Never true    Ran Out of Food in the Last Year: Never true   Transportation Needs: No Transportation Needs    Lack of Transportation (Medical): No    Lack of Transportation (Non-Medical): No   Physical Activity: Inactive    Days of Exercise per Week: 0 days    Minutes of Exercise per Session: 0 min   Stress: No Stress Concern Present    Feeling of Stress : Not at all   Social Connections: Moderately Isolated    Frequency of Communication with Friends and Family: More than three times a week    Frequency of Social Gatherings with Friends and Family: More than three times a week     Attends Faith Services: Never    Active Member of Clubs or Organizations: No    Attends Club or Organization Meetings: Never    Marital Status:    Housing Stability: Low Risk     Unable to Pay for Housing in the Last Year: No    Number of Places Lived in the Last Year: 1    Unstable Housing in the Last Year: No       Objective:     Current Outpatient Medications   Medication Instructions    albuterol (PROVENTIL/VENTOLIN HFA) 90 mcg/actuation inhaler Currently holding due to heart rate    albuterol-ipratropium (DUO-NEB) 2.5 mg-0.5 mg/3 mL nebulizer solution 3 mLs, Nebulization, Every 4 hours PRN, Currently holding due to heartrate    aspirin 81 mg, Oral, Daily    ATROVENT HFA 17 mcg/actuation inhaler Currently holding due to heartrate    cyproheptadine (PERIACTIN) 4 mg, Oral, As needed (PRN)    dicyclomine (BENTYL) 10 mg, Oral, 3 times daily PRN    diltiaZEM (CARDIZEM CD) 360 mg, Oral, Daily    folic acid (FOLVITE) 1 mg, Oral, Daily    gabapentin (NEURONTIN) 300 mg, Oral, 3 times daily    guaiFENesin-codeine 100-10 mg/5 ml (TUSSI-ORGANIDIN NR)  mg/5 mL syrup 10 mLs, Oral, As needed (PRN)    HUMULIN R REGULAR U-100 INSULN 100 unit/mL injection Holding since in patient stay    HYDROcodone-acetaminophen (NORCO)  mg per tablet 1 tablet, Oral, Every 4 hours PRN    loratadine (CLARITIN) 10 mg, Oral, Daily    metFORMIN (GLUCOPHAGE) 500 mg, Oral, 2 times daily    metoprolol tartrate (LOPRESSOR) 100 mg, Oral, 2 times daily    mirtazapine (REMERON) 15 mg, Oral, Nightly    montelukast (SINGULAIR) 10 mg, Oral, As needed (PRN)    ONETOUCH DELICA PLUS LANCET 33 gauge Misc No dose, route, or frequency recorded.    ONETOUCH VERIO REFLECT METER Misc No dose, route, or frequency recorded.    ONETOUCH VERIO TEST STRIPS Strp No dose, route, or frequency recorded.    OXYGEN-AIR DELIVERY SYSTEMS MISC   oxygen, See Instructions, home concentrator and portable @ 2L per nasal cannula continuous to keep SATS at 88%  or greater J18.9, C34.9, J96.9, R09.02, # 1 EA, 0 Refill(s)    tiZANidine (ZANAFLEX) 4 MG tablet No dose, route, or frequency recorded.       Current Inpatient Medications   ceFEPime (MAXIPIME) IVPB  2 g Intravenous Q8H    pantoprozole (PROTONIX) IV  40 mg Intravenous Daily    vancomycin (VANCOCIN) IVPB  750 mg Intravenous Q24H         Intake/Output Summary (Last 24 hours) at 11/9/2022 0540  Last data filed at 11/9/2022 0523  Gross per 24 hour   Intake 5111.34 ml   Output 2775 ml   Net 2336.34 ml         Review of Systems   Unable to perform ROS: Intubated      Vital Signs (Most Recent):  Temp: (!) 101.5 °F (38.6 °C) (11/09/22 0400)  Pulse: 107 (11/09/22 0500)  Resp: (!) 26 (11/09/22 0500)  BP: (!) 113/47 (11/09/22 0323)  SpO2: 99 % (11/09/22 0504)    Body mass index is 31.42 kg/m².  Weight: 88.3 kg (194 lb 10.7 oz) Vital Signs (24h Range):  Temp:  [98.8 °F (37.1 °C)-101.5 °F (38.6 °C)] 101.5 °F (38.6 °C)  Pulse:  [] 107  Resp:  [17-42] 26  SpO2:  [87 %-100 %] 99 %  BP: ()/(47-87) 113/47  Arterial Line BP: ()/(35-74) 115/45     Physical Exam  Vitals and nursing note reviewed.   Constitutional:       General: She is not in acute distress.     Appearance: She is ill-appearing. She is not toxic-appearing.      Comments: Intubated and sedated   HENT:      Head: Normocephalic and atraumatic.      Nose: Nose normal.      Mouth/Throat:      Mouth: Mucous membranes are moist.      Pharynx: Oropharynx is clear.   Eyes:      Extraocular Movements: Extraocular movements intact.      Conjunctiva/sclera: Conjunctivae normal.      Pupils: Pupils are equal, round, and reactive to light.   Cardiovascular:      Rate and Rhythm: Normal rate and regular rhythm.      Pulses: Normal pulses.      Heart sounds: Normal heart sounds. No murmur heard.    No gallop.   Pulmonary:      Effort: No respiratory distress.      Breath sounds: No stridor. No wheezing, rhonchi or rales.   Abdominal:      General: Abdomen is flat.       Palpations: Abdomen is soft.   Musculoskeletal:         General: No swelling or deformity.      Right lower leg: No edema.      Left lower leg: No edema.   Skin:     General: Skin is warm and dry.      Coloration: Skin is not jaundiced.      Findings: No bruising.   Neurological:      General: No focal deficit present.      Mental Status: She is alert and oriented to person, place, and time.      Comments: Patient sedated and therefore unable to accurately exam       Mechanical ventilation support:  Vent Mode: A/C (11/09/22 0504)  Set Rate: 26 BPM (11/09/22 0504)  Vt Set: 450 mL (11/09/22 0504)  PEEP/CPAP: 5 cmH20 (11/09/22 0504)  Oxygen Concentration (%): 55 (11/09/22 0504)  Peak Airway Pressure: 29.6 cmH2O (11/09/22 0504)  Plateau Pressure: 26 cmH20 (11/08/22 0800)  Total Ve: 10.8 L/m (11/09/22 0504)  F/VT Ratio<105 (RSBI): (!) 61.76 (11/09/22 0100)    Lines/Drains/Airways       Central Venous Catheter Line  Duration             Port A Cath Single Lumen right subclavian -- days              Drain  Duration                  NG/OG Tube 11/02/22 1355 McKenzie sump 16 Fr. Left mouth 6 days         Urethral Catheter 11/02/22 1400 Silicone 16 Fr. 6 days         Rectal Tube 11/07/22 0408 rectal tube w/ balloon (indicate number of mLs) 2 days              Airway  Duration                  Airway - Non-Surgical 11/02/22 1400 Endotracheal Tube 6 days              Arterial Line  Duration             Arterial Line 11/07/22 1340 Right Radial 1 day              Peripheral Intravenous Line  Duration                  Midline Catheter Insertion/Assessment  - Single Lumen 11/02/22 2149 Left basilic vein (medial side of arm)  6 days         Peripheral IV - Single Lumen 11/07/22 1600 20 G Lateral;Left Breast 1 day                    Significant Labs:    Lab Results   Component Value Date    WBC 29.6 (H) 11/08/2022    HGB 9.8 (L) 11/08/2022    HCT 31.3 (L) 11/08/2022    MCV 95.1 (H) 11/08/2022     11/08/2022         BMP  Lab  Results   Component Value Date     11/09/2022    K 3.3 (L) 11/09/2022    CO2 26 11/09/2022    BUN 21.9 (H) 11/09/2022    CREATININE 1.00 11/09/2022    CALCIUM 9.5 11/09/2022    EGFRNONAA 81 04/22/2022       ABG  Recent Labs   Lab 11/09/22  0422   PH 7.33*   PO2 75   PCO2 50*   HCO3 26.4*             Significant Imaging:  I have reviewed all pertinent imaging within the past 24 hours.    Assessment/Plan:   1.) Massive Hemoptysis  2.) Hx of Stage IV Poorly Differentiated RUL Lung Carcinoma  3.) Serratia M. Pneumonia with leukocytosis  4.) Hx of Atrial Fibrillation/flutter (not on Eliquis)  5.) Hx of COPD (on home 2L NC q.Nightly)  6.) Hx of HTN  7.) Hx of HLD  8.) Hx of PAD    On mechanical ventilation, wean as tolerated.  Daily SBT as tolerated  Will discontinue vanc (MRSA PCR neg). Continue cefipime for serratia PNA.   Febrile despite abx. Getting repeat blood cx this AM  Continue amiodarone - Cardiology following. Appreciate recs  US doppler of Left UE for swelling  Giving 60meq KCL IV and 2 g Mag sulfate  H/H stable past 5 days  Prognosis guarded    DVT Prophylaxis:  Continue to hold all anticoagulation including aspirin for ongoing hemoptysis  GI Prophylaxis:  Protonix     Greater than 30 minutes of critical care was time spent personally by me on the following activities: development of treatment plan with patient or surrogate and bedside caregivers, discussions with consultants, evaluation of patient's response to treatment, examination of patient, ordering and performing treatments and interventions, ordering and review of laboratory studies, ordering and review of radiographic studies, pulse oximetry, re-evaluation of patient's condition.  This critical care time did not overlap with that of any other provider or involve time for any procedures.     Pato Rodríguez,   Pulmonary Critical Care Medicine  [facility] - ICU

## 2022-11-09 NOTE — PLAN OF CARE
Problem: Adult Inpatient Plan of Care  Goal: Plan of Care Review  Outcome: Ongoing, Progressing  Goal: Absence of Hospital-Acquired Illness or Injury  Outcome: Ongoing, Progressing  Goal: Optimal Comfort and Wellbeing  Outcome: Ongoing, Progressing  Goal: Readiness for Transition of Care  Outcome: Ongoing, Progressing     Problem: Infection  Goal: Absence of Infection Signs and Symptoms  Outcome: Ongoing, Progressing     Problem: Skin Injury Risk Increased  Goal: Skin Health and Integrity  Outcome: Ongoing, Progressing

## 2022-11-10 NOTE — PROCEDURES
"Adrienne Urbina is a 67 y.o. female patient.    Temp: 98 °F (36.7 °C) (11/10/22 1130)  Pulse: (!) 115 (11/10/22 1600)  Resp: (!) 26 (11/10/22 1600)  BP: (!) 149/65 (11/10/22 1600)  SpO2: (!) 91 % (11/10/22 1600)  Weight: 87.2 kg (192 lb 3.9 oz) (11/10/22 0509)  Height: 5' 6" (167.6 cm) (11/01/22 1251)       Central Line    Date/Time: 11/10/2022 4:35 PM  Performed by: Sandy Chan MD  Authorized by: Sandy Chan MD     Location procedure was performed:  [facility] GENERAL SURGERY  Assisting Provider:  Maik Cooper MD  Consent Done ?:  Yes  Indications:  Med administration  Preparation:  Skin prepped with ChloraPrep  Sterile barriers: All five maximal sterile barriers used - gloves, gown, cap, mask and large sterile sheet    Location:  Right femoral  Site selection rationale:  Patient with RIJ mediport and failed placement of LIJ previously. Patient has left upper extremity DVT  Catheter type:  Triple lumen  Catheter size:  7 Fr  Inserted Catheter Length (cm):  20  Ultrasound guidance: Yes    Vessel Caliber:  Large   patent  Comprressibility:  Normal  Needle advanced into vessel with real time ultrasound guidance.    Steril sheath on probe.    Sterile gel used.  Manometry: No    Number of attempts:  1  Securement:  Line sutured, chlorhexidine patch and blood return through all ports  Complications: No    Estimated blood loss (mL):  0  Specimens: No    Implants: Yes (specify) (20 cm 7 Fr central line)    Adverse Events:  None    11/10/2022    "

## 2022-11-10 NOTE — CONSULTS
[facility]   Consult Note  General Surgery    Patient Name: Adrienne Urbina  YOB: 1955  Date of Admission: 10/31/2022  Date: 11/10/2022 5:51 AM  Reason for Consult: central line access  HD#9  POD#* No surgery found *    PRESENTING HISTORY     Chief Complaint/Reason for Admission: <principal problem not specified>    History of Present Illness:  67F with Stage IV R lung cancer with brain metastasis s/p radiation currently on chemo, afib, HTN, HLD, PAD, COPD with recent GNR pneumonia presented to ED with hemoptysis. Patient is currently intubated and sedated in the ICU. Requires central line access as patient is currently on levo for blood pressure support. Currently receiving access through R mediport. Patient had L midline which has been pulled due to US yesterday showing DVT in LUE.    Review of Systems:  12 point ROS limited by patient being intubated and sedated    PAST HISTORY:   Past medical history:  Past Medical History:   Diagnosis Date    Anxiety and depression     Cancer     lung with brain metastasis    Cholelithiasis     COPD (chronic obstructive pulmonary disease)     Hypertension     Lumbar disc disease     PAD (peripheral artery disease)     Paroxysmal SVT (supraventricular tachycardia)     Pleural effusion        Past surgical history:  Past Surgical History:   Procedure Laterality Date    COLONOSCOPY      MEDIPORT INSERTION, DOUBLE      REPAIR OF CAROTID ARTERY Left 2022    Procedure: REPAIR, ARTERY, CAROTID;  Surgeon: Juan Luis Healy III, MD;  Location: [facility];  Service: General;  Laterality: Left;    UPPER GASTROINTESTINAL ENDOSCOPY         Family history:  No family history on file.    Social history:  Social History     Socioeconomic History    Marital status:    Tobacco Use    Smoking status: Some Days     Packs/day: 0.25     Years: 49.00     Pack years: 12.25     Types: Cigarettes     Last attempt to quit: 2022     Years since quittin.3    Smokeless  tobacco: Never   Substance and Sexual Activity    Alcohol use: Never    Drug use: Never    Sexual activity: Yes     Social Determinants of Health     Financial Resource Strain: Low Risk     Difficulty of Paying Living Expenses: Not very hard   Food Insecurity: No Food Insecurity    Worried About Running Out of Food in the Last Year: Never true    Ran Out of Food in the Last Year: Never true   Transportation Needs: No Transportation Needs    Lack of Transportation (Medical): No    Lack of Transportation (Non-Medical): No   Physical Activity: Inactive    Days of Exercise per Week: 0 days    Minutes of Exercise per Session: 0 min   Stress: No Stress Concern Present    Feeling of Stress : Not at all   Social Connections: Moderately Isolated    Frequency of Communication with Friends and Family: More than three times a week    Frequency of Social Gatherings with Friends and Family: More than three times a week    Attends [employer] Services: Never    Active Member of Clubs or Organizations: No    Attends Club or Organization Meetings: Never    Marital Status:    Housing Stability: Low Risk     Unable to Pay for Housing in the Last Year: No    Number of Places Lived in the Last Year: 1    Unstable Housing in the Last Year: No     Social History     Tobacco Use   Smoking Status Some Days    Packs/day: 0.25    Years: 49.00    Pack years: 12.25    Types: Cigarettes    Last attempt to quit: 2022    Years since quittin.3   Smokeless Tobacco Never         MEDICATIONS & ALLERGIES:   Allergies: Review of patient's allergies indicates:  No Known Allergies    No current facility-administered medications on file prior to encounter.     Current Outpatient Medications on File Prior to Encounter   Medication Sig    albuterol (PROVENTIL/VENTOLIN HFA) 90 mcg/actuation inhaler Currently holding due to heart rate    albuterol-ipratropium (DUO-NEB) 2.5 mg-0.5 mg/3 mL nebulizer solution Take 3 mLs by nebulization every 4 (four)  hours as needed. Currently holding due to heartrate    aspirin 81 MG Chew Take 1 tablet (81 mg total) by mouth once daily.    ATROVENT HFA 17 mcg/actuation inhaler Currently holding due to heartrate    cyproheptadine (PERIACTIN) 4 mg tablet Take 4 mg by mouth as needed.    dicyclomine (BENTYL) 10 MG capsule Take 10 mg by mouth 3 (three) times daily as needed.    diltiaZEM (CARDIZEM CD) 360 MG 24 hr capsule Take 1 capsule (360 mg total) by mouth once daily.    folic acid (FOLVITE) 1 MG tablet Take 1 mg by mouth once daily.    gabapentin (NEURONTIN) 300 MG capsule Take 300 mg by mouth 3 (three) times daily.    guaiFENesin-codeine 100-10 mg/5 ml (TUSSI-ORGANIDIN NR)  mg/5 mL syrup Take 10 mLs by mouth as needed.    HUMULIN R REGULAR U-100 INSULN 100 unit/mL injection Holding since in patient stay    HYDROcodone-acetaminophen (NORCO)  mg per tablet Take 1 tablet by mouth every 4 (four) hours as needed for Pain.    loratadine (CLARITIN) 10 mg tablet Take 10 mg by mouth once daily.    metFORMIN (GLUCOPHAGE) 500 MG tablet Take 500 mg by mouth 2 (two) times a day.    metoprolol tartrate (LOPRESSOR) 100 MG tablet Take 1 tablet (100 mg total) by mouth 2 (two) times daily.    mirtazapine (REMERON) 15 MG tablet Take 1 tablet (15 mg total) by mouth every evening.    montelukast (SINGULAIR) 10 mg tablet Take 10 mg by mouth as needed.    ONETOUCH DELICA PLUS LANCET 33 gauge Misc     ONETOUCH VERIO REFLECT METER [facility]     ONETOUCH VERIO TEST STRIPS Strp     OXYGEN-AIR DELIVERY SYSTEMS [facility]   oxygen, See Instructions, home concentrator and portable @ 2L per nasal cannula continuous to keep SATS at 88% or greater J18.9, C34.9, J96.9, R09.02, # 1 EA, 0 Refill(s)    tiZANidine (ZANAFLEX) 4 MG tablet        Scheduled Meds:   acetaminophen  650 mg Oral Once    amiodarone  400 mg Oral BID    ceFEPime (MAXIPIME) IVPB  2 g Intravenous Q8H    pantoprozole (PROTONIX) IV  40 mg Intravenous Daily       Continuous Infusions:    dexmedetomidine (PRECEDEX) infusion 1.2 mcg/kg/hr (11/10/22 0501)    NORepinephrine bitartrate-D5W 0.4 mcg/kg/min (11/10/22 0501)    propofoL 50 mcg/kg/min (11/10/22 0501)       PRN Meds:sodium chloride, acetaminophen, benzonatate, dextrose 10%, dextrose 10%, glucagon (human recombinant), glucose, glucose, HYDROcodone-acetaminophen, insulin aspart U-100, ipratropium, morphine, naloxone, ondansetron, sodium chloride 0.9%    OBJECTIVE:     Vital Signs:  Temp:  [99 °F (37.2 °C)-101 °F (38.3 °C)] 100.4 °F (38 °C)  Pulse:  [100-141] 120  Resp:  [] 26  SpO2:  [92 %-100 %] 100 %  BP: ()/(51-85) 133/51  Arterial Line BP: ()/(38-63) 138/52  Body mass index is 31.03 kg/m².     I/O last 3 completed shifts:  In: 6634.1 [I.V.:2614.1; NG/GT:2920; IV Piggyback:1100]  Out: 2800 [Urine:2650; Stool:150]  I/O this shift:  In: 950.4 [I.V.:900.4; IV Piggyback:50]  Out: 1000 [Urine:1000]    Physical Exam:  General:  Intubated and sedated  HEENT:  Normocephalic  CVS:  irregular rate, tachycardia  Resp:  Intubated, on vent  GI: Abdomen soft, non-tender, non-distended, no masses, no guarding, no rebound  :  Deferred  MSK:  No muscle atrophy, cyanosis. L arm edema      Laboratory:  Recent Labs     11/08/22  0312 11/09/22  0316 11/10/22  0304   WBC 29.6* 29.9* 31.3*   HGB 9.8* 9.4* 9.4*   HCT 31.3* 30.9* 30.3*    252 245     Recent Labs     11/08/22  0312 11/09/22  0316 11/10/22  0304    142 140   K 4.0 3.3* 3.9   CO2 27 26 25   BUN 21.6* 21.9* 25.3*   CREATININE 0.91 1.00 1.13*   CALCIUM 9.7 9.5 9.4   MG  --  1.50*  --    ALBUMIN 1.3* 1.2* 1.2*   BILITOT 0.6 0.4 0.4   AST 29 38* 28   ALKPHOS 204* 226* 237*   ALT 26 34 34     Troponin:  No results for input(s): TROPONINI in the last 72 hours.  CBC:  Recent Labs     11/08/22  0312 11/09/22  0316 11/10/22  0304   WBC 29.6* 29.9* 31.3*   RBC 3.29* 3.23* 3.19*   HGB 9.8* 9.4* 9.4*   HCT 31.3* 30.9* 30.3*    252 245   MCV 95.1* 95.7* 95.0*   MCH 29.8 29.1  29.5   MCHC 31.3* 30.4* 31.0*     CMP:  Recent Labs     11/08/22  0312 11/09/22  0316 11/10/22  0304   CALCIUM 9.7 9.5 9.4   ALBUMIN 1.3* 1.2* 1.2*    142 140   K 4.0 3.3* 3.9   CO2 27 26 25   BUN 21.6* 21.9* 25.3*   CREATININE 0.91 1.00 1.13*   ALKPHOS 204* 226* 237*   ALT 26 34 34   AST 29 38* 28   BILITOT 0.6 0.4 0.4     Lactic Acid:  No results for input(s): LACTATE in the last 72 hours.  Etoh:  No results for input(s): ALCOHOLMEDIC in the last 72 hours.  Drug Screen:  No results for input(s): PCDSCOMETHA, COCAINEMETAB, OPIATESCREEN, BARBITURATES, AMPHETAMINES, MARIJUANATHC, PCDSOPHENCYN, CREATRANDUR, TOXINFO in the last 72 hours.    ABG:  Recent Labs     11/09/22  0422 11/10/22  0502   PH 7.33* 7.35   PCO2 50* 49*   PO2 75 77   HCO3 26.4* 27.1*   POCSATURATED 93.8 94.5       Diagnostic Results:  No results found in the last 24 hours.  X-Ray Chest 1 View   Final Result      Interval development of a left-sided pleural effusion.      Infiltrate/atelectasis at the left base.      No other change.      Support catheters in place         Electronically signed by: Bhargav Harris   Date:    11/04/2022   Time:    08:30      X-ray Abdomen for NG Tube Placement (Nursing should notify Radiology after placement)   Final Result      Nasogastric tube as above         Electronically signed by: Bhargav Harris   Date:    11/02/2022   Time:    14:42      X-Ray Chest 1 View   Final Result      Findings similar to prior with interval intubation.         Electronically signed by: Gianluca Mack   Date:    11/02/2022   Time:    14:14      X-Ray Chest 1 View   Final Result      Increased left retrocardiac density and silhouetting of the left hemidiaphragm might be related to an infiltrate/atelectasis.      Otherwise no significant change as compared with the previous exam         Electronically signed by: Bhargav Harris   Date:    11/02/2022   Time:    08:11      CTA Chest Non-Coronary (PE Studies)   Final Result    Impression:      1. No filling defects are seen in the pulmonary arteries to suggest pulmonary embolus.      2. Again noted is a large thick walled cavitary lesion in the right upper lobe with an 8 x 7.5 cm soft tissue component within this cavitary lesion at the apex. There is some cortical erosion and scleroses involving the right 3rd, 4th and 5th ribs posteriorly. This is suggestive of infiltration of this mass into the chest wall. These findings are consistent with history of bronchogenic carcinoma. A 6 mm nodule with spiculated margin is seen in the left upper lobe (series 12, image 16). Again noted is collapse consolidation of the lingula of the left upper lobe. Patchy ill-defined airspace opacities are also seen in the basilar segments of the left lower lobe. These are new since the prior examination. These findings are suggestive of pneumonia. Correlate with clinical and laboratory findings as regards additional evaluation and follow-up.      3. Details and other findings as discussed above.      No significant discrepancy with overnight report.         Electronically signed by: Surjit Beck   Date:    11/01/2022   Time:    08:11      X-Ray Chest AP Portable   Final Result      No adverse interval change.         Electronically signed by: Gianluca Mack   Date:    11/01/2022   Time:    07:20          Microbiology:  Microbiology Results (last 7 days)       Procedure Component Value Units Date/Time    Respiratory Culture [[id number]] Collected: 11/09/22 1256    Order Status: Sent Specimen: Respiratory from Endotracheal Aspirate Updated: 11/09/22 1341    Blood Culture [[id number]] Collected: 11/09/22 0636    Order Status: Resulted Specimen: Blood from Arm, Left Updated: 11/09/22 0644    Blood Culture [[id number]] Collected: 11/09/22 0615    Order Status: Resulted Specimen: Blood from Arm, Right Updated: 11/09/22 0644    Blood Culture (site 1) [[id number]]  (Normal) Collected: 11/01/22 0416    Order Status:  Completed Specimen: Blood Updated: 11/06/22 1000     CULTURE, BLOOD ([facility]) No Growth at 5 days    Blood Culture (site 2) [[id number]]  (Normal) Collected: 11/01/22 0416    Order Status: Completed Specimen: Blood Updated: 11/06/22 1000     CULTURE, BLOOD ([facility]) No Growth at 5 days    Respiratory Culture [[id number]]  (Abnormal)  (Susceptibility) Collected: 11/03/22 0607    Order Status: Completed Specimen: Respiratory from Endotracheal Aspirate Updated: 11/05/22 1049     Respiratory Culture Many Serratia marcescens     Comment: with rare normal respiratory jia        GRAM STAIN Quality 1+      Rare Gram Negative Rods    Fungal Culture [[id number]] Collected: 11/03/22 0915    Order Status: Sent Specimen: Endotrachial Tube from Endotracheal Aspirate Updated: 11/03/22 0922    Mycobacteria and Nocardia Culture [[id number]] Collected: 11/03/22 0915    Order Status: Sent Specimen: Respiratory from Endotracheal Aspirate Updated: 11/03/22 0922             ASSESSMENT & PLAN:   67F with Stage IV R lung cancer with brain metastasis s/p radiation currently on chemo, afib, HTN, HLD, PAD, COPD with recent GNR pneumonia presented to ED with hemoptysis        Sandy Chan MD  [employer] General Surgery PGY1  11/10/2022  5:51 AM

## 2022-11-10 NOTE — NURSING
1235 Cardiology Fellow (Dr. Christina) at bedside.  Patient continues to be in Afib/Aflutter with 's - 170's. Cardiology Fellow instructed nurse to prepare for Cardioversion.  Patient is already intubated & sedated on Propofol & Precedex. 1235 Pads placed; charging to 200 joules. 1238 Patient cardioverted. Rhythm converted to Sinus Tachycardia . Dr. Christina and Dr. Rodríguez at bedside for intervention.

## 2022-11-10 NOTE — PROCEDURES
11/10/2022  3:22 PM    Procedure:  Central line  Indication:  Venous access    Consent under global consent. Pt prepped and draped in sterile fashion. The following sterile precautions were followed: cap and mask, hand hygiene, sterile gown and sterile gloves, large sterile sheet and sterile field and 2% Chlorhexidine for cutaneous antisepsis. Time out was performed with nursing staff. Ultrasound guidance utilized to visualize anatomyof left IJ. Access obtained without difficulty and blood flow was non-pulsatile. Guide wire was unable to be advanced into vein. 3 attempts were made with gaining access but not being able to advance the guide wire without resistance. Syringe was removed and pressure dressing was applied. We will request assistance from general surgery for attempt for femoral line. CXR to follow.     Gennaro Rodriguez D.O  Internal Medicine PGY-2

## 2022-11-10 NOTE — PROGRESS NOTES
[facility] - ICU  Pulmonary Critical Care Note    Patient Name: Adrienne Urbina  MRN: [id number]  Admission Date: 10/31/2022  Hospital Length of Stay: 9 days  Code Status: Full Code  Attending Provider: Glenn Cantu Jr., MD, *  Primary Care Provider: Gregorio Cherry NP     Subjective:     HPI:   Ms. 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  Patient reports being in her usual state of health until noticing blood streaked sputum that progressed to complete blood filled sputum over the past x1.5 days.  She denies any trauma however does report chronic cough due to COPD which she feels may have contributed to recent symptoms. When asked, patient denies ever experiencing hemoptysis at any point in the past even throughout undergoing chemotherapy and radiation for right upper lobe lung mass (last chemotherapy x1.5 months ago; follows w/ Dr. Shoemaker).  These new symptoms scared her which prompted her ED presentation. Denies any history or DVT/PE. She also denies any current lightheadedness/dizziness, nausea/vomiting, fever/chills, chest pain, palpitations, new/worsening shortness of breath, hematemesis, hematochezia, melena, hematuria. Per chart review; patient was recently discharged from [facility] (10/17) in which she required ICU level care secondary to suspected Afib w/ RVR in addition to anemia requiring x2 units PRBCs and GNR related pneumonia.     Hospital Course/Significant events:  Intubated 11/02/2022    24 Hour Interval History:  No significant events overnight.  Did lose IV access and has not been getting amiodarone infusion since midnight however she is still rate controlled.   Nurse reports that patient is not really able to be weaned off of Levophed at this time.  States that when Levophed is paused her blood pressure will drop to SBP of 60/DBP 30.  Therefore still requiring a significant amount of Levophed running at 0.4 mcg/kg/minute.  Still on mechanical ventilation now day 8 on AC/450/26/5 +/55% with ABG this morning with a pH 7.35, pCO2 49, PO2 77.  Also sedated still with Precedex and propofol.  Continues to be Febrile in past 24 hours with T-max of 38.3° C.  Is only on cefepime now.  White count steadily increasing with increasing left shift.  Repeat blood culture and respiratory culture still pending at this time.  Input in last 24 hours 3546 with output of 1900 mL.  Appears that there is less blood being suctioned from ETT.   To the ultrasound of the left upper extremity which is significant for a nonocclusive thrombus in the left axillary vein and they occlusive thrombus in the left cephalic vein.      Past Medical History:   Diagnosis Date    Anxiety and depression     Cancer     lung with brain metastasis    Cholelithiasis     COPD (chronic obstructive pulmonary disease)     Hypertension     Lumbar disc disease     PAD (peripheral artery disease)     Paroxysmal SVT (supraventricular tachycardia)     Pleural effusion        Social History     Socioeconomic History    Marital status:    Tobacco Use    Smoking status: Some Days     Packs/day: 0.25     Years: 49.00     Pack years: 12.25     Types: Cigarettes     Last attempt to quit: 2022     Years since quittin.3    Smokeless tobacco: Never   Substance and Sexual Activity    Alcohol use: Never    Drug use: Never    Sexual activity: Yes     Social Determinants of Health     Financial Resource Strain: Low Risk     Difficulty of Paying Living Expenses: Not very hard   Food Insecurity: No Food Insecurity    Worried About Running Out of Food in the Last Year: Never true    Ran Out of Food in the Last Year: Never true    Transportation Needs: No Transportation Needs    Lack of Transportation (Medical): No    Lack of Transportation (Non-Medical): No   Physical Activity: Inactive    Days of Exercise per Week: 0 days    Minutes of Exercise per Session: 0 min   Stress: No Stress Concern Present    Feeling of Stress : Not at all   Social Connections: Moderately Isolated    Frequency of Communication with Friends and Family: More than three times a week    Frequency of Social Gatherings with Friends and Family: More than three times a week    Attends [employer] Services: Never    Active Member of Clubs or Organizations: No    Attends Club or Organization Meetings: Never    Marital Status:    Housing Stability: Low Risk     Unable to Pay for Housing in the Last Year: No    Number of Places Lived in the Last Year: 1    Unstable Housing in the Last Year: No       Objective:     Current Outpatient Medications   Medication Instructions    albuterol (PROVENTIL/VENTOLIN HFA) 90 mcg/actuation inhaler Currently holding due to heart rate    albuterol-ipratropium (DUO-NEB) 2.5 mg-0.5 mg/3 mL nebulizer solution 3 mLs, Nebulization, Every 4 hours PRN, Currently holding due to heartrate    aspirin 81 mg, Oral, Daily    ATROVENT HFA 17 mcg/actuation inhaler Currently holding due to heartrate    cyproheptadine (PERIACTIN) 4 mg, Oral, As needed (PRN)    dicyclomine (BENTYL) 10 mg, Oral, 3 times daily PRN    diltiaZEM (CARDIZEM CD) 360 mg, Oral, Daily    folic acid (FOLVITE) 1 mg, Oral, Daily    gabapentin (NEURONTIN) 300 mg, Oral, 3 times daily    guaiFENesin-codeine 100-10 mg/5 ml (TUSSI-ORGANIDIN NR)  mg/5 mL syrup 10 mLs, Oral, As needed (PRN)    HUMULIN R REGULAR U-100 INSULN 100 unit/mL injection Holding since in patient stay    HYDROcodone-acetaminophen (NORCO)  mg per tablet 1 tablet, Oral, Every 4 hours PRN    loratadine (CLARITIN) 10 mg, Oral, Daily    metFORMIN (GLUCOPHAGE) 500 mg, Oral, 2 times daily    metoprolol tartrate  (LOPRESSOR) 100 mg, Oral, 2 times daily    mirtazapine (REMERON) 15 mg, Oral, Nightly    montelukast (SINGULAIR) 10 mg, Oral, As needed (PRN)    ONETOUCH DELICA PLUS LANCET 33 gauge Misc No dose, route, or frequency recorded.    ONETOUCH VERIO REFLECT METER Misc No dose, route, or frequency recorded.    ONETOUCH VERIO TEST STRIPS Strp No dose, route, or frequency recorded.    OXYGEN-AIR DELIVERY SYSTEMS [facility]   oxygen, See Instructions, home concentrator and portable @ 2L per nasal cannula continuous to keep SATS at 88% or greater J18.9, C34.9, J96.9, R09.02, # 1 EA, 0 Refill(s)    tiZANidine (ZANAFLEX) 4 MG tablet No dose, route, or frequency recorded.       Current Inpatient Medications   acetaminophen  650 mg Oral Once    amiodarone  400 mg Oral BID    ceFEPime (MAXIPIME) IVPB  2 g Intravenous Q8H    pantoprozole (PROTONIX) IV  40 mg Intravenous Daily         Intake/Output Summary (Last 24 hours) at 11/10/2022 0541  Last data filed at 11/10/2022 0509  Gross per 24 hour   Intake 3546.19 ml   Output 1900 ml   Net 1646.19 ml         Review of Systems   Unable to perform ROS: Intubated      Vital Signs (Most Recent):  Temp: (!) 100.4 °F (38 °C) (11/10/22 0330)  Pulse: (!) 120 (11/10/22 0502)  Resp: (!) 26 (11/10/22 0502)  BP: (!) 133/51 (11/10/22 0318)  SpO2: 100 % (11/10/22 0502)    Body mass index is 31.03 kg/m².  Weight: 87.2 kg (192 lb 3.9 oz) Vital Signs (24h Range):  Temp:  [99 °F (37.2 °C)-101 °F (38.3 °C)] 100.4 °F (38 °C)  Pulse:  [100-141] 120  Resp:  [] 26  SpO2:  [92 %-100 %] 100 %  BP: ()/(51-85) 133/51  Arterial Line BP: ()/(38-63) 138/52     Physical Exam  Vitals and nursing note reviewed.   Constitutional:       General: She is not in acute distress.     Appearance: She is ill-appearing. She is not toxic-appearing.      Comments: Intubated and sedated   HENT:      Head: Normocephalic and atraumatic.      Nose: Nose normal.      Mouth/Throat:      Mouth: Mucous membranes are moist.       Pharynx: Oropharynx is clear.   Eyes:      Extraocular Movements: Extraocular movements intact.      Conjunctiva/sclera: Conjunctivae normal.      Pupils: Pupils are equal, round, and reactive to light.   Cardiovascular:      Rate and Rhythm: Normal rate and regular rhythm.      Pulses: Normal pulses.      Heart sounds: Normal heart sounds. No murmur heard.    No gallop.   Pulmonary:      Effort: No respiratory distress.      Breath sounds: No stridor. No wheezing, rhonchi or rales.   Abdominal:      General: Abdomen is flat.      Palpations: Abdomen is soft.   Musculoskeletal:         General: No swelling or deformity.      Right lower leg: No edema.      Left lower leg: No edema.   Skin:     General: Skin is warm and dry.      Coloration: Skin is not jaundiced.      Findings: No bruising.   Neurological:      General: No focal deficit present.      Mental Status: She is alert and oriented to person, place, and time.      Comments: Patient sedated and therefore unable to accurately exam       Mechanical ventilation support:  Vent Mode: A/C (11/10/22 0502)  Set Rate: 26 BPM (11/10/22 0502)  Vt Set: 450 mL (11/10/22 0502)  PEEP/CPAP: 5 cmH20 (11/10/22 0502)  Oxygen Concentration (%): 55 (11/10/22 0502)  Peak Airway Pressure: 31.5 cmH2O (11/10/22 0502)  Plateau Pressure: 25.8 cmH20 (11/09/22 1932)  Total Ve: 11 L/m (11/10/22 0502)  F/VT Ratio<105 (RSBI): (!) 61.47 (11/10/22 0502)    Lines/Drains/Airways       Central Venous Catheter Line  Duration             Port A Cath Single Lumen right subclavian -- days              Drain  Duration                  NG/OG Tube 11/02/22 1355 Ace sump 16 Fr. Left mouth 7 days         Urethral Catheter 11/02/22 1400 Silicone 16 Fr. 7 days         Rectal Tube 11/07/22 0408 rectal tube w/ balloon (indicate number of mLs) 3 days              Airway  Duration                  Airway - Non-Surgical 11/02/22 1400 Endotracheal Tube 7 days              Arterial Line  Duration              Arterial Line 11/07/22 1340 Right Radial 2 days              Peripheral Intravenous Line  Duration                  Midline Catheter Insertion/Assessment  - Single Lumen 11/02/22 2149 Left basilic vein (medial side of arm)  7 days                    Significant Labs:    Lab Results   Component Value Date    WBC 31.3 (H) 11/10/2022    HGB 9.4 (L) 11/10/2022    HCT 30.3 (L) 11/10/2022    MCV 95.0 (H) 11/10/2022     11/10/2022         BMP  Lab Results   Component Value Date     11/10/2022    K 3.9 11/10/2022    CO2 25 11/10/2022    BUN 25.3 (H) 11/10/2022    CREATININE 1.13 (H) 11/10/2022    CALCIUM 9.4 11/10/2022    EGFRNONAA 81 04/22/2022       ABG  Recent Labs   Lab 11/10/22  0502   PH 7.35   PO2 77   PCO2 49*   HCO3 27.1*             Significant Imaging:  I have reviewed all pertinent imaging within the past 24 hours.    Assessment/Plan:   1.) Massive Hemoptysis  2.) Hx of Stage IV Poorly Differentiated RUL Lung Carcinoma  3.) Serratia M. Pneumonia with leukocytosis  4.) Hx of Atrial Fibrillation/flutter (not on Eliquis)  5.)  Nonocclusive acute deep vein thrombosis visualized left axillary vein and occlusive thrombus in left cephalic vein.  6.) Hx of COPD (on home 2L NC q.Nightly)  7.) Hx of HTN  8.) Hx of HLD  9.) Hx of PAD    On mechanical ventilation, wean as tolerated.  Daily SBT as tolerated  Continue cefepime for Serratia pneumonia.  Repeat blood culture and respiratory culture pending  Will start her on p.o. amiodarone 400 mg b.i.d. this morning  H&H remains stable during last 48 hours  Nonocclusive and occlusive thrombus in left upper extremity.  Remove midline  Prognosis guarded    DVT Prophylaxis:  Continue to hold all anticoagulation including aspirin for ongoing hemoptysis  GI Prophylaxis:  Protonix     Greater than 30 minutes of critical care was time spent personally by me on the following activities: development of treatment plan with patient or surrogate and bedside caregivers,  discussions with consultants, evaluation of patient's response to treatment, examination of patient, ordering and performing treatments and interventions, ordering and review of laboratory studies, ordering and review of radiographic studies, pulse oximetry, re-evaluation of patient's condition.  This critical care time did not overlap with that of any other provider or involve time for any procedures.     Pato Rodríguez, DO  Pulmonary Critical Care Medicine  [facility] - ICU

## 2022-11-10 NOTE — PROGRESS NOTES
Pharmacokinetic Initial Assessment: IV Vancomycin    Assessment/Plan:    Initiate intravenous vancomycin with loading dose of 1500 mg once followed by a maintenance dose of vancomycin 750mg IV every 12 hours  Desired empiric serum trough concentration is 15 to 20 mcg/mL  Draw vancomycin random level on 11/11 at 1500.  Pharmacy will continue to follow and monitor vancomycin.      Please contact pharmacy at extension [phone] with any questions regarding this assessment.     Thank you for the consult,   Leah Mckeon       Patient brief summary:  Adrienne Urbina is a 67 y.o. female initiated on antimicrobial therapy with IV Vancomycin for treatment of suspected bacteremia    Drug Allergies:   Review of patient's allergies indicates:  No Known Allergies    Actual Body Weight:   87.2 kg    Renal Function:   Estimated Creatinine Clearance: 54.5 mL/min (A) (based on SCr of 1.13 mg/dL (H)).,     Dialysis Method (if applicable):  N/A    CBC (last 72 hours):  Recent Labs   Lab Result Units 11/08/22  0312 11/09/22  0316 11/10/22  0304   WBC x10(3)/mcL 29.6* 29.9* 31.3*   Hgb gm/dL 9.8* 9.4* 9.4*   Hct % 31.3* 30.9* 30.3*   Platelet x10(3)/mcL 238 252 245   Mono % % 5.4  --  5.5   Monocyte Man %  --  4  --    Eos % % 0.4  --  0.4   Basophil % % 0.6  --  0.5       Metabolic Panel (last 72 hours):  Recent Labs   Lab Result Units 11/08/22  0312 11/09/22  0316 11/10/22  0304   Sodium Level mmol/L 143 142 140   Potassium Level mmol/L 4.0 3.3* 3.9   Chloride mmol/L 107 106 104   Carbon Dioxide mmol/L 27 26 25   Glucose Level mg/dL 145* 179* 172*   Blood Urea Nitrogen mg/dL 21.6* 21.9* 25.3*   Creatinine mg/dL 0.91 1.00 1.13*   Albumin Level gm/dL 1.3* 1.2* 1.2*   Bilirubin Total mg/dL 0.6 0.4 0.4   Alkaline Phosphatase unit/L 204* 226* 237*   Aspartate Aminotransferase unit/L 29 38* 28   Alanine Aminotransferase unit/L 26 34 34   Magnesium Level mg/dL  --  1.50*  --        Drug levels (last 3 results):  Recent Labs   Lab Result Units  11/08/22  1529   Vancomycin Trough ug/ml 9.5*       Microbiologic Results:  Microbiology Results (last 7 days)       Procedure Component Value Units Date/Time    Blood Culture [[id number]]  (Normal) Collected: 11/09/22 0615    Order Status: Completed Specimen: Blood from Arm, Right Updated: 11/10/22 1100     CULTURE, BLOOD ([facility]) No Growth At 24 Hours    Blood Culture [[id number]]  (Abnormal) Collected: 11/09/22 0636    Order Status: Completed Specimen: Blood from Arm, Left Updated: 11/10/22 0943     GRAM STAIN Gram Positive Cocci, probable Staphylococcus      Seen in gram stain of broth only      1 of 1 Pediatric bottle positive    Respiratory Culture [[id number]] Collected: 11/09/22 1256    Order Status: Completed Specimen: Respiratory from Endotracheal Aspirate Updated: 11/10/22 0732     Respiratory Culture No Growth At 24 Hours     GRAM STAIN Quality 2+      No bacteria seen    Blood Culture (site 1) [[id number]]  (Normal) Collected: 11/01/22 0416    Order Status: Completed Specimen: Blood Updated: 11/06/22 1000     CULTURE, BLOOD ([facility]) No Growth at 5 days    Blood Culture (site 2) [[id number]]  (Normal) Collected: 11/01/22 0416    Order Status: Completed Specimen: Blood Updated: 11/06/22 1000     CULTURE, BLOOD ([facility]) No Growth at 5 days    Respiratory Culture [[id number]]  (Abnormal)  (Susceptibility) Collected: 11/03/22 0607    Order Status: Completed Specimen: Respiratory from Endotracheal Aspirate Updated: 11/05/22 1049     Respiratory Culture Many Serratia marcescens     Comment: with rare normal respiratory jia        GRAM STAIN Quality 1+      Rare Gram Negative Rods

## 2022-11-10 NOTE — CONSULTS
[facility]   Consult Note  General Surgery    Patient Name: Adrienne Urbina  YOB: 1955  Date of Admission: 10/31/2022  Date: 11/10/2022 5:51 AM  Reason for Consult: central line access  HD#9  POD#* No surgery found *    PRESENTING HISTORY     Chief Complaint/Reason for Admission: <principal problem not specified>    History of Present Illness:  67F with Stage IV R lung cancer with brain metastasis s/p radiation currently on chemo, afib, HTN, HLD, PAD, COPD with recent GNR pneumonia presented to ED with hemoptysis. Patient is currently intubated and sedated in the ICU. Requires central line access as patient is currently on levo for blood pressure support. Currently receiving access through R mediport. Patient had L midline which has been pulled due to US yesterday showing DVT in LUE.    Review of Systems:  12 point ROS limited by patient being intubated and sedated    PAST HISTORY:   Past medical history:  Past Medical History:   Diagnosis Date    Anxiety and depression     Cancer     lung with brain metastasis    Cholelithiasis     COPD (chronic obstructive pulmonary disease)     Hypertension     Lumbar disc disease     PAD (peripheral artery disease)     Paroxysmal SVT (supraventricular tachycardia)     Pleural effusion        Past surgical history:  Past Surgical History:   Procedure Laterality Date    COLONOSCOPY      MEDIPORT INSERTION, DOUBLE      REPAIR OF CAROTID ARTERY Left 2022    Procedure: REPAIR, ARTERY, CAROTID;  Surgeon: Juan Luis Healy III, MD;  Location: [facility];  Service: General;  Laterality: Left;    UPPER GASTROINTESTINAL ENDOSCOPY         Family history:  No family history on file.    Social history:  Social History     Socioeconomic History    Marital status:    Tobacco Use    Smoking status: Some Days     Packs/day: 0.25     Years: 49.00     Pack years: 12.25     Types: Cigarettes     Last attempt to quit: 2022     Years since quittin.3    Smokeless  tobacco: Never   Substance and Sexual Activity    Alcohol use: Never    Drug use: Never    Sexual activity: Yes     Social Determinants of Health     Financial Resource Strain: Low Risk     Difficulty of Paying Living Expenses: Not very hard   Food Insecurity: No Food Insecurity    Worried About Running Out of Food in the Last Year: Never true    Ran Out of Food in the Last Year: Never true   Transportation Needs: No Transportation Needs    Lack of Transportation (Medical): No    Lack of Transportation (Non-Medical): No   Physical Activity: Inactive    Days of Exercise per Week: 0 days    Minutes of Exercise per Session: 0 min   Stress: No Stress Concern Present    Feeling of Stress : Not at all   Social Connections: Moderately Isolated    Frequency of Communication with Friends and Family: More than three times a week    Frequency of Social Gatherings with Friends and Family: More than three times a week    Attends Baptism Services: Never    Active Member of Clubs or Organizations: No    Attends Club or Organization Meetings: Never    Marital Status:    Housing Stability: Low Risk     Unable to Pay for Housing in the Last Year: No    Number of Places Lived in the Last Year: 1    Unstable Housing in the Last Year: No     Social History     Tobacco Use   Smoking Status Some Days    Packs/day: 0.25    Years: 49.00    Pack years: 12.25    Types: Cigarettes    Last attempt to quit: 2022    Years since quittin.3   Smokeless Tobacco Never         MEDICATIONS & ALLERGIES:   Allergies: Review of patient's allergies indicates:  No Known Allergies    No current facility-administered medications on file prior to encounter.     Current Outpatient Medications on File Prior to Encounter   Medication Sig    albuterol (PROVENTIL/VENTOLIN HFA) 90 mcg/actuation inhaler Currently holding due to heart rate    albuterol-ipratropium (DUO-NEB) 2.5 mg-0.5 mg/3 mL nebulizer solution Take 3 mLs by nebulization every 4 (four)  hours as needed. Currently holding due to heartrate    aspirin 81 MG Chew Take 1 tablet (81 mg total) by mouth once daily.    ATROVENT HFA 17 mcg/actuation inhaler Currently holding due to heartrate    cyproheptadine (PERIACTIN) 4 mg tablet Take 4 mg by mouth as needed.    dicyclomine (BENTYL) 10 MG capsule Take 10 mg by mouth 3 (three) times daily as needed.    diltiaZEM (CARDIZEM CD) 360 MG 24 hr capsule Take 1 capsule (360 mg total) by mouth once daily.    folic acid (FOLVITE) 1 MG tablet Take 1 mg by mouth once daily.    gabapentin (NEURONTIN) 300 MG capsule Take 300 mg by mouth 3 (three) times daily.    guaiFENesin-codeine 100-10 mg/5 ml (TUSSI-ORGANIDIN NR)  mg/5 mL syrup Take 10 mLs by mouth as needed.    HUMULIN R REGULAR U-100 INSULN 100 unit/mL injection Holding since in patient stay    HYDROcodone-acetaminophen (NORCO)  mg per tablet Take 1 tablet by mouth every 4 (four) hours as needed for Pain.    loratadine (CLARITIN) 10 mg tablet Take 10 mg by mouth once daily.    metFORMIN (GLUCOPHAGE) 500 MG tablet Take 500 mg by mouth 2 (two) times a day.    metoprolol tartrate (LOPRESSOR) 100 MG tablet Take 1 tablet (100 mg total) by mouth 2 (two) times daily.    mirtazapine (REMERON) 15 MG tablet Take 1 tablet (15 mg total) by mouth every evening.    montelukast (SINGULAIR) 10 mg tablet Take 10 mg by mouth as needed.    ONETOUCH DELICA PLUS LANCET 33 gauge Misc     ONETOUCH VERIO REFLECT METER [facility]     ONETOUCH VERIO TEST STRIPS Strp     OXYGEN-AIR DELIVERY SYSTEMS [facility]   oxygen, See Instructions, home concentrator and portable @ 2L per nasal cannula continuous to keep SATS at 88% or greater J18.9, C34.9, J96.9, R09.02, # 1 EA, 0 Refill(s)    tiZANidine (ZANAFLEX) 4 MG tablet        Scheduled Meds:   acetaminophen  650 mg Oral Once    amiodarone  400 mg Oral BID    hydrocortisone sodium succinate  100 mg Intravenous Q8H    pantoprozole (PROTONIX) IV  40 mg Intravenous Daily     sulfamethoxazole-trimethoprim 800-160mg  1 tablet Oral BID    vancomycin (VANCOCIN) IVPB  1,500 mg Intravenous Once    [START ON 11/11/2022] vancomycin (VANCOCIN) IVPB  750 mg Intravenous Q12H       Continuous Infusions:   dexmedetomidine (PRECEDEX) infusion 1.4 mcg/kg/hr (11/10/22 1537)    propofoL 50 mcg/kg/min (11/10/22 1410)    vasopressin         PRN Meds:sodium chloride, acetaminophen, benzonatate, dextrose 10%, dextrose 10%, glucagon (human recombinant), glucose, glucose, HYDROcodone-acetaminophen, insulin aspart U-100, ipratropium, morphine, naloxone, ondansetron, sodium chloride 0.9%, Pharmacy to dose Vancomycin consult **AND** vancomycin - pharmacy to dose    OBJECTIVE:     Vital Signs:  Temp:  [98 °F (36.7 °C)-100.9 °F (38.3 °C)] 98 °F (36.7 °C)  Pulse:  [102-171] 115  Resp:  [0-34] 26  SpO2:  [91 %-100 %] 91 %  BP: ()/(43-99) 149/65  Arterial Line BP: ()/(25-63) 138/51  Body mass index is 31.03 kg/m².     I/O last 3 completed shifts:  In: 5338.2 [I.V.:2728.2; NG/GT:1810; IV Piggyback:800]  Out: 2650 [Urine:2650]  No intake/output data recorded.    Physical Exam:  General:  Intubated and sedated  HEENT:  Normocephalic  CVS:  irregular rate, tachycardia  Resp:  Intubated, on vent  GI: Abdomen soft, non-tender, non-distended, no masses, no guarding, no rebound  :  Deferred  MSK:  No muscle atrophy, cyanosis. L arm edema      Laboratory:  Recent Labs     11/08/22  0312 11/09/22  0316 11/10/22  0304   WBC 29.6* 29.9* 31.3*   HGB 9.8* 9.4* 9.4*   HCT 31.3* 30.9* 30.3*    252 245       Recent Labs     11/08/22  0312 11/09/22  0316 11/10/22  0304    142 140   K 4.0 3.3* 3.9   CO2 27 26 25   BUN 21.6* 21.9* 25.3*   CREATININE 0.91 1.00 1.13*   CALCIUM 9.7 9.5 9.4   MG  --  1.50*  --    ALBUMIN 1.3* 1.2* 1.2*   BILITOT 0.6 0.4 0.4   AST 29 38* 28   ALKPHOS 204* 226* 237*   ALT 26 34 34       Troponin:  No results for input(s): TROPONINI in the last 72 hours.  CBC:  Recent Labs      11/08/22  0312 11/09/22  0316 11/10/22  0304   WBC 29.6* 29.9* 31.3*   RBC 3.29* 3.23* 3.19*   HGB 9.8* 9.4* 9.4*   HCT 31.3* 30.9* 30.3*    252 245   MCV 95.1* 95.7* 95.0*   MCH 29.8 29.1 29.5   MCHC 31.3* 30.4* 31.0*       CMP:  Recent Labs     11/08/22  0312 11/09/22  0316 11/10/22  0304   CALCIUM 9.7 9.5 9.4   ALBUMIN 1.3* 1.2* 1.2*    142 140   K 4.0 3.3* 3.9   CO2 27 26 25   BUN 21.6* 21.9* 25.3*   CREATININE 0.91 1.00 1.13*   ALKPHOS 204* 226* 237*   ALT 26 34 34   AST 29 38* 28   BILITOT 0.6 0.4 0.4       Lactic Acid:  No results for input(s): LACTATE in the last 72 hours.  Etoh:  No results for input(s): ALCOHOLMEDIC in the last 72 hours.  Drug Screen:  No results for input(s): PCDSCOMETHA, COCAINEMETAB, OPIATESCREEN, BARBITURATES, AMPHETAMINES, MARIJUANATHC, PCDSOPHENCYN, CREATRANDUR, TOXINFO in the last 72 hours.    ABG:  Recent Labs     11/09/22  0422 11/10/22  0502   PH 7.33* 7.35   PCO2 50* 49*   PO2 75 77   HCO3 26.4* 27.1*   POCSATURATED 93.8 94.5         Diagnostic Results:  No results found in the last 24 hours.  X-Ray Chest 1 View   Final Result      Interval development of a left-sided pleural effusion.      Infiltrate/atelectasis at the left base.      No other change.      Support catheters in place         Electronically signed by: Bhargav Harris   Date:    11/04/2022   Time:    08:30      X-ray Abdomen for NG Tube Placement (Nursing should notify Radiology after placement)   Final Result      Nasogastric tube as above         Electronically signed by: Bhargav Harris   Date:    11/02/2022   Time:    14:42      X-Ray Chest 1 View   Final Result      Findings similar to prior with interval intubation.         Electronically signed by: Gianluca Mack   Date:    11/02/2022   Time:    14:14      X-Ray Chest 1 View   Final Result      Increased left retrocardiac density and silhouetting of the left hemidiaphragm might be related to an infiltrate/atelectasis.      Otherwise no  significant change as compared with the previous exam         Electronically signed by: Bhargav Harris   Date:    11/02/2022   Time:    08:11      CTA Chest Non-Coronary (PE Studies)   Final Result   Impression:      1. No filling defects are seen in the pulmonary arteries to suggest pulmonary embolus.      2. Again noted is a large thick walled cavitary lesion in the right upper lobe with an 8 x 7.5 cm soft tissue component within this cavitary lesion at the apex. There is some cortical erosion and scleroses involving the right 3rd, 4th and 5th ribs posteriorly. This is suggestive of infiltration of this mass into the chest wall. These findings are consistent with history of bronchogenic carcinoma. A 6 mm nodule with spiculated margin is seen in the left upper lobe (series 12, image 16). Again noted is collapse consolidation of the lingula of the left upper lobe. Patchy ill-defined airspace opacities are also seen in the basilar segments of the left lower lobe. These are new since the prior examination. These findings are suggestive of pneumonia. Correlate with clinical and laboratory findings as regards additional evaluation and follow-up.      3. Details and other findings as discussed above.      No significant discrepancy with overnight report.         Electronically signed by: Surjit Beck   Date:    11/01/2022   Time:    08:11      X-Ray Chest AP Portable   Final Result      No adverse interval change.         Electronically signed by: Gianluca Mack   Date:    11/01/2022   Time:    07:20          Microbiology:  Microbiology Results (last 7 days)       Procedure Component Value Units Date/Time    Blood Culture [[id number]]  (Normal) Collected: 11/09/22 0615    Order Status: Completed Specimen: Blood from Arm, Right Updated: 11/10/22 1100     CULTURE, BLOOD ([facility]) No Growth At 24 Hours    Blood Culture [[id number]]  (Abnormal) Collected: 11/09/22 0636    Order Status: Completed Specimen: Blood from Arm, Left  Updated: 11/10/22 0943     GRAM STAIN Gram Positive Cocci, probable Staphylococcus      Seen in gram stain of broth only      1 of 1 Pediatric bottle positive    Respiratory Culture [[id number]] Collected: 11/09/22 1256    Order Status: Completed Specimen: Respiratory from Endotracheal Aspirate Updated: 11/10/22 0732     Respiratory Culture No Growth At 24 Hours     GRAM STAIN Quality 2+      No bacteria seen    Blood Culture (site 1) [[id number]]  (Normal) Collected: 11/01/22 0416    Order Status: Completed Specimen: Blood Updated: 11/06/22 1000     CULTURE, BLOOD ([facility]) No Growth at 5 days    Blood Culture (site 2) [[id number]]  (Normal) Collected: 11/01/22 0416    Order Status: Completed Specimen: Blood Updated: 11/06/22 1000     CULTURE, BLOOD ([facility]) No Growth at 5 days    Respiratory Culture [[id number]]  (Abnormal)  (Susceptibility) Collected: 11/03/22 0607    Order Status: Completed Specimen: Respiratory from Endotracheal Aspirate Updated: 11/05/22 1049     Respiratory Culture Many Serratia marcescens     Comment: with rare normal respiratory jia        GRAM STAIN Quality 1+      Rare Gram Negative Rods             ASSESSMENT & PLAN:   67F with Stage IV R lung cancer with brain metastasis s/p radiation currently on chemo, afib, HTN, HLD, PAD, COPD with recent GNR pneumonia presented to ED with hemoptysis    Plan  -patient daughter consented. Plan for femoral line      Sandy Chan MD  [employer] General Surgery PGY1  11/10/2022  5:51 AM

## 2022-11-11 NOTE — CONSULTS
Inpatient Nutrition Assessment    Admit Date: 10/31/2022   Total duration of encounter: 11 days     Nutrition Recommendation/Prescription     Pt remains NPO/intubated ; has OGT.  Propofol infusing @ 20.7 ml/hr providing 546 calories. Per RN--TF currently off 2 elevated residuals--> 600ml ; consider reglan for Gi motility. Retry TF Peptamen AF @ 20ml/hr; increase as tolerated to goal rate 55ml/hr (23 hr cycle) provide 1518 calories, 96 gm protein, 1027 ml free water. (TF + propofol = 2064 calories). Flush tube with 100 ml water every 4hrs.   When extubated ADAT to cardiac with boost plus bid  MVI/fe  Biweekly wt  If pt not able to tolerate TF then consider TPN: Clinimix 5/20 @ 75ml/hr (no lipids while on propofol) to provide 1584 caloreis, 90 gm protein. (TPN + propofol = 2130 kcal)   Will monitor nutrition status     Communication of Recommendations: reviewed with nurse    Nutrition Assessment     Malnutrition Assessment/Nutrition-Focused Physical Exam    Malnutrition in the context of chronic illness  Degree of Malnutrition: does not meet criteria  Energy Intake: does not meet criteria  Interpretation of Weight Loss: does not meet criteria  Body Fat: does not meet criteria  Area of Body Fat Loss: does not meet criteria  Muscle Mass Loss: does not meet criteria  Area of Muscle Mass Loss: does not meet criteria  Fluid Accumulation: does not meet criteria  Edema: does not meet criteria  Reduced  Strength: unable to obtain  A minimum of two characteristics is recommended for diagnosis of either severe or non-severe malnutrition.    Chart Review    Reason Seen: continuous nutrition monitoring and follow-up    Diagnosis: S/P intubation 11/2/vent   Diagnosis: New-Onset Hemoptysis  Suspected Recurrent Pneumonia  SIRS (2/4; tachycardia, tachypnea) w/ suspected PNA etiology as above  Macrocytic Anemia  Hypomagnesemia    Relevant Medical History: Relevant Medical History: Stage IV poorly differentiated right lung  carcinoma w/ brain metastasis (diagnosed 09/2020, s/p radiation, currently receiving chemotherapy q3 weeks), atrial fibrillation (not on Eliquis), HTN, HLD, PAD, COPD (on nightly 2 L NC), tobacco use, and cholelithiasis    Nutrition-Related Medications:  pantoprazole,   precedex, levophed, propofol ; maxipime, vasopressin   Calorie Containing IV Medications: Diprivan @ 20 ml/hr (provides 546  kcal/d)    Nutrition-Related Labs:  (11/8) H/H 9.8/31.3(L) Gluc 145 Bun 21.6(H) Cr 0.9 Alb 1.3   (11-11) H/H 8.1/25.7(L) Gluc 201(H) Bun 32.2(H) Cr 1.1 Alk Phos 179(H) Alb 1.1 K 3.9   Diet/PN Order: Diet NPO  Oral Supplement Order: none  Tube Feeding Order:    (see below for calculation)  Appetite/Oral Intake: NPO/NPO  Factors Affecting Nutritional Intake: NPO and on mechanical ventilation  Food/Jehovah's witness/Cultural Preferences: none reported  Food Allergies: none reported       Wound(s):   none reported     Comments  (11/11) Pt remains on vent/propofol @ 20ml/hr; RN reported TF currently off 2 elevated residual of > 600 ml; suggested Reglan for Gi motility. Wt remains elevaeted/+ edema. TPN recs provided incase pt unable to tolerate enteral feedings. Labs acknowledged.     (11/8) Pt remains NPO/vent; propofol @ 18ml/hr; TF infusing at goal rate 55ml/hr; aware pt poor prognosis; family desires full code at this time. Suggest continue current nutrition support. Labs acknowledged. Noted wt elevated--? Fluid/vs bedscale accuracy.     (11/4) pt remains NPO x 3 days today/ vent; consulted for TF recs; will initiate TF as per protocol; spoke to RN about TF order. Pt remains on propofol @ 16ml/hr--providing 425 calories.     (11/3) Pt intubated yesterday (11/2); on propofol @ 18ml/hr; has OGT; TF recs provided to maintain nutrition status; will forward to MD. Labs akcnowledged.     11/1: Pt currently NPO during rounds; reports some nausea/vomiting, no other GI complaints. Pt reports good appetite and no recent wt loss pta; #. No  "c/s difficulties reported. Pt ok to add Boost Plus once diet advanced.    Anthropometrics    Height: 5' 6" (167.6 cm) Height Method: Stated  Last Weight: 87.3 kg (192 lb 7.4 oz) (22 0600) Weight Method: Bed Scale  BMI (Calculated): 31.1  BMI Classification: overweight (BMI 25-29.9)     Ideal Body Weight (IBW), Female: 130 lb     % Ideal Body Weight, Female (lb): 130.77 %                    Usual Body Weight (UBW), k.91 kg (# per pt)  % Usual Body Weight: 101.8     Usual Weight Provided By: patient and EMR weight history    Wt Readings from Last 5 Encounters:   22 87.3 kg (192 lb 7.4 oz)   10/21/22 75.7 kg (166 lb 12.8 oz)   10/16/22 77.7 kg (171 lb 4.8 oz)   22 74.4 kg (164 lb)   09/10/22 75.3 kg (166 lb)     Weight Change(s) Since Admission:  Admit Weight: 77.1 kg (170 lb) (22 0552)  (11/3) 77.8kg   () wt elevated 88kg; ? Fluid/vs accuracy bed   () 87.3kg; wt remains elevated/edema   Estimated Needs    Weight Used For Calorie Calculations: 77.8 kg (171 lb 8.3 oz)  Energy Calorie Requirements (kcal): 1945 kcal/d; 25 maryjane/kg vent  Energy Need Method: Kcal/kg  Weight Used For Protein Calculations: 77.8 kg (171 lb 8.3 oz)  Protein Requirements: 94 gm protein/d; 1.2 gm/kg  Fluid Requirements (mL): 1945ml/d; 1ml/maryjane  Temp: 98.3 °F (36.8 °C)       Enteral Nutrition    Formula: Peptamen AF  Rate/Volume: TF off   Water Flushes: 100 ml every 4 hrs   Additives/Modulars: none at this time  Route: orogastric tube  Method: continuous  Total Nutrition Provided by Tube Feeding, Additives, and Flushes:  Calories Provided  0 kcal/d, 0% needs   Protein Provided  0 g/d, 0 % needs   Fluid Provided  0  ml/d, 0% needs   Continuous feeding calculations based on estimated 20 hr/d run time unless otherwise stated.    Parenteral Nutrition    Patient not receiving parenteral nutrition support at this time.    Evaluation of Received Nutrient Intake    Calories: not meeting estimated needs  Protein: " not meeting estimated needs    Patient Education    Not applicable.    Nutrition Diagnosis     PES: Inadequate oral intake related to vent as evidenced by NPO status . (continues)    Interventions/Goals     Intervention(s): modified composition of meals/snacks, modified composition of enteral nutrition, modified rate of enteral nutrition, commercial beverage, multivitamin/mineral supplement therapy, and collaboration with other providers  Goal: Meet greater than 75% of nutritional needs by follow-up. (goal not met)    Monitoring & Evaluation     Dietitian will monitor food and beverage intake, enteral nutrition intake, and weight change.  Nutrition Risk/Follow-Up: high (follow-up in 1-4 days)   Please consult if re-assessment needed sooner.

## 2022-11-11 NOTE — PLAN OF CARE
Problem: Adult Inpatient Plan of Care  Goal: Plan of Care Review  Outcome: Ongoing, Progressing  Goal: Patient-Specific Goal (Individualized)  Outcome: Ongoing, Progressing  Goal: Absence of Hospital-Acquired Illness or Injury  Outcome: Ongoing, Progressing  Goal: Optimal Comfort and Wellbeing  Outcome: Ongoing, Progressing  Goal: Readiness for Transition of Care  Outcome: Ongoing, Progressing     Problem: Infection  Goal: Absence of Infection Signs and Symptoms  Outcome: Ongoing, Progressing     Problem: Skin Injury Risk Increased  Goal: Skin Health and Integrity  Outcome: Ongoing, Progressing     Problem: Communication Impairment (Mechanical Ventilation, Invasive)  Goal: Effective Communication  Outcome: Ongoing, Progressing     Problem: Device-Related Complication Risk (Mechanical Ventilation, Invasive)  Goal: Optimal Device Function  Outcome: Ongoing, Progressing     Problem: Inability to Wean (Mechanical Ventilation, Invasive)  Goal: Mechanical Ventilation Liberation  Outcome: Ongoing, Progressing     Problem: Inability to Wean (Mechanical Ventilation, Invasive)  Goal: Mechanical Ventilation Liberation  Outcome: Ongoing, Progressing     Problem: Nutrition Impairment (Mechanical Ventilation, Invasive)  Goal: Optimal Nutrition Delivery  Outcome: Ongoing, Progressing     Problem: Skin and Tissue Injury (Mechanical Ventilation, Invasive)  Goal: Absence of Device-Related Skin and Tissue Injury  Outcome: Ongoing, Progressing     Problem: Skin and Tissue Injury (Mechanical Ventilation, Invasive)  Goal: Absence of Device-Related Skin and Tissue Injury  Outcome: Ongoing, Progressing     Problem: Ventilator-Induced Lung Injury (Mechanical Ventilation, Invasive)  Goal: Absence of Ventilator-Induced Lung Injury  Outcome: Ongoing, Progressing     Problem: Communication Impairment (Artificial Airway)  Goal: Effective Communication  Outcome: Ongoing, Progressing     Problem: Device-Related Complication Risk (Artificial  Airway)  Goal: Optimal Device Function  Outcome: Ongoing, Progressing     Problem: Skin and Tissue Injury (Artificial Airway)  Goal: Absence of Device-Related Skin or Tissue Injury  Outcome: Ongoing, Progressing     Problem: Noninvasive Ventilation Acute  Goal: Effective Unassisted Ventilation and Oxygenation  Outcome: Ongoing, Progressing     Problem: Fall Injury Risk  Goal: Absence of Fall and Fall-Related Injury  Outcome: Ongoing, Progressing

## 2022-11-11 NOTE — MEDICAL/APP STUDENT
[facility]/General Surgery  Progress Note     Admit Date:10/31/2022    Hospital Day #10  POD #* No surgery found *     Subjective:     HPI: Adrienne Urbina is a 67 y.o. with Stage IV R lung cancer with brain metastasis s/p radiation currently on chemo, afib, HTN, HLD, PAD, COPD with recent GNR pneumonia presented to ED with hemoptysis. Patient is currently intubated and sedated in the ICU. Received R femoral line placement on 11/10; patient had L midline which has been pulled due to US yesterday showing DVT in LUE.    ROS limited due to pt being intubated and sedated.          Objective:    Inpatient Data      Vitals (last 24h):   Temp:  [97.9 °F (36.6 °C)-100.2 °F (37.9 °C)] 100.2 °F (37.9 °C)  Pulse:  [] 86  Resp:  [0-29] 26  SpO2:  [91 %-100 %] 100 %  BP: ()/(43-99) 149/67  Arterial Line BP: ()/(25-62) 148/44  Intake/Output:  I/O last 3 completed shifts:  In: 4584.6 [I.V.:2677.3; NG/GT:960; IV Piggyback:947.3]  Out: 3100 [Urine:3100]  I/O this shift:  In: 712.7 [I.V.:659.9; IV Piggyback:52.8]  Out: 800 [Urine:800]            Physical Exam:  Gen: Intubated and sedate  HEENT: normocephalic and atraumatic.   Resp: tachycardic, irregular rate  CV: intubated on ventilator  Abd: soft, non-tender, non-distended  MSK: L arm edema. Cyanotic       Labs:  CBC:  Recent Labs     11/09/22  0316 11/10/22  0304 11/11/22  0309   WBC 29.9* 31.3* 26.5*   RBC 3.23* 3.19* 2.74*   HGB 9.4* 9.4* 8.1*   HCT 30.9* 30.3* 25.7*   MCV 95.7* 95.0* 93.8   MCH 29.1 29.5 29.6   MCHC 30.4* 31.0* 31.5*   RDW 19.1* 19.2* 19.2*    245 236       BMP:  Recent Labs     11/09/22 0316 11/10/22  0304 11/11/22  0309    140 140   K 3.3* 3.9 3.9   CHLORIDE 106 104 104   CO2 26 25 22*   BUN 21.9* 25.3* 32.2*   CREATININE 1.00 1.13* 1.16*   GLUCOSE 179* 172* 201*   CALCIUM 9.5 9.4 9.0   ALBUMIN 1.2* 1.2* 1.1*   AST 38* 28 19   ALT 34 34 23   BILITOT 0.4 0.4 0.4     Recent Labs     11/11/22 0309   MG 1.80   PHOS 3.9        Diabetes:  Recent Labs     11/09/22  0316 11/10/22  0304 11/11/22  0309   GLUCOSE 179* 172* 201*       Cardiac:  No results for input(s): TROPONIN in the last 72 hours.          Scheduled Meds:   acetaminophen  650 mg Oral Once    amiodarone  400 mg Oral BID    ceFEPime (MAXIPIME) IVPB  1 g Intravenous Q8H    hydrocortisone sodium succinate  100 mg Intravenous Q8H    pantoprozole (PROTONIX) IV  40 mg Intravenous Daily    vancomycin (VANCOCIN) IVPB  750 mg Intravenous Q12H       PRN Meds:sodium chloride, acetaminophen, benzonatate, dextrose 10%, dextrose 10%, glucagon (human recombinant), glucose, glucose, HYDROcodone-acetaminophen, insulin aspart U-100, ipratropium, morphine, naloxone, ondansetron, sodium chloride 0.9%, Pharmacy to dose Vancomycin consult **AND** vancomycin - pharmacy to dose    Continuous Infusions:   dexmedetomidine (PRECEDEX) infusion 1.2 mcg/kg/hr (11/11/22 0038)    NORepinephrine bitartrate-D5W 0.06 mcg/kg/min (11/11/22 0400)    propofoL 45 mcg/kg/min (11/11/22 0417)    vasopressin 0.04 Units/min (11/10/22 1652)         Imaging:  Imaging Results              CTA Chest Non-Coronary (PE Studies) (Final result)  Result time 11/01/22 08:11:46      Final result by Surjit Beck MD (11/01/22 08:11:46)                   Impression:    Impression:    1. No filling defects are seen in the pulmonary arteries to suggest pulmonary embolus.    2. Again noted is a large thick walled cavitary lesion in the right upper lobe with an 8 x 7.5 cm soft tissue component within this cavitary lesion at the apex. There is some cortical erosion and scleroses involving the right 3rd, 4th and 5th ribs posteriorly. This is suggestive of infiltration of this mass into the chest wall. These findings are consistent with history of bronchogenic carcinoma. A 6 mm nodule with spiculated margin is seen in the left upper lobe (series 12, image 16). Again noted is collapse consolidation of the lingula of the left upper  lobe. Patchy ill-defined airspace opacities are also seen in the basilar segments of the left lower lobe. These are new since the prior examination. These findings are suggestive of pneumonia. Correlate with clinical and laboratory findings as regards additional evaluation and follow-up.    3. Details and other findings as discussed above.    No significant discrepancy with overnight report.      Electronically signed by: Surjit Beck  Date:    11/01/2022  Time:    08:11               Narrative:      Technique:CT Scan of the chest was performed with intravenous contrast with direct axial images as well as sagittal and coronal reconstruction images pulmonary embolus protocol.    Dosage Information:Automated Exposure Control was utilized.      Comparison:Comparison is with study dated 2022-10-10.    Clinical History:Hemoptysis (Has stage 4 lung cancer. Was recently in the hospital for pneumonia.    Findings:    Mediastinum:The right apical cavitary mass appears to infiltrate the mediastinum. There are calcified right hilar lymph nodes.    Heart:The heart size is within normal limits.    Aorta:No aortic dissection or aneurysm is seen. Mild aortic calcification is seen in the thoracic aorta.    Pulmonary Arteries:No filling defects are seen in the pulmonary arteries to suggest pulmonary embolus.    Lungs:The lungs are clear with no focal infiltrate or airspace disease. Again noted is a large thick walled cavitary lesion in the right upper lobe with an 8 x 7.5 cm soft tissue component within this cavitary lesion at the apex. There is some cortical erosion and scleroses involving the right 3rd, 4th and 5th ribs posteriorly. This is suggestive of infiltration of this mass into the chest wall. These findings are consistent with history of bronchogenic carcinoma. Again noted is collapse of the right middle lobe. Moderate emphysematous changes are noted in the left lung, predominantly involving the left upper lobe. A  stable 6 mm nodule with spiculated margin is seen in the left upper lobe (series 12, image 16). Again noted is collapse consolidation of the lingula of the left upper lobe. Patchy ill-defined airspace opacities are also seen in the basilar segments of the left lower lobe. These are new since the prior examination. There are also subtle ill-defined nodular opacities in the right lower lobe. These findings are suggestive of pneumonia.    Pleura:No effusions or pneumothorax are identified. There is interval resolution of left pleural effusion.    Bony Structures:The bones are mildly osteopenic. Mild degenerative changes are identified in the spine.    Abdomen:Calcific densities are seen in the spleen, likely reflect calcified granulomas. The other visualized upper abdominal organs appear unremarkable.                        Preliminary result by Surjit Beck MD (10/31/22 23:33:57)                   Narrative:    START OF REPORT:  Technique: CT Scan of the chest was performed with intravenous contrast with direct axial images as well as sagittal and coronal reconstruction images pulmonary embolus protocol.    Dosage Information: Automated Exposure Control was utilized.    Comparison: Comparison is with study dated2022-10-10 17:07:02.    Clinical History: Hemoptysis (Has stage 4 lung cancer. Was recently in the hospital for pneumonia.    Findings:  Mediastinum: The right apical cavitary mass appears to infiltrate the mediastinum. There are calcified right hilar lymph nodes.  Heart: The heart size is within normal limits.  Aorta: No aortic dissection or aneurysm is seen. Mild aortic calcification is seen in the thoracic aorta.  Pulmonary Arteries: No filling defects are seen in the pulmonary arteries to suggest pulmonary embolus.  Lungs: The lungs are clear with no focal infiltrate or airspace disease. Again noted is a large thick walled cavitary lesion in the right upper lobe with an 8 x 7.5 cm soft tissue component  within this cavitary lesion at the apex. There is some cortical erosion and scleroses involving the right 3rd, 4th and 5th ribs posteriorly. This is suggestive of infiltration of this mass into the chest wall. These findings are consistent with history of bronchogenic carcinoma. Again noted is collapse of the right middle lobe. Moderate emphysematous changes are noted in the left lung, predominantly involving the left upper lobe. A 6 mm nodule with spiculated margin is seen in the left upper lobe (series 12, image 16). Again noted is collapse consolidation of the lingula of the left upper lobe. Patchy ill-defined airspace opacities are also seen in the basilar segments of the left lower lobe. These are new since the prior examination. There are also subtle ill-defined nodular opacities in the right lower lobe. These findings are suggestive of pneumonia.  Pleura: No effusions or pneumothorax are identified. There is interval resolution of left pleural effusion.  Bony Structures: The bones are mildly osteopenic. Mild degenerative changes are identified in the spine.  Abdomen: Calcific densities are seen in the spleen, likely reflect calcified granulomas. The other visualized upper abdominal organs appear unremarkable.      Impression:  1. No filling defects are seen in the pulmonary arteries to suggest pulmonary embolus.  2. Again noted is a large thick walled cavitary lesion in the right upper lobe with an 8 x 7.5 cm soft tissue component within this cavitary lesion at the apex. There is some cortical erosion and scleroses involving the right 3rd, 4th and 5th ribs posteriorly. This is suggestive of infiltration of this mass into the chest wall. These findings are consistent with history of bronchogenic carcinoma. A 6 mm nodule with spiculated margin is seen in the left upper lobe (series 12, image 16). Again noted is collapse consolidation of the lingula of the left upper lobe. Patchy ill-defined airspace opacities  are also seen in the basilar segments of the left lower lobe. These are new since the prior examination. These findings are suggestive of pneumonia. Correlate with clinical and laboratory findings as regards additional evaluation and follow-up.  3. Details and other findings as discussed above.                          Preliminary result by Cory Cummins Jr., MD (10/31/22 23:33:57)                   Narrative:    START OF REPORT:  Technique: CT Scan of the chest was performed with intravenous contrast with direct axial images as well as sagittal and coronal reconstruction images pulmonary embolus protocol.    Dosage Information: Automated Exposure Control was utilized.    Comparison: Comparison is with study dated2022-10-10 17:07:02.    Clinical History: Hemoptysis (Has stage 4 lung cancer. Was recently in the hospital for pneumonia.    Findings:  Mediastinum: The right apical cavitary mass appears to infiltrate the mediastinum. There are calcified right hilar lymph nodes.  Heart: The heart size is within normal limits.  Aorta: No aortic dissection or aneurysm is seen. Mild aortic calcification is seen in the thoracic aorta.  Pulmonary Arteries: No filling defects are seen in the pulmonary arteries to suggest pulmonary embolus.  Lungs: The lungs are clear with no focal infiltrate or airspace disease. Again noted is a large thick walled cavitary lesion in the right upper lobe with an 8 x 7.5 cm soft tissue component within this cavitary lesion at the apex. There is some cortical erosion and scleroses involving the right 3rd, 4th and 5th ribs posteriorly. This is suggestive of infiltration of this mass into the chest wall. These findings are consistent with history of bronchogenic carcinoma. Again noted is collapse of the right middle lobe. Moderate emphysematous changes are noted in the left lung, predominantly involving the left upper lobe. A 6 mm nodule with spiculated margin is seen in the left upper lobe  (series 12, image 16). Again noted is collapse consolidation of the lingula of the left upper lobe. Patchy ill-defined airspace opacities are also seen in the basilar segments of the left lower lobe. These are new since the prior examination. There are also subtle ill-defined nodular opacities in the right lower lobe. These findings are suggestive of pneumonia.  Pleura: No effusions or pneumothorax are identified. There is interval resolution of left pleural effusion.  Bony Structures: The bones are mildly osteopenic. Mild degenerative changes are identified in the spine.  Abdomen: Calcific densities are seen in the spleen, likely reflect calcified granulomas. The other visualized upper abdominal organs appear unremarkable.      Impression:  1. No filling defects are seen in the pulmonary arteries to suggest pulmonary embolus.  2. Again noted is a large thick walled cavitary lesion in the right upper lobe with an 8 x 7.5 cm soft tissue component within this cavitary lesion at the apex. There is some cortical erosion and scleroses involving the right 3rd, 4th and 5th ribs posteriorly. This is suggestive of infiltration of this mass into the chest wall. These findings are consistent with history of bronchogenic carcinoma. A 6 mm nodule with spiculated margin is seen in the left upper lobe (series 12, image 16). Again noted is collapse consolidation of the lingula of the left upper lobe. Patchy ill-defined airspace opacities are also seen in the basilar segments of the left lower lobe. These are new since the prior examination. These findings are suggestive of pneumonia. Correlate with clinical and laboratory findings as regards additional evaluation and follow-up.  3. Details and other findings as discussed above.                                         X-Ray Chest AP Portable (Final result)  Result time 11/01/22 07:20:27      Final result by Gianluca Mack MD (11/01/22 07:20:27)                   Impression:       No adverse interval change.      Electronically signed by: Gianluca Mack  Date:    11/01/2022  Time:    07:20               Narrative:    EXAMINATION:  XR CHEST AP PORTABLE    CLINICAL HISTORY:  Chest Pain;    TECHNIQUE:  Single view of the chest    COMPARISON:  10/15/2022    FINDINGS:  Right upper lobe opacification remains with cavitary appearing right mid lung zone lesion.  The left hemithorax is clear.                                       Micro  Microbiology Results (last 7 days)       Procedure Component Value Units Date/Time    Blood Culture [[id number]]  (Normal) Collected: 11/09/22 0615    Order Status: Completed Specimen: Blood from Arm, Right Updated: 11/10/22 1100     CULTURE, BLOOD ([facility]) No Growth At 24 Hours    Blood Culture [[id number]]  (Abnormal) Collected: 11/09/22 0636    Order Status: Completed Specimen: Blood from Arm, Left Updated: 11/10/22 0943     GRAM STAIN Gram Positive Cocci, probable Staphylococcus      Seen in gram stain of broth only      1 of 1 Pediatric bottle positive    Respiratory Culture [[id number]] Collected: 11/09/22 1256    Order Status: Completed Specimen: Respiratory from Endotracheal Aspirate Updated: 11/10/22 0732     Respiratory Culture No Growth At 24 Hours     GRAM STAIN Quality 2+      No bacteria seen    Blood Culture (site 1) [[id number]]  (Normal) Collected: 11/01/22 0416    Order Status: Completed Specimen: Blood Updated: 11/06/22 1000     CULTURE, BLOOD ([facility]) No Growth at 5 days    Blood Culture (site 2) [[id number]]  (Normal) Collected: 11/01/22 0416    Order Status: Completed Specimen: Blood Updated: 11/06/22 1000     CULTURE, BLOOD ([facility]) No Growth at 5 days    Respiratory Culture [[id number]]  (Abnormal)  (Susceptibility) Collected: 11/03/22 0607    Order Status: Completed Specimen: Respiratory from Endotracheal Aspirate Updated: 11/05/22 1049     Respiratory Culture Many Serratia marcescens     Comment: with rare normal respiratory jia        GRAM STAIN Quality  1+      Rare Gram Negative Rods                  Assessment/Plan: 67F with Stage IV R lung cancer with brain metastasis s/p radiation currently on chemo, afib, HTN, HLD, PAD, COPD with recent GNR pneumonia presented to ED with hemoptysis           Enrrique Lopes   MS3  11/11/2022 5:31 AM

## 2022-11-11 NOTE — PROGRESS NOTES
[facility] - ICU  Pulmonary Critical Care Note    Patient Name: Adrienne Urbina  MRN: [id number]  Admission Date: 10/31/2022  Hospital Length of Stay: 10 days  Code Status: Full Code  Attending Provider: Glenn Cantu Jr., MD, *  Primary Care Provider: Gregorio Cherry NP     Subjective:     HPI:   Ms. 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  Patient reports being in her usual state of health until noticing blood streaked sputum that progressed to complete blood filled sputum over the past x1.5 days.  She denies any trauma however does report chronic cough due to COPD which she feels may have contributed to recent symptoms. When asked, patient denies ever experiencing hemoptysis at any point in the past even throughout undergoing chemotherapy and radiation for right upper lobe lung mass (last chemotherapy x1.5 months ago; follows w/ Dr. Shoemaker).  These new symptoms scared her which prompted her ED presentation. Denies any history or DVT/PE. She also denies any current lightheadedness/dizziness, nausea/vomiting, fever/chills, chest pain, palpitations, new/worsening shortness of breath, hematemesis, hematochezia, melena, hematuria. Per chart review; patient was recently discharged from [facility] (10/17) in which she required ICU level care secondary to suspected Afib w/ RVR in addition to anemia requiring x2 units PRBCs and GNR related pneumonia.     Hospital Course/Significant events:  Intubated 11/02/2022    24 Hour Interval History:  No significant events overnight.  Afebrile overnight.  A femoral line was placed.  Patient is on amiodarone 400 mg b.i.d. for AFib.  Patient is on  cefepime for Serratia pneumonia.  If unable to have IV access can do p.o. Bactrim.  Leukocytosis down trending.  Urine output was 2 L patient is net -248.9.  ABG reveals pH 7.36 pCO2 43, PO2 of 59      Past Medical History:   Diagnosis Date    Anxiety and depression     Cancer     lung with brain metastasis    Cholelithiasis     COPD (chronic obstructive pulmonary disease)     Hypertension     Lumbar disc disease     PAD (peripheral artery disease)     Paroxysmal SVT (supraventricular tachycardia)     Pleural effusion        Social History     Socioeconomic History    Marital status:    Tobacco Use    Smoking status: Some Days     Packs/day: 0.25     Years: 49.00     Pack years: 12.25     Types: Cigarettes     Last attempt to quit: 2022     Years since quittin.3    Smokeless tobacco: Never   Substance and Sexual Activity    Alcohol use: Never    Drug use: Never    Sexual activity: Yes     Social Determinants of Health     Financial Resource Strain: Low Risk     Difficulty of Paying Living Expenses: Not very hard   Food Insecurity: No Food Insecurity    Worried About Running Out of Food in the Last Year: Never true    Ran Out of Food in the Last Year: Never true   Transportation Needs: No Transportation Needs    Lack of Transportation (Medical): No    Lack of Transportation (Non-Medical): No   Physical Activity: Inactive    Days of Exercise per Week: 0 days    Minutes of Exercise per Session: 0 min   Stress: No Stress Concern Present    Feeling of Stress : Not at all   Social Connections: Moderately Isolated    Frequency of Communication with Friends and Family: More than three times a week    Frequency of Social Gatherings with Friends and Family: More than three times a week    Attends [employer] Services: Never    Active Member of Clubs or Organizations: No    Attends Club or Organization Meetings: Never    Marital Status:    Housing Stability: Low Risk     Unable to Pay for Housing in the  Last Year: No    Number of Places Lived in the Last Year: 1    Unstable Housing in the Last Year: No       Objective:     Current Outpatient Medications   Medication Instructions    albuterol (PROVENTIL/VENTOLIN HFA) 90 mcg/actuation inhaler Currently holding due to heart rate    albuterol-ipratropium (DUO-NEB) 2.5 mg-0.5 mg/3 mL nebulizer solution 3 mLs, Nebulization, Every 4 hours PRN, Currently holding due to heartrate    aspirin 81 mg, Oral, Daily    ATROVENT HFA 17 mcg/actuation inhaler Currently holding due to heartrate    cyproheptadine (PERIACTIN) 4 mg, Oral, As needed (PRN)    dicyclomine (BENTYL) 10 mg, Oral, 3 times daily PRN    diltiaZEM (CARDIZEM CD) 360 mg, Oral, Daily    folic acid (FOLVITE) 1 mg, Oral, Daily    gabapentin (NEURONTIN) 300 mg, Oral, 3 times daily    guaiFENesin-codeine 100-10 mg/5 ml (TUSSI-ORGANIDIN NR)  mg/5 mL syrup 10 mLs, Oral, As needed (PRN)    HUMULIN R REGULAR U-100 INSULN 100 unit/mL injection Holding since in patient stay    HYDROcodone-acetaminophen (NORCO)  mg per tablet 1 tablet, Oral, Every 4 hours PRN    loratadine (CLARITIN) 10 mg, Oral, Daily    metFORMIN (GLUCOPHAGE) 500 mg, Oral, 2 times daily    metoprolol tartrate (LOPRESSOR) 100 mg, Oral, 2 times daily    mirtazapine (REMERON) 15 mg, Oral, Nightly    montelukast (SINGULAIR) 10 mg, Oral, As needed (PRN)    ONETOUCH DELICA PLUS LANCET 33 gauge Misc No dose, route, or frequency recorded.    ONETOUCH VERIO REFLECT METER Misc No dose, route, or frequency recorded.    ONETOUCH VERIO TEST STRIPS Strp No dose, route, or frequency recorded.    OXYGEN-AIR DELIVERY SYSTEMS MISC   oxygen, See Instructions, home concentrator and portable @ 2L per nasal cannula continuous to keep SATS at 88% or greater J18.9, C34.9, J96.9, R09.02, # 1 EA, 0 Refill(s)    tiZANidine (ZANAFLEX) 4 MG tablet No dose, route, or frequency recorded.       Current Inpatient Medications   acetaminophen  650 mg Oral Once    amiodarone  400 mg  Oral BID    ceFEPime (MAXIPIME) IVPB  1 g Intravenous Q8H    hydrocortisone sodium succinate  100 mg Intravenous Q8H    pantoprozole (PROTONIX) IV  40 mg Intravenous Daily    vancomycin (VANCOCIN) IVPB  750 mg Intravenous Q12H         Intake/Output Summary (Last 24 hours) at 11/11/2022 0739  Last data filed at 11/11/2022 0500  Gross per 24 hour   Intake 1751.02 ml   Output 2000 ml   Net -248.98 ml         Review of Systems   Unable to perform ROS: Intubated      Vital Signs (Most Recent):  Temp: 100.1 °F (37.8 °C) (11/11/22 0723)  Pulse: 73 (11/11/22 0723)  Resp: (!) 26 (11/11/22 0723)  BP: (!) 135/36 (11/11/22 0723)  SpO2: (!) 93 % (11/11/22 0723)    Body mass index is 31.06 kg/m².  Weight: 87.3 kg (192 lb 7.4 oz) Vital Signs (24h Range):  Temp:  [97.9 °F (36.6 °C)-100.2 °F (37.9 °C)] 100.1 °F (37.8 °C)  Pulse:  [] 73  Resp:  [0-29] 26  SpO2:  [91 %-100 %] 93 %  BP: ()/(36-99) 135/36  Arterial Line BP: ()/(25-62) 166/44     Physical Exam  Vitals and nursing note reviewed.   Constitutional:       General: She is not in acute distress.     Appearance: She is ill-appearing. She is not toxic-appearing.      Comments: Intubated and sedated   HENT:      Head: Normocephalic and atraumatic.      Nose: Nose normal.      Mouth/Throat:      Mouth: Mucous membranes are moist.      Pharynx: Oropharynx is clear.   Eyes:      Extraocular Movements: Extraocular movements intact.      Conjunctiva/sclera: Conjunctivae normal.      Pupils: Pupils are equal, round, and reactive to light.   Cardiovascular:      Rate and Rhythm: Normal rate and regular rhythm.      Pulses: Normal pulses.      Heart sounds: Normal heart sounds. No murmur heard.    No gallop.   Pulmonary:      Effort: No respiratory distress.      Breath sounds: No stridor. No wheezing, rhonchi or rales.   Abdominal:      General: Abdomen is flat.      Palpations: Abdomen is soft.   Musculoskeletal:         General: No swelling or deformity.      Right  lower leg: No edema.      Left lower leg: No edema.   Skin:     General: Skin is warm and dry.      Coloration: Skin is not jaundiced.      Findings: No bruising.   Neurological:      General: No focal deficit present.      Mental Status: She is alert and oriented to person, place, and time.      Comments: Patient sedated and therefore unable to accurately exam       Mechanical ventilation support:  Vent Mode: A/C (11/11/22 0715)  Set Rate: 26 BPM (11/11/22 0715)  Vt Set: 450 mL (11/11/22 0715)  PEEP/CPAP: 5 cmH20 (11/11/22 0715)  Oxygen Concentration (%): 45 (11/11/22 0715)  Peak Airway Pressure: 28.8 cmH2O (11/11/22 0715)  Plateau Pressure: 22 cmH20 (11/11/22 0715)  Total Ve: 11.1 L/m (11/11/22 0715)  F/VT Ratio<105 (RSBI): (!) 60.61 (11/11/22 0450)    Lines/Drains/Airways       Central Venous Catheter Line  Duration             Port A Cath Single Lumen right subclavian -- days    Percutaneous Central Line Insertion/Assessment - Triple Lumen  11/10/22 1545 right femoral vein;right femoral <1 day              Drain  Duration                  NG/OG Tube 11/02/22 1355 [place] sump 16 Fr. Left mouth 8 days         Urethral Catheter 11/02/22 1400 Silicone 16 Fr. 8 days         Rectal Tube 11/07/22 0408 rectal tube w/ balloon (indicate number of mLs) 4 days              Airway  Duration                  Airway - Non-Surgical 11/02/22 1400 Endotracheal Tube 8 days              Arterial Line  Duration             Arterial Line 11/07/22 1340 Right Radial 3 days                    Significant Labs:    Lab Results   Component Value Date    WBC 26.5 (H) 11/11/2022    HGB 8.1 (L) 11/11/2022    HCT 25.7 (L) 11/11/2022    MCV 93.8 11/11/2022     11/11/2022         BMP  Lab Results   Component Value Date     11/11/2022    K 3.9 11/11/2022    CO2 22 (L) 11/11/2022    BUN 32.2 (H) 11/11/2022    CREATININE 1.16 (H) 11/11/2022    CALCIUM 9.0 11/11/2022    EGFRNONAA 81 04/22/2022       ABG  Recent Labs   Lab 11/11/22  0455    PH 7.36   PO2 59*   PCO2 43   HCO3 24.3             Significant Imaging:  I have reviewed all pertinent imaging within the past 24 hours.    Assessment/Plan:   1.) Massive Hemoptysis  2.) Hx of Stage IV Poorly Differentiated RUL Lung Carcinoma  3.) Serratia M. Pneumonia with leukocytosis  4.) Hx of Atrial Fibrillation/flutter (not on Eliquis)  5.)  Nonocclusive acute deep vein thrombosis visualized left axillary vein and occlusive thrombus in left cephalic vein.  6.) Hx of COPD (on home 2L NC q.Nightly)  7.) Hx of HTN  8.) Hx of HLD  9.) Hx of PAD    On mechanical ventilation, wean as tolerated.  Daily SBT as tolerated  Continue cefepime (day 4) for Serratia pneumonia.  Repeat respiratory cultures negative at 24 hours.  1/2 blood cultures grew Gram-positive cocci  continue vancomycin b.i.d. (day 4)  Continue amiodarone 400 mg b.i.d.  H&H remains stable during last 48 hours  Nonocclusive and occlusive thrombus in left upper extremity.  Remove midline  Prognosis guarded    DVT Prophylaxis:  Continue to hold all anticoagulation including aspirin for ongoing hemoptysis  GI Prophylaxis:  Protonix     Greater than 30 minutes of critical care was time spent personally by me on the following activities: development of treatment plan with patient or surrogate and bedside caregivers, discussions with consultants, evaluation of patient's response to treatment, examination of patient, ordering and performing treatments and interventions, ordering and review of laboratory studies, ordering and review of radiographic studies, pulse oximetry, re-evaluation of patient's condition.  This critical care time did not overlap with that of any other provider or involve time for any procedures.     Colton Olivares MD  Pulmonary Critical Care Medicine  [facility] - ICU

## 2022-11-12 NOTE — PROGRESS NOTES
[facility] - ICU  Pulmonary Critical Care Note    Patient Name: Adrienne Urbina  MRN: [id number]  Admission Date: 10/31/2022  Hospital Length of Stay: 11 days  Code Status: Full Code  Attending Provider: Glenn Cantu Jr., MD, *  Primary Care Provider: Gregorio Cherry NP     Subjective:     HPI:   Ms. 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  Patient reports being in her usual state of health until noticing blood streaked sputum that progressed to complete blood filled sputum over the past x1.5 days.  She denies any trauma however does report chronic cough due to COPD which she feels may have contributed to recent symptoms. When asked, patient denies ever experiencing hemoptysis at any point in the past even throughout undergoing chemotherapy and radiation for right upper lobe lung mass (last chemotherapy x1.5 months ago; follows w/ Dr. Shoemaker).  These new symptoms scared her which prompted her ED presentation. Denies any history or DVT/PE. She also denies any current lightheadedness/dizziness, nausea/vomiting, fever/chills, chest pain, palpitations, new/worsening shortness of breath, hematemesis, hematochezia, melena, hematuria. Per chart review; patient was recently discharged from [facility] (10/17) in which she required ICU level care secondary to suspected Afib w/ RVR in addition to anemia requiring x2 units PRBCs and GNR related pneumonia.     Hospital Course/Significant events:  Intubated 11/02/2022    24 Hour Interval History:  Over the course of the afternoon yesterday her HR has started to trend down. This morning it is was 53. Her diastolic pressure is also noticeably lower  this morning as well. She remains on Vasopressin and cannot be weaned off of pressors at this time. Today is now day 10 of being mechanically intubated and vent settings have not changed much - /26/5+/45%. ABG on these settings 7.37/41/68/23.7. Has had low grade temps over the last 48 hours with tmax 100.1. White count trending down. Remains on Cefipime now day 6/10. Inout of  in 24 hours wuith output of 1150ml.     Past Medical History:   Diagnosis Date    Anxiety and depression     Cancer     lung with brain metastasis    Cholelithiasis     COPD (chronic obstructive pulmonary disease)     Hypertension     Lumbar disc disease     PAD (peripheral artery disease)     Paroxysmal SVT (supraventricular tachycardia)     Pleural effusion        Social History     Socioeconomic History    Marital status:    Tobacco Use    Smoking status: Some Days     Packs/day: 0.25     Years: 49.00     Pack years: 12.25     Types: Cigarettes     Last attempt to quit: 2022     Years since quittin.3    Smokeless tobacco: Never   Substance and Sexual Activity    Alcohol use: Never    Drug use: Never    Sexual activity: Yes     Social Determinants of Health     Financial Resource Strain: Low Risk     Difficulty of Paying Living Expenses: Not very hard   Food Insecurity: No Food Insecurity    Worried About Running Out of Food in the Last Year: Never true    Ran Out of Food in the Last Year: Never true   Transportation Needs: No Transportation Needs    Lack of Transportation (Medical): No    Lack of Transportation (Non-Medical): No   Physical Activity: Inactive    Days of Exercise per Week: 0 days    Minutes of Exercise per Session: 0 min   Stress: No Stress Concern Present    Feeling of Stress : Not at all   Social Connections: Moderately Isolated    Frequency of Communication with Friends and Family: More than three times a week    Frequency of Social Gatherings with Friends and Family: More than three times a week     Attends [employer] Services: Never    Active Member of Clubs or Organizations: No    Attends Club or Organization Meetings: Never    Marital Status:    Housing Stability: Low Risk     Unable to Pay for Housing in the Last Year: No    Number of Places Lived in the Last Year: 1    Unstable Housing in the Last Year: No       Objective:     Current Outpatient Medications   Medication Instructions    albuterol (PROVENTIL/VENTOLIN HFA) 90 mcg/actuation inhaler Currently holding due to heart rate    albuterol-ipratropium (DUO-NEB) 2.5 mg-0.5 mg/3 mL nebulizer solution 3 mLs, Nebulization, Every 4 hours PRN, Currently holding due to heartrate    aspirin 81 mg, Oral, Daily    ATROVENT HFA 17 mcg/actuation inhaler Currently holding due to heartrate    cyproheptadine (PERIACTIN) 4 mg, Oral, As needed (PRN)    dicyclomine (BENTYL) 10 mg, Oral, 3 times daily PRN    diltiaZEM (CARDIZEM CD) 360 mg, Oral, Daily    folic acid (FOLVITE) 1 mg, Oral, Daily    gabapentin (NEURONTIN) 300 mg, Oral, 3 times daily    guaiFENesin-codeine 100-10 mg/5 ml (TUSSI-ORGANIDIN NR)  mg/5 mL syrup 10 mLs, Oral, As needed (PRN)    HUMULIN R REGULAR U-100 INSULN 100 unit/mL injection Holding since in patient stay    HYDROcodone-acetaminophen (NORCO)  mg per tablet 1 tablet, Oral, Every 4 hours PRN    loratadine (CLARITIN) 10 mg, Oral, Daily    metFORMIN (GLUCOPHAGE) 500 mg, Oral, 2 times daily    metoprolol tartrate (LOPRESSOR) 100 mg, Oral, 2 times daily    mirtazapine (REMERON) 15 mg, Oral, Nightly    montelukast (SINGULAIR) 10 mg, Oral, As needed (PRN)    ONETOUCH DELICA PLUS LANCET 33 gauge Misc No dose, route, or frequency recorded.    ONETOUCH VERIO REFLECT METER Misc No dose, route, or frequency recorded.    ONETOUCH VERIO TEST STRIPS Strp No dose, route, or frequency recorded.    OXYGEN-AIR DELIVERY SYSTEMS MISC   oxygen, See Instructions, home concentrator and portable @ 2L per nasal cannula continuous to keep SATS at 88%  or greater J18.9, C34.9, J96.9, R09.02, # 1 EA, 0 Refill(s)    tiZANidine (ZANAFLEX) 4 MG tablet No dose, route, or frequency recorded.       Current Inpatient Medications   acetaminophen  650 mg Oral Once    amiodarone  400 mg Oral BID    ceFEPime (MAXIPIME) IVPB  1 g Intravenous Q8H    hydrocortisone sodium succinate  100 mg Intravenous Q8H    pantoprozole (PROTONIX) IV  40 mg Intravenous Daily         Intake/Output Summary (Last 24 hours) at 11/12/2022 0711  Last data filed at 11/12/2022 0531  Gross per 24 hour   Intake 2042.72 ml   Output 1150 ml   Net 892.72 ml         Review of Systems   Unable to perform ROS: Intubated      Vital Signs (Most Recent):  Temp: 97.6 °F (36.4 °C) (11/12/22 0400)  Pulse: 62 (11/12/22 0600)  Resp: (!) 26 (11/12/22 0600)  BP: (!) 152/63 (11/12/22 0600)  SpO2: 97 % (11/12/22 0602)    Body mass index is 31.6 kg/m².  Weight: 88.8 kg (195 lb 12.3 oz) Vital Signs (24h Range):  Temp:  [96.5 °F (35.8 °C)-100.1 °F (37.8 °C)] 97.6 °F (36.4 °C)  Pulse:  [51-78] 62  Resp:  [25-28] 26  SpO2:  [90 %-100 %] 97 %  BP: (116-152)/(33-77) 152/63  Arterial Line BP: (117-153)/(33-40) 131/39     Physical Exam  Vitals and nursing note reviewed.   Constitutional:       General: She is not in acute distress.     Appearance: She is ill-appearing. She is not toxic-appearing.      Comments: Intubated and sedated   HENT:      Head: Normocephalic and atraumatic.      Nose: Nose normal.      Mouth/Throat:      Mouth: Mucous membranes are moist.      Pharynx: Oropharynx is clear.   Eyes:      Extraocular Movements: Extraocular movements intact.      Conjunctiva/sclera: Conjunctivae normal.      Pupils: Pupils are equal, round, and reactive to light.   Cardiovascular:      Rate and Rhythm: Normal rate and regular rhythm.      Pulses: Normal pulses.      Heart sounds: Normal heart sounds. No murmur heard.    No gallop.   Pulmonary:      Effort: No respiratory distress.      Breath sounds: No stridor. No wheezing,  rhonchi or rales.   Abdominal:      General: Abdomen is flat.      Palpations: Abdomen is soft.   Musculoskeletal:         General: No swelling or deformity.      Right lower leg: No edema.      Left lower leg: No edema.   Skin:     General: Skin is warm and dry.      Coloration: Skin is not jaundiced.      Findings: No bruising.   Neurological:      General: No focal deficit present.      Mental Status: She is alert and oriented to person, place, and time.      Comments: Patient sedated and therefore unable to accurately exam       Mechanical ventilation support:  Vent Mode: A/C (11/12/22 0500)  Set Rate: 26 BPM (11/12/22 0500)  Vt Set: 450 mL (11/12/22 0500)  PEEP/CPAP: 5 cmH20 (11/12/22 0500)  Oxygen Concentration (%): 45 (11/12/22 0115)  Peak Airway Pressure: 31.3 cmH2O (11/12/22 0500)  Plateau Pressure: 22 cmH20 (11/11/22 0715)  Total Ve: 11 L/m (11/12/22 0500)  F/VT Ratio<105 (RSBI): (!) 60.47 (11/12/22 0500)    Lines/Drains/Airways       Central Venous Catheter Line  Duration             Port A Cath Single Lumen right subclavian -- days    Percutaneous Central Line Insertion/Assessment - Triple Lumen  11/10/22 1545 right femoral vein;right femoral 1 day              Drain  Duration                  NG/OG Tube 11/02/22 1355 [place] sump 16 Fr. Left mouth 9 days         Urethral Catheter 11/02/22 1400 Silicone 16 Fr. 9 days         Rectal Tube 11/07/22 0408 rectal tube w/ balloon (indicate number of mLs) 5 days              Airway  Duration                  Airway - Non-Surgical 11/02/22 1400 Endotracheal Tube 9 days              Arterial Line  Duration             Arterial Line 11/07/22 1340 Right Radial 4 days                    Significant Labs:    Lab Results   Component Value Date    WBC 11.1 11/12/2022    HGB 7.5 (L) 11/12/2022    HCT 23.6 (L) 11/12/2022    MCV 92.5 11/12/2022     11/12/2022         BMP  Lab Results   Component Value Date     11/12/2022    K 3.4 (L) 11/12/2022    CO2 21 (L)  11/12/2022    BUN 46.8 (H) 11/12/2022    CREATININE 1.29 (H) 11/12/2022    CALCIUM 9.1 11/12/2022    EGFRNONAA 81 04/22/2022       ABG  Recent Labs   Lab 11/12/22  0549   PH 7.37   PO2 68*   PCO2 41   HCO3 23.7             Significant Imaging:  I have reviewed all pertinent imaging within the past 24 hours.    Assessment/Plan:   1.) Massive Hemoptysis  2.) Hx of Stage IV Poorly Differentiated RUL Lung Carcinoma  3.) Serratia M. Pneumonia with leukocytosis  4.) Hx of Atrial Fibrillation/flutter (not on Eliquis)  5.)  Nonocclusive acute deep vein thrombosis visualized left axillary vein and occlusive thrombus in left cephalic vein.  6.) Hx of COPD (on home 2L NC q.Nightly)  7.) Hx of HTN  8.) Hx of HLD  9.) Hx of PAD    On mechanical ventilation, wean as tolerated.  Daily SBT as tolerated  Continue cefepime (day6/10)  D/c amio 400 BID and change to 200 BID  H&H slowly trending down. Hgb 7.6 this morning. Will only transfuse if hgb <7  Continue vasopressin and wean as tolerated. If severely bradycardic can consider epi vs levophed  Continue Hydrocortisone  Continue Sliding scale insulin. Adding Determir 3 units qhs  Prognosis guarded    DVT Prophylaxis:  Continue to hold all anticoagulation including aspirin for ongoing hemoptysis  GI Prophylaxis:  Protonix     Greater than 30 minutes of critical care was time spent personally by me on the following activities: development of treatment plan with patient or surrogate and bedside caregivers, discussions with consultants, evaluation of patient's response to treatment, examination of patient, ordering and performing treatments and interventions, ordering and review of laboratory studies, ordering and review of radiographic studies, pulse oximetry, re-evaluation of patient's condition.  This critical care time did not overlap with that of any other provider or involve time for any procedures.     Pato Rodríguez DO  Pulmonary Critical Care Medicine  [facility] -  ICU

## 2022-11-12 NOTE — PLAN OF CARE
Problem: Adult Inpatient Plan of Care  Goal: Plan of Care Review  Outcome: Ongoing, Progressing  Goal: Patient-Specific Goal (Individualized)  Outcome: Ongoing, Progressing  Goal: Absence of Hospital-Acquired Illness or Injury  Outcome: Ongoing, Progressing  Goal: Optimal Comfort and Wellbeing  Outcome: Ongoing, Progressing  Goal: Readiness for Transition of Care  Outcome: Ongoing, Progressing     Problem: Infection  Goal: Absence of Infection Signs and Symptoms  Outcome: Ongoing, Progressing     Problem: Skin Injury Risk Increased  Goal: Skin Health and Integrity  Outcome: Ongoing, Progressing     Problem: Communication Impairment (Mechanical Ventilation, Invasive)  Goal: Effective Communication  Outcome: Ongoing, Progressing     Problem: Device-Related Complication Risk (Mechanical Ventilation, Invasive)  Goal: Optimal Device Function  Outcome: Ongoing, Progressing     Problem: Inability to Wean (Mechanical Ventilation, Invasive)  Goal: Mechanical Ventilation Liberation  Outcome: Ongoing, Progressing     Problem: Nutrition Impairment (Mechanical Ventilation, Invasive)  Goal: Optimal Nutrition Delivery  Outcome: Ongoing, Progressing     Problem: Skin and Tissue Injury (Mechanical Ventilation, Invasive)  Goal: Absence of Device-Related Skin and Tissue Injury  Outcome: Ongoing, Progressing     Problem: Ventilator-Induced Lung Injury (Mechanical Ventilation, Invasive)  Goal: Absence of Ventilator-Induced Lung Injury  Outcome: Ongoing, Progressing     Problem: Communication Impairment (Artificial Airway)  Goal: Effective Communication  Outcome: Ongoing, Progressing     Problem: Device-Related Complication Risk (Artificial Airway)  Goal: Optimal Device Function  Outcome: Ongoing, Progressing     Problem: Skin and Tissue Injury (Artificial Airway)  Goal: Absence of Device-Related Skin or Tissue Injury  Outcome: Ongoing, Progressing     Problem: Noninvasive Ventilation Acute  Goal: Effective Unassisted Ventilation and  Oxygenation  Outcome: Ongoing, Progressing     Problem: Fall Injury Risk  Goal: Absence of Fall and Fall-Related Injury  Outcome: Ongoing, Progressing

## 2022-11-13 PROBLEM — C34.90 MALIGNANT NEOPLASM OF LUNG: Status: ACTIVE | Noted: 2022-01-01

## 2022-11-13 NOTE — PROGRESS NOTES
[facility] - ICU  Pulmonary Critical Care Note    Patient Name: Adrienne Urbina  MRN: [id number]  Admission Date: 10/31/2022  Hospital Length of Stay: 12 days  Code Status: Full Code  Attending Provider: Glenn Cantu Jr., MD, *  Primary Care Provider: Gregorio Cherry NP     Subjective:     HPI:   Ms. 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  Patient reports being in her usual state of health until noticing blood streaked sputum that progressed to complete blood filled sputum over the past x1.5 days.  She denies any trauma however does report chronic cough due to COPD which she feels may have contributed to recent symptoms. When asked, patient denies ever experiencing hemoptysis at any point in the past even throughout undergoing chemotherapy and radiation for right upper lobe lung mass (last chemotherapy x1.5 months ago; follows w/ Dr. Shoemaker).  These new symptoms scared her which prompted her ED presentation. Denies any history or DVT/PE. She also denies any current lightheadedness/dizziness, nausea/vomiting, fever/chills, chest pain, palpitations, new/worsening shortness of breath, hematemesis, hematochezia, melena, hematuria. Per chart review; patient was recently discharged from [facility] (10/17) in which she required ICU level care secondary to suspected Afib w/ RVR in addition to anemia requiring x2 units PRBCs and GNR related pneumonia.     Hospital Course/Significant events:  Intubated 11/02/2022    24 Hour Interval History:  NAEON. Afebrile last 24hr. Dinh cardic with low of 58. Normotensive. Input in 24h 3349 with output of 1000. Remains on Vent /26/5+/45% - ABG on  these settings . On Diprivan, Precedex, and vasopressin. Day 7/10 for cefipime - serratia PNA.     Past Medical History:   Diagnosis Date    Anxiety and depression     Cancer     lung with brain metastasis    Cholelithiasis     COPD (chronic obstructive pulmonary disease)     Hypertension     Lumbar disc disease     PAD (peripheral artery disease)     Paroxysmal SVT (supraventricular tachycardia)     Pleural effusion        Social History     Socioeconomic History    Marital status:    Tobacco Use    Smoking status: Some Days     Packs/day: 0.25     Years: 49.00     Pack years: 12.25     Types: Cigarettes     Last attempt to quit: 2022     Years since quittin.3    Smokeless tobacco: Never   Substance and Sexual Activity    Alcohol use: Never    Drug use: Never    Sexual activity: Yes     Social Determinants of Health     Financial Resource Strain: Low Risk     Difficulty of Paying Living Expenses: Not very hard   Food Insecurity: No Food Insecurity    Worried About Running Out of Food in the Last Year: Never true    Ran Out of Food in the Last Year: Never true   Transportation Needs: No Transportation Needs    Lack of Transportation (Medical): No    Lack of Transportation (Non-Medical): No   Physical Activity: Inactive    Days of Exercise per Week: 0 days    Minutes of Exercise per Session: 0 min   Stress: No Stress Concern Present    Feeling of Stress : Not at all   Social Connections: Moderately Isolated    Frequency of Communication with Friends and Family: More than three times a week    Frequency of Social Gatherings with Friends and Family: More than three times a week    Attends [employer] Services: Never    Active Member of Clubs or Organizations: No    Attends Club or Organization Meetings: Never    Marital Status:    Housing Stability: Low Risk     Unable to Pay for Housing in the Last Year: No    Number of Places Lived in the Last Year: 1    Unstable Housing in the Last  Year: No       Objective:     Current Outpatient Medications   Medication Instructions    albuterol (PROVENTIL/VENTOLIN HFA) 90 mcg/actuation inhaler Currently holding due to heart rate    albuterol-ipratropium (DUO-NEB) 2.5 mg-0.5 mg/3 mL nebulizer solution 3 mLs, Nebulization, Every 4 hours PRN, Currently holding due to heartrate    aspirin 81 mg, Oral, Daily    ATROVENT HFA 17 mcg/actuation inhaler Currently holding due to heartrate    cyproheptadine (PERIACTIN) 4 mg, Oral, As needed (PRN)    dicyclomine (BENTYL) 10 mg, Oral, 3 times daily PRN    diltiaZEM (CARDIZEM CD) 360 mg, Oral, Daily    folic acid (FOLVITE) 1 mg, Oral, Daily    gabapentin (NEURONTIN) 300 mg, Oral, 3 times daily    guaiFENesin-codeine 100-10 mg/5 ml (TUSSI-ORGANIDIN NR)  mg/5 mL syrup 10 mLs, Oral, As needed (PRN)    HUMULIN R REGULAR U-100 INSULN 100 unit/mL injection Holding since in patient stay    HYDROcodone-acetaminophen (NORCO)  mg per tablet 1 tablet, Oral, Every 4 hours PRN    loratadine (CLARITIN) 10 mg, Oral, Daily    metFORMIN (GLUCOPHAGE) 500 mg, Oral, 2 times daily    metoprolol tartrate (LOPRESSOR) 100 mg, Oral, 2 times daily    mirtazapine (REMERON) 15 mg, Oral, Nightly    montelukast (SINGULAIR) 10 mg, Oral, As needed (PRN)    ONETOUCH DELICA PLUS LANCET 33 gauge Misc No dose, route, or frequency recorded.    ONETOUCH VERIO REFLECT METER Misc No dose, route, or frequency recorded.    ONETOUCH VERIO TEST STRIPS Strp No dose, route, or frequency recorded.    OXYGEN-AIR DELIVERY SYSTEMS [facility]   oxygen, See Instructions, home concentrator and portable @ 2L per nasal cannula continuous to keep SATS at 88% or greater J18.9, C34.9, J96.9, R09.02, # 1 EA, 0 Refill(s)    tiZANidine (ZANAFLEX) 4 MG tablet No dose, route, or frequency recorded.       Current Inpatient Medications   acetaminophen  650 mg Oral Once    amiodarone  200 mg Oral BID    ceFEPime (MAXIPIME) IVPB  1 g Intravenous Q8H    hydrocortisone sodium  succinate  100 mg Intravenous Q8H    insulin aspart U-100  0-10 Units Subcutaneous TID    insulin detemir U-100  3 Units Subcutaneous QHS    magnesium sulfate IVPB  1 g Intravenous Once    pantoprozole (PROTONIX) IV  40 mg Intravenous Daily         Intake/Output Summary (Last 24 hours) at 11/13/2022 0544  Last data filed at 11/13/2022 0500  Gross per 24 hour   Intake 3349.82 ml   Output 1000 ml   Net 2349.82 ml         Review of Systems   Unable to perform ROS: Intubated      Vital Signs (Most Recent):  Temp: 97.7 °F (36.5 °C) (11/13/22 0330)  Pulse: (!) 58 (11/13/22 0500)  Resp: (!) 26 (11/13/22 0500)  BP: (!) 115/56 (11/13/22 0500)  SpO2: 100 % (11/13/22 0500)    Body mass index is 33.48 kg/m².  Weight: 94.1 kg (207 lb 7.3 oz) Vital Signs (24h Range):  Temp:  [97 °F (36.1 °C)-98 °F (36.7 °C)] 97.7 °F (36.5 °C)  Pulse:  [58-95] 58  Resp:  [19-28] 26  SpO2:  [93 %-100 %] 100 %  BP: (115-174)/(40-63) 115/56  Arterial Line BP: (136-180)/(35-60) 149/46     Physical Exam  Vitals and nursing note reviewed.   Constitutional:       General: She is not in acute distress.     Appearance: She is ill-appearing. She is not toxic-appearing.      Comments: Intubated and sedated   HENT:      Head: Normocephalic and atraumatic.      Nose: Nose normal.      Mouth/Throat:      Mouth: Mucous membranes are moist.      Pharynx: Oropharynx is clear.   Eyes:      Extraocular Movements: Extraocular movements intact.      Conjunctiva/sclera: Conjunctivae normal.      Pupils: Pupils are equal, round, and reactive to light.   Cardiovascular:      Rate and Rhythm: Normal rate and regular rhythm.      Pulses: Normal pulses.      Heart sounds: Normal heart sounds. No murmur heard.    No gallop.   Pulmonary:      Effort: No respiratory distress.      Breath sounds: No stridor. No wheezing, rhonchi or rales.   Abdominal:      General: Abdomen is flat.      Palpations: Abdomen is soft.   Musculoskeletal:         General: No swelling or deformity.       Right lower leg: No edema.      Left lower leg: No edema.   Skin:     General: Skin is warm and dry.      Coloration: Skin is not jaundiced.      Findings: No bruising.   Neurological:      General: No focal deficit present.      Mental Status: She is alert and oriented to person, place, and time.      Comments: Patient sedated and therefore unable to accurately exam       Mechanical ventilation support:  Vent Mode: A/C (11/13/22 0300)  Set Rate: 26 BPM (11/13/22 0300)  Vt Set: 450 mL (11/13/22 0300)  PEEP/CPAP: 5 cmH20 (11/13/22 0300)  Oxygen Concentration (%): 45 (11/13/22 0300)  Peak Airway Pressure: 30.6 cmH2O (11/13/22 0300)  Plateau Pressure: 25.3 cmH20 (11/12/22 1925)  Total Ve: 10.9 L/m (11/13/22 0300)  F/VT Ratio<105 (RSBI): (!) 62.2 (11/13/22 0300)    Lines/Drains/Airways       Central Venous Catheter Line  Duration             Port A Cath Single Lumen right subclavian -- days    Percutaneous Central Line Insertion/Assessment - Triple Lumen  11/10/22 1545 right femoral vein;right femoral 2 days              Drain  Duration                  NG/OG Tube 11/02/22 1355 [place] sump 16 Fr. Left mouth 10 days         Urethral Catheter 11/02/22 1400 Silicone 16 Fr. 10 days         Rectal Tube 11/07/22 0408 rectal tube w/ balloon (indicate number of mLs) 6 days              Airway  Duration                  Airway - Non-Surgical 11/02/22 1400 Endotracheal Tube 10 days              Arterial Line  Duration             Arterial Line 11/07/22 1340 Right Radial 5 days                    Significant Labs:    Lab Results   Component Value Date    WBC 9.0 11/13/2022    HGB 6.9 (L) 11/13/2022    HCT 22.1 (L) 11/13/2022    MCV 93.2 11/13/2022     11/13/2022         BMP  Lab Results   Component Value Date     11/13/2022    K 3.6 11/13/2022    CO2 21 (L) 11/13/2022    BUN 48.0 (H) 11/13/2022    CREATININE 1.11 (H) 11/13/2022    CALCIUM 8.5 11/13/2022    EGFRNONAA 81 04/22/2022       ABG  Recent Labs   Lab  11/12/22  0549   PH 7.37   PO2 68*   PCO2 41   HCO3 23.7             Significant Imaging:  I have reviewed all pertinent imaging within the past 24 hours.    Assessment/Plan:   1.) Massive Hemoptysis  2.) Hx of Stage IV Poorly Differentiated RUL Lung Carcinoma  3.) Serratia M. Pneumonia with leukocytosis  4.) Hx of Atrial Fibrillation/flutter (not on Eliquis)  5.)  Nonocclusive acute deep vein thrombosis visualized left axillary vein and occlusive thrombus in left cephalic vein.  6.) Hx of COPD (on home 2L NC q.Nightly)  7.) Hx of HTN  8.) Hx of HLD  9.) Hx of PAD    On mechanical ventilation, wean as tolerated.  Daily SBT as able/tolerated  Continue cefepime (day7/10)  Funtitell Positive 11/11 - consult ID  Amio 200 BID  Hgb 6.9 this morning. Type and cross - transfuse 1 unit  Continue vasopressin and wean as tolerated. If severely bradycardic can consider epi vs levophed  Continue Hydrocortisone  Continue Sliding scale insulin and determir 5 units qhs  Prognosis guarded    DVT Prophylaxis:  Continue to hold all anticoagulation including aspirin for ongoing hemoptysis  GI Prophylaxis:  Protonix     Greater than 30 minutes of critical care was time spent personally by me on the following activities: development of treatment plan with patient or surrogate and bedside caregivers, discussions with consultants, evaluation of patient's response to treatment, examination of patient, ordering and performing treatments and interventions, ordering and review of laboratory studies, ordering and review of radiographic studies, pulse oximetry, re-evaluation of patient's condition.  This critical care time did not overlap with that of any other provider or involve time for any procedures.     Pato Rodríguez,   Pulmonary Critical Care Medicine  [facility] - ICU

## 2022-11-13 NOTE — PLAN OF CARE
Problem: Adult Inpatient Plan of Care  Goal: Plan of Care Review  Outcome: Ongoing, Progressing  Goal: Patient-Specific Goal (Individualized)  Outcome: Ongoing, Progressing  Goal: Absence of Hospital-Acquired Illness or Injury  Outcome: Ongoing, Progressing  Goal: Optimal Comfort and Wellbeing  Outcome: Ongoing, Progressing  Goal: Readiness for Transition of Care  Outcome: Ongoing, Progressing     Problem: Infection  Goal: Absence of Infection Signs and Symptoms  Outcome: Ongoing, Progressing     Problem: Skin Injury Risk Increased  Goal: Skin Health and Integrity  Outcome: Ongoing, Progressing     Problem: Communication Impairment (Mechanical Ventilation, Invasive)  Goal: Effective Communication  Outcome: Ongoing, Progressing     Problem: Device-Related Complication Risk (Mechanical Ventilation, Invasive)  Goal: Optimal Device Function  Outcome: Ongoing, Progressing     Problem: Inability to Wean (Mechanical Ventilation, Invasive)  Goal: Mechanical Ventilation Liberation  Outcome: Ongoing, Progressing     Problem: Skin and Tissue Injury (Mechanical Ventilation, Invasive)  Goal: Absence of Device-Related Skin and Tissue Injury  Outcome: Ongoing, Progressing     Problem: Ventilator-Induced Lung Injury (Mechanical Ventilation, Invasive)  Goal: Absence of Ventilator-Induced Lung Injury  Outcome: Ongoing, Progressing     Problem: Communication Impairment (Artificial Airway)  Goal: Effective Communication  Outcome: Ongoing, Progressing     Problem: Device-Related Complication Risk (Artificial Airway)  Goal: Optimal Device Function  Outcome: Ongoing, Progressing     Problem: Skin and Tissue Injury (Artificial Airway)  Goal: Absence of Device-Related Skin or Tissue Injury  Outcome: Ongoing, Progressing     Problem: Noninvasive Ventilation Acute  Goal: Effective Unassisted Ventilation and Oxygenation  Outcome: Ongoing, Progressing     Problem: Fall Injury Risk  Goal: Absence of Fall and Fall-Related Injury  Outcome:  Ongoing, Progressing

## 2022-11-14 NOTE — PLAN OF CARE
Problem: Adult Inpatient Plan of Care  Goal: Optimal Comfort and Wellbeing  Outcome: Ongoing, Progressing     Problem: Infection  Goal: Absence of Infection Signs and Symptoms  Outcome: Ongoing, Progressing     Problem: Skin Injury Risk Increased  Goal: Skin Health and Integrity  Outcome: Ongoing, Progressing     Problem: Skin and Tissue Injury (Mechanical Ventilation, Invasive)  Goal: Absence of Device-Related Skin and Tissue Injury  Outcome: Ongoing, Progressing

## 2022-11-14 NOTE — PROGRESS NOTES
[facility] - ICU  Pulmonary Critical Care Note    Patient Name: Adrienne Urbina  MRN: [id number]  Admission Date: 10/31/2022  Hospital Length of Stay: 13 days  Code Status: Full Code  Attending Provider: Glenn Cantu Jr., MD, *  Primary Care Provider: Gregorio Cherry NP     Subjective:     HPI:   Ms. 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  Patient reports being in her usual state of health until noticing blood streaked sputum that progressed to complete blood filled sputum over the past x1.5 days.  She denies any trauma however does report chronic cough due to COPD which she feels may have contributed to recent symptoms. When asked, patient denies ever experiencing hemoptysis at any point in the past even throughout undergoing chemotherapy and radiation for right upper lobe lung mass (last chemotherapy x1.5 months ago; follows w/ Dr. Shoemaker).  These new symptoms scared her which prompted her ED presentation. Denies any history or DVT/PE. She also denies any current lightheadedness/dizziness, nausea/vomiting, fever/chills, chest pain, palpitations, new/worsening shortness of breath, hematemesis, hematochezia, melena, hematuria. Per chart review; patient was recently discharged from [facility] (10/17) in which she required ICU level care secondary to suspected Afib w/ RVR in addition to anemia requiring x2 units PRBCs and GNR related pneumonia.     Hospital Course/Significant events:  Intubated 11/02/2022    24 Hour Interval History:  Patient is currently sedated and intubated patient is on 50 of Diprivan, vasopressin 0.04.  Ventilator settings are assist control 26/450/peep+ 5/45%.   Patient's diastolic pressures were low however systolic pressures required last night.  Patient's vasopressin was hold and maps were improved    Past Medical History:   Diagnosis Date    Anxiety and depression     Cancer     lung with brain metastasis    Cholelithiasis     COPD (chronic obstructive pulmonary disease)     Hypertension     Lumbar disc disease     PAD (peripheral artery disease)     Paroxysmal SVT (supraventricular tachycardia)     Pleural effusion        Social History     Socioeconomic History    Marital status:    Tobacco Use    Smoking status: Some Days     Packs/day: 0.25     Years: 49.00     Pack years: 12.25     Types: Cigarettes     Last attempt to quit: 2022     Years since quittin.3    Smokeless tobacco: Never   Substance and Sexual Activity    Alcohol use: Never    Drug use: Never    Sexual activity: Yes     Social Determinants of Health     Financial Resource Strain: Low Risk     Difficulty of Paying Living Expenses: Not very hard   Food Insecurity: No Food Insecurity    Worried About Running Out of Food in the Last Year: Never true    Ran Out of Food in the Last Year: Never true   Transportation Needs: No Transportation Needs    Lack of Transportation (Medical): No    Lack of Transportation (Non-Medical): No   Physical Activity: Inactive    Days of Exercise per Week: 0 days    Minutes of Exercise per Session: 0 min   Stress: No Stress Concern Present    Feeling of Stress : Not at all   Social Connections: Moderately Isolated    Frequency of Communication with Friends and Family: More than three times a week    Frequency of Social Gatherings with Friends and Family: More than three times a week    Attends [employer] Services: Never    Active Member of Clubs or Organizations: No    Attends Club or Organization Meetings: Never    Marital Status:    Housing Stability: Low Risk     Unable to Pay for Housing in the Last Year: No    Number of Places Lived in the Last Year: 1     Unstable Housing in the Last Year: No       Objective:     Current Outpatient Medications   Medication Instructions    albuterol (PROVENTIL/VENTOLIN HFA) 90 mcg/actuation inhaler Currently holding due to heart rate    albuterol-ipratropium (DUO-NEB) 2.5 mg-0.5 mg/3 mL nebulizer solution 3 mLs, Nebulization, Every 4 hours PRN, Currently holding due to heartrate    aspirin 81 mg, Oral, Daily    ATROVENT HFA 17 mcg/actuation inhaler Currently holding due to heartrate    cyproheptadine (PERIACTIN) 4 mg, Oral, As needed (PRN)    dicyclomine (BENTYL) 10 mg, Oral, 3 times daily PRN    diltiaZEM (CARDIZEM CD) 360 mg, Oral, Daily    folic acid (FOLVITE) 1 mg, Oral, Daily    gabapentin (NEURONTIN) 300 mg, Oral, 3 times daily    guaiFENesin-codeine 100-10 mg/5 ml (TUSSI-ORGANIDIN NR)  mg/5 mL syrup 10 mLs, Oral, As needed (PRN)    HUMULIN R REGULAR U-100 INSULN 100 unit/mL injection Holding since in patient stay    HYDROcodone-acetaminophen (NORCO)  mg per tablet 1 tablet, Oral, Every 4 hours PRN    loratadine (CLARITIN) 10 mg, Oral, Daily    metFORMIN (GLUCOPHAGE) 500 mg, Oral, 2 times daily    metoprolol tartrate (LOPRESSOR) 100 mg, Oral, 2 times daily    mirtazapine (REMERON) 15 mg, Oral, Nightly    montelukast (SINGULAIR) 10 mg, Oral, As needed (PRN)    ONETOUCH DELICA PLUS LANCET 33 gauge Misc No dose, route, or frequency recorded.    ONETOUCH VERIO REFLECT METER Misc No dose, route, or frequency recorded.    ONETOUCH VERIO TEST STRIPS Strp No dose, route, or frequency recorded.    OXYGEN-AIR DELIVERY SYSTEMS [facility]   oxygen, See Instructions, home concentrator and portable @ 2L per nasal cannula continuous to keep SATS at 88% or greater J18.9, C34.9, J96.9, R09.02, # 1 EA, 0 Refill(s)    tiZANidine (ZANAFLEX) 4 MG tablet No dose, route, or frequency recorded.       Current Inpatient Medications   acetaminophen  650 mg Oral Once    amiodarone  200 mg Oral BID    ceFEPime (MAXIPIME) IVPB  1 g Intravenous Q8H     hydrocortisone sodium succinate  100 mg Intravenous Q8H    insulin detemir U-100  5 Units Subcutaneous QHS    pantoprozole (PROTONIX) IV  40 mg Intravenous Daily         Intake/Output Summary (Last 24 hours) at 11/14/2022 0632  Last data filed at 11/14/2022 0507  Gross per 24 hour   Intake 2034.78 ml   Output 1370 ml   Net 664.78 ml         Review of Systems   Unable to perform ROS: Intubated      Vital Signs (Most Recent):  Temp: 97.8 °F (36.6 °C) (11/14/22 0306)  Pulse: 64 (11/14/22 0600)  Resp: (!) 26 (11/14/22 0600)  BP: (!) 131/47 (11/14/22 0306)  SpO2: 99 % (11/14/22 0600)    Body mass index is 32.38 kg/m².  Weight: 91 kg (200 lb 9.9 oz) Vital Signs (24h Range):  Temp:  [97.7 °F (36.5 °C)-98 °F (36.7 °C)] 97.8 °F (36.6 °C)  Pulse:  [52-98] 64  Resp:  [26-35] 26  SpO2:  [97 %-100 %] 99 %  BP: (116-184)/(44-72) 131/47  Arterial Line BP: (123-214)/(39-62) 157/54     Physical Exam  Vitals and nursing note reviewed.   Constitutional:       General: She is not in acute distress.     Appearance: She is ill-appearing. She is not toxic-appearing.      Comments: Intubated and sedated   HENT:      Head: Normocephalic and atraumatic.      Nose: Nose normal.      Mouth/Throat:      Mouth: Mucous membranes are moist.      Pharynx: Oropharynx is clear.   Eyes:      Extraocular Movements: Extraocular movements intact.      Conjunctiva/sclera: Conjunctivae normal.      Pupils: Pupils are equal, round, and reactive to light.   Cardiovascular:      Rate and Rhythm: Normal rate and regular rhythm.      Pulses: Normal pulses.      Heart sounds: Normal heart sounds. No murmur heard.    No gallop.   Pulmonary:      Effort: No respiratory distress.      Breath sounds: No stridor. No wheezing, rhonchi or rales.   Abdominal:      General: Abdomen is flat.      Palpations: Abdomen is soft.   Musculoskeletal:         General: No swelling or deformity.      Right lower leg: No edema.      Left lower leg: No edema.   Skin:     General:  Skin is warm and dry.      Coloration: Skin is not jaundiced.      Findings: No bruising.   Neurological:      General: No focal deficit present.      Mental Status: She is alert and oriented to person, place, and time.      Comments: Patient sedated and therefore unable to accurately exam       Mechanical ventilation support:  Vent Mode: A/C (11/14/22 0500)  Set Rate: 26 BPM (11/14/22 0500)  Vt Set: 450 mL (11/14/22 0500)  PEEP/CPAP: 5 cmH20 (11/14/22 0500)  Oxygen Concentration (%): 45 (11/14/22 0500)  Peak Airway Pressure: 30.3 cmH2O (11/14/22 0500)  Plateau Pressure: 23.6 cmH20 (11/13/22 1925)  Total Ve: 10.8 L/m (11/14/22 0500)  F/VT Ratio<105 (RSBI): (!) 62.35 (11/14/22 0500)    Lines/Drains/Airways       Central Venous Catheter Line  Duration             Port A Cath Single Lumen right subclavian -- days    Percutaneous Central Line Insertion/Assessment - Triple Lumen  11/10/22 1545 right femoral vein;right femoral 3 days              Drain  Duration                  NG/OG Tube 11/02/22 1355 [place] sump 16 Fr. Left mouth 11 days         Urethral Catheter 11/02/22 1400 Silicone 16 Fr. 11 days         Rectal Tube 11/07/22 0408 rectal tube w/ balloon (indicate number of mLs) 7 days              Airway  Duration                  Airway - Non-Surgical 11/02/22 1400 Endotracheal Tube 11 days              Arterial Line  Duration             Arterial Line 11/07/22 1340 Right Radial 6 days                    Significant Labs:    Lab Results   Component Value Date    WBC 9.6 11/14/2022    HGB 8.7 (L) 11/14/2022    HCT 26.9 (L) 11/14/2022    MCV 90.9 11/14/2022     11/14/2022         BMP  Lab Results   Component Value Date     (L) 11/14/2022    K 3.0 (L) 11/14/2022    CO2 21 (L) 11/14/2022    BUN 46.6 (H) 11/14/2022    CREATININE 1.08 (H) 11/14/2022    CALCIUM 9.0 11/14/2022    EGFRNONAA 81 04/22/2022       ABG  Recent Labs   Lab 11/14/22  0611   PH 7.39   PO2 85   PCO2 37   HCO3 22.4             Significant  Imaging:  I have reviewed all pertinent imaging within the past 24 hours.    Assessment/Plan:   1.) Massive Hemoptysis  2.) Hx of Stage IV Poorly Differentiated RUL Lung Carcinoma  3.) Serratia M. Pneumonia with leukocytosis  4.) Hx of Atrial Fibrillation/flutter (not on Eliquis)  5.)  Nonocclusive acute deep vein thrombosis visualized left axillary vein and occlusive thrombus in left cephalic vein.  6.) Hx of COPD (on home 2L NC q.Nightly)  7.) Hx of HTN  8.) Hx of HLD  9.) Hx of PAD    On mechanical ventilation, wean as tolerated.  Daily SBT as able/tolerated  Continue cefepime (day 8/10)  Funtitell Positive 11/11 - Will likely need antifungal. Voriconazole.  Amio 200 BID  Continue Hydrocortisone will start to wean  Continue Sliding scale insulin and determir 5 units qhs  Patient has black at suction and patient also has residuals.  Patient may require imaging to delineate if there is any obstructive pattern.  Will start patient on IV 80 Protonix and switch to b.i.d. dosing.  Prognosis guarded.  This is day 11 of endotracheal tube intubation.  Will discuss with patient's family with regards for possible tracheostomy versus hospice    DVT Prophylaxis:  Continue to hold all anticoagulation including aspirin for ongoing hemoptysis  GI Prophylaxis:  Protonix     Greater than 30 minutes of critical care was time spent personally by me on the following activities: development of treatment plan with patient or surrogate and bedside caregivers, discussions with consultants, evaluation of patient's response to treatment, examination of patient, ordering and performing treatments and interventions, ordering and review of laboratory studies, ordering and review of radiographic studies, pulse oximetry, re-evaluation of patient's condition.  This critical care time did not overlap with that of any other provider or involve time for any procedures.     Ebnoy Leal MD  Pulmonary Critical Care Medicine  [facility]  - ICU

## 2022-11-15 NOTE — CONSULTS
Inpatient Nutrition Assessment    Admit Date: 10/31/2022   Total duration of encounter: 15 days     Nutrition Recommendation/Prescription     Pt remains NPO/intubated . Propofol infusing @ 18.4  ml/hr providing 485 calories. RN reported TF off again 2 elevated residual; on reglan for Gi motility. (See TPN recs below/ suggest TPN until pt able to tolerate enteral feeding). As tolerated--Retry TF Peptamen AF @ 20ml/hr; increase as tolerated to goal rate 55ml/hr (23 hr cycle) provide 1518 calories, 96 gm protein, 1027 ml free water. (TF + propofol = 2064 calories). Flush tube with 100 ml water every 4hrs.   When extubated ADAT to cardiac with boost plus bid  MVI/fe  Biweekly wt  If pt not able to tolerate TF then consider TPN: Clinimix 5/20 @ 75ml/hr (no lipids while on propofol) to provide 1584 caloreis, 90 gm protein. (TPN + propofol = 2069 kcal)   Will monitor nutrition status     Communication of Recommendations: reviewed with nurse    Nutrition Assessment     Malnutrition Assessment/Nutrition-Focused Physical Exam    Malnutrition in the context of chronic illness  Degree of Malnutrition: non-severe (moderate) malnutrition  Energy Intake: < 75% of estimated energy requirement for >/= 1 month  Interpretation of Weight Loss: does not meet criteria  Body Fat: mild depletion  Area of Body Fat Loss: orbital region  and upper arm region - triceps / biceps  Muscle Mass Loss: does not meet criteria  Area of Muscle Mass Loss: does not meet criteria  Fluid Accumulation: does not meet criteria  Edema: 2+ edema - mild and does not meet criteria  Reduced  Strength: unable to obtain  A minimum of two characteristics is recommended for diagnosis of either severe or non-severe malnutrition.    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 (diagnosed 09/2020, s/p radiation, currently receiving chemotherapy q3 weeks), atrial fibrillation (not on Eliquis), HTN, HLD, PAD, COPD (on nightly 2 L NC), tobacco use, and cholelithiasis    Nutrition-Related Medications:  pantoprazole,   precedex, levophed, propofol ; vasopressin   Calorie Containing IV Medications: Diprivan @ 18 ml/hr (provides 485 kcal/d)    Nutrition-Related Labs:  (11-11) H/H 8.1/25.7(L) Gluc 201(H) Bun 32.2(H) Cr 1.1 Alk Phos 179(H) Alb 1.1 K 3.9   (11-15) H/H 9.9/31.7(L)  Gluc 99 Bun 41(H) Cr 1.1(H) K 2.9(L) Alb 1.5(L) P 2.9   Diet/PN Order: Diet NPO  Oral Supplement Order: none  Tube Feeding Order:    (see below for calculation)  Appetite/Oral Intake: NPO/NPO  Factors Affecting Nutritional Intake: NPO and on mechanical ventilation  Food/Confucianism/Cultural Preferences: none reported  Food Allergies: none reported       Wound(s):   none reported     Comments  (11/15) Pt NPO/vent; propofol @ 18ml/hr; pt remains on levophed/vasopressin; staff to have discussion today about pt plan of care option. Pt not tolerating TF even with reglan ; if family desires to continue nutrition support may need to use TPN until TF tolerated; see recs. Wt remains elevated/ fluid.     (11/11) Pt remains on vent/propofol @ 20ml/hr; RN reported TF currently off 2 elevated residual of > 600 ml; suggested Reglan for Gi motility. Wt remains elevaeted/+ edema. TPN recs provided incase pt unable to tolerate enteral feedings. Labs acknowledged.     (11/8) Pt remains NPO/vent; propofol @ 18ml/hr; TF infusing at goal rate 55ml/hr; aware pt poor prognosis; family desires full code at this time. Suggest continue current nutrition support. Labs acknowledged. Noted wt elevated--? Fluid/vs bedscale accuracy.     (11/4) pt remains NPO x 3 days today/  "vent; consulted for TF recs; will initiate TF as per protocol; spoke to RN about TF order. Pt remains on propofol @ 16ml/hr--providing 425 calories.     (11/3) Pt intubated yesterday (); on propofol @ 18ml/hr; has OGT; TF recs provided to maintain nutrition status; will forward to MD. Labs akcnowledged.     : Pt currently NPO during rounds; reports some nausea/vomiting, no other GI complaints. Pt reports good appetite and no recent wt loss pta; #. No c/s difficulties reported. Pt ok to add Boost Plus once diet advanced.    Anthropometrics    Height: 5' 6" (167.6 cm) Height Method: Stated  Last Weight: 92.7 kg (204 lb 5.9 oz) (11/15/22 0500) Weight Method: Bed Scale  BMI (Calculated): 33  BMI Classification: overweight (BMI 25-29.9)     Ideal Body Weight (IBW), Female: 130 lb     % Ideal Body Weight, Female (lb): 130.77 %                    Usual Body Weight (UBW), k.91 kg (# per pt)  % Usual Body Weight: 101.8     Usual Weight Provided By: patient and EMR weight history    Wt Readings from Last 5 Encounters:   11/15/22 92.7 kg (204 lb 5.9 oz)   10/21/22 75.7 kg (166 lb 12.8 oz)   10/16/22 77.7 kg (171 lb 4.8 oz)   22 74.4 kg (164 lb)   09/10/22 75.3 kg (166 lb)     Weight Change(s) Since Admission:  Admit Weight: 77.1 kg (170 lb) (22 0552)  (11/3) 77.8kg   () wt elevated 88kg; ? Fluid/vs accuracy bed   () 87.3kg; wt remains elevated/edema   (11/15) 92.7kg; wt elevaetd/ edema   Estimated Needs    Weight Used For Calorie Calculations: 77.8 kg (171 lb 8.3 oz)  Energy Calorie Requirements (kcal): 1945 kcal/d; 25 maryjane/kg vent  Energy Need Method: Kcal/kg  Weight Used For Protein Calculations: 77.8 kg (171 lb 8.3 oz)  Protein Requirements: 94 gm protein/d; 1.2 gm/kg  Fluid Requirements (mL): 1945ml/d; 1ml/maryjane  Temp: 98.2 °F (36.8 °C)       Enteral Nutrition    Formula: Peptamen AF  Rate/Volume: TF off   Water Flushes: 100 ml every 4 hrs   Additives/Modulars: none at this " time  Route: orogastric tube  Method: continuous  Total Nutrition Provided by Tube Feeding, Additives, and Flushes:  Calories Provided  0 kcal/d, 0% needs   Protein Provided  0 g/d, 0 % needs   Fluid Provided  0  ml/d, 0% needs   Continuous feeding calculations based on estimated 20 hr/d run time unless otherwise stated.    Parenteral Nutrition    Patient not receiving parenteral nutrition support at this time.    Evaluation of Received Nutrient Intake    Calories: not meeting estimated needs  Protein: not meeting estimated needs    Patient Education    Not applicable.    Nutrition Diagnosis     PES: Inadequate oral intake related to vent as evidenced by NPO status . (continues)    Interventions/Goals     Intervention(s): modified composition of meals/snacks, modified composition of enteral nutrition, modified rate of enteral nutrition, commercial beverage, multivitamin/mineral supplement therapy, and collaboration with other providers  Goal: Meet greater than 75% of nutritional needs by follow-up. (goal not met)    Monitoring & Evaluation     Dietitian will monitor food and beverage intake, enteral nutrition intake, and weight change.  Nutrition Risk/Follow-Up: high (follow-up in 1-4 days)   Please consult if re-assessment needed sooner.

## 2022-11-15 NOTE — PROGRESS NOTES
[facility] - ICU  Pulmonary Critical Care Note    Patient Name: Adrienne Urbina  MRN: [id number]  Admission Date: 10/31/2022  Hospital Length of Stay: 14 days  Code Status: Full Code  Attending Provider: Glenn Cantu Jr., MD, *  Primary Care Provider: Gregorio Cherry NP     Subjective:     HPI:   Ms. 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  Patient reports being in her usual state of health until noticing blood streaked sputum that progressed to complete blood filled sputum over the past x1.5 days.  She denies any trauma however does report chronic cough due to COPD which she feels may have contributed to recent symptoms. When asked, patient denies ever experiencing hemoptysis at any point in the past even throughout undergoing chemotherapy and radiation for right upper lobe lung mass (last chemotherapy x1.5 months ago; follows w/ Dr. Shoemaker).  These new symptoms scared her which prompted her ED presentation. Denies any history or DVT/PE. She also denies any current lightheadedness/dizziness, nausea/vomiting, fever/chills, chest pain, palpitations, new/worsening shortness of breath, hematemesis, hematochezia, melena, hematuria. Per chart review; patient was recently discharged from [facility] (10/17) in which she required ICU level care secondary to suspected Afib w/ RVR in addition to anemia requiring x2 units PRBCs and GNR related pneumonia.     Hospital Course/Significant events:  Intubated 11/02/2022  Patient was initiated on stress dose steroids on 11/10, steroids were stopped on 11/14 when patient's he went that mimics were stable.    11/14 patient was initiated on voriconazole    Re-initiated stress dose steroids on 11/15 secondary to hypotension requiring increasing vasopressor requirements.    24 Hour Interval History:  Patient is currently sedated and intubated.  Patient is currently sedated on 40 of Diprivan, point for Precedex.  Patient is requiring vasopressors of 0.02 of Levophed and 0.04 of vasopressin.  Patient is currently on AC 26/450/peep positive 5/45% FiO2.  ABG is 7.35/45/69/23.2.  I met with patient's  yesterday.  Patient's  is not the power of , and he is not aware of any wishes that she would have wanted.  I was supposed to meet with patient's eldest sister the power-of- however she did not show up for visit.  I have contacted her to have a visit with them today.  Patient was transfused 1 unit of packed red blood cell, on  with clinical response.  Patient's urine output was 2 L.    Past Medical History:   Diagnosis Date    Anxiety and depression     Cancer     lung with brain metastasis    Cholelithiasis     COPD (chronic obstructive pulmonary disease)     Hypertension     Lumbar disc disease     PAD (peripheral artery disease)     Paroxysmal SVT (supraventricular tachycardia)     Pleural effusion        Social History     Socioeconomic History    Marital status:    Tobacco Use    Smoking status: Some Days     Packs/day: 0.25     Years: 49.00     Pack years: 12.25     Types: Cigarettes     Last attempt to quit: 2022     Years since quittin.3    Smokeless tobacco: Never   Substance and Sexual Activity    Alcohol use: Never    Drug use: Never    Sexual activity: Yes     Social Determinants of Health     Financial Resource Strain: Low Risk     Difficulty of Paying Living Expenses: Not very hard   Food Insecurity: No Food Insecurity    Worried About Running Out of Food in the Last Year: Never true    Ran Out of Food in the Last Year: Never true   Transportation Needs: No Transportation Needs    Lack of Transportation  (Medical): No    Lack of Transportation (Non-Medical): No   Physical Activity: Inactive    Days of Exercise per Week: 0 days    Minutes of Exercise per Session: 0 min   Stress: No Stress Concern Present    Feeling of Stress : Not at all   Social Connections: Moderately Isolated    Frequency of Communication with Friends and Family: More than three times a week    Frequency of Social Gatherings with Friends and Family: More than three times a week    Attends Yazidism Services: Never    Active Member of Clubs or Organizations: No    Attends Club or Organization Meetings: Never    Marital Status:    Housing Stability: Low Risk     Unable to Pay for Housing in the Last Year: No    Number of Places Lived in the Last Year: 1    Unstable Housing in the Last Year: No       Objective:     Current Outpatient Medications   Medication Instructions    albuterol (PROVENTIL/VENTOLIN HFA) 90 mcg/actuation inhaler Currently holding due to heart rate    albuterol-ipratropium (DUO-NEB) 2.5 mg-0.5 mg/3 mL nebulizer solution 3 mLs, Nebulization, Every 4 hours PRN, Currently holding due to heartrate    aspirin 81 mg, Oral, Daily    ATROVENT HFA 17 mcg/actuation inhaler Currently holding due to heartrate    cyproheptadine (PERIACTIN) 4 mg, Oral, As needed (PRN)    dicyclomine (BENTYL) 10 mg, Oral, 3 times daily PRN    diltiaZEM (CARDIZEM CD) 360 mg, Oral, Daily    folic acid (FOLVITE) 1 mg, Oral, Daily    gabapentin (NEURONTIN) 300 mg, Oral, 3 times daily    guaiFENesin-codeine 100-10 mg/5 ml (TUSSI-ORGANIDIN NR)  mg/5 mL syrup 10 mLs, Oral, As needed (PRN)    HUMULIN R REGULAR U-100 INSULN 100 unit/mL injection Holding since in patient stay    HYDROcodone-acetaminophen (NORCO)  mg per tablet 1 tablet, Oral, Every 4 hours PRN    loratadine (CLARITIN) 10 mg, Oral, Daily    metFORMIN (GLUCOPHAGE) 500 mg, Oral, 2 times daily    metoprolol tartrate (LOPRESSOR) 100 mg, Oral, 2 times daily    mirtazapine (REMERON) 15 mg,  Oral, Nightly    montelukast (SINGULAIR) 10 mg, Oral, As needed (PRN)    ONETOUCH DELICA PLUS LANCET 33 gauge Misc No dose, route, or frequency recorded.    ONETOUCH VERIO REFLECT METER Misc No dose, route, or frequency recorded.    ONETOUCH VERIO TEST STRIPS Strp No dose, route, or frequency recorded.    OXYGEN-AIR DELIVERY SYSTEMS MIS   oxygen, See Instructions, home concentrator and portable @ 2L per nasal cannula continuous to keep SATS at 88% or greater J18.9, C34.9, J96.9, R09.02, # 1 EA, 0 Refill(s)    tiZANidine (ZANAFLEX) 4 MG tablet No dose, route, or frequency recorded.       Current Inpatient Medications   acetaminophen  650 mg Oral Once    amiodarone  200 mg Oral BID    hydrocortisone sodium succinate  100 mg Intravenous Q12H    insulin detemir U-100  5 Units Subcutaneous QHS    magnesium sulfate IVPB  1 g Intravenous Once    pantoprozole (PROTONIX) IV  40 mg Intravenous Q12H    voriconazole  200 mg Oral BID         Intake/Output Summary (Last 24 hours) at 11/15/2022 0607  Last data filed at 11/15/2022 0426  Gross per 24 hour   Intake 1641.18 ml   Output 2550 ml   Net -908.82 ml         Review of Systems   Unable to perform ROS: Intubated      Vital Signs (Most Recent):  Temp: 98.2 °F (36.8 °C) (11/15/22 0326)  Pulse: 76 (11/15/22 0552)  Resp: (!) 26 (11/15/22 0552)  BP: (!) 84/48 (11/15/22 0552)  SpO2: 99 % (11/15/22 0552)    Body mass index is 32.99 kg/m².  Weight: 92.7 kg (204 lb 5.9 oz) Vital Signs (24h Range):  Temp:  [97.6 °F (36.4 °C)-99.8 °F (37.7 °C)] 98.2 °F (36.8 °C)  Pulse:  [] 76  Resp:  [24-34] 26  SpO2:  [95 %-100 %] 99 %  BP: ()/(48-73) 84/48  Arterial Line BP: ()/(36-58) 85/36     Physical Exam  Vitals and nursing note reviewed.   Constitutional:       General: She is not in acute distress.     Appearance: She is ill-appearing. She is not toxic-appearing.      Comments: Intubated and sedated   HENT:      Head: Normocephalic and atraumatic.      Nose: Nose normal.       Mouth/Throat:      Mouth: Mucous membranes are moist.      Pharynx: Oropharynx is clear.   Eyes:      Extraocular Movements: Extraocular movements intact.      Conjunctiva/sclera: Conjunctivae normal.      Pupils: Pupils are equal, round, and reactive to light.   Cardiovascular:      Rate and Rhythm: Normal rate and regular rhythm.      Pulses: Normal pulses.      Heart sounds: Normal heart sounds. No murmur heard.    No gallop.   Pulmonary:      Effort: No respiratory distress.      Breath sounds: No stridor. No wheezing, rhonchi or rales.   Abdominal:      General: Abdomen is flat.      Palpations: Abdomen is soft.   Musculoskeletal:         General: No swelling or deformity.      Right lower leg: No edema.      Left lower leg: No edema.   Skin:     General: Skin is warm and dry.      Coloration: Skin is not jaundiced.      Findings: No bruising.   Neurological:      General: No focal deficit present.      Mental Status: She is alert and oriented to person, place, and time.      Comments: Patient sedated and therefore unable to accurately exam       Mechanical ventilation support:  Vent Mode: A/C (11/15/22 0315)  Set Rate: 26 BPM (11/15/22 0115)  Vt Set: 450 mL (11/15/22 0315)  PEEP/CPAP: 5 cmH20 (11/15/22 0315)  Oxygen Concentration (%): 45 (11/15/22 0115)  Peak Airway Pressure: 31.6 cmH2O (11/15/22 0315)  Plateau Pressure: 26.9 cmH20 (11/14/22 1925)  Total Ve: 10.9 L/m (11/15/22 0315)  F/VT Ratio<105 (RSBI): (!) 69.05 (11/15/22 0115)    Lines/Drains/Airways       Central Venous Catheter Line  Duration             Port A Cath Single Lumen right subclavian -- days    Percutaneous Central Line Insertion/Assessment - Triple Lumen  11/10/22 1545 right femoral vein;right femoral 4 days              Drain  Duration                  NG/OG Tube 11/02/22 1355 Parker sump 16 Fr. Left mouth 12 days         Urethral Catheter 11/02/22 1400 Silicone 16 Fr. 12 days         Rectal Tube 11/07/22 0408 rectal tube w/ balloon  (indicate number of mLs) 8 days              Airway  Duration                  Airway - Non-Surgical 11/02/22 1400 Endotracheal Tube 12 days              Arterial Line  Duration             Arterial Line 11/07/22 1340 Right Radial 7 days                    Significant Labs:    Lab Results   Component Value Date    WBC 21.2 (H) 11/15/2022    HGB 9.9 (L) 11/15/2022    HCT 31.7 (L) 11/15/2022    MCV 94.6 (H) 11/15/2022     11/15/2022         BMP  Lab Results   Component Value Date     11/15/2022    K 2.9 (L) 11/15/2022    CO2 21 (L) 11/15/2022    BUN 41.4 (H) 11/15/2022    CREATININE 1.10 (H) 11/15/2022    CALCIUM 9.0 11/15/2022    EGFRNONAA 81 04/22/2022       ABG  Recent Labs   Lab 11/15/22  0544   PH 7.35   PO2 69*   PCO2 42   HCO3 23.2             Significant Imaging:  I have reviewed all pertinent imaging within the past 24 hours.    Assessment/Plan:   1.) Massive Hemoptysis  2.) Hx of Stage IV Poorly Differentiated RUL Lung Carcinoma  3.) Serratia M. Pneumonia with leukocytosis  4.) Hx of Atrial Fibrillation/flutter (not on Eliquis)  5.)  Nonocclusive acute deep vein thrombosis visualized left axillary vein and occlusive thrombus in left cephalic vein.  6.) Hx of COPD (on home 2L NC q.Nightly)  7.) Hx of HTN  8.) Hx of HLD  9.) Hx of PAD    -On mechanical ventilation, wean as tolerated.  Daily SBT as able/tolerated  -Cefepime was discontinued after 8 days.  -Patient is showing signs of potassium spilling, will get urine studies to delineate.  Patient was initiated on voriconazole. However, Aspergillus antigen has came back negative will more than likely switch to micafungin.  -Amio 200 BID, QTC is not prolonged  -with patient's hemodynamics being tenable will restart patient on stress dose steroids at q.12 hours, and switch patient from Diprivan to fentanyl.    -patient now has a leukocytosis, however she is afebrile.  Will touch base with intensivist with regards to restarting antibiotics.    -with  patient's spilling of potassium will get urine studies     Prognosis guarded.  This is day 12 of endotracheal tube intubation.  Will discuss with patient's family with regards for possible tracheostomy versus hospice    DVT Prophylaxis:  Continue to hold all anticoagulation including aspirin for ongoing hemoptysis  GI Prophylaxis:  Protonix     Greater than 30 minutes of critical care was time spent personally by me on the following activities: development of treatment plan with patient or surrogate and bedside caregivers, discussions with consultants, evaluation of patient's response to treatment, examination of patient, ordering and performing treatments and interventions, ordering and review of laboratory studies, ordering and review of radiographic studies, pulse oximetry, re-evaluation of patient's condition.  This critical care time did not overlap with that of any other provider or involve time for any procedures.     Ebony Leal MD  Pulmonary Critical Care Medicine  [facility] - ICU

## 2022-11-16 NOTE — PROGRESS NOTES
Patient is seen at bedside and chart reviewed due to low abdi score of 11 indicating increased risk of skin breakdown. Pt is currently intubated and sedated. Nursing reports intact skin. Pt is in a specialty air bed with the turn feature activated. Nursing is also turning with wedge. Pt has heel protectors in place as well as pillows between bony prominences. Rectal tube and lorenzo in place for moisture management. Pressure prevention order set has been place since 11/3/22. Nursing to continue prevention measures and notify wound care of any changes.

## 2022-11-16 NOTE — PROGRESS NOTES
[facility] - ICU  Pulmonary Critical Care Note    Patient Name: Adrienne Urbina  MRN: [id number]  Admission Date: 10/31/2022  Hospital Length of Stay: 15 days  Code Status: Full Code  Attending Provider: Glnen Cantu Jr., MD, *  Primary Care Provider: Gregorio Cherry NP     Subjective:     HPI:   Ms. 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  Patient reports being in her usual state of health until noticing blood streaked sputum that progressed to complete blood filled sputum over the past x1.5 days.  She denies any trauma however does report chronic cough due to COPD which she feels may have contributed to recent symptoms. When asked, patient denies ever experiencing hemoptysis at any point in the past even throughout undergoing chemotherapy and radiation for right upper lobe lung mass (last chemotherapy x1.5 months ago; follows w/ Dr. Shoemaker).  These new symptoms scared her which prompted her ED presentation. Denies any history or DVT/PE. She also denies any current lightheadedness/dizziness, nausea/vomiting, fever/chills, chest pain, palpitations, new/worsening shortness of breath, hematemesis, hematochezia, melena, hematuria. Per chart review; patient was recently discharged from [facility] (10/17) in which she required ICU level care secondary to suspected Afib w/ RVR in addition to anemia requiring x2 units PRBCs and GNR related pneumonia.     Hospital Course/Significant events:  Intubated 11/02/2022  Patient was initiated on stress dose steroids on 11/10, steroids were stopped on 11/14 when patient's he went that mimics were stable.    11/14 patient was initiated on voriconazole        24 Hour Interval History:  Patient is currently sedated and intubated.  Patient is currently sedated on 20 Diprivan, 75 of fentanyl and .2 Precedex.  Patient is requiring 0.04 of vasopressin.  Patient is currently on assist control 450/26/5+/45% FiO2.  Per nursing patient has been having bloody secretions through her ET tube.  On my examination this morning patient has blood coming out of her ET tube.  Discussions were had with patient's family and they are aware of the severity of patient's disease and that her prognosis is guarded at best.  Patient's family is aware that this is day 13 of endotracheal tube intubation and that they need to make a decision with regards to goals of care.  I have made known that there is inpatient hospice available to them as well.  They will talk his family and come back to us with a decision later this week    Past Medical History:   Diagnosis Date    Anxiety and depression     Cancer     lung with brain metastasis    Cholelithiasis     COPD (chronic obstructive pulmonary disease)     Hypertension     Lumbar disc disease     PAD (peripheral artery disease)     Paroxysmal SVT (supraventricular tachycardia)     Pleural effusion        Social History     Socioeconomic History    Marital status:    Tobacco Use    Smoking status: Some Days     Packs/day: 0.25     Years: 49.00     Pack years: 12.25     Types: Cigarettes     Last attempt to quit: 2022     Years since quittin.3    Smokeless tobacco: Never   Substance and Sexual Activity    Alcohol use: Never    Drug use: Never    Sexual activity: Yes     Social Determinants of Health     Financial Resource Strain: Low Risk     Difficulty of Paying Living Expenses: Not very hard   Food Insecurity: No Food Insecurity    Worried About Running Out of Food in the Last Year: Never true    Ran Out of Food in the Last Year: Never true   Transportation Needs: No Transportation Needs    Lack of Transportation (Medical): No     Lack of Transportation (Non-Medical): No   Physical Activity: Inactive    Days of Exercise per Week: 0 days    Minutes of Exercise per Session: 0 min   Stress: No Stress Concern Present    Feeling of Stress : Not at all   Social Connections: Moderately Isolated    Frequency of Communication with Friends and Family: More than three times a week    Frequency of Social Gatherings with Friends and Family: More than three times a week    Attends Hindu Services: Never    Active Member of Clubs or Organizations: No    Attends Club or Organization Meetings: Never    Marital Status:    Housing Stability: Low Risk     Unable to Pay for Housing in the Last Year: No    Number of Places Lived in the Last Year: 1    Unstable Housing in the Last Year: No       Objective:     Current Outpatient Medications   Medication Instructions    albuterol (PROVENTIL/VENTOLIN HFA) 90 mcg/actuation inhaler Currently holding due to heart rate    albuterol-ipratropium (DUO-NEB) 2.5 mg-0.5 mg/3 mL nebulizer solution 3 mLs, Nebulization, Every 4 hours PRN, Currently holding due to heartrate    aspirin 81 mg, Oral, Daily    ATROVENT HFA 17 mcg/actuation inhaler Currently holding due to heartrate    cyproheptadine (PERIACTIN) 4 mg, Oral, As needed (PRN)    dicyclomine (BENTYL) 10 mg, Oral, 3 times daily PRN    diltiaZEM (CARDIZEM CD) 360 mg, Oral, Daily    folic acid (FOLVITE) 1 mg, Oral, Daily    gabapentin (NEURONTIN) 300 mg, Oral, 3 times daily    guaiFENesin-codeine 100-10 mg/5 ml (TUSSI-ORGANIDIN NR)  mg/5 mL syrup 10 mLs, Oral, As needed (PRN)    HUMULIN R REGULAR U-100 INSULN 100 unit/mL injection Holding since in patient stay    HYDROcodone-acetaminophen (NORCO)  mg per tablet 1 tablet, Oral, Every 4 hours PRN    loratadine (CLARITIN) 10 mg, Oral, Daily    metFORMIN (GLUCOPHAGE) 500 mg, Oral, 2 times daily    metoprolol tartrate (LOPRESSOR) 100 mg, Oral, 2 times daily    mirtazapine (REMERON) 15 mg, Oral, Nightly     montelukast (SINGULAIR) 10 mg, Oral, As needed (PRN)    ONETOUCH DELICA PLUS LANCET 33 gauge Misc No dose, route, or frequency recorded.    ONETOUCH VERIO REFLECT METER Misc No dose, route, or frequency recorded.    ONETOUCH VERIO TEST STRIPS Strp No dose, route, or frequency recorded.    OXYGEN-AIR DELIVERY SYSTEMS MISC   oxygen, See Instructions, home concentrator and portable @ 2L per nasal cannula continuous to keep SATS at 88% or greater J18.9, C34.9, J96.9, R09.02, # 1 EA, 0 Refill(s)    tiZANidine (ZANAFLEX) 4 MG tablet No dose, route, or frequency recorded.       Current Inpatient Medications   acetaminophen  650 mg Oral Once    amiodarone  200 mg Oral BID    insulin detemir U-100  5 Units Subcutaneous QHS    pantoprozole (PROTONIX) IV  40 mg Intravenous Q12H    voriconazole  200 mg Oral BID         Intake/Output Summary (Last 24 hours) at 11/16/2022 0742  Last data filed at 11/16/2022 0600  Gross per 24 hour   Intake 1426.76 ml   Output 1500 ml   Net -73.24 ml         Review of Systems   Unable to perform ROS: Intubated      Vital Signs (Most Recent):  Temp: 98.3 °F (36.8 °C) (11/16/22 0709)  Pulse: 89 (11/16/22 0709)  Resp: (!) 26 (11/16/22 0709)  BP: (!) 147/68 (11/16/22 0709)  SpO2: 100 % (11/16/22 0709)    Body mass index is 32.99 kg/m².  Weight: 92.7 kg (204 lb 5.9 oz) Vital Signs (24h Range):  Temp:  [97.9 °F (36.6 °C)-98.4 °F (36.9 °C)] 98.3 °F (36.8 °C)  Pulse:  [] 89  Resp:  [26-32] 26  SpO2:  [95 %-100 %] 100 %  BP: ()/(44-92) 147/68  Arterial Line BP: ()/(33-63) 122/34     Physical Exam  Vitals and nursing note reviewed.   Constitutional:       General: She is not in acute distress.     Appearance: She is ill-appearing. She is not toxic-appearing.      Comments: Intubated and sedated   HENT:      Head: Normocephalic and atraumatic.      Nose: Nose normal.      Mouth/Throat:      Mouth: Mucous membranes are moist.      Pharynx: Oropharynx is clear.   Eyes:      Extraocular  Movements: Extraocular movements intact.      Conjunctiva/sclera: Conjunctivae normal.      Pupils: Pupils are equal, round, and reactive to light.   Cardiovascular:      Rate and Rhythm: Normal rate and regular rhythm.      Pulses: Normal pulses.      Heart sounds: Normal heart sounds. No murmur heard.    No gallop.   Pulmonary:      Effort: No respiratory distress.      Breath sounds: No stridor. No wheezing, rhonchi or rales.   Abdominal:      General: Abdomen is flat.      Palpations: Abdomen is soft.   Musculoskeletal:         General: No swelling or deformity.      Right lower leg: No edema.      Left lower leg: No edema.   Skin:     General: Skin is warm and dry.      Coloration: Skin is not jaundiced.      Findings: No bruising.   Neurological:      General: No focal deficit present.      Mental Status: She is alert and oriented to person, place, and time.      Comments: Patient sedated and therefore unable to accurately exam     Bloody secretions.  Mechanical ventilation support:  Vent Mode: A/C (11/16/22 0709)  Set Rate: 26 BPM (11/16/22 0709)  Vt Set: 450 mL (11/16/22 0709)  PEEP/CPAP: 5 cmH20 (11/16/22 0709)  Oxygen Concentration (%): 45 (11/16/22 0709)  Peak Airway Pressure: 32 cmH2O (11/16/22 0709)  Plateau Pressure: 24 cmH20 (11/16/22 0709)  Total Ve: 10.9 L/m (11/16/22 0709)  F/VT Ratio<105 (RSBI): (!) 62.05 (11/16/22 0709)    Lines/Drains/Airways       Central Venous Catheter Line  Duration             Port A Cath Single Lumen right subclavian -- days    Percutaneous Central Line Insertion/Assessment - Triple Lumen  11/10/22 1545 right femoral vein;right femoral 5 days              Drain  Duration                  NG/OG Tube 11/02/22 1355 [place] sump 16 Fr. Left mouth 13 days         Urethral Catheter 11/02/22 1400 Silicone 16 Fr. 13 days         Rectal Tube 11/07/22 0408 rectal tube w/ balloon (indicate number of mLs) 9 days              Airway  Duration                  Airway - Non-Surgical  11/02/22 1400 Endotracheal Tube 13 days              Arterial Line  Duration             Arterial Line 11/07/22 1340 Right Radial 8 days                    Significant Labs:    Lab Results   Component Value Date    WBC 16.0 (H) 11/16/2022    HGB 8.9 (L) 11/16/2022    HCT 27.9 (L) 11/16/2022    MCV 92.4 11/16/2022     11/16/2022         BMP  Lab Results   Component Value Date     11/16/2022    K 3.0 (L) 11/16/2022    CO2 19 (L) 11/16/2022    BUN 38.2 (H) 11/16/2022    CREATININE 1.03 (H) 11/16/2022    CALCIUM 8.9 11/16/2022    EGFRNONAA 81 04/22/2022       ABG  Recent Labs   Lab 11/15/22  0544   PH 7.35   PO2 69*   PCO2 42   HCO3 23.2             Significant Imaging:  I have reviewed all pertinent imaging within the past 24 hours.    Assessment/Plan:   1.) Massive Hemoptysis  2.) Hx of Stage IV Poorly Differentiated RUL Lung Carcinoma  3.) Serratia M. Pneumonia with leukocytosis  4.) Hx of Atrial Fibrillation/flutter (not on Eliquis)  5.)  Nonocclusive acute deep vein thrombosis visualized left axillary vein and occlusive thrombus in left cephalic vein.  6.) Hx of COPD (on home 2L NC q.Nightly)  7.) Hx of HTN  8.) Hx of HLD  9.) Hx of PAD    -On mechanical ventilation, wean as tolerated.  Daily SBT as able/tolerated  -Cefepime was discontinued after 8 days.  -patient is spilling potassium in her urine, will replete as necessary to maintain potassium.  -this is day 3 of voriconazole will continue  -Amio 200 BID, QTC is not prolonged  -waiting respiratory and fungal culture  -patient has had a drop in hemoglobin will hold off all anticoagulation at this time.  Will replete if H/H is <7  Prognosis guarded.  This is day 13 of endotracheal tube intubation.  Patient's family is aware of patient's grave prognosis and they will come up with a decision later this week.      DVT Prophylaxis:  Continue to hold all anticoagulation including aspirin for ongoing hemoptysis  GI Prophylaxis:  Protonix     Greater than  30 minutes of critical care was time spent personally by me on the following activities: development of treatment plan with patient or surrogate and bedside caregivers, discussions with consultants, evaluation of patient's response to treatment, examination of patient, ordering and performing treatments and interventions, ordering and review of laboratory studies, ordering and review of radiographic studies, pulse oximetry, re-evaluation of patient's condition.  This critical care time did not overlap with that of any other provider or involve time for any procedures.     Ebony Leal MD  Pulmonary Critical Care Medicine  [facility]

## 2022-11-17 NOTE — PLAN OF CARE
Problem: Adult Inpatient Plan of Care  Goal: Plan of Care Review  Outcome: Ongoing, Progressing  Goal: Patient-Specific Goal (Individualized)  Outcome: Ongoing, Progressing  Goal: Absence of Hospital-Acquired Illness or Injury  Outcome: Ongoing, Progressing  Goal: Optimal Comfort and Wellbeing  Outcome: Ongoing, Progressing  Goal: Readiness for Transition of Care  Outcome: Ongoing, Progressing     Problem: Infection  Goal: Absence of Infection Signs and Symptoms  Outcome: Ongoing, Progressing     Problem: Skin Injury Risk Increased  Goal: Skin Health and Integrity  Outcome: Ongoing, Progressing     Problem: Communication Impairment (Mechanical Ventilation, Invasive)  Goal: Effective Communication  Outcome: Ongoing, Progressing     Problem: Device-Related Complication Risk (Mechanical Ventilation, Invasive)  Goal: Optimal Device Function  Outcome: Ongoing, Progressing     Problem: Inability to Wean (Mechanical Ventilation, Invasive)  Goal: Mechanical Ventilation Liberation  Outcome: Ongoing, Progressing     Problem: Nutrition Impairment (Mechanical Ventilation, Invasive)  Goal: Optimal Nutrition Delivery  Outcome: Ongoing, Progressing     Problem: Nutrition Impairment (Mechanical Ventilation, Invasive)  Goal: Optimal Nutrition Delivery  Outcome: Ongoing, Progressing     Problem: Skin and Tissue Injury (Mechanical Ventilation, Invasive)  Goal: Absence of Device-Related Skin and Tissue Injury  Outcome: Ongoing, Progressing     Problem: Ventilator-Induced Lung Injury (Mechanical Ventilation, Invasive)  Goal: Absence of Ventilator-Induced Lung Injury  Outcome: Ongoing, Progressing     Problem: Communication Impairment (Artificial Airway)  Goal: Effective Communication  Outcome: Ongoing, Progressing     Problem: Device-Related Complication Risk (Artificial Airway)  Goal: Optimal Device Function  Outcome: Ongoing, Progressing     Problem: Fall Injury Risk  Goal: Absence of Fall and Fall-Related Injury  Outcome: Ongoing,  Progressing     Problem: Noninvasive Ventilation Acute  Goal: Effective Unassisted Ventilation and Oxygenation  Outcome: Ongoing, Progressing     Problem: Skin and Tissue Injury (Artificial Airway)  Goal: Absence of Device-Related Skin or Tissue Injury  Outcome: Ongoing, Progressing

## 2022-11-17 NOTE — PROGRESS NOTES
[facility] - ICU  Pulmonary Critical Care Note    Patient Name: Adrienne Urbina  MRN: [id number]  Admission Date: 10/31/2022  Hospital Length of Stay: 16 days  Code Status: Full Code  Attending Provider: Glenn Cantu Jr., MD, *  Primary Care Provider: Gregorio Cherry NP     Subjective:     HPI:   Ms. 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  Patient reports being in her usual state of health until noticing blood streaked sputum that progressed to complete blood filled sputum over the past x1.5 days.  She denies any trauma however does report chronic cough due to COPD which she feels may have contributed to recent symptoms. When asked, patient denies ever experiencing hemoptysis at any point in the past even throughout undergoing chemotherapy and radiation for right upper lobe lung mass (last chemotherapy x1.5 months ago; follows w/ Dr. Shoemaker).  These new symptoms scared her which prompted her ED presentation. Denies any history or DVT/PE. She also denies any current lightheadedness/dizziness, nausea/vomiting, fever/chills, chest pain, palpitations, new/worsening shortness of breath, hematemesis, hematochezia, melena, hematuria. Per chart review; patient was recently discharged from [facility] (10/17) in which she required ICU level care secondary to suspected Afib w/ RVR in addition to anemia requiring x2 units PRBCs and GNR related pneumonia.     Hospital Course/Significant events:  Intubated 11/02/2022  Patient was initiated on stress dose steroids on 11/10, steroids were stopped on 11/14 when patient's he went that mimics were stable.    11/14 patient was initiated on voriconazole        24 Hour Interval History:  Patient is currently sedated and intubated.  Patient is currently sedated on Precedex .2 mcg.h.  Patient is requiring 0.04 of vasopressin.  Patient is currently on assist control 450/26/5+/40% FiO2.  Patient has usuall hsa blood wheneve she is being turned.Day 14 of intubation. Family is aware of prognosis will continue to have discussions with family daily.    Past Medical History:   Diagnosis Date    Anxiety and depression     Cancer     lung with brain metastasis    Cholelithiasis     COPD (chronic obstructive pulmonary disease)     Hypertension     Lumbar disc disease     PAD (peripheral artery disease)     Paroxysmal SVT (supraventricular tachycardia)     Pleural effusion        Social History     Socioeconomic History    Marital status:    Tobacco Use    Smoking status: Some Days     Packs/day: 0.25     Years: 49.00     Pack years: 12.25     Types: Cigarettes     Last attempt to quit: 2022     Years since quittin.3    Smokeless tobacco: Never   Substance and Sexual Activity    Alcohol use: Never    Drug use: Never    Sexual activity: Yes     Social Determinants of Health     Financial Resource Strain: Low Risk     Difficulty of Paying Living Expenses: Not very hard   Food Insecurity: No Food Insecurity    Worried About Running Out of Food in the Last Year: Never true    Ran Out of Food in the Last Year: Never true   Transportation Needs: No Transportation Needs    Lack of Transportation (Medical): No    Lack of Transportation (Non-Medical): No   Physical Activity: Inactive    Days of Exercise per Week: 0 days    Minutes of Exercise per Session: 0 min   Stress: No Stress Concern Present    Feeling of Stress : Not at all   Social Connections: Moderately Isolated    Frequency of Communication with Friends and Family: More than three times a week    Frequency of Social Gatherings with Friends and Family: More than three times a week    Attends [employer] Services:  Never    Active Member of Clubs or Organizations: No    Attends Club or Organization Meetings: Never    Marital Status:    Housing Stability: Low Risk     Unable to Pay for Housing in the Last Year: No    Number of Places Lived in the Last Year: 1    Unstable Housing in the Last Year: No       Objective:     Current Outpatient Medications   Medication Instructions    albuterol (PROVENTIL/VENTOLIN HFA) 90 mcg/actuation inhaler Currently holding due to heart rate    albuterol-ipratropium (DUO-NEB) 2.5 mg-0.5 mg/3 mL nebulizer solution 3 mLs, Nebulization, Every 4 hours PRN, Currently holding due to heartrate    aspirin 81 mg, Oral, Daily    ATROVENT HFA 17 mcg/actuation inhaler Currently holding due to heartrate    cyproheptadine (PERIACTIN) 4 mg, Oral, As needed (PRN)    dicyclomine (BENTYL) 10 mg, Oral, 3 times daily PRN    diltiaZEM (CARDIZEM CD) 360 mg, Oral, Daily    folic acid (FOLVITE) 1 mg, Oral, Daily    gabapentin (NEURONTIN) 300 mg, Oral, 3 times daily    guaiFENesin-codeine 100-10 mg/5 ml (TUSSI-ORGANIDIN NR)  mg/5 mL syrup 10 mLs, Oral, As needed (PRN)    HUMULIN R REGULAR U-100 INSULN 100 unit/mL injection Holding since in patient stay    loratadine (CLARITIN) 10 mg, Oral, Daily    metFORMIN (GLUCOPHAGE) 500 mg, Oral, 2 times daily    metoprolol tartrate (LOPRESSOR) 100 mg, Oral, 2 times daily    mirtazapine (REMERON) 15 mg, Oral, Nightly    montelukast (SINGULAIR) 10 mg, Oral, As needed (PRN)    ONETOUCH DELICA PLUS LANCET 33 gauge Misc No dose, route, or frequency recorded.    ONETOUCH VERIO REFLECT METER Misc No dose, route, or frequency recorded.    ONETOUCH VERIO TEST STRIPS Strp No dose, route, or frequency recorded.    OXYGEN-AIR DELIVERY SYSTEMS MISC   oxygen, See Instructions, home concentrator and portable @ 2L per nasal cannula continuous to keep SATS at 88% or greater J18.9, C34.9, J96.9, R09.02, # 1 EA, 0 Refill(s)    tiZANidine (ZANAFLEX) 4 MG tablet No dose, route, or  frequency recorded.       Current Inpatient Medications   acetaminophen  650 mg Oral Once    amiodarone  200 mg Oral BID    insulin detemir U-100  5 Units Subcutaneous QHS    pantoprozole (PROTONIX) IV  40 mg Intravenous Q12H    voriconazole  200 mg Oral BID         Intake/Output Summary (Last 24 hours) at 11/17/2022 0821  Last data filed at 11/17/2022 0418  Gross per 24 hour   Intake 1806.4 ml   Output 900 ml   Net 906.4 ml         Review of Systems   Unable to perform ROS: Intubated      Vital Signs (Most Recent):  Temp: 97.6 °F (36.4 °C) (11/17/22 0715)  Pulse: 76 (11/17/22 0715)  Resp: (!) 26 (11/17/22 0715)  BP: (!) 142/64 (11/17/22 0500)  SpO2: 100 % (11/17/22 0715)    Body mass index is 32.77 kg/m².  Weight: 92.1 kg (203 lb 0.7 oz) Vital Signs (24h Range):  Temp:  [97.6 °F (36.4 °C)-98.6 °F (37 °C)] 97.6 °F (36.4 °C)  Pulse:  [] 76  Resp:  [26] 26  SpO2:  [98 %-100 %] 100 %  BP: ()/(38-88) 142/64  Arterial Line BP: ()/(35-52) 137/42     Physical Exam  Vitals and nursing note reviewed.   Constitutional:       General: She is not in acute distress.     Appearance: She is ill-appearing. She is not toxic-appearing.      Comments: Intubated and sedated   HENT:      Head: Normocephalic and atraumatic.      Nose: Nose normal.      Mouth/Throat:      Mouth: Mucous membranes are moist.      Pharynx: Oropharynx is clear.   Eyes:      Extraocular Movements: Extraocular movements intact.      Conjunctiva/sclera: Conjunctivae normal.      Pupils: Pupils are equal, round, and reactive to light.   Cardiovascular:      Rate and Rhythm: Normal rate and regular rhythm.      Pulses: Normal pulses.      Heart sounds: Normal heart sounds. No murmur heard.    No gallop.   Pulmonary:      Effort: No respiratory distress.      Breath sounds: No stridor. No wheezing, rhonchi or rales.   Abdominal:      General: Abdomen is flat.      Palpations: Abdomen is soft.   Musculoskeletal:         General: No swelling or  deformity.      Right lower leg: No edema.      Left lower leg: No edema.   Skin:     General: Skin is warm and dry.      Coloration: Skin is not jaundiced.      Findings: No bruising.   Neurological:      General: No focal deficit present.      Mental Status: She is alert and oriented to person, place, and time.      Comments: Patient sedated and therefore unable to accurately exam     Bloody secretions.  Mechanical ventilation support:  Vent Mode: A/C (11/17/22 0715)  Set Rate: 26 BPM (11/17/22 0715)  Vt Set: 450 mL (11/17/22 0715)  PEEP/CPAP: 5 cmH20 (11/17/22 0715)  Oxygen Concentration (%): 40 (11/17/22 0715)  Peak Airway Pressure: 28 cmH2O (11/17/22 0715)  Plateau Pressure: 16 cmH20 (11/17/22 0715)  Total Ve: 11 L/m (11/17/22 0715)  F/VT Ratio<105 (RSBI): (!) 61.03 (11/17/22 0715)    Lines/Drains/Airways       Central Venous Catheter Line  Duration             Port A Cath Single Lumen right subclavian -- days    Percutaneous Central Line Insertion/Assessment - Triple Lumen  11/10/22 1545 right femoral vein;right femoral 6 days              Drain  Duration                  NG/OG Tube 11/02/22 1355 [place] sump 16 Fr. Left mouth 14 days         Urethral Catheter 11/02/22 1400 Silicone 16 Fr. 14 days         Rectal Tube 11/07/22 0408 rectal tube w/ balloon (indicate number of mLs) 10 days              Airway  Duration                  Airway - Non-Surgical 11/02/22 1400 Endotracheal Tube 14 days              Arterial Line  Duration             Arterial Line 11/07/22 1340 Right Radial 9 days                    Significant Labs:    Lab Results   Component Value Date    WBC 16.8 (H) 11/17/2022    HGB 8.5 (L) 11/17/2022    HCT 27.0 (L) 11/17/2022    MCV 92.5 11/17/2022     11/17/2022         BMP  Lab Results   Component Value Date     11/17/2022    K 3.8 11/17/2022    CO2 19 (L) 11/17/2022    BUN 30.9 (H) 11/17/2022    CREATININE 0.85 11/17/2022    CALCIUM 8.9 11/17/2022    EGFRNONAA 81 04/22/2022        ABG  Recent Labs   Lab 11/17/22  0530   PH 7.39   PO2 95   PCO2 37   HCO3 22.4             Significant Imaging:  I have reviewed all pertinent imaging within the past 24 hours.    Assessment/Plan:   1.) Massive Hemoptysis  2.) Hx of Stage IV Poorly Differentiated RUL Lung Carcinoma  3.) Serratia M. Pneumonia with leukocytosis  4.) Hx of Atrial Fibrillation/flutter (not on Eliquis)  5.)  Nonocclusive acute deep vein thrombosis visualized left axillary vein and occlusive thrombus in left cephalic vein.  6.) Hx of COPD (on home 2L NC q.Nightly)  7.) Hx of HTN  8.) Hx of HLD  9.) Hx of PAD    -On mechanical ventilation, wean as tolerated.  Daily SBT as able/tolerated  -patient is spilling potassium in her urine, will replete as necessary to maintain potassium.  -this is day 4 of voriconazole will continue  -Amio 200 BID, QTC is not prolonged  -waiting respiratory and fungal culture  -patient has had a drop in hemoglobin will hold off all anticoagulation at this time.  Will replete if H/H is <7  Prognosis guarded.  This is day 14 of endotracheal tube intubation.  Patient's family is aware of patient's grave prognosis and they will come up with a decision later this week.      DVT Prophylaxis:  Continue to hold all anticoagulation including aspirin for ongoing hemoptysis  GI Prophylaxis:  Protonix     Greater than 30 minutes of critical care was time spent personally by me on the following activities: development of treatment plan with patient or surrogate and bedside caregivers, discussions with consultants, evaluation of patient's response to treatment, examination of patient, ordering and performing treatments and interventions, ordering and review of laboratory studies, ordering and review of radiographic studies, pulse oximetry, re-evaluation of patient's condition.  This critical care time did not overlap with that of any other provider or involve time for any procedures.     Colton Olivares,  MD  Pulmonary Critical Care Medicine  [facility] - ICU

## 2022-11-18 NOTE — PROGRESS NOTES
[facility] - ICU  Pulmonary Critical Care Note    Patient Name: Adrienne Urbina  MRN: [id number]  Admission Date: 10/31/2022  Hospital Length of Stay: 17 days  Code Status: Full Code  Attending Provider: Glenn Cantu Jr., MD, *  Primary Care Provider: Gregorio Cherry NP     Subjective:     HPI:   Ms. 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 ED w/ CC of new onset Hemoptysis.  Patient is currently vent the past wean sedation having met with increased blood pressure and tachycardia.  CPAP trials have been tried however patient's tidal volumes have been adequate.  Discussions were had with family and they will come to a decision this week.    Hospital Course/Significant events:  Intubated 11/02/2022  Patient was initiated on stress dose steroids on 11/10, steroids were stopped on 11/14 when patient's he went that mimics were stable.    11/14 patient was initiated on voriconazole       24 Hour Interval History:  Patient is currently sedated and intubated.  Patient is currently sedated on Precedex .4 mcg/h  Patient is requiring 0.042 of vasopressin.  Patient is currently on assist control 450/26/5+/40% FiO2.  Patient has been afebrile, per nursing patient's ETT had pus like material coming out.  But no overt bleeding like there was a few days ago.  Patient does not respond to commands however she does open her eyes spontaneously on sedation.  Family discussions will be had today with regards to goals of care    Past Medical History:   Diagnosis Date    Anxiety and depression     Cancer     lung with brain metastasis    Cholelithiasis     COPD (chronic obstructive pulmonary disease)     Hypertension     Lumbar disc disease     PAD  (peripheral artery disease)     Paroxysmal SVT (supraventricular tachycardia)     Pleural effusion        Social History     Socioeconomic History    Marital status:    Tobacco Use    Smoking status: Some Days     Packs/day: 0.25     Years: 49.00     Pack years: 12.25     Types: Cigarettes     Last attempt to quit: 2022     Years since quittin.3    Smokeless tobacco: Never   Substance and Sexual Activity    Alcohol use: Never    Drug use: Never    Sexual activity: Yes     Social Determinants of Health     Financial Resource Strain: Low Risk     Difficulty of Paying Living Expenses: Not very hard   Food Insecurity: No Food Insecurity    Worried About Running Out of Food in the Last Year: Never true    Ran Out of Food in the Last Year: Never true   Transportation Needs: No Transportation Needs    Lack of Transportation (Medical): No    Lack of Transportation (Non-Medical): No   Physical Activity: Inactive    Days of Exercise per Week: 0 days    Minutes of Exercise per Session: 0 min   Stress: No Stress Concern Present    Feeling of Stress : Not at all   Social Connections: Moderately Isolated    Frequency of Communication with Friends and Family: More than three times a week    Frequency of Social Gatherings with Friends and Family: More than three times a week    Attends Worship Services: Never    Active Member of Clubs or Organizations: No    Attends Club or Organization Meetings: Never    Marital Status:    Housing Stability: Low Risk     Unable to Pay for Housing in the Last Year: No    Number of Places Lived in the Last Year: 1    Unstable Housing in the Last Year: No       Objective:     Current Outpatient Medications   Medication Instructions    albuterol (PROVENTIL/VENTOLIN HFA) 90 mcg/actuation inhaler Currently holding due to heart rate    albuterol-ipratropium (DUO-NEB) 2.5 mg-0.5 mg/3 mL nebulizer solution 3 mLs, Nebulization, Every 4 hours PRN, Currently holding due to heartrate     aspirin 81 mg, Oral, Daily    ATROVENT HFA 17 mcg/actuation inhaler Currently holding due to heartrate    cyproheptadine (PERIACTIN) 4 mg, Oral, As needed (PRN)    dicyclomine (BENTYL) 10 mg, Oral, 3 times daily PRN    diltiaZEM (CARDIZEM CD) 360 mg, Oral, Daily    folic acid (FOLVITE) 1 mg, Oral, Daily    gabapentin (NEURONTIN) 300 mg, Oral, 3 times daily    guaiFENesin-codeine 100-10 mg/5 ml (TUSSI-ORGANIDIN NR)  mg/5 mL syrup 10 mLs, Oral, As needed (PRN)    HUMULIN R REGULAR U-100 INSULN 100 unit/mL injection Holding since in patient stay    loratadine (CLARITIN) 10 mg, Oral, Daily    metFORMIN (GLUCOPHAGE) 500 mg, Oral, 2 times daily    metoprolol tartrate (LOPRESSOR) 100 mg, Oral, 2 times daily    mirtazapine (REMERON) 15 mg, Oral, Nightly    montelukast (SINGULAIR) 10 mg, Oral, As needed (PRN)    ONETOUCH DELICA PLUS LANCET 33 gauge Misc No dose, route, or frequency recorded.    ONETOUCH VERIO REFLECT METER Misc No dose, route, or frequency recorded.    ONETOUCH VERIO TEST STRIPS Strp No dose, route, or frequency recorded.    OXYGEN-AIR DELIVERY SYSTEMS MIS   oxygen, See Instructions, home concentrator and portable @ 2L per nasal cannula continuous to keep SATS at 88% or greater J18.9, C34.9, J96.9, R09.02, # 1 EA, 0 Refill(s)    tiZANidine (ZANAFLEX) 4 MG tablet No dose, route, or frequency recorded.       Current Inpatient Medications   acetaminophen  650 mg Oral Once    amiodarone  200 mg Oral BID    insulin detemir U-100  5 Units Subcutaneous QHS    pantoprozole (PROTONIX) IV  40 mg Intravenous Q12H    voriconazole  200 mg Oral BID         Intake/Output Summary (Last 24 hours) at 11/18/2022 0735  Last data filed at 11/18/2022 0530  Gross per 24 hour   Intake 1101.6 ml   Output 1870 ml   Net -768.4 ml         Review of Systems   Unable to perform ROS: Intubated      Vital Signs (Most Recent):  Temp: 97.9 °F (36.6 °C) (11/18/22 0330)  Pulse: 66 (11/18/22 0700)  Resp: (!) 26 (11/18/22 0700)  BP: (!)  119/55 (11/18/22 0700)  SpO2: 100 % (11/18/22 0700)    Body mass index is 32.77 kg/m².  Weight: 92.1 kg (203 lb 0.7 oz) Vital Signs (24h Range):  Temp:  [97.6 °F (36.4 °C)-98.7 °F (37.1 °C)] 97.9 °F (36.6 °C)  Pulse:  [] 66  Resp:  [26] 26  SpO2:  [100 %] 100 %  BP: (106-157)/(38-72) 119/55  Arterial Line BP: (112-168)/(35-48) 148/44     Physical Exam  Vitals and nursing note reviewed.   Constitutional:       General: She is not in acute distress.     Appearance: She is ill-appearing. She is not toxic-appearing.      Comments: Intubated and sedated   HENT:      Head: Normocephalic and atraumatic.      Nose: Nose normal.      Mouth/Throat:      Mouth: Mucous membranes are moist.      Pharynx: Oropharynx is clear.   Eyes:      Extraocular Movements: Extraocular movements intact.      Conjunctiva/sclera: Conjunctivae normal.      Pupils: Pupils are equal, round, and reactive to light.   Cardiovascular:      Rate and Rhythm: Normal rate and regular rhythm.      Pulses: Normal pulses.      Heart sounds: Normal heart sounds. No murmur heard.    No gallop.   Pulmonary:      Effort: No respiratory distress.      Breath sounds: No stridor. No wheezing, rhonchi or rales.   Abdominal:      General: Abdomen is flat.      Palpations: Abdomen is soft.   Musculoskeletal:         General: No swelling or deformity.      Right lower leg: No edema.      Left lower leg: No edema.   Skin:     General: Skin is warm and dry.      Coloration: Skin is not jaundiced.      Findings: No bruising.   Neurological:      General: No focal deficit present.      Mental Status: She is alert and oriented to person, place, and time.      Comments: Patient sedated and therefore unable to accurately exam     Bloody secretions.  Mechanical ventilation support:  Vent Mode: A/C (11/18/22 0537)  Set Rate: 26 BPM (11/18/22 0537)  Vt Set: 450 mL (11/18/22 0537)  PEEP/CPAP: 5 cmH20 (11/18/22 0537)  Oxygen Concentration (%): 40 (11/18/22 2907)  Peak Airway  Pressure: 27.2 cmH2O (11/18/22 0537)  Plateau Pressure: 16 cmH20 (11/17/22 0715)  Total Ve: 11 L/m (11/18/22 0537)  F/VT Ratio<105 (RSBI): (!) 61.61 (11/18/22 0537)    Lines/Drains/Airways       Central Venous Catheter Line  Duration             Port A Cath Single Lumen right subclavian -- days    Percutaneous Central Line Insertion/Assessment - Triple Lumen  11/10/22 1545 right femoral vein;right femoral 7 days              Drain  Duration                  NG/OG Tube 11/02/22 1355 [place] sump 16 Fr. Left mouth 15 days         Urethral Catheter 11/02/22 1400 Silicone 16 Fr. 15 days         Rectal Tube 11/07/22 0408 rectal tube w/ balloon (indicate number of mLs) 11 days              Airway  Duration                  Airway - Non-Surgical 11/02/22 1400 Endotracheal Tube 15 days              Arterial Line  Duration             Arterial Line 11/07/22 1340 Right Radial 10 days                    Significant Labs:    Lab Results   Component Value Date    WBC 14.8 (H) 11/18/2022    HGB 8.5 (L) 11/18/2022    HCT 26.8 (L) 11/18/2022    MCV 92.4 11/18/2022     11/18/2022         BMP  Lab Results   Component Value Date     (L) 11/18/2022    K 3.2 (L) 11/18/2022    CO2 21 (L) 11/18/2022    BUN 24.2 (H) 11/18/2022    CREATININE 0.87 11/18/2022    CALCIUM 8.7 11/18/2022    EGFRNONAA 81 04/22/2022       ABG  Recent Labs   Lab 11/18/22 0537   PH 7.39   PO2 79   PCO2 35   HCO3 21.2*             Significant Imaging:  I have reviewed all pertinent imaging within the past 24 hours.    Assessment/Plan:   1.) Massive Hemoptysis  2.) Hx of Stage IV Poorly Differentiated RUL Lung Carcinoma  3.) Serratia M. Pneumonia with leukocytosis  4.) Hx of Atrial Fibrillation/flutter (not on Eliquis)  5.)  Nonocclusive acute deep vein thrombosis visualized left axillary vein and occlusive thrombus in left cephalic vein.  6.) Hx of COPD (on home 2L NC q.Nightly)  7.) Hx of HTN  8.) Hx of HLD  9.) Hx of PAD    -On mechanical ventilation,  wean as tolerated.  Daily SBT as able/tolerated  -patient is spilling potassium in her urine, will replete as necessary to maintain potassium.  -this is day 5 of voriconazole will continue  -Amio 200 BID, QTC is not prolonged  -Will replete if H/H is <7  Prognosis guarded.  This is day 15 of endotracheal tube intubation.  Patient's family is aware of patient's grave prognosis family meeting to be held today     DVT Prophylaxis:  Continue to hold all anticoagulation including aspirin for ongoing hemoptysis  GI Prophylaxis:  Protonix     Greater than 30 minutes of critical care was time spent personally by me on the following activities: development of treatment plan with patient or surrogate and bedside caregivers, discussions with consultants, evaluation of patient's response to treatment, examination of patient, ordering and performing treatments and interventions, ordering and review of laboratory studies, ordering and review of radiographic studies, pulse oximetry, re-evaluation of patient's condition.  This critical care time did not overlap with that of any other provider or involve time for any procedures.     Ebony Leal MD  Pulmonary Critical Care Medicine  [facility] - ICU

## 2022-11-18 NOTE — CONSULTS
Inpatient Nutrition Assessment    Admit Date: 10/31/2022   Total duration of encounter: 18 days     Nutrition Recommendation/Prescription     Pt remains NPO/intubated . Propofol infusing @ 9.2  ml/hr providing 242 calories(rate up/down). . Pt has remained off TF; need to resume TF or begin TPN if gut not working to maintain nutrition status. Pt family considering plan of care options; currently not with Hospice /full code status so full nutrition care /needs should be met. As tolerated--Retry TF Peptamen AF @ 20ml/hr; increase as tolerated to goal rate 55ml/hr (23 hr cycle) provide 1518 calories, 96 gm protein, 1027 ml free water. (TF + propofol = 1760 calories). Flush tube with 100 ml water every 4hrs.   If unable to use gut--consider TPN: Clinimix 5/20 @ 75ml/hr /no lipids while on propofol --to provide 1584 calories, 90 gm protein. (TPN + propofol = 1826 calories).   When extubated ADAT to cardiac with boost plus bid  MVI/fe  Biweekly wt  Will forward recs to MD.   Will monitor nutrition status     Communication of Recommendations: reviewed with nurse    Nutrition Assessment     Malnutrition Assessment/Nutrition-Focused Physical Exam    Malnutrition in the context of chronic illness  Degree of Malnutrition: non-severe (moderate) malnutrition  Energy Intake: < 75% of estimated energy requirement for >/= 1 month  Interpretation of Weight Loss: does not meet criteria  Body Fat: mild depletion  Area of Body Fat Loss: orbital region  and upper arm region - triceps / biceps  Muscle Mass Loss: does not meet criteria  Area of Muscle Mass Loss: does not meet criteria  Fluid Accumulation: does not meet criteria  Edema: 2+ edema - mild and does not meet criteria  Reduced  Strength: unable to obtain  A minimum of two characteristics is recommended for diagnosis of either severe or non-severe malnutrition.    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 (diagnosed 09/2020, s/p radiation, currently receiving chemotherapy q3 weeks), atrial fibrillation (not on Eliquis), HTN, HLD, PAD, COPD (on nightly 2 L NC), tobacco use, and cholelithiasis    Nutrition-Related Medications:  pantoprazole,   precedex, levophed, propofol ; vasopressin ; fentanyl   Calorie Containing IV Medications: Diprivan @ 9 ml/hr (provides 485 kcal/d)    Nutrition-Related Labs:  (11-15) H/H 9.9/31.7(L)  Gluc 99 Bun 41(H) Cr 1.1(H) K 2.9(L) Alb 1.5(L) P 2.9   (11-18) H/H 8.5/26.8(L) Gluc 81 Bun 24 Cr 0.8 K 3.2(L) Alb 1.4(L) Alk Phos 170(H)   Diet/PN Order: Diet NPO  Oral Supplement Order: none  Tube Feeding Order:    (see below for calculation)  Appetite/Oral Intake: NPO/NPO  Factors Affecting Nutritional Intake: NPO and on mechanical ventilation  Food/Presybeterian/Cultural Preferences: none reported  Food Allergies: none reported       Wound(s):   none reported     Comments  (11/18) Pt remains on vent; propofol @ 9ml/hr; TF has remained off; spoke with RN about need for nutrition support; aware staff spoke to daughter about considering palliative WD/transition to hospice; no decision made of this time. Both TF/TPN recs provided; will forward to MD. Wt remains elevated/ edema.     (11/15) Pt NPO/vent; propofol @ 18ml/hr; pt remains on levophed/vasopressin; staff to have discussion today about pt plan of care option. Pt not tolerating TF even with reglan ; if family desires to continue nutrition support may need to use TPN until TF tolerated; see recs. Wt remains elevated/ fluid.     (11/11) Pt remains on vent/propofol @ 20ml/hr; RN reported TF currently off 2 elevated residual of > 600 ml; suggested Reglan  "for Gi motility. Wt remains elevaeted/+ edema. TPN recs provided incase pt unable to tolerate enteral feedings. Labs acknowledged.     () Pt remains NPO/vent; propofol @ 18ml/hr; TF infusing at goal rate 55ml/hr; aware pt poor prognosis; family desires full code at this time. Suggest continue current nutrition support. Labs acknowledged. Noted wt elevated--? Fluid/vs bedscale accuracy.     () pt remains NPO x 3 days today/ vent; consulted for TF recs; will initiate TF as per protocol; spoke to RN about TF order. Pt remains on propofol @ 16ml/hr--providing 425 calories.     (11/3) Pt intubated yesterday (); on propofol @ 18ml/hr; has OGT; TF recs provided to maintain nutrition status; will forward to MD. Labs akcnowledged.     : Pt currently NPO during rounds; reports some nausea/vomiting, no other GI complaints. Pt reports good appetite and no recent wt loss pta; #. No c/s difficulties reported. Pt ok to add Boost Plus once diet advanced.    Anthropometrics    Height: 5' 6" (167.6 cm) Height Method: Stated  Last Weight: 92.1 kg (203 lb 0.7 oz) (22 0600) Weight Method: Bed Scale  BMI (Calculated): 32.8  BMI Classification: overweight (BMI 25-29.9)     Ideal Body Weight (IBW), Female: 130 lb     % Ideal Body Weight, Female (lb): 130.77 %                    Usual Body Weight (UBW), k.91 kg (# per pt)  % Usual Body Weight: 101.8     Usual Weight Provided By: patient and EMR weight history    Wt Readings from Last 5 Encounters:   22 92.1 kg (203 lb 0.7 oz)   10/21/22 75.7 kg (166 lb 12.8 oz)   10/16/22 77.7 kg (171 lb 4.8 oz)   22 74.4 kg (164 lb)   09/10/22 75.3 kg (166 lb)     Weight Change(s) Since Admission:  Admit Weight: 77.1 kg (170 lb) (22 0552)  (11/3) 77.8kg   () wt elevated 88kg; ? Fluid/vs accuracy bed   () 87.3kg; wt remains elevated/edema   (11/15) 92.7kg; wt elevaetd/ edema   Estimated Needs    Weight Used For Calorie Calculations: 77.8 kg " (171 lb 8.3 oz)  Energy Calorie Requirements (kcal): 1945 kcal/d; 25 maryjane/kg vent  Energy Need Method: Kcal/kg  Weight Used For Protein Calculations: 77.8 kg (171 lb 8.3 oz)  Protein Requirements: 94 gm protein/d; 1.2 gm/kg  Fluid Requirements (mL): 1945ml/d; 1ml/maryjane  Temp: 98.7 °F (37.1 °C)       Enteral Nutrition    Formula: Peptamen AF  Rate/Volume: TF off   Water Flushes: 100 ml every 4 hrs   Additives/Modulars: none at this time  Route: orogastric tube  Method: continuous  Total Nutrition Provided by Tube Feeding, Additives, and Flushes:  Calories Provided  0 kcal/d, 0% needs   Protein Provided  0 g/d, 0 % needs   Fluid Provided  0  ml/d, 0% needs   Continuous feeding calculations based on estimated 20 hr/d run time unless otherwise stated.    Parenteral Nutrition    Patient not receiving parenteral nutrition support at this time.    Evaluation of Received Nutrient Intake    Calories: not meeting estimated needs  Protein: not meeting estimated needs    Patient Education    Not applicable.    Nutrition Diagnosis     PES: Inadequate oral intake related to vent as evidenced by NPO status . (continues)    Interventions/Goals     Intervention(s): modified composition of meals/snacks, modified composition of enteral nutrition, modified rate of enteral nutrition, commercial beverage, multivitamin/mineral supplement therapy, and collaboration with other providers  Goal: Meet greater than 75% of nutritional needs by follow-up. (goal not met)    Monitoring & Evaluation     Dietitian will monitor food and beverage intake, enteral nutrition intake, and weight change.  Nutrition Risk/Follow-Up: high (follow-up in 1-4 days)   Please consult if re-assessment needed sooner.

## 2022-11-19 NOTE — PROGRESS NOTES
[facility] - ICU  Pulmonary Critical Care Note    Patient Name: Adrienne Urbina  MRN: [id number]  Admission Date: 10/31/2022  Hospital Length of Stay: 18 days  Code Status: Full Code  Attending Provider: Glenn Cantu Jr., MD, *  Primary Care Provider: Gregorio Cherry NP     Subjective:     HPI:   Ms. 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  Patient is currently vent the past wean sedation having met with increased blood pressure and tachycardia.  CPAP trials have been tried however patient's tidal volumes have been adequate.  Discussions were had with family and they will come to a decision this week.    Hospital Course/Significant events:  Intubated 11/02/2022  Patient was initiated on stress dose steroids on 11/10, steroids were stopped on 11/14 when patient's he went that mimics were stable.    11/14 patient was initiated on voriconazole       24 Hour Interval History:  Patient is currently sedated and intubated.  Patient is currently on Levophed 0.03, Diprivan of 20, fentanyl 100, .4 Precedex.  Patient is currently on assist control 450/26/5 positive/40%.  Discussions are continued to have with family.  Intensivist Dr. Simpson has talked to Ms. Nelson power of  and has made his recommendations family will get together and will make a decision on Tuesday.  Patient is still NPO secondary to inability to tolerate tube feeds.  When patient was initiated on tube feeds patient's NG tube suctions most of the tube feeds out.  Will initiate patient on Clinimix TPN per dietary recommendations  Past Medical History:   Diagnosis Date    Anxiety and depression     Cancer     lung with brain  metastasis    Cholelithiasis     COPD (chronic obstructive pulmonary disease)     Hypertension     Lumbar disc disease     PAD (peripheral artery disease)     Paroxysmal SVT (supraventricular tachycardia)     Pleural effusion        Social History     Socioeconomic History    Marital status:    Tobacco Use    Smoking status: Some Days     Packs/day: 0.25     Years: 49.00     Pack years: 12.25     Types: Cigarettes     Last attempt to quit: 2022     Years since quittin.3    Smokeless tobacco: Never   Substance and Sexual Activity    Alcohol use: Never    Drug use: Never    Sexual activity: Yes     Social Determinants of Health     Financial Resource Strain: Low Risk     Difficulty of Paying Living Expenses: Not very hard   Food Insecurity: No Food Insecurity    Worried About Running Out of Food in the Last Year: Never true    Ran Out of Food in the Last Year: Never true   Transportation Needs: No Transportation Needs    Lack of Transportation (Medical): No    Lack of Transportation (Non-Medical): No   Physical Activity: Inactive    Days of Exercise per Week: 0 days    Minutes of Exercise per Session: 0 min   Stress: No Stress Concern Present    Feeling of Stress : Not at all   Social Connections: Moderately Isolated    Frequency of Communication with Friends and Family: More than three times a week    Frequency of Social Gatherings with Friends and Family: More than three times a week    Attends Scientology Services: Never    Active Member of Clubs or Organizations: No    Attends Club or Organization Meetings: Never    Marital Status:    Housing Stability: Low Risk     Unable to Pay for Housing in the Last Year: No    Number of Places Lived in the Last Year: 1    Unstable Housing in the Last Year: No       Objective:     Current Outpatient Medications   Medication Instructions    albuterol (PROVENTIL/VENTOLIN HFA) 90 mcg/actuation inhaler Currently holding due to heart rate     albuterol-ipratropium (DUO-NEB) 2.5 mg-0.5 mg/3 mL nebulizer solution 3 mLs, Nebulization, Every 4 hours PRN, Currently holding due to heartrate    aspirin 81 mg, Oral, Daily    ATROVENT HFA 17 mcg/actuation inhaler Currently holding due to heartrate    cyproheptadine (PERIACTIN) 4 mg, Oral, As needed (PRN)    dicyclomine (BENTYL) 10 mg, Oral, 3 times daily PRN    diltiaZEM (CARDIZEM CD) 360 mg, Oral, Daily    folic acid (FOLVITE) 1 mg, Oral, Daily    gabapentin (NEURONTIN) 300 mg, Oral, 3 times daily    guaiFENesin-codeine 100-10 mg/5 ml (TUSSI-ORGANIDIN NR)  mg/5 mL syrup 10 mLs, Oral, As needed (PRN)    HUMULIN R REGULAR U-100 INSULN 100 unit/mL injection Holding since in patient stay    loratadine (CLARITIN) 10 mg, Oral, Daily    metFORMIN (GLUCOPHAGE) 500 mg, Oral, 2 times daily    metoprolol tartrate (LOPRESSOR) 100 mg, Oral, 2 times daily    mirtazapine (REMERON) 15 mg, Oral, Nightly    montelukast (SINGULAIR) 10 mg, Oral, As needed (PRN)    ONETOUCH DELICA PLUS LANCET 33 gauge Misc No dose, route, or frequency recorded.    ONETOUCH VERIO REFLECT METER Misc No dose, route, or frequency recorded.    ONETOUCH VERIO TEST STRIPS Strp No dose, route, or frequency recorded.    OXYGEN-AIR DELIVERY SYSTEMS [facility] [place]   oxygen, See Instructions, home concentrator and portable @ 2L per nasal cannula continuous to keep SATS at 88% or greater J18.9, C34.9, J96.9, R09.02, # 1 EA, 0 Refill(s)    tiZANidine (ZANAFLEX) 4 MG tablet No dose, route, or frequency recorded.       Current Inpatient Medications   acetaminophen  650 mg Oral Once    amiodarone  200 mg Oral BID    insulin detemir U-100  5 Units Subcutaneous QHS    pantoprozole (PROTONIX) IV  40 mg Intravenous Q12H    potassium bicarbonate  20 mEq Oral BID    voriconazole  200 mg Oral BID         Intake/Output Summary (Last 24 hours) at 11/19/2022 0525  Last data filed at 11/19/2022 0433  Gross per 24 hour   Intake 1424.46 ml   Output 1700 ml   Net -275.54 ml          Review of Systems   Unable to perform ROS: Intubated      Vital Signs (Most Recent):  Temp: 98.8 °F (37.1 °C) (11/19/22 0400)  Pulse: 84 (11/19/22 0510)  Resp: (!) 26 (11/19/22 0510)  BP: 131/62 (11/19/22 0400)  SpO2: 100 % (11/19/22 0510)    Body mass index is 32.77 kg/m².  Weight: 92.1 kg (203 lb 0.7 oz) Vital Signs (24h Range):  Temp:  [97.6 °F (36.4 °C)-98.8 °F (37.1 °C)] 98.8 °F (37.1 °C)  Pulse:  [] 84  Resp:  [26] 26  SpO2:  [100 %] 100 %  BP: ()/(47-69) 131/62  Arterial Line BP: ()/(42-74) 102/47     Physical Exam  Vitals and nursing note reviewed.   Constitutional:       General: She is not in acute distress.     Appearance: She is ill-appearing. She is not toxic-appearing.      Comments: Intubated and sedated   HENT:      Head: Normocephalic and atraumatic.      Nose: Nose normal.      Mouth/Throat:      Mouth: Mucous membranes are moist.      Pharynx: Oropharynx is clear.   Eyes:      Extraocular Movements: Extraocular movements intact.      Conjunctiva/sclera: Conjunctivae normal.      Pupils: Pupils are equal, round, and reactive to light.   Cardiovascular:      Rate and Rhythm: Normal rate and regular rhythm.      Pulses: Normal pulses.      Heart sounds: Normal heart sounds. No murmur heard.    No gallop.   Pulmonary:      Effort: No respiratory distress.      Breath sounds: No stridor. No wheezing, rhonchi or rales.   Abdominal:      General: Abdomen is flat.      Palpations: Abdomen is soft.   Musculoskeletal:         General: No swelling or deformity.      Right lower leg: No edema.      Left lower leg: No edema.   Skin:     General: Skin is warm and dry.      Coloration: Skin is not jaundiced.      Findings: No bruising.   Neurological:      General: No focal deficit present.      Mental Status: She is alert and oriented to person, place, and time.      Comments: Patient sedated and therefore unable to accurately exam     Bloody secretions.  Mechanical ventilation  support:  Vent Mode: A/C (11/19/22 0510)  Set Rate: 26 BPM (11/19/22 0510)  Vt Set: 450 mL (11/19/22 0510)  PEEP/CPAP: 5 cmH20 (11/19/22 0510)  Oxygen Concentration (%): 40 (11/19/22 0510)  Peak Airway Pressure: 28.1 cmH2O (11/19/22 0510)  Plateau Pressure: 25.6 cmH20 (11/18/22 1900)  Total Ve: 10.9 L/m (11/19/22 0510)  F/VT Ratio<105 (RSBI): (!) 61.9 (11/19/22 0510)    Lines/Drains/Airways       Central Venous Catheter Line  Duration             Port A Cath Single Lumen right subclavian -- days    Percutaneous Central Line Insertion/Assessment - Triple Lumen  11/10/22 1545 right femoral vein;right femoral 8 days              Drain  Duration                  NG/OG Tube 11/02/22 1355 Industry sump 16 Fr. Left mouth 16 days         Urethral Catheter 11/02/22 1400 Silicone 16 Fr. 16 days         Rectal Tube 11/07/22 0408 rectal tube w/ balloon (indicate number of mLs) 12 days              Airway  Duration                  Airway - Non-Surgical 11/02/22 1400 Endotracheal Tube 16 days              Arterial Line  Duration             Arterial Line 11/07/22 1340 Right Radial 11 days                    Significant Labs:    Lab Results   Component Value Date    WBC 15.8 (H) 11/19/2022    HGB 9.5 (L) 11/19/2022    HCT 29.2 (L) 11/19/2022    MCV 91.8 11/19/2022     11/19/2022         BMP  Lab Results   Component Value Date     11/19/2022    K 3.9 11/19/2022    CO2 18 (L) 11/19/2022    BUN 18.3 11/19/2022    CREATININE 0.80 11/19/2022    CALCIUM 9.0 11/19/2022    EGFRNONAA 81 04/22/2022       ABG  Recent Labs   Lab 11/19/22 0511   PH 7.35   PO2 100   PCO2 27*   HCO3 14.9*             Significant Imaging:  I have reviewed all pertinent imaging within the past 24 hours.    Assessment/Plan:   1.) Massive Hemoptysis  2.) Hx of Stage IV Poorly Differentiated RUL Lung Carcinoma  3.) Serratia M. Pneumonia with leukocytosis  4.) Hx of Atrial Fibrillation/flutter (not on Eliquis)  5.)  Nonocclusive acute deep vein thrombosis  visualized left axillary vein and occlusive thrombus in left cephalic vein.  6.) Hx of COPD (on home 2L NC q.Nightly)  7.) Hx of HTN  8.) Hx of HLD  9.) Hx of PAD  10) NAGMA    -On mechanical ventilation, wean as tolerated.  Daily SBT as able/tolerated  -patient is spilling potassium in her urine, will replete as necessary to maintain potassium. Replete HCO3 per NAGMA  -this is day 6 of voriconazole will continue  -Amio 200 BID, QTC is not prolonged  -Will replete if H/H is <7  Will initiate TPN per dietary recommendations.    Prognosis guarded.  This is day 16 of endotracheal tube intubation.  Patient's family is aware of patient's grave prognosis    DVT Prophylaxis:  Continue to hold all anticoagulation including aspirin for ongoing hemoptysis  GI Prophylaxis:  Protonix     Greater than 30 minutes of critical care was time spent personally by me on the following activities: development of treatment plan with patient or surrogate and bedside caregivers, discussions with consultants, evaluation of patient's response to treatment, examination of patient, ordering and performing treatments and interventions, ordering and review of laboratory studies, ordering and review of radiographic studies, pulse oximetry, re-evaluation of patient's condition.  This critical care time did not overlap with that of any other provider or involve time for any procedures.     Ebony Leal MD  Pulmonary Critical Care Medicine  [facility] - ICU

## 2022-11-20 NOTE — PROGRESS NOTES
[facility] - ICU  Pulmonary Critical Care Note    Patient Name: Adrienne Urbina  MRN: [id number]  Admission Date: 10/31/2022  Hospital Length of Stay: 19 days  Code Status: Full Code  Attending Provider: Glenn Cantu Jr., MD, *  Primary Care Provider: Gregorio Cherry NP     Subjective:     HPI:   Ms. 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  Patient is currently vent the past wean sedation having met with increased blood pressure and tachycardia.  CPAP trials have been tried however patient's tidal volumes have been adequate.  Discussions were had with family and they will come to a decision this week.    Hospital Course/Significant events:  Intubated 11/02/2022  Patient was initiated on stress dose steroids on 11/10, steroids were stopped on 11/14 when patient's he went that mimics were stable.    11/14 patient was initiated on voriconazole       24 Hour Interval History:  Patient has remained afebrile however she is a little bit tachycardia this a.m..  Patient is intubated and sedated on 0.06 of Levophed, 20 of Diprivan, 100 of fentanyl, .4 Precedex.  Patient is on assist control 450/26/peep positive 5/40% FiO2.  ABG is 7.31/46/81/23.2.  This is day 17 of endotracheal tube intubation.  Past Medical History:   Diagnosis Date    Anxiety and depression     Cancer     lung with brain metastasis    Cholelithiasis     COPD (chronic obstructive pulmonary disease)     Hypertension     Lumbar disc disease     PAD (peripheral artery disease)     Paroxysmal SVT (supraventricular tachycardia)     Pleural effusion        Social History     Socioeconomic History    Marital status:    Tobacco Use     Smoking status: Some Days     Packs/day: 0.25     Years: 49.00     Pack years: 12.25     Types: Cigarettes     Last attempt to quit: 2022     Years since quittin.3    Smokeless tobacco: Never   Substance and Sexual Activity    Alcohol use: Never    Drug use: Never    Sexual activity: Yes     Social Determinants of Health     Financial Resource Strain: Low Risk     Difficulty of Paying Living Expenses: Not very hard   Food Insecurity: No Food Insecurity    Worried About Running Out of Food in the Last Year: Never true    Ran Out of Food in the Last Year: Never true   Transportation Needs: No Transportation Needs    Lack of Transportation (Medical): No    Lack of Transportation (Non-Medical): No   Physical Activity: Inactive    Days of Exercise per Week: 0 days    Minutes of Exercise per Session: 0 min   Stress: No Stress Concern Present    Feeling of Stress : Not at all   Social Connections: Moderately Isolated    Frequency of Communication with Friends and Family: More than three times a week    Frequency of Social Gatherings with Friends and Family: More than three times a week    Attends Adventism Services: Never    Active Member of Clubs or Organizations: No    Attends Club or Organization Meetings: Never    Marital Status:    Housing Stability: Low Risk     Unable to Pay for Housing in the Last Year: No    Number of Places Lived in the Last Year: 1    Unstable Housing in the Last Year: No       Objective:     Current Outpatient Medications   Medication Instructions    albuterol (PROVENTIL/VENTOLIN HFA) 90 mcg/actuation inhaler Currently holding due to heart rate    albuterol-ipratropium (DUO-NEB) 2.5 mg-0.5 mg/3 mL nebulizer solution 3 mLs, Nebulization, Every 4 hours PRN, Currently holding due to heartrate    aspirin 81 mg, Oral, Daily    ATROVENT HFA 17 mcg/actuation inhaler Currently holding due to heartrate    cyproheptadine (PERIACTIN) 4 mg, Oral, As needed (PRN)    dicyclomine (BENTYL) 10  mg, Oral, 3 times daily PRN    diltiaZEM (CARDIZEM CD) 360 mg, Oral, Daily    folic acid (FOLVITE) 1 mg, Oral, Daily    gabapentin (NEURONTIN) 300 mg, Oral, 3 times daily    guaiFENesin-codeine 100-10 mg/5 ml (TUSSI-ORGANIDIN NR)  mg/5 mL syrup 10 mLs, Oral, As needed (PRN)    HUMULIN R REGULAR U-100 INSULN 100 unit/mL injection Holding since in patient stay    loratadine (CLARITIN) 10 mg, Oral, Daily    metFORMIN (GLUCOPHAGE) 500 mg, Oral, 2 times daily    metoprolol tartrate (LOPRESSOR) 100 mg, Oral, 2 times daily    mirtazapine (REMERON) 15 mg, Oral, Nightly    montelukast (SINGULAIR) 10 mg, Oral, As needed (PRN)    ONETOUCH DELICA PLUS LANCET 33 gauge Misc No dose, route, or frequency recorded.    ONETOUCH VERIO REFLECT METER Misc No dose, route, or frequency recorded.    ONETOUCH VERIO TEST STRIPS Strp No dose, route, or frequency recorded.    OXYGEN-AIR DELIVERY SYSTEMS MISC   oxygen, See Instructions, home concentrator and portable @ 2L per nasal cannula continuous to keep SATS at 88% or greater J18.9, C34.9, J96.9, R09.02, # 1 EA, 0 Refill(s)    tiZANidine (ZANAFLEX) 4 MG tablet No dose, route, or frequency recorded.       Current Inpatient Medications   acetaminophen  650 mg Oral Once    amiodarone  200 mg Oral BID    insulin detemir U-100  5 Units Subcutaneous QHS    pantoprozole (PROTONIX) IV  40 mg Intravenous Q12H    potassium bicarbonate  20 mEq Oral BID    voriconazole  200 mg Oral BID         Intake/Output Summary (Last 24 hours) at 11/20/2022 0557  Last data filed at 11/19/2022 1724  Gross per 24 hour   Intake 767.96 ml   Output 2800 ml   Net -2032.04 ml         Review of Systems   Unable to perform ROS: Intubated      Vital Signs (Most Recent):  Temp: 97.9 °F (36.6 °C) (11/20/22 0400)  Pulse: 99 (11/20/22 0530)  Resp: (!) 27 (11/20/22 0530)  BP: 127/73 (11/20/22 0530)  SpO2: 100 % (11/20/22 0530)    Body mass index is 32.2 kg/m².  Weight: 90.5 kg (199 lb 8.3 oz) Vital Signs (24h  Range):  Temp:  [97.7 °F (36.5 °C)-98.8 °F (37.1 °C)] 97.9 °F (36.6 °C)  Pulse:  [] 99  Resp:  [0-29] 27  SpO2:  [90 %-100 %] 100 %  BP: ()/(44-95) 127/73  Arterial Line BP: ()/(28-83) 122/50     Physical Exam  Vitals and nursing note reviewed.   Constitutional:       General: She is not in acute distress.     Appearance: She is ill-appearing. She is not toxic-appearing.      Comments: Intubated and sedated   HENT:      Head: Normocephalic and atraumatic.      Nose: Nose normal.      Mouth/Throat:      Mouth: Mucous membranes are moist.      Pharynx: Oropharynx is clear.   Eyes:      Extraocular Movements: Extraocular movements intact.      Conjunctiva/sclera: Conjunctivae normal.      Pupils: Pupils are equal, round, and reactive to light.   Cardiovascular:      Rate and Rhythm: Normal rate and regular rhythm.      Pulses: Normal pulses.      Heart sounds: Normal heart sounds. No murmur heard.    No gallop.   Pulmonary:      Effort: No respiratory distress.      Breath sounds: No stridor. No wheezing, rhonchi or rales.   Abdominal:      General: Abdomen is flat.      Palpations: Abdomen is soft.   Musculoskeletal:         General: No swelling or deformity.      Right lower leg: No edema.      Left lower leg: No edema.   Skin:     General: Skin is warm and dry.      Coloration: Skin is not jaundiced.      Findings: No bruising.   Neurological:      General: No focal deficit present.      Mental Status: She is alert and oriented to person, place, and time.      Comments: Patient sedated and therefore unable to accurately exam     Bloody secretions.  Mechanical ventilation support:  Vent Mode: A/C (11/20/22 0524)  Set Rate: 26 BPM (11/20/22 0524)  Vt Set: 450 mL (11/20/22 0524)  PEEP/CPAP: 5 cmH20 (11/20/22 0524)  Oxygen Concentration (%): 40 (11/20/22 0524)  Peak Airway Pressure: 25 cmH2O (11/20/22 0524)  Plateau Pressure: 25.6 cmH20 (11/18/22 1900)  Total Ve: 11.8 L/m (11/20/22 0524)  F/VT  Ratio<105 (RSBI): (!) 55.01 (11/20/22 0524)    Lines/Drains/Airways       Central Venous Catheter Line  Duration             Port A Cath Single Lumen right subclavian -- days    Percutaneous Central Line Insertion/Assessment - Triple Lumen  11/10/22 1545 right femoral vein;right femoral 9 days              Drain  Duration                  NG/OG Tube 11/02/22 1355 [place] sump 16 Fr. Left mouth 17 days         Urethral Catheter 11/02/22 1400 Silicone 16 Fr. 17 days         Rectal Tube 11/07/22 0408 rectal tube w/ balloon (indicate number of mLs) 13 days              Airway  Duration                  Airway - Non-Surgical 11/02/22 1400 Endotracheal Tube 17 days              Arterial Line  Duration             Arterial Line 11/07/22 1340 Right Radial 12 days                    Significant Labs:    Lab Results   Component Value Date    WBC 15.8 (H) 11/19/2022    HGB 9.5 (L) 11/19/2022    HCT 29.2 (L) 11/19/2022    MCV 91.8 11/19/2022     11/19/2022         BMP  Lab Results   Component Value Date     11/19/2022    K 3.9 11/19/2022    CO2 18 (L) 11/19/2022    BUN 18.3 11/19/2022    CREATININE 0.80 11/19/2022    CALCIUM 9.0 11/19/2022    EGFRNONAA 81 04/22/2022       ABG  Recent Labs   Lab 11/20/22  0525   PH 7.31*   PO2 81   PCO2 46*   HCO3 23.2             Significant Imaging:  I have reviewed all pertinent imaging within the past 24 hours.    Assessment/Plan:   1.) Massive Hemoptysis  2.) Hx of Stage IV Poorly Differentiated RUL Lung Carcinoma  3.) Serratia M. Pneumonia with leukocytosis  4.) Hx of Atrial Fibrillation/flutter (not on Eliquis)  5.)  Nonocclusive acute deep vein thrombosis visualized left axillary vein and occlusive thrombus in left cephalic vein.  6.) Hx of COPD (on home 2L NC q.Nightly)  7.) Hx of HTN  8.) Hx of HLD  9.) Hx of PAD  10) NAGMA    -On mechanical ventilation, wean as tolerated.  Daily SBT as able/tolerated  -this is day 7 of voriconazole will continue  -Amio 200 BID, QTC is not  prolonged  -Will replete if H/H is <7  Continue TPN recommendations from dietary    Prognosis guarded.  This is day 17 of endotracheal tube intubation.  Patient's family is aware of patient's grave prognosis    DVT Prophylaxis:  Continue to hold all anticoagulation including aspirin for ongoing hemoptysis  GI Prophylaxis:  Protonix     Greater than 30 minutes of critical care was time spent personally by me on the following activities: development of treatment plan with patient or surrogate and bedside caregivers, discussions with consultants, evaluation of patient's response to treatment, examination of patient, ordering and performing treatments and interventions, ordering and review of laboratory studies, ordering and review of radiographic studies, pulse oximetry, re-evaluation of patient's condition.  This critical care time did not overlap with that of any other provider or involve time for any procedures.     Ebony Leal MD  Pulmonary Critical Care Medicine  [facility] - ICU

## 2022-11-21 NOTE — PROGRESS NOTES
[facility] - ICU  Pulmonary Critical Care Note    Patient Name: Adrienne Urbina  MRN: [id number]  Admission Date: 10/31/2022  Hospital Length of Stay: 20 days  Code Status: Full Code  Attending Provider: Glenn Cantu Jr., MD, *  Primary Care Provider: Gregorio Cherry NP     Subjective:     HPI:   Ms. 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  Patient is currently vent the past wean sedation having met with increased blood pressure and tachycardia.  CPAP trials have been tried however patient's tidal volumes have been adequate.  Discussions were had with family and they will come to a decision this week.    Hospital Course/Significant events:  Intubated 11/02/2022  Patient was initiated on stress dose steroids on 11/10, steroids were stopped on 11/14 when patient's he went that mimics were stable.    11/14 patient was initiated on voriconazole       24 Hour Interval History:  Patient is intubated and sedated currently on 0.08 of Levophed, 20 of Diprivan, 100 of fentanyl, point for Precedex.  She is on assist control 26/450/5 positive/40% FiO2 she has been afebrile.  She has had 2600 of output.  Reportedly no more bloody secretions today.  Patient is tolerating TPN however her blood sugars have been elevated.  Past Medical History:   Diagnosis Date    Anxiety and depression     Cancer     lung with brain metastasis    Cholelithiasis     COPD (chronic obstructive pulmonary disease)     Hypertension     Lumbar disc disease     PAD (peripheral artery disease)     Paroxysmal SVT (supraventricular tachycardia)     Pleural effusion        Social History     Socioeconomic History    Marital status:     Tobacco Use    Smoking status: Some Days     Packs/day: 0.25     Years: 49.00     Pack years: 12.25     Types: Cigarettes     Last attempt to quit: 2022     Years since quittin.3    Smokeless tobacco: Never   Substance and Sexual Activity    Alcohol use: Never    Drug use: Never    Sexual activity: Yes     Social Determinants of Health     Financial Resource Strain: Low Risk     Difficulty of Paying Living Expenses: Not very hard   Food Insecurity: No Food Insecurity    Worried About Running Out of Food in the Last Year: Never true    Ran Out of Food in the Last Year: Never true   Transportation Needs: No Transportation Needs    Lack of Transportation (Medical): No    Lack of Transportation (Non-Medical): No   Physical Activity: Inactive    Days of Exercise per Week: 0 days    Minutes of Exercise per Session: 0 min   Stress: No Stress Concern Present    Feeling of Stress : Not at all   Social Connections: Moderately Isolated    Frequency of Communication with Friends and Family: More than three times a week    Frequency of Social Gatherings with Friends and Family: More than three times a week    Attends Adventism Services: Never    Active Member of Clubs or Organizations: No    Attends Club or Organization Meetings: Never    Marital Status:    Housing Stability: Low Risk     Unable to Pay for Housing in the Last Year: No    Number of Places Lived in the Last Year: 1    Unstable Housing in the Last Year: No       Objective:     Current Outpatient Medications   Medication Instructions    albuterol (PROVENTIL/VENTOLIN HFA) 90 mcg/actuation inhaler Currently holding due to heart rate    albuterol-ipratropium (DUO-NEB) 2.5 mg-0.5 mg/3 mL nebulizer solution 3 mLs, Nebulization, Every 4 hours PRN, Currently holding due to heartrate    aspirin 81 mg, Oral, Daily    ATROVENT HFA 17 mcg/actuation inhaler Currently holding due to heartrate    cyproheptadine (PERIACTIN) 4 mg, Oral, As needed (PRN)     dicyclomine (BENTYL) 10 mg, Oral, 3 times daily PRN    diltiaZEM (CARDIZEM CD) 360 mg, Oral, Daily    folic acid (FOLVITE) 1 mg, Oral, Daily    gabapentin (NEURONTIN) 300 mg, Oral, 3 times daily    guaiFENesin-codeine 100-10 mg/5 ml (TUSSI-ORGANIDIN NR)  mg/5 mL syrup 10 mLs, Oral, As needed (PRN)    HUMULIN R REGULAR U-100 INSULN 100 unit/mL injection Holding since in patient stay    loratadine (CLARITIN) 10 mg, Oral, Daily    metFORMIN (GLUCOPHAGE) 500 mg, Oral, 2 times daily    metoprolol tartrate (LOPRESSOR) 100 mg, Oral, 2 times daily    mirtazapine (REMERON) 15 mg, Oral, Nightly    montelukast (SINGULAIR) 10 mg, Oral, As needed (PRN)    ONETOUCH DELICA PLUS LANCET 33 gauge Misc No dose, route, or frequency recorded.    ONETOUCH VERIO REFLECT METER Misc No dose, route, or frequency recorded.    ONETOUCH VERIO TEST STRIPS Strp No dose, route, or frequency recorded.    OXYGEN-AIR DELIVERY SYSTEMS MISC   oxygen, See Instructions, home concentrator and portable @ 2L per nasal cannula continuous to keep SATS at 88% or greater J18.9, C34.9, J96.9, R09.02, # 1 EA, 0 Refill(s)    tiZANidine (ZANAFLEX) 4 MG tablet No dose, route, or frequency recorded.       Current Inpatient Medications   acetaminophen  650 mg Oral Once    amiodarone  200 mg Oral BID    insulin detemir U-100  5 Units Subcutaneous QHS    pantoprozole (PROTONIX) IV  40 mg Intravenous Q12H    voriconazole  200 mg Oral BID         Intake/Output Summary (Last 24 hours) at 11/21/2022 0604  Last data filed at 11/21/2022 0530  Gross per 24 hour   Intake 4889.72 ml   Output 2600 ml   Net 2289.72 ml         Review of Systems   Unable to perform ROS: Intubated      Vital Signs (Most Recent):  Temp: 98.6 °F (37 °C) (11/21/22 0315)  Pulse: 83 (11/21/22 0500)  Resp: (!) 26 (11/21/22 0500)  BP: (!) 143/71 (11/21/22 0400)  SpO2: 100 % (11/21/22 0500)    Body mass index is 32.2 kg/m².  Weight: 90.5 kg (199 lb 8.3 oz) Vital Signs (24h Range):  Temp:  [98.4 °F  (36.9 °C)-99.8 °F (37.7 °C)] 98.6 °F (37 °C)  Pulse:  [] 83  Resp:  [18-29] 26  SpO2:  [99 %-100 %] 100 %  BP: ()/(41-71) 143/71  Arterial Line BP: ()/(31-58) 148/52     Physical Exam  Vitals and nursing note reviewed.   Constitutional:       General: She is not in acute distress.     Appearance: She is ill-appearing. She is not toxic-appearing.      Comments: Intubated and sedated   HENT:      Head: Normocephalic and atraumatic.      Nose: Nose normal.      Mouth/Throat:      Mouth: Mucous membranes are moist.      Pharynx: Oropharynx is clear.   Eyes:      Extraocular Movements: Extraocular movements intact.      Conjunctiva/sclera: Conjunctivae normal.      Pupils: Pupils are equal, round, and reactive to light.   Cardiovascular:      Rate and Rhythm: Normal rate and regular rhythm.      Pulses: Normal pulses.      Heart sounds: Normal heart sounds. No murmur heard.    No gallop.   Pulmonary:      Effort: No respiratory distress.      Breath sounds: No stridor. No wheezing, rhonchi or rales.   Abdominal:      General: Abdomen is flat.      Palpations: Abdomen is soft.   Musculoskeletal:         General: No swelling or deformity.      Right lower leg: No edema.      Left lower leg: No edema.   Skin:     General: Skin is warm and dry.      Coloration: Skin is not jaundiced.      Findings: No bruising.   Neurological:      General: No focal deficit present.      Mental Status: She is alert and oriented to person, place, and time.      Comments: Patient sedated and therefore unable to accurately exam     Bloody secretions.  Mechanical ventilation support:  Vent Mode: A/C (11/21/22 0500)  Set Rate: 26 BPM (11/21/22 0500)  Vt Set: 450 mL (11/21/22 0500)  PEEP/CPAP: 5 cmH20 (11/21/22 0500)  Oxygen Concentration (%): 40 (11/21/22 0500)  Peak Airway Pressure: 20.9 cmH2O (11/21/22 0500)  Plateau Pressure: 24.5 cmH20 (11/20/22 1936)  Total Ve: 11.2 L/m (11/21/22 0500)  F/VT Ratio<105 (RSBI): (!) 58.17  (11/21/22 0500)    Lines/Drains/Airways       Central Venous Catheter Line  Duration             Port A Cath Single Lumen right subclavian -- days    Percutaneous Central Line Insertion/Assessment - Triple Lumen  11/10/22 1545 right femoral vein;right femoral 10 days              Drain  Duration                  NG/OG Tube 11/02/22 1355 [place] sump 16 Fr. Left mouth 18 days         Urethral Catheter 11/02/22 1400 Silicone 16 Fr. 18 days         Rectal Tube 11/07/22 0408 rectal tube w/ balloon (indicate number of mLs) 14 days              Airway  Duration                  Airway - Non-Surgical 11/02/22 1400 Endotracheal Tube 18 days              Arterial Line  Duration             Arterial Line 11/07/22 1340 Right Radial 13 days                    Significant Labs:    Lab Results   Component Value Date    WBC 17.9 (H) 11/21/2022    HGB 9.8 (L) 11/21/2022    HCT 30.3 (L) 11/21/2022    MCV 94.4 (H) 11/21/2022     11/21/2022         BMP  Lab Results   Component Value Date     (L) 11/21/2022    K 3.1 (L) 11/21/2022    CO2 20 (L) 11/21/2022    BUN 36.1 (H) 11/21/2022    CREATININE 1.25 (H) 11/21/2022    CALCIUM 8.8 11/21/2022    EGFRNONAA 81 04/22/2022       ABG  Recent Labs   Lab 11/21/22  0501   PH 7.28*   PO2 95   PCO2 47*   HCO3 22.1             Significant Imaging:  I have reviewed all pertinent imaging within the past 24 hours.    Assessment/Plan:   1.) Massive Hemoptysis  2.) Hx of Stage IV Poorly Differentiated RUL Lung Carcinoma  3.) Serratia M. Pneumonia with leukocytosis  4.) Hx of Atrial Fibrillation/flutter (not on Eliquis)  5.)  Nonocclusive acute deep vein thrombosis visualized left axillary vein and occlusive thrombus in left cephalic vein.  6.) Hx of COPD (on home 2L NC q.Nightly)  7.) Hx of HTN  8.) Hx of HLD  9.) Hx of PAD  10) NAGMA    -On mechanical ventilation, wean as tolerated.  Daily SBT as able/tolerated  -this is day 8 of voriconazole will continue  -Amio 200 BID, QTC is not  prolonged  -Will replete if H/H is <7  -Continue TPN recommendations from dietary.  Up titrated the detemir to 10 units b.i.d. with hold parameters, and will add sliding scale insulin for TPN continues material.  -PH shows 7.28/47/95/22.1.  Patient is currently on a rate of 26.  Will hold off sedation today and see how patient responds.  -Will replete electrolytes    Prognosis guarded.  This is day 18 of endotracheal tube intubation.  Patient's family is aware of patient's grave prognosis    DVT Prophylaxis:  Continue to hold all anticoagulation including aspirin for ongoing hemoptysis  GI Prophylaxis:  Protonix     Greater than 30 minutes of critical care was time spent personally by me on the following activities: development of treatment plan with patient or surrogate and bedside caregivers, discussions with consultants, evaluation of patient's response to treatment, examination of patient, ordering and performing treatments and interventions, ordering and review of laboratory studies, ordering and review of radiographic studies, pulse oximetry, re-evaluation of patient's condition.  This critical care time did not overlap with that of any other provider or involve time for any procedures.     Ebony Leal MD  Pulmonary Critical Care Medicine  [facility] - ICU

## 2022-11-21 NOTE — PROGRESS NOTES
Inpatient Nutrition Assessment    Admit Date: 10/31/2022   Total duration of encounter: 21 days     Nutrition Recommendation/Prescription     Pt remains NPO/intubated . Propofol infusing @ 9.2  ml/hr providing 242 calories(rate up/down). . Pt has remained off TF; on TPN. Pt family considering plan of care options; currently not with Hospice /full code status so full nutrition care /needs should be met. Continue TPN: Clinimix 5/20 @ 75ml/hr /no lipids while on propofol --to provide 1584 calories, 90 gm protein. (TPN + propofol = 1826 calories).    As tolerated--Retry TF Peptamen AF @ 20ml/hr; increase as tolerated to goal rate 55ml/hr (23 hr cycle) provide 1518 calories, 96 gm protein, 1027 ml free water. (TF + propofol = 1760 calories). Flush tube with 100 ml water every 4hrs.   When extubated ADAT to cardiac with boost plus bid  MVI/fe  Biweekly wt  Will monitor nutrition status     Communication of Recommendations: reviewed with nurse    Nutrition Assessment     Malnutrition Assessment/Nutrition-Focused Physical Exam    Malnutrition in the context of chronic illness  Degree of Malnutrition: non-severe (moderate) malnutrition  Energy Intake: < 75% of estimated energy requirement for >/= 1 month  Interpretation of Weight Loss: does not meet criteria  Body Fat: mild depletion  Area of Body Fat Loss: orbital region  and upper arm region - triceps / biceps  Muscle Mass Loss: does not meet criteria  Area of Muscle Mass Loss: does not meet criteria  Fluid Accumulation: does not meet criteria  Edema: 2+ edema - mild and does not meet criteria  Reduced  Strength: unable to obtain  A minimum of two characteristics is recommended for diagnosis of either severe or non-severe malnutrition.    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 (diagnosed 09/2020, s/p radiation, currently receiving chemotherapy q3 weeks), atrial fibrillation (not on Eliquis), HTN, HLD, PAD, COPD (on nightly 2 L NC), tobacco use, and cholelithiasis    Nutrition-Related Medications:  levemir, protonix, K bicarb, KCl, precedex, levophed, propofol @ 9.2ml/hr, Clinimix 5/20 @ 75ml/hr  Calorie Containing IV Medications: Diprivan @ 9.2 ml/hr (provides 242 kcal/d) and Clinimix 5/20 @ 75ml/hr    Nutrition-Related Labs:  (11-15) H/H 9.9/31.7(L)  Gluc 99 Bun 41(H) Cr 1.1(H) K 2.9(L) Alb 1.5(L) P 2.9   (11-18) H/H 8.5/26.8(L) Gluc 81 Bun 24 Cr 0.8 K 3.2(L) Alb 1.4(L) Alk Phos 170(H)   11/21-Na 135, K 3.1, CO2 20, Gluc 231, BUN 36.1, Creat 1.25, Alb 1.3, GFR 48    Diet/PN Order: Diet NPO  amino acid 5% - dextrose 20% solution  Oral Supplement Order: none  Tube Feeding Order: none  Appetite/Oral Intake: NPO/NPO  Factors Affecting Nutritional Intake: NPO and on mechanical ventilation  Food/Anabaptist/Cultural Preferences: none reported  Food Allergies: none reported       Wound(s):   none reported     Comments  11/21: Pt remains on vent; propofol @ 9.2 ml/hr; TF has remained off; noted pt started on TPN. Spoke with nsg; reports TPN running at goal with no issues. Wt remains elevated/ edema. Noted fly to make decision about care this week.    (11/18) Pt remains on vent; propofol @ 9ml/hr; TF has remained off; spoke with RN about need for nutrition support; aware staff spoke to daughter about considering palliative WD/transition to hospice; no decision made of this time. Both TF/TPN recs provided; will forward to MD. Wt remains elevated/ edema.     (11/15) Pt NPO/vent; propofol @ 18ml/hr; pt remains on levophed/vasopressin; staff to have discussion today about pt plan of care option. Pt not tolerating TF even with  "reglan ; if family desires to continue nutrition support may need to use TPN until TF tolerated; see recs. Wt remains elevated/ fluid.     () Pt remains on vent/propofol @ 20ml/hr; RN reported TF currently off 2 elevated residual of > 600 ml; suggested Reglan for Gi motility. Wt remains elevaeted/+ edema. TPN recs provided incase pt unable to tolerate enteral feedings. Labs acknowledged.     () Pt remains NPO/vent; propofol @ 18ml/hr; TF infusing at goal rate 55ml/hr; aware pt poor prognosis; family desires full code at this time. Suggest continue current nutrition support. Labs acknowledged. Noted wt elevated--? Fluid/vs bedscale accuracy.     () pt remains NPO x 3 days today/ vent; consulted for TF recs; will initiate TF as per protocol; spoke to RN about TF order. Pt remains on propofol @ 16ml/hr--providing 425 calories.     (11/3) Pt intubated yesterday (); on propofol @ 18ml/hr; has OGT; TF recs provided to maintain nutrition status; will forward to MD. Labs akcnowledged.     : Pt currently NPO during rounds; reports some nausea/vomiting, no other GI complaints. Pt reports good appetite and no recent wt loss pta; #. No c/s difficulties reported. Pt ok to add Boost Plus once diet advanced.    Anthropometrics    Height: 5' 6" (167.6 cm) Height Method: Stated  Last Weight: 90.5 kg (199 lb 8.3 oz) (22 0553) Weight Method: Bed Scale  BMI (Calculated): 32.2  BMI Classification: overweight (BMI 25-29.9)     Ideal Body Weight (IBW), Female: 130 lb     % Ideal Body Weight, Female (lb): 130.77 %                    Usual Body Weight (UBW), k.91 kg (# per pt)  % Usual Body Weight: 101.8     Usual Weight Provided By: patient and EMR weight history    Wt Readings from Last 5 Encounters:   22 90.5 kg (199 lb 8.3 oz)   10/21/22 75.7 kg (166 lb 12.8 oz)   10/16/22 77.7 kg (171 lb 4.8 oz)   22 74.4 kg (164 lb)   09/10/22 75.3 kg (166 lb)     Weight Change(s) Since " Admission:  Admit Weight: 77.1 kg (170 lb) (11/01/22 0552)  (11/3) 77.8kg   (11/8) wt elevated 88kg; ? Fluid/vs accuracy bed   (11/11) 87.3kg; wt remains elevated/edema   (11/15) 92.7kg; wt elevaetd/ edema   11/21: 90.5 kg; wt elevated from admit wt/down from previous wt; edema     Estimated Needs    Weight Used For Calorie Calculations: 77.8 kg (171 lb 8.3 oz)  Energy Calorie Requirements (kcal): 1945 kcal/d; 25 maryjane/kg vent  Energy Need Method: Kcal/kg  Weight Used For Protein Calculations: 77.8 kg (171 lb 8.3 oz)  Protein Requirements: 94 gm protein/d; 1.2 gm/kg  Fluid Requirements (mL): 1945ml/d; 1ml/maryjane  Temp: 99.5 °F (37.5 °C)       Enteral Nutrition    Patient not receiving enteral nutrition support at this time.    Parenteral Nutrition    Standard Formula: Clinimix 5/20  Custom Formula: not applicable  Additives: none  Rate/Volume: 75 mL/hr  Lipids: none -- propofol @ 9.2 ml/hr  Total Nutrition Provided by Parenteral Nutrition:  Calories Provided  1826 kcal/d, 94% needs   Protein Provided  90 g/d, 96% needs   Dextrose Provided  360 g/d, GIR 2.76 mg CHO/kg/min   Fluid Provided  1800 ml/d, 93% needs       Evaluation of Received Nutrient Intake    Calories: meeting estimated needs  Protein: meeting estimated needs    Patient Education    Not applicable.    Nutrition Diagnosis     PES: Inadequate oral intake related to vent as evidenced by NPO status . (continues)    Interventions/Goals     Intervention(s): modified composition of meals/snacks, modified composition of enteral nutrition, modified rate of enteral nutrition, modified composition of parenteral nutrition, modified rate of parenteral nutrition, commercial beverage, and collaboration with other providers  Goal: Meet greater than 75% of nutritional needs by follow-up. (goal progressing)    Monitoring & Evaluation     Dietitian will monitor energy intake, parenteral nutrition intake, weight change, electrolyte/renal panel, and glucose/endocrine  profile.  Nutrition Risk/Follow-Up: high (follow-up in 1-4 days)   Please consult if re-assessment needed sooner.

## 2022-11-22 PROBLEM — Z51.5 COMFORT MEASURES ONLY STATUS: Status: ACTIVE | Noted: 2022-01-01

## 2022-11-22 NOTE — CARE UPDATE
I have communicated the options to Ms. Nelson, patient's power-of- and have discussed options with patient's family.  They would like to switch patient to a DNR and to initiate palliative measures.  Patient's family is aware that once these measures are undertaken that patient will pass away, they are in agreement.  At this time will make patient a DNR, extubate, and initiate comfort measures.      Ebony Leal MD  Internal Medicine Resident PGY-II

## 2022-11-22 NOTE — DISCHARGE SUMMARY
U Internal Medicine Discharge Summary    Admitting Physician: Glenn Cantu Jr., MD, [facility]  Attending Physician: Glenn Cantu Jr., MD, *  Date of Admit: 10/31/2022  Date of Discharge: 2022    Condition:   Outcome:    DISPOSITION:           Discharge Diagnoses     Patient Active Problem List   Diagnosis    Stage IV adenocarcinoma of lung    Abnormal MRI of the abdomen    Malignant neoplasm of lung    Vocal cord anomaly    COPD (chronic obstructive pulmonary disease)    PAF (paroxysmal atrial fibrillation)    Anxiety and depression    Cavitary lesion of lung    Elevated alkaline phosphatase level    Comfort measures only status       Principal Problem:  Malignant neoplasm of lung    Consultants and Procedures     Consultants:  IP CONSULT TO HOSPITAL MEDICINE  IP CONSULT TO PICC TEAM ([facility]AS)  IP CONSULT TO GENERAL SURGERY  IP CONSULT TO REGISTERED DIETITIAN/NUTRITIONIST  IP CONSULT TO CARDIOLOGY  IP CONSULT TO GENERAL SURGERY  PHARMACY TO DOSE VANCOMYCIN CONSULT  IP CONSULT TO SOCIAL WORK/CASE MANAGEMENT    Procedures:   * No surgery found *     Brief Admission History      Ms. 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    Hospital Course with Pertinent Findings     Patient was treated empirically for pneumonia and Fungitell was got which found possible fungus infection.  Patient was treated with cefepime and voriconazole.  Attempts were made to wean patient off however patient's right upper lung and it is cavitation and necrosis patient was unable to be weaned.  Patient also had hemoptysis that did not stop and patient's blood was repleted.  Additionally patient had her  electrolytes repleted however patient became aneuric.  Furthermore patient went into AFib with RVR requiring multiple shocks.  Due to inability to be weaned off of the ventilator discussions were had with patient's family and hospice.  Patient's family elected to palliatively extubate and comfort cares were started on 2022 patient passed away on  20.      DEATH NOTE    Received a page from the nurse that Ms. Adrienne Urbina's telemetry monitor was showing asystole. On arrival at bedside, she was unresponsive to verbal, tactile or painful stimuli. She  was not moving any of her extremities spontaneously. She had non-palpable bilateral  Carotid, Radial, and Femoral pulses. On auscultation of her precordium and anterior thorax, she did not have any audible heart sounds or breath sounds. She had absent pupillary light reflexes as well as absent corneal reflexes bilaterally. She was pronounced  at 14:20 on 2022. Cause of death was cardiopulmonary arrest from Stage IV adenocarcinoma.    Family was at bedside and informed of the patient's passing.     Time of Death: 14:20  Date of Death: 2022    Attending physician notified.    Ebony Leal MD  Internal Medicine Resident PGY-II      TIME SPENT ON DISCHARGE: 60 minutes      Ebony Leal MD  Internal Medicine Resident PGY-II

## 2022-11-22 NOTE — PHYSICIAN QUERY
"PT Name: Adrienne Urbina  MR #: [id number]    DOCUMENTATION CLARIFICATION     CDS: Alyse Fisher RN         Contact information: [email or web address] or (cell) [phone]     This form is a permanent document in the medical record.     Query Date: November 22, 2022    By submitting this query, we are merely seeking further clarification of documentation.. Please utilize your independent clinical judgment when addressing the question(s) below.    The medical record contains the following:   Indicators  Supporting Clinical Findings Location in Medical Record   x Energy Intake Energy Intake: < 75% of estimated energy requirement for >/= 1 month   Nutrition PN 11/21    Weight Loss     x Fat Loss Body Fat: mild depletion  Area of Body Fat Loss: orbital region  and upper arm region - triceps / biceps   Nutrition PN 11/21    Muscle Loss     x Edema/Fluid Accumulation Edema: 2+ edema - mild    Nutrition PN 11/21    Reduced  Strength (by dynamometer)     x Weight, BMI, Usual Body Weight Height: 5' 6" (167.6 cm) Height Method: Stated  Last Weight: 90.5 kg (199 lb 8.3 oz) (11/19/22 0553) Weight Method: Bed Scale  BMI (Calculated): 32.2   Nutrition PN 11/21    Delayed Wound Healing     x Registered Dietician Diagnosis Malnutrition in the context of chronic illness  Degree of Malnutrition: non-severe (moderate) malnutrition   Nutrition PN 11/21   x Acute or Chronic Illness 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 Propofol infusing @ 9.2  ml/hr providing 242 calories(rate up/down). . Pt has remained off TF; on TPN. Pt family considering plan of care options; currently not with Hospice /full code status " so full nutrition care /needs should be met. Continue TPN: Clinimix 5/20 @ 75ml/hr /no lipids while on propofol --to provide 1584 calories, 90 gm protein. (TPN + propofol = 1826 calories).    2.As tolerated--Retry TF Peptamen AF @ 20ml/hr; increase as tolerated to goal rate 55ml/hr (23 hr cycle) provide 1518 calories, 96 gm protein, 1027 ml free water. (TF + propofol = 1760 calories). Flush tube with 100 ml water every 4hrs.      Nutrition PN 11/21    Other       Academy of Nutrition and Dietetics (Academy) and the American Society for Parenteral and Enteral Nutrition (A.S.P.E.N.) Clinical Characteristics to support Malnutrition   Malnutrition in the Context of Acute Illness or Injury Malnutrition in the Context of Chronic Illness or Injury Malnutrition in the Context of Social or Environmental Circumstances   Malnutrition Level Moderate Severe Moderate Severe   Moderate   Severe   Energy Intake <75%                   >7 days <50%                 >5 days <75%           >1 month <75%                      >1 month   <75% for >3 months   <50% for >1 month   Weight Loss   1-2% in 1 week >2% in 1 week 5% in 1 month >5% in 1 month 5% in 1 month >5% in 1 month    5% in 1 month >5% in 1 month 7.5% in 3 months >7.5% in 3 months 7.5% in 3 months >7.5% in 3 months    7.5% in 3 months >7.5% in 3 months 10% in 6 months >10% in 6 months 10% in 6 months >10% in 6 months        20% in 1 year                    >20% in 1 year                                                                  20% in 1 year                            >20% in 1 year                                                  Subcutaneous Fat Loss Mild  Moderate  Mild  Severe    Mild   Severe   Muscle Loss Mild depletion Moderate depletion  Mild depletion Severe Depletion   Mild   Severe   Edema/Fluid Accumulation Mild Moderate to severe  Mild  Severe   Mild   Severe   Reduced  Strength         (based on standards supplied by  of dynamometer) N/A  Measurably reduced N/A Measurably reduced N/A Measurably reduced     Criteria for mild malnutrition is defined as 1 characteristic outlined above within the established moderate or severe parameters.  A minimum of 2 out of the 6 characteristics noted above are recommended for a diagnosis of moderate or severe malnutrition.  Chronic illness/injury is a disease/condition lasting 3 months or longer.    The noted clinical guidelines are only system guidelines and do not replace the providers clinical judgment.    Provider, please specify diagnosis or diagnoses associated with above clinical findings.    [  ] Mild Malnutrition - 1 characteristic outlined above within the established moderate or severe parameters     [X] Moderate Malnutrition - a minimum of 2 of the 6 moderate malnutrition characteristics noted above      [  ] Malnutrition, Unspecified degree     [  ] Other Nutritional Diagnosis (please specify): _______     [  ] Malnutrition ruled out     [  ] Clinically Undetermined     Please document in your progress notes daily for the duration of treatment until resolved and  include in your discharge summary.      References:    MAYURI Yung, & SOY Jenkins (2022, April). Assessment and management of anorexia and cachexia in palliative care. Retrieved May 23, 2022, from [email or web address]-[email or web address]-management-[email or web address]-[email or web address]-cachexia-[email or web address]-[email or web address]-care?[email or web address]=see_link     JEAN-CLAUDE Ayala, PhD, RD, Mary HUERTAS P., PhD, RN, ИРИНА Espitia MD, PhD, Mohamud LEWIS A., MS, RD, [facility], ANIL Falcon, MS, RD, The Academy Malnutrition Work Group, The A.S.P.E.N. Board of Directors. (2012). Consensus Statement: Academy of Nutrition and Dietetics and American Society for Parenteral and Enteral Nutrition: Characteristics Recommended for the Identification and Documentation of Adult Malnutrition (Undernutrition). Journal of Parenteral and Enteral Nutrition, 36(3), 275-283. doi:10.1177/3218769517975456      Form No. 20833

## 2022-11-22 NOTE — NURSING
1400 Family at bedside, orders to withdraw all life support. 1420 patient pronounced per Dr. Diaz. FAHEEM contacted referral number [id number] not suitable for tissue due to hx of CA. Lacy with [facility] back put hold on transferring body to  home until next of kin contacted. House supervisor aware of hold. All lines removed, Rt Art line, Rt femoral TLC, Rt Mediport de assessed,lorenzo and rectal tubed removed. Patient cleaned ready for  home.

## 2022-11-22 NOTE — PROGRESS NOTES
[facility] - ICU  Pulmonary Critical Care Note    Patient Name: Adrienne Urbina  MRN: [id number]  Admission Date: 10/31/2022  Hospital Length of Stay: 21 days  Code Status: Full Code  Attending Provider: Glenn Cantu Jr., MD, *  Primary Care Provider: Gregorio Cherry NP     Subjective:     HPI:   Ms. 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  Patient is currently vent the past wean sedation having met with increased blood pressure and tachycardia.  CPAP trials have been tried however patient's tidal volumes have been adequate.  Discussions were had with family and they will come to a decision this week.    Hospital Course/Significant events:  Intubated 11/02/2022  Patient was initiated on stress dose steroids on 11/10, steroids were stopped on 11/14 when patient's he went that mimics were stable.    11/14 patient was initiated on voriconazole       24 Hour Interval History:  Patient is intubated and sedated, she is requiring increasing doses of vasopressors.  Patient is currently on 0.22 of Levophed, and 0.04 of vasopressin.  She is sedated on 10 Diprivan, 75 of fentanyl, 0.6 of Precedex.  She is currently on assist control 27/450/Peep+ 5/ 50% fio2.  ABG 7.11/59/76/18.7.  Patient had bloody secretions from her ET tube yesterday when she was turned.  Patient's daughter and power of  was present and she is now aware of patient's grim prognosis, per conversations they will make a decision soon.  Past Medical History:   Diagnosis Date    Anxiety and depression     Cancer     lung with brain metastasis    Cholelithiasis     COPD (chronic obstructive pulmonary disease)     Hypertension      Lumbar disc disease     PAD (peripheral artery disease)     Paroxysmal SVT (supraventricular tachycardia)     Pleural effusion        Social History     Socioeconomic History    Marital status:    Tobacco Use    Smoking status: Some Days     Packs/day: 0.25     Years: 49.00     Pack years: 12.25     Types: Cigarettes     Last attempt to quit: 2022     Years since quittin.3    Smokeless tobacco: Never   Substance and Sexual Activity    Alcohol use: Never    Drug use: Never    Sexual activity: Yes     Social Determinants of Health     Financial Resource Strain: Low Risk     Difficulty of Paying Living Expenses: Not very hard   Food Insecurity: No Food Insecurity    Worried About Running Out of Food in the Last Year: Never true    Ran Out of Food in the Last Year: Never true   Transportation Needs: No Transportation Needs    Lack of Transportation (Medical): No    Lack of Transportation (Non-Medical): No   Physical Activity: Inactive    Days of Exercise per Week: 0 days    Minutes of Exercise per Session: 0 min   Stress: No Stress Concern Present    Feeling of Stress : Not at all   Social Connections: Moderately Isolated    Frequency of Communication with Friends and Family: More than three times a week    Frequency of Social Gatherings with Friends and Family: More than three times a week    Attends [employer] Services: Never    Active Member of Clubs or Organizations: No    Attends Club or Organization Meetings: Never    Marital Status:    Housing Stability: Low Risk     Unable to Pay for Housing in the Last Year: No    Number of Places Lived in the Last Year: 1    Unstable Housing in the Last Year: No       Objective:     Current Outpatient Medications   Medication Instructions    albuterol (PROVENTIL/VENTOLIN HFA) 90 mcg/actuation inhaler Currently holding due to heart rate    albuterol-ipratropium (DUO-NEB) 2.5 mg-0.5 mg/3 mL nebulizer solution 3 mLs, Nebulization, Every 4 hours PRN, Currently  holding due to heartrate    aspirin 81 mg, Oral, Daily    ATROVENT HFA 17 mcg/actuation inhaler Currently holding due to heartrate    cyproheptadine (PERIACTIN) 4 mg, Oral, As needed (PRN)    dicyclomine (BENTYL) 10 mg, Oral, 3 times daily PRN    diltiaZEM (CARDIZEM CD) 360 mg, Oral, Daily    folic acid (FOLVITE) 1 mg, Oral, Daily    gabapentin (NEURONTIN) 300 mg, Oral, 3 times daily    guaiFENesin-codeine 100-10 mg/5 ml (TUSSI-ORGANIDIN NR)  mg/5 mL syrup 10 mLs, Oral, As needed (PRN)    HUMULIN R REGULAR U-100 INSULN 100 unit/mL injection Holding since in patient stay    loratadine (CLARITIN) 10 mg, Oral, Daily    metFORMIN (GLUCOPHAGE) 500 mg, Oral, 2 times daily    metoprolol tartrate (LOPRESSOR) 100 mg, Oral, 2 times daily    mirtazapine (REMERON) 15 mg, Oral, Nightly    montelukast (SINGULAIR) 10 mg, Oral, As needed (PRN)    ONETOUCH DELICA PLUS LANCET 33 gauge Misc No dose, route, or frequency recorded.    ONETOUCH VERIO REFLECT METER Misc No dose, route, or frequency recorded.    ONETOUCH VERIO TEST STRIPS Strp No dose, route, or frequency recorded.    OXYGEN-AIR DELIVERY SYSTEMS MISC   oxygen, See Instructions, home concentrator and portable @ 2L per nasal cannula continuous to keep SATS at 88% or greater J18.9, C34.9, J96.9, R09.02, # 1 EA, 0 Refill(s)    tiZANidine (ZANAFLEX) 4 MG tablet No dose, route, or frequency recorded.       Current Inpatient Medications   acetaminophen  650 mg Oral Once    amiodarone  200 mg Oral BID    insulin detemir U-100  10 Units Subcutaneous BID    pantoprozole (PROTONIX) IV  40 mg Intravenous Q12H    voriconazole  200 mg Oral BID         Intake/Output Summary (Last 24 hours) at 11/22/2022 0613  Last data filed at 11/22/2022 0553  Gross per 24 hour   Intake 3926.89 ml   Output 958 ml   Net 2968.89 ml         Review of Systems   Unable to perform ROS: Intubated      Vital Signs (Most Recent):  Temp: 97.6 °F (36.4 °C) (11/22/22 0422)  Pulse: (!) 112 (11/22/22  0600)  Resp: (!) 26 (11/22/22 0600)  BP: 112/65 (11/22/22 0600)  SpO2: 98 % (11/22/22 0600)    Body mass index is 32.35 kg/m².  Weight: 90.9 kg (200 lb 6.4 oz) Vital Signs (24h Range):  Temp:  [97.6 °F (36.4 °C)-99.5 °F (37.5 °C)] 97.6 °F (36.4 °C)  Pulse:  [] 112  Resp:  [10-30] 26  SpO2:  [89 %-100 %] 98 %  BP: ()/(45-88) 112/65  Arterial Line BP: ()/(27-63) 133/47     Physical Exam  Vitals and nursing note reviewed.   Constitutional:       General: She is not in acute distress.     Appearance: She is ill-appearing. She is not toxic-appearing.      Comments: Intubated and sedated   HENT:      Head: Normocephalic and atraumatic.      Nose: Nose normal.      Mouth/Throat:      Mouth: Mucous membranes are moist.      Pharynx: Oropharynx is clear.   Eyes:      Extraocular Movements: Extraocular movements intact.      Conjunctiva/sclera: Conjunctivae normal.      Pupils: Pupils are equal, round, and reactive to light.   Cardiovascular:      Rate and Rhythm: Normal rate and regular rhythm.      Pulses: Normal pulses.      Heart sounds: Normal heart sounds. No murmur heard.    No gallop.   Pulmonary:      Effort: No respiratory distress.      Breath sounds: No stridor. No wheezing, rhonchi or rales.   Abdominal:      General: Abdomen is flat.      Palpations: Abdomen is soft.   Musculoskeletal:         General: No swelling or deformity.      Right lower leg: No edema.      Left lower leg: No edema.   Skin:     General: Skin is warm and dry.      Coloration: Skin is not jaundiced.      Findings: No bruising.   Neurological:      General: No focal deficit present.      Mental Status: She is alert and oriented to person, place, and time.      Comments: Patient sedated and therefore unable to accurately exam     Bloody secretions.  Mechanical ventilation support:  Vent Mode: A/C (11/22/22 0526)  Set Rate: 26 BPM (11/22/22 0526)  Vt Set: 450 mL (11/22/22 0526)  PEEP/CPAP: 5 cmH20 (11/22/22 0526)  Oxygen  Concentration (%): 50 (11/22/22 0526)  Peak Airway Pressure: 32 cmH2O (11/22/22 0526)  Plateau Pressure: 29 cmH20 (11/21/22 2147)  Total Ve: 10.9 L/m (11/22/22 0526)  F/VT Ratio<105 (RSBI): (!) 62.07 (11/22/22 0152)    Lines/Drains/Airways       Central Venous Catheter Line  Duration             Port A Cath Single Lumen right subclavian -- days    Percutaneous Central Line Insertion/Assessment - Triple Lumen  11/10/22 1545 right femoral vein;right femoral 11 days              Drain  Duration                  NG/OG Tube 11/02/22 1355 [place] sump 16 Fr. Left mouth 19 days         Urethral Catheter 11/02/22 1400 Sil[place]one 16 Fr. 19 days         Rectal Tube 11/07/22 0408 rectal tube w/ balloon (indicate number of mLs) 15 days              Airway  Duration                  Airway - Non-Surgical 11/02/22 1400 Endotracheal Tube 19 days              Arterial Line  Duration             Arterial Line 11/07/22 1340 Right Radial 14 days                    Significant Labs:    Lab Results   Component Value Date    WBC 35.3 (H) 11/22/2022    HGB 9.8 (L) 11/22/2022    HCT 32.7 (L) 11/22/2022    .6 (H) 11/22/2022     11/22/2022         BMP  Lab Results   Component Value Date     (L) 11/22/2022    K 3.7 11/22/2022    CO2 17 (L) 11/22/2022    BUN 49.1 (H) 11/22/2022    CREATININE 1.67 (H) 11/22/2022    CALCIUM 9.0 11/22/2022    EGFRNONAA 81 04/22/2022       ABG  Recent Labs   Lab 11/22/22  0534   PH 7.11*   PO2 76   PCO2 59*   HCO3 18.7*             Significant Imaging:  I have reviewed all pertinent imaging within the past 24 hours.    Assessment/Plan:   1.) Massive Hemoptysis  2.) Hx of Stage IV Poorly Differentiated RUL Lung Carcinoma  3.) Serratia M. Pneumonia with leukocytosis  4.) Hx of Atrial Fibrillation/flutter (not on Eliquis)  5.)  Nonocclusive acute deep vein thrombosis visualized left axillary vein and occlusive thrombus in left cephalic vein.  6.) Hx of COPD (on home 2L NC q.Nightly)  7.) Hx of  HTN  8.) Hx of HLD  9.) Hx of PAD  10) NAGMA    -will give 2 amps of bicarbonate, and initiate a sterile water and 3 amps of bicarbonate drip at 100 cc/hour.  Will stop TPN at this time  -will continue voriconazole  -patient's leukocytosis has doubled, and her vasopressor requirement has also increased will trend lactates and touch base with intensivist with regards to adding back empiric antibiotics    Prognosis guarded.  This is day 19 of endotracheal tube intubation.  Patient's family is aware of patient's grave prognosis    DVT Prophylaxis:  Continue to hold all anticoagulation including aspirin for ongoing hemoptysis  GI Prophylaxis:  Protonix     Greater than 30 minutes of critical care was time spent personally by me on the following activities: development of treatment plan with patient or surrogate and bedside caregivers, discussions with consultants, evaluation of patient's response to treatment, examination of patient, ordering and performing treatments and interventions, ordering and review of laboratory studies, ordering and review of radiographic studies, pulse oximetry, re-evaluation of patient's condition.  This critical care time did not overlap with that of any other provider or involve time for any procedures.     Ebony Leal MD  Pulmonary Critical Care Medicine  [facility] - ICU

## 2022-11-22 NOTE — PLAN OF CARE
11/22/22 1103   Discharge Reassessment   Assessment Type Discharge Planning Reassessment   Did the patient's condition or plan change since previous assessment? Yes   Discharge Plan discussed with: Spouse/sig other   Name(s) and Number(s) Toney Urbina ([phone])   Discharge Plan A Comfort care/withdrawal   Discharge Plan B Comfort care/withdrawal   DME Needed Upon Discharge  none   ICU nurse reports pt in process of dying-remains Full Code status. Contacted spouse, Toney Urbina, who stated he and pt's dghtr, Leslie Bhatt, planning to meet with physician/staff at 1:00 pm to discuss withdrawal of care. Apprised 4 [facility]ades nurse, Inna Adams & Dr. Ebony Leal with Team 3.

## 2022-11-23 RX ORDER — HYDROCODONE BITARTRATE AND ACETAMINOPHEN 10; 325 MG/1; MG/1
TABLET ORAL
Qty: 180 TABLET | OUTPATIENT
Start: 2022-11-23

## 2022-11-25 LAB
BACTERIA SPEC CULT: NORMAL
MYCOBACTERIUM SPEC QL CULT: NORMAL

## 2022-12-05 LAB — FUNGUS SPEC CULT: NORMAL

## 2022-12-17 LAB — MYCOBACTERIUM SPEC QL CULT: NORMAL

## 2022-12-19 LAB — FUNGUS SPEC CULT: NORMAL

## 2023-07-04 NOTE — PLAN OF CARE
Problem: Adult Inpatient Plan of Care  Goal: Plan of Care Review  Outcome: Ongoing, Progressing  Goal: Patient-Specific Goal (Individualized)  Outcome: Ongoing, Progressing  Goal: Absence of Hospital-Acquired Illness or Injury  Outcome: Ongoing, Progressing  Goal: Optimal Comfort and Wellbeing  Outcome: Ongoing, Progressing  Goal: Readiness for Transition of Care  Outcome: Ongoing, Progressing     Problem: Infection  Goal: Absence of Infection Signs and Symptoms  Outcome: Ongoing, Progressing     Problem: Skin Injury Risk Increased  Goal: Skin Health and Integrity  Outcome: Ongoing, Progressing     Problem: Communication Impairment (Mechanical Ventilation, Invasive)  Goal: Effective Communication  Outcome: Ongoing, Progressing     Problem: Device-Related Complication Risk (Mechanical Ventilation, Invasive)  Goal: Optimal Device Function  Outcome: Ongoing, Progressing     Problem: Inability to Wean (Mechanical Ventilation, Invasive)  Goal: Mechanical Ventilation Liberation  Outcome: Ongoing, Progressing     Problem: Nutrition Impairment (Mechanical Ventilation, Invasive)  Goal: Optimal Nutrition Delivery  Outcome: Ongoing, Progressing     Problem: Skin and Tissue Injury (Mechanical Ventilation, Invasive)  Goal: Absence of Device-Related Skin and Tissue Injury  Outcome: Ongoing, Progressing     Problem: Ventilator-Induced Lung Injury (Mechanical Ventilation, Invasive)  Goal: Absence of Ventilator-Induced Lung Injury  Outcome: Ongoing, Progressing     Problem: Communication Impairment (Artificial Airway)  Goal: Effective Communication  Outcome: Ongoing, Progressing     Problem: Device-Related Complication Risk (Artificial Airway)  Goal: Optimal Device Function  Outcome: Ongoing, Progressing     Problem: Skin and Tissue Injury (Artificial Airway)  Goal: Absence of Device-Related Skin or Tissue Injury  Outcome: Ongoing, Progressing     Problem: Noninvasive Ventilation Acute  Goal: Effective Unassisted Ventilation and  PA completed await outcome Oxygenation  Outcome: Ongoing, Progressing     Problem: Fall Injury Risk  Goal: Absence of Fall and Fall-Related Injury  Outcome: Ongoing, Progressing

## 2023-08-16 LAB
INR PPP: 1.1
PROTHROMBIN TIME: 14.1 SECONDS (ref 11.4–14)

## (undated) DEVICE — ADHESIVE DERMABOND ADVANCED

## (undated) DEVICE — SUT 3-0 VICRYL / SH (J416)

## (undated) DEVICE — NDL HYPO REG 25G X 1 1/2

## (undated) DEVICE — APPLICATOR CHLORAPREP ORN 26ML

## (undated) DEVICE — GLOVE PROTEXIS LTX MICRO 8

## (undated) DEVICE — GLOVE PROTEXIS LTX MICRO 6.5

## (undated) DEVICE — GLOVE PROTEXIS LTX MICRO 6

## (undated) DEVICE — GLOVE PROTEXIS HYDROGEL SZ7.5

## (undated) DEVICE — LOOP STERION MAXI YEL 1X406MM

## (undated) DEVICE — GLOVE PROTEXIS BLUE LATEX 7

## (undated) DEVICE — DRAPE C-ARM COVER EZ 36X28IN

## (undated) DEVICE — TUBE PORTEX UNCUFFED ORALSIZE

## (undated) DEVICE — Device

## (undated) DEVICE — INTRO KIT MICPUNC STIFF CANN

## (undated) DEVICE — SUT PROLENE 6-0 BV-1 30IN

## (undated) DEVICE — SYR DISP LL 5CC

## (undated) DEVICE — BAG MEDI-PLAST DECANTER C-FLOW

## (undated) DEVICE — SUT 7/0 24IN PROLENE BL MO

## (undated) DEVICE — GLOVE PROTEXIS BLUE LATEX 7.5

## (undated) DEVICE — BLADE SURG STAINLESS STEEL #11

## (undated) DEVICE — PORT POWER CLEAR VIEW: Type: IMPLANTABLE DEVICE | Site: CHEST | Status: NON-FUNCTIONAL

## (undated) DEVICE — HANDLE DEVON RIGID OR LIGHT

## (undated) DEVICE — PENCIL ELECSURG ROCKER 15FT

## (undated) DEVICE — LOOP STERION MINI RED 8X406MM

## (undated) DEVICE — CONTAINER SPECIMEN 4.5OZ

## (undated) DEVICE — TIP SUCTION YANKAUER

## (undated) DEVICE — SUT SA85H SILK 2-0

## (undated) DEVICE — GOWN X-LG STERILE BACK

## (undated) DEVICE — GAUZE VISTEC XR DTECT 16 4X4IN

## (undated) DEVICE — SUT PROLENE 5-0 RB1 8756H

## (undated) DEVICE — COVER SITE-RITE PROBE 96IN

## (undated) DEVICE — APPLIER LIGACLIP MED 11IN

## (undated) DEVICE — SUT PDSII 4-0 PS-2 CLEAR MO

## (undated) DEVICE — SUT PROLENE 3-0 PS-2 BL 18IN

## (undated) DEVICE — GEL AQUASONIC 100 STERILE20GM

## (undated) DEVICE — SYR 10CC LUER LOCK